# Patient Record
Sex: FEMALE | Race: BLACK OR AFRICAN AMERICAN | NOT HISPANIC OR LATINO | URBAN - METROPOLITAN AREA
[De-identification: names, ages, dates, MRNs, and addresses within clinical notes are randomized per-mention and may not be internally consistent; named-entity substitution may affect disease eponyms.]

---

## 2018-01-14 ENCOUNTER — EMERGENCY (EMERGENCY)
Facility: HOSPITAL | Age: 68
LOS: 1 days | Discharge: HOME | End: 2018-01-14
Attending: EMERGENCY MEDICINE | Admitting: EMERGENCY MEDICINE
Payer: MEDICARE

## 2018-01-14 VITALS
DIASTOLIC BLOOD PRESSURE: 79 MMHG | OXYGEN SATURATION: 98 % | SYSTOLIC BLOOD PRESSURE: 153 MMHG | RESPIRATION RATE: 16 BRPM | TEMPERATURE: 98 F | HEART RATE: 60 BPM

## 2018-01-14 DIAGNOSIS — Z98.89 OTHER SPECIFIED POSTPROCEDURAL STATES: Chronic | ICD-10-CM

## 2018-01-14 PROCEDURE — 99283 EMERGENCY DEPT VISIT LOW MDM: CPT

## 2018-01-14 RX ORDER — TRAMADOL HYDROCHLORIDE 50 MG/1
1 TABLET ORAL
Qty: 16 | Refills: 0 | OUTPATIENT
Start: 2018-01-14 | End: 2018-01-17

## 2018-01-14 NOTE — ED PROVIDER NOTE - PROGRESS NOTE DETAILS
DC with instructions:  Continue Valcyclovir as prescribed. Continue Ibuprofen 400mg every 6 hours with food as needed for pain. If pain not controlled, add TRAMADOL 50mg every 6 hours as needed. (Do Not Drive if you take this). For itch take Benadryl 25mg every 6 hours as needed. If you get eye pain or difficulty with vision return to ER

## 2018-01-14 NOTE — ED PROVIDER NOTE - PSH
C Section  x 1  History of ovarian cystectomy  x 2 in 2013  S/P Appendectomy  patient denies  S/P primary angioplasty with coronary stent  has 5 stents-first placed in 2007, last stent "I think it was 2011, more than a year ago"

## 2018-01-14 NOTE — ED PROVIDER NOTE - OBJECTIVE STATEMENT
66 y/o F w/ DM, HTN, CAD c/o itchy rash to the forehead and rt face x2wks w/ associated pain. Seen by her PMD yesterday, dx w/ shingles and started on valacyclovir and ibuprofen. Came to ED today due to difficulty sleeping last night secondary to pain, itchiness, and HA. Denies visual changes and other complaints. Regular meds are Eliquis, Norvasc, hydrochlorothiazide, atorvastatin, metoprolol, metformin, and levothyroxine. 68 y/o F w/ DM, HTN, CAD c/o itchy painful rash to the forehead and rt face over 1 week  w/ associated pain. Seen by her PMD yesterday, dx w/ shingles and started on valacyclovir and ibuprofen. Came to ED today due to difficulty sleeping last night secondary to pain, itchiness, and HA. Denies visual changes and other complaints. Regular meds are Eliquis, Norvasc, hydrochlorothiazide, atorvastatin, metoprolol, metformin, and levothyroxine.

## 2018-01-14 NOTE — ED PROVIDER NOTE - SKIN RASH DESCRIPTION
vesicular erythematous rash on rt forehead w/ some erythema extending down anterior to rt ear consistent w/ zoster rash, no rash elsewhere

## 2018-01-14 NOTE — ED PROVIDER NOTE - RIGHT FACE
vesicular erythematous rash on rt forehead w/ some erythema extending down anterior to rt ear consistent w/ zoster rash

## 2018-01-14 NOTE — ED PROVIDER NOTE - PMH
CAD (Coronary Artery Disease) (ICD9 414.00)  c STENTS  DM (Diabetes Mellitus) (ICD9 250.00)    Dyslipidemia (ICD9 272.4)    HTN (Hypertension) (ICD9 401.9)    Obesity    NACHO (obstructive sleep apnea)  pt's. daughter states pt. was told that she has NACHO but never had a sleep study; pt denies

## 2018-01-14 NOTE — ED ADULT TRIAGE NOTE - CHIEF COMPLAINT QUOTE
Patient states" I have painful rash on my right fore head and swelling  to right ear and neck since yesterday ." patient went to her doctor yesterday and was given meds for Shingles .h/o stentsx5, HTN ,DM , HLD

## 2018-01-14 NOTE — ED PROVIDER NOTE - MEDICAL DECISION MAKING DETAILS
Herpes Zoster rash in V1 distribution, no ocular involvement. Plan - continue valacyclovir, optimize analgesia, warn of danger signs, f/u PCP.

## 2018-08-31 ENCOUNTER — EMERGENCY (EMERGENCY)
Facility: HOSPITAL | Age: 68
LOS: 1 days | Discharge: HOME | End: 2018-08-31
Attending: EMERGENCY MEDICINE | Admitting: EMERGENCY MEDICINE
Payer: MEDICARE

## 2018-08-31 VITALS
OXYGEN SATURATION: 100 % | HEART RATE: 64 BPM | RESPIRATION RATE: 16 BRPM | SYSTOLIC BLOOD PRESSURE: 153 MMHG | TEMPERATURE: 98 F | DIASTOLIC BLOOD PRESSURE: 77 MMHG

## 2018-08-31 DIAGNOSIS — Z98.89 OTHER SPECIFIED POSTPROCEDURAL STATES: Chronic | ICD-10-CM

## 2018-08-31 PROCEDURE — 99284 EMERGENCY DEPT VISIT MOD MDM: CPT | Mod: 25

## 2018-08-31 NOTE — ED PROVIDER NOTE - CARE PLAN
Principal Discharge DX:	Umbilical hernia without obstruction or gangrene  Assessment and plan of treatment:	1. TAKE ALL MEDICATIONS AS DIRECTED.    2. FOR PAIN OR FEVER YOU CAN TAKE IBUPROFEN (MOTRIN, ADVIL) OR ACETAMINOPHEN (TYLENOL) AS NEEDED, AS DIRECTED ON PACKAGING.  3. FOLLOW UP WITH YOUR PRIMARY DOCTOR WITHIN 5 DAYS AS DIRECTED.  4. IF YOU HAD LABS OR IMAGING DONE, YOU WERE GIVEN COPIES OF ALL LABS AND/OR IMAGING RESULTS FROM YOUR ER VISIT--PLEASE TAKE THEM WITH YOU TO YOUR FOLLOW UP APPOINTMENTS.  5. IF NEEDED, CALL PATIENT ACCESS SERVICES AT [phone] TO FIND A PRIMARY CARE PHYSICIAN.  OR CALL [phone] TO MAKE AN APPOINTMENT WITH THE CLINIC.  6. RETURN TO THE ER FOR ANY WORSENING SYMPTOMS OR CONCERNS.

## 2018-08-31 NOTE — ED PROVIDER NOTE - OBJECTIVE STATEMENT
68F CAD (Coronary Artery Disease) (ICD9 414.00)  c STENTS  DM (Diabetes Mellitus) (ICD9 250.00)    Dyslipidemia (ICD9 272.4)    HTN (Hypertension) (ICD9 401.9)    Obesity    NACHO 68F CAD, DM, HLD, HTN, NACHO presents with pain in the umbilicus worsening for 2 days. History of abdominal surgery 2 years ago. Also noted b/l leg swelling since the beginning of summer. Denies fever, chills, N/V/D. PMD told her to come to ER. No other complaints.

## 2018-08-31 NOTE — ED PROVIDER NOTE - MEDICAL DECISION MAKING DETAILS
68F p/w periumbilical pain and leg swelling b/l. Possible umbilical hernia/abscess though no fever. Leg swelling likely from venous stasis no signs of DVT. Will get labs, CT abd pel. Admit if sig findings.

## 2018-08-31 NOTE — ED PROVIDER NOTE - PLAN OF CARE
1. TAKE ALL MEDICATIONS AS DIRECTED.    2. FOR PAIN OR FEVER YOU CAN TAKE IBUPROFEN (MOTRIN, ADVIL) OR ACETAMINOPHEN (TYLENOL) AS NEEDED, AS DIRECTED ON PACKAGING.  3. FOLLOW UP WITH YOUR PRIMARY DOCTOR WITHIN 5 DAYS AS DIRECTED.  4. IF YOU HAD LABS OR IMAGING DONE, YOU WERE GIVEN COPIES OF ALL LABS AND/OR IMAGING RESULTS FROM YOUR ER VISIT--PLEASE TAKE THEM WITH YOU TO YOUR FOLLOW UP APPOINTMENTS.  5. IF NEEDED, CALL PATIENT ACCESS SERVICES AT [phone] TO FIND A PRIMARY CARE PHYSICIAN.  OR CALL [phone] TO MAKE AN APPOINTMENT WITH THE CLINIC.  6. RETURN TO THE ER FOR ANY WORSENING SYMPTOMS OR CONCERNS.

## 2018-08-31 NOTE — ED ADULT TRIAGE NOTE - CHIEF COMPLAINT QUOTE
Abdominal pain today sent by MD. Denies n/v/d. States no BM since Monday. Denies CP, SOB Hx 5 stents on eliquis Abdominal pain today sent by MD. Denies n/v/d. States no BM since Monday. Also endorsing swelling to blle. Denies CP, SOB Hx 5 stents on eliquis

## 2018-08-31 NOTE — ED PROVIDER NOTE - PHYSICAL EXAMINATION
GEN - NAD; well appearing; A+O x3 Obese  HEAD - NC/AT     ENT - PEERL, EOMI, mucous membranes  moist , no discharge      NECK: Neck supple, non-tender without lymphadenopathy, no masses, no JVD  PULM - CTA b/l,  symmetric breath sounds  COR -  normal heart sounds    ABD - , ND, periumbilical ttp, small umbilical abd wall defect palpable, soft,  BACK - no CVA tenderness, nontender spine     EXTREMS - (+)nonpitting edema, no deformity, warm and well perfused    SKIN - no rash or bruising      NEUROLOGIC - alert, CN 2-12 intact, sensation nl, motor no focal deficit.

## 2018-08-31 NOTE — ED PROVIDER NOTE - ATTENDING CONTRIBUTION TO CARE
68F p/w umbilical pain, also c/o bilat LE swelling, worse when it is hot; told PMD about umbilicus pain, sent to ED.  H/o “cysts in abdomen” surgery in the past, no vomiting no diarrhea, no fever.  Pt obese, bilat nonpitting edema.  No masses, reports umbilical ttp. Potentially umbilical hernia - fat containing, as pt without obstructive sx.  Also check LE for DVTs with duplexes.  Reassess.  Liklely d/c home f/u PMD.   VS:  unremarkable except htn    GEN - NAD; well appearing; A+O x3 Obese  HEAD - NC/AT     ENT - PEERL, EOMI, mucous membranes  moist , no discharge      NECK: Neck supple, non-tender without lymphadenopathy, no masses, no JVD  PULM - CTA b/l,  symmetric breath sounds  COR -  normal heart sounds    ABD - , ND, periumbilical ttp, small umbilical abd wall defect palpable, soft,  BACK - no CVA tenderness, nontender spine     EXTREMS - (+)nonpitting edema, no deformity, warm and well perfused    SKIN - no rash or bruising      NEUROLOGIC - alert, CN 2-12 intact, sensation nl, motor no focal deficit.

## 2018-09-01 VITALS
TEMPERATURE: 98 F | RESPIRATION RATE: 18 BRPM | OXYGEN SATURATION: 99 % | SYSTOLIC BLOOD PRESSURE: 160 MMHG | HEART RATE: 57 BPM | DIASTOLIC BLOOD PRESSURE: 70 MMHG

## 2018-09-01 LAB
ALBUMIN SERPL ELPH-MCNC: 3.9 G/DL — SIGNIFICANT CHANGE UP (ref 3.3–5)
ALP SERPL-CCNC: 134 U/L — HIGH (ref 40–120)
ALT FLD-CCNC: 13 U/L — SIGNIFICANT CHANGE UP (ref 4–33)
APPEARANCE UR: CLEAR — SIGNIFICANT CHANGE UP
AST SERPL-CCNC: 17 U/L — SIGNIFICANT CHANGE UP (ref 4–32)
BASE EXCESS BLDV CALC-SCNC: 4.1 MMOL/L — SIGNIFICANT CHANGE UP
BASOPHILS # BLD AUTO: 0.04 K/UL — SIGNIFICANT CHANGE UP (ref 0–0.2)
BASOPHILS NFR BLD AUTO: 0.7 % — SIGNIFICANT CHANGE UP (ref 0–2)
BILIRUB SERPL-MCNC: < 0.2 MG/DL — LOW (ref 0.2–1.2)
BILIRUB UR-MCNC: NEGATIVE — SIGNIFICANT CHANGE UP
BLOOD GAS VENOUS - CREATININE: 0.59 MG/DL — SIGNIFICANT CHANGE UP (ref 0.5–1.3)
BLOOD UR QL VISUAL: NEGATIVE — SIGNIFICANT CHANGE UP
BUN SERPL-MCNC: 15 MG/DL — SIGNIFICANT CHANGE UP (ref 7–23)
CALCIUM SERPL-MCNC: 9.2 MG/DL — SIGNIFICANT CHANGE UP (ref 8.4–10.5)
CHLORIDE BLDV-SCNC: 105 MMOL/L — SIGNIFICANT CHANGE UP (ref 96–108)
CHLORIDE SERPL-SCNC: 102 MMOL/L — SIGNIFICANT CHANGE UP (ref 98–107)
CO2 SERPL-SCNC: 24 MMOL/L — SIGNIFICANT CHANGE UP (ref 22–31)
COLOR SPEC: COLORLESS — SIGNIFICANT CHANGE UP
CREAT SERPL-MCNC: 0.69 MG/DL — SIGNIFICANT CHANGE UP (ref 0.5–1.3)
EOSINOPHIL # BLD AUTO: 0.09 K/UL — SIGNIFICANT CHANGE UP (ref 0–0.5)
EOSINOPHIL NFR BLD AUTO: 1.5 % — SIGNIFICANT CHANGE UP (ref 0–6)
GAS PNL BLDV: 139 MMOL/L — SIGNIFICANT CHANGE UP (ref 136–146)
GLUCOSE BLDV-MCNC: 231 — HIGH (ref 70–99)
GLUCOSE SERPL-MCNC: 238 MG/DL — HIGH (ref 70–99)
GLUCOSE UR-MCNC: NEGATIVE — SIGNIFICANT CHANGE UP
HCO3 BLDV-SCNC: 28 MMOL/L — HIGH (ref 20–27)
HCT VFR BLD CALC: 33.3 % — LOW (ref 34.5–45)
HCT VFR BLDV CALC: 32.2 % — LOW (ref 34.5–45)
HGB BLD-MCNC: 10.2 G/DL — LOW (ref 11.5–15.5)
HGB BLDV-MCNC: 10.4 G/DL — LOW (ref 11.5–15.5)
IMM GRANULOCYTES # BLD AUTO: 0.01 # — SIGNIFICANT CHANGE UP
IMM GRANULOCYTES NFR BLD AUTO: 0.2 % — SIGNIFICANT CHANGE UP (ref 0–1.5)
KETONES UR-MCNC: NEGATIVE — SIGNIFICANT CHANGE UP
LACTATE BLDV-MCNC: 2.1 MMOL/L — HIGH (ref 0.5–2)
LEUKOCYTE ESTERASE UR-ACNC: NEGATIVE — SIGNIFICANT CHANGE UP
LIDOCAIN IGE QN: 33.6 U/L — SIGNIFICANT CHANGE UP (ref 7–60)
LYMPHOCYTES # BLD AUTO: 2.47 K/UL — SIGNIFICANT CHANGE UP (ref 1–3.3)
LYMPHOCYTES # BLD AUTO: 40.5 % — SIGNIFICANT CHANGE UP (ref 13–44)
MCHC RBC-ENTMCNC: 23.4 PG — LOW (ref 27–34)
MCHC RBC-ENTMCNC: 30.6 % — LOW (ref 32–36)
MCV RBC AUTO: 76.4 FL — LOW (ref 80–100)
MONOCYTES # BLD AUTO: 0.32 K/UL — SIGNIFICANT CHANGE UP (ref 0–0.9)
MONOCYTES NFR BLD AUTO: 5.2 % — SIGNIFICANT CHANGE UP (ref 2–14)
NEUTROPHILS # BLD AUTO: 3.17 K/UL — SIGNIFICANT CHANGE UP (ref 1.8–7.4)
NEUTROPHILS NFR BLD AUTO: 51.9 % — SIGNIFICANT CHANGE UP (ref 43–77)
NITRITE UR-MCNC: NEGATIVE — SIGNIFICANT CHANGE UP
NRBC # FLD: 0 — SIGNIFICANT CHANGE UP
PCO2 BLDV: 47 MMHG — SIGNIFICANT CHANGE UP (ref 41–51)
PH BLDV: 7.4 PH — SIGNIFICANT CHANGE UP (ref 7.32–7.43)
PH UR: 7.5 — SIGNIFICANT CHANGE UP (ref 5–8)
PLATELET # BLD AUTO: 234 K/UL — SIGNIFICANT CHANGE UP (ref 150–400)
PMV BLD: 11 FL — SIGNIFICANT CHANGE UP (ref 7–13)
PO2 BLDV: 58 MMHG — HIGH (ref 35–40)
POTASSIUM BLDV-SCNC: 3.1 MMOL/L — LOW (ref 3.4–4.5)
POTASSIUM SERPL-MCNC: 3.5 MMOL/L — SIGNIFICANT CHANGE UP (ref 3.5–5.3)
POTASSIUM SERPL-SCNC: 3.5 MMOL/L — SIGNIFICANT CHANGE UP (ref 3.5–5.3)
PROT SERPL-MCNC: 7.5 G/DL — SIGNIFICANT CHANGE UP (ref 6–8.3)
PROT UR-MCNC: NEGATIVE — SIGNIFICANT CHANGE UP
RBC # BLD: 4.36 M/UL — SIGNIFICANT CHANGE UP (ref 3.8–5.2)
RBC # FLD: 15.9 % — HIGH (ref 10.3–14.5)
SAO2 % BLDV: 88.3 % — HIGH (ref 60–85)
SODIUM SERPL-SCNC: 141 MMOL/L — SIGNIFICANT CHANGE UP (ref 135–145)
SP GR SPEC: 1.02 — SIGNIFICANT CHANGE UP (ref 1–1.04)
UROBILINOGEN FLD QL: NORMAL — SIGNIFICANT CHANGE UP
WBC # BLD: 6.1 K/UL — SIGNIFICANT CHANGE UP (ref 3.8–10.5)
WBC # FLD AUTO: 6.1 K/UL — SIGNIFICANT CHANGE UP (ref 3.8–10.5)

## 2018-09-01 PROCEDURE — 74177 CT ABD & PELVIS W/CONTRAST: CPT | Mod: 26

## 2018-09-01 NOTE — ED ADULT NURSE NOTE - CHIEF COMPLAINT QUOTE
Abdominal pain today sent by MD. Denies n/v/d. States no BM since Monday. Also endorsing swelling to blle. Denies CP, SOB Hx 5 stents on eliquis

## 2018-09-01 NOTE — ED ADULT NURSE NOTE - NSIMPLEMENTINTERV_GEN_ALL_ED
Implemented All Universal Safety Interventions:  [place] to call system. Call bell, personal items and telephone within reach. Instruct patient to call for assistance. Room bathroom lighting operational. Non-slip footwear when patient is off stretcher. Physically safe environment: no spills, clutter or unnecessary equipment. Stretcher in lowest position, wheels locked, appropriate side rails in place.

## 2018-09-01 NOTE — ED ADULT NURSE NOTE - OBJECTIVE STATEMENT
Pt received A&Ox4 to ED Rm 19 with c/o non-radiating umbilical pain & constipation x 5d. Also c/o BLE swelling x 3 months. Ambulates at baseline with cane. Also c/o "pain all over body - taking tylenol with codeine & not working." Denies SOB, N/V, fevers. RR equal & unlabored. Labs sent per MD orders. Awaiting CT. Will monitor.

## 2019-04-10 VITALS
TEMPERATURE: 97 F | HEIGHT: 65 IN | OXYGEN SATURATION: 100 % | RESPIRATION RATE: 17 BRPM | SYSTOLIC BLOOD PRESSURE: 175 MMHG | WEIGHT: 220.02 LBS | HEART RATE: 60 BPM | DIASTOLIC BLOOD PRESSURE: 70 MMHG

## 2019-04-10 NOTE — ASU PATIENT PROFILE, ADULT - PSH
C Section  x 1  History of hysterectomy    History of ovarian cystectomy  x 2 in 2013  S/P primary angioplasty with coronary stent  has 5 stents-first placed in 2007, last stent -2011,

## 2019-04-10 NOTE — ASU PREOP CHECKLIST - 1.
[id number]  Anesthesiologist [id number]   Anesthesiologist and Dr. Bernstein aware will treat patient in OR

## 2019-04-10 NOTE — ASU PATIENT PROFILE, ADULT - PMH
CAD (Coronary Artery Disease) (ICD9 414.00)  c STENTS  DM (Diabetes Mellitus) (ICD9 250.00)    Dyslipidemia (ICD9 272.4)    HTN (Hypertension) (ICD9 401.9)    Hypothyroid    Obesity    NACHO (obstructive sleep apnea)  sleep study done 5 years ago

## 2019-04-11 ENCOUNTER — RESULT REVIEW (OUTPATIENT)
Age: 69
End: 2019-04-11

## 2019-04-11 ENCOUNTER — OUTPATIENT (OUTPATIENT)
Dept: OUTPATIENT SERVICES | Facility: HOSPITAL | Age: 69
LOS: 1 days | Discharge: HOME | End: 2019-04-11
Payer: MEDICARE

## 2019-04-11 VITALS
OXYGEN SATURATION: 91 % | SYSTOLIC BLOOD PRESSURE: 120 MMHG | RESPIRATION RATE: 22 BRPM | HEART RATE: 62 BPM | DIASTOLIC BLOOD PRESSURE: 64 MMHG

## 2019-04-11 DIAGNOSIS — Z90.710 ACQUIRED ABSENCE OF BOTH CERVIX AND UTERUS: Chronic | ICD-10-CM

## 2019-04-11 DIAGNOSIS — Z98.89 OTHER SPECIFIED POSTPROCEDURAL STATES: Chronic | ICD-10-CM

## 2019-04-11 LAB
GLUCOSE BLDC GLUCOMTR-MCNC: 233 MG/DL — HIGH (ref 70–99)
GLUCOSE BLDC GLUCOMTR-MCNC: 240 MG/DL — HIGH (ref 70–99)
GLUCOSE BLDC GLUCOMTR-MCNC: 262 MG/DL — HIGH (ref 70–99)

## 2019-04-11 PROCEDURE — 49561: CPT

## 2019-04-11 PROCEDURE — 88302 TISSUE EXAM BY PATHOLOGIST: CPT

## 2019-04-11 PROCEDURE — 82962 GLUCOSE BLOOD TEST: CPT

## 2019-04-11 PROCEDURE — 49568: CPT

## 2019-04-11 RX ORDER — DEXTROSE 50 % IN WATER 50 %
12.5 SYRINGE (ML) INTRAVENOUS ONCE
Qty: 0 | Refills: 0 | Status: DISCONTINUED | OUTPATIENT
Start: 2019-04-11 | End: 2019-04-11

## 2019-04-11 RX ORDER — DEXTROSE 50 % IN WATER 50 %
15 SYRINGE (ML) INTRAVENOUS ONCE
Qty: 0 | Refills: 0 | Status: DISCONTINUED | OUTPATIENT
Start: 2019-04-11 | End: 2019-04-11

## 2019-04-11 RX ORDER — OXYCODONE HYDROCHLORIDE 5 MG/1
10 TABLET ORAL EVERY 4 HOURS
Qty: 0 | Refills: 0 | Status: DISCONTINUED | OUTPATIENT
Start: 2019-04-11 | End: 2019-04-11

## 2019-04-11 RX ORDER — INSULIN LISPRO 100/ML
VIAL (ML) SUBCUTANEOUS
Qty: 0 | Refills: 0 | Status: DISCONTINUED | OUTPATIENT
Start: 2019-04-11 | End: 2019-04-11

## 2019-04-11 RX ORDER — OXYCODONE HYDROCHLORIDE 5 MG/1
5 TABLET ORAL EVERY 4 HOURS
Qty: 0 | Refills: 0 | Status: DISCONTINUED | OUTPATIENT
Start: 2019-04-11 | End: 2019-04-11

## 2019-04-11 RX ORDER — DEXTROSE 50 % IN WATER 50 %
25 SYRINGE (ML) INTRAVENOUS ONCE
Qty: 0 | Refills: 0 | Status: DISCONTINUED | OUTPATIENT
Start: 2019-04-11 | End: 2019-04-11

## 2019-04-11 RX ORDER — SODIUM CHLORIDE 9 MG/ML
1000 INJECTION, SOLUTION INTRAVENOUS
Qty: 0 | Refills: 0 | Status: DISCONTINUED | OUTPATIENT
Start: 2019-04-11 | End: 2019-04-11

## 2019-04-11 RX ORDER — METOPROLOL TARTRATE 50 MG
5 TABLET ORAL
Qty: 0 | Refills: 0 | Status: DISCONTINUED | OUTPATIENT
Start: 2019-04-11 | End: 2019-04-11

## 2019-04-11 RX ORDER — GLUCAGON INJECTION, SOLUTION 0.5 MG/.1ML
1 INJECTION, SOLUTION SUBCUTANEOUS ONCE
Qty: 0 | Refills: 0 | Status: DISCONTINUED | OUTPATIENT
Start: 2019-04-11 | End: 2019-04-11

## 2019-04-11 RX ORDER — ONDANSETRON 8 MG/1
4 TABLET, FILM COATED ORAL EVERY 6 HOURS
Qty: 0 | Refills: 0 | Status: DISCONTINUED | OUTPATIENT
Start: 2019-04-11 | End: 2019-04-11

## 2019-04-11 RX ADMIN — Medication 6: at 09:51

## 2019-04-11 RX ADMIN — Medication 5 MILLIGRAM(S): at 09:48

## 2019-04-11 RX ADMIN — ONDANSETRON 4 MILLIGRAM(S): 8 TABLET, FILM COATED ORAL at 11:07

## 2019-04-11 NOTE — BRIEF OPERATIVE NOTE - OPERATION/FINDINGS
Preop Dx: Umbilical Hernia  Postop Dx: Same as above  Procedure: Umbilical Hernia Repair  Findings: Small (~5mm) Umbilical hernia with incarcerated preperitoneal fat noted. The incarcerated fat was excised. Hernia repaired with 2x interrupted figure of eight 0-maxxon sutures.

## 2019-04-17 LAB — SURGICAL PATHOLOGY STUDY: SIGNIFICANT CHANGE UP

## 2019-04-18 ENCOUNTER — INPATIENT (INPATIENT)
Facility: HOSPITAL | Age: 69
LOS: 18 days | Discharge: HOME | DRG: 25 | End: 2019-05-07
Attending: PSYCHIATRY & NEUROLOGY | Admitting: PSYCHIATRY & NEUROLOGY
Payer: COMMERCIAL

## 2019-04-18 VITALS
SYSTOLIC BLOOD PRESSURE: 197 MMHG | OXYGEN SATURATION: 95 % | HEART RATE: 55 BPM | DIASTOLIC BLOOD PRESSURE: 110 MMHG | RESPIRATION RATE: 16 BRPM

## 2019-04-18 DIAGNOSIS — T14.90XA INJURY, UNSPECIFIED, INITIAL ENCOUNTER: ICD-10-CM

## 2019-04-18 DIAGNOSIS — Z90.710 ACQUIRED ABSENCE OF BOTH CERVIX AND UTERUS: Chronic | ICD-10-CM

## 2019-04-18 DIAGNOSIS — Z98.89 OTHER SPECIFIED POSTPROCEDURAL STATES: Chronic | ICD-10-CM

## 2019-04-18 PROBLEM — E03.9 HYPOTHYROIDISM, UNSPECIFIED: Chronic | Status: ACTIVE | Noted: 2019-04-10

## 2019-04-18 LAB
ALBUMIN SERPL ELPH-MCNC: 3.9 G/DL — SIGNIFICANT CHANGE UP (ref 3.3–5)
ALP SERPL-CCNC: 152 U/L — HIGH (ref 40–120)
ALT FLD-CCNC: 17 U/L — SIGNIFICANT CHANGE UP (ref 10–45)
ANION GAP SERPL CALC-SCNC: 17 MMOL/L — SIGNIFICANT CHANGE UP (ref 5–17)
APTT BLD: 26.2 SEC — LOW (ref 27.5–36.3)
AST SERPL-CCNC: 36 U/L — SIGNIFICANT CHANGE UP (ref 10–40)
BILIRUB SERPL-MCNC: 0.3 MG/DL — SIGNIFICANT CHANGE UP (ref 0.2–1.2)
BLD GP AB SCN SERPL QL: NEGATIVE — SIGNIFICANT CHANGE UP
BUN SERPL-MCNC: 12 MG/DL — SIGNIFICANT CHANGE UP (ref 7–23)
CALCIUM SERPL-MCNC: 10.1 MG/DL — SIGNIFICANT CHANGE UP (ref 8.4–10.5)
CHLORIDE SERPL-SCNC: 96 MMOL/L — SIGNIFICANT CHANGE UP (ref 96–108)
CK SERPL-CCNC: 115 U/L — SIGNIFICANT CHANGE UP (ref 25–170)
CO2 SERPL-SCNC: 25 MMOL/L — SIGNIFICANT CHANGE UP (ref 22–31)
CREAT SERPL-MCNC: 0.47 MG/DL — LOW (ref 0.5–1.3)
GLUCOSE BLDC GLUCOMTR-MCNC: 157 MG/DL — HIGH (ref 70–99)
GLUCOSE BLDC GLUCOMTR-MCNC: 202 MG/DL — HIGH (ref 70–99)
GLUCOSE SERPL-MCNC: 204 MG/DL — HIGH (ref 70–99)
HCT VFR BLD CALC: 34 % — LOW (ref 34.5–45)
HGB BLD-MCNC: 11.1 G/DL — LOW (ref 11.5–15.5)
INR BLD: 1.31 RATIO — HIGH (ref 0.88–1.16)
LIDOCAIN IGE QN: 33 U/L — SIGNIFICANT CHANGE UP (ref 7–60)
MCHC RBC-ENTMCNC: 25.1 PG — LOW (ref 27–34)
MCHC RBC-ENTMCNC: 32.5 GM/DL — SIGNIFICANT CHANGE UP (ref 32–36)
MCV RBC AUTO: 77.2 FL — LOW (ref 80–100)
PLATELET # BLD AUTO: 194 K/UL — SIGNIFICANT CHANGE UP (ref 150–400)
POTASSIUM SERPL-MCNC: 4.6 MMOL/L — SIGNIFICANT CHANGE UP (ref 3.5–5.3)
POTASSIUM SERPL-SCNC: 4.6 MMOL/L — SIGNIFICANT CHANGE UP (ref 3.5–5.3)
PROT SERPL-MCNC: 7.9 G/DL — SIGNIFICANT CHANGE UP (ref 6–8.3)
PROTHROM AB SERPL-ACNC: 15.2 SEC — HIGH (ref 10–12.9)
RBC # BLD: 4.41 M/UL — SIGNIFICANT CHANGE UP (ref 3.8–5.2)
RBC # FLD: 13.8 % — SIGNIFICANT CHANGE UP (ref 10.3–14.5)
RH IG SCN BLD-IMP: POSITIVE — SIGNIFICANT CHANGE UP
SODIUM SERPL-SCNC: 138 MMOL/L — SIGNIFICANT CHANGE UP (ref 135–145)
TROPONIN T, HIGH SENSITIVITY RESULT: 9 NG/L — SIGNIFICANT CHANGE UP (ref 0–51)
WBC # BLD: 10.2 K/UL — SIGNIFICANT CHANGE UP (ref 3.8–10.5)
WBC # FLD AUTO: 10.2 K/UL — SIGNIFICANT CHANGE UP (ref 3.8–10.5)

## 2019-04-18 PROCEDURE — 74177 CT ABD & PELVIS W/CONTRAST: CPT | Mod: 26

## 2019-04-18 PROCEDURE — 71260 CT THORAX DX C+: CPT | Mod: 26

## 2019-04-18 PROCEDURE — 31500 INSERT EMERGENCY AIRWAY: CPT

## 2019-04-18 PROCEDURE — 70450 CT HEAD/BRAIN W/O DYE: CPT | Mod: 26

## 2019-04-18 PROCEDURE — 95951: CPT | Mod: 26

## 2019-04-18 PROCEDURE — 72125 CT NECK SPINE W/O DYE: CPT | Mod: 26

## 2019-04-18 PROCEDURE — 71045 X-RAY EXAM CHEST 1 VIEW: CPT | Mod: 26

## 2019-04-18 PROCEDURE — 71045 X-RAY EXAM CHEST 1 VIEW: CPT | Mod: 26,77

## 2019-04-18 PROCEDURE — 99291 CRITICAL CARE FIRST HOUR: CPT

## 2019-04-18 PROCEDURE — 95819 EEG AWAKE AND ASLEEP: CPT | Mod: 26

## 2019-04-18 PROCEDURE — 99291 CRITICAL CARE FIRST HOUR: CPT | Mod: 25

## 2019-04-18 RX ORDER — PROPOFOL 10 MG/ML
20 INJECTION, EMULSION INTRAVENOUS ONCE
Refills: 0 | Status: COMPLETED | OUTPATIENT
Start: 2019-04-18 | End: 2019-04-18

## 2019-04-18 RX ORDER — PROPOFOL 10 MG/ML
10 INJECTION, EMULSION INTRAVENOUS
Qty: 1000 | Refills: 0 | Status: DISCONTINUED | OUTPATIENT
Start: 2019-04-18 | End: 2019-04-19

## 2019-04-18 RX ORDER — METOPROLOL TARTRATE 50 MG
5 TABLET ORAL EVERY 6 HOURS
Refills: 0 | Status: DISCONTINUED | OUTPATIENT
Start: 2019-04-18 | End: 2019-04-20

## 2019-04-18 RX ORDER — CHLORHEXIDINE GLUCONATE 213 G/1000ML
1 SOLUTION TOPICAL
Refills: 0 | Status: DISCONTINUED | OUTPATIENT
Start: 2019-04-18 | End: 2019-04-23

## 2019-04-18 RX ORDER — LEVETIRACETAM 250 MG/1
2000 TABLET, FILM COATED ORAL ONCE
Refills: 0 | Status: COMPLETED | OUTPATIENT
Start: 2019-04-18 | End: 2019-04-18

## 2019-04-18 RX ORDER — CHLORHEXIDINE GLUCONATE 213 G/1000ML
15 SOLUTION TOPICAL
Refills: 0 | Status: DISCONTINUED | OUTPATIENT
Start: 2019-04-18 | End: 2019-04-23

## 2019-04-18 RX ORDER — ETOMIDATE 2 MG/ML
30 INJECTION INTRAVENOUS ONCE
Refills: 0 | Status: COMPLETED | OUTPATIENT
Start: 2019-04-18 | End: 2019-04-18

## 2019-04-18 RX ORDER — LEVOTHYROXINE SODIUM 125 MCG
25 TABLET ORAL AT BEDTIME
Refills: 0 | Status: DISCONTINUED | OUTPATIENT
Start: 2019-04-18 | End: 2019-04-23

## 2019-04-18 RX ORDER — SODIUM CHLORIDE 9 MG/ML
1000 INJECTION INTRAMUSCULAR; INTRAVENOUS; SUBCUTANEOUS
Refills: 0 | Status: DISCONTINUED | OUTPATIENT
Start: 2019-04-18 | End: 2019-04-21

## 2019-04-18 RX ORDER — SODIUM CHLORIDE 9 MG/ML
1000 INJECTION INTRAMUSCULAR; INTRAVENOUS; SUBCUTANEOUS ONCE
Refills: 0 | Status: COMPLETED | OUTPATIENT
Start: 2019-04-18 | End: 2019-04-18

## 2019-04-18 RX ORDER — INSULIN LISPRO 100/ML
VIAL (ML) SUBCUTANEOUS EVERY 4 HOURS
Refills: 0 | Status: DISCONTINUED | OUTPATIENT
Start: 2019-04-18 | End: 2019-04-20

## 2019-04-18 RX ORDER — HYDRALAZINE HCL 50 MG
10 TABLET ORAL ONCE
Refills: 0 | Status: COMPLETED | OUTPATIENT
Start: 2019-04-18 | End: 2019-04-18

## 2019-04-18 RX ORDER — PANTOPRAZOLE SODIUM 20 MG/1
40 TABLET, DELAYED RELEASE ORAL DAILY
Refills: 0 | Status: DISCONTINUED | OUTPATIENT
Start: 2019-04-18 | End: 2019-04-21

## 2019-04-18 RX ORDER — SUCCINYLCHOLINE CHLORIDE 100 MG/5ML
100 SYRINGE (ML) INTRAVENOUS ONCE
Refills: 0 | Status: COMPLETED | OUTPATIENT
Start: 2019-04-18 | End: 2019-04-18

## 2019-04-18 RX ORDER — LEVETIRACETAM 250 MG/1
750 TABLET, FILM COATED ORAL EVERY 12 HOURS
Refills: 0 | Status: DISCONTINUED | OUTPATIENT
Start: 2019-04-18 | End: 2019-04-19

## 2019-04-18 RX ADMIN — CHLORHEXIDINE GLUCONATE 15 MILLILITER(S): 213 SOLUTION TOPICAL at 17:17

## 2019-04-18 RX ADMIN — Medication 2: at 21:36

## 2019-04-18 RX ADMIN — Medication 4: at 17:17

## 2019-04-18 RX ADMIN — SODIUM CHLORIDE 100 MILLILITER(S): 9 INJECTION INTRAMUSCULAR; INTRAVENOUS; SUBCUTANEOUS at 21:02

## 2019-04-18 RX ADMIN — Medication 5 MILLIGRAM(S): at 23:57

## 2019-04-18 RX ADMIN — PROPOFOL 20 MILLIGRAM(S): 10 INJECTION, EMULSION INTRAVENOUS at 15:56

## 2019-04-18 RX ADMIN — PROPOFOL 5.4 MICROGRAM(S)/KG/MIN: 10 INJECTION, EMULSION INTRAVENOUS at 21:02

## 2019-04-18 RX ADMIN — Medication 25 MICROGRAM(S): at 22:22

## 2019-04-18 RX ADMIN — LEVETIRACETAM 600 MILLIGRAM(S): 250 TABLET, FILM COATED ORAL at 23:01

## 2019-04-18 RX ADMIN — CHLORHEXIDINE GLUCONATE 1 APPLICATION(S): 213 SOLUTION TOPICAL at 17:19

## 2019-04-18 RX ADMIN — Medication 100 MILLIGRAM(S): at 15:56

## 2019-04-18 RX ADMIN — Medication 10 MILLIGRAM(S): at 20:43

## 2019-04-18 RX ADMIN — PANTOPRAZOLE SODIUM 40 MILLIGRAM(S): 20 TABLET, DELAYED RELEASE ORAL at 18:21

## 2019-04-18 RX ADMIN — PROPOFOL 5.4 MICROGRAM(S)/KG/MIN: 10 INJECTION, EMULSION INTRAVENOUS at 15:57

## 2019-04-18 RX ADMIN — ETOMIDATE 30 MILLIGRAM(S): 2 INJECTION INTRAVENOUS at 15:56

## 2019-04-18 RX ADMIN — SODIUM CHLORIDE 1000 MILLILITER(S): 9 INJECTION INTRAMUSCULAR; INTRAVENOUS; SUBCUTANEOUS at 15:56

## 2019-04-18 NOTE — ED PROVIDER NOTE - ATTENDING CONTRIBUTION TO CARE
Dr. Vasquez : I have personally seen and examined this patient at the bedside. I have fully participated in the care of this patient. I have reviewed all pertinent clinical information, including history, physical exam, plan and the Resident's note and agree except as noted.     68yo F hx of a cad stents htn dm on eliquis pw ams sp fall. as per family wnl today. unwitnessed fall non communicative after just moaning but following some commands. at baseline talks in full sentences. + head trauma + vomiting.  Family Denies f/c/cp/sob/palpitations/cough/abd.pain/d/c/dysuria/hematuria. no sick contacts/recent travel.     PE:  head; atraumatic normocephalic  eyes: perrla;  Heart: rrr s1s2  lungs: ctab  abd: soft, nt nd + bs no rebound/guarding no cva ttp  le: no swelling no calf ttp  back: no midline cervical/thoracic/lumbar ttp  neuro: moving all extremeties intermittenly following commands moaning right sided gaze deviation GSC 10    -->level 2 activation intinially upgraded to level 1 as pt non communicative unsafe to go to ct--needed intubation--difficulty getting periphral lines; was pre oxygenated; also IO placed--pt seized meantime was given 2mg IM ativan; intubated first pass; no secondary trauma--ct head neck abd chest--admit to SICU Dr. Vasquez : I have personally seen and examined this patient at the bedside. I have fully participated in the care of this patient. I have reviewed all pertinent clinical information, including history, physical exam, plan and the Resident's note and agree except as noted.     68yo F hx of a cad stents htn dm on eliquis pw ams sp fall. as per family wnl today. unwitnessed fall non communicative after just moaning but following some commands. at baseline talks in full sentences. + head trauma + vomiting.  Family Denies f/c/cp/sob/palpitations/cough/abd.pain/d/c/dysuria/hematuria. no sick contacts/recent travel.     PE:  head; atraumatic normocephalic  eyes: perrla;  Heart: rrr s1s2  lungs: ctab  abd: soft, nt nd + bs no rebound/guarding no cva ttp  le: no swelling no calf ttp  back: no midline cervical/thoracic/lumbar ttp  neuro: moving all extremeties intermittenly following commands moaning right sided gaze deviation GSC 10    -->level 2 activation intinially upgraded to level 1 as pt non communicative unsafe to go to ct--needed intubation--difficulty getting periphral lines; was pre oxygenated; also IO placed by surgery attending--pt seized meantime was given 2mg IM ativan; intubated first pass; no secondary trauma--ct head neck abd chest--admit to SICU

## 2019-04-18 NOTE — H&P ADULT - NSICDXPASTMEDICALHX_GEN_ALL_CORE_FT
PAST MEDICAL HISTORY:  CAD (Coronary Artery Disease) (ICD9 414.00) c STENTS    DM (Diabetes Mellitus) (ICD9 250.00)     Dyslipidemia (ICD9 272.4)     HTN (Hypertension) (ICD9 401.9)     Hypothyroid     Obesity     NACHO (obstructive sleep apnea) sleep study done 5 years ago

## 2019-04-18 NOTE — H&P ADULT - ASSESSMENT
ASSESSMENT:   69F w/ CAD, DM, HTN, HLD, hypothyroid, on eliquis, ASA, s/p umbilical hernia repair ~2 weeks ago, presents as level 1 trauma activation. Primary survey initially intact, GCS 10.  Patient noted to be more confused, airway re-assessed with concern that patient not adequately protecting airway. Patient urgently intubated for airway protection with adequate end tidal CO2. Attempted left femoral central line but limited 2/2 habitus. IO placed. Secondary survey without evidence of external injury.    - Admit to trauma surgery service under Dr. Martinez  - SICU admission  - Ventilation  - Propofol in ED for sedation  - NPO + IVF  - Pending CT head, C/S, CAP  - CXR  - Pending trauma labs    Patient evaluated with Dr. Juan Jenkins [facility] PGY-2  Surgery Pager [phone] ASSESSMENT:   69F w/ CAD, DM, HTN, HLD, hypothyroid, on eliquis, ASA, s/p umbilical hernia repair ~2 weeks ago, presents as level 1 trauma activation. Primary survey initially intact, GCS 10.  Patient noted to be more confused, airway re-assessed with concern that patient not adequately protecting airway. Patient urgently intubated for airway protection with adequate end tidal CO2. Attempted left femoral central line but limited 2/2 habitus.  IO placed. Seizure in trauma bay, resolved w/ ativan. Secondary survey without evidence of external injury.    - Admit to trauma surgery service under Dr. Martinez  - SICU admission  - Ventilation  - Propofol in ED for sedation  - NPO + IVF  - Pending CT head, C/S, CAP  - CXR  - Pending trauma labs    Patient evaluated with Dr. Juan Jenkins [facility] PGY-2  Surgery Pager [phone]

## 2019-04-18 NOTE — ED ADULT NURSE NOTE - NSIMPLEMENTINTERV_GEN_ALL_ED
Implemented All Fall with Harm Risk Interventions:  [place] to call system. Call bell, personal items and telephone within reach. Instruct patient to call for assistance. Room bathroom lighting operational. Non-slip footwear when patient is off stretcher. Physically safe environment: no spills, clutter or unnecessary equipment. Stretcher in lowest position, wheels locked, appropriate side rails in place. Provide visual cue, wrist band, yellow gown, etc. Monitor gait and stability. Monitor for mental status changes and reorient to person, place, and time. Review medications for side effects contributing to fall risk. Reinforce activity limits and safety measures with patient and family. Provide visual clues: red socks.

## 2019-04-18 NOTE — H&P ADULT - NSICDXFAMILYHX_GEN_ALL_CORE_FT
FAMILY HISTORY:  Aunt  Still living? Unknown  Family history of heart disease, Age at diagnosis: Age Unknown

## 2019-04-18 NOTE — ED PROVIDER NOTE - OBJECTIVE STATEMENT
70yo female PMh DM, HTN, hypothyroid, obesity, on Eliquis for unclear reasons presenting to the ED for unwitnessed fall and AMS. As per EMS, patient was found down at home calling for help. Unclear how patient fell. Hypertensive en route to ED, BP in 200s systolic. Patient creole speaking, history attempted to be obtained from patient using family as , however patient too confused to respond.

## 2019-04-18 NOTE — ED PROVIDER NOTE - CLINICAL SUMMARY MEDICAL DECISION MAKING FREE TEXT BOX
70yo female with AMS, hypertension s/p unwitnessed fall, given patient's altered mentation level 1 trauma activated, IO placed for difficult access and patient intubated for airway protection. Patient developed seizure during assessment and was given Ativan 2mg IM. Patient to obtain Ct head/c-spine/chest/abd/pelvis, labs, SICU admission. Niurka Blood DO

## 2019-04-18 NOTE — CONSULT NOTE ADULT - SUBJECTIVE AND OBJECTIVE BOX
HISTORY OF PRESENT ILLNESS:  RON FOSTER is a 69y Female w/ hx of CAD, DM, HTN, HLD, hypothyroid, on eliquis, ASA, s/p umbilical hernia repair ~2 weeks ago, presents as level 1 trauma activation. Patient brought in by ambulance after being found down by family. Patient unable to answer questions, history obtained from family. Patient's family heard patient calling out for help and found her on the ground. She was noted to be less conversant than normal and confused. She was activated as a level 2 trauma activation based on unknown mechanism.  Patient had increasing AMS and trauma upgraded to level 1.  Patient intubated in trauma bay.  IV access obtained via right tibial IO.  Seizure in trauma bay, resolved w/ ativan.  Primary survey intact after intubation, bilateral breath sounds, hemodynamically stable.  Secondary survey without evidence of external injury.       PAST MEDICAL HISTORY: Hypothyroid  Obesity  Morbid obesity  Morbidly obese  NACHO (obstructive sleep apnea)  DM (Diabetes Mellitus) (ICD9 250.00)  Dyslipidemia (ICD9 272.4)  HTN (Hypertension) (ICD9 401.9)  CAD (Coronary Artery Disease) (ICD9 414.00)      PAST SURGICAL HISTORY: History of hysterectomy  History of ovarian cystectomy  S/P primary angioplasty with coronary stent  C Section  S/P Appendectomy  S/P Coronary Angioplasty      FAMILY HISTORY: Family history of heart disease (Aunt)  No pertinent family history in first degree relatives      HOME MEDICATIONS:  · 	levothyroxine 50 mcg (0.05 mg) oral tablet: 1 tab(s) orally once a day, Last Dose Taken:    · 	metFORMIN 1000 mg oral tablet, extended release: 1 tab(s) orally once a day, Last Dose Taken:    · 	Eliquis 5 mg oral tablet: 1 tab(s) orally 2 times a day, Last Dose Taken:    · 	amLODIPine: 10 milligram(s) orally once a day, Last Dose Taken:    · 	hydrochlorothiazide: 50 milligram(s) orally once a day, Last Dose Taken:    · 	Metoprolol Tartrate 100 mg oral tablet:  orally 2 times a day, Last Dose Taken:        ALLERGIES: No Known Allergies      VITAL SIGNS:  ICU Vital Signs Last 24 Hrs  T(C): 36.6 (18 Apr 2019 15:50), Max: 36.6 (18 Apr 2019 15:50)  T(F): 97.8 (18 Apr 2019 15:50), Max: 97.8 (18 Apr 2019 15:50)  HR: 79 (18 Apr 2019 16:00) (55 - 80)  BP: 252/114 (18 Apr 2019 16:00) (197/110 - 252/114)  BP(mean): 163 (18 Apr 2019 16:00) (144 - 163)  ABP: --  ABP(mean): --  RR: 31 (18 Apr 2019 16:00) (16 - 31)  SpO2: 100% (18 Apr 2019 16:00) (95% - 100%)      NEURO  Exam: AMS, not responding to questions  propofol Infusion 10 MICROgram(s)/kG/Min IV Continuous <Continuous>      RESPIRATORY  Mechanical Ventilation: Mode: AC/ CMV (Assist Control/ Continuous Mandatory Ventilation), RR (machine): 16, RR (patient): 22, TV (machine): 400, FiO2: 100, PEEP: 5, ITime: 1, PIP: 26  Exam: CTAB      CARDIOVASCULAR  Exam: hypertensive, RRR  Cardiac Rhythm: sinus  metoprolol tartrate Injectable 5 milliGRAM(s) IV Push every 6 hours      GI/NUTRITION  Exam: soft, ND, NT, recent surgical incision at umbilicus c,d,i with dermabond  Diet: NPO  pantoprazole  Injectable 40 milliGRAM(s) IV Push daily      GENITOURINARY/RENAL  sodium chloride 0.9%. 1000 milliLiter(s) IV Continuous <Continuous>      Weight (kg): 100.5 (04-18 @ 15:50)  04-18    138  |  96  |  12  ----------------------------<  204<H>  4.6   |  25  |  0.47<L>    Ca    10.1      18 Apr 2019 15:32    TPro  7.9  /  Alb  3.9  /  TBili  0.3  /  DBili  x   /  AST  36  /  ALT  17  /  AlkPhos  152<H>  04-18    [ ] Camilo catheter, indication: urine output monitoring in critically ill patient    HEMATOLOGIC  [ ] VTE Prophylaxis:        Transfusion: [ ] PRBC	[ ] Platelets	[ ] FFP	[ ] Cryoprecipitate      INFECTIOUS DISEASES  no isses    ENDOCRINE  insulin lispro (HumaLOG) corrective regimen sliding scale   SubCutaneous every 4 hours  levothyroxine Injectable 25 MICROGram(s) IV Push at bedtime    DM      PATIENT CARE ACCESS DEVICES:  [x] Peripheral IV  [ ] Central Venous Line	[ ] R	[ ] L	[ ] IJ	[ ] Fem	[ ] SC	Placed:   [ ] Arterial Line		[ ] R	[ ] L	[ ] Fem	[ ] Rad	[ ] Ax	Placed:   [ ] PICC:					[ ] Mediport  [ ] Urinary Catheter, Date Placed:   [x] Necessity of urinary, arterial, and venous catheters discussed    OTHER MEDICATIONS: chlorhexidine 0.12% Liquid 15 milliLiter(s) Oral Mucosa two times a day  chlorhexidine 4% Liquid 1 Application(s) Topical <User Schedule>      IMAGING STUDIES: HISTORY OF PRESENT ILLNESS:  RON FOSTER is a 69y Female w/ hx of CAD, DM, HTN, HLD, hypothyroid, on eliquis, ASA, s/p umbilical hernia repair ~2 weeks ago, presents as level 1 trauma activation. Patient brought in by ambulance after being found down by family. Patient unable to answer questions, history obtained from family. Patient's family heard patient calling out for help and found her on the ground. She was noted to be less conversant than normal and confused. She was activated as a level 2 trauma activation based on unknown mechanism.  Patient had increasing AMS and trauma upgraded to level 1.  Patient intubated in trauma bay.  IV access obtained via right tibial IO.  Seizure in trauma bay, resolved w/ ativan.  Primary survey intact after intubation, bilateral breath sounds, hemodynamically stable.  Secondary survey without evidence of external injury.       PAST MEDICAL HISTORY: Hypothyroid  Obesity  Morbid obesity  Morbidly obese  NACHO (obstructive sleep apnea)  DM (Diabetes Mellitus) (ICD9 250.00)  Dyslipidemia (ICD9 272.4)  HTN (Hypertension) (ICD9 401.9)  CAD (Coronary Artery Disease) (ICD9 414.00)      PAST SURGICAL HISTORY: History of hysterectomy  History of ovarian cystectomy  S/P primary angioplasty with coronary stent  C Section  S/P Appendectomy  S/P Coronary Angioplasty      FAMILY HISTORY: Family history of heart disease (Aunt)  No pertinent family history in first degree relatives      HOME MEDICATIONS:  · 	levothyroxine 50 mcg (0.05 mg) oral tablet: 1 tab(s) orally once a day, Last Dose Taken:    · 	metFORMIN 1000 mg oral tablet, extended release: 1 tab(s) orally once a day, Last Dose Taken:    · 	Eliquis 5 mg oral tablet: 1 tab(s) orally 2 times a day, Last Dose Taken:    · 	amLODIPine: 10 milligram(s) orally once a day, Last Dose Taken:    · 	hydrochlorothiazide: 50 milligram(s) orally once a day, Last Dose Taken:    · 	Metoprolol Tartrate 100 mg oral tablet:  orally 2 times a day, Last Dose Taken:        ALLERGIES: No Known Allergies      VITAL SIGNS:  ICU Vital Signs Last 24 Hrs  T(C): 36.6 (18 Apr 2019 15:50), Max: 36.6 (18 Apr 2019 15:50)  T(F): 97.8 (18 Apr 2019 15:50), Max: 97.8 (18 Apr 2019 15:50)  HR: 79 (18 Apr 2019 16:00) (55 - 80)  BP: 252/114 (18 Apr 2019 16:00) (197/110 - 252/114)  BP(mean): 163 (18 Apr 2019 16:00) (144 - 163)  ABP: --  ABP(mean): --  RR: 31 (18 Apr 2019 16:00) (16 - 31)  SpO2: 100% (18 Apr 2019 16:00) (95% - 100%)      NEURO  Exam: AMS, not responding to questions  propofol Infusion 10 MICROgram(s)/kG/Min IV Continuous <Continuous>      RESPIRATORY  Mechanical Ventilation: Mode: AC/ CMV (Assist Control/ Continuous Mandatory Ventilation), RR (machine): 16, RR (patient): 22, TV (machine): 400, FiO2: 100, PEEP: 5, ITime: 1, PIP: 26  Exam: CTAB      CARDIOVASCULAR  Exam: hypertensive, RRR  Cardiac Rhythm: sinus  metoprolol tartrate Injectable 5 milliGRAM(s) IV Push every 6 hours      GI/NUTRITION  Exam: soft, ND, NT, recent surgical incision at umbilicus c,d,i with dermabond  Diet: NPO  pantoprazole  Injectable 40 milliGRAM(s) IV Push daily      GENITOURINARY/RENAL  sodium chloride 0.9%. 1000 milliLiter(s) IV Continuous <Continuous>      Weight (kg): 100.5 (04-18 @ 15:50)  04-18    138  |  96  |  12  ----------------------------<  204<H>  4.6   |  25  |  0.47<L>    Ca    10.1      18 Apr 2019 15:32    TPro  7.9  /  Alb  3.9  /  TBili  0.3  /  DBili  x   /  AST  36  /  ALT  17  /  AlkPhos  152<H>  04-18    [ ] Camilo catheter, indication: urine output monitoring in critically ill patient    HEMATOLOGIC  [ ] VTE Prophylaxis:        Transfusion: [ ] PRBC	[ ] Platelets	[ ] FFP	[ ] Cryoprecipitate      INFECTIOUS DISEASES  no isses    ENDOCRINE  insulin lispro (HumaLOG) corrective regimen sliding scale   SubCutaneous every 4 hours  levothyroxine Injectable 25 MICROGram(s) IV Push at bedtime    DM      PATIENT CARE ACCESS DEVICES:  [x] Peripheral IV  [ ] Central Venous Line	[ ] R	[ ] L	[ ] IJ	[ ] Fem	[ ] SC	Placed:   [ ] Arterial Line		[ ] R	[ ] L	[ ] Fem	[ ] Rad	[ ] Ax	Placed:   [ ] PICC:					[ ] Mediport  [ ] Urinary Catheter, Date Placed:   [x] Necessity of urinary, arterial, and venous catheters discussed    OTHER MEDICATIONS: chlorhexidine 0.12% Liquid 15 milliLiter(s) Oral Mucosa two times a day  chlorhexidine 4% Liquid 1 Application(s) Topical <User Schedule>      IMAGING STUDIES:    < from: CT Head No Cont (04.18.19 @ 15:50) >  IMPRESSION:    CT brain    No acute intracranial hemorrhage, mass effect, vasogenic edema, or   evidence of acute territorial infarct.    Asymmetric nonspecific low density in the left posterior temporal and   occipital lobes primarily involving the white matter is more conspicuous   compared to study from 3/27/2013. This could represent progression of now   chronic ischemic changes. Correlation with MRI of the brain is   recommended for further evaluation.    CT cervical spine:    Loss of height of the C6 vertebral body may be degenerative in nature. No   subjacent compressive sclerosis or discontinuity of the cortex to suggest   acute fracture.    Otherwise, no definite acute fracture or traumatic subluxation. No gross   evidence for spinal canal stenosis or neural foraminal narrowing.    < end of copied text >

## 2019-04-18 NOTE — H&P ADULT - ATTENDING COMMENTS
Trauma Attending    69  year old female who presented as a level 2 trauma activation after a fall on aspirin and plavix. I was present the entire time. During the activation it was noted that her mental status declined and she was not able to state her name or have a normal conversation which was a change as per EMS. Given her high risk of intracranial hemorrhage I decided to intubate the patient. A  level 1 was activated for this. She was intubated successfully and ETCO2 confirmed this. She was difficult to obtain IV access and CVC was attempted but due to body habitus was unsuccessful as she was also moving to painful stimuli. An IO was placed as well as 2 IVs on the upper portions of her upper extremities.  IVF resuscitation was started.  A C collar was placed.  SHe did not have any other obvious injuries.  She was hemodynamically appropriate  She did appear to have seizure activity during this and ativan was given. After intubation propofol was also initiated as the patient was agitated.  CXR confirmed placement of ETT and it was readjusted.  She was taken to CT scan where there was no obvious hemorrhage noted.  SHe was then taken to SICU.   Final reads were pending of films.  I discussed the plan with her family.   CC time: 65 minutes

## 2019-04-18 NOTE — H&P ADULT - NSICDXPASTSURGICALHX_GEN_ALL_CORE_FT
PAST SURGICAL HISTORY:  C Section x 1    History of hysterectomy     History of ovarian cystectomy x 2 in 2013    S/P primary angioplasty with coronary stent has 5 stents-first placed in 2007, last stent -2011,

## 2019-04-18 NOTE — ED PROVIDER NOTE - PHYSICAL EXAMINATION
Gen: eyes closed, responds to pain, no meaningful speech, well-developed  Head: NCAT  HEENT: Pupils 3mm and reactive bilaterally, rightward gaze, oral mucosa moist, normal conjunctiva  Lung: CTAB, no respiratory distress, no wheezing, rales, rhonchi  CV: normal s1/s2, rrr, no murmurs, Normal perfusion  Abd: soft, NTND  MSK: No edema, no deformities  Neuro: Rightward preferential gaze, moving all extremities, not following commands, cervical collar in place  Skin: No rash

## 2019-04-18 NOTE — H&P ADULT - NSHPPHYSICALEXAM_GEN_ALL_CORE
T(C): --  HR: 55 (04-18-19 @ 14:34) (55 - 55)  BP: 197/110 (04-18-19 @ 14:34) (197/110 - 197/110)  RR: 16 (04-18-19 @ 14:34) (16 - 16)  SpO2: 95% (04-18-19 @ 14:34) (95% - 95%)    General: well developed, well groomed, agitated  Neuro: GCS 10, responsive to pain, opens eyes to voice, moves all extremities spontaneously  HEENT: NCAT, PERRL, EOMI, mucosa moist  Respiratory: airway patent, respirations unlabored  Back: no step off, deformity or discoloration  CVS: regular rate and rhythm  Chest: no deformity  Abdomen: soft, nondistended  Pelvis/: intact, stable  Extremities: no angulation or deformity, movement grossly intact  Vascular: bilateral palpable distal pulses  Skin: warm, dry, appropriate color

## 2019-04-18 NOTE — EEG REPORT - NS EEG TEXT BOX
[facility]   COMPREHENSIVE EPILEPSY CENTER   REPORT OF ROUTINE VIDEO EEG     [facility]: [address] [place] [address] , [address], [place] [address], Ph#: [phone]  [facility]: [id number], [place] [address], Ph#: [phone]  Office: [address] [place] [address], [place] [address], [place] [address] Ph#: [phone]    Patient Name: RON FOSTER  Age and : 69y (54)  MRN #: [id number]  Location: [place] [address]  Referring Physician: Daria Martinez    Study Date: 19    _____________________________________________________________  TECHNICAL INFORMATION    Placement and Labeling of Electrodes:  The EEG was performed utilizing 20 channels referential EEG connections (coronal over temporal over parasagittal montage) using all standard 10-20 electrode placements with EKG.  Recording was at a sampling rate of 256 samples per second per channel.  Time synchronized digital video recording was done simultaneously with EEG recording.  A low light infrared camera was used for low light recording.  Saulo and seizure detection algorithms were utilized.    _____________________________________________________________  HISTORY    Patient is a 69y old  Female who presents with a chief complaint of     PERTINENT MEDICATION:  MEDICATIONS  (STANDING):  chlorhexidine 0.12% Liquid 15 milliLiter(s) Oral Mucosa two times a day  chlorhexidine 4% Liquid 1 Application(s) Topical <User Schedule>  insulin lispro (HumaLOG) corrective regimen sliding scale   SubCutaneous every 4 hours  levothyroxine Injectable 25 MICROGram(s) IV Push at bedtime  metoprolol tartrate Injectable 5 milliGRAM(s) IV Push every 6 hours  pantoprazole  Injectable 40 milliGRAM(s) IV Push daily  propofol Infusion 10 MICROgram(s)/kG/Min (5.4 mL/Hr) IV Continuous <Continuous>  sodium chloride 0.9%. 1000 milliLiter(s) (100 mL/Hr) IV Continuous <Continuous>    _____________________________________________________________  STUDY INTERPRETATION    Findings: The background was continuous, diffuse delta and theta activity with faster frequencies bifrontally, and slower frequencies over the left hemisphere.  No PDR was seen.      Generalized Slowing:  -Diffuse theta and polymorphic delta slowing, continuous    Focal Slowing:   -Continuous Slowing, Lateralized, Left hemisphere (delta)    Sleep Background:  Stage II sleep transients were not recorded.  Drowsiness and stage II sleep transients were not recorded.    Other Non-Epileptiform Findings:  None were present.    Interictal Epileptiform Activity:   -LPDs (Lateralized Periodic Discharges), Regional, Left Posterior Temporal, maximum T7/P7 (0.5 - 1 Hz)    Events:  - 7 subclinical seizures recorded from the left posterior temporal region (rhythmic beta activity developing and evolving in frequency over the left posterior temporal region, duration 10-30 seconds, with no associated clinical signs)    Activation Procedures:   Hyperventilation was not performed.    Photic stimulation was not performed.     Artifacts:  Intermittent myogenic and movement artifacts were noted.    ECG:  The heart rate on single channel ECG was predominantly between XX BPM.    _____________________________________________________________  EEG SUMMARY/CLASSIFICATION  Abnormal EEG in an unresponsive patient / a sedated patient.  - 7 subclinical seizures recorded from the left posterior temporal region (rhythmic beta activity developing and evolving in frequency over the left posterior temporal region, duration 10-30 seconds, with no associated clinical signs)  - LPDs (Lateralized Periodic Discharges), Regional, Left Posterior Temporal, maximum T7/P7 (0.5 - 1 Hz)  -Continuous Slowing, Lateralized, Left hemisphere (delta)  -Diffuse theta and polymorphic delta slowing, continuous  _____________________________________________________________  EEG IMPRESSION/CLINICAL CORRELATE    Abnormal EEG study.  1. Seven subclinical seizures recorded from the left posterior temporal region (rhythmic beta activity developing and evolving in frequency over the left posterior temporal region, duration 10-30 seconds, with no associated clinical signs)  2. Structural lesion in the left hemisphere  3. Moderate to severe diffuse encephalopathy    Neurology team notified who will discuss with primary team.    Jhony Berkowitz MD  EEG / Epilepsy Attending Physician

## 2019-04-18 NOTE — AIRWAY PLACEMENT NOTE ADULT - AIRWAY COMMENTS:
intubated by pricila newberry
Pt intubated in ER by MD.  Bilateral chest rise and breath sounds.  Pt placed on full vent support.

## 2019-04-18 NOTE — H&P ADULT - HISTORY OF PRESENT ILLNESS
69F w/ CAD, DM, HTN, HLD, hypothyroid, on eliquis, ASA, s/p umbilical hernia repair ~2 weeks ago, presents as level 1 trauma activation. Patient brought in by ambulance after being found down by family. Patient unable to answer questions, history obtained from family. Patient's family heard patient calling out for help and found her on the ground. She was noted to be less conversant than normal and confused. She was activated as a level 2 trauma activation based on unknown mechanism, upgraded to level 2.     Primary survey--airway intact initially, b/l breath sounds, HDS. Patient noted to be more confused, airway re-assessed with concern that patient not adequately protecting airway. Patient urgently intubated for airway protection with adequate end tidal CO2. Attempted left femoral central line but limited 2/2 habitus. IO placed. Secondary survey without evidence of external injury. 69F w/ CAD, DM, HTN, HLD, hypothyroid, on eliquis, ASA, s/p umbilical hernia repair ~2 weeks ago, presents as level 1 trauma activation. Patient brought in by ambulance after being found down by family. Patient unable to answer questions, history obtained from family. Patient's family heard patient calling out for help and found her on the ground. She was noted to be less conversant than normal and confused. She was activated as a level 2 trauma activation based on unknown mechanism, upgraded to level 2.     Primary survey--airway intact initially, b/l breath sounds, HDS. Patient noted to be more confused, airway re-assessed with concern that patient not adequately protecting airway. Patient urgently intubated for airway protection with adequate end tidal CO2. Attempted left femoral central line but limited 2/2 habitus. IO placed. Seizure in trauma bay, resolved w/ ativan. Secondary survey without evidence of external injury.

## 2019-04-19 LAB
ALBUMIN SERPL ELPH-MCNC: 3.4 G/DL — SIGNIFICANT CHANGE UP (ref 3.3–5)
ALP SERPL-CCNC: 133 U/L — HIGH (ref 40–120)
ALT FLD-CCNC: 13 U/L — SIGNIFICANT CHANGE UP (ref 10–45)
ANION GAP SERPL CALC-SCNC: 13 MMOL/L — SIGNIFICANT CHANGE UP (ref 5–17)
ANION GAP SERPL CALC-SCNC: 14 MMOL/L — SIGNIFICANT CHANGE UP (ref 5–17)
ANION GAP SERPL CALC-SCNC: 15 MMOL/L — SIGNIFICANT CHANGE UP (ref 5–17)
APPEARANCE CSF: CLEAR — SIGNIFICANT CHANGE UP
APPEARANCE SPUN FLD: COLORLESS — SIGNIFICANT CHANGE UP
AST SERPL-CCNC: 17 U/L — SIGNIFICANT CHANGE UP (ref 10–40)
BILIRUB SERPL-MCNC: 0.3 MG/DL — SIGNIFICANT CHANGE UP (ref 0.2–1.2)
BUN SERPL-MCNC: 13 MG/DL — SIGNIFICANT CHANGE UP (ref 7–23)
BUN SERPL-MCNC: 16 MG/DL — SIGNIFICANT CHANGE UP (ref 7–23)
BUN SERPL-MCNC: 8 MG/DL — SIGNIFICANT CHANGE UP (ref 7–23)
CALCIUM SERPL-MCNC: 8.5 MG/DL — SIGNIFICANT CHANGE UP (ref 8.4–10.5)
CALCIUM SERPL-MCNC: 8.9 MG/DL — SIGNIFICANT CHANGE UP (ref 8.4–10.5)
CALCIUM SERPL-MCNC: 9 MG/DL — SIGNIFICANT CHANGE UP (ref 8.4–10.5)
CHLORIDE SERPL-SCNC: 106 MMOL/L — SIGNIFICANT CHANGE UP (ref 96–108)
CHLORIDE SERPL-SCNC: 97 MMOL/L — SIGNIFICANT CHANGE UP (ref 96–108)
CHLORIDE SERPL-SCNC: 98 MMOL/L — SIGNIFICANT CHANGE UP (ref 96–108)
CK SERPL-CCNC: 140 U/L — SIGNIFICANT CHANGE UP (ref 25–170)
CO2 SERPL-SCNC: 25 MMOL/L — SIGNIFICANT CHANGE UP (ref 22–31)
CO2 SERPL-SCNC: 26 MMOL/L — SIGNIFICANT CHANGE UP (ref 22–31)
CO2 SERPL-SCNC: 26 MMOL/L — SIGNIFICANT CHANGE UP (ref 22–31)
COLOR CSF: SIGNIFICANT CHANGE UP
CREAT SERPL-MCNC: 0.53 MG/DL — SIGNIFICANT CHANGE UP (ref 0.5–1.3)
CREAT SERPL-MCNC: 0.59 MG/DL — SIGNIFICANT CHANGE UP (ref 0.5–1.3)
CREAT SERPL-MCNC: 0.72 MG/DL — SIGNIFICANT CHANGE UP (ref 0.5–1.3)
GAS PNL BLDA: SIGNIFICANT CHANGE UP
GAS PNL BLDA: SIGNIFICANT CHANGE UP
GLUCOSE BLDC GLUCOMTR-MCNC: 147 MG/DL — HIGH (ref 70–99)
GLUCOSE BLDC GLUCOMTR-MCNC: 162 MG/DL — HIGH (ref 70–99)
GLUCOSE BLDC GLUCOMTR-MCNC: 170 MG/DL — HIGH (ref 70–99)
GLUCOSE BLDC GLUCOMTR-MCNC: 187 MG/DL — HIGH (ref 70–99)
GLUCOSE BLDC GLUCOMTR-MCNC: 191 MG/DL — HIGH (ref 70–99)
GLUCOSE BLDC GLUCOMTR-MCNC: 234 MG/DL — HIGH (ref 70–99)
GLUCOSE CSF-MCNC: 103 MG/DL — HIGH (ref 40–70)
GLUCOSE SERPL-MCNC: 183 MG/DL — HIGH (ref 70–99)
GLUCOSE SERPL-MCNC: 186 MG/DL — HIGH (ref 70–99)
GLUCOSE SERPL-MCNC: 188 MG/DL — HIGH (ref 70–99)
GRAM STN FLD: SIGNIFICANT CHANGE UP
HCT VFR BLD CALC: 33.3 % — LOW (ref 34.5–45)
HCV AB S/CO SERPL IA: 0.16 S/CO — SIGNIFICANT CHANGE UP (ref 0–0.99)
HCV AB SERPL-IMP: SIGNIFICANT CHANGE UP
HGB BLD-MCNC: 10.2 G/DL — LOW (ref 11.5–15.5)
LYMPHOCYTES # CSF: 87 % — HIGH (ref 40–80)
MAGNESIUM SERPL-MCNC: 1.1 MG/DL — LOW (ref 1.6–2.6)
MAGNESIUM SERPL-MCNC: 2.1 MG/DL — SIGNIFICANT CHANGE UP (ref 1.6–2.6)
MAGNESIUM SERPL-MCNC: 2.5 MG/DL — SIGNIFICANT CHANGE UP (ref 1.6–2.6)
MCHC RBC-ENTMCNC: 23.2 PG — LOW (ref 27–34)
MCHC RBC-ENTMCNC: 30.7 GM/DL — LOW (ref 32–36)
MCV RBC AUTO: 75.5 FL — LOW (ref 80–100)
MONOS+MACROS NFR CSF: 12 % — LOW (ref 15–45)
NEUTROPHILS # CSF: 1 % — SIGNIFICANT CHANGE UP (ref 0–6)
NRBC NFR CSF: 2 /UL — SIGNIFICANT CHANGE UP (ref 0–5)
PHOSPHATE SERPL-MCNC: 2.9 MG/DL — SIGNIFICANT CHANGE UP (ref 2.5–4.5)
PHOSPHATE SERPL-MCNC: 3.9 MG/DL — SIGNIFICANT CHANGE UP (ref 2.5–4.5)
PHOSPHATE SERPL-MCNC: 4.9 MG/DL — HIGH (ref 2.5–4.5)
PLATELET # BLD AUTO: 181 K/UL — SIGNIFICANT CHANGE UP (ref 150–400)
POTASSIUM SERPL-MCNC: 2.9 MMOL/L — CRITICAL LOW (ref 3.5–5.3)
POTASSIUM SERPL-MCNC: 3.2 MMOL/L — LOW (ref 3.5–5.3)
POTASSIUM SERPL-MCNC: 3.5 MMOL/L — SIGNIFICANT CHANGE UP (ref 3.5–5.3)
POTASSIUM SERPL-SCNC: 2.9 MMOL/L — CRITICAL LOW (ref 3.5–5.3)
POTASSIUM SERPL-SCNC: 3.2 MMOL/L — LOW (ref 3.5–5.3)
POTASSIUM SERPL-SCNC: 3.5 MMOL/L — SIGNIFICANT CHANGE UP (ref 3.5–5.3)
PROT CSF-MCNC: 41 MG/DL — SIGNIFICANT CHANGE UP (ref 15–45)
PROT SERPL-MCNC: 6.9 G/DL — SIGNIFICANT CHANGE UP (ref 6–8.3)
RBC # BLD: 4.4 M/UL — SIGNIFICANT CHANGE UP (ref 3.8–5.2)
RBC # CSF: 1 /UL — HIGH (ref 0–0)
RBC # FLD: 13.3 % — SIGNIFICANT CHANGE UP (ref 10.3–14.5)
SODIUM SERPL-SCNC: 137 MMOL/L — SIGNIFICANT CHANGE UP (ref 135–145)
SODIUM SERPL-SCNC: 138 MMOL/L — SIGNIFICANT CHANGE UP (ref 135–145)
SODIUM SERPL-SCNC: 145 MMOL/L — SIGNIFICANT CHANGE UP (ref 135–145)
TUBE TYPE: SIGNIFICANT CHANGE UP
WBC # BLD: 9.2 K/UL — SIGNIFICANT CHANGE UP (ref 3.8–10.5)
WBC # FLD AUTO: 9.2 K/UL — SIGNIFICANT CHANGE UP (ref 3.8–10.5)

## 2019-04-19 PROCEDURE — 70544 MR ANGIOGRAPHY HEAD W/O DYE: CPT | Mod: 26,59

## 2019-04-19 PROCEDURE — 99223 1ST HOSP IP/OBS HIGH 75: CPT

## 2019-04-19 PROCEDURE — 71045 X-RAY EXAM CHEST 1 VIEW: CPT | Mod: 26,77

## 2019-04-19 PROCEDURE — 95951: CPT | Mod: 26

## 2019-04-19 PROCEDURE — 99291 CRITICAL CARE FIRST HOUR: CPT

## 2019-04-19 PROCEDURE — 70549 MR ANGIOGRAPH NECK W/O&W/DYE: CPT | Mod: 26

## 2019-04-19 PROCEDURE — 70551 MRI BRAIN STEM W/O DYE: CPT | Mod: 26

## 2019-04-19 PROCEDURE — 71045 X-RAY EXAM CHEST 1 VIEW: CPT | Mod: 26

## 2019-04-19 RX ORDER — DEXMEDETOMIDINE HYDROCHLORIDE IN 0.9% SODIUM CHLORIDE 4 UG/ML
0.1 INJECTION INTRAVENOUS
Qty: 200 | Refills: 0 | Status: DISCONTINUED | OUTPATIENT
Start: 2019-04-19 | End: 2019-04-20

## 2019-04-19 RX ORDER — POTASSIUM CHLORIDE 20 MEQ
10 PACKET (EA) ORAL
Refills: 0 | Status: COMPLETED | OUTPATIENT
Start: 2019-04-19 | End: 2019-04-19

## 2019-04-19 RX ORDER — MIDAZOLAM HYDROCHLORIDE 1 MG/ML
1 INJECTION, SOLUTION INTRAMUSCULAR; INTRAVENOUS ONCE
Refills: 0 | Status: DISCONTINUED | OUTPATIENT
Start: 2019-04-19 | End: 2019-04-19

## 2019-04-19 RX ORDER — LACOSAMIDE 50 MG/1
200 TABLET ORAL EVERY 12 HOURS
Refills: 0 | Status: DISCONTINUED | OUTPATIENT
Start: 2019-04-19 | End: 2019-04-19

## 2019-04-19 RX ORDER — MAGNESIUM SULFATE 500 MG/ML
2 VIAL (ML) INJECTION ONCE
Refills: 0 | Status: COMPLETED | OUTPATIENT
Start: 2019-04-19 | End: 2019-04-19

## 2019-04-19 RX ORDER — LEVETIRACETAM 250 MG/1
1500 TABLET, FILM COATED ORAL EVERY 12 HOURS
Refills: 0 | Status: DISCONTINUED | OUTPATIENT
Start: 2019-04-19 | End: 2019-04-19

## 2019-04-19 RX ORDER — PERAMPANEL 2 MG/1
4 TABLET ORAL AT BEDTIME
Refills: 0 | Status: DISCONTINUED | OUTPATIENT
Start: 2019-04-19 | End: 2019-04-22

## 2019-04-19 RX ORDER — NICARDIPINE HYDROCHLORIDE 30 MG/1
5 CAPSULE, EXTENDED RELEASE ORAL
Qty: 40 | Refills: 0 | Status: DISCONTINUED | OUTPATIENT
Start: 2019-04-19 | End: 2019-04-20

## 2019-04-19 RX ORDER — ENOXAPARIN SODIUM 100 MG/ML
40 INJECTION SUBCUTANEOUS DAILY
Refills: 0 | Status: DISCONTINUED | OUTPATIENT
Start: 2019-04-19 | End: 2019-04-19

## 2019-04-19 RX ORDER — ACYCLOVIR SODIUM 500 MG
1000 VIAL (EA) INTRAVENOUS EVERY 8 HOURS
Refills: 0 | Status: DISCONTINUED | OUTPATIENT
Start: 2019-04-19 | End: 2019-04-20

## 2019-04-19 RX ORDER — LEVETIRACETAM 250 MG/1
1000 TABLET, FILM COATED ORAL
Refills: 0 | Status: DISCONTINUED | OUTPATIENT
Start: 2019-04-19 | End: 2019-04-22

## 2019-04-19 RX ORDER — POTASSIUM CHLORIDE 20 MEQ
40 PACKET (EA) ORAL
Refills: 0 | Status: COMPLETED | OUTPATIENT
Start: 2019-04-19 | End: 2019-04-20

## 2019-04-19 RX ORDER — LIDOCAINE HCL 20 MG/ML
20 VIAL (ML) INJECTION ONCE
Refills: 0 | Status: COMPLETED | OUTPATIENT
Start: 2019-04-19 | End: 2019-04-19

## 2019-04-19 RX ADMIN — LEVETIRACETAM 400 MILLIGRAM(S): 250 TABLET, FILM COATED ORAL at 17:15

## 2019-04-19 RX ADMIN — Medication 100 MILLIEQUIVALENT(S): at 15:07

## 2019-04-19 RX ADMIN — Medication 4: at 06:39

## 2019-04-19 RX ADMIN — LEVETIRACETAM 400 MILLIGRAM(S): 250 TABLET, FILM COATED ORAL at 06:26

## 2019-04-19 RX ADMIN — CHLORHEXIDINE GLUCONATE 15 MILLILITER(S): 213 SOLUTION TOPICAL at 06:43

## 2019-04-19 RX ADMIN — Medication 2: at 02:09

## 2019-04-19 RX ADMIN — PROPOFOL 5.4 MICROGRAM(S)/KG/MIN: 10 INJECTION, EMULSION INTRAVENOUS at 12:19

## 2019-04-19 RX ADMIN — Medication 100 MILLIEQUIVALENT(S): at 01:41

## 2019-04-19 RX ADMIN — Medication 5 MILLIGRAM(S): at 12:19

## 2019-04-19 RX ADMIN — Medication 50 GRAM(S): at 06:53

## 2019-04-19 RX ADMIN — Medication 5 MILLIGRAM(S): at 06:27

## 2019-04-19 RX ADMIN — Medication 20 MILLILITER(S): at 14:20

## 2019-04-19 RX ADMIN — Medication 50 GRAM(S): at 01:41

## 2019-04-19 RX ADMIN — Medication 100 MILLIEQUIVALENT(S): at 12:19

## 2019-04-19 RX ADMIN — PROPOFOL 5.4 MICROGRAM(S)/KG/MIN: 10 INJECTION, EMULSION INTRAVENOUS at 21:08

## 2019-04-19 RX ADMIN — Medication 2: at 11:02

## 2019-04-19 RX ADMIN — SODIUM CHLORIDE 100 MILLILITER(S): 9 INJECTION INTRAMUSCULAR; INTRAVENOUS; SUBCUTANEOUS at 07:56

## 2019-04-19 RX ADMIN — Medication 100 MILLIEQUIVALENT(S): at 09:05

## 2019-04-19 RX ADMIN — Medication 100 MILLIEQUIVALENT(S): at 17:15

## 2019-04-19 RX ADMIN — Medication 2: at 18:22

## 2019-04-19 RX ADMIN — LEVETIRACETAM 400 MILLIGRAM(S): 250 TABLET, FILM COATED ORAL at 23:21

## 2019-04-19 RX ADMIN — Medication 100 MILLIEQUIVALENT(S): at 10:30

## 2019-04-19 RX ADMIN — PERAMPANEL 4 MILLIGRAM(S): 2 TABLET ORAL at 23:21

## 2019-04-19 RX ADMIN — CHLORHEXIDINE GLUCONATE 15 MILLILITER(S): 213 SOLUTION TOPICAL at 17:10

## 2019-04-19 RX ADMIN — LACOSAMIDE 140 MILLIGRAM(S): 50 TABLET ORAL at 18:22

## 2019-04-19 RX ADMIN — CHLORHEXIDINE GLUCONATE 1 APPLICATION(S): 213 SOLUTION TOPICAL at 12:20

## 2019-04-19 RX ADMIN — Medication 100 MILLIEQUIVALENT(S): at 06:27

## 2019-04-19 RX ADMIN — PANTOPRAZOLE SODIUM 40 MILLIGRAM(S): 20 TABLET, DELAYED RELEASE ORAL at 12:20

## 2019-04-19 RX ADMIN — NICARDIPINE HYDROCHLORIDE 25 MG/HR: 30 CAPSULE, EXTENDED RELEASE ORAL at 21:09

## 2019-04-19 RX ADMIN — MIDAZOLAM HYDROCHLORIDE 1 MILLIGRAM(S): 1 INJECTION, SOLUTION INTRAMUSCULAR; INTRAVENOUS at 07:54

## 2019-04-19 RX ADMIN — Medication 25 MICROGRAM(S): at 21:07

## 2019-04-19 RX ADMIN — SODIUM CHLORIDE 100 MILLILITER(S): 9 INJECTION INTRAMUSCULAR; INTRAVENOUS; SUBCUTANEOUS at 21:09

## 2019-04-19 RX ADMIN — Medication 5 MILLIGRAM(S): at 17:10

## 2019-04-19 RX ADMIN — PROPOFOL 5.4 MICROGRAM(S)/KG/MIN: 10 INJECTION, EMULSION INTRAVENOUS at 17:10

## 2019-04-19 RX ADMIN — Medication 270 MILLIGRAM(S): at 16:08

## 2019-04-19 RX ADMIN — PROPOFOL 5.4 MICROGRAM(S)/KG/MIN: 10 INJECTION, EMULSION INTRAVENOUS at 07:55

## 2019-04-19 RX ADMIN — SODIUM CHLORIDE 100 MILLILITER(S): 9 INJECTION INTRAMUSCULAR; INTRAVENOUS; SUBCUTANEOUS at 17:10

## 2019-04-19 RX ADMIN — Medication 2: at 15:08

## 2019-04-19 NOTE — PROCEDURE NOTE - NSPROCDETAILS_GEN_ALL_CORE
ultrasound guidance
connected to a pressurized flush line/positive blood return obtained via catheter/sutured in place/Seldinger technique/all materials/supplies accounted for at end of procedure/ultrasound guidance
CSF Obtained/location identified, draped/prepped, sterile technique used, needle inserted/introduced/area cleaned in sterile fashion

## 2019-04-19 NOTE — CONSULT NOTE ADULT - SUBJECTIVE AND OBJECTIVE BOX
SUBJECTIVE:       Vital Signs Last 24 Hrs  T(C): 36.7 (19 Apr 2019 07:00), Max: 37.1 (18 Apr 2019 23:00)  T(F): 98 (19 Apr 2019 07:00), Max: 98.8 (18 Apr 2019 23:00)  HR: 74 (19 Apr 2019 09:00) (55 - 86)  BP: 131/60 (19 Apr 2019 07:00) (131/60 - 252/114)  BP(mean): 86 (19 Apr 2019 07:00) (86 - 163)  RR: 21 (19 Apr 2019 09:00) (16 - 32)  SpO2: 100% (19 Apr 2019 09:00) (95% - 100%)    PHYSICAL EXAM:    Constitutional: No Acute Distress     Neurological: AOx3, Following Commands, Moving all Extremities     Motor exam:          Upper extremity                         Delt     Bicep     Tricep    HG                                                 R         5/5        5/5        5/5       5/5                                               L          5/5        5/5        5/5       5/5          Lower extremity                        HF         KF        KE       DF         PF                                                  R        5/5        5/5        5/5       5/5         5/5                                               L         5/5        5/5       5/5       5/5          5/5                                                 Sensation: [] intact to light touch  [] decreased:     Pulmonary: Clear to Auscultation, No rales, No rhonchi, No wheezes     Cardiovascular: S1, S2, Regular rate and rhythm     Gastrointestinal: Soft, Non-tender, Non-distended     Extremities: No calf tenderness     LABS:                        10.2   9.2   )-----------( 181      ( 19 Apr 2019 00:35 )             33.3    04-19    138  |  97  |  13  ----------------------------<  183<H>  2.9<LL>   |  26  |  0.59    Ca    8.9      19 Apr 2019 00:35  Phos  3.9     04-19  Mg     1.1     04-19    TPro  6.9  /  Alb  3.4  /  TBili  0.3  /  DBili  x   /  AST  17  /  ALT  13  /  AlkPhos  133<H>  04-19  PT/INR - ( 18 Apr 2019 21:24 )   PT: 15.2 sec;   INR: 1.31 ratio         PTT - ( 18 Apr 2019 21:24 )  PTT:26.2 sec    04-18 @ 07:01  -  04-19 @ 07:00  --------------------------------------------------------  IN: 2035 mL / OUT: 500 mL / NET: 1535 mL    04-19 @ 07:01  - 04-19 @ 10:33  --------------------------------------------------------  IN: 171 mL / OUT: 0 mL / NET: 171 mL      DRAINS:     MEDICATIONS:  Anticoagulation:     Antibiotics:    Endo:  insulin lispro (HumaLOG) corrective regimen sliding scale   SubCutaneous every 4 hours  levothyroxine Injectable 25 MICROGram(s) IV Push at bedtime    Neuro:  levETIRAcetam  IVPB 750 milliGRAM(s) IV Intermittent every 12 hours  propofol Infusion 10 MICROgram(s)/kG/Min IV Continuous <Continuous>    Cardiac:  metoprolol tartrate Injectable 5 milliGRAM(s) IV Push every 6 hours    Pulm:    GI/:  pantoprazole  Injectable 40 milliGRAM(s) IV Push daily    Other:   chlorhexidine 0.12% Liquid 15 milliLiter(s) Oral Mucosa two times a day  chlorhexidine 4% Liquid 1 Application(s) Topical <User Schedule>  sodium chloride 0.9%. 1000 milliLiter(s) IV Continuous <Continuous>  IMAGING: < from: MR Head No Cont (04.19.19 @ 05:44) >  Left posterior lateral temporal and parietal occipital hyperintense T2   and FLAIR signal with faint hyperintense DWI signal and ADC T2 shine   through, with  slight  restricted diffusion, series 300 image 18. There   is a 4 x 6 mm  enhancement  in the left posterior lateral lateral   temporal lobe with questionable leptomeningeal enhancement and slight   susceptibility. Differential  considerations include atypical cavernoma   or  posterior reversible encephalopathy, mass such as low-grade neoplasm,   infectious, and or inflammatory change are not excluded.    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    < end of copied text > SUBJECTIVE:       Vital Signs Last 24 Hrs  T(C): 36.7 (19 Apr 2019 07:00), Max: 37.1 (18 Apr 2019 23:00)  T(F): 98 (19 Apr 2019 07:00), Max: 98.8 (18 Apr 2019 23:00)  HR: 74 (19 Apr 2019 09:00) (55 - 86)  BP: 131/60 (19 Apr 2019 07:00) (131/60 - 252/114)  BP(mean): 86 (19 Apr 2019 07:00) (86 - 163)  RR: 21 (19 Apr 2019 09:00) (16 - 32)  SpO2: 100% (19 Apr 2019 09:00) (95% - 100%)    PHYSICAL EXAM:    Constitutional: No Acute Distress     Neurological: Intubated , Following Commands, Moving all Extremities                                                LABS:                        10.2   9.2   )-----------( 181      ( 19 Apr 2019 00:35 )             33.3    04-19    138  |  97  |  13  ----------------------------<  183<H>  2.9<LL>   |  26  |  0.59    Ca    8.9      19 Apr 2019 00:35  Phos  3.9     04-19  Mg     1.1     04-19    TPro  6.9  /  Alb  3.4  /  TBili  0.3  /  DBili  x   /  AST  17  /  ALT  13  /  AlkPhos  133<H>  04-19  PT/INR - ( 18 Apr 2019 21:24 )   PT: 15.2 sec;   INR: 1.31 ratio         PTT - ( 18 Apr 2019 21:24 )  PTT:26.2 sec    04-18 @ 07:01 - 04-19 @ 07:00  --------------------------------------------------------  IN: 2035 mL / OUT: 500 mL / NET: 1535 mL    04-19 @ 07:01 - 04-19 @ 10:33  --------------------------------------------------------  IN: 171 mL / OUT: 0 mL / NET: 171 mL      DRAINS:     MEDICATIONS:  Anticoagulation:     Antibiotics:    Endo:  insulin lispro (HumaLOG) corrective regimen sliding scale   SubCutaneous every 4 hours  levothyroxine Injectable 25 MICROGram(s) IV Push at bedtime    Neuro:  levETIRAcetam  IVPB 750 milliGRAM(s) IV Intermittent every 12 hours  propofol Infusion 10 MICROgram(s)/kG/Min IV Continuous <Continuous>    Cardiac:  metoprolol tartrate Injectable 5 milliGRAM(s) IV Push every 6 hours    Pulm:    GI/:  pantoprazole  Injectable 40 milliGRAM(s) IV Push daily    Other:   chlorhexidine 0.12% Liquid 15 milliLiter(s) Oral Mucosa two times a day  chlorhexidine 4% Liquid 1 Application(s) Topical <User Schedule>  sodium chloride 0.9%. 1000 milliLiter(s) IV Continuous <Continuous>  IMAGING: < from: MR Head No Cont (04.19.19 @ 05:44) >  Left posterior lateral temporal and parietal occipital hyperintense T2   and FLAIR signal with faint hyperintense DWI signal and ADC T2 shine   through, with  slight  restricted diffusion, series 300 image 18. There   is a 4 x 6 mm  enhancement  in the left posterior lateral lateral   temporal lobe with questionable leptomeningeal enhancement and slight   susceptibility. Differential  considerations include atypical cavernoma   or  posterior reversible encephalopathy, mass such as low-grade neoplasm,   infectious, and or inflammatory change are not excluded.    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    < end of copied text >

## 2019-04-19 NOTE — DIETITIAN INITIAL EVALUATION ADULT. - NS AS NUTRI INTERV ENTERAL NUTRITION
1) Per discretion of medical team, recommend enteral nutrition of Vital 1.2 at 10ml/hr; increase by 10ml/hr to goal 30ml/hr x 24 hrs. To note, current infusion rate of propofol x 24 hrs to provide ~557kcal. Recommend prosource 1x daily (+60kcal, +15g protein). EN + prosource + propofol to provide: ~1481kcal, 69g protein. To provide ~14.7kcal/kg (dosing wt 100.7kg), ~1.2g protein/kg (upper limit IBW 57.6kg). 2) Monitor propofol infusion rate, RD to adjust EN formulary, volume/rate PRN. 3) Monitor GI tolerance, nutrition related labs, skin integrity and hydration status./Composition

## 2019-04-19 NOTE — DIETITIAN INITIAL EVALUATION ADULT. - PHYSICAL APPEARANCE
well nourished/Pt unable to provide consent for Nutrition Focused Physical Assessment at this time. Pt intubated and sedated, with collar in place.

## 2019-04-19 NOTE — PROGRESS NOTE ADULT - ASSESSMENT
69F w/ CAD, DM, HTN, HLD, hypothyroid, on eliquis, ASA, s/p umbilical hernia repair 2 weeks ago, presents as level 1 trauma activation s/p fall from standing with AMS, seized and subsequently intubated in trauma bay.  CT negative for ICH. Seizure like activity on vEEG in SICU, s/p keppra load, MRI obtained to rule out acute stroke      Neuro: Concern for acute stroke  - Continue vEEG  - Continue Keppra 750 BID     Resp:   - intubated for airway protection  - will wean from vent as tolerated    Cards: hypertension  - continue lopressor    GI:  - NPO    :  - close monitorin I/Os    Heme:  - H/H stable, will monitor    ID:   no active issues    Endo: DM  - insulin sliding scale    - Jatin Solano PA-C     Dispo: SICU full code

## 2019-04-19 NOTE — PROGRESS NOTE ADULT - SUBJECTIVE AND OBJECTIVE BOX
HPI:  69F w/ CAD, DM, HTN, HLD, hypothyroid, on eliquis, ASA, s/p umbilical hernia repair ~2 weeks ago, presented as a level 1 trauma activation. Patient brought in by ambulance after being found down by family. Patient unable to answer questions, history obtained from family. Patient's family heard patient calling out for help and found her on the ground. She was noted to be less conversant than normal and confused. She was activated as a level 2 trauma activation based on unknown mechanism, upgraded to level 2.     Primary survey--airway intact initially, b/l breath sounds, HDS. Patient noted to be more confused, airway re-assessed with concern that patient not adequately protecting airway. Patient urgently intubated for airway protection with adequate end tidal CO2. Attempted left femoral central line but limited 2/2 habitus. IO placed. Seizure in trauma bay, resolved w/ ativan. Secondary survey without evidence of external injury.     Patient admitted to SICU and then began having sublinical siezures on VEEG and then removed.  Patient had LP and MRI with Left posterior lateral temporal and parietal occipital hyperintense T2 FLAIR suspicious for herpes encephalitis - started on Acyclovir.    EVENTS:  Transferred to NSCU     VITALS:  Recent Vitals Reviewed   LABS:  Recent Labs Reviewed  MEDICATIONS: All Recent Medications Reviewed   IMAGING:   Recent imaging studies were reviewed.    EXAMINATION:  General:  intubated   HEENT:  MMM  Neuro:  on propofol - not arousable to verbal or noxious stimuli, no FC, no OE.  PERRL  Cards:  s1, s2 RRR  Respiratory:  lungs clear b/l   Adomen:  soft  Extremities:  no edema  Skin:  warm/dry    ET tube HPI:  69F w/ CAD, DM, HTN, HLD, hypothyroid, on eliquis, ASA, s/p umbilical hernia repair ~2 weeks ago, presented as a level 1 trauma activation. Patient brought in by ambulance after being found down by family. Patient unable to answer questions, history obtained from family. Patient's family heard patient calling out for help and found her on the ground. She was noted to be less conversant than normal and confused. She was activated as a level 2 trauma activation based on unknown mechanism, upgraded to level 2.     4/18 Primary survey--airway intact initially, b/l breath sounds, HDS. Patient noted to be more confused, airway re-assessed with concern that patient not adequately protecting airway. Patient urgently intubated for airway protection with adequate end tidal CO2. Attempted left femoral central line but limited 2/2 habitus. IO placed. Seizure in trauma bay, resolved w/ ativan. Secondary survey without evidence of external injury.     4/18-19 overnight Patient admitted to SICU and then began having sublinical siezures on VEEG and then removed.  Patient had LP and MRI with Left posterior lateral temporal and parietal occipital hyperintense T2 FLAIR suspicious for herpes encephalitis - started on Acyclovir.    4/19 PM Transferred to NSCU   on propofol    VITALS:  Recent Vitals Reviewed   LABS:  Recent Labs Reviewed  MEDICATIONS: All Recent Medications Reviewed   IMAGING:   Recent imaging studies were reviewed.    EXAMINATION:  General:  intubated   HEENT:  MMM  Neuro:  on propofol - EO to voice, nods appropriately, L gaze preference  oriented to self given choices  DELGADO antigravity to command  Cards:  s1, s2 RRR  Respiratory:  lungs clear b/l   Adomen:  soft  Extremities:  no edema  Skin:  warm/dry    ET tube

## 2019-04-19 NOTE — PROGRESS NOTE ADULT - SUBJECTIVE AND OBJECTIVE BOX
HISTORY  69y Female w/ hx of CAD, DM, HTN, HLD, hypothyroid, on eliquis, ASA, s/p umbilical hernia repair ~2 weeks ago, presents as level 1 trauma activation. Patient brought in by ambulance after being found down by family. Patient unable to answer questions, history obtained from family. Patient's family heard patient calling out for help and found her on the ground. She was noted to be less conversant than normal and confused. She was activated as a level 2 trauma activation based on unknown mechanism.  Patient had increasing AMS and trauma upgraded to level 1.  Patient intubated in trauma bay.  IV access obtained via right tibial IO.  Seizure in trauma bay, resolved w/ ativan.  Primary survey intact after intubation, bilateral breath sounds, hemodynamically stable.  Secondary survey without evidence of external injury.     24 HOUR EVENTS: Left radial arterial line placed for access in SICU. EEG showing evidence of seizure like activity. Loaded with keppra 2g and started 750mg. BID. Urgent MRI of head obtained to rule out acute stroke.     SUBJECTIVE/ROS:  [ ] A ten-point review of systems was otherwise negative except as noted.  [ ] Due to altered mental status/intubation, subjective information were not able to be obtained from the patient. History was obtained, to the extent possible, from review of the chart and collateral sources of information.      NEURO  GCS: 7T   Meds: levETIRAcetam  IVPB 750 milliGRAM(s) IV Intermittent every 12 hours  propofol Infusion 10 MICROgram(s)/kG/Min IV Continuous <Continuous>    [x] Adequacy of sedation and pain control has been assessed and adjusted      RESPIRATORY  RR: 22 (04-19-19 @ 03:00) (16 - 32)  SpO2: 100% (04-19-19 @ 03:00) (95% - 100%)  Exam: unlabored, clear to auscultation bilaterally  Mechanical Ventilation: Mode: AC/ CMV (Assist Control/ Continuous Mandatory Ventilation), RR (machine): 16, RR (patient): 21, TV (machine): 400, FiO2: 60, PEEP: 5, ITime: 1, PIP: 26  ABG - ( 19 Apr 2019 00:33 )  pH: 7.48  /  pCO2: 40    /  pO2: 147   / HCO3: 30    / Base Excess: 6.0   /  SaO2: 99      Lactate: x      [N/A] Extubation Readiness Assessed  Meds: none      CARDIOVASCULAR  HR: 74 (04-19-19 @ 03:00) (55 - 84)  BP: 174/79 (04-18-19 @ 21:00) (156/76 - 252/114)  BP(mean): 114 (04-18-19 @ 21:00) (109 - 163)  ABP: 90/67 (04-19-19 @ 03:00) (90/67 - 177/78)  ABP(mean): 75 (04-19-19 @ 03:00) (75 - 106)  Exam: regular rate and rhythm  Cardiac Rhythm: sinus  Perfusion     [x]Adequate   [ ]Inadequate  Mentation   [x]Normal       [ ]Reduced  Extremities  [x]Warm         [ ]Cool  Volume Status [ ]Hypervolemic [x]Euvolemic [ ]Hypovolemic  Meds: metoprolol tartrate Injectable 5 milliGRAM(s) IV Push every 6 hours        GI/NUTRITION  Exam: soft, nontender, nondistended  Diet: NPO   Meds: pantoprazole  Injectable 40 milliGRAM(s) IV Push daily      GENITOURINARY  I&O's Detail    04-18 @ 07:01  -  04-19 @ 03:16  --------------------------------------------------------  IN:    IV PiggyBack: 100 mL    propofol Infusion: 289 mL    sodium chloride 0.9%.: 1100 mL    Solution: 50 mL  Total IN: 1539 mL    OUT:  Total OUT: 0 mL    Total NET: 1539 mL        Weight (kg): 100.5 (04-18 @ 15:50)  04-19    138  |  97  |  13  ----------------------------<  183<H>  2.9<LL>   |  26  |  0.59    Ca    8.9      19 Apr 2019 00:35  Phos  3.9     04-19  Mg     1.1     04-19    TPro  6.9  /  Alb  3.4  /  TBili  0.3  /  DBili  x   /  AST  17  /  ALT  13  /  AlkPhos  133<H>  04-19    [ ] Camilo catheter, indication: N/A  Meds: magnesium sulfate  IVPB 2 Gram(s) IV Intermittent once  potassium chloride  10 mEq/100 mL IVPB 10 milliEquivalent(s) IV Intermittent every 1 hour  sodium chloride 0.9%. 1000 milliLiter(s) IV Continuous <Continuous>        HEMATOLOGIC  Meds:   [x] VTE Prophylaxis                        10.2   9.2   )-----------( 181      ( 19 Apr 2019 00:35 )             33.3     PT/INR - ( 18 Apr 2019 21:24 )   PT: 15.2 sec;   INR: 1.31 ratio         PTT - ( 18 Apr 2019 21:24 )  PTT:26.2 sec  Transfusion     [ ] PRBC   [ ] Platelets   [ ] FFP   [ ] Cryoprecipitate      INFECTIOUS DISEASES  WBC Count: 9.2 K/uL (04-19 @ 00:35)  WBC Count: 10.2 K/uL (04-18 @ 21:24)    RECENT CULTURES: none    Meds: none      ENDOCRINE  CAPILLARY BLOOD GLUCOSE      POCT Blood Glucose.: 191 mg/dL (19 Apr 2019 02:08)  POCT Blood Glucose.: 157 mg/dL (18 Apr 2019 21:34)  POCT Blood Glucose.: 202 mg/dL (18 Apr 2019 17:14)    Meds: insulin lispro (HumaLOG) corrective regimen sliding scale   SubCutaneous every 4 hours  levothyroxine Injectable 25 MICROGram(s) IV Push at bedtime        ACCESS DEVICES:  [x] Peripheral IV  [ ] Central Venous Line	[ ] R	[ ] L	[ ] IJ	[ ] Fem	[ ] SC	Placed:   [ ] Arterial Line		[ ] R	[ ] L	[ ] Fem	[ ] Rad	[ ] Ax	Placed:   [ ] PICC:					[ ] Mediport  [ ] Urinary Catheter, Date Placed:   [x] Necessity of urinary, arterial, and venous catheters discussed    OTHER MEDICATIONS:  chlorhexidine 0.12% Liquid 15 milliLiter(s) Oral Mucosa two times a day  chlorhexidine 4% Liquid 1 Application(s) Topical <User Schedule>      CODE STATUS: full code       IMAGING: < from: CT Abdomen and Pelvis w/ IV Cont (04.18.19 @ 15:50) >  IMPRESSION: Findings concerning for a T10 vertebral body fracture.   Recommend MRI for further evaluation.    Endotracheal tube in the right mainstem bronchus.      < end of copied text >

## 2019-04-19 NOTE — PROGRESS NOTE ADULT - SUBJECTIVE AND OBJECTIVE BOX
pt was seen and examined, still intubated.       Vital Signs Last 24 Hrs  T(C): 37.3 (2019 11:00), Max: 37.3 (2019 11:00)  T(F): 99.2 (2019 11:00), Max: 99.2 (2019 11:00)  HR: 73 (2019 14:00) (32 - 86)  BP: 131/60 (2019 07:00) (131/60 - 252/114)  BP(mean): 86 (2019 07:00) (86 - 163)  RR: 20 (2019 14:00) (17 - 33)  SpO2: 100% (2019 14:00) (100% - 100%)    I&O's Detail    2019 07:01  -  2019 07:00  --------------------------------------------------------  IN:    IV PiggyBack: 100 mL    propofol Infusion: 385 mL    sodium chloride 0.9%.: 1500 mL    Solution: 50 mL  Total IN: 2035 mL    OUT:    Incontinent per Collection Ba mL  Total OUT: 500 mL    Total NET: 1535 mL      2019 07:01  -  2019 14:57  --------------------------------------------------------  IN:    IV PiggyBack: 200 mL    propofol Infusion: 147 mL    sodium chloride 0.9%.: 600 mL    Solution: 50 mL  Total IN: 997 mL    OUT:  Total OUT: 0 mL    Total NET: 997 mL          MEDICATIONS  (STANDING):  acyclovir IVPB 1000 milliGRAM(s) IV Intermittent every 8 hours  chlorhexidine 0.12% Liquid 15 milliLiter(s) Oral Mucosa two times a day  chlorhexidine 4% Liquid 1 Application(s) Topical <User Schedule>  insulin lispro (HumaLOG) corrective regimen sliding scale   SubCutaneous every 4 hours  levETIRAcetam  IVPB 1500 milliGRAM(s) IV Intermittent every 12 hours  levothyroxine Injectable 25 MICROGram(s) IV Push at bedtime  metoprolol tartrate Injectable 5 milliGRAM(s) IV Push every 6 hours  pantoprazole  Injectable 40 milliGRAM(s) IV Push daily  potassium chloride  10 mEq/100 mL IVPB 10 milliEquivalent(s) IV Intermittent every 1 hour  propofol Infusion 10 MICROgram(s)/kG/Min (5.4 mL/Hr) IV Continuous <Continuous>  sodium chloride 0.9%. 1000 milliLiter(s) (100 mL/Hr) IV Continuous <Continuous>    MEDICATIONS  (PRN):      LABS:                        10.2   9.2   )-----------( 181      ( 2019 00:35 )             33.3         137  |  98  |  16  ----------------------------<  186<H>  3.5   |  26  |  0.72    Ca    9.0      2019 10:13  Phos  4.9       Mg     2.5         TPro  6.9  /  Alb  3.4  /  TBili  0.3  /  DBili  x   /  AST  17  /  ALT  13  /  AlkPhos  133<H>      PT/INR - ( 2019 21:24 )   PT: 15.2 sec;   INR: 1.31 ratio         PTT - ( 2019 21:24 )  PTT:26.2 sec    LIVER FUNCTIONS - ( 2019 00:35 )  Alb: 3.4 g/dL / Pro: 6.9 g/dL / ALK PHOS: 133 U/L / ALT: 13 U/L / AST: 17 U/L / GGT: x           Physical exam       General Exam:   General: No acute distress   Neck: In C-collar        Neurological Exam: Exam done on Propofol  Mental Status: Intubated and Sedated. Does not arouse to verbal or noxious stimuli. Does not follow commands.   Cranial Nerves:   PERRL, no nystagmus.  No facial asymmetry.

## 2019-04-19 NOTE — CONSULT NOTE ADULT - ASSESSMENT
HPI:  69F w/ CAD, DM, HTN, HLD, hypothyroid, on eliquis, ASA, s/p umbilical hernia repair ~2 weeks ago, presents as level 1 trauma activation. Patient brought in by ambulance after being found down by family. Patient unable to answer questions, history obtained from family. Patient's family heard patient calling out for help and found her on the ground. She was noted to be less conversant than normal and confused. She was activated as a level 2 trauma activation based on unknown mechanism, upgraded to level 2.     Primary survey--airway intact initially, b/l breath sounds, HDS. Patient noted to be more confused, airway re-assessed with concern that patient not adequately protecting airway. Patient urgently intubated for airway protection with adequate end tidal CO2. Attempted left femoral central line but limited 2/2 habitus. IO placed. Seizure in trauma bay, resolved w/ ativan. Secondary survey without evidence of external injury. (18 Apr 2019 15:19)    PAST MEDICAL & SURGICAL HISTORY:  Hypothyroid  Obesity  NACHO (obstructive sleep apnea): sleep study done 5 years ago  DM (Diabetes Mellitus) (ICD9 250.00)  Dyslipidemia (ICD9 272.4)  HTN (Hypertension) (ICD9 401.9)  CAD (Coronary Artery Disease) (ICD9 414.00): c STENTS  History of hysterectomy  History of ovarian cystectomy: x 2 in 2013  S/P primary angioplasty with coronary stent: has 5 stents-first placed in 2007, last stent -2011,  C Section: x 1        PLAN:  Neuro:   frequent neuro checks  mr with contrast  AEDS for seizure by neurology     t10 vertebral fracture on ct abdomen from 4/18  recommend thoracic MRI      [employer] # [id number] HPI:  69F w/ CAD, DM, HTN, HLD, hypothyroid, on eliquis, ASA, s/p umbilical hernia repair ~2 weeks ago, presents as level 1 trauma activation. Patient brought in by ambulance after being found down by family. Patient unable to answer questions, history obtained from family. Patient's family heard patient calling out for help and found her on the ground. She was noted to be less conversant than normal and confused. She was activated as a level 2 trauma activation based on unknown mechanism, upgraded to level 2.     Primary survey--airway intact initially, b/l breath sounds, HDS. Patient noted to be more confused, airway re-assessed with concern that patient not adequately protecting airway. Patient urgently intubated for airway protection with adequate end tidal CO2. Attempted left femoral central line but limited 2/2 habitus. IO placed. Seizure in trauma bay, resolved w/ ativan. Secondary survey without evidence of external injury. (18 Apr 2019 15:19)    PAST MEDICAL & SURGICAL HISTORY:  Hypothyroid  Obesity  NACHO (obstructive sleep apnea): sleep study done 5 years ago  DM (Diabetes Mellitus) (ICD9 250.00)  Dyslipidemia (ICD9 272.4)  HTN (Hypertension) (ICD9 401.9)  CAD (Coronary Artery Disease) (ICD9 414.00): c STENTS  History of hysterectomy  History of ovarian cystectomy: x 2 in 2013  S/P primary angioplasty with coronary stent: has 5 stents-first placed in 2007, last stent -2011,  C Section: x 1        PLAN:  Neuro:   frequent neuro checks  mr head with contrast  AEDS for seizure by neurology     t10 vertebral fracture on ct abdomen from 4/18  recommend thoracic MRI      [employer] # [id number] HPI:  69F w/ CAD, DM, HTN, HLD, hypothyroid, on eliquis, ASA, s/p umbilical hernia repair ~2 weeks ago, presents as level 1 trauma activation. Patient brought in by ambulance after being found down by family. Patient unable to answer questions, history obtained from family. Patient's family heard patient calling out for help and found her on the ground. She was noted to be less conversant than normal and confused. She was activated as a level 2 trauma activation based on unknown mechanism, upgraded to level 2.     found to have left posterior lateral temporal and parietal iccupital hyperintense t2 and flair possible low grade neoplasm, infectious, inflammatory  or posterior reversible encephalopathy   PAST MEDICAL & SURGICAL HISTORY:  Hypothyroid  Obesity  NACHO (obstructive sleep apnea): sleep study done 5 years ago  DM (Diabetes Mellitus) (ICD9 250.00)  Dyslipidemia (ICD9 272.4)  HTN (Hypertension) (ICD9 401.9)  CAD (Coronary Artery Disease) (ICD9 414.00): c STENTS  History of hysterectomy  History of ovarian cystectomy: x 2 in 2013  S/P primary angioplasty with coronary stent: has 5 stents-first placed in 2007, last stent -2011,  C Section: x 1        PLAN:  Neuro:   frequent neuro checks  mr head with contrast  AEDS for seizure by neurology     t10 vertebral fracture on ct abdomen from 4/18  recommend thoracic MRI      "[employer] # [id number]

## 2019-04-19 NOTE — DIETITIAN INITIAL EVALUATION ADULT. - OTHER INFO
Pt seen for length of stay in SICU. Pt intubated and sedated. Unable to obtain subjective wt/diet history at this time. Pt currently NPO, with orogastric tube in place per RN; pending placement. Per H&P (home medications), pt noted with hx of taking Metformin and Levothyroxine PTA. Baseline chewing/swallowing tolerance unknown at this time. Per review of EMR, no known food allergies. No BM's recorded in-house at this time. Will monitor. As noted, pt NPO since admission (4/18).

## 2019-04-19 NOTE — CONSULT NOTE ADULT - ASSESSMENT
70 y/o F w/ past medical history of CAD, DM, HTN, HLD, hypothyroid, on eliquis, ASA, s/p umbilical hernia repair ~2 weeks ago presented as level 1 trauma. Patient was found down by family 4/18/2019 and was found to be altered and had witnessed seizure in ED and subsequently intubated.   Neurology consulted for seizure and MRI brain findings. Neurologic exam done on Propofol. Limited but demonstrates PERRL and withdraws to pain (R>L).   MRI demonstrates hyperintense T2 and FLAIR signal in the left posterior lateral temporal and parietal occipital lobe w/ questionable leptomeningeal enhancement.   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 68 y/o F w/ past medical history of CAD, DM, HTN, HLD, hypothyroid, on eliquis, ASA, s/p umbilical hernia repair ~2 weeks ago presented as level 1 trauma. Patient was found down by family 4/18/2019 and was found to be altered and had witnessed seizure in ED and subsequently intubated.   Neurology consulted for seizure and MRI brain findings. Neurologic exam done on Propofol. Limited but demonstrates PERRL and withdraws to pain (R>L).   MRI demonstrates hyperintense T2 and FLAIR signal in the left posterior lateral temporal and parietal occipital lobe w/ questionable leptomeningeal enhancement.   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

## 2019-04-19 NOTE — PROGRESS NOTE ADULT - ASSESSMENT
70 y/o F w/ past medical history of CAD, DM, HTN, HLD, hypothyroid, on eliquis, ASA, s/p umbilical hernia repair ~2 weeks ago presented as level 1 trauma. Patient was found down by family 4/18/2019 and was found to be altered and had witnessed seizure in ED and subsequently intubated.     FU neurology   FU neuro surgery   care per sicu

## 2019-04-19 NOTE — EEG REPORT - NS EEG TEXT BOX
[facility]   COMPREHENSIVE EPILEPSY CENTER   REPORT OF ROUTINE VIDEO EEG     [facility]: [address] [place] [address] , [address], [place] [address], Ph#: [phone]  [facility]: [id number], [place] [address], Ph#: [phone]  Office: [address] [place] [address], [place] [address], [place] [address] Ph#: [phone]    Patient Name: RON FOSTER  Age and : 69y (54)  MRN #: [id number]  Location: [place] [address]  Referring Physician: Daria Martinez    Study Date: 19    _____________________________________________________________  TECHNICAL INFORMATION    Placement and Labeling of Electrodes:  The EEG was performed utilizing 20 channels referential EEG connections (coronal over temporal over parasagittal montage) using all standard 10-20 electrode placements with EKG.  Recording was at a sampling rate of 256 samples per second per channel.  Time synchronized digital video recording was done simultaneously with EEG recording.  A low light infrared camera was used for low light recording.  Saulo and seizure detection algorithms were utilized.    _____________________________________________________________  HISTORY    Patient is a 69y old  Female who presents with a chief complaint of     PERTINENT MEDICATION:  MEDICATIONS  (STANDING):  chlorhexidine 0.12% Liquid 15 milliLiter(s) Oral Mucosa two times a day  chlorhexidine 4% Liquid 1 Application(s) Topical <User Schedule>  insulin lispro (HumaLOG) corrective regimen sliding scale   SubCutaneous every 4 hours  levothyroxine Injectable 25 MICROGram(s) IV Push at bedtime  metoprolol tartrate Injectable 5 milliGRAM(s) IV Push every 6 hours  pantoprazole  Injectable 40 milliGRAM(s) IV Push daily  propofol Infusion 10 MICROgram(s)/kG/Min (5.4 mL/Hr) IV Continuous <Continuous>  sodium chloride 0.9%. 1000 milliLiter(s) (100 mL/Hr) IV Continuous <Continuous>    _____________________________________________________________  STUDY INTERPRETATION    Findings: The background was continuous, diffuse delta and theta activity with faster frequencies bifrontally, and slower frequencies over the left hemisphere.  No PDR was seen.      Generalized Slowing:  -Diffuse theta and polymorphic delta slowing, continuous    Focal Slowing:   -Continuous Slowing, Lateralized, Left hemisphere (delta)    Sleep Background:  Stage II sleep transients were not recorded.  Drowsiness and stage II sleep transients were not recorded.    Other Non-Epileptiform Findings:  None were present.    Interictal Epileptiform Activity:   -LPDs (Lateralized Periodic Discharges), Regional, Left Posterior Temporal, maximum T7/P7 (0.5 - 1 Hz)    Events:  - 12-16 subclinical seizures per hour recorded from the left posterior temporal region (rhythmic beta activity developing and evolving in frequency over the left posterior temporal region, duration 10-30 seconds, with no associated clinical signs)    Activation Procedures:   Hyperventilation was not performed.    Photic stimulation was not performed.     Artifacts:  Intermittent myogenic and movement artifacts were noted.    ECG:  The heart rate on single channel ECG was predominantly between XX BPM.    _____________________________________________________________  EEG SUMMARY/CLASSIFICATION  Abnormal EEG in an unresponsive patient / a sedated patient.  - 12-16 subclinical seizures per hour recorded from the left posterior temporal region (rhythmic beta activity developing and evolving in frequency over the left posterior temporal region, duration 10-30 seconds, with no associated clinical signs).   - LPDs (Lateralized Periodic Discharges), Regional, Left Posterior Temporal, maximum T7/P7 (0.5 - 1 Hz)  -Continuous Slowing, Lateralized, Left hemisphere (delta)  -Diffuse theta and polymorphic delta slowing, continuous  _____________________________________________________________  EEG IMPRESSION/CLINICAL CORRELATE    Abnormal EEG study.  1.  12-16 subclinical seizures per hour recorded from the left posterior temporal region (rhythmic beta activity developing and evolving in frequency over the left posterior temporal region, duration 10-30 seconds, with no associated clinical signs).  The last seizure was recorded at 2:48am, and the EEG was disconnected at 2:53am.  2. Structural lesion in the left hemisphere  3. Moderate to severe diffuse encephalopathy      Jhony Berkowitz MD  EEG / Epilepsy Attending Physician

## 2019-04-19 NOTE — PROGRESS NOTE ADULT - ASSESSMENT
ASSESSMENT/PLAN:  Seizures  ?Herpes encephalitis     NEURO:    Neurochecks q1 hrs,  propofol for sedation   LP done - f/u final cx's , viral panel, HSV PCR, patient on Acylovir   start cVEEG - c/w Keppra and start Vimpat   Activity: [] mobilize as tolerated [x] Bedrest [] PT [] OT [] PMNR    PULM:  acute resp failure 2/2 seziures  full mechanical vent support     CV:  MAP > 65    RENAL:  Fluids:  NS @ 75    GI:    Diet: npo    GI prophylaxis [] not indicated [x] PPI [] other:  Bowel regimen [x] colace [x] senna [] other:    ENDO:   Goal euglycemia (-180)    HEME/ONC:  VTE prophylaxis: [] SCDs [] chemoprophylaxis - lovenox     ID:  afebrile     SOCIAL/FAMILY:  [x] awaiting [] updated at bedside [] family meeting    CODE STATUS:  [x] Full Code [] DNR [] DNI [] Palliative/Comfort Care    DISPOSITION:  [x] ICU [] Stroke Unit [] Floor [] EMU [] RCU [] PCU    [x] Patient is at high risk of neurologic deterioration/death due to:  seziures, hemorrhage, herniation       Time spent: 45 critical care minutes    Contact: [phone] ASSESSMENT/PLAN:  Seizures  ?Herpes encephalitis     NEURO:    Neurochecks q1 hrs,  propofol for sedation   LP done - f/u final cx's , viral panel, HSV PCR, patient on Acylovir   start cVEEG - c/w Keppra and start Vimpat   12-16 subclinical seizures per hour overnight on 4/18-19  Activity: [] mobilize as tolerated [x] Bedrest [] PT [] OT [] PMNR    PULM:  acute resp failure 2/2 seziures  full mechanical vent support     CV:  MAP > 65  -160; cardene prn    RENAL:  Fluids:  NS @ 75    GI:    Diet: npo    GI prophylaxis [] not indicated [x] PPI [] other:  Bowel regimen [x] colace [x] senna [] other:    ENDO:   Goal euglycemia (-180)    HEME/ONC:  VTE prophylaxis: [x] SCDs [x] chemoprophylaxis - lovenox     ID:  afebrile     SOCIAL/FAMILY:  [x] awaiting [] updated at bedside [] family meeting    CODE STATUS:  [x] Full Code [] DNR [] DNI [] Palliative/Comfort Care    DISPOSITION:  [x] ICU [] Stroke Unit [] Floor [] EMU [] RCU [] PCU    [x] Patient is at high risk of neurologic deterioration/death due to:  seizures, hemorrhage, herniation       Time spent: 60 critical care minutes    Contact: [phone] ASSESSMENT/PLAN:  Seizures  ?Herpes encephalitis     NEURO:    Neurochecks q1 hrs,  propofol for sedation   LP done - f/u final cx's , viral panel, HSV PCR, patient on Acylovir   start cVEEG - c/w Keppra and start Vimpat   12-16 subclinical seizures per hour overnight on 4/18-19  Activity: [] mobilize as tolerated [x] Bedrest [] PT [] OT [] PMNR    PULM:  acute resp failure 2/2 seziures  full mechanical vent support     CV:  MAP > 65  -160; cardene prn    RENAL:  Fluids:  NS @ 75    GI:    Diet: npo    GI prophylaxis [] not indicated [x] PPI [] other:  Bowel regimen [x] colace [x] senna [] other:    ENDO:   Goal euglycemia (-180)    HEME/ONC:  VTE prophylaxis: [x] SCDs [x] chemoprophylaxis - lovenox     ID:  afebrile     SOCIAL/FAMILY:  [x] awaiting [] updated at bedside [] family meeting    CODE STATUS:  [x] Full Code [] DNR [] DNI [] Palliative/Comfort Care    DISPOSITION:  [x] ICU [] Stroke Unit [] Floor [] EMU [] RCU [] PCU    [x] Patient is at high risk of neurologic deterioration/death due to:  seizures, hemorrhage, herniation       Time spent: 45 critical care minutes    Contact: [phone]

## 2019-04-19 NOTE — DIETITIAN INITIAL EVALUATION ADULT. - ENERGY NEEDS
The modified [place] equation (ZHAO) 2010 equation was used to calculator resting energy expenditure: 1465kcal   Pt is a 70 yo F with PMH: CAD, DM, HTN, HLD, hypothyroid on eliquis, ASA, s/p umbilical hernia ~2 weeks ago. Presented s/p being found down by family. Pt intubated for airway protection with sedation. Noted with seizure, resolved with ativan. Neurology consulted secondary to same.   Ht: 63"  Wt: 221.5   BMI: 39.3 kg/m2   IBW: 115 (+/-10%)     193% IBW  Edema: 1+ generalized          Skin: surgical incision to lateral umbilicus

## 2019-04-19 NOTE — CONSULT NOTE ADULT - SUBJECTIVE AND OBJECTIVE BOX
RON [id number] is a 69y old  Female who presents with a chief complaint of     History obtained from EMR.     HPI: 68 y/o F w/ past medical history of CAD, DM, HTN, HLD, hypothyroid, on eliquis, ASA, s/p umbilical hernia repair ~2 weeks ago presented as level 1 trauma. Patient was found down by family 4/18/2019 and was found to be altered and had witnessed seizure in ED and subsequently intubated.   Neurology consulted for seizure and MRI brain findings.     MEDICATIONS  (STANDING):  chlorhexidine 0.12% Liquid 15 milliLiter(s) Oral Mucosa two times a day  chlorhexidine 4% Liquid 1 Application(s) Topical <User Schedule>  insulin lispro (HumaLOG) corrective regimen sliding scale   SubCutaneous every 4 hours  levETIRAcetam  IVPB 750 milliGRAM(s) IV Intermittent every 12 hours  levothyroxine Injectable 25 MICROGram(s) IV Push at bedtime  metoprolol tartrate Injectable 5 milliGRAM(s) IV Push every 6 hours  pantoprazole  Injectable 40 milliGRAM(s) IV Push daily  propofol Infusion 10 MICROgram(s)/kG/Min (5.4 mL/Hr) IV Continuous <Continuous>  sodium chloride 0.9%. 1000 milliLiter(s) (100 mL/Hr) IV Continuous <Continuous>    MEDICATIONS  (PRN):    PAST MEDICAL & SURGICAL HISTORY:  Hypothyroid  Obesity  NACHO (obstructive sleep apnea): sleep study done 5 years ago  DM (Diabetes Mellitus) (ICD9 250.00)  Dyslipidemia (ICD9 272.4)  HTN (Hypertension) (ICD9 401.9)  CAD (Coronary Artery Disease) (ICD9 414.00): c STENTS  History of hysterectomy  History of ovarian cystectomy: x 2 in 2013  S/P primary angioplasty with coronary stent: has 5 stents-first placed in 2007, last stent -2011,  C Section: x 1    FAMILY HISTORY:  Family history of heart disease (Aunt)    Allergies    No Known Allergies    Intolerances    SHx - No smoking, No ETOH, No drug abuse    Review of Systems:  As per HPI, otherwise negative.     Vital Signs Last 24 Hrs  T(C): 36.7 (19 Apr 2019 07:00), Max: 37.1 (18 Apr 2019 23:00)  T(F): 98 (19 Apr 2019 07:00), Max: 98.8 (18 Apr 2019 23:00)  HR: 72 (19 Apr 2019 10:00) (55 - 86)  BP: 131/60 (19 Apr 2019 07:00) (131/60 - 252/114)  BP(mean): 86 (19 Apr 2019 07:00) (86 - 163)  RR: 18 (19 Apr 2019 10:00) (16 - 32)  SpO2: 100% (19 Apr 2019 10:00) (95% - 100%)    General Exam:   General appearance: No acute distress   Neck: In C-collar          Neurological Exam: Exam done on Propofol  Mental Status: Intubated and Sedated. Does not arouse to verbal or noxious stimuli. Does not follow commands.   Cranial Nerves:   PERRL, no nystagmus.  No facial asymmetry.     Motor:   Tone: Normal.                   Strength:     [] Upper extremity                                                                     R         No spontaneous movement; Withdraws to noxious stimuli                                               L         No spontaneous movement; Minimal withdrawal to noxious stimuli    [] Lower extremity                                                                     R        No spontaneous movement; Withdraws to noxious stimuli                                               L        No spontaneous movement; Minimal withdrawal to noxious stimuli  Pronator drift: Unable to assess due to clinical condition               Dysmetria: Unable to assess due to clinical condition  Tremor: No resting, postural or action tremor.  No myoclonus.    Sensation: +withdraws all extremities to painful stimuli    Deep Tendon Reflexes:     Biceps          Triceps      BR        Patellar        Ankle         Babinski                                         R       1+                   1+           1+               0               0           downgoing                                         L        1+                  1+           1+               0               0           downgoing    Gait: Deferred    Other:    04-19    137  |  98  |  16  ----------------------------<  186<H>  3.5   |  26  |  0.72    Ca    9.0      19 Apr 2019 10:13  Phos  4.9     04-19  Mg     2.5     04-19    TPro  6.9  /  Alb  3.4  /  TBili  0.3  /  DBili  x   /  AST  17  /  ALT  13  /  AlkPhos  133<H>  04-19                            10.2   9.2   )-----------( 181      ( 19 Apr 2019 00:35 )             33.3       Radiology    CT: < from: CT Head No Cont (04.18.19 @ 15:50) >  IMPRESSION:    CT brain    No acute intracranial hemorrhage, mass effect, vasogenic edema, or   evidence of acute territorial infarct.    Asymmetric nonspecific low density in the left posterior temporal and   occipital lobes primarily involving the white matter is more conspicuous   compared to study from 3/27/2013. This could represent progression of now   chronic ischemic changes. Correlation with MRI of the brain is   recommended for further evaluation.    CT cervical spine:    Loss of height of the C6 vertebral body may be degenerative in nature. No   subjacent compressive sclerosis or discontinuity of the cortex to suggest   acute fracture.    Otherwise, no definite acute fracture or traumatic subluxation. No gross   evidence for spinal canal stenosis or neural foraminal narrowing.    < end of copied text >    MRI: < from: MR Angio Neck w/wo IV Cont (04.19.19 @ 05:44) >  IMPRESSION:    Left posterior lateral temporal and parietal occipital hyperintense T2   and FLAIR signal with faint hyperintense DWI signal and ADC T2 shine   through, with  slight  restricted diffusion, series 300 image 18. There   is a 4 x 6 mm  enhancement  in the left posterior lateral lateral   temporal lobe with questionable leptomeningeal enhancement and slight   susceptibility. Differential  considerations include atypical cavernoma   or  posterior reversible encephalopathy, mass such as low-grade neoplasm,   infectious, and or inflammatory change are not excluded.    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    No hemodynamically significant at the ICA origins by NASCET criteria.    < end of copied text >    EKG:  tele:  TTE:  EEG:

## 2019-04-20 LAB
ANION GAP SERPL CALC-SCNC: 13 MMOL/L — SIGNIFICANT CHANGE UP (ref 5–17)
BUN SERPL-MCNC: 6 MG/DL — LOW (ref 7–23)
CALCIUM SERPL-MCNC: 8.7 MG/DL — SIGNIFICANT CHANGE UP (ref 8.4–10.5)
CHLORIDE SERPL-SCNC: 117 MMOL/L — HIGH (ref 96–108)
CO2 SERPL-SCNC: 21 MMOL/L — LOW (ref 22–31)
CREAT SERPL-MCNC: 0.79 MG/DL — SIGNIFICANT CHANGE UP (ref 0.5–1.3)
CSF PCR RESULT: SIGNIFICANT CHANGE UP
GAS PNL BLDA: SIGNIFICANT CHANGE UP
GLUCOSE BLDC GLUCOMTR-MCNC: 137 MG/DL — HIGH (ref 70–99)
GLUCOSE BLDC GLUCOMTR-MCNC: 137 MG/DL — HIGH (ref 70–99)
GLUCOSE BLDC GLUCOMTR-MCNC: 144 MG/DL — HIGH (ref 70–99)
GLUCOSE BLDC GLUCOMTR-MCNC: 145 MG/DL — HIGH (ref 70–99)
GLUCOSE BLDC GLUCOMTR-MCNC: 152 MG/DL — HIGH (ref 70–99)
GLUCOSE BLDC GLUCOMTR-MCNC: 160 MG/DL — HIGH (ref 70–99)
GLUCOSE BLDC GLUCOMTR-MCNC: 171 MG/DL — HIGH (ref 70–99)
GLUCOSE BLDC GLUCOMTR-MCNC: 176 MG/DL — HIGH (ref 70–99)
GLUCOSE BLDC GLUCOMTR-MCNC: 179 MG/DL — HIGH (ref 70–99)
GLUCOSE BLDC GLUCOMTR-MCNC: 182 MG/DL — HIGH (ref 70–99)
GLUCOSE BLDC GLUCOMTR-MCNC: 184 MG/DL — HIGH (ref 70–99)
GLUCOSE BLDC GLUCOMTR-MCNC: 186 MG/DL — HIGH (ref 70–99)
GLUCOSE BLDC GLUCOMTR-MCNC: 188 MG/DL — HIGH (ref 70–99)
GLUCOSE BLDC GLUCOMTR-MCNC: 217 MG/DL — HIGH (ref 70–99)
GLUCOSE BLDC GLUCOMTR-MCNC: 222 MG/DL — HIGH (ref 70–99)
GLUCOSE BLDC GLUCOMTR-MCNC: 231 MG/DL — HIGH (ref 70–99)
GLUCOSE BLDC GLUCOMTR-MCNC: 233 MG/DL — HIGH (ref 70–99)
GLUCOSE BLDC GLUCOMTR-MCNC: 233 MG/DL — HIGH (ref 70–99)
GLUCOSE BLDC GLUCOMTR-MCNC: 254 MG/DL — HIGH (ref 70–99)
GLUCOSE SERPL-MCNC: 173 MG/DL — HIGH (ref 70–99)
HCT VFR BLD CALC: 33.8 % — LOW (ref 34.5–45)
HGB BLD-MCNC: 10.8 G/DL — LOW (ref 11.5–15.5)
LABORATORY COMMENT REPORT: SIGNIFICANT CHANGE UP
MAGNESIUM SERPL-MCNC: 1.7 MG/DL — SIGNIFICANT CHANGE UP (ref 1.6–2.6)
MCHC RBC-ENTMCNC: 25.2 PG — LOW (ref 27–34)
MCHC RBC-ENTMCNC: 31.9 GM/DL — LOW (ref 32–36)
MCV RBC AUTO: 79 FL — LOW (ref 80–100)
PHOSPHATE SERPL-MCNC: 1.4 MG/DL — LOW (ref 2.5–4.5)
PLATELET # BLD AUTO: 136 K/UL — LOW (ref 150–400)
POTASSIUM SERPL-MCNC: 3.6 MMOL/L — SIGNIFICANT CHANGE UP (ref 3.5–5.3)
POTASSIUM SERPL-SCNC: 3.6 MMOL/L — SIGNIFICANT CHANGE UP (ref 3.5–5.3)
RBC # BLD: 4.27 M/UL — SIGNIFICANT CHANGE UP (ref 3.8–5.2)
RBC # FLD: 14 % — SIGNIFICANT CHANGE UP (ref 10.3–14.5)
SODIUM SERPL-SCNC: 151 MMOL/L — HIGH (ref 135–145)
SOURCE HSV 1/2: SIGNIFICANT CHANGE UP
SP GR UR STRIP: 1 — LOW (ref 1.01–1.02)
WBC # BLD: 9.7 K/UL — SIGNIFICANT CHANGE UP (ref 3.8–10.5)
WBC # FLD AUTO: 9.7 K/UL — SIGNIFICANT CHANGE UP (ref 3.8–10.5)

## 2019-04-20 PROCEDURE — 99292 CRITICAL CARE ADDL 30 MIN: CPT

## 2019-04-20 PROCEDURE — 99232 SBSQ HOSP IP/OBS MODERATE 35: CPT

## 2019-04-20 PROCEDURE — 99291 CRITICAL CARE FIRST HOUR: CPT

## 2019-04-20 PROCEDURE — 71045 X-RAY EXAM CHEST 1 VIEW: CPT | Mod: 26,77

## 2019-04-20 PROCEDURE — 71045 X-RAY EXAM CHEST 1 VIEW: CPT | Mod: 26

## 2019-04-20 PROCEDURE — 95951: CPT | Mod: 26

## 2019-04-20 RX ORDER — NICARDIPINE HYDROCHLORIDE 30 MG/1
5 CAPSULE, EXTENDED RELEASE ORAL
Qty: 40 | Refills: 0 | Status: DISCONTINUED | OUTPATIENT
Start: 2019-04-20 | End: 2019-04-20

## 2019-04-20 RX ORDER — NICARDIPINE HYDROCHLORIDE 30 MG/1
10 CAPSULE, EXTENDED RELEASE ORAL
Qty: 40 | Refills: 0 | Status: DISCONTINUED | OUTPATIENT
Start: 2019-04-20 | End: 2019-04-21

## 2019-04-20 RX ORDER — LACOSAMIDE 50 MG/1
200 TABLET ORAL EVERY 12 HOURS
Refills: 0 | Status: DISCONTINUED | OUTPATIENT
Start: 2019-04-20 | End: 2019-04-21

## 2019-04-20 RX ORDER — IPRATROPIUM/ALBUTEROL SULFATE 18-103MCG
3 AEROSOL WITH ADAPTER (GRAM) INHALATION EVERY 6 HOURS
Refills: 0 | Status: DISCONTINUED | OUTPATIENT
Start: 2019-04-20 | End: 2019-05-01

## 2019-04-20 RX ORDER — HYDRALAZINE HCL 50 MG
5 TABLET ORAL ONCE
Refills: 0 | Status: COMPLETED | OUTPATIENT
Start: 2019-04-20 | End: 2019-04-20

## 2019-04-20 RX ORDER — ACYCLOVIR SODIUM 500 MG
1000 VIAL (EA) INTRAVENOUS EVERY 8 HOURS
Refills: 0 | Status: DISCONTINUED | OUTPATIENT
Start: 2019-04-20 | End: 2019-04-22

## 2019-04-20 RX ORDER — FENTANYL CITRATE 50 UG/ML
25 INJECTION INTRAVENOUS ONCE
Refills: 0 | Status: DISCONTINUED | OUTPATIENT
Start: 2019-04-20 | End: 2019-04-20

## 2019-04-20 RX ORDER — ENOXAPARIN SODIUM 100 MG/ML
40 INJECTION SUBCUTANEOUS
Refills: 0 | Status: DISCONTINUED | OUTPATIENT
Start: 2019-04-20 | End: 2019-04-24

## 2019-04-20 RX ORDER — METOPROLOL TARTRATE 50 MG
5 TABLET ORAL EVERY 6 HOURS
Refills: 0 | Status: DISCONTINUED | OUTPATIENT
Start: 2019-04-20 | End: 2019-04-21

## 2019-04-20 RX ORDER — SODIUM CHLORIDE 9 MG/ML
500 INJECTION INTRAMUSCULAR; INTRAVENOUS; SUBCUTANEOUS ONCE
Refills: 0 | Status: COMPLETED | OUTPATIENT
Start: 2019-04-20 | End: 2019-04-20

## 2019-04-20 RX ORDER — INSULIN HUMAN 100 [IU]/ML
3 INJECTION, SOLUTION SUBCUTANEOUS
Qty: 100 | Refills: 0 | Status: DISCONTINUED | OUTPATIENT
Start: 2019-04-20 | End: 2019-04-21

## 2019-04-20 RX ADMIN — PANTOPRAZOLE SODIUM 40 MILLIGRAM(S): 20 TABLET, DELAYED RELEASE ORAL at 11:48

## 2019-04-20 RX ADMIN — Medication 270 MILLIGRAM(S): at 14:00

## 2019-04-20 RX ADMIN — Medication 5 MILLIGRAM(S): at 05:14

## 2019-04-20 RX ADMIN — SODIUM CHLORIDE 1000 MILLILITER(S): 9 INJECTION INTRAMUSCULAR; INTRAVENOUS; SUBCUTANEOUS at 08:30

## 2019-04-20 RX ADMIN — INSULIN HUMAN 2 UNIT(S)/HR: 100 INJECTION, SOLUTION SUBCUTANEOUS at 06:23

## 2019-04-20 RX ADMIN — FENTANYL CITRATE 25 MICROGRAM(S): 50 INJECTION INTRAVENOUS at 03:00

## 2019-04-20 RX ADMIN — Medication 40 MILLIEQUIVALENT(S): at 01:33

## 2019-04-20 RX ADMIN — LEVETIRACETAM 400 MILLIGRAM(S): 250 TABLET, FILM COATED ORAL at 21:55

## 2019-04-20 RX ADMIN — CHLORHEXIDINE GLUCONATE 15 MILLILITER(S): 213 SOLUTION TOPICAL at 17:05

## 2019-04-20 RX ADMIN — Medication 3 MILLILITER(S): at 23:05

## 2019-04-20 RX ADMIN — Medication 5 MILLIGRAM(S): at 01:34

## 2019-04-20 RX ADMIN — Medication 40 MILLIEQUIVALENT(S): at 03:22

## 2019-04-20 RX ADMIN — LEVETIRACETAM 400 MILLIGRAM(S): 250 TABLET, FILM COATED ORAL at 05:14

## 2019-04-20 RX ADMIN — Medication 5 MILLIGRAM(S): at 11:48

## 2019-04-20 RX ADMIN — FENTANYL CITRATE 25 MICROGRAM(S): 50 INJECTION INTRAVENOUS at 03:15

## 2019-04-20 RX ADMIN — Medication 3 MILLILITER(S): at 18:34

## 2019-04-20 RX ADMIN — ENOXAPARIN SODIUM 40 MILLIGRAM(S): 100 INJECTION SUBCUTANEOUS at 17:05

## 2019-04-20 RX ADMIN — Medication 4: at 01:59

## 2019-04-20 RX ADMIN — Medication 5 MILLIGRAM(S): at 23:32

## 2019-04-20 RX ADMIN — Medication 5 MILLIGRAM(S): at 17:05

## 2019-04-20 RX ADMIN — NICARDIPINE HYDROCHLORIDE 50 MG/HR: 30 CAPSULE, EXTENDED RELEASE ORAL at 21:32

## 2019-04-20 RX ADMIN — Medication 5 MILLIGRAM(S): at 12:18

## 2019-04-20 RX ADMIN — CHLORHEXIDINE GLUCONATE 15 MILLILITER(S): 213 SOLUTION TOPICAL at 05:14

## 2019-04-20 RX ADMIN — LEVETIRACETAM 400 MILLIGRAM(S): 250 TABLET, FILM COATED ORAL at 14:00

## 2019-04-20 RX ADMIN — CHLORHEXIDINE GLUCONATE 1 APPLICATION(S): 213 SOLUTION TOPICAL at 11:48

## 2019-04-20 RX ADMIN — LACOSAMIDE 140 MILLIGRAM(S): 50 TABLET ORAL at 15:45

## 2019-04-20 RX ADMIN — Medication 25 MICROGRAM(S): at 21:55

## 2019-04-20 RX ADMIN — Medication 270 MILLIGRAM(S): at 05:14

## 2019-04-20 RX ADMIN — INSULIN HUMAN 3 UNIT(S)/HR: 100 INJECTION, SOLUTION SUBCUTANEOUS at 21:31

## 2019-04-20 RX ADMIN — Medication 270 MILLIGRAM(S): at 01:33

## 2019-04-20 RX ADMIN — Medication 270 MILLIGRAM(S): at 21:31

## 2019-04-20 NOTE — EEG REPORT - NS EEG TEXT BOX
[facility]   COMPREHENSIVE EPILEPSY CENTER   REPORT OF ROUTINE VIDEO EEG     [facility]: [address] , [address], [place] [address], Ph#: [phone]  [facility]: [id number] [place] [address], Ph#: [phone]  Office: [address] [place] [address], [place] [address], [place] [address] Ph#: [phone]    Patient Name: RON FOSTER  Age and : 69y (54)  MRN #: [id number]  Location: [place] [address]  Referring Physician: Daria Martinez    Study Date: 19-19    _____________________________________________________________  TECHNICAL INFORMATION    _____________________________________________________________  HISTORY    Patient is a 69y old  Female who presents with a chief complaint of AMS    PERTINENT MEDICATION:  acyclovir IVPB 1000 milliGRAM(s) IV Intermittent every 8 hours  chlorhexidine 0.12% Liquid 15 milliLiter(s) Oral Mucosa two times a day  chlorhexidine 4% Liquid 1 Application(s) Topical <User Schedule>  dexmedetomidine Infusion 0.1 MICROgram(s)/kG/Hr (2.513 mL/Hr) IV Continuous <Continuous>  enoxaparin Injectable 40 milliGRAM(s) SubCutaneous <User Schedule>  insulin Infusion 3 Unit(s)/Hr (3 mL/Hr) IV Continuous <Continuous>  levETIRAcetam  IVPB 1000 milliGRAM(s) IV Intermittent <User Schedule>  levothyroxine Injectable 25 MICROGram(s) IV Push at bedtime  metoprolol tartrate Injectable 5 milliGRAM(s) IV Push every 6 hours  pantoprazole  Injectable 40 milliGRAM(s) IV Push daily  perampanel 4 milliGRAM(s) Oral at bedtime  sodium chloride 0.9% Bolus 500 milliLiter(s) IV Bolus once  sodium chloride 0.9%. 1000 milliLiter(s) (100 mL/Hr) IV Continuous <Continuous>    _____________________________________________________________  STUDY INTERPRETATION    Findings: The background was continuous, diffuse delta and theta activity with faster frequencies bifrontally, and slower frequencies over the left hemisphere.  No PDR was seen.      Generalized Slowing:  -Diffuse theta and polymorphic delta slowing, continuous    Focal Slowing:   -Continuous Slowing, Lateralized, Left hemisphere (delta)    Sleep Background:  Stage II sleep transients (rudimentary symmetric sleep spindles and K complexes ) were recorded at 8 am.     Other Non-Epileptiform Findings:  None were present.    Interictal Epileptiform Activity:   -LPDs (Lateralized Periodic Discharges), Regional, Left Posterior Temporal, maximum T7/P7 (0.5 - 1 Hz), improved after 10 pm.    Events:  - 10-14 subclinical seizures per hour recorded from the left posterior temporal region (rhythmic beta activity developing and evolving in frequency over the left posterior temporal region, duration 10 seconds, with no associated clinical signs) improved after 8 am.     Activation Procedures:   Hyperventilation was not performed.    Photic stimulation was not performed.     Artifacts:  Intermittent myogenic and movement artifacts were noted.    ECG:  The heart rate on single channel ECG was predominantly between 70-80 BPM.    _____________________________________________________________  EEG SUMMARY/CLASSIFICATION  Abnormal EEG in an unresponsive patient / a sedated patient.  - 10-14 subclinical seizures per hour recorded from the left posterior temporal region (rhythmic beta activity developing and evolving in frequency over the left posterior temporal region, duration 10 seconds, with no associated clinical signs) improved after 8 am.  - LPDs (Lateralized Periodic Discharges), Regional, Left Posterior Temporal, maximum T7/P7 (0.5 - 1 Hz) improved after 10 pm.  -Continuous Slowing, Lateralized, Left hemisphere (delta)  -Diffuse theta and polymorphic delta slowing, intermittent.  _____________________________________________________________  EEG IMPRESSION/CLINICAL CORRELATE    Abnormal EEG study.  1.  10-14 subclinical seizures per hour recorded from the left posterior temporal region (rhythmic beta activity developing and evolving in frequency over the left posterior temporal region, duration 10 seconds, with no associated clinical signs) improved after 8 am.  2. Structural lesion in the left hemisphere  3. Moderate diffuse encephalopathy    The present recording is improved compared with the previous one due to decrease seizure burden and emerging normal sleep pattern.      Korina Ivory MD  Epilepsy Fellow [facility]   COMPREHENSIVE EPILEPSY CENTER   REPORT OF CONTINUOS VIDEO EEG     [facility]: [address], [address], [place] [address], Ph#: [phone]  [facility]: [id number] [place], [place] [address], [place] [address], Ph#: [phone]  Office: [address] [place] [address], [place] [address] Ph#: [phone]    Patient Name: RON FOSTER  Age and : 69y (54)  MRN #: [id number]  Location: [place] [address]  Referring Physician: Daria Martinez    Study Date: 19-19    _____________________________________________________________  TECHNICAL INFORMATION    _____________________________________________________________  HISTORY    Patient is a 69y old  Female who presents with a chief complaint of AMS    PERTINENT MEDICATION:  acyclovir IVPB 1000 milliGRAM(s) IV Intermittent every 8 hours  chlorhexidine 0.12% Liquid 15 milliLiter(s) Oral Mucosa two times a day  chlorhexidine 4% Liquid 1 Application(s) Topical <User Schedule>  dexmedetomidine Infusion 0.1 MICROgram(s)/kG/Hr (2.513 mL/Hr) IV Continuous <Continuous>  enoxaparin Injectable 40 milliGRAM(s) SubCutaneous <User Schedule>  insulin Infusion 3 Unit(s)/Hr (3 mL/Hr) IV Continuous <Continuous>  levETIRAcetam  IVPB 1000 milliGRAM(s) IV Intermittent <User Schedule>  levothyroxine Injectable 25 MICROGram(s) IV Push at bedtime  metoprolol tartrate Injectable 5 milliGRAM(s) IV Push every 6 hours  pantoprazole  Injectable 40 milliGRAM(s) IV Push daily  perampanel 4 milliGRAM(s) Oral at bedtime  sodium chloride 0.9% Bolus 500 milliLiter(s) IV Bolus once  sodium chloride 0.9%. 1000 milliLiter(s) (100 mL/Hr) IV Continuous <Continuous>    _____________________________________________________________  STUDY INTERPRETATION    Findings: The background was continuous, diffuse delta and theta activity with faster frequencies bifrontally, and slower frequencies over the left hemisphere.  No PDR was seen.      Generalized Slowing:  -Diffuse theta and polymorphic delta slowing, continuous    Focal Slowing:   -Continuous Slowing, Lateralized, Left hemisphere (delta)    Sleep Background:  Stage II sleep transients (rudimentary symmetric sleep spindles and K complexes ) were recorded at 8 am.     Other Non-Epileptiform Findings:  None were present.    Interictal Epileptiform Activity:   -LPDs (Lateralized Periodic Discharges), Regional, Left Posterior Temporal, maximum T7/P7 (0.5 - 1 Hz).    Events:  - 10-14 subclinical seizures per hour recorded from the left posterior temporal region (rhythmic beta activity developing and evolving in frequency over the left posterior temporal region, duration 10 seconds, with no associated clinical signs) improved after 8 am.     Activation Procedures:   Hyperventilation was not performed.    Photic stimulation was not performed.     Artifacts:  Intermittent myogenic and movement artifacts were noted.    ECG:  The heart rate on single channel ECG was predominantly between 70-80 BPM.    _____________________________________________________________  EEG SUMMARY/CLASSIFICATION  Abnormal EEG in an unresponsive patient / a sedated patient.  - 10-14 subclinical seizures per hour recorded from the left posterior temporal region (rhythmic beta activity developing and evolving in frequency over the left posterior temporal region, duration 10 seconds, with no associated clinical signs) improved after 8 am.  - LPDs (Lateralized Periodic Discharges), Regional, Left Posterior Temporal, maximum T7/P7 (0.5 - 1 Hz).  -Continuous Slowing, Lateralized, Left hemisphere (delta)  -Diffuse theta and polymorphic delta slowing, intermittent.  _____________________________________________________________  EEG IMPRESSION/CLINICAL CORRELATE    Abnormal EEG study.  1.  10-14 subclinical seizures per hour recorded from the left posterior temporal region (rhythmic beta activity developing and evolving in frequency over the left posterior temporal region, duration 10 seconds, with no associated clinical signs) improved after 8 am.  2. Structural lesion in the left hemisphere  3. Moderate diffuse encephalopathy    The present recording is improved compared with the previous one due to decrease seizure burden and emerging normal sleep pattern early in the morning.      Korina Ivory MD  Epilepsy Fellow [facility]   COMPREHENSIVE EPILEPSY CENTER   REPORT OF CONTINUOS VIDEO EEG     [facility]: [address], [address], [place] [address], Ph#: [phone]  [facility]: [id number] [place], [place] [address] [address], Ph#: [phone]  Office: [address] [place] [address], [place] [address] Ph#: [phone]    Patient Name: RON FOSTER  Age and : 69y (54)  MRN #: [id number]  Location: [place] [address]  Referring Physician: Daria Martinez    Study Date: 19-19    _____________________________________________________________  TECHNICAL INFORMATION    _____________________________________________________________  HISTORY    Patient is a 69y old  Female who presents with a chief complaint of AMS    PERTINENT MEDICATION:  acyclovir IVPB 1000 milliGRAM(s) IV Intermittent every 8 hours  chlorhexidine 0.12% Liquid 15 milliLiter(s) Oral Mucosa two times a day  chlorhexidine 4% Liquid 1 Application(s) Topical <User Schedule>  dexmedetomidine Infusion 0.1 MICROgram(s)/kG/Hr (2.513 mL/Hr) IV Continuous <Continuous>  enoxaparin Injectable 40 milliGRAM(s) SubCutaneous <User Schedule>  insulin Infusion 3 Unit(s)/Hr (3 mL/Hr) IV Continuous <Continuous>  levETIRAcetam  IVPB 1000 milliGRAM(s) IV Intermittent <User Schedule>  levothyroxine Injectable 25 MICROGram(s) IV Push at bedtime  metoprolol tartrate Injectable 5 milliGRAM(s) IV Push every 6 hours  pantoprazole  Injectable 40 milliGRAM(s) IV Push daily  perampanel 4 milliGRAM(s) Oral at bedtime  sodium chloride 0.9% Bolus 500 milliLiter(s) IV Bolus once  sodium chloride 0.9%. 1000 milliLiter(s) (100 mL/Hr) IV Continuous <Continuous>    _____________________________________________________________  STUDY INTERPRETATION    Findings: The background was continuous, diffuse delta and theta activity with faster frequencies bifrontally, and slower frequencies over the left hemisphere.  No PDR was seen.      Generalized Slowing:  -Diffuse theta and polymorphic delta slowing, continuous    Focal Slowing:   -Continuous Slowing, Lateralized, Left hemisphere (delta)    Sleep Background:  Stage II sleep transients (rudimentary symmetric sleep spindles and K complexes ) were recorded at 8 am.     Other Non-Epileptiform Findings:  None were present.    Interictal Epileptiform Activity:   -LPDs (Lateralized Periodic Discharges), Regional, Left Posterior Temporal, maximum T7/P7 (0.5 - 1 Hz).    Events:  - 10-14 subclinical seizures per hour recorded from the left posterior temporal region (rhythmic beta activity developing and evolving in frequency over the left posterior temporal region, duration 10 seconds, with no associated clinical signs) improved after 8 am.     Activation Procedures:   Hyperventilation was not performed.    Photic stimulation was not performed.     Artifacts:  Intermittent myogenic and movement artifacts were noted.    ECG:  The heart rate on single channel ECG was predominantly between 70-80 BPM.    _____________________________________________________________  EEG SUMMARY/CLASSIFICATION  Abnormal EEG in an unresponsive patient / a sedated patient.  - 10-14 subclinical seizures per hour recorded from the left posterior temporal region (rhythmic beta activity developing and evolving in frequency over the left posterior temporal region, duration 10 seconds, with no associated clinical signs) improved after 8 am.  - LPDs (Lateralized Periodic Discharges), Regional, Left Posterior Temporal, maximum T7/P7 (0.5 - 1 Hz).  -Continuous Slowing, Lateralized, Left hemisphere (delta)  -Diffuse theta and polymorphic delta slowing, intermittent.  _____________________________________________________________  EEG IMPRESSION/CLINICAL CORRELATE    Abnormal EEG study.  1.  10-14 subclinical seizures per hour recorded from the left posterior temporal region (rhythmic beta activity developing and evolving in frequency over the left posterior temporal region, duration 10 seconds, with no associated clinical signs) improved after 8 am.  2. Structural lesion in the left hemisphere  3. Moderate diffuse encephalopathy    The present recording is improved compared with the previous one due to decrease seizure burden and emerging normal sleep pattern early in the morning.  neurology team notified.    Korina Ivory MD  Epilepsy Fellow    Roberto Yun MD

## 2019-04-20 NOTE — AIRWAY REMOVAL NOTE  ADULT & PEDS - ARTIFICAL AIRWAY REMOVAL COMMENTS
Written order for extubation verified. The patient was identified by full name and birth date compared to the identification band. Present during the procedure was  Ulises COLEY

## 2019-04-20 NOTE — PROGRESS NOTE ADULT - SUBJECTIVE AND OBJECTIVE BOX
O status epilepticus on propofol drip     T(C): 37.3 (04-20-19 @ 07:00), Max: 37.3 (04-19-19 @ 11:00)  HR: 65 (04-20-19 @ 08:00) (32 - 104)  BP: 91/47 (04-20-19 @ 08:00) (91/47 - 91/47)  RR: 18 (04-20-19 @ 08:00) (16 - 33)  SpO2: 100% (04-20-19 @ 08:00) (97% - 100%)  04-19-19 @ 07:01  -  04-20-19 @ 07:00  --------------------------------------------------------  IN: 3251.5 mL / OUT: 2185 mL / NET: 1066.5 mL    04-20-19 @ 07:01  -  04-20-19 @ 08:46  --------------------------------------------------------  IN: 103 mL / OUT: 0 mL / NET: 103 mL    acyclovir IVPB 1000 milliGRAM(s) IV Intermittent every 8 hours  chlorhexidine 0.12% Liquid 15 milliLiter(s) Oral Mucosa two times a day  chlorhexidine 4% Liquid 1 Application(s) Topical <User Schedule>  dexmedetomidine Infusion 0.1 MICROgram(s)/kG/Hr IV Continuous <Continuous>  insulin Infusion 3 Unit(s)/Hr IV Continuous <Continuous>  levETIRAcetam  IVPB 1000 milliGRAM(s) IV Intermittent <User Schedule>  levothyroxine Injectable 25 MICROGram(s) IV Push at bedtime  metoprolol tartrate Injectable 5 milliGRAM(s) IV Push every 6 hours  niCARdipine Infusion 5 mG/Hr IV Continuous <Continuous>  pantoprazole  Injectable 40 milliGRAM(s) IV Push daily  perampanel 4 milliGRAM(s) Oral at bedtime  sodium chloride 0.9% Bolus 500 milliLiter(s) IV Bolus once  sodium chloride 0.9%. 1000 milliLiter(s) IV Continuous <Continuous>    Culture - CSF with Gram Stain (collected 04-19-19 @ 16:57)  Source: .CSF  Gram Stain (04-19-19 @ 17:53):    No polymorphonuclear cells seen    No organisms seen    by cytocentrifuge  Preliminary Report (04-19-19 @ 17:53):    Culture in progress    Mode: AC/ CMV (Assist Control/ Continuous Mandatory Ventilation), RR (machine): 16, TV (machine): 400, FiO2: 40, PEEP: 5, ITime: 1, MAP: 9, PIP: 20      EXAMINATION:  General:  intubated   HEENT:  MMM  Neuro:  on propofol - EO to voice, nods appropriately, L gaze preference  oriented to self given choices  DELGADO antigravity to command  Cards:  s1, s2 RRR  Respiratory:  lungs clear b/l   Adomen:  soft  Extremities:  no edema  Skin:  warm/dry      LABS:  Na: 145 (04-19 @ 22:55), 137 (04-19 @ 10:13), 138 (04-19 @ 00:35), 138 (04-18 @ 15:32)  K: 3.2 (04-19 @ 22:55), 3.5 (04-19 @ 10:13), 2.9 (04-19 @ 00:35), 4.6 (04-18 @ 15:32)  Cl: 106 (04-19 @ 22:55), 98 (04-19 @ 10:13), 97 (04-19 @ 00:35), 96 (04-18 @ 15:32)  CO2: 25 (04-19 @ 22:55), 26 (04-19 @ 10:13), 26 (04-19 @ 00:35), 25 (04-18 @ 15:32)  BUN: 8 (04-19 @ 22:55), 16 (04-19 @ 10:13), 13 (04-19 @ 00:35), 12 (04-18 @ 15:32)  Cr: 0.53 (04-19 @ 22:55), 0.72 (04-19 @ 10:13), 0.59 (04-19 @ 00:35), 0.47 (04-18 @ 15:32)  Glu: 188(04-19 @ 22:55), 186(04-19 @ 10:13), 183(04-19 @ 00:35), 204(04-18 @ 15:32)    Hgb: 10.2 (04-19 @ 00:35), 11.1 (04-18 @ 21:24)  Hct: 33.3 (04-19 @ 00:35), 34.0 (04-18 @ 21:24)  WBC: 9.2 (04-19 @ 00:35), 10.2 (04-18 @ 21:24)  Plt: 181 (04-19 @ 00:35), 194 (04-18 @ 21:24)    INR: 1.31 04-18-19 @ 21:24  PTT: 26.2 04-18-19 @ 21:24  	  ASSESSMENT/PLAN:  Seizures  ?Herpes encephalitis     NEURO:    Neurochecks q1 hrs,  propofol for sedation   LP done - f/u final cx's , viral panel, HSV PCR, patient on Acylovir   start cVEEG - c/w Keppra and start Vimpat   12-16 subclinical seizures per hour overnight on 4/18-19  Activity: [] mobilize as tolerated [x] Bedrest [] PT [] OT [] PMNR    PULM:  acute resp failure 2/2 seziures  full mechanical vent support     CV:  MAP > 65  -160; cardene prn    RENAL:  Fluids:  NS @ 75    GI:    Diet: npo    GI prophylaxis [] not indicated [x] PPI [] other:  Bowel regimen [x] colace [x] senna [] other:    ENDO:   Goal euglycemia (-180)    HEME/ONC:  VTE prophylaxis: [x] SCDs [x] chemoprophylaxis - lovenox     ID:  afebrile     SOCIAL/FAMILY:  [x] awaiting [] updated at bedside [] family meeting    CODE STATUS:  [x] Full Code [] DNR [] DNI [] Palliative/Comfort Care    DISPOSITION:  [x] ICU [] Stroke Unit [] Floor [] EMU [] RCU [] PCU    [x] Patient is at high risk of neurologic deterioration/death due to:  seizures, hemorrhage, herniation       Time spent: 60 critical care minutes    Contact: [phone] O status epilepticus on propofol drip     T(C): 37.3 (04-20-19 @ 07:00), Max: 37.3 (04-19-19 @ 11:00)  HR: 65 (04-20-19 @ 08:00) (32 - 104)  BP: 91/47 (04-20-19 @ 08:00) (91/47 - 91/47)  RR: 18 (04-20-19 @ 08:00) (16 - 33)  SpO2: 100% (04-20-19 @ 08:00) (97% - 100%)  04-19-19 @ 07:01  -  04-20-19 @ 07:00  --------------------------------------------------------  IN: 3251.5 mL / OUT: 2185 mL / NET: 1066.5 mL    04-20-19 @ 07:01  -  04-20-19 @ 08:46  --------------------------------------------------------  IN: 103 mL / OUT: 0 mL / NET: 103 mL    acyclovir IVPB 1000 milliGRAM(s) IV Intermittent every 8 hours  chlorhexidine 0.12% Liquid 15 milliLiter(s) Oral Mucosa two times a day  chlorhexidine 4% Liquid 1 Application(s) Topical <User Schedule>  dexmedetomidine Infusion 0.1 MICROgram(s)/kG/Hr IV Continuous <Continuous>  insulin Infusion 3 Unit(s)/Hr IV Continuous <Continuous>  levETIRAcetam  IVPB 1000 milliGRAM(s) IV Intermittent <User Schedule>  levothyroxine Injectable 25 MICROGram(s) IV Push at bedtime  metoprolol tartrate Injectable 5 milliGRAM(s) IV Push every 6 hours  niCARdipine Infusion 5 mG/Hr IV Continuous <Continuous>  pantoprazole  Injectable 40 milliGRAM(s) IV Push daily  perampanel 4 milliGRAM(s) Oral at bedtime  sodium chloride 0.9% Bolus 500 milliLiter(s) IV Bolus once  sodium chloride 0.9%. 1000 milliLiter(s) IV Continuous <Continuous>    Culture - CSF with Gram Stain (collected 04-19-19 @ 16:57)  Source: .CSF  Gram Stain (04-19-19 @ 17:53):    No polymorphonuclear cells seen    No organisms seen    by cytocentrifuge  Preliminary Report (04-19-19 @ 17:53):    Culture in progress    Mode: AC/ CMV (Assist Control/ Continuous Mandatory Ventilation), RR (machine): 16, TV (machine): 400, FiO2: 40, PEEP: 5, ITime: 1, MAP: 9, PIP: 20      EXAMINATION:  General:  intubated   HEENT:  MMM  Neuro: opens eyes to command, CANDE, no gaze preference, moving all 4 extremities antigravity   oriented to self given choices  DELGADO antigravity to command  Cards:  s1, s2 RRR  Respiratory:  lungs clear b/l   Adomen:  soft  Extremities:  no edema  Skin:  warm/dry      LABS:  Na: 145 (04-19 @ 22:55), 137 (04-19 @ 10:13), 138 (04-19 @ 00:35), 138 (04-18 @ 15:32)  K: 3.2 (04-19 @ 22:55), 3.5 (04-19 @ 10:13), 2.9 (04-19 @ 00:35), 4.6 (04-18 @ 15:32)  Cl: 106 (04-19 @ 22:55), 98 (04-19 @ 10:13), 97 (04-19 @ 00:35), 96 (04-18 @ 15:32)  CO2: 25 (04-19 @ 22:55), 26 (04-19 @ 10:13), 26 (04-19 @ 00:35), 25 (04-18 @ 15:32)  BUN: 8 (04-19 @ 22:55), 16 (04-19 @ 10:13), 13 (04-19 @ 00:35), 12 (04-18 @ 15:32)  Cr: 0.53 (04-19 @ 22:55), 0.72 (04-19 @ 10:13), 0.59 (04-19 @ 00:35), 0.47 (04-18 @ 15:32)  Glu: 188(04-19 @ 22:55), 186(04-19 @ 10:13), 183(04-19 @ 00:35), 204(04-18 @ 15:32)    Hgb: 10.2 (04-19 @ 00:35), 11.1 (04-18 @ 21:24)  Hct: 33.3 (04-19 @ 00:35), 34.0 (04-18 @ 21:24)  WBC: 9.2 (04-19 @ 00:35), 10.2 (04-18 @ 21:24)  Plt: 181 (04-19 @ 00:35), 194 (04-18 @ 21:24)    INR: 1.31 04-18-19 @ 21:24  PTT: 26.2 04-18-19 @ 21:24  	  ASSESSMENT/PLAN:  status epilepticus, r/o Herpes encephalitis , although CSF PCR is neg, might have been too early, will need to repeat tap next week 	    NEURO:    Neurochecks q1 hrs,  d/c propofol  continue keppra and perampanel if still seizing, will add Vimpat   LP done - CSF PCR neg, cx negative   cVEEG - c/w Keppra and start Vimpat   T 10 vertebral fracture, NS recommended MRI spine, will get it next week with a repeat MRI brain   This could also be a low grade glioma, if repeat CSF PCR HSV neg will probably need a brain biopsy   Activity: [] mobilize as tolerated [x] Bedrest [] PT [] OT [] PMNR    PULM:  acute resp failure 2/2 seziures  PS trial possible extubation   full mechanical vent support     CV:  MAP > 65 mmhg  -160 mmhg   resume her home metoprolol HCZ and amlodipine     RENAL:  Fluids:  NS @ 75    GI:    Diet: npo  for extubation  GI prophylaxis [] not indicated [x] PPI [] other:  Bowel regimen [x] colace [x] senna [] other:    ENDO:   Goal euglycemia (-180)    HEME/ONC:  VTE prophylaxis: [x] SCDs [x] chemoprophylaxis - lovenox     ID:  afebrile   r/o Herpes encephalitis, will need repeat LP next week ( monday), will continue acyclovir and hydration     SOCIAL/FAMILY:  [x] awaiting [] updated at bedside [] family meeting    CODE STATUS:  [x] Full Code [] DNR [] DNI [] Palliative/Comfort Care    DISPOSITION:  [x] ICU [] Stroke Unit [] Floor [] EMU [] RCU [] PCU    [x] Patient is at high risk of neurologic deterioration/death due to:  seizures, hemorrhage, herniation       Time spent: 40 critical care minutes    Contact: [phone]

## 2019-04-20 NOTE — PROGRESS NOTE ADULT - SUBJECTIVE AND OBJECTIVE BOX
HPI:  69F w/ CAD, DM, HTN, HLD, hypothyroid, on eliquis, ASA, s/p umbilical hernia repair ~2 weeks ago, presented as a level 1 trauma activation. Patient brought in by ambulance after being found down by family. Patient unable to answer questions, history obtained from family. Patient's family heard patient calling out for help and found her on the ground. She was noted to be less conversant than normal and confused. She was activated as a level 2 trauma activation based on unknown mechanism, upgraded to level 2.     4/18 Primary survey--airway intact initially, b/l breath sounds, HDS. Patient noted to be more confused, airway re-assessed with concern that patient not adequately protecting airway. Patient urgently intubated for airway protection with adequate end tidal CO2. Attempted left femoral central line but limited 2/2 habitus. IO placed. Seizure in trauma bay, resolved w/ ativan. Secondary survey without evidence of external injury.   4/18-19 overnight Patient admitted to SICU and then began having sublinical siezures on VEEG and then removed.  Patient had LP and MRI with Left posterior lateral temporal and parietal occipital hyperintense T2 FLAIR suspicious for herpes encephalitis - started on Acyclovir.  4/19 PM Transferred to NSCU   4/19-20 multiple subclinical seizures, now on keppra, fycompa, and vimpat    VITALS:  Recent Vitals Reviewed   LABS:  Recent Labs Reviewed  MEDICATIONS: All Recent Medications Reviewed   IMAGING:   Recent imaging studies were reviewed.    EXAMINATION:  General:  intubated   HEENT:  MMM  Neuro:  on propofol - EO to voice, nods appropriately, L gaze preference  oriented to self given choices  DELGADO antigravity to command  Cards:  s1, s2 RRR  Respiratory:  lungs clear b/l   Adomen:  soft  Extremities:  no edema  Skin:  warm/dry    ET tube HPI:  69F w/ CAD, DM, HTN, HLD, hypothyroid, on eliquis, ASA, s/p umbilical hernia repair ~2 weeks ago, presented as a level 1 trauma activation. Patient brought in by ambulance after being found down by family. Patient unable to answer questions, history obtained from family. Patient's family heard patient calling out for help and found her on the ground. She was noted to be less conversant than normal and confused. She was activated as a level 2 trauma activation based on unknown mechanism, upgraded to level 2.     4/18 Primary survey--airway intact initially, b/l breath sounds, HDS. Patient noted to be more confused, airway re-assessed with concern that patient not adequately protecting airway. Patient urgently intubated for airway protection with adequate end tidal CO2. Attempted left femoral central line but limited 2/2 habitus. IO placed. Seizure in trauma bay, resolved w/ ativan. Secondary survey without evidence of external injury.   4/18-19 overnight Patient admitted to SICU and then began having sublinical siezures on VEEG and then removed.  Patient had LP and MRI with Left posterior lateral temporal and parietal occipital hyperintense T2 FLAIR suspicious for herpes encephalitis - started on Acyclovir.  4/19 PM Transferred to NSCU   4/19-20 multiple subclinical seizures, now on keppra, fycompa, and vimpat    VITALS:  Recent Vitals Reviewed   LABS:  Recent Labs Reviewed  MEDICATIONS: All Recent Medications Reviewed   IMAGING:   Recent imaging studies were reviewed.    EXAMINATION:  General:  calm   HEENT:  MMM  Neuro:  oriented to self/place, BUE 4/5, BLE 5/5  Cards:  s1, s2 RRR  Respiratory:  lungs clear b/l   Adomen:  soft  Extremities:  no edema  Skin:  warm/dry    ET tube

## 2019-04-20 NOTE — PROGRESS NOTE ADULT - SUBJECTIVE AND OBJECTIVE BOX
*************************************  NEUROLOGY FOLLOW UP NOTE  **************************************    RON FOSTER  Female  MRN-[id number]    Subjective: Pt seen with attending. intubated; off sedation; open eyes to name; follows most commands; mental status improving.      VITAL SIGNS:  Vital Signs Last 24 Hrs  T(C): 37.3 (20 Apr 2019 07:00), Max: 37.3 (20 Apr 2019 07:00)  T(F): 99.1 (20 Apr 2019 07:00), Max: 99.1 (20 Apr 2019 07:00)  HR: 69 (20 Apr 2019 10:00) (32 - 104)  BP: 123/63 (20 Apr 2019 10:00) (91/47 - 123/63)  BP(mean): 82 (20 Apr 2019 10:00) (61 - 82)  RR: 25 (20 Apr 2019 10:00) (16 - 33)  SpO2: 99% (20 Apr 2019 10:00) (97% - 100%)    PHYSICAL EXAMINATION:  General Exam:   General appearance: No acute distress   Neck: supple, non-tender       Neurological Exam: off sedation  Mental Status: Intubated. open eyes to name; follows most commands (showed 2 fingers, squeezes hand)  Cranial Nerves:   PERRL, no nystagmus.  No facial asymmetry.     Motor:   Tone: Normal.                   Strength:   moving all ext spontaneously as well as on command   Tremor: No resting, postural or action tremor.  No myoclonus.    Sensation: +withdraws all extremities to painful stimuli    Deep Tendon Reflexes:     Biceps          Triceps      BR        Patellar        Ankle         Babinski                                         R       1+                   1+           1+               0               0           downgoing                                         L        1+                  1+           1+               0               0           downgoing    Gait: Deferred    LABS:                          10.2   9.2   )-----------( 181      ( 19 Apr 2019 00:35 )             33.3     04-19    145  |  106  |  8   ----------------------------<  188<H>  3.2<L>   |  25  |  0.53    Ca    8.5      19 Apr 2019 22:55  Phos  2.9     04-19  Mg     2.1     04-19    TPro  6.9  /  Alb  3.4  /  TBili  0.3  /  DBili  x   /  AST  17  /  ALT  13  /  AlkPhos  133<H>  04-19    PT/INR - ( 18 Apr 2019 21:24 )   PT: 15.2 sec;   INR: 1.31 ratio         PTT - ( 18 Apr 2019 21:24 )  PTT:26.2 sec    RADIOLOGY & ADDITIONAL STUDIES:    Herpes Simplex Virus 1/2 PCR, CSF (04.19.19 @ 22:40)    Source HSV 1/2: CSF    HSV 1/2 PCR: [facility]: HSV1/2 PCR assay is a real-time PCR test that detects and differentiates  HSV-1 and/or HSV-2 DNA in CSF (Vitreous Fluid). The results of this test  should be interpreted with consideration of all clinical and laboratory  findings.      _____________________________________________________________  EEG IMPRESSION/CLINICAL CORRELATE    Abnormal EEG study.  1.  10-14 subclinical seizures per hour recorded from the left posterior temporal region (rhythmic beta activity developing and evolving in frequency over the left posterior temporal region, duration 10 seconds, with no associated clinical signs) improved after 8 am.  2. Structural lesion in the left hemisphere  3. Moderate diffuse encephalopathy    The present recording is improved compared with the previous one due to decrease seizure burden and emerging normal sleep pattern early in the morning.  neurology team notified.

## 2019-04-20 NOTE — PROGRESS NOTE ADULT - ASSESSMENT
ASSESSMENT/PLAN:  Seizures  ?Herpes encephalitis     NEURO:    neurology following  LP done - f/u final cx's , viral panel, HSV PCR, patient on Acylovir   c/w Keppra, fycompa, vimpat  multiple subclinical seizures, cont vEEG with AEDs added/increased  Activity: [x] mobilize as tolerated [] Bedrest [x] PT [x] OT [] PMNR    PULM:    extubated, monitor resp status  encourage incentive spirometry as able    CV:  MAP > 65  -160 on cardene    RENAL:  Fluids:  NS @ 75    GI:    Diet: dysphagia screen s/p extubation  GI prophylaxis [] not indicated [x] PPI [] other:  Bowel regimen [x] colace [x] senna [] other:    ENDO:   Goal euglycemia (-180)  on insulin gtt--transition to lantus/premeal    HEME/ONC:  VTE prophylaxis: [x] SCDs [x] chemoprophylaxis - lovenox     ID:  afebrile     SOCIAL/FAMILY:  [x] updated at bedside extensively    CODE STATUS:  [x] Full Code [] DNR [] DNI [] Palliative/Comfort Care    [x] Patient is at high risk of neurologic deterioration/death due to:  seizures, hemorrhage, herniation     Time spent: 40 critical care minutes

## 2019-04-20 NOTE — PROGRESS NOTE ADULT - ASSESSMENT
68 y/o F w/ past medical history of CAD, DM, HTN, HLD, hypothyroid, on eliquis, ASA, s/p umbilical hernia repair ~2 weeks ago presented as level 1 trauma. Patient was found down by family 4/18/2019 and was found to be altered and had witnessed seizure in ED and subsequently intubated. Neurology consulted for seizure and MRI brain findings of left temporal encephalitis/cerebritis.    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 Off sedation following commands.    [] Kindly repeat MRI brain [facility] on 4/22 followed by repeat LP studies inc HSV, PCR  [x] LP done by neuro 4/19 - cells 2 (lymph 87), glucose 103, protein 43, CSF PCR/herpes negative (can be negative early 72hrs)  [x] c/w video EEG monitoring  [x] c/w Acyclovir 1g TID  [x] c/w Keppra 1g TID; Fycompa 4mg QHS    >> appreciate NSCU care, plan discussed with team 70 y/o F w/ past medical history of CAD, DM, HTN, HLD, hypothyroid, on eliquis, ASA, s/p umbilical hernia repair ~2 weeks ago presented as level 1 trauma. Patient was found down by family 4/18/2019 and was found to be altered and had witnessed seizure in ED and subsequently intubated. Neurology consulted for seizure and MRI brain findings of left temporal encephalitis/cerebritis.    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 Started on Fycompa overnight. Off sedation following commands.    [] Kindly repeat MRI brain [facility] on 4/22 followed by repeat LP studies inc HSV, PCR  [x] LP done by neuro 4/19 - cells 2 (lymph 87), glucose 103, protein 43, CSF PCR/herpes negative (can be negative early 72hrs)  [x] c/w video EEG monitoring  [x] c/w Acyclovir 1g TID  [x] c/w Keppra 1g TID; Fycompa 4mg QHS    >> appreciate NSCU care, plan discussed with team

## 2019-04-21 DIAGNOSIS — E03.9 HYPOTHYROIDISM, UNSPECIFIED: ICD-10-CM

## 2019-04-21 DIAGNOSIS — I10 ESSENTIAL (PRIMARY) HYPERTENSION: ICD-10-CM

## 2019-04-21 DIAGNOSIS — E11.65 TYPE 2 DIABETES MELLITUS WITH HYPERGLYCEMIA: ICD-10-CM

## 2019-04-21 DIAGNOSIS — E78.5 HYPERLIPIDEMIA, UNSPECIFIED: ICD-10-CM

## 2019-04-21 DIAGNOSIS — E87.0 HYPEROSMOLALITY AND HYPERNATREMIA: ICD-10-CM

## 2019-04-21 LAB
ANION GAP SERPL CALC-SCNC: 12 MMOL/L — SIGNIFICANT CHANGE UP (ref 5–17)
ANION GAP SERPL CALC-SCNC: 13 MMOL/L — SIGNIFICANT CHANGE UP (ref 5–17)
ANION GAP SERPL CALC-SCNC: 16 MMOL/L — SIGNIFICANT CHANGE UP (ref 5–17)
APTT BLD: 26.2 SEC — LOW (ref 27.5–36.3)
BUN SERPL-MCNC: 4 MG/DL — LOW (ref 7–23)
BUN SERPL-MCNC: 5 MG/DL — LOW (ref 7–23)
BUN SERPL-MCNC: 5 MG/DL — LOW (ref 7–23)
CALCIUM SERPL-MCNC: 8.9 MG/DL — SIGNIFICANT CHANGE UP (ref 8.4–10.5)
CALCIUM SERPL-MCNC: 9 MG/DL — SIGNIFICANT CHANGE UP (ref 8.4–10.5)
CALCIUM SERPL-MCNC: 9 MG/DL — SIGNIFICANT CHANGE UP (ref 8.4–10.5)
CHLORIDE SERPL-SCNC: 117 MMOL/L — HIGH (ref 96–108)
CHLORIDE SERPL-SCNC: 118 MMOL/L — HIGH (ref 96–108)
CHLORIDE SERPL-SCNC: 119 MMOL/L — HIGH (ref 96–108)
CO2 SERPL-SCNC: 22 MMOL/L — SIGNIFICANT CHANGE UP (ref 22–31)
CO2 SERPL-SCNC: 23 MMOL/L — SIGNIFICANT CHANGE UP (ref 22–31)
CO2 SERPL-SCNC: 23 MMOL/L — SIGNIFICANT CHANGE UP (ref 22–31)
CREAT SERPL-MCNC: 0.76 MG/DL — SIGNIFICANT CHANGE UP (ref 0.5–1.3)
CREAT SERPL-MCNC: 0.77 MG/DL — SIGNIFICANT CHANGE UP (ref 0.5–1.3)
CREAT SERPL-MCNC: 0.79 MG/DL — SIGNIFICANT CHANGE UP (ref 0.5–1.3)
GLUCOSE BLDC GLUCOMTR-MCNC: 106 MG/DL — HIGH (ref 70–99)
GLUCOSE BLDC GLUCOMTR-MCNC: 115 MG/DL — HIGH (ref 70–99)
GLUCOSE BLDC GLUCOMTR-MCNC: 127 MG/DL — HIGH (ref 70–99)
GLUCOSE BLDC GLUCOMTR-MCNC: 134 MG/DL — HIGH (ref 70–99)
GLUCOSE BLDC GLUCOMTR-MCNC: 146 MG/DL — HIGH (ref 70–99)
GLUCOSE BLDC GLUCOMTR-MCNC: 149 MG/DL — HIGH (ref 70–99)
GLUCOSE BLDC GLUCOMTR-MCNC: 150 MG/DL — HIGH (ref 70–99)
GLUCOSE BLDC GLUCOMTR-MCNC: 154 MG/DL — HIGH (ref 70–99)
GLUCOSE BLDC GLUCOMTR-MCNC: 164 MG/DL — HIGH (ref 70–99)
GLUCOSE BLDC GLUCOMTR-MCNC: 172 MG/DL — HIGH (ref 70–99)
GLUCOSE BLDC GLUCOMTR-MCNC: 97 MG/DL — SIGNIFICANT CHANGE UP (ref 70–99)
GLUCOSE SERPL-MCNC: 164 MG/DL — HIGH (ref 70–99)
GLUCOSE SERPL-MCNC: 174 MG/DL — HIGH (ref 70–99)
GLUCOSE SERPL-MCNC: 210 MG/DL — HIGH (ref 70–99)
HBA1C BLD-MCNC: 9.9 % — HIGH (ref 4–5.6)
HCT VFR BLD CALC: 33.1 % — LOW (ref 34.5–45)
HGB BLD-MCNC: 10.6 G/DL — LOW (ref 11.5–15.5)
INR BLD: 1.24 RATIO — HIGH (ref 0.88–1.16)
MAGNESIUM SERPL-MCNC: 1.9 MG/DL — SIGNIFICANT CHANGE UP (ref 1.6–2.6)
MAGNESIUM SERPL-MCNC: 2.4 MG/DL — SIGNIFICANT CHANGE UP (ref 1.6–2.6)
MCHC RBC-ENTMCNC: 25 PG — LOW (ref 27–34)
MCHC RBC-ENTMCNC: 32.1 GM/DL — SIGNIFICANT CHANGE UP (ref 32–36)
MCV RBC AUTO: 78 FL — LOW (ref 80–100)
OSMOLALITY SERPL: 318 MOS/KG — HIGH (ref 275–300)
OSMOLALITY UR: 294 MOS/KG — LOW (ref 300–900)
PHOSPHATE SERPL-MCNC: 2.2 MG/DL — LOW (ref 2.5–4.5)
PHOSPHATE SERPL-MCNC: 3.2 MG/DL — SIGNIFICANT CHANGE UP (ref 2.5–4.5)
PLATELET # BLD AUTO: 103 K/UL — LOW (ref 150–400)
POTASSIUM SERPL-MCNC: 3.5 MMOL/L — SIGNIFICANT CHANGE UP (ref 3.5–5.3)
POTASSIUM SERPL-MCNC: 3.8 MMOL/L — SIGNIFICANT CHANGE UP (ref 3.5–5.3)
POTASSIUM SERPL-MCNC: 4.2 MMOL/L — SIGNIFICANT CHANGE UP (ref 3.5–5.3)
POTASSIUM SERPL-SCNC: 3.5 MMOL/L — SIGNIFICANT CHANGE UP (ref 3.5–5.3)
POTASSIUM SERPL-SCNC: 3.8 MMOL/L — SIGNIFICANT CHANGE UP (ref 3.5–5.3)
POTASSIUM SERPL-SCNC: 4.2 MMOL/L — SIGNIFICANT CHANGE UP (ref 3.5–5.3)
PROTHROM AB SERPL-ACNC: 14.2 SEC — HIGH (ref 10–12.9)
RBC # BLD: 4.24 M/UL — SIGNIFICANT CHANGE UP (ref 3.8–5.2)
RBC # FLD: 13.7 % — SIGNIFICANT CHANGE UP (ref 10.3–14.5)
SODIUM SERPL-SCNC: 154 MMOL/L — HIGH (ref 135–145)
SODIUM SERPL-SCNC: 154 MMOL/L — HIGH (ref 135–145)
SODIUM SERPL-SCNC: 155 MMOL/L — HIGH (ref 135–145)
SP GR UR STRIP: 1.01 — LOW (ref 1.01–1.02)
SP GR UR STRIP: 1.01 — LOW (ref 1.01–1.02)
WBC # BLD: 9.6 K/UL — SIGNIFICANT CHANGE UP (ref 3.8–10.5)
WBC # FLD AUTO: 9.6 K/UL — SIGNIFICANT CHANGE UP (ref 3.8–10.5)

## 2019-04-21 PROCEDURE — 95819 EEG AWAKE AND ASLEEP: CPT | Mod: 26

## 2019-04-21 PROCEDURE — 99232 SBSQ HOSP IP/OBS MODERATE 35: CPT

## 2019-04-21 PROCEDURE — 93306 TTE W/DOPPLER COMPLETE: CPT | Mod: 26

## 2019-04-21 PROCEDURE — 95951: CPT | Mod: 26

## 2019-04-21 PROCEDURE — 99223 1ST HOSP IP/OBS HIGH 75: CPT | Mod: GC

## 2019-04-21 RX ORDER — HYDRALAZINE HCL 50 MG
25 TABLET ORAL EVERY 8 HOURS
Refills: 0 | Status: DISCONTINUED | OUTPATIENT
Start: 2019-04-21 | End: 2019-04-21

## 2019-04-21 RX ORDER — SODIUM CHLORIDE 9 MG/ML
1000 INJECTION, SOLUTION INTRAVENOUS
Refills: 0 | Status: DISCONTINUED | OUTPATIENT
Start: 2019-04-21 | End: 2019-04-23

## 2019-04-21 RX ORDER — HUMAN INSULIN 100 [IU]/ML
25 INJECTION, SUSPENSION SUBCUTANEOUS EVERY 8 HOURS
Refills: 0 | Status: DISCONTINUED | OUTPATIENT
Start: 2019-04-21 | End: 2019-04-21

## 2019-04-21 RX ORDER — HYDRALAZINE HCL 50 MG
25 TABLET ORAL ONCE
Refills: 0 | Status: COMPLETED | OUTPATIENT
Start: 2019-04-21 | End: 2019-04-21

## 2019-04-21 RX ORDER — HYDRALAZINE HCL 50 MG
50 TABLET ORAL ONCE
Refills: 0 | Status: COMPLETED | OUTPATIENT
Start: 2019-04-21 | End: 2019-04-21

## 2019-04-21 RX ORDER — HUMAN INSULIN 100 [IU]/ML
20 INJECTION, SUSPENSION SUBCUTANEOUS EVERY 8 HOURS
Refills: 0 | Status: DISCONTINUED | OUTPATIENT
Start: 2019-04-21 | End: 2019-04-21

## 2019-04-21 RX ORDER — MAGNESIUM SULFATE 500 MG/ML
2 VIAL (ML) INJECTION ONCE
Refills: 0 | Status: COMPLETED | OUTPATIENT
Start: 2019-04-21 | End: 2019-04-21

## 2019-04-21 RX ORDER — ACETAMINOPHEN 500 MG
650 TABLET ORAL EVERY 6 HOURS
Refills: 0 | Status: DISCONTINUED | OUTPATIENT
Start: 2019-04-21 | End: 2019-04-22

## 2019-04-21 RX ORDER — INSULIN LISPRO 100/ML
VIAL (ML) SUBCUTANEOUS EVERY 6 HOURS
Refills: 0 | Status: DISCONTINUED | OUTPATIENT
Start: 2019-04-21 | End: 2019-04-24

## 2019-04-21 RX ORDER — METOPROLOL TARTRATE 50 MG
50 TABLET ORAL ONCE
Refills: 0 | Status: COMPLETED | OUTPATIENT
Start: 2019-04-21 | End: 2019-04-21

## 2019-04-21 RX ORDER — HYDRALAZINE HCL 50 MG
50 TABLET ORAL EVERY 8 HOURS
Refills: 0 | Status: DISCONTINUED | OUTPATIENT
Start: 2019-04-21 | End: 2019-04-21

## 2019-04-21 RX ORDER — POTASSIUM PHOSPHATE, MONOBASIC POTASSIUM PHOSPHATE, DIBASIC 236; 224 MG/ML; MG/ML
30 INJECTION, SOLUTION INTRAVENOUS ONCE
Refills: 0 | Status: COMPLETED | OUTPATIENT
Start: 2019-04-21 | End: 2019-04-21

## 2019-04-21 RX ORDER — DEXTROSE 50 % IN WATER 50 %
15 SYRINGE (ML) INTRAVENOUS ONCE
Refills: 0 | Status: DISCONTINUED | OUTPATIENT
Start: 2019-04-21 | End: 2019-04-24

## 2019-04-21 RX ORDER — HUMAN INSULIN 100 [IU]/ML
20 INJECTION, SUSPENSION SUBCUTANEOUS
Refills: 0 | Status: DISCONTINUED | OUTPATIENT
Start: 2019-04-21 | End: 2019-04-21

## 2019-04-21 RX ORDER — HUMAN INSULIN 100 [IU]/ML
20 INJECTION, SUSPENSION SUBCUTANEOUS THREE TIMES A DAY
Refills: 0 | Status: DISCONTINUED | OUTPATIENT
Start: 2019-04-21 | End: 2019-04-21

## 2019-04-21 RX ORDER — METOPROLOL TARTRATE 50 MG
50 TABLET ORAL
Refills: 0 | Status: DISCONTINUED | OUTPATIENT
Start: 2019-04-21 | End: 2019-04-21

## 2019-04-21 RX ORDER — HUMAN INSULIN 100 [IU]/ML
14 INJECTION, SUSPENSION SUBCUTANEOUS EVERY 6 HOURS
Refills: 0 | Status: DISCONTINUED | OUTPATIENT
Start: 2019-04-21 | End: 2019-04-22

## 2019-04-21 RX ORDER — DEXTROSE 50 % IN WATER 50 %
12.5 SYRINGE (ML) INTRAVENOUS ONCE
Refills: 0 | Status: DISCONTINUED | OUTPATIENT
Start: 2019-04-21 | End: 2019-04-24

## 2019-04-21 RX ORDER — DEXTROSE 50 % IN WATER 50 %
25 SYRINGE (ML) INTRAVENOUS ONCE
Refills: 0 | Status: DISCONTINUED | OUTPATIENT
Start: 2019-04-21 | End: 2019-04-24

## 2019-04-21 RX ORDER — HUMAN INSULIN 100 [IU]/ML
5 INJECTION, SUSPENSION SUBCUTANEOUS ONCE
Refills: 0 | Status: COMPLETED | OUTPATIENT
Start: 2019-04-21 | End: 2019-04-21

## 2019-04-21 RX ORDER — LABETALOL HCL 100 MG
200 TABLET ORAL THREE TIMES A DAY
Refills: 0 | Status: DISCONTINUED | OUTPATIENT
Start: 2019-04-21 | End: 2019-05-01

## 2019-04-21 RX ORDER — SODIUM CHLORIDE 9 MG/ML
1000 INJECTION, SOLUTION INTRAVENOUS
Refills: 0 | Status: DISCONTINUED | OUTPATIENT
Start: 2019-04-21 | End: 2019-04-24

## 2019-04-21 RX ORDER — GLUCAGON INJECTION, SOLUTION 0.5 MG/.1ML
1 INJECTION, SOLUTION SUBCUTANEOUS ONCE
Refills: 0 | Status: DISCONTINUED | OUTPATIENT
Start: 2019-04-21 | End: 2019-04-24

## 2019-04-21 RX ORDER — POTASSIUM CHLORIDE 20 MEQ
40 PACKET (EA) ORAL ONCE
Refills: 0 | Status: COMPLETED | OUTPATIENT
Start: 2019-04-21 | End: 2019-04-21

## 2019-04-21 RX ORDER — AMLODIPINE BESYLATE 2.5 MG/1
10 TABLET ORAL DAILY
Refills: 0 | Status: DISCONTINUED | OUTPATIENT
Start: 2019-04-21 | End: 2019-05-01

## 2019-04-21 RX ORDER — INSULIN HUMAN 100 [IU]/ML
2 INJECTION, SOLUTION SUBCUTANEOUS
Qty: 100 | Refills: 0 | Status: DISCONTINUED | OUTPATIENT
Start: 2019-04-21 | End: 2019-04-21

## 2019-04-21 RX ORDER — POTASSIUM PHOSPHATE, MONOBASIC POTASSIUM PHOSPHATE, DIBASIC 236; 224 MG/ML; MG/ML
15 INJECTION, SOLUTION INTRAVENOUS ONCE
Refills: 0 | Status: COMPLETED | OUTPATIENT
Start: 2019-04-21 | End: 2019-04-21

## 2019-04-21 RX ORDER — HYDRALAZINE HCL 50 MG
100 TABLET ORAL EVERY 8 HOURS
Refills: 0 | Status: DISCONTINUED | OUTPATIENT
Start: 2019-04-21 | End: 2019-04-22

## 2019-04-21 RX ADMIN — HUMAN INSULIN 20 UNIT(S): 100 INJECTION, SUSPENSION SUBCUTANEOUS at 12:02

## 2019-04-21 RX ADMIN — Medication 50 MILLIGRAM(S): at 09:50

## 2019-04-21 RX ADMIN — Medication 3 MILLILITER(S): at 23:55

## 2019-04-21 RX ADMIN — Medication 4: at 23:10

## 2019-04-21 RX ADMIN — Medication 2: at 18:21

## 2019-04-21 RX ADMIN — AMLODIPINE BESYLATE 10 MILLIGRAM(S): 2.5 TABLET ORAL at 01:15

## 2019-04-21 RX ADMIN — LEVETIRACETAM 400 MILLIGRAM(S): 250 TABLET, FILM COATED ORAL at 21:09

## 2019-04-21 RX ADMIN — POTASSIUM PHOSPHATE, MONOBASIC POTASSIUM PHOSPHATE, DIBASIC 62.5 MILLIMOLE(S): 236; 224 INJECTION, SOLUTION INTRAVENOUS at 22:15

## 2019-04-21 RX ADMIN — Medication 25 MILLIGRAM(S): at 05:18

## 2019-04-21 RX ADMIN — HUMAN INSULIN 5 UNIT(S): 100 INJECTION, SUSPENSION SUBCUTANEOUS at 18:53

## 2019-04-21 RX ADMIN — INSULIN HUMAN 3 UNIT(S)/HR: 100 INJECTION, SOLUTION SUBCUTANEOUS at 09:47

## 2019-04-21 RX ADMIN — LACOSAMIDE 140 MILLIGRAM(S): 50 TABLET ORAL at 05:16

## 2019-04-21 RX ADMIN — Medication 40 MILLIEQUIVALENT(S): at 13:57

## 2019-04-21 RX ADMIN — Medication 650 MILLIGRAM(S): at 16:00

## 2019-04-21 RX ADMIN — Medication 3 MILLILITER(S): at 05:04

## 2019-04-21 RX ADMIN — SODIUM CHLORIDE 75 MILLILITER(S): 9 INJECTION, SOLUTION INTRAVENOUS at 09:47

## 2019-04-21 RX ADMIN — Medication 50 MILLIGRAM(S): at 05:39

## 2019-04-21 RX ADMIN — Medication 200 MILLIGRAM(S): at 22:14

## 2019-04-21 RX ADMIN — Medication 40 MILLIEQUIVALENT(S): at 03:28

## 2019-04-21 RX ADMIN — Medication 270 MILLIGRAM(S): at 13:38

## 2019-04-21 RX ADMIN — NICARDIPINE HYDROCHLORIDE 50 MG/HR: 30 CAPSULE, EXTENDED RELEASE ORAL at 09:47

## 2019-04-21 RX ADMIN — CHLORHEXIDINE GLUCONATE 15 MILLILITER(S): 213 SOLUTION TOPICAL at 05:17

## 2019-04-21 RX ADMIN — Medication 50 GRAM(S): at 03:29

## 2019-04-21 RX ADMIN — Medication 50 MILLIGRAM(S): at 18:33

## 2019-04-21 RX ADMIN — INSULIN HUMAN 2 UNIT(S)/HR: 100 INJECTION, SOLUTION SUBCUTANEOUS at 12:27

## 2019-04-21 RX ADMIN — Medication 25 MILLIGRAM(S): at 01:15

## 2019-04-21 RX ADMIN — Medication 270 MILLIGRAM(S): at 05:17

## 2019-04-21 RX ADMIN — Medication 100 MILLIGRAM(S): at 21:10

## 2019-04-21 RX ADMIN — POTASSIUM PHOSPHATE, MONOBASIC POTASSIUM PHOSPHATE, DIBASIC 83.33 MILLIMOLE(S): 236; 224 INJECTION, SOLUTION INTRAVENOUS at 03:29

## 2019-04-21 RX ADMIN — PERAMPANEL 4 MILLIGRAM(S): 2 TABLET ORAL at 00:56

## 2019-04-21 RX ADMIN — Medication 3 MILLILITER(S): at 12:03

## 2019-04-21 RX ADMIN — Medication 25 MICROGRAM(S): at 21:09

## 2019-04-21 RX ADMIN — Medication 2: at 12:01

## 2019-04-21 RX ADMIN — SODIUM CHLORIDE 75 MILLILITER(S): 9 INJECTION, SOLUTION INTRAVENOUS at 21:10

## 2019-04-21 RX ADMIN — Medication 270 MILLIGRAM(S): at 21:09

## 2019-04-21 RX ADMIN — CHLORHEXIDINE GLUCONATE 15 MILLILITER(S): 213 SOLUTION TOPICAL at 18:21

## 2019-04-21 RX ADMIN — HUMAN INSULIN 14 UNIT(S): 100 INJECTION, SUSPENSION SUBCUTANEOUS at 23:11

## 2019-04-21 RX ADMIN — CHLORHEXIDINE GLUCONATE 1 APPLICATION(S): 213 SOLUTION TOPICAL at 12:04

## 2019-04-21 RX ADMIN — ENOXAPARIN SODIUM 40 MILLIGRAM(S): 100 INJECTION SUBCUTANEOUS at 18:22

## 2019-04-21 RX ADMIN — Medication 50 GRAM(S): at 13:59

## 2019-04-21 RX ADMIN — Medication 100 MILLIGRAM(S): at 13:38

## 2019-04-21 RX ADMIN — LEVETIRACETAM 400 MILLIGRAM(S): 250 TABLET, FILM COATED ORAL at 05:17

## 2019-04-21 RX ADMIN — Medication 25 MILLIGRAM(S): at 05:39

## 2019-04-21 RX ADMIN — Medication 3 MILLILITER(S): at 18:05

## 2019-04-21 RX ADMIN — LEVETIRACETAM 400 MILLIGRAM(S): 250 TABLET, FILM COATED ORAL at 13:38

## 2019-04-21 RX ADMIN — PERAMPANEL 4 MILLIGRAM(S): 2 TABLET ORAL at 21:09

## 2019-04-21 RX ADMIN — Medication 650 MILLIGRAM(S): at 15:00

## 2019-04-21 NOTE — CONSULT NOTE ADULT - ASSESSMENT
69F w/ CAD, DM (A1C 9.9), HTN, HLD, hypothyroid, on eliquis, ASA, s/p umbilical hernia repair ~2 weeks ago, presents as level 1 trauma activation. Patient brought in by ambulance after being found down by family. Currently in NICU being treated for seizures and possible encephalitis. Endocrine consulted for Type 2 DM management on tube feeds as well as hypernatremia.

## 2019-04-21 NOTE — PROGRESS NOTE ADULT - SUBJECTIVE AND OBJECTIVE BOX
*************************************  NEUROLOGY FOLLOW UP NOTE  **************************************    RON FOSTER  Female  MRN-[id number]    Subjective: Pt seen with attending. intubated; off sedation; open eyes to name; follows most commands; mental status improving.    Vital Signs Last 24 Hrs  T(C): 37.1 (21 Apr 2019 07:00), Max: 37.4 (20 Apr 2019 23:00)  T(F): 98.7 (21 Apr 2019 07:00), Max: 99.3 (20 Apr 2019 23:00)  HR: 91 (21 Apr 2019 11:00) (76 - 121)  BP: --  BP(mean): --  RR: 24 (21 Apr 2019 11:00) (18 - 39)  SpO2: 99% (21 Apr 2019 11:00) (91% - 100%)    MEDICATIONS  (STANDING):  acyclovir IVPB 1000 milliGRAM(s) IV Intermittent every 8 hours  ALBUTerol/ipratropium for Nebulization 3 milliLiter(s) Nebulizer every 6 hours  amLODIPine   Tablet 10 milliGRAM(s) Oral daily  chlorhexidine 0.12% Liquid 15 milliLiter(s) Oral Mucosa two times a day  chlorhexidine 4% Liquid 1 Application(s) Topical <User Schedule>  dextrose 5%. 1000 milliLiter(s) (50 mL/Hr) IV Continuous <Continuous>  dextrose 50% Injectable 12.5 Gram(s) IV Push once  dextrose 50% Injectable 25 Gram(s) IV Push once  dextrose 50% Injectable 25 Gram(s) IV Push once  enoxaparin Injectable 40 milliGRAM(s) SubCutaneous <User Schedule>  hydrALAZINE 100 milliGRAM(s) Oral every 8 hours  insulin lispro (HumaLOG) corrective regimen sliding scale   SubCutaneous every 6 hours  insulin NPH human recombinant 20 Unit(s) SubCutaneous three times a day before meals  lactated ringers. 1000 milliLiter(s) (75 mL/Hr) IV Continuous <Continuous>  levETIRAcetam  IVPB 1000 milliGRAM(s) IV Intermittent <User Schedule>  levothyroxine Injectable 25 MICROGram(s) IV Push at bedtime  metoprolol tartrate 50 milliGRAM(s) Enteral Tube two times a day  niCARdipine Infusion 10 mG/Hr (50 mL/Hr) IV Continuous <Continuous>  perampanel 4 milliGRAM(s) Oral at bedtime    MEDICATIONS  (PRN):  dextrose 40% Gel 15 Gram(s) Oral once PRN Blood Glucose LESS THAN 70 milliGRAM(s)/deciliter  glucagon  Injectable 1 milliGRAM(s) IntraMuscular once PRN Glucose LESS THAN 70 milligrams/deciliter      PHYSICAL EXAMINATION:  General Exam:   General appearance: No acute distress   Neck: supple, non-tender       Neurological Exam: off sedation  Mental Status: Extubated. open eyes to name; follows most commands (showed 2 fingers, squeezes hand)  Cranial Nerves:   PERRL, no nystagmus.  No facial asymmetry.     Motor:   Tone: Normal.                   Strength:   moving all ext spontaneously as well as on command   Tremor: No resting, postural or action tremor.  No myoclonus.    Sensation: +withdraws all extremities to painful stimuli    Deep Tendon Reflexes:     Biceps          Triceps      BR        Patellar        Ankle         Babinski                                         R       1+                   1+           1+               0               0           downgoing                                         L        1+                  1+           1+               0               0           downgoing    Gait: Deferred    LABS:                        10.8   9.7   )-----------( 136      ( 20 Apr 2019 21:38 )             33.8     21 Apr 2019 04:34    154    |  119    |  5      ----------------------------<  164    4.2     |  23     |  0.76     Ca    8.9        21 Apr 2019 04:34  Phos  1.4       20 Apr 2019 21:38  Mg     1.7       20 Apr 2019 21:38        CSF:    RBC Count - Spinal Fluid: 1 /uL (04-19-19 @ 15:13)  Total Nucleated Cell Count, CSF: 2 /uL (04-19-19 @ 15:13)          Protein, CSF: 41 mg/dL (04-19-19 @ 15:13)      RADIOLOGY & ADDITIONAL STUDIES:    Herpes Simplex Virus 1/2 PCR, CSF (04.19.19 @ 22:40)    Source HSV 1/2: CSF    HSV 1/2 PCR: [facility]: HSV1/2 PCR assay is a real-time PCR test that detects and differentiates  HSV-1 and/or HSV-2 DNA in CSF (Vitreous Fluid). The results of this test  should be interpreted with consideration of all clinical and laboratory  findings.    _____________________________________________________________  EEG IMPRESSION/CLINICAL CORRELATE    Abnormal EEG study.  1.  6-10 subclinical seizures per hour recorded from the left posterior temporal region (rhythmic beta activity developing and evolving in frequency over the left posterior temporal region, duration 10 seconds, with no associated clinical signs) improved after 22:00  2. Structural lesion in the left hemisphere  3. Moderate diffuse encephalopathy    The present recording is improved compared with the previous one due to decrease seizure burden.      Korina Ivory MD  Epilepsy Fellow    Roberto Yun MD

## 2019-04-21 NOTE — PROGRESS NOTE ADULT - ASSESSMENT
ASSESSMENT/PLAN:  Seizures  ?Herpes encephalitis     NEURO:    neurology following  LP done - f/u final cx's , viral panel, HSV PCR, patient on Acylovir   c/w Keppra, fycompa  multiple subclinical seizures, cont vEEG with AEDs added/increased  Activity: [x] mobilize as tolerated [] Bedrest [x] PT [x] OT [] PMNR    PULM:    extubated, monitor resp status  encourage incentive spirometry as able    CV:  MAP > 65  -160 on cardene    RENAL:  Fluids:  NS @ 75    GI:    Diet: dysphagia screen s/p extubation  GI prophylaxis [] not indicated [x] PPI [] other:  Bowel regimen [x] colace [x] senna [] other:    ENDO:   Goal euglycemia (-180)  on insulin gtt--transition to lantus/premeal    HEME/ONC:  VTE prophylaxis: [x] SCDs [x] chemoprophylaxis - lovenox     ID:  afebrile     transfer to floor under neurology, [place]  not critically ill

## 2019-04-21 NOTE — CONSULT NOTE ADULT - PROBLEM SELECTOR RECOMMENDATION 9
-Patient with uncontrolled Type 2 DM (A1C 9.9)  -currently with hyperglycemia on tube feeds- on 18hrs on/6hrs off feeds- at goal rate of 60cc/hr. When pt seen tube feeds being held, most recently were at rate of 30cc/hr. Insulin gtt was dc 1 hr prior and started on NPH [id number]. -Patient with uncontrolled Type 2 DM (A1C 9.9)  -currently with hyperglycemia on tube feeds- on 18hrs on/6hrs off feeds- with goal rate of 60cc/hr. When pt seen tube feeds being held, most recently were at rate of 30cc/hr. Insulin gtt was dc 1 hr prior and started on NPH [id number].  -Would change tube feeds to continuous. Can start 14 units q6hrs. Hold if tube feeds are held. Also start humalog moderate dose correctional q 6hrs.

## 2019-04-21 NOTE — CONSULT NOTE ADULT - PROBLEM SELECTOR RECOMMENDATION 2
-Patient noted with persistent hypernatremia Na 155, increased serum osm 318 , low SG 1.006 and urine osm 294.   -Urine output does not appear to be increased as per records. Need strict I/O.   - -Patient noted with persistent hypernatremia Na 155, increased serum osm 318 , low SG 1.006 and urine osm 294.   -Urine output does not appear to be increased as per records. Need strict I/O.   -Patient noted with initial normal Na on admission with hypernatremia developing yest. Noted to have been NPO so far, with tube feeds started today.  -Patient with calculated free water deficit of 4L. Would start free water boluses of 400cc every 6hrs.   -Monitor Na every 4-6 hrs for now. Can start DI watch if increasing urine output. If urine output >500cc/1hr or >250cc every 2hrs, then check STAT Na, serum, osm, urine osm, SG. Can consider bolusing 1/2NS 500cc and trending Na then.  -D/W nsx team hypernatremia of unclear etiolgy though would be unsual to develop DI acutetly without a pituioary or infunibulum insult. rather this hypernatremia is new inpt and may be secondary to free water deficit?, however monitor for DI as can not rule it out until we have further trend with Na and UOP  Patient noted with hypernatremia Na 155 today , increased serum osm 318 , low SG 1.006 and urine osm 294.   -Urine output does not always appear to be increased which would have been consistent with DI as per records until this afternoon where she did have increased UOP  Need strict I/O.   -Patient noted with initial normal Na on admission with hypernatremia developing yest. Noted to have been NPO so far, with tube feeds started today.  -Patient with calculated free water deficit of 4L. Would recommend to  start free water boluses of 400cc every 6hrs and monitor Na and UOP closely   -Monitor Na every 4hrs for now.  Start DI watch if increasing urine output. DI watch as follows: If urine output >500cc/1hr or >250cc every 2hrs, then check STAT Na, serum, osm, urine osm, SG. if net negative and NA is increasing to prior would bolus 1/2NS to match half UOP and continue to trend Na  inform endocrine if DI watch is positve   -D/W nsx team

## 2019-04-21 NOTE — CONSULT NOTE ADULT - PROBLEM SELECTOR RECOMMENDATION 3
-on levothyroxine 50mcg at home. Here on 25mcg IV daily. Check TSH, Ft4 and will adjust accordingly.

## 2019-04-21 NOTE — EEG REPORT - NS EEG TEXT BOX
[facility]   COMPREHENSIVE EPILEPSY CENTER   REPORT OF CONTINUOS VIDEO EEG     [facility]: [address], [place] [address], Ph#: [phone]  [facility]: [id number] [place] [address], Ph#: [phone]  Office: [address] [place] [address], [place] [address], [place] [address] Ph#: [phone]    Patient Name: RON FOSTER  Age and : 69y (54)  MRN #: [id number]  Location: [place] [address]  Referring Physician: Daria Martinez    Study Date: 19-19    _____________________________________________________________  TECHNICAL INFORMATION    _____________________________________________________________  HISTORY    Patient is a 69y old  Female who presents with a chief complaint of AMS    PERTINENT MEDICATION:  acyclovir IVPB 1000 milliGRAM(s) IV Intermittent every 8 hours  ALBUTerol/ipratropium for Nebulization 3 milliLiter(s) Nebulizer every 6 hours  amLODIPine   Tablet 10 milliGRAM(s) Oral daily  chlorhexidine 0.12% Liquid 15 milliLiter(s) Oral Mucosa two times a day  chlorhexidine 4% Liquid 1 Application(s) Topical <User Schedule>  enoxaparin Injectable 40 milliGRAM(s) SubCutaneous <User Schedule>  hydrALAZINE 50 milliGRAM(s) Oral every 8 hours  insulin Infusion 3 Unit(s)/Hr (3 mL/Hr) IV Continuous <Continuous>  lacosamide IVPB 200 milliGRAM(s) IV Intermittent every 12 hours  lactated ringers. 1000 milliLiter(s) (75 mL/Hr) IV Continuous <Continuous>  levETIRAcetam  IVPB 1000 milliGRAM(s) IV Intermittent <User Schedule>  levothyroxine Injectable 25 MICROGram(s) IV Push at bedtime  metoprolol tartrate 50 milliGRAM(s) Enteral Tube two times a day  niCARdipine Infusion 10 mG/Hr (50 mL/Hr) IV Continuous <Continuous>  pantoprazole  Injectable 40 milliGRAM(s) IV Push daily  perampanel 4 milliGRAM(s) Oral at bedtime    _____________________________________________________________  STUDY INTERPRETATION    Findings: The background was continuous, diffuse delta and theta activity with faster frequencies bifrontally, and slower frequencies over the left hemisphere.  No PDR was seen.      Generalized Slowing:  -Diffuse theta and polymorphic delta slowing, continuous    Focal Slowing:   -Continuous Slowing, Lateralized, Left hemisphere (delta)    Sleep Background:  Stage II sleep transients (rudimentary symmetric sleep spindles and K complexes ) were present.     Other Non-Epileptiform Findings:  None were present.    Interictal Epileptiform Activity:   -LPDs (Lateralized Periodic Discharges), Regional, Left Posterior Temporal, maximum T7/P7 (0.5 - 1 Hz).    Events:  - 6-10 subclinical seizures per hour recorded from the left posterior temporal region (rhythmic beta activity developing and evolving in frequency over the left posterior temporal region, duration 10 seconds, with no associated clinical signs) improved after 22:00    Activation Procedures:   Hyperventilation was not performed.    Photic stimulation was not performed.     Artifacts:  Intermittent myogenic and movement artifacts were noted.    ECG:  The heart rate on single channel ECG was predominantly between 70-80 BPM.    _____________________________________________________________  EEG SUMMARY/CLASSIFICATION  Abnormal EEG in an unresponsive patient / a sedated patient.  - 6-10 subclinical seizures per hour recorded from the left posterior temporal region (rhythmic beta activity developing and evolving in frequency over the left posterior temporal region, duration 10 seconds, with no associated clinical signs) improved after 22:00.  - LPDs (Lateralized Periodic Discharges), Regional, Left Posterior Temporal, maximum T7/P7 (0.5 - 1 Hz).  -Continuous Slowing, Lateralized, Left hemisphere (delta)  -Diffuse theta and polymorphic delta slowing, intermittent.  _____________________________________________________________  EEG IMPRESSION/CLINICAL CORRELATE    Abnormal EEG study.  1.  6-10 subclinical seizures per hour recorded from the left posterior temporal region (rhythmic beta activity developing and evolving in frequency over the left posterior temporal region, duration 10 seconds, with no associated clinical signs) improved after 22:00  2. Structural lesion in the left hemisphere  3. Moderate diffuse encephalopathy    The present recording is improved compared with the previous one due to decrease seizure burden.      Korina Ivory MD  Epilepsy Fellow [facility]   COMPREHENSIVE EPILEPSY CENTER   REPORT OF CONTINUOS VIDEO EEG     [facility]: [address] [facility] , [address], [place] [address], Ph#: [phone]  [facility]: [id number] [place] [address], Ph#: [phone]  Office: [address] [place] [address], [place] [address], [place] [address] Ph#: [phone]    Patient Name: RON FOSTER  Age and : 69y (54)  MRN #: [id number]  Location: [place] [address]  Referring Physician: Daria Martinez    Study Date: 19-19    _____________________________________________________________  TECHNICAL INFORMATION    _____________________________________________________________  HISTORY    Patient is a 69y old  Female who presents with a chief complaint of AMS    PERTINENT MEDICATION:  acyclovir IVPB 1000 milliGRAM(s) IV Intermittent every 8 hours  ALBUTerol/ipratropium for Nebulization 3 milliLiter(s) Nebulizer every 6 hours  amLODIPine   Tablet 10 milliGRAM(s) Oral daily  chlorhexidine 0.12% Liquid 15 milliLiter(s) Oral Mucosa two times a day  chlorhexidine 4% Liquid 1 Application(s) Topical <User Schedule>  enoxaparin Injectable 40 milliGRAM(s) SubCutaneous <User Schedule>  hydrALAZINE 50 milliGRAM(s) Oral every 8 hours  insulin Infusion 3 Unit(s)/Hr (3 mL/Hr) IV Continuous <Continuous>  lacosamide IVPB 200 milliGRAM(s) IV Intermittent every 12 hours  lactated ringers. 1000 milliLiter(s) (75 mL/Hr) IV Continuous <Continuous>  levETIRAcetam  IVPB 1000 milliGRAM(s) IV Intermittent <User Schedule>  levothyroxine Injectable 25 MICROGram(s) IV Push at bedtime  metoprolol tartrate 50 milliGRAM(s) Enteral Tube two times a day  niCARdipine Infusion 10 mG/Hr (50 mL/Hr) IV Continuous <Continuous>  pantoprazole  Injectable 40 milliGRAM(s) IV Push daily  perampanel 4 milliGRAM(s) Oral at bedtime    _____________________________________________________________  STUDY INTERPRETATION    Findings: The background was continuous, diffuse delta and theta activity with faster frequencies bifrontally, and slower frequencies over the left hemisphere.  No PDR was seen.      Generalized Slowing:  -Diffuse theta and polymorphic delta slowing, continuous    Focal Slowing:   -Continuous Slowing, Lateralized, Left hemisphere (delta)    Sleep Background:  Stage II sleep transients (rudimentary symmetric sleep spindles and K complexes ) were present.     Other Non-Epileptiform Findings:  None were present.    Interictal Epileptiform Activity:   -LPDs (Lateralized Periodic Discharges), Regional, Left Posterior Temporal, maximum T7/P7 (0.5 - 1 Hz).    Events:  - 6-10 subclinical seizures per hour recorded from the left posterior temporal region (rhythmic beta activity developing and evolving in frequency over the left posterior temporal region, duration 10 seconds, with no associated clinical signs) improved after 22:00    Activation Procedures:   Hyperventilation was not performed.    Photic stimulation was not performed.     Artifacts:  Intermittent myogenic and movement artifacts were noted.    ECG:  The heart rate on single channel ECG was predominantly between 70-80 BPM.    _____________________________________________________________  EEG SUMMARY/CLASSIFICATION  Abnormal EEG in an unresponsive patient / a sedated patient.  - 6-10 subclinical seizures per hour recorded from the left posterior temporal region (rhythmic beta activity developing and evolving in frequency over the left posterior temporal region, duration 10 seconds, with no associated clinical signs) improved after 22:00.  - LPDs (Lateralized Periodic Discharges), Regional, Left Posterior Temporal, maximum T7/P7 (0.5 - 1 Hz).  -Continuous Slowing, Lateralized, Left hemisphere (delta)  -Diffuse theta and polymorphic delta slowing, intermittent.  _____________________________________________________________  EEG IMPRESSION/CLINICAL CORRELATE    Abnormal EEG study.  1.  6-10 subclinical seizures per hour recorded from the left posterior temporal region (rhythmic beta activity developing and evolving in frequency over the left posterior temporal region, duration 10 seconds, with no associated clinical signs) improved after 22:00  2. Structural lesion in the left hemisphere  3. Moderate diffuse encephalopathy    The present recording is improved compared with the previous one due to decrease seizure burden.      Korina Ivory MD  Epilepsy Fellow    Roberto Yun MD

## 2019-04-21 NOTE — CONSULT NOTE ADULT - SUBJECTIVE AND OBJECTIVE BOX
HPI:  69F w/ CAD, DM, HTN, HLD, hypothyroid, on eliquis, ASA, s/p umbilical hernia repair ~2 weeks ago, presents as level 1 trauma activation. Patient brought in by ambulance after being found down by family. Patient unable to answer questions, history obtained from family. Patient's family heard patient calling out for help and found her on the ground. She was noted to be less conversant than normal and confused. She was activated as a level 2 trauma activation based on unknown mechanism, upgraded to level 2.     Primary survey--airway intact initially, b/l breath sounds, HDS. Patient noted to be more confused, airway re-assessed with concern that patient not adequately protecting airway. Patient urgently intubated for airway protection with adequate end tidal CO2. Attempted left femoral central line but limited 2/2 habitus. IO placed. Seizure in trauma bay, resolved w/ ativan. Secondary survey without evidence of external injury. (18 Apr 2019 15:19)    Endocrine History:  Hx as per chart with patient lethargic and AAO X1.     Patient with reported hx of Type 2 DM (A1C 9.9). As per med rec was on metformin 1000mg daily. Not clear if has ever been on insulin. Currently lethargic, on tube feeds, 18 on/ 6 off with goal rate of 60cc/hr. Was on insulin gtt for hyperglycemia, most recent rate 2 units/hr. Insulin gtt was dc 1 hr prior and dosed NPH 20 units q8hrs.     Patient also noted with hypernatremia. Last Na 155, serum osm 318, urine osm 294, SG 1.007. 1L over the past shift since 7AM. MRI head done shows temporal lobe enhancement- infectious vs neoplasm, but no comment on pituitary.     Patient also noted to be on LT4 50mcg at home. Here on 25mcg IVP.      PAST MEDICAL & SURGICAL HISTORY:  Hypothyroid  Obesity  NACHO (obstructive sleep apnea): sleep study done 5 years ago  DM (Diabetes Mellitus) (ICD9 250.00)  Dyslipidemia (ICD9 272.4)  HTN (Hypertension) (ICD9 401.9)  CAD (Coronary Artery Disease) (ICD9 414.00): c STENTS  History of hysterectomy  History of ovarian cystectomy: x 2 in 2013  S/P primary angioplasty with coronary stent: has 5 stents-first placed in 2007, last stent -2011,  C Section: x 1      FAMILY HISTORY:  Family history of heart disease (Aunt)      Social History: unknown    Outpatient Medications:  · 	levothyroxine 50 mcg (0.05 mg) oral tablet: 1 tab(s) orally once a day, Last Dose Taken:    · 	metFORMIN 1000 mg oral tablet, extended release: 1 tab(s) orally once a day, Last Dose Taken:    · 	Eliquis 5 mg oral tablet: 1 tab(s) orally 2 times a day, Last Dose Taken:    · 	amLODIPine: 10 milligram(s) orally once a day, Last Dose Taken:    · 	hydrochlorothiazide: 50 milligram(s) orally once a day, Last Dose Taken:    · 	Metoprolol Tartrate 100 mg oral tablet:  orally 2 times a day, Last Dose Taken:        MEDICATIONS  (STANDING):  acyclovir IVPB 1000 milliGRAM(s) IV Intermittent every 8 hours  ALBUTerol/ipratropium for Nebulization 3 milliLiter(s) Nebulizer every 6 hours  amLODIPine   Tablet 10 milliGRAM(s) Oral daily  chlorhexidine 0.12% Liquid 15 milliLiter(s) Oral Mucosa two times a day  chlorhexidine 4% Liquid 1 Application(s) Topical <User Schedule>  dextrose 5%. 1000 milliLiter(s) (50 mL/Hr) IV Continuous <Continuous>  dextrose 50% Injectable 12.5 Gram(s) IV Push once  dextrose 50% Injectable 25 Gram(s) IV Push once  dextrose 50% Injectable 25 Gram(s) IV Push once  enoxaparin Injectable 40 milliGRAM(s) SubCutaneous <User Schedule>  hydrALAZINE 100 milliGRAM(s) Oral every 8 hours  insulin lispro (HumaLOG) corrective regimen sliding scale   SubCutaneous every 6 hours  insulin NPH human recombinant 20 Unit(s) SubCutaneous every 8 hours  lactated ringers. 1000 milliLiter(s) (75 mL/Hr) IV Continuous <Continuous>  levETIRAcetam  IVPB 1000 milliGRAM(s) IV Intermittent <User Schedule>  levothyroxine Injectable 25 MICROGram(s) IV Push at bedtime  metoprolol tartrate 50 milliGRAM(s) Enteral Tube two times a day  niCARdipine Infusion 10 mG/Hr (50 mL/Hr) IV Continuous <Continuous>  perampanel 4 milliGRAM(s) Oral at bedtime    MEDICATIONS  (PRN):  acetaminophen    Suspension .. 650 milliGRAM(s) Oral every 6 hours PRN Temp greater or equal to 38C (100.4F), Mild Pain (1 - 3)  dextrose 40% Gel 15 Gram(s) Oral once PRN Blood Glucose LESS THAN 70 milliGRAM(s)/deciliter  glucagon  Injectable 1 milliGRAM(s) IntraMuscular once PRN Glucose LESS THAN 70 milligrams/deciliter      Allergies    No Known Allergies    Intolerances      Review of Systems:    UNABLE TO OBTAIN    PHYSICAL EXAM:  VITALS: T(C): 37.2 (04-21-19 @ 15:00)  T(F): 98.9 (04-21-19 @ 15:00), Max: 99.3 (04-20-19 @ 23:00)  HR: 111 (04-21-19 @ 15:15) (77 - 121)  BP: --  RR:  (13 - 39)  SpO2:  (91% - 100%)  Wt(kg): --  GENERAL: NAD, well-groomed, well-developed, obese  EYES: No proptosis, no lid lag, anicteric  HEENT:  Atraumatic, Normocephalic, moist mucous membranes, NGT  THYROID: Normal size, no palpable nodules  RESPIRATORY: Clear to auscultation bilaterally; No rales, rhonchi, wheezing  CARDIOVASCULAR: Regular rate and rhythm; No murmurs; no peripheral edema  GI: Soft, nontender, non distended, normal bowel sounds  SKIN: Dry, intact, No rashes or lesions  PSYCH: Alert and oriented x 1, lethargic    POCT Blood Glucose.: 180 mg/dL (04-21-19 @ 16:22)  POCT Blood Glucose.: 174 mg/dL (04-21-19 @ 14:05)  POCT Blood Glucose.: 172 mg/dL (04-21-19 @ 13:00)  POCT Blood Glucose.: 192 mg/dL (04-21-19 @ 11:55)  POCT Blood Glucose.: 170 mg/dL (04-21-19 @ 10:41)  POCT Blood Glucose.: 127 mg/dL (04-21-19 @ 09:28)  POCT Blood Glucose.: 106 mg/dL (04-21-19 @ 08:34)  POCT Blood Glucose.: 97 mg/dL (04-21-19 @ 08:06)  POCT Blood Glucose.: 115 mg/dL (04-21-19 @ 06:52)  POCT Blood Glucose.: 150 mg/dL (04-21-19 @ 06:03)  POCT Blood Glucose.: 146 mg/dL (04-21-19 @ 05:10)  POCT Blood Glucose.: 164 mg/dL (04-21-19 @ 04:09)  POCT Blood Glucose.: 134 mg/dL (04-21-19 @ 03:13)  POCT Blood Glucose.: 149 mg/dL (04-21-19 @ 02:01)  POCT Blood Glucose.: 154 mg/dL (04-21-19 @ 01:08)  POCT Blood Glucose.: 172 mg/dL (04-20-19 @ 23:59)  POCT Blood Glucose.: 179 mg/dL (04-20-19 @ 23:23)  POCT Blood Glucose.: 171 mg/dL (04-20-19 @ 22:06)  POCT Blood Glucose.: 152 mg/dL (04-20-19 @ 21:02)  POCT Blood Glucose.: 176 mg/dL (04-20-19 @ 20:02)  POCT Blood Glucose.: 188 mg/dL (04-20-19 @ 18:46)  POCT Blood Glucose.: 217 mg/dL (04-20-19 @ 17:11)  POCT Blood Glucose.: 231 mg/dL (04-20-19 @ 16:11)  POCT Blood Glucose.: 182 mg/dL (04-20-19 @ 15:04)  POCT Blood Glucose.: 184 mg/dL (04-20-19 @ 14:02)  POCT Blood Glucose.: 160 mg/dL (04-20-19 @ 13:06)  POCT Blood Glucose.: 145 mg/dL (04-20-19 @ 11:53)  POCT Blood Glucose.: 144 mg/dL (04-20-19 @ 10:59)  POCT Blood Glucose.: 137 mg/dL (04-20-19 @ 10:06)  POCT Blood Glucose.: 137 mg/dL (04-20-19 @ 09:13)  POCT Blood Glucose.: 186 mg/dL (04-20-19 @ 07:56)  POCT Blood Glucose.: 233 mg/dL (04-20-19 @ 06:54)  POCT Blood Glucose.: 254 mg/dL (04-20-19 @ 06:22)  POCT Blood Glucose.: 233 mg/dL (04-20-19 @ 05:24)  POCT Blood Glucose.: 222 mg/dL (04-20-19 @ 01:45)  POCT Blood Glucose.: 147 mg/dL (04-19-19 @ 21:00)  POCT Blood Glucose.: 170 mg/dL (04-19-19 @ 18:18)  POCT Blood Glucose.: 187 mg/dL (04-19-19 @ 17:04)  POCT Blood Glucose.: 162 mg/dL (04-19-19 @ 15:03)  POCT Blood Glucose.: 234 mg/dL (04-19-19 @ 06:35)  POCT Blood Glucose.: 191 mg/dL (04-19-19 @ 02:08)  POCT Blood Glucose.: 157 mg/dL (04-18-19 @ 21:34)  POCT Blood Glucose.: 202 mg/dL (04-18-19 @ 17:14)                            10.8   9.7   )-----------( 136      ( 20 Apr 2019 21:38 )             33.8       04-21    155<H>  |  117<H>  |  4<L>  ----------------------------<  174<H>  3.5   |  22  |  0.77    EGFR if : 93  EGFR if non : 80    Ca    9.0      04-21  Mg     1.9     04-21  Phos  3.2     04-21    TPro  6.9  /  Alb  3.4  /  TBili  0.3  /  DBili  x   /  AST  17  /  ALT  13  /  AlkPhos  133<H>  04-19        Hemoglobin A1C, Whole Blood: 9.9 % <H> [4.0 - 5.6] (04-21-19 @ 09:26)

## 2019-04-21 NOTE — PROGRESS NOTE ADULT - SUBJECTIVE AND OBJECTIVE BOX
PAST MEDICAL & SURGICAL HISTORY:  Hypothyroid  Obesity  NACHO (obstructive sleep apnea): sleep study done 5 years ago  DM (Diabetes Mellitus) (ICD9 250.00)  Dyslipidemia (ICD9 272.4)  HTN (Hypertension) (ICD9 401.9)  CAD (Coronary Artery Disease) (ICD9 414.00): c STENTS  History of hysterectomy  History of ovarian cystectomy: x 2 in 2013  S/P primary angioplasty with coronary stent: has 5 stents-first placed in 2007, last stent -2011,  C Section: x 1      Allergies    No Known Allergies    Intolerances          REVIEW OF SYSTEMS: [ ] Unable to Assess due to neurologic exam   [x ] All ROS addressed below are non-contributory, except:  Neuro: [ ] Headache [ ] Back pain [ ] Numbness [ ] Weakness [ ] Ataxia [ ] Dizziness [ ] Aphasia [ ] Dysarthria [ ] Visual disturbance  Resp: [ ] Shortness of breath/dyspnea, [ ] Orthopnea [ ] Cough  CV: [ ] Chest pain [ ] Palpitation [ ] Lightheadedness [ ] Syncope  Renal: [ ] Thirst [ ] Edema  GI: [ ] Nausea [ ] Emesis [ ] Abdominal pain [ ] Constipation [ ] Diarrhea  Hem: [ ] Hematemesis [ ] bright red blood per rectum  ID: [ ] Fever [ ] Chills [ ] Dysuria  ENT: [ ] Rhinorrhea        T(C): 37.3 (04-20-19 @ 07:00), Max: 37.3 (04-19-19 @ 11:00)  HR: 65 (04-20-19 @ 08:00) (32 - 104)  BP: 91/47 (04-20-19 @ 08:00) (91/47 - 91/47)  RR: 18 (04-20-19 @ 08:00) (16 - 33)  SpO2: 100% (04-20-19 @ 08:00) (97% - 100%)  04-19-19 @ 07:01  -  04-20-19 @ 07:00  --------------------------------------------------------  IN: 3251.5 mL / OUT: 2185 mL / NET: 1066.5 mL    04-20-19 @ 07:01  -  04-20-19 @ 08:46  --------------------------------------------------------  IN: 103 mL / OUT: 0 mL / NET: 103 mL    acyclovir IVPB 1000 milliGRAM(s) IV Intermittent every 8 hours  chlorhexidine 0.12% Liquid 15 milliLiter(s) Oral Mucosa two times a day  chlorhexidine 4% Liquid 1 Application(s) Topical <User Schedule>  dexmedetomidine Infusion 0.1 MICROgram(s)/kG/Hr IV Continuous <Continuous>  insulin Infusion 3 Unit(s)/Hr IV Continuous <Continuous>  levETIRAcetam  IVPB 1000 milliGRAM(s) IV Intermittent <User Schedule>  levothyroxine Injectable 25 MICROGram(s) IV Push at bedtime  metoprolol tartrate Injectable 5 milliGRAM(s) IV Push every 6 hours  niCARdipine Infusion 5 mG/Hr IV Continuous <Continuous>  pantoprazole  Injectable 40 milliGRAM(s) IV Push daily  perampanel 4 milliGRAM(s) Oral at bedtime  sodium chloride 0.9% Bolus 500 milliLiter(s) IV Bolus once  sodium chloride 0.9%. 1000 milliLiter(s) IV Continuous <Continuous>    Culture - CSF with Gram Stain (collected 04-19-19 @ 16:57)  Source: .CSF  Gram Stain (04-19-19 @ 17:53):    No polymorphonuclear cells seen    No organisms seen    by cytocentrifuge  Preliminary Report (04-19-19 @ 17:53):    Culture in progress    Mode: AC/ CMV (Assist Control/ Continuous Mandatory Ventilation), RR (machine): 16, TV (machine): 400, FiO2: 40, PEEP: 5, ITime: 1, MAP: 9, PIP: 20      EXAMINATION:  General:  intubated   HEENT:  MMM  Neuro: opens eyes to command, CANDE, no gaze preference, moving all 4 extremities antigravity   oriented to self given choices  DELGADO antigravity to command  Cards:  s1, s2 RRR  Respiratory:  lungs clear b/l   Adomen:  soft  Extremities:  no edema  Skin:  warm/dry      LABS:  Na: 145 (04-19 @ 22:55), 137 (04-19 @ 10:13), 138 (04-19 @ 00:35), 138 (04-18 @ 15:32)  K: 3.2 (04-19 @ 22:55), 3.5 (04-19 @ 10:13), 2.9 (04-19 @ 00:35), 4.6 (04-18 @ 15:32)  Cl: 106 (04-19 @ 22:55), 98 (04-19 @ 10:13), 97 (04-19 @ 00:35), 96 (04-18 @ 15:32)  CO2: 25 (04-19 @ 22:55), 26 (04-19 @ 10:13), 26 (04-19 @ 00:35), 25 (04-18 @ 15:32)  BUN: 8 (04-19 @ 22:55), 16 (04-19 @ 10:13), 13 (04-19 @ 00:35), 12 (04-18 @ 15:32)  Cr: 0.53 (04-19 @ 22:55), 0.72 (04-19 @ 10:13), 0.59 (04-19 @ 00:35), 0.47 (04-18 @ 15:32)  Glu: 188(04-19 @ 22:55), 186(04-19 @ 10:13), 183(04-19 @ 00:35), 204(04-18 @ 15:32)    Hgb: 10.2 (04-19 @ 00:35), 11.1 (04-18 @ 21:24)  Hct: 33.3 (04-19 @ 00:35), 34.0 (04-18 @ 21:24)  WBC: 9.2 (04-19 @ 00:35), 10.2 (04-18 @ 21:24)  Plt: 181 (04-19 @ 00:35), 194 (04-18 @ 21:24)    INR: 1.31 04-18-19 @ 21:24  PTT: 26.2 04-18-19 @ 21:24    	  ASSESSMENT/PLAN:  status epilepticus, r/o Herpes encephalitis , although CSF PCR is neg, might have been too early, will need to repeat tap next week 	    NEURO:    Neurochecks q1 hrs,  d/c propofol  continue keppra and perampanel if still seizing, will add Vimpat   LP done - CSF PCR neg, cx negative   cVEEG - c/w Keppra and start Vimpat   T 10 vertebral fracture, NS recommended MRI spine, will get it next week with a repeat MRI brain   This could also be a low grade glioma, if repeat CSF PCR HSV neg will probably need a brain biopsy   Activity: [] mobilize as tolerated [x] Bedrest [] PT [] OT [] PMNR    PULM:  acute resp failure 2/2 seziures  PS trial possible extubation   full mechanical vent support     CV:  MAP > 65 mmhg  -160 mmhg   resume her home metoprolol HCZ and amlodipine     RENAL:  Fluids:  NS @ 75    GI:    Diet: npo  for extubation  GI prophylaxis [] not indicated [x] PPI [] other:  Bowel regimen [x] colace [x] senna [] other:    ENDO:   hyperglycemia on insulin drip     Goal euglycemia (-180)    HEME/ONC:  VTE prophylaxis: [x] SCDs [x] chemoprophylaxis - lovenox     ID:  afebrile   r/o Herpes encephalitis, will need repeat LP next week ( monday), will continue acyclovir and hydration     SOCIAL/FAMILY:  [x] awaiting [] updated at bedside [] family meeting    CODE STATUS:  [x] Full Code [] DNR [] DNI [] Palliative/Comfort Care    DISPOSITION:  [x] ICU [] Stroke Unit [] Floor [] EMU [] RCU [] PCU    [x] Patient is at high risk of neurologic deterioration/death due to:  seizures, hemorrhage, herniation       Time spent: 40 critical care minutes    Contact: [phone] Overnight  still has subclinical status       PAST MEDICAL & SURGICAL HISTORY:  Hypothyroid  Obesity  NACHO (obstructive sleep apnea): sleep study done 5 years ago  DM (Diabetes Mellitus) (ICD9 250.00)  Dyslipidemia (ICD9 272.4)  HTN (Hypertension) (ICD9 401.9)  CAD (Coronary Artery Disease) (ICD9 414.00): c STENTS  History of hysterectomy  History of ovarian cystectomy: x 2 in 2013  S/P primary angioplasty with coronary stent: has 5 stents-first placed in 2007, last stent -2011,  C Section: x 1      Allergies    No Known Allergies    Intolerances          REVIEW OF SYSTEMS: [ ] Unable to Assess due to neurologic exam   [x ] All ROS addressed below are non-contributory, except:  Neuro: [ ] Headache [ ] Back pain [ ] Numbness [ ] Weakness [ ] Ataxia [ ] Dizziness [ ] Aphasia [ ] Dysarthria [ ] Visual disturbance  Resp: [ ] Shortness of breath/dyspnea, [ ] Orthopnea [ ] Cough  CV: [ ] Chest pain [ ] Palpitation [ ] Lightheadedness [ ] Syncope  Renal: [ ] Thirst [ ] Edema  GI: [ ] Nausea [ ] Emesis [ ] Abdominal pain [ ] Constipation [ ] Diarrhea  Hem: [ ] Hematemesis [ ] bright red blood per rectum  ID: [ ] Fever [ ] Chills [ ] Dysuria  ENT: [ ] Rhinorrhea        T(C): 37.3 (04-20-19 @ 07:00), Max: 37.3 (04-19-19 @ 11:00)  HR: 65 (04-20-19 @ 08:00) (32 - 104)  BP: 91/47 (04-20-19 @ 08:00) (91/47 - 91/47)  RR: 18 (04-20-19 @ 08:00) (16 - 33)  SpO2: 100% (04-20-19 @ 08:00) (97% - 100%)  04-19-19 @ 07:01  -  04-20-19 @ 07:00  --------------------------------------------------------  IN: 3251.5 mL / OUT: 2185 mL / NET: 1066.5 mL    04-20-19 @ 07:01  -  04-20-19 @ 08:46  --------------------------------------------------------  IN: 103 mL / OUT: 0 mL / NET: 103 mL    acyclovir IVPB 1000 milliGRAM(s) IV Intermittent every 8 hours  chlorhexidine 0.12% Liquid 15 milliLiter(s) Oral Mucosa two times a day  chlorhexidine 4% Liquid 1 Application(s) Topical <User Schedule>  dexmedetomidine Infusion 0.1 MICROgram(s)/kG/Hr IV Continuous <Continuous>  insulin Infusion 3 Unit(s)/Hr IV Continuous <Continuous>  levETIRAcetam  IVPB 1000 milliGRAM(s) IV Intermittent <User Schedule>  levothyroxine Injectable 25 MICROGram(s) IV Push at bedtime  metoprolol tartrate Injectable 5 milliGRAM(s) IV Push every 6 hours  niCARdipine Infusion 5 mG/Hr IV Continuous <Continuous>  pantoprazole  Injectable 40 milliGRAM(s) IV Push daily  perampanel 4 milliGRAM(s) Oral at bedtime  sodium chloride 0.9% Bolus 500 milliLiter(s) IV Bolus once  sodium chloride 0.9%. 1000 milliLiter(s) IV Continuous <Continuous>    Culture - CSF with Gram Stain (collected 04-19-19 @ 16:57)  Source: .CSF  Gram Stain (04-19-19 @ 17:53):    No polymorphonuclear cells seen    No organisms seen    by cytocentrifuge  Preliminary Report (04-19-19 @ 17:53):    Culture in progress        EXAMINATION:  General:  NAD  HEENT:  MMM  Neuro: opens eyes to command, CANDE, no gaze preference, oriented x2,  moving all 4 extremities antigravity   Cards:  s1, s2 RRR  Respiratory:  lungs clear b/l   Adomen:  soft  Extremities:  no edema  Skin:  warm/dry      LABS:  Na: 145 (04-19 @ 22:55), 137 (04-19 @ 10:13), 138 (04-19 @ 00:35), 138 (04-18 @ 15:32)  K: 3.2 (04-19 @ 22:55), 3.5 (04-19 @ 10:13), 2.9 (04-19 @ 00:35), 4.6 (04-18 @ 15:32)  Cl: 106 (04-19 @ 22:55), 98 (04-19 @ 10:13), 97 (04-19 @ 00:35), 96 (04-18 @ 15:32)  CO2: 25 (04-19 @ 22:55), 26 (04-19 @ 10:13), 26 (04-19 @ 00:35), 25 (04-18 @ 15:32)  BUN: 8 (04-19 @ 22:55), 16 (04-19 @ 10:13), 13 (04-19 @ 00:35), 12 (04-18 @ 15:32)  Cr: 0.53 (04-19 @ 22:55), 0.72 (04-19 @ 10:13), 0.59 (04-19 @ 00:35), 0.47 (04-18 @ 15:32)  Glu: 188(04-19 @ 22:55), 186(04-19 @ 10:13), 183(04-19 @ 00:35), 204(04-18 @ 15:32)    Hgb: 10.2 (04-19 @ 00:35), 11.1 (04-18 @ 21:24)  Hct: 33.3 (04-19 @ 00:35), 34.0 (04-18 @ 21:24)  WBC: 9.2 (04-19 @ 00:35), 10.2 (04-18 @ 21:24)  Plt: 181 (04-19 @ 00:35), 194 (04-18 @ 21:24)    INR: 1.31 04-18-19 @ 21:24  PTT: 26.2 04-18-19 @ 21:24    	  ASSESSMENT/PLAN:  status epilepticus, r/o Herpes encephalitis , although CSF PCR is neg, might have been too early, will need to repeat tap next week 	    NEURO:    EEG reads of 10 subclinical seizures   Neurochecks q2 hrs  continue keppra   perampanel 4 mg qhs   Vimpat 200 mg BID   LP done - CSF PCR neg, cx negative   cVEEG - had 10 subclinical seizures   T 10 vertebral fracture, NS recommended MRI spine, will get it next week with a repeat MRI wwo brain   This could also be a low grade glioma, if repeat CSF PCR HSV neg will probably need a brain biopsy   Activity: [] mobilize as tolerated [] Bedrest [x] PT [x] OT [] PMNR    PULM:    On NC   IS  chest PT     CV:  MAP > 65 mmhg  -160 mmhg   resume her home metoprolol 50 mg q 12hrs   d/c HCZ as she has high urine output   increase hydralazine 100 mg q 8 hrs   continue  amlodipine   d/c nicardipine     RENAL:  Fluids:  LR 75 ml/hr  UO very elevated, Urine SG low, and sodium increased could be DI, will repeat BMP in 6 hrs and urine SG and consult endo for possible DI     GI:    Diet: glucerna   GI prophylaxis [] not indicated [x] PPI [] other:  Bowel regimen [x] colace [x] senna [] other:    ENDO:   hyperglycemia on insulin drip   NPH 20 units q 8 hr  ISS  Goal euglycemia (-180)    HEME/ONC:  VTE prophylaxis: [x] SCDs [x] chemoprophylaxis - lovenox     ID:  afebrile   r/o Herpes encephalitis, will need repeat LP next week ( monday), will continue acyclovir and hydration      SOCIAL/FAMILY:  [x] awaiting [] updated at bedside [] family meeting    CODE STATUS:  [x] Full Code [] DNR [] DNI [] Palliative/Comfort Care    DISPOSITION:  [x] ICU [] Stroke Unit [] Floor [] EMU [] RCU [] PCU    [x] Patient is at high risk of neurologic deterioration/death due to:  seizures, hemorrhage, herniation     MODERATE COMPLEXITY, MENINGITIS, SEIZURES, HYPERGLYCEMIA, R/O DI Overnight  still has subclinical status       PAST MEDICAL & SURGICAL HISTORY:  Hypothyroid  Obesity  NACHO (obstructive sleep apnea): sleep study done 5 years ago  DM (Diabetes Mellitus) (ICD9 250.00)  Dyslipidemia (ICD9 272.4)  HTN (Hypertension) (ICD9 401.9)  CAD (Coronary Artery Disease) (ICD9 414.00): c STENTS  History of hysterectomy  History of ovarian cystectomy: x 2 in 2013  S/P primary angioplasty with coronary stent: has 5 stents-first placed in 2007, last stent -2011,  C Section: x 1      Allergies    No Known Allergies    Intolerances          REVIEW OF SYSTEMS: [ ] Unable to Assess due to neurologic exam   [x ] All ROS addressed below are non-contributory, except:  Neuro: [ ] Headache [ ] Back pain [ ] Numbness [ ] Weakness [ ] Ataxia [ ] Dizziness [ ] Aphasia [ ] Dysarthria [ ] Visual disturbance  Resp: [ ] Shortness of breath/dyspnea, [ ] Orthopnea [ ] Cough  CV: [ ] Chest pain [ ] Palpitation [ ] Lightheadedness [ ] Syncope  Renal: [ ] Thirst [ ] Edema  GI: [ ] Nausea [ ] Emesis [ ] Abdominal pain [ ] Constipation [ ] Diarrhea  Hem: [ ] Hematemesis [ ] bright red blood per rectum  ID: [ ] Fever [ ] Chills [ ] Dysuria  ENT: [ ] Rhinorrhea        T(C): 37.3 (04-20-19 @ 07:00), Max: 37.3 (04-19-19 @ 11:00)  HR: 65 (04-20-19 @ 08:00) (32 - 104)  BP: 91/47 (04-20-19 @ 08:00) (91/47 - 91/47)  RR: 18 (04-20-19 @ 08:00) (16 - 33)  SpO2: 100% (04-20-19 @ 08:00) (97% - 100%)  04-19-19 @ 07:01  -  04-20-19 @ 07:00  --------------------------------------------------------  IN: 3251.5 mL / OUT: 2185 mL / NET: 1066.5 mL    04-20-19 @ 07:01  -  04-20-19 @ 08:46  --------------------------------------------------------  IN: 103 mL / OUT: 0 mL / NET: 103 mL    acyclovir IVPB 1000 milliGRAM(s) IV Intermittent every 8 hours  chlorhexidine 0.12% Liquid 15 milliLiter(s) Oral Mucosa two times a day  chlorhexidine 4% Liquid 1 Application(s) Topical <User Schedule>  dexmedetomidine Infusion 0.1 MICROgram(s)/kG/Hr IV Continuous <Continuous>  insulin Infusion 3 Unit(s)/Hr IV Continuous <Continuous>  levETIRAcetam  IVPB 1000 milliGRAM(s) IV Intermittent <User Schedule>  levothyroxine Injectable 25 MICROGram(s) IV Push at bedtime  metoprolol tartrate Injectable 5 milliGRAM(s) IV Push every 6 hours  niCARdipine Infusion 5 mG/Hr IV Continuous <Continuous>  pantoprazole  Injectable 40 milliGRAM(s) IV Push daily  perampanel 4 milliGRAM(s) Oral at bedtime  sodium chloride 0.9% Bolus 500 milliLiter(s) IV Bolus once  sodium chloride 0.9%. 1000 milliLiter(s) IV Continuous <Continuous>    Culture - CSF with Gram Stain (collected 04-19-19 @ 16:57)  Source: .CSF  Gram Stain (04-19-19 @ 17:53):    No polymorphonuclear cells seen    No organisms seen    by cytocentrifuge  Preliminary Report (04-19-19 @ 17:53):    Culture in progress        EXAMINATION:  General:  NAD  HEENT:  MMM  Neuro: opens eyes to command, CANDE, no gaze preference, oriented x2,  moving all 4 extremities antigravity   Cards:  s1, s2 RRR  Respiratory:  lungs clear b/l   Adomen:  soft  Extremities:  no edema  Skin:  warm/dry      LABS:  Na: 145 (04-19 @ 22:55), 137 (04-19 @ 10:13), 138 (04-19 @ 00:35), 138 (04-18 @ 15:32)  K: 3.2 (04-19 @ 22:55), 3.5 (04-19 @ 10:13), 2.9 (04-19 @ 00:35), 4.6 (04-18 @ 15:32)  Cl: 106 (04-19 @ 22:55), 98 (04-19 @ 10:13), 97 (04-19 @ 00:35), 96 (04-18 @ 15:32)  CO2: 25 (04-19 @ 22:55), 26 (04-19 @ 10:13), 26 (04-19 @ 00:35), 25 (04-18 @ 15:32)  BUN: 8 (04-19 @ 22:55), 16 (04-19 @ 10:13), 13 (04-19 @ 00:35), 12 (04-18 @ 15:32)  Cr: 0.53 (04-19 @ 22:55), 0.72 (04-19 @ 10:13), 0.59 (04-19 @ 00:35), 0.47 (04-18 @ 15:32)  Glu: 188(04-19 @ 22:55), 186(04-19 @ 10:13), 183(04-19 @ 00:35), 204(04-18 @ 15:32)    Hgb: 10.2 (04-19 @ 00:35), 11.1 (04-18 @ 21:24)  Hct: 33.3 (04-19 @ 00:35), 34.0 (04-18 @ 21:24)  WBC: 9.2 (04-19 @ 00:35), 10.2 (04-18 @ 21:24)  Plt: 181 (04-19 @ 00:35), 194 (04-18 @ 21:24)    INR: 1.31 04-18-19 @ 21:24  PTT: 26.2 04-18-19 @ 21:24    	  ASSESSMENT/PLAN:  status epilepticus, r/o Herpes encephalitis , although CSF PCR is neg, might have been too early, will need to repeat tap next week 	    NEURO:    EEG reads of 10 subclinical seizures   Neurochecks q2 hrs  continue keppra   perampanel 4 mg qhs   Vimpat 200 mg BID   LP done - CSF PCR neg, cx negative   cVEEG - had 10 subclinical seizures   T 10 vertebral fracture, NS recommended MRI spine, will get it next week with a repeat MRI wwo brain   This could also be a low grade glioma, if repeat CSF PCR HSV neg will probably need a brain biopsy   Activity: [] mobilize as tolerated [] Bedrest [x] PT [x] OT [] PMNR    PULM:    On NC   IS  chest PT     CV:  MAP > 65 mmhg  -160 mmhg   resume her home metoprolol 50 mg q 12hrs   increase hydralazine 100 mg q 8 hrs   continue  amlodipine   d/c nicardipine     RENAL:  Fluids:  LR 75 ml/hr  UO very elevated, Urine SG low, and sodium increased could be DI, will repeat BMP in 6 hrs and urine SG and consult endo for possible DI     GI:    Diet: glucerna   GI prophylaxis [] not indicated [x] PPI [] other:  Bowel regimen [x] colace [x] senna [] other:    ENDO:   hyperglycemia on insulin drip   NPH 20 units q 8 hr  ISS  Goal euglycemia (-180)    HEME/ONC:  VTE prophylaxis: [x] SCDs [x] chemoprophylaxis - lovenox     ID:  afebrile   r/o Herpes encephalitis, will need repeat LP next week ( monday), will continue acyclovir and hydration      SOCIAL/FAMILY:  [x] awaiting [] updated at bedside [] family meeting    CODE STATUS:  [x] Full Code [] DNR [] DNI [] Palliative/Comfort Care    DISPOSITION:  [x] ICU [] Stroke Unit [] Floor [] EMU [] RCU [] PCU    [x] Patient is at high risk of neurologic deterioration/death due to:  seizures, hemorrhage, herniation     MODERATE COMPLEXITY, MENINGITIS, SEIZURES, HYPERGLYCEMIA, R/O DI

## 2019-04-21 NOTE — PROGRESS NOTE ADULT - SUBJECTIVE AND OBJECTIVE BOX
HPI:  69F w/ CAD, DM, HTN, HLD, hypothyroid, on eliquis, ASA, s/p umbilical hernia repair ~2 weeks ago, presented as a level 1 trauma activation. Patient brought in by ambulance after being found down by family. Patient unable to answer questions, history obtained from family. Patient's family heard patient calling out for help and found her on the ground. She was noted to be less conversant than normal and confused. She was activated as a level 2 trauma activation based on unknown mechanism, upgraded to level 2.     4/18 Primary survey--airway intact initially, b/l breath sounds, HDS. Patient noted to be more confused, airway re-assessed with concern that patient not adequately protecting airway. Patient urgently intubated for airway protection with adequate end tidal CO2. Attempted left femoral central line but limited 2/2 habitus. IO placed. Seizure in trauma bay, resolved w/ ativan. Secondary survey without evidence of external injury.   4/18-19 overnight Patient admitted to SICU and then began having sublinical siezures on VEEG and then removed.  Patient had LP and MRI with Left posterior lateral temporal and parietal occipital hyperintense T2 FLAIR suspicious for herpes encephalitis - started on Acyclovir.  4/19 PM Transferred to NSCU   4/19-20 multiple subclinical seizures, now on keppra, fycompa, and vimpat    vimpat is d/c'ed per neurology    VITALS:  Recent Vitals Reviewed   LABS:  Recent Labs Reviewed  MEDICATIONS: All Recent Medications Reviewed   IMAGING:   Recent imaging studies were reviewed.    EXAMINATION:  General:  calm   HEENT:  MMM  Neuro:  oriented to self/place, BUE 4/5, BLE 5/5  Cards:  s1, s2 RRR  Respiratory:  lungs clear b/l   Adomen:  soft  Extremities:  no edema  Skin:  warm/dry

## 2019-04-21 NOTE — PROGRESS NOTE ADULT - ASSESSMENT
70 y/o F w/ past medical history of CAD, DM, HTN, HLD, hypothyroid, on eliquis, ASA, s/p umbilical hernia repair ~2 weeks ago presented as level 1 trauma. Patient was found down by family 4/18/2019 and was found to be altered and had witnessed seizure in ED and subsequently intubated. Neurology consulted for seizure and MRI brain findings of left temporal encephalitis/cerebritis.    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 Started on Vimpat overnight. Off sedation following commands.    [] Kindly repeat MRI brain [facility] on 4/22 followed by repeat LP studies inc HSV, PCR  [] Discontinue Vimpat. No clinical correlate to improvement in EEG.   [x] LP done by neuro 4/19 - cells 2 (lymph 87), glucose 103, protein 43, CSF PCR/herpes negative (can be negative early 72hrs)  [x] c/w video EEG monitoring  [x] c/w Acyclovir 1g TID  [x] c/w Keppra 1g TID; Fycompa 4mg QHS    >> appreciate NSCU care, plan discussed with team, can transfer to Neurology

## 2019-04-22 LAB
ANION GAP SERPL CALC-SCNC: 11 MMOL/L — SIGNIFICANT CHANGE UP (ref 5–17)
ANION GAP SERPL CALC-SCNC: 12 MMOL/L — SIGNIFICANT CHANGE UP (ref 5–17)
ANION GAP SERPL CALC-SCNC: 12 MMOL/L — SIGNIFICANT CHANGE UP (ref 5–17)
BUN SERPL-MCNC: 6 MG/DL — LOW (ref 7–23)
BUN SERPL-MCNC: 6 MG/DL — LOW (ref 7–23)
BUN SERPL-MCNC: 7 MG/DL — SIGNIFICANT CHANGE UP (ref 7–23)
CALCIUM SERPL-MCNC: 8.7 MG/DL — SIGNIFICANT CHANGE UP (ref 8.4–10.5)
CALCIUM SERPL-MCNC: 8.8 MG/DL — SIGNIFICANT CHANGE UP (ref 8.4–10.5)
CALCIUM SERPL-MCNC: 8.9 MG/DL — SIGNIFICANT CHANGE UP (ref 8.4–10.5)
CHLORIDE SERPL-SCNC: 114 MMOL/L — HIGH (ref 96–108)
CHLORIDE SERPL-SCNC: 114 MMOL/L — HIGH (ref 96–108)
CHLORIDE SERPL-SCNC: 117 MMOL/L — HIGH (ref 96–108)
CO2 SERPL-SCNC: 24 MMOL/L — SIGNIFICANT CHANGE UP (ref 22–31)
CO2 SERPL-SCNC: 24 MMOL/L — SIGNIFICANT CHANGE UP (ref 22–31)
CO2 SERPL-SCNC: 25 MMOL/L — SIGNIFICANT CHANGE UP (ref 22–31)
CREAT SERPL-MCNC: 0.84 MG/DL — SIGNIFICANT CHANGE UP (ref 0.5–1.3)
CREAT SERPL-MCNC: 0.84 MG/DL — SIGNIFICANT CHANGE UP (ref 0.5–1.3)
CREAT SERPL-MCNC: 0.87 MG/DL — SIGNIFICANT CHANGE UP (ref 0.5–1.3)
CULTURE RESULTS: NO GROWTH — SIGNIFICANT CHANGE UP
GLUCOSE SERPL-MCNC: 157 MG/DL — HIGH (ref 70–99)
GLUCOSE SERPL-MCNC: 181 MG/DL — HIGH (ref 70–99)
GLUCOSE SERPL-MCNC: 198 MG/DL — HIGH (ref 70–99)
HCT VFR BLD CALC: 30.4 % — LOW (ref 34.5–45)
HGB BLD-MCNC: 9.6 G/DL — LOW (ref 11.5–15.5)
MAGNESIUM SERPL-MCNC: 1.8 MG/DL — SIGNIFICANT CHANGE UP (ref 1.6–2.6)
MAGNESIUM SERPL-MCNC: 2.1 MG/DL — SIGNIFICANT CHANGE UP (ref 1.6–2.6)
MAGNESIUM SERPL-MCNC: 2.3 MG/DL — SIGNIFICANT CHANGE UP (ref 1.6–2.6)
MCHC RBC-ENTMCNC: 25.4 PG — LOW (ref 27–34)
MCHC RBC-ENTMCNC: 31.8 GM/DL — LOW (ref 32–36)
MCV RBC AUTO: 79.8 FL — LOW (ref 80–100)
PHOSPHATE SERPL-MCNC: 2.9 MG/DL — SIGNIFICANT CHANGE UP (ref 2.5–4.5)
PHOSPHATE SERPL-MCNC: 3 MG/DL — SIGNIFICANT CHANGE UP (ref 2.5–4.5)
PHOSPHATE SERPL-MCNC: 3.4 MG/DL — SIGNIFICANT CHANGE UP (ref 2.5–4.5)
PLATELET # BLD AUTO: 135 K/UL — LOW (ref 150–400)
POTASSIUM SERPL-MCNC: 3.6 MMOL/L — SIGNIFICANT CHANGE UP (ref 3.5–5.3)
POTASSIUM SERPL-MCNC: 3.6 MMOL/L — SIGNIFICANT CHANGE UP (ref 3.5–5.3)
POTASSIUM SERPL-MCNC: 3.9 MMOL/L — SIGNIFICANT CHANGE UP (ref 3.5–5.3)
POTASSIUM SERPL-SCNC: 3.6 MMOL/L — SIGNIFICANT CHANGE UP (ref 3.5–5.3)
POTASSIUM SERPL-SCNC: 3.6 MMOL/L — SIGNIFICANT CHANGE UP (ref 3.5–5.3)
POTASSIUM SERPL-SCNC: 3.9 MMOL/L — SIGNIFICANT CHANGE UP (ref 3.5–5.3)
RBC # BLD: 3.8 M/UL — SIGNIFICANT CHANGE UP (ref 3.8–5.2)
RBC # FLD: 14 % — SIGNIFICANT CHANGE UP (ref 10.3–14.5)
SODIUM SERPL-SCNC: 150 MMOL/L — HIGH (ref 135–145)
SODIUM SERPL-SCNC: 151 MMOL/L — HIGH (ref 135–145)
SODIUM SERPL-SCNC: 152 MMOL/L — HIGH (ref 135–145)
SPECIMEN SOURCE: SIGNIFICANT CHANGE UP
T4 FREE SERPL-MCNC: 1.1 NG/DL — SIGNIFICANT CHANGE UP (ref 0.9–1.8)
TROPONIN T, HIGH SENSITIVITY RESULT: 25 NG/L — SIGNIFICANT CHANGE UP (ref 0–51)
TSH SERPL-MCNC: 1.45 UIU/ML — SIGNIFICANT CHANGE UP (ref 0.27–4.2)
WBC # BLD: 6.9 K/UL — SIGNIFICANT CHANGE UP (ref 3.8–10.5)
WBC # FLD AUTO: 6.9 K/UL — SIGNIFICANT CHANGE UP (ref 3.8–10.5)

## 2019-04-22 PROCEDURE — 71045 X-RAY EXAM CHEST 1 VIEW: CPT | Mod: 26,76

## 2019-04-22 PROCEDURE — 95951: CPT | Mod: 26

## 2019-04-22 PROCEDURE — 95819 EEG AWAKE AND ASLEEP: CPT | Mod: 26

## 2019-04-22 PROCEDURE — 99233 SBSQ HOSP IP/OBS HIGH 50: CPT

## 2019-04-22 PROCEDURE — 99232 SBSQ HOSP IP/OBS MODERATE 35: CPT

## 2019-04-22 PROCEDURE — 93970 EXTREMITY STUDY: CPT | Mod: 26

## 2019-04-22 RX ORDER — POTASSIUM CHLORIDE 20 MEQ
40 PACKET (EA) ORAL ONCE
Refills: 0 | Status: COMPLETED | OUTPATIENT
Start: 2019-04-22 | End: 2019-04-22

## 2019-04-22 RX ORDER — HYDRALAZINE HCL 50 MG
100 TABLET ORAL EVERY 8 HOURS
Refills: 0 | Status: DISCONTINUED | OUTPATIENT
Start: 2019-04-22 | End: 2019-04-22

## 2019-04-22 RX ORDER — HYDRALAZINE HCL 50 MG
100 TABLET ORAL EVERY 8 HOURS
Refills: 0 | Status: DISCONTINUED | OUTPATIENT
Start: 2019-04-22 | End: 2019-05-01

## 2019-04-22 RX ORDER — FOSPHENYTOIN 50 MG/ML
1500 INJECTION INTRAMUSCULAR; INTRAVENOUS ONCE
Refills: 0 | Status: COMPLETED | OUTPATIENT
Start: 2019-04-22 | End: 2019-04-22

## 2019-04-22 RX ORDER — HUMAN INSULIN 100 [IU]/ML
7 INJECTION, SUSPENSION SUBCUTANEOUS EVERY 6 HOURS
Refills: 0 | Status: DISCONTINUED | OUTPATIENT
Start: 2019-04-22 | End: 2019-04-24

## 2019-04-22 RX ORDER — ALTEPLASE 100 MG
2 KIT INTRAVENOUS ONCE
Refills: 0 | Status: DISCONTINUED | OUTPATIENT
Start: 2019-04-22 | End: 2019-04-23

## 2019-04-22 RX ORDER — POTASSIUM CHLORIDE 20 MEQ
40 PACKET (EA) ORAL ONCE
Refills: 0 | Status: COMPLETED | OUTPATIENT
Start: 2019-04-22 | End: 2019-04-23

## 2019-04-22 RX ORDER — MAGNESIUM SULFATE 500 MG/ML
2 VIAL (ML) INJECTION ONCE
Refills: 0 | Status: COMPLETED | OUTPATIENT
Start: 2019-04-22 | End: 2019-04-23

## 2019-04-22 RX ORDER — HUMAN INSULIN 100 [IU]/ML
7 INJECTION, SUSPENSION SUBCUTANEOUS ONCE
Refills: 0 | Status: COMPLETED | OUTPATIENT
Start: 2019-04-22 | End: 2019-04-22

## 2019-04-22 RX ORDER — FOSPHENYTOIN 50 MG/ML
100 INJECTION INTRAMUSCULAR; INTRAVENOUS EVERY 8 HOURS
Refills: 0 | Status: DISCONTINUED | OUTPATIENT
Start: 2019-04-22 | End: 2019-04-22

## 2019-04-22 RX ORDER — FOSPHENYTOIN 50 MG/ML
100 INJECTION INTRAMUSCULAR; INTRAVENOUS EVERY 8 HOURS
Refills: 0 | Status: DISCONTINUED | OUTPATIENT
Start: 2019-04-22 | End: 2019-05-01

## 2019-04-22 RX ORDER — PERAMPANEL 2 MG/1
6 TABLET ORAL AT BEDTIME
Refills: 0 | Status: DISCONTINUED | OUTPATIENT
Start: 2019-04-22 | End: 2019-04-23

## 2019-04-22 RX ORDER — ACETAMINOPHEN 500 MG
975 TABLET ORAL EVERY 6 HOURS
Refills: 0 | Status: DISCONTINUED | OUTPATIENT
Start: 2019-04-22 | End: 2019-05-01

## 2019-04-22 RX ORDER — ACYCLOVIR SODIUM 500 MG
750 VIAL (EA) INTRAVENOUS EVERY 8 HOURS
Refills: 0 | Status: DISCONTINUED | OUTPATIENT
Start: 2019-04-22 | End: 2019-04-25

## 2019-04-22 RX ORDER — OXYCODONE HYDROCHLORIDE 5 MG/1
10 TABLET ORAL EVERY 6 HOURS
Refills: 0 | Status: DISCONTINUED | OUTPATIENT
Start: 2019-04-22 | End: 2019-04-22

## 2019-04-22 RX ORDER — ACETAMINOPHEN 500 MG
1000 TABLET ORAL ONCE
Refills: 0 | Status: COMPLETED | OUTPATIENT
Start: 2019-04-22 | End: 2019-04-22

## 2019-04-22 RX ORDER — LEVETIRACETAM 250 MG/1
1000 TABLET, FILM COATED ORAL
Refills: 0 | Status: DISCONTINUED | OUTPATIENT
Start: 2019-04-22 | End: 2019-05-01

## 2019-04-22 RX ORDER — OXYCODONE HYDROCHLORIDE 5 MG/1
5 TABLET ORAL EVERY 4 HOURS
Refills: 0 | Status: DISCONTINUED | OUTPATIENT
Start: 2019-04-22 | End: 2019-04-22

## 2019-04-22 RX ORDER — FOSPHENYTOIN 50 MG/ML
2000 INJECTION INTRAMUSCULAR; INTRAVENOUS ONCE
Refills: 0 | Status: DISCONTINUED | OUTPATIENT
Start: 2019-04-22 | End: 2019-04-22

## 2019-04-22 RX ADMIN — FOSPHENYTOIN 104 MILLIGRAM(S) PE: 50 INJECTION INTRAMUSCULAR; INTRAVENOUS at 21:36

## 2019-04-22 RX ADMIN — CHLORHEXIDINE GLUCONATE 15 MILLILITER(S): 213 SOLUTION TOPICAL at 18:14

## 2019-04-22 RX ADMIN — HUMAN INSULIN 7 UNIT(S): 100 INJECTION, SUSPENSION SUBCUTANEOUS at 11:51

## 2019-04-22 RX ADMIN — Medication 3 MILLILITER(S): at 05:03

## 2019-04-22 RX ADMIN — Medication 100 MILLIGRAM(S): at 05:01

## 2019-04-22 RX ADMIN — OXYCODONE HYDROCHLORIDE 5 MILLIGRAM(S): 5 TABLET ORAL at 05:45

## 2019-04-22 RX ADMIN — Medication 200 MILLIGRAM(S): at 05:21

## 2019-04-22 RX ADMIN — Medication 400 MILLIGRAM(S): at 00:30

## 2019-04-22 RX ADMIN — Medication 975 MILLIGRAM(S): at 16:56

## 2019-04-22 RX ADMIN — OXYCODONE HYDROCHLORIDE 5 MILLIGRAM(S): 5 TABLET ORAL at 11:00

## 2019-04-22 RX ADMIN — CHLORHEXIDINE GLUCONATE 15 MILLILITER(S): 213 SOLUTION TOPICAL at 05:02

## 2019-04-22 RX ADMIN — FOSPHENYTOIN 160 MILLIGRAM(S) PE: 50 INJECTION INTRAMUSCULAR; INTRAVENOUS at 18:14

## 2019-04-22 RX ADMIN — Medication 100 MILLIGRAM(S): at 13:25

## 2019-04-22 RX ADMIN — Medication 40 MILLIEQUIVALENT(S): at 15:58

## 2019-04-22 RX ADMIN — Medication 1000 MILLIGRAM(S): at 00:30

## 2019-04-22 RX ADMIN — HUMAN INSULIN 7 UNIT(S): 100 INJECTION, SUSPENSION SUBCUTANEOUS at 05:52

## 2019-04-22 RX ADMIN — Medication 270 MILLIGRAM(S): at 05:01

## 2019-04-22 RX ADMIN — SODIUM CHLORIDE 75 MILLILITER(S): 9 INJECTION, SOLUTION INTRAVENOUS at 09:58

## 2019-04-22 RX ADMIN — Medication 200 MILLIGRAM(S): at 15:58

## 2019-04-22 RX ADMIN — Medication 3 MILLILITER(S): at 12:23

## 2019-04-22 RX ADMIN — Medication 270 MILLIGRAM(S): at 13:26

## 2019-04-22 RX ADMIN — OXYCODONE HYDROCHLORIDE 5 MILLIGRAM(S): 5 TABLET ORAL at 09:57

## 2019-04-22 RX ADMIN — Medication 25 MICROGRAM(S): at 22:30

## 2019-04-22 RX ADMIN — Medication 2: at 11:51

## 2019-04-22 RX ADMIN — CHLORHEXIDINE GLUCONATE 1 APPLICATION(S): 213 SOLUTION TOPICAL at 11:52

## 2019-04-22 RX ADMIN — Medication 100 MILLIGRAM(S): at 21:33

## 2019-04-22 RX ADMIN — LEVETIRACETAM 400 MILLIGRAM(S): 250 TABLET, FILM COATED ORAL at 13:25

## 2019-04-22 RX ADMIN — AMLODIPINE BESYLATE 10 MILLIGRAM(S): 2.5 TABLET ORAL at 05:01

## 2019-04-22 RX ADMIN — Medication 2: at 05:39

## 2019-04-22 RX ADMIN — Medication 165 MILLIGRAM(S): at 21:33

## 2019-04-22 RX ADMIN — ENOXAPARIN SODIUM 40 MILLIGRAM(S): 100 INJECTION SUBCUTANEOUS at 18:15

## 2019-04-22 RX ADMIN — PERAMPANEL 6 MILLIGRAM(S): 2 TABLET ORAL at 22:09

## 2019-04-22 RX ADMIN — LEVETIRACETAM 400 MILLIGRAM(S): 250 TABLET, FILM COATED ORAL at 05:01

## 2019-04-22 RX ADMIN — Medication 975 MILLIGRAM(S): at 15:59

## 2019-04-22 RX ADMIN — OXYCODONE HYDROCHLORIDE 5 MILLIGRAM(S): 5 TABLET ORAL at 05:00

## 2019-04-22 RX ADMIN — LEVETIRACETAM 400 MILLIGRAM(S): 250 TABLET, FILM COATED ORAL at 21:33

## 2019-04-22 NOTE — CHART NOTE - NSCHARTNOTEFT_GEN_A_CORE
Epilepsy consult team recommendation : continue keppra 1 gram TID, fycompa increased to 6 mg. Load with Fosphenytoin 2 gram, continue 100 TID. Monitor phenytoin level.

## 2019-04-22 NOTE — PROGRESS NOTE ADULT - PROBLEM SELECTOR PLAN 1
-hold NPH until TF are restarted after MRI to avoid hypoglycemia. Can cover with moderate scale q6.  -Once TF are at goal, can restart NPH 14 units sq q6.  DISPO: will need to clarify her meds, but she will need additional agents as her HbA1c is 9.9%.

## 2019-04-22 NOTE — PROGRESS NOTE ADULT - SUBJECTIVE AND OBJECTIVE BOX
Right soleal DVT. EEG: Right soleal DVT. EEG: no overt seizures but did have LPDs (improving).    Arousable to loud voice, oriented to self, follows simple commands, BUE 4/5, BLE 5/5

## 2019-04-22 NOTE — PROGRESS NOTE ADULT - SUBJECTIVE AND OBJECTIVE BOX
Chief Complaint/Follow-up on: T2DM    Subjective: Patient has been NPO all day for her MRI, so NPH was reduced to 7 units sq qhs. While seeing her she pulled out her NGT but the RNs got it back in place.     MEDICATIONS  (STANDING):  acyclovir IVPB 750 milliGRAM(s) IV Intermittent every 8 hours  ALBUTerol/ipratropium for Nebulization 3 milliLiter(s) Nebulizer every 6 hours  amLODIPine   Tablet 10 milliGRAM(s) Oral daily  chlorhexidine 0.12% Liquid 15 milliLiter(s) Oral Mucosa two times a day  chlorhexidine 4% Liquid 1 Application(s) Topical <User Schedule>  dextrose 5%. 1000 milliLiter(s) (50 mL/Hr) IV Continuous <Continuous>  dextrose 50% Injectable 12.5 Gram(s) IV Push once  dextrose 50% Injectable 25 Gram(s) IV Push once  dextrose 50% Injectable 25 Gram(s) IV Push once  enoxaparin Injectable 40 milliGRAM(s) SubCutaneous <User Schedule>  fosphenytoin IVPB 100 milliGRAM(s) PE IV Intermittent every 8 hours  hydrALAZINE 100 milliGRAM(s) Oral every 8 hours  insulin lispro (HumaLOG) corrective regimen sliding scale   SubCutaneous every 6 hours  insulin NPH human recombinant 7 Unit(s) SubCutaneous every 6 hours  labetalol 200 milliGRAM(s) Oral three times a day  lactated ringers. 1000 milliLiter(s) (75 mL/Hr) IV Continuous <Continuous>  levETIRAcetam  IVPB 1000 milliGRAM(s) IV Intermittent <User Schedule>  levothyroxine Injectable 25 MICROGram(s) IV Push at bedtime  perampanel 6 milliGRAM(s) Oral at bedtime    MEDICATIONS  (PRN):  acetaminophen    Suspension .. 975 milliGRAM(s) Oral every 6 hours PRN Temp greater or equal to 38.5C (101.3F), Mild Pain (1 - 3)  dextrose 40% Gel 15 Gram(s) Oral once PRN Blood Glucose LESS THAN 70 milliGRAM(s)/deciliter  glucagon  Injectable 1 milliGRAM(s) IntraMuscular once PRN Glucose LESS THAN 70 milligrams/deciliter      PHYSICAL EXAM:  VITALS: T(C): 36.7 (04-22-19 @ 19:00)  T(F): 98.1 (04-22-19 @ 19:00), Max: 98.9 (04-22-19 @ 15:00)  HR: 78 (04-22-19 @ 19:00) (70 - 85)  BP: 121/60 (04-22-19 @ 19:00) (92/41 - 149/52)  RR:  (15 - 24)  SpO2:  (91% - 100%)  Wt(kg): --  GENERAL: NAD, well-groomed, well-developed  HEENT:  Atraumatic, Normocephalic, moist mucous membranes  RESPIRATORY: Clear to auscultation bilaterally; No rales, rhonchi, wheezing, or rubs  CARDIOVASCULAR: Regular rate and rhythm; No murmurs  GI: Soft, nontender, non distended, normal bowel sounds      POCT Blood Glucose.: 123 mg/dL (04-22-19 @ 18:10)  POCT Blood Glucose.: 174 mg/dL (04-22-19 @ 11:39)  POCT Blood Glucose.: 182 mg/dL (04-22-19 @ 05:23)  POCT Blood Glucose.: 230 mg/dL (04-21-19 @ 23:04)  POCT Blood Glucose.: 200 mg/dL (04-21-19 @ 17:38)  POCT Blood Glucose.: 180 mg/dL (04-21-19 @ 16:22)  POCT Blood Glucose.: 174 mg/dL (04-21-19 @ 14:05)  POCT Blood Glucose.: 172 mg/dL (04-21-19 @ 13:00)  POCT Blood Glucose.: 192 mg/dL (04-21-19 @ 11:55)  POCT Blood Glucose.: 170 mg/dL (04-21-19 @ 10:41)  POCT Blood Glucose.: 127 mg/dL (04-21-19 @ 09:28)  POCT Blood Glucose.: 106 mg/dL (04-21-19 @ 08:34)  POCT Blood Glucose.: 97 mg/dL (04-21-19 @ 08:06)  POCT Blood Glucose.: 115 mg/dL (04-21-19 @ 06:52)  POCT Blood Glucose.: 150 mg/dL (04-21-19 @ 06:03)  POCT Blood Glucose.: 146 mg/dL (04-21-19 @ 05:10)  POCT Blood Glucose.: 164 mg/dL (04-21-19 @ 04:09)  POCT Blood Glucose.: 134 mg/dL (04-21-19 @ 03:13)  POCT Blood Glucose.: 149 mg/dL (04-21-19 @ 02:01)  POCT Blood Glucose.: 154 mg/dL (04-21-19 @ 01:08)  POCT Blood Glucose.: 172 mg/dL (04-20-19 @ 23:59)  POCT Blood Glucose.: 179 mg/dL (04-20-19 @ 23:23)  POCT Blood Glucose.: 171 mg/dL (04-20-19 @ 22:06)  POCT Blood Glucose.: 152 mg/dL (04-20-19 @ 21:02)  POCT Blood Glucose.: 176 mg/dL (04-20-19 @ 20:02)  POCT Blood Glucose.: 188 mg/dL (04-20-19 @ 18:46)  POCT Blood Glucose.: 217 mg/dL (04-20-19 @ 17:11)  POCT Blood Glucose.: 231 mg/dL (04-20-19 @ 16:11)  POCT Blood Glucose.: 182 mg/dL (04-20-19 @ 15:04)  POCT Blood Glucose.: 184 mg/dL (04-20-19 @ 14:02)  POCT Blood Glucose.: 160 mg/dL (04-20-19 @ 13:06)  POCT Blood Glucose.: 145 mg/dL (04-20-19 @ 11:53)  POCT Blood Glucose.: 144 mg/dL (04-20-19 @ 10:59)  POCT Blood Glucose.: 137 mg/dL (04-20-19 @ 10:06)  POCT Blood Glucose.: 137 mg/dL (04-20-19 @ 09:13)  POCT Blood Glucose.: 186 mg/dL (04-20-19 @ 07:56)  POCT Blood Glucose.: 233 mg/dL (04-20-19 @ 06:54)  POCT Blood Glucose.: 254 mg/dL (04-20-19 @ 06:22)  POCT Blood Glucose.: 233 mg/dL (04-20-19 @ 05:24)  POCT Blood Glucose.: 222 mg/dL (04-20-19 @ 01:45)  POCT Blood Glucose.: 147 mg/dL (04-19-19 @ 21:00)    04-22    151<H>  |  114<H>  |  6<L>  ----------------------------<  181<H>  3.6   |  25  |  0.84    EGFR if : 83  EGFR if non : 72    Ca    8.8      04-22  Mg     2.1     04-22  Phos  2.9     04-22            Thyroid Function Tests:  04-22 @ 08:55 TSH -- FreeT4 1.1   04-22 @ 08:51 TSH 1.45      Hemoglobin A1C, Whole Blood: 9.9 % <H> [4.0 - 5.6] (04-21-19 @ 09:26)

## 2019-04-22 NOTE — SWALLOW BEDSIDE ASSESSMENT ADULT - SLP PERTINENT HISTORY OF CURRENT PROBLEM
69F w/ CAD, DM, HTN, HLD, hypothyroid, on eliquis, ASA, s/p umbilical hernia repair ~2 weeks ago, presents as level 1 trauma activation. Patient brought in by ambulance after being found down by family. Patient unable to answer questions, history obtained from family. Patient's family heard patient calling out for help and found her on the ground. She was noted to be less conversant than normal and confused. She was activated as a level 2 trauma activation based on unknown mechanism, upgraded to level 2.

## 2019-04-22 NOTE — PROGRESS NOTE ADULT - ASSESSMENT
69F w/ CAD, DM (A1C 9.9), HTN, HLD, hypothyroid, on eliquis, ASA, s/p umbilical hernia repair ~2 weeks ago, brought in by ambulance after being found down by family. Currently in NICU being treated for seizures and possible encephalitis. Endocrine consulted for Type 2 DM management on tube feeds as well as hypernatremia.

## 2019-04-22 NOTE — PROGRESS NOTE ADULT - ASSESSMENT
Seizures  - EEG  - Continue current antiepileptics  - Repeat LP; recheck HSV PCR; if repeat negative will d/c acyclovir  - -160mmHg, continue antihypertensive meds  - T10 vertebral body fracture: MRI pending

## 2019-04-22 NOTE — PROGRESS NOTE ADULT - ASSESSMENT
68 y/o F w/ past medical history of CAD, DM, HTN, HLD, hypothyroid, on eliquis, ASA, s/p umbilical hernia repair ~2 weeks ago presented as level 1 trauma. Patient was found down by family 4/18/2019 and was found to be altered and had witnessed seizure in ED and subsequently intubated. Neurology consulted for seizure and MRI brain findings of left temporal encephalitis/cerebritis.    Impression: AMS, seizures likely 2/2 to left temporal lesion, differential diagnosis includes neoplastic vs herpes infection     4/21: Mental status appears to be improved as well as EEG tracings. Started on Vimpat overnight. Off sedation following commands.  04/22 : waxing waning of mental status    [] Kindly repeat MRI brain WAW on 4/22 followed by repeat LP studies inc HSV, PCR  [x] LP done by neuro 4/19 - cells 2 (lymph 87), glucose 103, protein 43, CSF PCR/herpes negative (can be negative early 72hrs)  [x] c/w video EEG monitoring  [x] c/w Acyclovir 1g TID  [x] c/w Keppra 1g TID; increase Fycompa 6mg QHS    > appreciate NSCU care, Patient had numerous seizures (nearly 10/hr) in last 24 hours. Due to waxing/waning mental status and numerous seizure, patient should be monitored continuously with VEEG.   > Patient is at risk of compromising her airway due to numerous seizure, patient should stay in NSCU for further care until her seizures are under control. 70 y/o F w/ past medical history of CAD, DM, HTN, HLD, hypothyroid, on eliquis, ASA, s/p umbilical hernia repair ~2 weeks ago presented as level 1 trauma. Patient was found down by family 4/18/2019 and was found to be altered and had witnessed seizure in ED and subsequently intubated. Neurology consulted for seizure and MRI brain findings of left temporal abnormality.    Impression: AMS, seizures likely 2/2 to left temporal lesion, differential diagnosis includes neoplastic vs herpes infection     4/21: Mental status appears to be improved as well as EEG tracings. Started on Vimpat overnight. Off sedation following commands.  04/22 : waxing waning of mental status    [] Kindly repeat MRI brain WAW on 4/22 followed by repeat LP studies inc HSV, PCR  [x] LP done by neuro 4/19 - cells 2 (lymph 87), glucose 103, protein 43, CSF PCR/herpes negative (can be negative early 72hrs)  [x] c/w video EEG monitoring  [x] c/w Acyclovir 1g TID  [x] c/w Keppra 1g TID; increase Fycompa 6mg QHS    > appreciate NSCU care, Patient had numerous seizures (nearly 10/hr) in last 24 hours. Due to waxing/waning mental status and numerous seizure, patient should be monitored continuously with VEEG.   > Patient is at risk of compromising her airway due to numerous seizure, patient should stay in NSCU for further care until her seizures are under control.

## 2019-04-22 NOTE — EEG REPORT - NS EEG TEXT BOX
Study Date: 4/21/19-4/22/19    _____________________________________________________________  HISTORY:  Patient is a 69y old  Female who presents with a chief complaint of AMS    MEDICATIONS  (STANDING):  acyclovir IVPB 1000 milliGRAM(s) IV Intermittent every 8 hours  ALBUTerol/ipratropium for Nebulization 3 milliLiter(s) Nebulizer every 6 hours  amLODIPine   Tablet 10 milliGRAM(s) Oral daily  enoxaparin Injectable 40 milliGRAM(s) SubCutaneous <User Schedule>  hydrALAZINE 100 milliGRAM(s) Oral every 8 hours  insulin lispro (HumaLOG) corrective regimen sliding scale   SubCutaneous every 6 hours  insulin NPH human recombinant 14 Unit(s) SubCutaneous every 6 hours  labetalol 200 milliGRAM(s) Oral three times a day  lactated ringers. 1000 milliLiter(s) (75 mL/Hr) IV Continuous <Continuous>  levETIRAcetam  IVPB 1000 milliGRAM(s) IV Intermittent <User Schedule>  levothyroxine Injectable 25 MICROGram(s) IV Push at bedtime  perampanel 4 milliGRAM(s) Oral at bedtime    _____________________________________________________________  STUDY INTERPRETATION    Findings:  The background was continuous, diffuse delta and theta activity.  Diffuse alpha activity was present, though a clearly defined PDR was not.   State changes and reactivity were present.   Continuous Slowing, Lateralized, Left hemisphere (irregular delta)    Sleep Background:  Stage II sleep transients (rudimentary symmetric sleep spindles and K complexes ) were present.     Interictal Epileptiform Activity:   -Abundant LPDs (Lateralized Periodic Discharges) max O1 P7 at times field to P3 T7 (1 Hz), increased with stimulation and arousal (SIRPIDs).  Very frequently preceded by a burst of polyphasic spike activity in the same location.  LPDs and bursts of polyspiking at times did not evolve or occurred in short runs of a few seconds.    Events:  -Runs of LPDs evolving from burst of temporal occipital polyspike occurred frequently  lasting up to 30-60 sec at a time without overt clinical change near 10x per hour.  These did appear less prominent after 4AM less than 6/hr, and near 3/hr in the last 2 hours of recording.    Activation Procedures:   Hyperventilation was not performed.    Photic stimulation was not performed.     Artifacts:  Intermittent myogenic and movement artifacts were noted.    ECG:  The heart rate on single channel ECG was predominantly between 70-90 BPM.    _____________________________________________________________  EEG SUMMARY/CLASSIFICATION  Abnormal EEG in an unresponsive patient / a sedated patient.  - Frequent left temporo-occipital seizures, up to 10/hour, some improvements overnight with seizures near 2/hr near end of study  - LPDs / SIRPIDs temporal occipital  - Continuous Slowing, Lateralized, Left hemisphere (delta)  - Diffuse theta and polymorphic delta slowing, moderate  _____________________________________________________________  EEG IMPRESSION/CLINICAL CORRELATE    Abnormal EEG study.  Frequent left temporal occipital seizures and regional cortical irritability.  Moderate multifocal irregular slowing, worse in the left hemisphere    MD JULIANA Quevedo  Board Certifications:  Neurology, Clinical Neurophysiology, Epilepsy  Epilepsy Fellowship   , Department of Neurology  [facility]    Reading room: [phone] | Office: [phone]

## 2019-04-23 LAB
APTT BLD: 24.9 SEC — LOW (ref 27.5–36.3)
INR BLD: 1.21 RATIO — HIGH (ref 0.88–1.16)
PHENYTOIN FREE SERPL-MCNC: 11 UG/ML — SIGNIFICANT CHANGE UP (ref 10–20)
PROTHROM AB SERPL-ACNC: 14 SEC — HIGH (ref 10–12.9)

## 2019-04-23 PROCEDURE — 99233 SBSQ HOSP IP/OBS HIGH 50: CPT

## 2019-04-23 PROCEDURE — 95812 EEG 41-60 MINUTES: CPT | Mod: 26

## 2019-04-23 PROCEDURE — 71045 X-RAY EXAM CHEST 1 VIEW: CPT | Mod: 26

## 2019-04-23 PROCEDURE — 76390 MR SPECTROSCOPY: CPT | Mod: 26

## 2019-04-23 PROCEDURE — 99232 SBSQ HOSP IP/OBS MODERATE 35: CPT

## 2019-04-23 PROCEDURE — 72158 MRI LUMBAR SPINE W/O & W/DYE: CPT | Mod: 26

## 2019-04-23 PROCEDURE — 99223 1ST HOSP IP/OBS HIGH 75: CPT

## 2019-04-23 RX ORDER — LEVOTHYROXINE SODIUM 125 MCG
50 TABLET ORAL AT BEDTIME
Refills: 0 | Status: COMPLETED | OUTPATIENT
Start: 2019-04-23 | End: 2019-04-23

## 2019-04-23 RX ORDER — FOSPHENYTOIN 50 MG/ML
500 INJECTION INTRAMUSCULAR; INTRAVENOUS ONCE
Refills: 0 | Status: COMPLETED | OUTPATIENT
Start: 2019-04-23 | End: 2019-04-23

## 2019-04-23 RX ORDER — PERAMPANEL 2 MG/1
8 TABLET ORAL AT BEDTIME
Refills: 0 | Status: DISCONTINUED | OUTPATIENT
Start: 2019-04-23 | End: 2019-04-25

## 2019-04-23 RX ORDER — INSULIN LISPRO 100/ML
VIAL (ML) SUBCUTANEOUS
Refills: 0 | Status: DISCONTINUED | OUTPATIENT
Start: 2019-04-23 | End: 2019-04-24

## 2019-04-23 RX ORDER — IPRATROPIUM/ALBUTEROL SULFATE 18-103MCG
3 AEROSOL WITH ADAPTER (GRAM) INHALATION ONCE
Refills: 0 | Status: COMPLETED | OUTPATIENT
Start: 2019-04-23 | End: 2019-04-28

## 2019-04-23 RX ORDER — LEVOTHYROXINE SODIUM 125 MCG
50 TABLET ORAL DAILY
Refills: 0 | Status: DISCONTINUED | OUTPATIENT
Start: 2019-04-24 | End: 2019-05-01

## 2019-04-23 RX ADMIN — Medication 50 MICROGRAM(S): at 21:25

## 2019-04-23 RX ADMIN — LEVETIRACETAM 400 MILLIGRAM(S): 250 TABLET, FILM COATED ORAL at 15:34

## 2019-04-23 RX ADMIN — HUMAN INSULIN 7 UNIT(S): 100 INJECTION, SUSPENSION SUBCUTANEOUS at 22:13

## 2019-04-23 RX ADMIN — Medication 200 MILLIGRAM(S): at 00:27

## 2019-04-23 RX ADMIN — LEVETIRACETAM 400 MILLIGRAM(S): 250 TABLET, FILM COATED ORAL at 21:25

## 2019-04-23 RX ADMIN — HUMAN INSULIN 7 UNIT(S): 100 INJECTION, SUSPENSION SUBCUTANEOUS at 11:22

## 2019-04-23 RX ADMIN — Medication 100 MILLIGRAM(S): at 05:13

## 2019-04-23 RX ADMIN — Medication 3 MILLILITER(S): at 05:58

## 2019-04-23 RX ADMIN — Medication 165 MILLIGRAM(S): at 05:13

## 2019-04-23 RX ADMIN — Medication 1: at 22:13

## 2019-04-23 RX ADMIN — CHLORHEXIDINE GLUCONATE 15 MILLILITER(S): 213 SOLUTION TOPICAL at 05:14

## 2019-04-23 RX ADMIN — Medication 975 MILLIGRAM(S): at 15:33

## 2019-04-23 RX ADMIN — SODIUM CHLORIDE 75 MILLILITER(S): 9 INJECTION, SOLUTION INTRAVENOUS at 13:00

## 2019-04-23 RX ADMIN — Medication 50 GRAM(S): at 00:37

## 2019-04-23 RX ADMIN — FOSPHENYTOIN 120 MILLIGRAM(S) PE: 50 INJECTION INTRAMUSCULAR; INTRAVENOUS at 11:22

## 2019-04-23 RX ADMIN — ENOXAPARIN SODIUM 40 MILLIGRAM(S): 100 INJECTION SUBCUTANEOUS at 17:29

## 2019-04-23 RX ADMIN — PERAMPANEL 8 MILLIGRAM(S): 2 TABLET ORAL at 21:25

## 2019-04-23 RX ADMIN — FOSPHENYTOIN 104 MILLIGRAM(S) PE: 50 INJECTION INTRAMUSCULAR; INTRAVENOUS at 21:24

## 2019-04-23 RX ADMIN — LEVETIRACETAM 400 MILLIGRAM(S): 250 TABLET, FILM COATED ORAL at 05:18

## 2019-04-23 RX ADMIN — FOSPHENYTOIN 104 MILLIGRAM(S) PE: 50 INJECTION INTRAMUSCULAR; INTRAVENOUS at 15:34

## 2019-04-23 RX ADMIN — Medication 40 MILLIEQUIVALENT(S): at 00:37

## 2019-04-23 RX ADMIN — Medication 975 MILLIGRAM(S): at 16:00

## 2019-04-23 RX ADMIN — HUMAN INSULIN 7 UNIT(S): 100 INJECTION, SUSPENSION SUBCUTANEOUS at 05:55

## 2019-04-23 RX ADMIN — Medication 3 MILLILITER(S): at 19:24

## 2019-04-23 RX ADMIN — SODIUM CHLORIDE 75 MILLILITER(S): 9 INJECTION, SOLUTION INTRAVENOUS at 07:00

## 2019-04-23 RX ADMIN — Medication 2: at 17:30

## 2019-04-23 RX ADMIN — Medication 165 MILLIGRAM(S): at 15:34

## 2019-04-23 RX ADMIN — Medication 100 MILLIGRAM(S): at 17:29

## 2019-04-23 RX ADMIN — CHLORHEXIDINE GLUCONATE 1 APPLICATION(S): 213 SOLUTION TOPICAL at 12:35

## 2019-04-23 RX ADMIN — AMLODIPINE BESYLATE 10 MILLIGRAM(S): 2.5 TABLET ORAL at 05:13

## 2019-04-23 RX ADMIN — Medication 165 MILLIGRAM(S): at 20:34

## 2019-04-23 RX ADMIN — HUMAN INSULIN 7 UNIT(S): 100 INJECTION, SUSPENSION SUBCUTANEOUS at 00:27

## 2019-04-23 RX ADMIN — HUMAN INSULIN 7 UNIT(S): 100 INJECTION, SUSPENSION SUBCUTANEOUS at 17:30

## 2019-04-23 RX ADMIN — Medication 200 MILLIGRAM(S): at 23:34

## 2019-04-23 RX ADMIN — Medication 200 MILLIGRAM(S): at 08:53

## 2019-04-23 RX ADMIN — Medication 200 MILLIGRAM(S): at 15:33

## 2019-04-23 RX ADMIN — Medication 3 MILLILITER(S): at 00:21

## 2019-04-23 RX ADMIN — FOSPHENYTOIN 104 MILLIGRAM(S) PE: 50 INJECTION INTRAMUSCULAR; INTRAVENOUS at 05:14

## 2019-04-23 RX ADMIN — CHLORHEXIDINE GLUCONATE 15 MILLILITER(S): 213 SOLUTION TOPICAL at 17:29

## 2019-04-23 RX ADMIN — Medication 3 MILLILITER(S): at 23:37

## 2019-04-23 RX ADMIN — Medication 100 MILLIGRAM(S): at 21:25

## 2019-04-23 NOTE — PROGRESS NOTE ADULT - SUBJECTIVE AND OBJECTIVE BOX
69F w/ CAD, DM, HTN, HLD, hypothyroid, on eliquis, ASA, s/p umbilical hernia repair ~2 weeks ago, presented as a level 1 trauma activation. Patient brought in by ambulance after being found down by family. Patient unable to answer questions, history obtained from family. Patient's family heard patient calling out for help and found her on the ground. She was noted to be less conversant than normal and confused. She was activated as a level 2 trauma activation based on unknown mechanism.    Patient had LP and MRI with Left posterior lateral temporal and parietal occipital hyperintense T2 FLAIR suspicious for herpes encephalitis - started on Acyclovir.  4/19 PM Transferred to NSCU   4/19-20 multiple subclinical seizures, now on keppra, fycompa, and vimpat  4/22:  10 seizures overnight, 2/hr in the am    EVENTS:   no clinical seziures, exam remains stable     VITALS:  Reviewed   LABS:  Reviewed  MEDICATIONS: Reviewed  IMAGING:  Recent imaging studies were reviewed.    EXAM:  Arousable to loud voice, oriented to self, follows simple commands, BUE 4/5, BLE 5/5 69F w/ CAD, DM, HTN, HLD, hypothyroid, on eliquis, ASA, s/p umbilical hernia repair ~2 weeks ago, presented as a level 1 trauma activation. Patient brought in by ambulance after being found down by family. Patient unable to answer questions, history obtained from family. Patient's family heard patient calling out for help and found her on the ground. She was noted to be less conversant than normal and confused. She was activated as a level 2 trauma activation based on unknown mechanism.    Patient had LP and MRI with Left posterior lateral temporal and parietal occipital hyperintense T2 FLAIR suspicious for herpes encephalitis - started on Acyclovir.    4/19 PM Transferred to NSCU   4/19-20 multiple subclinical seizures, now on keppra, fycompa, and vimpat  4/22:  10 seizures overnight, 2/hr in the am    EVENTS:   perampanel increased to 6 mg PO      VITALS:  Reviewed   LABS:  Reviewed  MEDICATIONS: Reviewed  IMAGING:  Recent imaging studies were reviewed.    EXAM:  Arousable to loud voice, oriented to self, follows simple commands, BUE 4/5, BLE 5/5

## 2019-04-23 NOTE — PROGRESS NOTE ADULT - ASSESSMENT
70 y/o F w/ past medical history of CAD, DM, HTN, HLD, hypothyroid, on eliquis, ASA, s/p umbilical hernia repair ~2 weeks ago presented as level 1 trauma. Patient was found down by family 4/18/2019 and was found to be altered and had witnessed seizure in ED and subsequently intubated. Neurology consulted for seizure and MRI brain findings of left temporal abnormality.    Impression: AMS, seizures likely 2/2 to left temporal lesion, differential diagnosis includes neoplastic vs herpes infection     4/21: Mental status appears to be improved as well as EEG tracings. Started on Vimpat overnight. Off sedation following commands.  04/22 : waxing waning of mental status  04/23 : waxing/waning, improvement since 04/22     [] MRI brain WAW on 4/22 followed by repeat LP studies inc HSV, PCR  [x] LP done by neuro 4/19 - cells 2 (lymph 87), glucose 103, protein 43, CSF PCR/herpes negative (can be negative early 72hrs)  [x] c/w video EEG monitoring  [x] c/w Acyclovir 1g TID  [x] c/w Keppra 1g TID; increase Fycompa 8 mg QHS, patient on Fosphenytoin 100 TID ( received loading dose 1500, 500 in last tow days)    [ ] ID consult to see if patient is candidate for steroids without second LP   [ ] brain biopsy scheduled with  on Monday for post temporal lesion     > appreciate NSCU care, Patient had numerous seizures (nearly 3-10/hr) in last 24 hours. Due to waxing/waning mental status and numerous seizure, patient should be monitored continuously with VEEG.     > Patient is at risk of compromising her airway due to numerous seizure, Patient should be managed in EMU/NSCU for continuos VEEG monitoring.      Case discussed with  and .

## 2019-04-23 NOTE — SWALLOW BEDSIDE ASSESSMENT ADULT - COMMENTS
Primary survey--airway intact initially, b/l breath sounds, HDS. Patient noted to be more confused, airway re-assessed with concern that patient not adequately protecting airway. Patient urgently intubated for airway protection with adequate end tidal CO2. Attempted left femoral central line but limited 2/2 habitus. IO placed. Seizure in trauma bay, resolved w/ ativan. Secondary survey without evidence of external injury. GCS 10. MRI brain shows left temporal encephalitis/cerebritis and she has had multiple electrographic seizures even while on propofol.  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   If she continues to have electrographic seizures will add vimpat and/or versed drip.  LP performed and - CSF PCR neg, cx negative Pt extubated 4/20. 4/22-CXR: Clear lungs Brain MRI 4/19: Left posterior lateral temporal and parietal occipital hyperintense T2 and FLAIR signal with faint hyperintense DWI signal and ADC T2 shine through, with  slight  restricted diffusion, series 300 image 18. There is a 4 x 6 mm  enhancement  in the left posterior lateral lateral temporal lobe with questionable leptomeningeal enhancement and slight susceptibility. Differential  considerations include atypical cavernoma   or  posterior reversible encephalopathy, mass such as low-grade neoplasm, infectious, and or inflammatory change are not excluded. 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
Primary survey--airway intact initially, b/l breath sounds, HDS. Patient noted to be more confused, airway re-assessed with concern that patient not adequately protecting airway. Patient urgently intubated for airway protection with adequate end tidal CO2. Attempted left femoral central line but limited 2/2 habitus. IO placed. Seizure in trauma bay, resolved w/ ativan. Secondary survey without evidence of external injury. GCS 10. MRI brain shows left temporal encephalitis/cerebritis and she has had multiple electrographic seizures even while on propofol.  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   If she continues to have electrographic seizures will add vimpat and/or versed drip.  LP performed and - CSF PCR neg, cx negative Pt extubated 4/20. 4/22-CXR: Clear lungs Brain MRI 4/19: Left posterior lateral temporal and parietal occipital hyperintense T2 and FLAIR signal with faint hyperintense DWI signal and ADC T2 shine through, with  slight  restricted diffusion, series 300 image 18. There is a 4 x 6 mm  enhancement  in the left posterior lateral lateral temporal lobe with questionable leptomeningeal enhancement and slight susceptibility. Differential  considerations include atypical cavernoma   or  posterior reversible encephalopathy, mass such as low-grade neoplasm, infectious, and or inflammatory change are not excluded. 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

## 2019-04-23 NOTE — CONSULT NOTE ADULT - ASSESSMENT
69 yr old F h/o CAD, DM, HTN, HLD, hypothyroid, on eliquis and ASA (both off >5 days), presented initially after a fall. Patient brought in by ambulance after being found down by family. Patient was found down by family 4/18/2019 and was found to be altered and had witnessed seizure in ED. Patient then developed status epilepticus and was transferred to NSCU for further management. LP was done and was unremarkable. EEG showed many subclinical seizures. MRI showed left posterior lateral temporal and parietal occipital hyperintense T2   and FLAIR signal. PCR for HSV was negative. Neurosurgery was consulted for possible biopsy.     Plan:  - Continue management per neurology/ID  - Repeat MRI and LP per neurology  - Please send CSF for AFB, syphillis, fungus, etc.  - May entertain biopsy after MRI and CSF results 69 yr old F h/o CAD, DM, HTN, HLD, hypothyroid, on eliquis and ASA (both off >5 days), presented initially after a fall. Patient brought in by ambulance after being found down by family. Patient was found down by family 4/18/2019 and was found to be altered and had witnessed seizure in ED. Patient then developed status epilepticus and was transferred to NSCU for further management. LP was done and was unremarkable. EEG showed many subclinical seizures. MRI showed left posterior lateral temporal and parietal occipital hyperintense T2   and FLAIR signal. PCR for HSV was negative. Neurosurgery was consulted for possible biopsy.     Plan:  - Continue management per neurology/ID  - Continue holding ASA and eliquis  - Repeat MRI and LP per neurology  - Please send CSF for AFB, syphillis, fungus, etc.  - May entertain biopsy after MRI and CSF results  - Please obtain medical clearance 69 yr old F h/o CAD, DM, HTN, HLD, hypothyroid, on eliquis and ASA (both off >5 days), presented initially after a fall. Patient brought in by ambulance after being found down by family. Patient was found down by family 4/18/2019 and was found to be altered and had witnessed seizure in ED. Patient then developed status epilepticus and was transferred to NSCU for further management. LP was done and was unremarkable. EEG showed many subclinical seizures. MRI showed left posterior lateral temporal and parietal occipital hyperintense T2   and FLAIR signal. PCR for HSV was negative. Neurosurgery was consulted for possible biopsy.     Plan:  - Continue management per neurology/ID  - Continue holding ASA and eliquis  - Repeat MRI and LP per neurology (please make sure that MRI includes T2 and FLAIR sequences STEREO scan)  - Please send CSF for AFB, syphillis, fungus, etc.  - May entertain biopsy after MRI and CSF results  - Please obtain medical clearance

## 2019-04-23 NOTE — PROGRESS NOTE ADULT - ASSESSMENT
ASSESSMENT/PLAN:      NEURO:    Seizures - cEEG  Continue current antiepileptics  Repeat LP; recheck HSV PCR; if repeat negative will d/c acyclovir  MRI today   T10 vertebral body fracture: MRI pending  Activity: [] mobilize as tolerated [] Bedrest [x] PT [x] OT [] PMNR    PULM:  O2 sat > 92%    CV:  SBP goal 100 - 160    RENAL:  Fluids:  NS @ 75    GI:    Diet: advance diet as tolerated   GI prophylaxis [] not indicated [] PPI [] other:  Bowel regimen [x] colace [x] senna [] other:    ENDO:   Goal euglycemia (-180)    HEME/ONC:  VTE prophylaxis: [] SCDs [] chemoprophylaxis - lovenox   Right soleal DVT - repeat LE dopplers 4/29    ID:  afebrile     SOCIAL/FAMILY:  [x] awaiting [] updated at bedside [] family meeting    CODE STATUS:  [x] Full Code [] DNR [] DNI [] Palliative/Comfort Care    DISPOSITION:  [] ICU [] Stroke Unit [x] Floor [] EMU [] RCU [] PCU    [x] Patient is at high risk of neurologic deterioration/death due to:  seziures, hemorrhage, herniation       Time spent: 45 critical care minutes    Contact: [phone] ASSESSMENT/PLAN:  seziure -? herpes encephalitis vs low grade glioma vs autoimmune focality causing seziures    t10 fracture     NEURO:    seziure -? herpes encephalitis vs low grade glioma vs autoimmune focality causing seziures    Neurology following - consider Biospy, repeat MRI, repeat LP as HSV PCR negativew   Continue current antiepileptics, appreciate Epilepsy consult   T10 vertebral body nondisplaced fracture: MRI pending  Activity: [] mobilize as tolerated [] Bedrest [x] PT [x] OT [] PMNR    PULM:  O2 sat > 92% on RA  Duonebs     CV:  SBP goal 100 - 160    RENAL:  Fluids:  LR @ 75 while on Acyclovir     GI:    Diet: TF - will need formal speech and swallow   GI prophylaxis [] not indicated [] PPI [] other:  Bowel regimen [x] colace [x] senna [] other:    ENDO:   Goal euglycemia (-180)  Insulin nph 7 units q 6hours with ISS     HEME/ONC:  VTE prophylaxis: [] SCDs [x] chemoprophylaxis - lovenox   Right soleal DVT - repeat LE dopplers 4/29    ID:    ?Herpes encephalits Repeat LP; recheck HSV PCR; if repeat negative - d/c acyclovir    SOCIAL/FAMILY:  [x] awaiting [] updated at bedside [] family meeting    CODE STATUS:  [x] Full Code [] DNR [] DNI [] Palliative/Comfort Care    DISPOSITION:  [] ICU [] Stroke Unit [x] Floor [] EMU [] RCU [] PCU    [x] Patient is at high risk of neurologic deterioration/death due to:  seziures, hemorrhage, herniation       Time spent: 45 critical care minutes    Contact: [phone] ASSESSMENT/PLAN:  seziure -? herpes encephalitis vs low grade glioma vs autoimmune focality causing seziures    t10 fracture     NEURO:    seziure -? herpes encephalitis vs low grade glioma vs autoimmune focality causing seziures    Neurology following - consider Biopsy and immunotherapy if biopsy is negative, repeat MRI, repeat LP as HSV PCR negative    Continue current antiepileptics, appreciate Epilepsy consult   T10 vertebral body nondisplaced fracture: MRI pending  Activity: [] mobilize as tolerated [] Bedrest [x] PT [x] OT [] PMNR    PULM:  O2 sat > 92% on RA  Duonebs     CV:  SBP goal 100 - 160    RENAL:  Fluids:  LR @ 75 while on Acyclovir     GI:    Diet: TF - will need formal speech and swallow   GI prophylaxis [] not indicated [] PPI [] other:  Bowel regimen [x] colace [x] senna [] other:    ENDO:   Goal euglycemia (-180)  Insulin nph 7 units q 6hours with ISS     HEME/ONC:  VTE prophylaxis: [] SCDs [x] chemoprophylaxis - lovenox   Right soleal DVT - repeat LE dopplers 4/29    ID:    ?Herpes encephalits Repeat LP; recheck HSV PCR; if repeat negative - d/c acyclovir    SOCIAL/FAMILY:  [x] awaiting [] updated at bedside [] family meeting    CODE STATUS:  [x] Full Code [] DNR [] DNI [] Palliative/Comfort Care    DISPOSITION:  [] ICU [] Stroke Unit [x] Floor [] EMU [] RCU [] PCU    [x] Patient is at high risk of neurologic deterioration/death due to:  seziures, hemorrhage, herniation       Time spent: 45 critical care minutes    Contact: [phone] ASSESSMENT/PLAN:  seziure -? herpes encephalitis vs low grade glioma vs autoimmune focality causing seziures    t10 fracture     NEURO:    seziure -? herpes encephalitis vs low grade glioma vs autoimmune focality causing seziures    Neurology following - consider Biopsy and immunotherapy if biopsy is negative, repeat MRI, repeat LP as HSV PCR negative    Continue current antiepileptics, appreciate Epilepsy consult   T10 vertebral body nondisplaced fracture: MRI pending  Activity: [] mobilize as tolerated [] Bedrest [x] PT [x] OT [] PMNR    PULM:  O2 sat > 92% on RA  Duonebs     CV:  SBP goal 100 - 160    RENAL:  Fluids:  LR @ 75 while on Acyclovir     GI:    Diet: TF - will need formal speech and swallow   GI prophylaxis [] not indicated [] PPI [] other:  Bowel regimen [x] colace [x] senna [] other:    ENDO:   Goal euglycemia (-180)  Insulin nph 7 units q 6hours with ISS     HEME/ONC:  VTE prophylaxis: [] SCDs [x] chemoprophylaxis - lovenox   Right soleal DVT - repeat LE dopplers 4/29    ID:    ?Herpes encephalits Repeat LP; recheck HSV PCR; if repeat negative - d/c acyclovir    SOCIAL/FAMILY:  [x] awaiting [] updated at bedside [] family meeting    CODE STATUS:  [x] Full Code [] DNR [] DNI [] Palliative/Comfort Care    DISPOSITION:  [] ICU [] Stroke Unit [x] Floor [] EMU [] RCU [] PCU        Contact: [phone]

## 2019-04-23 NOTE — PROGRESS NOTE ADULT - ASSESSMENT
69F w/ CAD, DM (A1C 9.9), HTN, HLD, hypothyroid, on eliquis, ASA, s/p umbilical hernia repair ~2 weeks ago, brought in by ambulance after being found down by family. Currently in NICU being treated for seizures and possible encephalitis. Endocrine consulted for Type 2 DM management on tube feeds as well as hypernatremia which has improved.

## 2019-04-23 NOTE — PROGRESS NOTE ADULT - SUBJECTIVE AND OBJECTIVE BOX
Chief Complaint/Follow-up on: T2DM    Subjective: Pt seen in the NSICU after her MRI. She is alert to name and knew she was in the hospital in [facility] but could not tell me the name. She is unaware of the date. 10/10 frontal and neck pain noted.     MEDICATIONS  (STANDING):  acyclovir IVPB 750 milliGRAM(s) IV Intermittent every 8 hours  ALBUTerol/ipratropium for Nebulization 3 milliLiter(s) Nebulizer every 6 hours  amLODIPine   Tablet 10 milliGRAM(s) Oral daily  dextrose 5%. 1000 milliLiter(s) (50 mL/Hr) IV Continuous <Continuous>  dextrose 50% Injectable 12.5 Gram(s) IV Push once  dextrose 50% Injectable 25 Gram(s) IV Push once  dextrose 50% Injectable 25 Gram(s) IV Push once  enoxaparin Injectable 40 milliGRAM(s) SubCutaneous <User Schedule>  fosphenytoin IVPB 100 milliGRAM(s) PE IV Intermittent every 8 hours  hydrALAZINE 100 milliGRAM(s) Oral every 8 hours  insulin lispro (HumaLOG) corrective regimen sliding scale   SubCutaneous every 6 hours  insulin NPH human recombinant 7 Unit(s) SubCutaneous every 6 hours  labetalol 200 milliGRAM(s) Oral three times a day  levETIRAcetam  IVPB 1000 milliGRAM(s) IV Intermittent <User Schedule>  levothyroxine 50 MICROGram(s) Oral at bedtime  perampanel 8 milliGRAM(s) Oral at bedtime    MEDICATIONS  (PRN):  acetaminophen    Suspension .. 975 milliGRAM(s) Oral every 6 hours PRN Temp greater or equal to 38.5C (101.3F), Mild Pain (1 - 3)  dextrose 40% Gel 15 Gram(s) Oral once PRN Blood Glucose LESS THAN 70 milliGRAM(s)/deciliter  glucagon  Injectable 1 milliGRAM(s) IntraMuscular once PRN Glucose LESS THAN 70 milligrams/deciliter      PHYSICAL EXAM:  VITALS: T(C): 36.7 (04-23-19 @ 15:30)  T(F): 98.1 (04-23-19 @ 15:30), Max: 99 (04-23-19 @ 05:00)  HR: 85 (04-23-19 @ 15:30) (16 - 85)  BP: 153/135 (04-23-19 @ 15:30) (105/65 - 153/135)  RR:  (16 - 23)  SpO2:  (97% - 100%)  Wt(kg): --  GENERAL: NAD, well-groomed, well-developed  HEENT:  Atraumatic, Normocephalic, moist mucous membranes  RESPIRATORY: Clear to auscultation bilaterally; No rales, rhonchi, wheezing, or rubs  CARDIOVASCULAR: Regular rate and rhythm; No murmurs  GI: Soft, nontender, non distended, normal bowel sounds    POCT Blood Glucose.: 180 mg/dL (04-23-19 @ 17:27)  POCT Blood Glucose.: 141 mg/dL (04-23-19 @ 11:18)  POCT Blood Glucose.: 121 mg/dL (04-23-19 @ 05:52)  POCT Blood Glucose.: 144 mg/dL (04-23-19 @ 00:17)  POCT Blood Glucose.: 123 mg/dL (04-22-19 @ 18:10)  POCT Blood Glucose.: 174 mg/dL (04-22-19 @ 11:39)  POCT Blood Glucose.: 182 mg/dL (04-22-19 @ 05:23)  POCT Blood Glucose.: 230 mg/dL (04-21-19 @ 23:04)  POCT Blood Glucose.: 200 mg/dL (04-21-19 @ 17:38)  POCT Blood Glucose.: 180 mg/dL (04-21-19 @ 16:22)  POCT Blood Glucose.: 174 mg/dL (04-21-19 @ 14:05)  POCT Blood Glucose.: 172 mg/dL (04-21-19 @ 13:00)  POCT Blood Glucose.: 192 mg/dL (04-21-19 @ 11:55)  POCT Blood Glucose.: 170 mg/dL (04-21-19 @ 10:41)  POCT Blood Glucose.: 127 mg/dL (04-21-19 @ 09:28)  POCT Blood Glucose.: 106 mg/dL (04-21-19 @ 08:34)  POCT Blood Glucose.: 97 mg/dL (04-21-19 @ 08:06)  POCT Blood Glucose.: 115 mg/dL (04-21-19 @ 06:52)  POCT Blood Glucose.: 150 mg/dL (04-21-19 @ 06:03)  POCT Blood Glucose.: 146 mg/dL (04-21-19 @ 05:10)  POCT Blood Glucose.: 164 mg/dL (04-21-19 @ 04:09)  POCT Blood Glucose.: 134 mg/dL (04-21-19 @ 03:13)  POCT Blood Glucose.: 149 mg/dL (04-21-19 @ 02:01)  POCT Blood Glucose.: 154 mg/dL (04-21-19 @ 01:08)  POCT Blood Glucose.: 172 mg/dL (04-20-19 @ 23:59)  POCT Blood Glucose.: 179 mg/dL (04-20-19 @ 23:23)  POCT Blood Glucose.: 171 mg/dL (04-20-19 @ 22:06)  POCT Blood Glucose.: 152 mg/dL (04-20-19 @ 21:02)  POCT Blood Glucose.: 176 mg/dL (04-20-19 @ 20:02)    04-22    150<H>  |  114<H>  |  6<L>  ----------------------------<  157<H>  3.6   |  24  |  0.87    EGFR if : 80  EGFR if non : 69    Ca    8.9      04-22  Mg     1.8     04-22  Phos  3.4     04-22            Thyroid Function Tests:  04-22 @ 08:55 FreeT4 1.1   04-22 @ 08:51 TSH 1.45   Hemoglobin A1C, Whole Blood: 9.9 % <H> [4.0 - 5.6] (04-21-19 @ 09:26)

## 2019-04-23 NOTE — EEG REPORT - NS EEG TEXT BOX
Study Date: 4/22/19-4/23/19    _____________________________________________________________  HISTORY:  Patient is a 69y old  Female who presents with a chief complaint of AMS    MEDICATIONS  (STANDING):  acyclovir IVPB 1000 milliGRAM(s) IV Intermittent every 8 hours  ALBUTerol/ipratropium for Nebulization 3 milliLiter(s) Nebulizer every 6 hours  amLODIPine   Tablet 10 milliGRAM(s) Oral daily  enoxaparin Injectable 40 milliGRAM(s) SubCutaneous <User Schedule>  hydrALAZINE 100 milliGRAM(s) Oral every 8 hours  insulin lispro (HumaLOG) corrective regimen sliding scale   SubCutaneous every 6 hours  insulin NPH human recombinant 14 Unit(s) SubCutaneous every 6 hours  labetalol 200 milliGRAM(s) Oral three times a day  lactated ringers. 1000 milliLiter(s) (75 mL/Hr) IV Continuous <Continuous>  levETIRAcetam  IVPB 1000 milliGRAM(s) IV Intermittent <User Schedule>  levothyroxine Injectable 25 MICROGram(s) IV Push at bedtime  perampanel 4 milliGRAM(s) Oral at bedtime    _____________________________________________________________  STUDY INTERPRETATION    Findings:  The background was continuous, diffuse delta and theta activity.  Diffuse alpha activity was present, though a rudimentary PDR to 7hz was noted.   State changes and reactivity were present.   Continuous Slowing, Lateralized, Left hemisphere (irregular delta)    Sleep Background:  Stage II sleep transients (rudimentary symmetric sleep spindles and K complexes ) were present.      Interictal Epileptiform Activity:   -Abundant LPDs (Lateralized Periodic Discharges) max O1 P7 at times field to P3 T7 (0.5-1 Hz), increased with stimulation and arousal (SIRPIDs).  Very frequently preceded by a burst of polyphasic spike activity in the same location.  LPDs and bursts of polyspiking at times did not evolve or occurred in short runs of a few seconds.    Events:  -Runs of LPDs evolving from burst of temporal occipital polyspike occurred frequently  lasting up to 30-60 sec at a time, with frontotemporal spread of fast activity, without overt clinical change up to 10x per hour, slightly improved overnight closer to 3-6x/hr    Activation Procedures:   Hyperventilation was not performed.    Photic stimulation was not performed.     Artifacts:  Intermittent myogenic and movement artifacts were noted.    ECG:  The heart rate on single channel ECG was predominantly between 70-90 BPM.    _____________________________________________________________  EEG SUMMARY/CLASSIFICATION  Abnormal EEG in an unresponsive patient / a sedated patient.  - Frequent left temporo-occipital seizures, variably up to 3-10x/hour  - LPDs / SIRPIDs temporal occipital  - Continuous Slowing, Lateralized, Left hemisphere (delta)  - Diffuse theta and polymorphic delta slowing, moderate  _____________________________________________________________  EEG IMPRESSION/CLINICAL CORRELATE    Abnormal EEG study.  Frequent left temporal occipital seizures and regional cortical irritability.  Moderate multifocal irregular slowing, worse in the left hemisphere    MD JULIANA Quevedo  Board Certifications:  Neurology, Clinical Neurophysiology, Epilepsy  Epilepsy Fellowship   , Department of Neurology  [facility]    Reading room: [phone] | Office: [phone]

## 2019-04-23 NOTE — PROGRESS NOTE ADULT - PROBLEM SELECTOR PLAN 1
-continue moderate scale q6 and NPH 7 units sq q6.  DISPO: will need to clarify her meds, but she will need additional agents as her HbA1c is 9.9%.

## 2019-04-23 NOTE — SWALLOW BEDSIDE ASSESSMENT ADULT - SLP GENERAL OBSERVATIONS
Pt found in bed awake and alert. Pt English and Creole speaking. Daughters at bedside assisted with translation PRN at patient request. +KFT Pt with reduced command following.
Pt found in bed awake and alert. Pt English and Creole speaking. +KFT

## 2019-04-23 NOTE — SWALLOW BEDSIDE ASSESSMENT ADULT - SWALLOW EVAL: DIAGNOSIS
Assessment is incomplete 2/2 abrupt termination of PO trials 2/2 pt vomiting. Pt with hx of adverse effect to pain meds as per daughters. No overt s/s of laryngeal penetration/aspiration with puree textures.
Oropharyngeal swallow sequence appears WFL to tolerate a soft texture diet with no overt s/s of laryngeal penetration or aspiration.

## 2019-04-23 NOTE — CONSULT NOTE ADULT - SUBJECTIVE AND OBJECTIVE BOX
HPI:   Patient is a 69y female with dm, recent umbilical hernia repair who was admitted 5 days ago after being found down, has been having seizures and mri shows a left temporoccipital lesion. LP shows no pleocytosis ,normal protein, no blood and negative pcr panel. Acyclovir was commenced and we are called to comment on whether steroids should be given. The patient continues to have nongeneralized seizures. She currently denies all complaints and reports she felt perfectly well before admission. She is from [facility], in  for decades, works as home attendant, did not grow up on  a farm, no animal or known tb exposures. She has had no fever or headaches.    REVIEW OF SYSTEMS:  All other review of systems negative (Comprehensive ROS)    PAST MEDICAL & SURGICAL HISTORY:  Hypothyroid  Obesity  NACHO (obstructive sleep apnea): sleep study done 5 years ago  DM (Diabetes Mellitus) (ICD9 250.00)  Dyslipidemia (ICD9 272.4)  HTN (Hypertension) (ICD9 401.9)  CAD (Coronary Artery Disease) (ICD9 414.00): c STENTS  History of hysterectomy  History of ovarian cystectomy: x 2 in 2013  S/P primary angioplasty with coronary stent: has 5 stents-first placed in 2007, last stent -2011,  C Section: x 1      Allergies    No Known Allergies    Intolerances        Antimicrobials Day #  :5  acyclovir IVPB 750 milliGRAM(s) IV Intermittent every 8 hours    Other Medications:  acetaminophen    Suspension .. 975 milliGRAM(s) Oral every 6 hours PRN  ALBUTerol/ipratropium for Nebulization 3 milliLiter(s) Nebulizer every 6 hours  amLODIPine   Tablet 10 milliGRAM(s) Oral daily  chlorhexidine 0.12% Liquid 15 milliLiter(s) Oral Mucosa two times a day  chlorhexidine 4% Liquid 1 Application(s) Topical <User Schedule>  dextrose 40% Gel 15 Gram(s) Oral once PRN  dextrose 5%. 1000 milliLiter(s) IV Continuous <Continuous>  dextrose 50% Injectable 12.5 Gram(s) IV Push once  dextrose 50% Injectable 25 Gram(s) IV Push once  dextrose 50% Injectable 25 Gram(s) IV Push once  enoxaparin Injectable 40 milliGRAM(s) SubCutaneous <User Schedule>  fosphenytoin IVPB 100 milliGRAM(s) PE IV Intermittent every 8 hours  glucagon  Injectable 1 milliGRAM(s) IntraMuscular once PRN  hydrALAZINE 100 milliGRAM(s) Oral every 8 hours  insulin lispro (HumaLOG) corrective regimen sliding scale   SubCutaneous every 6 hours  insulin NPH human recombinant 7 Unit(s) SubCutaneous every 6 hours  labetalol 200 milliGRAM(s) Oral three times a day  lactated ringers. 1000 milliLiter(s) IV Continuous <Continuous>  levETIRAcetam  IVPB 1000 milliGRAM(s) IV Intermittent <User Schedule>  levothyroxine Injectable 25 MICROGram(s) IV Push at bedtime  perampanel 6 milliGRAM(s) Oral at bedtime      FAMILY HISTORY:  Family history of heart disease (Aunt)      SOCIAL HISTORY:  Smoking: [ ]Yes [ xNo  ETOH: [ ]Yes [ x]No  Drug Use: [ ]Yes [ ]xNo   [x ] Single[ ]    T(F): 98.1 (04-23-19 @ 11:00), Max: 99 (04-23-19 @ 05:00)  HR: 77 (04-23-19 @ 12:40)  BP: 105/65 (04-23-19 @ 11:00)  RR: 18 (04-23-19 @ 12:40)  SpO2: 99% (04-23-19 @ 12:40)  Wt(kg): --    PHYSICAL EXAM:  General: alert, no acute distress  Eyes:  anicteric, no conjunctival injection, no discharge  Oropharynx: no lesions or injection 	  Neck: supple, without adenopathy  Lungs: clear to auscultation  Heart: regular rate and rhythm; no murmur, rubs or gallops  Abdomen: soft, nondistended, nontender, without mass or organomegaly  Skin: no lesions  Extremities: no clubbing, cyanosis, or edema  Neurologic: alert,, moves all extremities  obese  LAB RESULTS:                        9.6    6.9   )-----------( 135      ( 22 Apr 2019 22:00 )             30.4     04-22    150<H>  |  114<H>  |  6<L>  ----------------------------<  157<H>  3.6   |  24  |  0.87    Ca    8.9      22 Apr 2019 22:00  Phos  3.4     04-22  Mg     1.8     04-22            MICROBIOLOGY:  RECENT CULTURES:  04-19 @ 16:57 .CSF     No growth    No polymorphonuclear cells seen  No organisms seen  by cytocentrifuge          RADIOLOGY REVIEWED:  < from: MR Head No Cont (04.19.19 @ 05:44) >  IMPRESSION:    Left posterior lateral temporal and parietal occipital hyperintense T2   and FLAIR signal with faint hyperintense DWI signal and ADC T2 shine   through, with  slight  restricted diffusion, series 300 image 18. There   is a 4 x 6 mm  enhancement  in the left posterior lateral lateral   temporal lobe with questionable leptomeningeal enhancement and slight   susceptibility. Differential  considerations include atypical cavernoma   or  posterior reversible encephalopathy, mass such as low-grade neoplasm,   infectious, and or inflammatory change are not excluded.    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    No hemodynamically significant at the ICA origins by NASCET criteria.    < end of copied text >          Impression:  Patient with dm, recent hernia repair but otherwise well until a few days ago when found down, seizures, left posterotemporal lesion that does not particularly look like herpes or any other infection radiographically per r/w radiology, LP without any parameters suggesting encephalitis and a negative pcr panel . I am not sure what she has but no objection to continuing acyclovir pending planned repeat LP. When do the LP would add additional studies for autoimmune encephalitis, fungus and afb cultures, cytology, vdrl. The mr spec may help to give more data as well which is being done now. I would favor to hold off on  IV ig until the repeat LP is done and the paraneoplastic antibodies are sent off. I would hold off on steroids since I suspect she will need a brain biopsy and if lymphoma the steroids will totally mess up our ability to make an accurate diagnosis. She does not seem to be herniating so I suspect it is neurologically safe at least in yuli short term to hold off  Recommendations:  For now on acyclovir, keep well hydrated, monitor cr and lytes  await planned repeat LP, send paraneoplastic/autoimmune panel, fungal cx, afb cx, repeat encephalitis/meningitis pcr panel, vdrl, cytology  await result of spect mr  will use lp and spec mr results to decide if continue acyclovir   Favor brain bx if no definitive dx in very near future  would hold off on steroids and iv IG pending further info to preserve ability to make a dx unless for some reason they are needed stat per neuro team

## 2019-04-23 NOTE — CONSULT NOTE ADULT - SUBJECTIVE AND OBJECTIVE BOX
Epilepsy Consult    69F w/ CAD, DM, HTN, HLD, hypothyroid, afib on eliquis, ASA, s/p umbilical hernia repair ~2 weeks PTA, presented after being found down by family, noted to be less conversant than normal and confused.  Patient urgently intubated for airway protection, Seizure in trauma bay, resolved w/ ativan.   Pt states she is amnestic for this.  Subsequently, extubated. Now awake, alert.    VEEG showing LPDs in the right temporal occipital region and frequent subclinical seizures near 3-10x/hr over last few days.   Pt denies any symptoms at this time.  Some HA and fluctuant AMS yesterday per notes.  BP elevated on admission, better last few days.  No fever.  LP neg for cells, PCR neg for HSV.  Given acyclovir empirically.  Hypernatremic    Prior ER visits in chart for dizziness, palpitations, HTN urgency in past.     ROS:  denies F/C/N/V/SOB/CP.  some backpain yest.  no HA today.  denies visual complaints.  ngt removed    SocHx: worked as HHA in past,  4 children  FmHx: n/c      MEDICATIONS  (STANDING):  acyclovir IVPB 750 milliGRAM(s) IV Intermittent every 8 hours  ALBUTerol/ipratropium for Nebulization 3 milliLiter(s) Nebulizer every 6 hours  amLODIPine   Tablet 10 milliGRAM(s) Oral daily  enoxaparin Injectable 40 milliGRAM(s) SubCutaneous <User Schedule>  fosphenytoin IVPB 100 milliGRAM(s) PE IV Intermittent every 8 hours  hydrALAZINE 100 milliGRAM(s) Oral every 8 hours  insulin lispro (HumaLOG) corrective regimen sliding scale   SubCutaneous every 6   insulin NPH human recombinant 7 Unit(s) SubCutaneous every 6 hours  labetalol 200 milliGRAM(s) Oral three times a day  lactated ringers. 1000 milliLiter(s) (75 mL/Hr) IV Continuous <Continuous>  levETIRAcetam  IVPB 1000 milliGRAM(s) IV Intermittent <User Schedule>  levothyroxine Injectable 25 MICROGram(s) IV Push at bedtime  perampanel 6 milliGRAM(s) Oral at bedtime    MEDICATIONS  (PRN):  acetaminophen    Suspension .. 975 milliGRAM(s) Oral every 6 hours PRN Temp greater or equal to 38.5C (101.3F), Mild Pain (1 - 3)  dextrose 40% Gel 15 Gram(s) Oral once PRN Blood Glucose LESS THAN 70 milliGRAM(s)/deciliter  glucagon  Injectable 1 milliGRAM(s) IntraMuscular once PRN Glucose LESS THAN 70 milligrams/deciliter      Vital Signs Last 24 Hrs:  T(C): 36.7 (04-23-19 @ 11:00), Max: 37.2 (04-22-19 @ 15:00)  T(F): 98.1 (04-23-19 @ 11:00), Max: 99 (04-23-19 @ 05:00)  HR: 77 (04-23-19 @ 12:40) (16 - 83)  BP: 105/65 (04-23-19 @ 11:00) (105/65 - 149/62)  RR: 18 (04-23-19 @ 12:40) (16 - 23)  SpO2: 99% (04-23-19 @ 12:40) (94% - 100%)    GENERAL PHYSICAL EXAM:  NAD, nondysmorphic  NCAT, OP clear  Neck supple  RRR  No rash  No joint deformity    NEUROLOGICAL EXAM:    Mental status: Awake, alert, and in no apparent distress. Oriented to person, place . Language function is normal. Recent memory, were normal.   neglect to Left with DSS.      Cranial Nerves: Visual field were full. But neglect to Left with DSS.  Pupils were equal, round, reactive to light. Extraocular movements were intact. Fundoscopic exam was deferred. Facial sensation was intact to light touch. There was no facial asymmetry. The palate was upgoing symmetrically and tongue was midline. Hearing acuity was intact to finger rub AU. Shoulder shrug was full bilaterally    Motor exam: Bulk and tone were normal. Strength was 5/5 in all four extremities. Fine finger movements were symmetric and normal. There was no pronator drift    Reflexes: 2+ in the biceps, 2+ in the knees     Sensation: Intact to light touch bilat    Coordination: Finger-nose-finger without dysmetria.     Gait: NT    < from: MR Head No Cont (04.19.19 @ 05:44) >  hyperintense T2 and FLAIR   signal associate with sulcal effacement in the left posterior lateral   temporal and parietal occipital lobes with faint hyperintense DWI signal   with predominantly ADC T2 shine through, with small component of   questionable slight restricted diffusion on ADC series 300 image 18   without significant hyperintense DWI signal. The postcontrast series 7   image 20 demonstrates a 4 x 6 mm enhancement in the left posterior   lateral lateral temporal lobe, SWI demonstrates slight susceptibility    < end of copied text >                          9.6<L>  6.9 )-----------( 135<L>     ( 04-22-19 @ 22:00 )             30.4<L>    150<H>  |  114<H>  |  6<L>  ----------------------------<  157<H>    04-22-19 @ 22:00  3.6   |  24  |  0.87    Ca    8.9      04-22-19 @ 22:00  Phos  3.4     04-22-19 @ 22:00  Mg     1.8     04-22-19 @ 22:00      Phenytoin Level, Serum: 11.0 ug/mL (04.23.19 @ 03:31)

## 2019-04-23 NOTE — SWALLOW BEDSIDE ASSESSMENT ADULT - SWALLOW EVAL: PATIENT/FAMILY GOALS STATEMENT
As per daughter's at bedside pt without dysphagia PTA
As per prior BSE, daughter's at bedside pt without dysphagia PTA

## 2019-04-23 NOTE — PHARMACOTHERAPY INTERVENTION NOTE - COMMENTS
iv to po switch
According to [employer] drug reference - acyclovir recommended dose in elevated BMI (39.3) for severe infections ie. HSV encephalitis, to use adjusted body weight.     Antimicrob Ag Chemother 60: 1830, 2016 studied IBW dosing in morbid obesity (BMI >=40) vs actual body weight in normal weight and found lower exposure and noted adjusted body weight might bring exposure levels closer to that in normal weight.     Shah guide recommended adjusted body weight dosing for severe infections.     Case report of neurotoxicity in supratherapeutic levels and case reports of nephrotoxicity and renal failure with actual weight dosing.     Recommended considering using adjusted body weight of 73 kg (10 mg/kg every 8 hours) and monitor.

## 2019-04-23 NOTE — CONSULT NOTE ADULT - ASSESSMENT
A/P:    69F with vascular risk factors, recent abdominal surgery, HTN, afib, now with NCSE/status epilepticus, right temporooccipital lesion on MRI (edema, wm involvement, not much contrast uptake on imaging), CSF neg so far.  DDx:  atypical tumor (does not appear c/w lymphoma or GBM), focal cerebritis from infectious or autoimmune/inflammatory cause, PRES/RLS a possibility (but less likely per nrad).    Rec:  -ID eval. consider D/C acyclovir.  or repeat CSF eval for PCR HSV repeat, autoimmune CSF and serum panels from [place], cytology.  -would recommend trial steroids and IVIGx5 doses if ID feels this is unlikely to be infectious, as pat has a lot of edema, PLEDs and seizures ongoing.  -if repeat MRI shows worsening or persistent lesion rec nsurg eval for possible biopsy, Dr Ruiz contacted.    Target PHT level 15-20, additional 500mg now x1, cont maintenance 300mg/d.  daily phenytoin levels please  cont fycompa to 8mg as tolerated  cont LEV 1000mg tid as tolerated.  consider benzo trial if mentation preserved ie 2mg ativan x1 now    BP, Na+ monitoring, FSG A/P:    69F with vascular risk factors, recent abdominal surgery, HTN, afib, now with NCSE/status epilepticus, right temporooccipital lesion on MRI (edema, wm involvement, not much contrast uptake on imaging), CSF neg so far.  DDx:  atypical tumor (does not appear c/w lymphoma or GBM), focal cerebritis from infectious or autoimmune/inflammatory cause, PRES/RLS a possibility (but less likely per nrad).    Rec:  -ID eval. consider D/C acyclovir.  or repeat CSF eval for PCR HSV repeat, autoimmune CSF and serum panels from [place], cytology.  -would recommend trial steroids and consideration for IVIGx5 doses if ID feels this is unlikely to be infectious, as pat has a lot of edema, PLEDs and seizures ongoing.  -if repeat MRI shows worsening or persistent lesion rec nsurg eval for possible biopsy, Dr Ruiz contacted.    Target PHT level 15-20, additional 500mg now x1, cont maintenance 300mg/d.  daily phenytoin levels please  cont fycompa to 8mg as tolerated  cont LEV 1000mg tid as tolerated.  consider benzo trial if mentation preserved ie 2mg ativan x1 now    BP, Na+ monitoring, FSG  DVT, afib A/C currently being held A/P:    69F with vascular risk factors, recent abdominal surgery, HTN, afib, now with NCSE/status epilepticus, right temporooccipital lesion on MRI (edema, wm involvement, not much contrast uptake on imaging), CSF neg so far.  DDx:  atypical tumor (does not appear c/w lymphoma or GBM), focal cerebritis from infectious or autoimmune/inflammatory cause, PRES/RLS a possibility (but less likely per nrad).    Rec:  -ID eval. consider D/C acyclovir.  or repeat CSF eval for PCR HSV repeat, autoimmune CSF and serum panels from [place], cytology.  -would recommend trial steroids (considering IVIGx5, but concern for pre-existant DVT) doses if ID feels this is unlikely to be infectious, as pat has a lot of edema, PLEDs and seizures ongoing.  -if repeat MRI shows worsening or persistent lesion rec nsurg eval for possible biopsy, Dr Ruiz contacted.    Target PHT level 15-20, additional 500mg now x1, cont maintenance 300mg/d.  daily phenytoin levels please  cont fycompa to 8mg as tolerated  cont LEV 1000mg tid as tolerated.  consider benzo trial if mentation preserved ie 2mg ativan x1 now    BP, Na+ monitoring, FSG  DVT, afib A/C currently being held A/P:    69F with vascular risk factors, recent abdominal surgery, HTN, afib, now with NCSE/status epilepticus, right temporooccipital lesion on MRI (edema, wm involvement, not much contrast uptake on imaging), CSF neg so far.  DDx:  atypical tumor (does not appear c/w lymphoma or GBM), focal cerebritis from infectious or autoimmune/inflammatory cause, PRES/RLS a possibility due to labile HTN (but less likely per nrad).    Rec:  -ID eval. consider D/C acyclovir.  or repeat CSF eval for PCR HSV repeat, autoimmune CSF and serum panels from [place], cytology.  -would recommend trial steroids (considering IVIGx5, but concern for pre-existant DVT) doses if ID feels this is unlikely to be infectious, as pat has a lot of edema, PLEDs and seizures ongoing.  -if repeat MRI shows worsening or persistent lesion rec nsurg eval for possible biopsy, Dr Ruiz contacted.    Target PHT level 15-20, additional 500mg now x1, cont maintenance 300mg/d.  daily phenytoin levels please  cont fycompa to 8mg as tolerated  cont LEV 1000mg tid as tolerated.  consider benzo trial if mentation preserved ie 2mg ativan x1 now    better BP control, Na+ monitoring, FSG  DVT, afib A/C currently being held for possible brain biopsy, but may need to resume when cleared

## 2019-04-24 ENCOUNTER — RESULT REVIEW (OUTPATIENT)
Age: 69
End: 2019-04-24

## 2019-04-24 LAB
ANION GAP SERPL CALC-SCNC: 12 MMOL/L — SIGNIFICANT CHANGE UP (ref 5–17)
ANION GAP SERPL CALC-SCNC: 12 MMOL/L — SIGNIFICANT CHANGE UP (ref 5–17)
APPEARANCE CSF: CLEAR — SIGNIFICANT CHANGE UP
APPEARANCE SPUN FLD: COLORLESS — SIGNIFICANT CHANGE UP
BUN SERPL-MCNC: 6 MG/DL — LOW (ref 7–23)
BUN SERPL-MCNC: 8 MG/DL — SIGNIFICANT CHANGE UP (ref 7–23)
CALCIUM SERPL-MCNC: 8.7 MG/DL — SIGNIFICANT CHANGE UP (ref 8.4–10.5)
CALCIUM SERPL-MCNC: 8.9 MG/DL — SIGNIFICANT CHANGE UP (ref 8.4–10.5)
CHLORIDE SERPL-SCNC: 105 MMOL/L — SIGNIFICANT CHANGE UP (ref 96–108)
CHLORIDE SERPL-SCNC: 107 MMOL/L — SIGNIFICANT CHANGE UP (ref 96–108)
CO2 SERPL-SCNC: 23 MMOL/L — SIGNIFICANT CHANGE UP (ref 22–31)
CO2 SERPL-SCNC: 25 MMOL/L — SIGNIFICANT CHANGE UP (ref 22–31)
COLOR CSF: SIGNIFICANT CHANGE UP
CREAT SERPL-MCNC: 0.84 MG/DL — SIGNIFICANT CHANGE UP (ref 0.5–1.3)
CREAT SERPL-MCNC: 0.95 MG/DL — SIGNIFICANT CHANGE UP (ref 0.5–1.3)
CSF PCR RESULT: SIGNIFICANT CHANGE UP
GLUCOSE CSF-MCNC: 69 MG/DL — SIGNIFICANT CHANGE UP (ref 40–70)
GLUCOSE SERPL-MCNC: 246 MG/DL — HIGH (ref 70–99)
GLUCOSE SERPL-MCNC: 79 MG/DL — SIGNIFICANT CHANGE UP (ref 70–99)
LABORATORY COMMENT REPORT: SIGNIFICANT CHANGE UP
LYMPHOCYTES # CSF: 91 % — HIGH (ref 40–80)
MONOS+MACROS NFR CSF: 4 % — LOW (ref 15–45)
NEUTROPHILS # CSF: 5 % — SIGNIFICANT CHANGE UP (ref 0–6)
NRBC NFR CSF: 2 /UL — SIGNIFICANT CHANGE UP (ref 0–5)
PHENYTOIN FREE SERPL-MCNC: 14.3 UG/ML — SIGNIFICANT CHANGE UP (ref 10–20)
POTASSIUM SERPL-MCNC: 3.2 MMOL/L — LOW (ref 3.5–5.3)
POTASSIUM SERPL-MCNC: 3.7 MMOL/L — SIGNIFICANT CHANGE UP (ref 3.5–5.3)
POTASSIUM SERPL-SCNC: 3.2 MMOL/L — LOW (ref 3.5–5.3)
POTASSIUM SERPL-SCNC: 3.7 MMOL/L — SIGNIFICANT CHANGE UP (ref 3.5–5.3)
PROT CSF-MCNC: 30 MG/DL — SIGNIFICANT CHANGE UP (ref 15–45)
RBC # CSF: 480 /UL — HIGH (ref 0–0)
SODIUM SERPL-SCNC: 140 MMOL/L — SIGNIFICANT CHANGE UP (ref 135–145)
SODIUM SERPL-SCNC: 144 MMOL/L — SIGNIFICANT CHANGE UP (ref 135–145)
SOURCE HSV 1/2: SIGNIFICANT CHANGE UP
TUBE TYPE: SIGNIFICANT CHANGE UP

## 2019-04-24 PROCEDURE — 88188 FLOWCYTOMETRY/READ 9-15: CPT | Mod: 59

## 2019-04-24 PROCEDURE — 62270 DX LMBR SPI PNXR: CPT

## 2019-04-24 PROCEDURE — 77003 FLUOROGUIDE FOR SPINE INJECT: CPT | Mod: 26

## 2019-04-24 PROCEDURE — 99232 SBSQ HOSP IP/OBS MODERATE 35: CPT

## 2019-04-24 PROCEDURE — 88108 CYTOPATH CONCENTRATE TECH: CPT | Mod: 26

## 2019-04-24 PROCEDURE — 95951: CPT | Mod: 26

## 2019-04-24 RX ORDER — INSULIN LISPRO 100/ML
VIAL (ML) SUBCUTANEOUS AT BEDTIME
Refills: 0 | Status: DISCONTINUED | OUTPATIENT
Start: 2019-04-24 | End: 2019-05-01

## 2019-04-24 RX ORDER — ENOXAPARIN SODIUM 100 MG/ML
100 INJECTION SUBCUTANEOUS
Refills: 0 | Status: DISCONTINUED | OUTPATIENT
Start: 2019-04-24 | End: 2019-04-28

## 2019-04-24 RX ORDER — SODIUM CHLORIDE 9 MG/ML
1000 INJECTION, SOLUTION INTRAVENOUS
Refills: 0 | Status: DISCONTINUED | OUTPATIENT
Start: 2019-04-24 | End: 2019-05-01

## 2019-04-24 RX ORDER — DEXTROSE 50 % IN WATER 50 %
25 SYRINGE (ML) INTRAVENOUS ONCE
Refills: 0 | Status: DISCONTINUED | OUTPATIENT
Start: 2019-04-24 | End: 2019-05-01

## 2019-04-24 RX ORDER — CLONAZEPAM 1 MG
1 TABLET ORAL ONCE
Refills: 0 | Status: DISCONTINUED | OUTPATIENT
Start: 2019-04-24 | End: 2019-04-24

## 2019-04-24 RX ORDER — INSULIN LISPRO 100/ML
VIAL (ML) SUBCUTANEOUS
Refills: 0 | Status: DISCONTINUED | OUTPATIENT
Start: 2019-04-24 | End: 2019-05-01

## 2019-04-24 RX ORDER — POTASSIUM CHLORIDE 20 MEQ
40 PACKET (EA) ORAL ONCE
Refills: 0 | Status: COMPLETED | OUTPATIENT
Start: 2019-04-24 | End: 2019-04-24

## 2019-04-24 RX ORDER — GLUCAGON INJECTION, SOLUTION 0.5 MG/.1ML
1 INJECTION, SOLUTION SUBCUTANEOUS ONCE
Refills: 0 | Status: DISCONTINUED | OUTPATIENT
Start: 2019-04-24 | End: 2019-05-01

## 2019-04-24 RX ORDER — DEXTROSE 50 % IN WATER 50 %
12.5 SYRINGE (ML) INTRAVENOUS ONCE
Refills: 0 | Status: DISCONTINUED | OUTPATIENT
Start: 2019-04-24 | End: 2019-05-01

## 2019-04-24 RX ORDER — INSULIN GLARGINE 100 [IU]/ML
18 INJECTION, SOLUTION SUBCUTANEOUS AT BEDTIME
Refills: 0 | Status: DISCONTINUED | OUTPATIENT
Start: 2019-04-24 | End: 2019-04-26

## 2019-04-24 RX ORDER — DEXTROSE 50 % IN WATER 50 %
15 SYRINGE (ML) INTRAVENOUS ONCE
Refills: 0 | Status: DISCONTINUED | OUTPATIENT
Start: 2019-04-24 | End: 2019-05-01

## 2019-04-24 RX ADMIN — INSULIN GLARGINE 18 UNIT(S): 100 INJECTION, SOLUTION SUBCUTANEOUS at 21:41

## 2019-04-24 RX ADMIN — Medication 1 MILLIGRAM(S): at 22:09

## 2019-04-24 RX ADMIN — Medication 165 MILLIGRAM(S): at 04:34

## 2019-04-24 RX ADMIN — Medication 50 MICROGRAM(S): at 05:18

## 2019-04-24 RX ADMIN — PERAMPANEL 8 MILLIGRAM(S): 2 TABLET ORAL at 21:42

## 2019-04-24 RX ADMIN — Medication 165 MILLIGRAM(S): at 21:35

## 2019-04-24 RX ADMIN — Medication 100 MILLIGRAM(S): at 05:17

## 2019-04-24 RX ADMIN — LEVETIRACETAM 400 MILLIGRAM(S): 250 TABLET, FILM COATED ORAL at 05:18

## 2019-04-24 RX ADMIN — Medication 975 MILLIGRAM(S): at 15:30

## 2019-04-24 RX ADMIN — LEVETIRACETAM 400 MILLIGRAM(S): 250 TABLET, FILM COATED ORAL at 13:11

## 2019-04-24 RX ADMIN — Medication 975 MILLIGRAM(S): at 02:28

## 2019-04-24 RX ADMIN — Medication 975 MILLIGRAM(S): at 21:24

## 2019-04-24 RX ADMIN — Medication 3 MILLILITER(S): at 13:11

## 2019-04-24 RX ADMIN — ENOXAPARIN SODIUM 100 MILLIGRAM(S): 100 INJECTION SUBCUTANEOUS at 18:08

## 2019-04-24 RX ADMIN — Medication 3 MILLILITER(S): at 18:08

## 2019-04-24 RX ADMIN — Medication 100 MILLIGRAM(S): at 13:11

## 2019-04-24 RX ADMIN — Medication 100 MILLIGRAM(S): at 21:41

## 2019-04-24 RX ADMIN — AMLODIPINE BESYLATE 10 MILLIGRAM(S): 2.5 TABLET ORAL at 05:18

## 2019-04-24 RX ADMIN — Medication 1: at 13:10

## 2019-04-24 RX ADMIN — Medication 0: at 21:41

## 2019-04-24 RX ADMIN — Medication 3 MILLILITER(S): at 05:19

## 2019-04-24 RX ADMIN — LEVETIRACETAM 400 MILLIGRAM(S): 250 TABLET, FILM COATED ORAL at 22:12

## 2019-04-24 RX ADMIN — Medication 165 MILLIGRAM(S): at 13:10

## 2019-04-24 RX ADMIN — Medication 975 MILLIGRAM(S): at 16:20

## 2019-04-24 RX ADMIN — Medication 975 MILLIGRAM(S): at 20:54

## 2019-04-24 RX ADMIN — Medication 200 MILLIGRAM(S): at 13:11

## 2019-04-24 RX ADMIN — FOSPHENYTOIN 104 MILLIGRAM(S) PE: 50 INJECTION INTRAMUSCULAR; INTRAVENOUS at 22:11

## 2019-04-24 RX ADMIN — Medication 40 MILLIEQUIVALENT(S): at 09:05

## 2019-04-24 RX ADMIN — FOSPHENYTOIN 104 MILLIGRAM(S) PE: 50 INJECTION INTRAMUSCULAR; INTRAVENOUS at 05:18

## 2019-04-24 RX ADMIN — Medication 975 MILLIGRAM(S): at 08:54

## 2019-04-24 RX ADMIN — Medication 975 MILLIGRAM(S): at 09:35

## 2019-04-24 RX ADMIN — Medication 200 MILLIGRAM(S): at 08:54

## 2019-04-24 RX ADMIN — HUMAN INSULIN 7 UNIT(S): 100 INJECTION, SUSPENSION SUBCUTANEOUS at 13:10

## 2019-04-24 RX ADMIN — FOSPHENYTOIN 104 MILLIGRAM(S) PE: 50 INJECTION INTRAMUSCULAR; INTRAVENOUS at 13:10

## 2019-04-24 RX ADMIN — Medication 975 MILLIGRAM(S): at 02:58

## 2019-04-24 NOTE — PROGRESS NOTE ADULT - SUBJECTIVE AND OBJECTIVE BOX
Clinical Indication: Encephalopathy    PREPROCEDURE:    Patient presents for diagnostic lumbar puncture.  Risks and benefits were discussed with patient and/or health care proxy.  Risks include but are not limited to headache, bleeding, infection and nerve damage.    Patient and/or health care proxy understands and consents to procedure.    POSTPROCEDURE:    Lumbar puncture was performed at the L3-4 level using a 20 gauge 6 inch needle using fluoroscopic guidance. 4 tubes of CSF  was collected and hand delivered to the lab    Patient tolerated the procedure well and left the department in stable condition.

## 2019-04-24 NOTE — PROGRESS NOTE ADULT - SUBJECTIVE AND OBJECTIVE BOX
CC: f/u for L temp lobe lesion, possibly HSE    Patient reports mental status improved, more alert, conversant, follows all commands, no Sz activity     REVIEW OF SYSTEMS:  All other review of systems negative (Comprehensive ROS)    Antimicrobials Day # 6  acyclovir IVPB 750 milliGRAM(s) IV Intermittent every 8 hours    Other Medications Reviewed    T(F): 97.8 (04-24-19 @ 15:31), Max: 98.1 (04-23-19 @ 20:25)  HR: 76 (04-24-19 @ 15:31)  BP: 126/68 (04-24-19 @ 15:31)  RR: 18 (04-24-19 @ 15:31)  SpO2: 97% (04-24-19 @ 15:31)  Wt(kg): --    PHYSICAL EXAM:  General: alert, no acute distress  Eyes:  anicteric, no conjunctival injection, no discharge  Oropharynx: no lesions or injection 	  Neck: supple, without adenopathy  Lungs: clear to auscultation  Heart: regular rate and rhythm; no murmur, rubs or gallops  Abdomen: soft, nondistended, nontender, without mass or organomegaly  Skin: no lesions  Extremities: no clubbing, cyanosis, or edema  Neurologic: alert, moves all extremities    LAB RESULTS:                        9.6    6.9   )-----------( 135      ( 22 Apr 2019 22:00 )             30.4     04-24    144  |  107  |  8   ----------------------------<  79  3.2<L>   |  25  |  0.95    Ca    8.9      24 Apr 2019 04:57  Phos  3.4     04-22  Mg     1.8     04-22    Cerebrospinal Fluid Cell Count-1 (04.24.19 @ 13:10)    CSF Segmented Neutrophils: 5 %    CSF Monocytes/Macrophages: 4 %    CSF Lymphocytes: 91 %    CSF Appearance: Clear    CSF Color: No Color    Total Nucleated Cell Count, CSF: 2 /uL    MICROBIOLOGY:  RECENT CULTURES:  Culture - CSF with Gram Stain . (04.19.19 @ 16:57)    Gram Stain:   No polymorphonuclear cells seen  No organisms seen  by cytocentrifuge    Specimen Source: .CSF    Culture Results:   No growth    Herpes Simplex Virus 1/2 PCR, CSF (04.19.19 @ 22:40)    Source HSV 1/2: CSF    HSV 1/2 PCR: NotDete:    RADIOLOGY REVIEWED:  MR Head No Cont (04.19.19 @ 05:44) >  Left posterior lateral temporal and parietal occipital hyperintense T2   and FLAIR signal with faint hyperintense DWI signal and ADC T2 shine   through, with  slight  restricted diffusion, series 300 image 18. There   is a 4 x 6 mm  enhancement  in the left posterior lateral lateral   temporal lobe with questionable leptomeningeal enhancement and slight   susceptibility. Differential  considerations include atypical cavernoma   or  posterior reversible encephalopathy, mass such as low-grade neoplasm,   infectious, and or inflammatory change are not excluded.

## 2019-04-24 NOTE — PROGRESS NOTE ADULT - ASSESSMENT
69F w/ CAD, DM (A1C 9.9), HTN, HLD, hypothyroid, on eliquis, ASA, s/p umbilical hernia repair ~2 weeks ago, brought in by ambulance after being found down by family. Currently in being treated for seizures and possible encephalitis. Endocrine consulted for Type 2 DM management on tube feeds as well as hypernatremia which has improved. 69F w/ CAD, DM (A1C 9.9), HTN, HLD, hypothyroid, on eliquis, ASA, s/p umbilical hernia repair ~2 weeks ago, brought in by ambulance after being found down by family. Currently in being treated for seizures and possible encephalitis. Endocrine consulted for Type 2 DM management on tube feeds which are now discontinued

## 2019-04-24 NOTE — PROVIDER CONTACT NOTE (OTHER) - RECOMMENDATIONS
Give morning BP meds amlodipine, hydralazine and labetolol and recheck BP Give morning BP meds amlodipine, hydralazine and recheck BP

## 2019-04-24 NOTE — PROGRESS NOTE ADULT - ASSESSMENT
70 y/o F w/ past medical history of CAD, DM, HTN, HLD, hypothyroid, on eliquis, ASA, s/p umbilical hernia repair ~2 weeks ago presented as level 1 trauma. Patient was found down by family 4/18/2019 and was found to be altered and had witnessed seizure in ED and subsequently intubated. Neurology consulted for seizure and MRI brain findings of left temporal abnormality.    Impression: AMS, seizures likely 2/2 to left temporal lesion, differential diagnosis includes neoplastic vs herpes infection     4/21: Mental status appears to be improved as well as EEG tracings. Started on Vimpat overnight. Off sedation following commands.  04/22 : waxing waning of mental status  04/23 : waxing/waning, improvement since 04/22 04/24 : alert awake, EEG improved compared to last three days     [] MRI brain WAW on 4/22 followed by repeat LP studies inc HSV, PCR  [x] LP done by neuro 4/19 - cells 2 (lymph 87), glucose 103, protein 43, CSF PCR/herpes negative (can be negative early 72hrs)  [x] c/w video EEG monitoring  [x] c/w Acyclovir 1g TID  [x] c/w Keppra 1g TID;  Fycompa 8 mg QHS, patient on Fosphenytoin 100 TID       [x ] ID recommended repeat LP : CSF glucose, cell count, protein, csf fungal culture, AFB culture, cytology pcr panel, serum autoimmune encephalitis panel, spinal fluid autoimmune encephalitis panel, Autoimmune epilepsy panel     [ ] brain biopsy scheduled with  on Monday for post temporal lesion     [ ] Discussed with patient's cardiology, patient is on Eliquis for new onset of A.fibb. Patient will be switched to full dose Lovonex in-patient before biopsy. 68 y/o F w/ past medical history of CAD, DM, HTN, HLD, hypothyroid, on eliquis, ASA, s/p umbilical hernia repair ~2 weeks ago presented as level 1 trauma. Patient was found down by family 4/18/2019 and was found to be altered and had witnessed seizure in ED and subsequently intubated. Neurology consulted for seizure and MRI brain findings of left temporal abnormality.    Impression: AMS, seizures likely 2/2 to left temporal lesion, differential diagnosis includes neoplastic vs herpes infection     4/21: Mental status appears to be improved as well as EEG tracings. Started on Vimpat overnight. Off sedation following commands.  04/22 : waxing waning of mental status  04/23 : waxing/waning, improvement since 04/22 04/24 : alert awake, EEG improved compared to last three days     [] MRI brain WAW on 4/22 followed by repeat LP studies inc HSV, PCR  [x] LP done by neuro 4/19 - cells 2 (lymph 87), glucose 103, protein 43, CSF PCR/herpes negative (can be negative early 72hrs)  [x] c/w video EEG monitoring  [x] c/w Acyclovir 1g TID  [x] c/w Keppra 1g TID;  Fycompa 8 mg QHS, patient on Fosphenytoin 100 TID       [x ] ID recommended repeat LP : CSF glucose, cell count, protein, csf fungal culture, AFB culture, cytology pcr panel, serum autoimmune encephalitis panel, spinal fluid autoimmune encephalitis panel, Autoimmune epilepsy panel     [ ] brain biopsy scheduled with  on Monday for post temporal lesion     [ ] Discussed with patient's cardiology, patient is on Eliquis for new onset of A.fibb. Patient will be switched to full dose Lovonex in-patient before biopsy.

## 2019-04-24 NOTE — EEG REPORT - NS EEG TEXT BOX
Study Date: 4/23/19-4/24/19    _____________________________________________________________  HISTORY:  Patient is a 69y old  Female who presents with a chief complaint of AMS    MEDICATIONS  (STANDING):  acyclovir IVPB 750 milliGRAM(s) IV Intermittent every 8 hours  ALBUTerol/ipratropium for Nebulization 3 milliLiter(s) Nebulizer every 6 hours  ALBUTerol/ipratropium for Nebulization. 3 milliLiter(s) Nebulizer once  amLODIPine   Tablet 10 milliGRAM(s) Oral daily  enoxaparin Injectable 40 milliGRAM(s) SubCutaneous <User Schedule>  fosphenytoin IVPB 100 milliGRAM(s) PE IV Intermittent every 8 hours  hydrALAZINE 100 milliGRAM(s) Oral every 8 hours  insulin lispro (HumaLOG) corrective regimen sliding scale   SubCutaneous every 6 hours  insulin lispro (HumaLOG) corrective regimen sliding scale   SubCutaneous three times a day before meals  insulin NPH human recombinant 7 Unit(s) SubCutaneous every 6 hours  labetalol 200 milliGRAM(s) Oral three times a day  levETIRAcetam  IVPB 1000 milliGRAM(s) IV Intermittent <User Schedule>  levothyroxine 50 MICROGram(s) Oral daily  perampanel 8 milliGRAM(s) Oral at bedtime    MEDICATIONS  (PRN):  acetaminophen    Suspension .. 975 milliGRAM(s) Oral every 6 hours PRN Temp greater or equal to 38.5C (101.3F), Mild Pain (1 - 3)  dextrose 40% Gel 15 Gram(s) Oral once PRN Blood Glucose LESS THAN 70 milliGRAM(s)/deciliter  glucagon  Injectable 1 milliGRAM(s) IntraMuscular once PRN Glucose LESS THAN 70 milligrams/deciliter    _____________________________________________________________  STUDY INTERPRETATION    Findings:  The background was continuous, diffuse delta and theta activity.  Diffuse alpha activity was present, though a rudimentary PDR to 7hz was noted.   State changes and reactivity were present.   Continuous Slowing, Lateralized, Left hemisphere (irregular delta)    Sleep Background:  Stage II sleep transients (rudimentary symmetric sleep spindles and K complexes ) were present.      Interictal Epileptiform Activity:   -Abundant LPDs (Lateralized Periodic Discharges) max O1 P7 at times field to P3 T7 (0.5-1 Hz).      Events:  -Runs of LPDs evolving from burst of temporal occipital polyspike occurred frequently  lasting up to 30-90 sec at a time, with frontotemporal spread of fast activity, without overt clinical change.  Seizures decreased overnight to near 1-2x/hr, improved vs prior days, a couple of hours with no seizures.    Activation Procedures:   Hyperventilation was not performed.    Photic stimulation was not performed.     Artifacts:  Intermittent myogenic and movement artifacts were noted.  Pt disconnected self, and was reconnected.  Also, was off recording during transport to EMU, then reconnected.    ECG:  The heart rate on single channel ECG was predominantly between 70-90 BPM.    _____________________________________________________________  EEG SUMMARY/CLASSIFICATION  Abnormal EEG in an unresponsive patient / a sedated patient.  - Frequent left temporo-occipital seizures, decreased overnight to near 1-2x/hr, improved vs prior days, a couple of hours with no seizures.  - LPDs / SIRPIDs temporal occipital  - Continuous Slowing, Lateralized, Left hemisphere (delta)  - Diffuse theta and polymorphic delta slowing, moderate  _____________________________________________________________  EEG IMPRESSION/CLINICAL CORRELATE    Abnormal EEG study.  Frequent left temporal occipital seizures and regional cortical irritability.  Improved in number of seizures per hour vs prior days.  Moderate multifocal irregular slowing, worse in the left hemisphere    MD JULIANA Quevedo  Board Certifications:  Neurology, Clinical Neurophysiology, Epilepsy  Epilepsy Fellowship   , Department of Neurology  [facility]    Reading room: [phone] | Office: [phone]

## 2019-04-24 NOTE — PROGRESS NOTE ADULT - SUBJECTIVE AND OBJECTIVE BOX
Chief Complaint/Follow-up on: T2DM    Subjective:    MEDICATIONS  (STANDING):  acyclovir IVPB 750 milliGRAM(s) IV Intermittent every 8 hours  ALBUTerol/ipratropium for Nebulization 3 milliLiter(s) Nebulizer every 6 hours  ALBUTerol/ipratropium for Nebulization. 3 milliLiter(s) Nebulizer once  amLODIPine   Tablet 10 milliGRAM(s) Oral daily  dextrose 5%. 1000 milliLiter(s) (50 mL/Hr) IV Continuous <Continuous>  dextrose 50% Injectable 12.5 Gram(s) IV Push once  dextrose 50% Injectable 25 Gram(s) IV Push once  dextrose 50% Injectable 25 Gram(s) IV Push once  enoxaparin Injectable 100 milliGRAM(s) SubCutaneous two times a day  fosphenytoin IVPB 100 milliGRAM(s) PE IV Intermittent every 8 hours  hydrALAZINE 100 milliGRAM(s) Oral every 8 hours  insulin lispro (HumaLOG) corrective regimen sliding scale   SubCutaneous every 6 hours  insulin lispro (HumaLOG) corrective regimen sliding scale   SubCutaneous three times a day before meals  insulin NPH human recombinant 7 Unit(s) SubCutaneous every 6 hours  labetalol 200 milliGRAM(s) Oral three times a day  levETIRAcetam  IVPB 1000 milliGRAM(s) IV Intermittent <User Schedule>  levothyroxine 50 MICROGram(s) Oral daily  perampanel 8 milliGRAM(s) Oral at bedtime    MEDICATIONS  (PRN):  acetaminophen    Suspension .. 975 milliGRAM(s) Oral every 6 hours PRN Temp greater or equal to 38.5C (101.3F), Mild Pain (1 - 3)  dextrose 40% Gel 15 Gram(s) Oral once PRN Blood Glucose LESS THAN 70 milliGRAM(s)/deciliter  glucagon  Injectable 1 milliGRAM(s) IntraMuscular once PRN Glucose LESS THAN 70 milligrams/deciliter      PHYSICAL EXAM:  VITALS: T(C): 36.6 (04-24-19 @ 15:31)  T(F): 97.8 (04-24-19 @ 15:31), Max: 98.1 (04-23-19 @ 20:25)  HR: 76 (04-24-19 @ 15:31) (68 - 84)  BP: 126/68 (04-24-19 @ 15:31) (126/68 - 176/76)  RR:  (16 - 19)  SpO2:  (94% - 99%)  Wt(kg): 100.5 kg    GENERAL: NAD, well-groomed, well-developed  EYES: No proptosis, no injection  HEENT:  Atraumatic, Normocephalic, moist mucous membranes  THYROID: Normal size, no palpable nodules  RESPIRATORY: Clear to auscultation bilaterally; No rales, rhonchi, wheezing, or rubs  CARDIOVASCULAR: Regular rate and rhythm; No murmurs; no peripheral edema  GI: Soft, nontender, non distended, normal bowel sounds  CUSHING'S SIGNS: no striae    POCT Blood Glucose.: 162 mg/dL (04-24-19 @ 12:39)  POCT Blood Glucose.: 88 mg/dL (04-24-19 @ 08:55)    POCT Blood Glucose.: 161 mg/dL (04-23-19 @ 21:35)  POCT Blood Glucose.: 180 mg/dL (04-23-19 @ 17:27)  POCT Blood Glucose.: 141 mg/dL (04-23-19 @ 11:18)  POCT Blood Glucose.: 121 mg/dL (04-23-19 @ 05:52)  POCT Blood Glucose.: 144 mg/dL (04-23-19 @ 00:17)    POCT Blood Glucose.: 123 mg/dL (04-22-19 @ 18:10)  POCT Blood Glucose.: 174 mg/dL (04-22-19 @ 11:39)  POCT Blood Glucose.: 182 mg/dL (04-22-19 @ 05:23)    POCT Blood Glucose.: 230 mg/dL (04-21-19 @ 23:04)  POCT Blood Glucose.: 200 mg/dL (04-21-19 @ 17:38)  POCT Blood Glucose.: 180 mg/dL (04-21-19 @ 16:22)    04-24    144  |  107  |  8   ----------------------------<  79  3.2<L>   |  25  |  0.95    EGFR if : 72  EGFR if non : 62    Ca    8.9      04-24  Mg     1.8     04-22  Phos  3.4     04-22      Thyroid Function Tests:  04-22 @ 08:55   TSH -- FreeT4 1.1 T3 -- Anti TPO -- Anti Thyroglobulin Ab -- TSI --    04-22 @ 08:51   TSH 1.45 FreeT4 -- T3 -- Anti TPO -- Anti Thyroglobulin Ab -- TSI --      Hemoglobin A1C, Whole Blood: 9.9 % <H> [4.0 - 5.6] (04-21-19 @ 09:26) Chief Complaint/Follow-up on: T2DM    Subjective: Patient pulled out NG last night, ate 100% breakfast and lunch today. No nausea or diarrhea. no vomiting. Admits to having an appetite for dinner. Last NPH 7 units was given at noon. FS now is 150. Planned for brain biopsy on Monday    MEDICATIONS  (STANDING):  acyclovir IVPB 750 milliGRAM(s) IV Intermittent every 8 hours  ALBUTerol/ipratropium for Nebulization 3 milliLiter(s) Nebulizer every 6 hours  ALBUTerol/ipratropium for Nebulization. 3 milliLiter(s) Nebulizer once  amLODIPine   Tablet 10 milliGRAM(s) Oral daily  dextrose 5%. 1000 milliLiter(s) (50 mL/Hr) IV Continuous <Continuous>  dextrose 50% Injectable 12.5 Gram(s) IV Push once  dextrose 50% Injectable 25 Gram(s) IV Push once  dextrose 50% Injectable 25 Gram(s) IV Push once  enoxaparin Injectable 100 milliGRAM(s) SubCutaneous two times a day  fosphenytoin IVPB 100 milliGRAM(s) PE IV Intermittent every 8 hours  hydrALAZINE 100 milliGRAM(s) Oral every 8 hours  insulin lispro (HumaLOG) corrective regimen sliding scale   SubCutaneous every 6 hours  insulin lispro (HumaLOG) corrective regimen sliding scale   SubCutaneous three times a day before meals  insulin NPH human recombinant 7 Unit(s) SubCutaneous every 6 hours  labetalol 200 milliGRAM(s) Oral three times a day  levETIRAcetam  IVPB 1000 milliGRAM(s) IV Intermittent <User Schedule>  levothyroxine 50 MICROGram(s) Oral daily  perampanel 8 milliGRAM(s) Oral at bedtime    MEDICATIONS  (PRN):  acetaminophen    Suspension .. 975 milliGRAM(s) Oral every 6 hours PRN Temp greater or equal to 38.5C (101.3F), Mild Pain (1 - 3)  dextrose 40% Gel 15 Gram(s) Oral once PRN Blood Glucose LESS THAN 70 milliGRAM(s)/deciliter  glucagon  Injectable 1 milliGRAM(s) IntraMuscular once PRN Glucose LESS THAN 70 milligrams/deciliter      PHYSICAL EXAM:  VITALS: T(C): 36.6 (04-24-19 @ 15:31)  T(F): 97.8 (04-24-19 @ 15:31), Max: 98.1 (04-23-19 @ 20:25)  HR: 76 (04-24-19 @ 15:31) (68 - 84)  BP: 126/68 (04-24-19 @ 15:31) (126/68 - 176/76)  RR:  (16 - 19)  SpO2:  (94% - 99%)  Wt(kg): 100.5 kg    GENERAL: NAD, well-groomed, well-developed, obese female  EYES: No proptosis, no injection  HEENT:  Atraumatic, Normocephalic, moist mucous membranes  THYROID: Normal size, no palpable nodules  RESPIRATORY: Clear to auscultation bilaterally; No rales, rhonchi, wheezing, or rubs  CARDIOVASCULAR: Regular rate and rhythm; No murmurs; no peripheral edema  GI: Soft, nontender, non distended, normal bowel sounds  CUSHING'S SIGNS: no striae    POCT Blood Glucose.: 162 mg/dL (04-24-19 @ 12:39)  POCT Blood Glucose.: 88 mg/dL (04-24-19 @ 08:55)    POCT Blood Glucose.: 161 mg/dL (04-23-19 @ 21:35)  POCT Blood Glucose.: 180 mg/dL (04-23-19 @ 17:27)  POCT Blood Glucose.: 141 mg/dL (04-23-19 @ 11:18)  POCT Blood Glucose.: 121 mg/dL (04-23-19 @ 05:52)  POCT Blood Glucose.: 144 mg/dL (04-23-19 @ 00:17)    POCT Blood Glucose.: 123 mg/dL (04-22-19 @ 18:10)  POCT Blood Glucose.: 174 mg/dL (04-22-19 @ 11:39)  POCT Blood Glucose.: 182 mg/dL (04-22-19 @ 05:23)    POCT Blood Glucose.: 230 mg/dL (04-21-19 @ 23:04)  POCT Blood Glucose.: 200 mg/dL (04-21-19 @ 17:38)  POCT Blood Glucose.: 180 mg/dL (04-21-19 @ 16:22)    04-24    144  |  107  |  8   ----------------------------<  79  3.2<L>   |  25  |  0.95    EGFR if : 72  EGFR if non : 62    Ca    8.9      04-24  Mg     1.8     04-22  Phos  3.4     04-22      Thyroid Function Tests:  04-22 @ 08:55   TSH -- FreeT4 1.1 T3 -- Anti TPO -- Anti Thyroglobulin Ab -- TSI --    04-22 @ 08:51   TSH 1.45 FreeT4 -- T3 -- Anti TPO -- Anti Thyroglobulin Ab -- TSI --      Hemoglobin A1C, Whole Blood: 9.9 % <H> [4.0 - 5.6] (04-21-19 @ 09:26)

## 2019-04-24 NOTE — PROGRESS NOTE ADULT - PROBLEM SELECTOR PLAN 3
-continue synthroid 25 mcg IVP qdaily  Mari Guillermo MD  [phone] - Continue Synthroid 50 mcg QD    Ryland Vargas DO  Pager 9AM-5PM: [phone]  Pager Nights and Weekends: [phone]

## 2019-04-24 NOTE — PROGRESS NOTE ADULT - SUBJECTIVE AND OBJECTIVE BOX
*************************************  NEUROLOGY FOLLOW UP NOTE  **************************************      Subjective: patient reported no headache today. More awake discussed about her family members.       Vital Signs Last 24 Hrs  T(C): 36.6 (24 Apr 2019 07:37), Max: 36.7 (23 Apr 2019 15:30)  T(F): 97.9 (24 Apr 2019 07:37), Max: 98.1 (23 Apr 2019 15:30)  HR: 84 (24 Apr 2019 07:37) (68 - 85)  BP: 158/82 (24 Apr 2019 07:37) (127/76 - 176/76)  BP(mean): 101 (23 Apr 2019 15:30) (101 - 101)  RR: 18 (24 Apr 2019 07:37) (17 - 19)  SpO2: 99% (24 Apr 2019 07:37) (95% - 99%)    MEDICATIONS  (STANDING):  acyclovir IVPB 750 milliGRAM(s) IV Intermittent every 8 hours  ALBUTerol/ipratropium for Nebulization 3 milliLiter(s) Nebulizer every 6 hours  ALBUTerol/ipratropium for Nebulization. 3 milliLiter(s) Nebulizer once  amLODIPine   Tablet 10 milliGRAM(s) Oral daily  dextrose 5%. 1000 milliLiter(s) (50 mL/Hr) IV Continuous <Continuous>  dextrose 50% Injectable 12.5 Gram(s) IV Push once  dextrose 50% Injectable 25 Gram(s) IV Push once  dextrose 50% Injectable 25 Gram(s) IV Push once  enoxaparin Injectable 40 milliGRAM(s) SubCutaneous <User Schedule>  fosphenytoin IVPB 100 milliGRAM(s) PE IV Intermittent every 8 hours  hydrALAZINE 100 milliGRAM(s) Oral every 8 hours  insulin lispro (HumaLOG) corrective regimen sliding scale   SubCutaneous every 6 hours  insulin lispro (HumaLOG) corrective regimen sliding scale   SubCutaneous three times a day before meals  insulin NPH human recombinant 7 Unit(s) SubCutaneous every 6 hours  labetalol 200 milliGRAM(s) Oral three times a day  levETIRAcetam  IVPB 1000 milliGRAM(s) IV Intermittent <User Schedule>  levothyroxine 50 MICROGram(s) Oral daily  perampanel 8 milliGRAM(s) Oral at bedtime    MEDICATIONS  (PRN):  acetaminophen    Suspension .. 975 milliGRAM(s) Oral every 6 hours PRN Temp greater or equal to 38.5C (101.3F), Mild Pain (1 - 3)  dextrose 40% Gel 15 Gram(s) Oral once PRN Blood Glucose LESS THAN 70 milliGRAM(s)/deciliter  glucagon  Injectable 1 milliGRAM(s) IntraMuscular once PRN Glucose LESS THAN 70 milligrams/deciliter      PHYSICAL EXAMINATION:  General Exam:   General appearance: No acute distress   Neck: supple, non-tender       Neurological Exam: off sedation  Mental Status:  alert awake following commands, oriented *2  Cranial Nerves:   PERRL, no nystagmus.  No facial asymmetry.     Motor:   Tone: Normal.                   Strength:   moving all ext spontaneously as well as on command   Tremor: No resting, postural or action tremor.  No myoclonus.    Sensation: intact     Gait: Deferred    LABS:                        10.8   9.7   )-----------( 136      ( 20 Apr 2019 21:38 )             33.8     21 Apr 2019 04:34    154    |  119    |  5      ----------------------------<  164    4.2     |  23     |  0.76     Ca    8.9        21 Apr 2019 04:34  Phos  1.4       20 Apr 2019 21:38  Mg     1.7       20 Apr 2019 21:38        CSF:    RBC Count - Spinal Fluid: 1 /uL (04-19-19 @ 15:13)  Total Nucleated Cell Count, CSF: 2 /uL (04-19-19 @ 15:13)          Protein, CSF: 41 mg/dL (04-19-19 @ 15:13)      RADIOLOGY & ADDITIONAL STUDIES:    Herpes Simplex Virus 1/2 PCR, CSF (04.19.19 @ 22:40)    Source HSV 1/2: CSF    HSV 1/2 PCR: Not[employer]: HSV1/2 PCR assay is a real-time PCR test that detects and differentiates  HSV-1 and/or HSV-2 DNA in CSF (Vitreous Fluid). The results of this test  should be interpreted with consideration of all clinical and laboratory  findings.    _____________________________________________________________  EEG IMPRESSION/CLINICAL CORRELATE    Abnormal EEG study.  1.  6-10 subclinical seizures per hour recorded from the left posterior temporal region (rhythmic beta activity developing and evolving in frequency over the left posterior temporal region, duration 10 seconds, with no associated clinical signs) improved after 22:00  2. Structural lesion in the left hemisphere  3. Moderate diffuse encephalopathy      < from: MR Head No Cont (04.19.19 @ 05:44) >    IMPRESSION:    Left posterior lateral temporal and parietal occipital hyperintense T2   and FLAIR signal with faint hyperintense DWI signal and ADC T2 shine   through, with  slight  restricted diffusion, series 300 image 18. There   is a 4 x 6 mm  enhancement  in the left posterior lateral lateral   temporal lobe with questionable leptomeningeal enhancement and slight   susceptibility. Differential  considerations include atypical cavernoma   or  posterior reversible encephalopathy, mass such as low-grade neoplasm,   infectious, and or inflammatory change are not excluded.    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  - Frequent left temporo-occipital seizures, up to 10/hour, some improvements overnight with seizures near 2/hr near end of study  - LPDs / SIRPIDs temporal occipital  - Continuous Slowing, Lateralized, Left hemisphere (delta)  - Diffuse theta and polymorphic delta slowing, moderate  _____________________________________________________________  EEG IMPRESSION/CLINICAL CORRELATE    Abnormal EEG study.  Frequent left temporal occipital seizures and regional cortical irritability.  Moderate multifocal irregular slowing, worse in the left hemisphere    04/22/2019     EEG SUMMARY/CLASSIFICATION  Abnormal EEG in an unresponsive patient / a sedated patient.  - Frequent left temporo-occipital seizures, variably up to 3-10x/hour  - LPDs / SIRPIDs temporal occipital  - Continuous Slowing, Lateralized, Left hemisphere (delta)  - Diffuse theta and polymorphic delta slowing, moderate  _____________________________________________________________  EEG IMPRESSION/CLINICAL CORRELATE    Abnormal EEG study.  Frequent left temporal occipital seizures and regional cortical irritability.  Moderate multifocal irregular slowing, worse in the left hemisphere

## 2019-04-24 NOTE — PROGRESS NOTE ADULT - PROBLEM SELECTOR PLAN 1
- continue moderate scale q6 and NPH 7 units sq q6.  DISPO: will need to clarify her meds, but she will need additional agents as her HbA1c is 9.9%. - Recommend stop NPH  - No insulin for dinner since NPH is still working from 12pm dose  - Recommend Lantus 18 units QHS starting tonight  - Recommend Humalog mod SSI AC and HS (HS starting tonight and AC starting tomorrow)  - RD consult    Outpatient   - F/u [address] [place] [address], will set up apt for her  - Provided card  - D/c plan possibly basal + meds or 3 oral agents since she was on Metformin and Glipizide at home and A1c 9.9%

## 2019-04-25 LAB
ANION GAP SERPL CALC-SCNC: 12 MMOL/L — SIGNIFICANT CHANGE UP (ref 5–17)
ANION GAP SERPL CALC-SCNC: 14 MMOL/L — SIGNIFICANT CHANGE UP (ref 5–17)
BUN SERPL-MCNC: 7 MG/DL — SIGNIFICANT CHANGE UP (ref 7–23)
BUN SERPL-MCNC: 7 MG/DL — SIGNIFICANT CHANGE UP (ref 7–23)
CALCIUM SERPL-MCNC: 8.8 MG/DL — SIGNIFICANT CHANGE UP (ref 8.4–10.5)
CALCIUM SERPL-MCNC: 9 MG/DL — SIGNIFICANT CHANGE UP (ref 8.4–10.5)
CHLORIDE SERPL-SCNC: 105 MMOL/L — SIGNIFICANT CHANGE UP (ref 96–108)
CHLORIDE SERPL-SCNC: 109 MMOL/L — HIGH (ref 96–108)
CHLORIDE UR-SCNC: 64 MMOL/L — SIGNIFICANT CHANGE UP
CO2 SERPL-SCNC: 24 MMOL/L — SIGNIFICANT CHANGE UP (ref 22–31)
CO2 SERPL-SCNC: 24 MMOL/L — SIGNIFICANT CHANGE UP (ref 22–31)
CREAT SERPL-MCNC: 0.73 MG/DL — SIGNIFICANT CHANGE UP (ref 0.5–1.3)
CREAT SERPL-MCNC: 0.88 MG/DL — SIGNIFICANT CHANGE UP (ref 0.5–1.3)
GLUCOSE SERPL-MCNC: 115 MG/DL — HIGH (ref 70–99)
GLUCOSE SERPL-MCNC: 127 MG/DL — HIGH (ref 70–99)
HCT VFR BLD CALC: 31.6 % — LOW (ref 34.5–45)
HGB BLD-MCNC: 9.5 G/DL — LOW (ref 11.5–15.5)
MAGNESIUM SERPL-MCNC: 1.8 MG/DL — SIGNIFICANT CHANGE UP (ref 1.6–2.6)
MCHC RBC-ENTMCNC: 23.6 PG — LOW (ref 27–34)
MCHC RBC-ENTMCNC: 30.1 GM/DL — LOW (ref 32–36)
MCV RBC AUTO: 78.4 FL — LOW (ref 80–100)
PHENYTOIN FREE SERPL-MCNC: 19.4 UG/ML — SIGNIFICANT CHANGE UP (ref 10–20)
PHOSPHATE SERPL-MCNC: 3.6 MG/DL — SIGNIFICANT CHANGE UP (ref 2.5–4.5)
PLATELET # BLD AUTO: 208 K/UL — SIGNIFICANT CHANGE UP (ref 150–400)
POTASSIUM SERPL-MCNC: 3.3 MMOL/L — LOW (ref 3.5–5.3)
POTASSIUM SERPL-MCNC: 3.5 MMOL/L — SIGNIFICANT CHANGE UP (ref 3.5–5.3)
POTASSIUM SERPL-SCNC: 3.3 MMOL/L — LOW (ref 3.5–5.3)
POTASSIUM SERPL-SCNC: 3.5 MMOL/L — SIGNIFICANT CHANGE UP (ref 3.5–5.3)
POTASSIUM UR-SCNC: 13 MMOL/L — SIGNIFICANT CHANGE UP
RBC # BLD: 4.03 M/UL — SIGNIFICANT CHANGE UP (ref 3.8–5.2)
RBC # FLD: 14.9 % — HIGH (ref 10.3–14.5)
SODIUM SERPL-SCNC: 141 MMOL/L — SIGNIFICANT CHANGE UP (ref 135–145)
SODIUM SERPL-SCNC: 147 MMOL/L — HIGH (ref 135–145)
SODIUM UR-SCNC: 79 MMOL/L — SIGNIFICANT CHANGE UP
VDRL CSF-TITR: NEGATIVE — SIGNIFICANT CHANGE UP
WBC # BLD: 5.37 K/UL — SIGNIFICANT CHANGE UP (ref 3.8–10.5)
WBC # FLD AUTO: 5.37 K/UL — SIGNIFICANT CHANGE UP (ref 3.8–10.5)

## 2019-04-25 PROCEDURE — 74177 CT ABD & PELVIS W/CONTRAST: CPT | Mod: 26

## 2019-04-25 PROCEDURE — 70450 CT HEAD/BRAIN W/O DYE: CPT | Mod: 26

## 2019-04-25 PROCEDURE — 95951: CPT | Mod: 26

## 2019-04-25 PROCEDURE — 95819 EEG AWAKE AND ASLEEP: CPT | Mod: 26

## 2019-04-25 PROCEDURE — 71260 CT THORAX DX C+: CPT | Mod: 26

## 2019-04-25 PROCEDURE — 99233 SBSQ HOSP IP/OBS HIGH 50: CPT

## 2019-04-25 PROCEDURE — 99232 SBSQ HOSP IP/OBS MODERATE 35: CPT

## 2019-04-25 PROCEDURE — 70553 MRI BRAIN STEM W/O & W/DYE: CPT | Mod: 26

## 2019-04-25 RX ORDER — POTASSIUM CHLORIDE 20 MEQ
20 PACKET (EA) ORAL ONCE
Refills: 0 | Status: COMPLETED | OUTPATIENT
Start: 2019-04-25 | End: 2019-04-25

## 2019-04-25 RX ORDER — DOCUSATE SODIUM 100 MG
100 CAPSULE ORAL DAILY
Refills: 0 | Status: DISCONTINUED | OUTPATIENT
Start: 2019-04-25 | End: 2019-05-01

## 2019-04-25 RX ORDER — SENNA PLUS 8.6 MG/1
1 TABLET ORAL DAILY
Refills: 0 | Status: DISCONTINUED | OUTPATIENT
Start: 2019-04-25 | End: 2019-05-01

## 2019-04-25 RX ORDER — PERAMPANEL 2 MG/1
4 TABLET ORAL AT BEDTIME
Refills: 0 | Status: DISCONTINUED | OUTPATIENT
Start: 2019-04-25 | End: 2019-05-01

## 2019-04-25 RX ORDER — POLYETHYLENE GLYCOL 3350 17 G/17G
17 POWDER, FOR SOLUTION ORAL
Refills: 0 | Status: DISCONTINUED | OUTPATIENT
Start: 2019-04-25 | End: 2019-05-01

## 2019-04-25 RX ADMIN — Medication 200 MILLIGRAM(S): at 16:47

## 2019-04-25 RX ADMIN — Medication 975 MILLIGRAM(S): at 05:43

## 2019-04-25 RX ADMIN — Medication 100 MILLIGRAM(S): at 05:13

## 2019-04-25 RX ADMIN — Medication 165 MILLIGRAM(S): at 04:33

## 2019-04-25 RX ADMIN — Medication 3 MILLILITER(S): at 18:23

## 2019-04-25 RX ADMIN — Medication 20 MILLIEQUIVALENT(S): at 23:23

## 2019-04-25 RX ADMIN — Medication 50 MICROGRAM(S): at 05:14

## 2019-04-25 RX ADMIN — Medication 200 MILLIGRAM(S): at 00:22

## 2019-04-25 RX ADMIN — LEVETIRACETAM 400 MILLIGRAM(S): 250 TABLET, FILM COATED ORAL at 06:05

## 2019-04-25 RX ADMIN — PERAMPANEL 4 MILLIGRAM(S): 2 TABLET ORAL at 21:14

## 2019-04-25 RX ADMIN — LEVETIRACETAM 400 MILLIGRAM(S): 250 TABLET, FILM COATED ORAL at 21:15

## 2019-04-25 RX ADMIN — Medication 200 MILLIGRAM(S): at 23:23

## 2019-04-25 RX ADMIN — Medication 200 MILLIGRAM(S): at 08:24

## 2019-04-25 RX ADMIN — Medication 975 MILLIGRAM(S): at 17:20

## 2019-04-25 RX ADMIN — Medication 3 MILLILITER(S): at 05:14

## 2019-04-25 RX ADMIN — Medication 3 MILLILITER(S): at 00:22

## 2019-04-25 RX ADMIN — POLYETHYLENE GLYCOL 3350 17 GRAM(S): 17 POWDER, FOR SOLUTION ORAL at 21:14

## 2019-04-25 RX ADMIN — INSULIN GLARGINE 18 UNIT(S): 100 INJECTION, SOLUTION SUBCUTANEOUS at 21:15

## 2019-04-25 RX ADMIN — Medication 1 MILLIGRAM(S): at 08:58

## 2019-04-25 RX ADMIN — LEVETIRACETAM 400 MILLIGRAM(S): 250 TABLET, FILM COATED ORAL at 16:47

## 2019-04-25 RX ADMIN — ENOXAPARIN SODIUM 100 MILLIGRAM(S): 100 INJECTION SUBCUTANEOUS at 18:23

## 2019-04-25 RX ADMIN — AMLODIPINE BESYLATE 10 MILLIGRAM(S): 2.5 TABLET ORAL at 05:15

## 2019-04-25 RX ADMIN — SENNA PLUS 1 TABLET(S): 8.6 TABLET ORAL at 21:13

## 2019-04-25 RX ADMIN — Medication 100 MILLIGRAM(S): at 16:47

## 2019-04-25 RX ADMIN — Medication 100 MILLIGRAM(S): at 21:15

## 2019-04-25 RX ADMIN — FOSPHENYTOIN 104 MILLIGRAM(S) PE: 50 INJECTION INTRAMUSCULAR; INTRAVENOUS at 21:15

## 2019-04-25 RX ADMIN — ENOXAPARIN SODIUM 100 MILLIGRAM(S): 100 INJECTION SUBCUTANEOUS at 05:15

## 2019-04-25 RX ADMIN — FOSPHENYTOIN 104 MILLIGRAM(S) PE: 50 INJECTION INTRAMUSCULAR; INTRAVENOUS at 06:05

## 2019-04-25 RX ADMIN — Medication 1 MILLIGRAM(S): at 21:15

## 2019-04-25 RX ADMIN — Medication 975 MILLIGRAM(S): at 05:13

## 2019-04-25 RX ADMIN — Medication 975 MILLIGRAM(S): at 16:40

## 2019-04-25 RX ADMIN — FOSPHENYTOIN 104 MILLIGRAM(S) PE: 50 INJECTION INTRAMUSCULAR; INTRAVENOUS at 17:01

## 2019-04-25 RX ADMIN — Medication 100 MILLIGRAM(S): at 21:14

## 2019-04-25 RX ADMIN — Medication 4: at 12:53

## 2019-04-25 RX ADMIN — Medication 3 MILLILITER(S): at 23:24

## 2019-04-25 NOTE — PROGRESS NOTE ADULT - SUBJECTIVE AND OBJECTIVE BOX
Neurology Follow up note    Patient is a 69y old  Female who presents with a chief complaint of AMS/seizure (24 Apr 2019 12:42)      Subjective: Interval History - No events overnight    Objective:   Vital Signs Last 24 Hrs  T(C): 36.6 (25 Apr 2019 07:33), Max: 36.9 (24 Apr 2019 20:06)  T(F): 97.9 (25 Apr 2019 07:33), Max: 98.5 (24 Apr 2019 20:06)  HR: 70 (25 Apr 2019 07:33) (70 - 81)  BP: 131/61 (25 Apr 2019 07:33) (126/68 - 164/83)  BP(mean): --  RR: 19 (25 Apr 2019 07:33) (16 - 19)  SpO2: 97% (25 Apr 2019 07:33) (94% - 99%)    General Exam:   General appearance: No acute distress                   Neurological Exam:  Mental Status: Orientated to self and place, but not to date (would not state current month or year, even when prompted). Attention intact. No dysarthria. able to name pen, but not gloves, or thumb (able to name gloves with hint).  able to show 2 fingers but does not follow commands to cross midline. able to count to 20, but skipped the number 18.    Cranial Nerves:  EOMI, no nystagmus. No facial asymmetry. Tongue midline.      Motor:                 Strength: 5/5 UE and LE    Deep Tendon Reflexes: 3+ RUE and RLE, 2+ LUE and LLE.  toes mute      Other:    04-25    147<H>  |  109<H>  |  7   ----------------------------<  115<H>  3.5   |  24  |  0.88    Ca    9.0      25 Apr 2019 04:51  Phos  3.6     04-25  Mg     1.8     04-25 04-25    147<H>  |  109<H>  |  7   ----------------------------<  115<H>  3.5   |  24  |  0.88    Ca    9.0      25 Apr 2019 04:51  Phos  3.6     04-25  Mg     1.8     04-25          Radiology    EKG:  tele:  TTE:  EEG:      MEDICATIONS  (STANDING):  ALBUTerol/ipratropium for Nebulization 3 milliLiter(s) Nebulizer every 6 hours  ALBUTerol/ipratropium for Nebulization. 3 milliLiter(s) Nebulizer once  amLODIPine   Tablet 10 milliGRAM(s) Oral daily  dextrose 5%. 1000 milliLiter(s) (50 mL/Hr) IV Continuous <Continuous>  dextrose 50% Injectable 12.5 Gram(s) IV Push once  dextrose 50% Injectable 25 Gram(s) IV Push once  dextrose 50% Injectable 25 Gram(s) IV Push once  enoxaparin Injectable 100 milliGRAM(s) SubCutaneous two times a day  fosphenytoin IVPB 100 milliGRAM(s) PE IV Intermittent every 8 hours  hydrALAZINE 100 milliGRAM(s) Oral every 8 hours  insulin glargine Injectable (LANTUS) 18 Unit(s) SubCutaneous at bedtime  insulin lispro (HumaLOG) corrective regimen sliding scale   SubCutaneous three times a day before meals  insulin lispro (HumaLOG) corrective regimen sliding scale   SubCutaneous at bedtime  labetalol 200 milliGRAM(s) Oral three times a day  levETIRAcetam  IVPB 1000 milliGRAM(s) IV Intermittent <User Schedule>  levothyroxine 50 MICROGram(s) Oral daily  perampanel 8 milliGRAM(s) Oral at bedtime    MEDICATIONS  (PRN):  acetaminophen    Suspension .. 975 milliGRAM(s) Oral every 6 hours PRN Temp greater or equal to 38.5C (101.3F), Mild Pain (1 - 3)  dextrose 40% Gel 15 Gram(s) Oral once PRN Blood Glucose LESS THAN 70 milliGRAM(s)/deciliter  glucagon  Injectable 1 milliGRAM(s) IntraMuscular once PRN Glucose LESS THAN 70 milligrams/deciliter  LORazepam   Injectable 2 milliGRAM(s) IV Push once PRN Seizure

## 2019-04-25 NOTE — CONSULT NOTE ADULT - PROBLEM SELECTOR RECOMMENDATION 9
Maintain sp02>90% on supplemental oxygen if needed  -pCO2 is normal on admission, no evidence of chronic co2 retention  -Outpt PSG needed  -Can trial on CPAP at night while inpt but will not be able to qualify her for home CPAP without PSG Maintain sp02>90% w supplemental oxygen if needed  -pCO2 is normal on admission, no evidence of chronic co2 retention  -Outpt PSG  -Can trial on CPAP at night while inpt but will not be able to qualify her for home CPAP without PSG

## 2019-04-25 NOTE — PROGRESS NOTE ADULT - ASSESSMENT
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

## 2019-04-25 NOTE — CONSULT NOTE ADULT - SUBJECTIVE AND OBJECTIVE BOX
seen full consult to follow seen full consult to follow    [facility]  [phone]  NEPHROLOGY      INITIAL CONSULT NOTE  --------------------------------------------------------------------------------  HPI:    69F with vascular risk factors, recent abdominal surgery, poorly controlled HTN, afib, now with NCSE/status epilepticus, right temporooccipital lesion on MRI (edema, wm involvement, not much contrast uptake on imaging), CSF neg so far.  DDx:  atypical tumor (does not appear c/w lymphoma or GBM), focal cerebritis from infectious or autoimmune/inflammatory cause with meningeal vessel enhancement, PRES/RLS a possibility due to labile HTN. also had ct abd which reveal heterogenous appearance and suspected pyelonephritis vs malignancy based on ct. renal consult called.   also undergoing work up for neuro findings as above     PAST HISTORY  --------------------------------------------------------------------------------  PAST MEDICAL & SURGICAL HISTORY:  Hypothyroid  Obesity  NACHO (obstructive sleep apnea): sleep study done 5 years ago  DM (Diabetes Mellitus) (ICD9 250.00)  Dyslipidemia (ICD9 272.4)  HTN (Hypertension) (ICD9 401.9)  CAD (Coronary Artery Disease) (ICD9 414.00): c STENTS  History of hysterectomy  History of ovarian cystectomy: x 2 in 2013  S/P primary angioplasty with coronary stent: has 5 stents-first placed in 2007, last stent -2011,  C Section: x 1    FAMILY HISTORY:  Family history of heart disease (Aunt)    PAST SOCIAL HISTORY:  pt unable     ALLERGIES & MEDICATIONS  --------------------------------------------------------------------------------  Allergies    No Known Allergies    Intolerances      Standing Inpatient Medications  ALBUTerol/ipratropium for Nebulization 3 milliLiter(s) Nebulizer every 6 hours  ALBUTerol/ipratropium for Nebulization. 3 milliLiter(s) Nebulizer once  amLODIPine   Tablet 10 milliGRAM(s) Oral daily  dextrose 5%. 1000 milliLiter(s) IV Continuous <Continuous>  dextrose 50% Injectable 12.5 Gram(s) IV Push once  dextrose 50% Injectable 25 Gram(s) IV Push once  dextrose 50% Injectable 25 Gram(s) IV Push once  docusate sodium 100 milliGRAM(s) Oral daily  enoxaparin Injectable 100 milliGRAM(s) SubCutaneous two times a day  fosphenytoin IVPB 100 milliGRAM(s) PE IV Intermittent every 8 hours  hydrALAZINE 100 milliGRAM(s) Oral every 8 hours  insulin glargine Injectable (LANTUS) 18 Unit(s) SubCutaneous at bedtime  insulin lispro (HumaLOG) corrective regimen sliding scale   SubCutaneous three times a day before meals  insulin lispro (HumaLOG) corrective regimen sliding scale   SubCutaneous at bedtime  labetalol 200 milliGRAM(s) Oral three times a day  levETIRAcetam  IVPB 1000 milliGRAM(s) IV Intermittent <User Schedule>  levothyroxine 50 MICROGram(s) Oral daily  LORazepam     Tablet 1 milliGRAM(s) Oral three times a day  perampanel 4 milliGRAM(s) Oral at bedtime  polyethylene glycol 3350 17 Gram(s) Oral two times a day  potassium chloride    Tablet ER 20 milliEquivalent(s) Oral once  senna 1 Tablet(s) Oral daily    PRN Inpatient Medications  acetaminophen    Suspension .. 975 milliGRAM(s) Oral every 6 hours PRN  bisacodyl Suppository 10 milliGRAM(s) Rectal daily PRN  dextrose 40% Gel 15 Gram(s) Oral once PRN  glucagon  Injectable 1 milliGRAM(s) IntraMuscular once PRN  LORazepam   Injectable 2 milliGRAM(s) IV Push once PRN      REVIEW OF SYSTEMS  --------------------------------------------------------------------------------    pt unable     VITALS/PHYSICAL EXAM  --------------------------------------------------------------------------------  T(C): 36.7 (04-25-19 @ 19:30), Max: 36.8 (04-25-19 @ 16:31)  HR: 71 (04-25-19 @ 19:30) (66 - 80)  BP: 159/92 (04-25-19 @ 19:30) (131/61 - 180/98)  RR: 20 (04-25-19 @ 19:30) (18 - 20)  SpO2: 100% (04-25-19 @ 19:30) (94% - 100%)  Wt(kg): --        04-24-19 @ 07:01  -  04-25-19 @ 07:00  --------------------------------------------------------  IN: 1170 mL / OUT: 0 mL / NET: 1170 mL      Physical Exam:  	Gen: Non toxic does not open eyes [ had received benzo earlier ]  	no jvd ,   	Pulm: decrease bs  no rales or ronchi or wheezing  	CV: RRR, S1S2; no rub  	Abd: +BS, soft, nontender/nondistended obese   	: No suprapubic tenderness  	UE: Warm, no cyanosis  no clubbing,  no edema;  	LE: Warm, no cyanosis  no clubbing, no edema  	Neuro: non verbal when seen   	Skin: Warm, no decrease skin turgor   	    LABS/STUDIES  --------------------------------------------------------------------------------              9.5    5.37  >-----------<  208      [04-25-19 @ 09:47]              31.6     141  |  105  |  7   ----------------------------<  127      [04-25-19 @ 20:12]  3.3   |  24  |  0.73        Ca     8.8     [04-25-19 @ 20:12]      Mg     1.8     [04-25-19 @ 04:51]      Phos  3.6     [04-25-19 @ 04:51]    TPro  x   /  Alb  2637  /  TBili  x   /  DBili  x   /  AST  x   /  ALT  x   /  AlkPhos  x   [04-24-19 @ 22:05]          Creatinine Trend:  SCr 0.73 [04-25 @ 20:12]  SCr 0.88 [04-25 @ 04:51]  SCr 0.84 [04-24 @ 19:03]  SCr 0.95 [04-24 @ 04:57]  SCr 0.87 [04-22 @ 22:00]    Urinalysis - [09-01-18 @ 03:55]      Color COLORLESS / Appearance CLEAR / SG 1.024 / pH 7.5      Gluc NEGATIVE / Ketone NEGATIVE  / Bili NEGATIVE / Urobili NORMAL       Blood NEGATIVE / Protein NEGATIVE / Leuk Est NEGATIVE / Nitrite NEGATIVE      RBC  / WBC  / Hyaline  / Gran  / Sq Epi  / Non Sq Epi  / Bacteria     Urine Osmolality 294      [04-21-19 @ 04:19]    HbA1c 9.9      [04-21-19 @ 09:26]  TSH 1.45      [04-22-19 @ 08:51]    HCV 0.16, Nonreact      [04-19-19 @ 15:27]    < from: CT Abdomen and Pelvis w/ IV Cont (04.25.19 @ 16:16) >  EXAM:  CT ABDOMEN AND PELVIS IC                          EXAM:  CT CHEST IC                            PROCEDURE DATE:  04/25/2019            INTERPRETATION:  CLINICAL INFORMATION: Status post recent umbilical   hernia repair with seizure and left temporal lobe lesion.      COMPARISON: Prior CT dated 4/18/2019.    PROCEDURE:   CT of the Chest, Abdomen and Pelvis was performed with intravenous   contrast.   Intravenous contrast: 90 ml Omnipaque 350. 10 ml discarded.  Oral contrast: None.  Sagittal and coronal reformats were performed.    FINDINGS:    CHEST:     LUNGS AND LARGE AIRWAYS: Patent central airways. Bilateral patchy   nonspecific groundglass opacities. Improved aeration in the left lower   lobe. Linear atelectasis in the right lower lobe.  PLEURA: Trace bilateral pleural effusions.  VESSELS: Left PICC line, with its tip at the cavoatrial junction.   Atherosclerotic calcification of the aorta and coronary arteries.  HEART: Heart size is mildly enlarged. No pericardial effusion.  MEDIASTINUM AND CANDICE: No lymphadenopathy.  CHEST WALL AND LOWER NECK: Within normal limits.    ABDOMEN AND PELVIS:    LIVER: Within normal limits.  BILE DUCTS: Normal caliber.  GALLBLADDER: Within normal limits.  SPLEEN: Within normal limits.  PANCREAS: Within normal limits.  ADRENALS: Within normal limits.  KIDNEYS/URETERS: No hydronephrosis. Heterogeneous enhancement of the   kidneys bilaterally, with patchy areas of decreased attenuation.    BLADDER: Within normal limits.  REPRODUCTIVE ORGANS:Status post hysterectomy.    BOWEL: No bowel obstruction. Appendix is within normal limits. Small   hiatal hernia.  PERITONEUM: No ascites.  VESSELS:  Atherosclerotic calcification of the aorta and bilateral renal   arteries. Again noted, is infiltration of the subcutaneous tissues in the   left groin, likely related to intervention, slightly decreased since   4/18/2019.  RETROPERITONEUM: No lymphadenopathy.    ABDOMINAL WALL: Again noted, is infiltration of the periumbilical soft   tissues.  BONES: Degenerative changes in the spine. Sclerotic focus in the right   iliac bone, unchanged compared to prior studies dating back to 9/1/2018.    IMPRESSION: Heterogeneous enhancement of the kidneys bilaterally, with   striated nephrograms bilaterally. Differential diagnosis includes   infection such as pyelonephritis, as well as ischemia and infiltrative   processes such is lymphoma and leukemia.  Correlation with urinalysis is   recommended.    Infiltration of the periumbilical subcutaneous tissues again noted.                      ARIES CAMARENA M.D., ATTENDING RADIOLOGIST  This document has been electronically signed. Apr 25 2019  4:39PM        < end of copied text >

## 2019-04-25 NOTE — PROGRESS NOTE ADULT - SUBJECTIVE AND OBJECTIVE BOX
Epilepsy Consult [facility]    Updates:  transferred to floor.  some agitation intermittently, removed EEG several times.  gabrielle feels she is more irritable. had some formed visual hallucinations of hus/man sitting in front of her.  COYNE yesterday, improved now.  LP done yest    MEDICATIONS  (STANDING):  ALBUTerol/ipratropium for Nebulization 3 milliLiter(s) Nebulizer every 6 hours  ALBUTerol/ipratropium for Nebulization. 3 milliLiter(s) Nebulizer once  amLODIPine   Tablet 10 milliGRAM(s) Oral daily  enoxaparin Injectable 100 milliGRAM(s) SubCutaneous two times a day  fosphenytoin IVPB 100 milliGRAM(s) PE IV Intermittent every 8 hours  hydrALAZINE 100 milliGRAM(s) Oral every 8 hours  insulin glargine Injectable (LANTUS) 18 Unit(s) SubCutaneous at bedtime  insulin lispro (HumaLOG) corrective regimen sliding scale   SubCutaneous three times a day before meals  insulin lispro (HumaLOG) corrective regimen sliding scale   SubCutaneous at bedtime  labetalol 200 milliGRAM(s) Oral three times a day  levETIRAcetam  IVPB 1000 milliGRAM(s) IV Intermittent <User Schedule>  levothyroxine 50 MICROGram(s) Oral daily  LORazepam     Tablet 1 milliGRAM(s) Oral three times a day  perampanel 4 milliGRAM(s) Oral at bedtime    MEDICATIONS  (PRN):  acetaminophen    Suspension .. 975 milliGRAM(s) Oral every 6 hours PRN Temp greater or equal to 38.5C (101.3F), Mild Pain (1 - 3)  dextrose 40% Gel 15 Gram(s) Oral once PRN Blood Glucose LESS THAN 70 milliGRAM(s)/deciliter  glucagon  Injectable 1 milliGRAM(s) IntraMuscular once PRN Glucose LESS THAN 70 milligrams/deciliter  LORazepam   Injectable 2 milliGRAM(s) IV Push once PRN Seizure    Vital Signs Last 24 Hrs:  T(C): 36.8 (04-25-19 @ 16:31), Max: 36.9 (04-24-19 @ 20:06)  T(F): 98.2 (04-25-19 @ 16:31), Max: 98.5 (04-24-19 @ 20:06)  HR: 78 (04-25-19 @ 16:31) (70 - 81)  BP: 180/98 (04-25-19 @ 16:31) (131/61 - 180/98)  RR: 18 (04-25-19 @ 16:31) (18 - 19)  SpO2: 98% (04-25-19 @ 16:31) (96% - 99%)    GENERAL PHYSICAL EXAM:  NAD, nondysmorphic  NCAT, OP clear  Neck supple  RRR  No rash  No joint deformity    NEUROLOGICAL EXAM:    Mental status: Awake, alert, and in no apparent distress. Oriented to person, place . Language function is normal. Recent memory, were normal.   neglect to Left with DSS.      Cranial Nerves: Visual field were full, but neglect to Left with DSS.  Pupils were equal, round, reactive to light. Extraocular movements were intact. Fundoscopic exam was deferred. Facial sensation was intact to light touch. There was no facial asymmetry. The palate was upgoing symmetrically and tongue was midline. Hearing acuity was intact to finger rub AU. Shoulder shrug was full bilaterally    Motor exam: Bulk and tone were normal. Strength was 5/5 in all four extremities. Fine finger movements were symmetric and normal. There was no pronator drift    Reflexes: 2+ in the biceps, 2+ in the knees     Sensation: Intact to light touch bilat    Coordination: Finger-nose-finger without dysmetria.     Gait: NT    MRI repeated 4/25:  SPECT neg, no contrast enhancement, meningeal vessels enhancing on left langmfm-wglpzacj-dkuuyxtu junction.  WM/cortical edema on the left.                                     9.5<L>  5.37 )-----------( 208     ( 04-25-19 @ 09:47 )             31.6<L>    147<H>  |  109<H>  |  7   ----------------------------<  115<H>    04-25-19 @ 04:51  3.5   |  24  |  0.88    Ca    9.0      04-25-19 @ 04:51  Phos  3.6     04-25-19 @ 04:51  Mg     1.8     04-25-19 @ 04:51    Phenytoin Level, Serum: 19.4 ug/mL (04.25.19 @ 08:23)  CSF neg cell #, PCR, prot/glu nl

## 2019-04-25 NOTE — CONSULT NOTE ADULT - ASSESSMENT
70 y/o F with PMH of CAD, DM, HTN, HLD, hypothyroid, on eliquis, ASA, s/p umbilical hernia repair ~2 weeks ago, presents as level 1 trauma activation after being found down at home. Found to have status epilepticus-now controlled. L temporal lesion identified on MRI brain of yet unclear etiology. Called to eval for ?NACHO. Sp02 dropping to 92% on RA at night. Denies dyspnea, cough, asthma history. Currently 98% on RA

## 2019-04-25 NOTE — PROGRESS NOTE ADULT - SUBJECTIVE AND OBJECTIVE BOX
CC: f/u for L temp lobe lesion, possibly HSE    Patient reports still more alert, cooperative, but confused at times    REVIEW OF SYSTEMS:  All other review of systems negative (Comprehensive ROS)    Antimicrobials Day # 7  acyclovir IVPB 750 milliGRAM(s) IV Intermittent every 8 hours    Other Medications Reviewed    Vital Signs Last 24 Hrs  T(F): 97.9 (25 Apr 2019 07:33), Max: 98.5 (24 Apr 2019 20:06)  HR: 70 (25 Apr 2019 07:33) (70 - 81)  BP: 131/61 (25 Apr 2019 07:33) (131/61 - 160/75)  BP(mean): --  RR: 19 (25 Apr 2019 07:33) (18 - 19)  SpO2: 97% (25 Apr 2019 07:33) (96% - 99%)    PHYSICAL EXAM:  General: alert, no acute distress  Eyes:  anicteric, no conjunctival injection, no discharge  Oropharynx: no lesions or injection 	  Neck: supple, without adenopathy  Lungs: clear to auscultation  Heart: regular rate and rhythm; no murmur, rubs or gallops  Abdomen: soft, nondistended, nontender, without mass or organomegaly  Skin: no lesions  Extremities: no clubbing, cyanosis, or edema  Neurologic: alert, moves all extremities    LAB RESULTS:                        9.6    6.9   )-----------( 135      ( 22 Apr 2019 22:00 )             30.4     04-24    144  |  107  |  8   ----------------------------<  79  3.2<L>   |  25  |  0.95    Ca    8.9      24 Apr 2019 04:57  Phos  3.4     04-22  Mg     1.8     04-22    Cerebrospinal Fluid Cell Count-1 (04.24.19 @ 13:10)    CSF Segmented Neutrophils: 5 %    CSF Monocytes/Macrophages: 4 %    CSF Lymphocytes: 91 %    CSF Appearance: Clear    CSF Color: No Color    Total Nucleated Cell Count, CSF: 2 /uL    MICROBIOLOGY:  RECENT CULTURES:  Culture - CSF with Gram Stain . (04.19.19 @ 16:57)    Gram Stain:   No polymorphonuclear cells seen  No organisms seen  by cytocentrifuge    Specimen Source: .CSF    Culture Results:   No growth    Herpes Simplex Virus 1/2 PCR, CSF (04.24.19 @ 22:05)    Source HSV 1/2: CSF    HSV 1/2 PCR: [facility]    Herpes Simplex Virus 1/2 PCR, CSF (04.19.19 @ 22:40)    Source HSV 1/2: CSF    HSV 1/2 PCR: [facility]:    RADIOLOGY REVIEWED:  MR Head No Cont (04.19.19 @ 05:44) >  Left posterior lateral temporal and parietal occipital hyperintense T2   and FLAIR signal with faint hyperintense DWI signal and ADC T2 shine   through, with  slight  restricted diffusion, series 300 image 18. There   is a 4 x 6 mm  enhancement  in the left posterior lateral lateral   temporal lobe with questionable leptomeningeal enhancement and slight   susceptibility. Differential  considerations include atypical cavernoma   or  posterior reversible encephalopathy, mass such as low-grade neoplasm,   infectious, and or inflammatory change are not excluded.

## 2019-04-25 NOTE — PROGRESS NOTE ADULT - ASSESSMENT
A/P:  69F with vascular risk factors, recent abdominal surgery, poorly controlled HTN, afib, now with NCSE/status epilepticus, right temporooccipital lesion on MRI (edema, wm involvement, not much contrast uptake on imaging), CSF neg so far.  DDx:  atypical tumor (does not appear c/w lymphoma or GBM), focal cerebritis from infectious or autoimmune/inflammatory cause with meningeal vessel enhancement, PRES/RLS a possibility due to labile HTN.    NCSE with szs 2-3x/hr yesterday prior to pulling leads again.  Improving vs 10+/hr on prior days.    Rec:  -appreciated ID eval. D/C acyclovir. repeat CSF eval for PCR HSV neg  - autoimmune CSF and serum panels from [place], cytology.  would recommend trial steroids (considered IVIGx5, but concern for pre-existant DVT) doses if ID feels this is unlikely to be infectious, as pat has a lot of edema, PLEDs and seizures ongoing.  -persistent lesion on MRI rec nsurg eval for possible biopsy, Dr Ruiz contacted, tentative date mon or wed next week.    Target PHT level 15-20, cont maintenance 300mg/d.  daily phenytoin levels please  reduce fycompa to 4mg due to increased agitation/delirium yesterday  cont LEV 1000mg tid as tolerated.  benzo trial ativan 1mg tid, hold for excessive sedation  Attempt to reconnect VEEG    better BP control, Na+ monitoring, FSG glycemic control  DVT, afib A/C currently on lovenox, will then hold for possible brain biopsy, but may need to resume A/C when cleared per nsurg  consider requesting heme guidance on this.    Had conf today with residents/faculty, later then with family to discuss care.

## 2019-04-25 NOTE — CHART NOTE - NSCHARTNOTEFT_GEN_A_CORE
Nutrition Follow Up Note  Patient seen for: consult for RD/follow up    Pt admitted after being found unresponsive and s/p seizure likely secondary to left temporal lesion (neoplastic vs herpes  infection). Pt planned for biopsy of posterior temporal lesion on 4/29/19.      Source: pt, daughter, grand daughter, chart    Diet : consistent carbohydrate, soft    Patient reports: pt confused and disoriented to time and situation. she was speaking Creole to her family at time of visit. she does not like hospital food, she likes her own cooking. pt does speak English also. daughter agrees to health shakes with meals in case pt does not like meals she receives. encouraged family to called down to diet office for an alternate lunch since pt was not happy with the one that she received today     PO intake : >75% of meals      Source for PO intake: daughter        Daily   % Weight Change: no current weight    Pertinent Medications: MEDICATIONS  (STANDING):  ALBUTerol/ipratropium for Nebulization 3 milliLiter(s) Nebulizer every 6 hours  ALBUTerol/ipratropium for Nebulization. 3 milliLiter(s) Nebulizer once  amLODIPine   Tablet 10 milliGRAM(s) Oral daily  dextrose 5%. 1000 milliLiter(s) (50 mL/Hr) IV Continuous <Continuous>  dextrose 50% Injectable 12.5 Gram(s) IV Push once  dextrose 50% Injectable 25 Gram(s) IV Push once  dextrose 50% Injectable 25 Gram(s) IV Push once  enoxaparin Injectable 100 milliGRAM(s) SubCutaneous two times a day  fosphenytoin IVPB 100 milliGRAM(s) PE IV Intermittent every 8 hours  hydrALAZINE 100 milliGRAM(s) Oral every 8 hours  insulin glargine Injectable (LANTUS) 18 Unit(s) SubCutaneous at bedtime  insulin lispro (HumaLOG) corrective regimen sliding scale   SubCutaneous three times a day before meals  insulin lispro (HumaLOG) corrective regimen sliding scale   SubCutaneous at bedtime  labetalol 200 milliGRAM(s) Oral three times a day  levETIRAcetam  IVPB 1000 milliGRAM(s) IV Intermittent <User Schedule>  levothyroxine 50 MICROGram(s) Oral daily  perampanel 8 milliGRAM(s) Oral at bedtime    MEDICATIONS  (PRN):  acetaminophen    Suspension .. 975 milliGRAM(s) Oral every 6 hours PRN Temp greater or equal to 38.5C (101.3F), Mild Pain (1 - 3)  dextrose 40% Gel 15 Gram(s) Oral once PRN Blood Glucose LESS THAN 70 milliGRAM(s)/deciliter  glucagon  Injectable 1 milliGRAM(s) IntraMuscular once PRN Glucose LESS THAN 70 milligrams/deciliter  LORazepam   Injectable 2 milliGRAM(s) IV Push once PRN Seizure    Pertinent Labs: 04-25 @ 04:51: Na 147<H>, BUN 7, Cr 0.88, <H>, K+ 3.5, Phos 3.6      Finger Sticks:  POCT Blood Glucose.: 211 mg/dL (04-25 @ 12:49)  POCT Blood Glucose.: 118 mg/dL (04-25 @ 08:44)  POCT Blood Glucose.: 216 mg/dL (04-24 @ 21:27)  POCT Blood Glucose.: 150 mg/dL (04-24 @ 16:10)      Skin per nursing documentation: no pressure injuries  Edema: no edema    Estimated Needs:   [ x] no change since previous assessment  [ ] recalculated:     Previous Nutrition Diagnosis: Swallowing difficulty  Nutrition Diagnosis is: resolved    New Nutrition Diagnosis: Obesity class 2  Related to: excessive energy intake   As evidenced by: admit BMI 39.3    Interventions: provided health shake at meals in case she does not like what she receives  provided written diabetes diet education to daughter for pt in the future (pt is confused at this time)    Recommend  1)continue consistent carbohydrate, soft diet  2)monitor tolerance of health shakes  3)monitor for appropriate time to follow up with verbal diabetes diet ed with pt      Monitoring and Evaluation:     Continue to monitor Nutritional intake, Tolerance to diet prescription, weights, labs, skin integrity    RD remains available upon request and will follow up per protocol  Umu Mccarty MA, [facility], CDN #[phone] Nutrition Follow Up Note  Patient seen for: consult for RD/follow up    Pt admitted after being found unresponsive and s/p seizure likely secondary to left temporal lesion (neoplastic vs herpes  infection). Pt planned for biopsy of posterior temporal lesion on 4/29/19.      Source: pt, daughter, grand daughter, chart    Diet : consistent carbohydrate, soft    Patient reports: pt confused and disoriented to time and situation. she was speaking Creole to her family at time of visit. she does not like hospital food, she likes her own cooking. pt does speak English also. daughter agrees to health shakes with meals in case pt does not like meals she receives. encouraged family to called down to diet office for an alternate lunch since pt was not happy with the one that she received today     PO intake : >75% of meals      Source for PO intake: daughter        Daily   % Weight Change: no current weight    Pertinent Medications: MEDICATIONS  (STANDING):  ALBUTerol/ipratropium for Nebulization 3 milliLiter(s) Nebulizer every 6 hours  ALBUTerol/ipratropium for Nebulization. 3 milliLiter(s) Nebulizer once  amLODIPine   Tablet 10 milliGRAM(s) Oral daily  dextrose 5%. 1000 milliLiter(s) (50 mL/Hr) IV Continuous <Continuous>  dextrose 50% Injectable 12.5 Gram(s) IV Push once  dextrose 50% Injectable 25 Gram(s) IV Push once  dextrose 50% Injectable 25 Gram(s) IV Push once  enoxaparin Injectable 100 milliGRAM(s) SubCutaneous two times a day  fosphenytoin IVPB 100 milliGRAM(s) PE IV Intermittent every 8 hours  hydrALAZINE 100 milliGRAM(s) Oral every 8 hours  insulin glargine Injectable (LANTUS) 18 Unit(s) SubCutaneous at bedtime  insulin lispro (HumaLOG) corrective regimen sliding scale   SubCutaneous three times a day before meals  insulin lispro (HumaLOG) corrective regimen sliding scale   SubCutaneous at bedtime  labetalol 200 milliGRAM(s) Oral three times a day  levETIRAcetam  IVPB 1000 milliGRAM(s) IV Intermittent <User Schedule>  levothyroxine 50 MICROGram(s) Oral daily  perampanel 8 milliGRAM(s) Oral at bedtime    MEDICATIONS  (PRN):  acetaminophen    Suspension .. 975 milliGRAM(s) Oral every 6 hours PRN Temp greater or equal to 38.5C (101.3F), Mild Pain (1 - 3)  dextrose 40% Gel 15 Gram(s) Oral once PRN Blood Glucose LESS THAN 70 milliGRAM(s)/deciliter  glucagon  Injectable 1 milliGRAM(s) IntraMuscular once PRN Glucose LESS THAN 70 milligrams/deciliter  LORazepam   Injectable 2 milliGRAM(s) IV Push once PRN Seizure    Pertinent Labs: 04-25 @ 04:51: Na 147<H>, BUN 7, Cr 0.88, <H>, K+ 3.5, Phos 3.6      Finger Sticks:  POCT Blood Glucose.: 211 mg/dL (04-25 @ 12:49)  POCT Blood Glucose.: 118 mg/dL (04-25 @ 08:44)  POCT Blood Glucose.: 216 mg/dL (04-24 @ 21:27)  POCT Blood Glucose.: 150 mg/dL (04-24 @ 16:10)      Skin per nursing documentation: no pressure injuries  Edema: no edema    Estimated Needs:   [ x] no change since previous assessment  [ ] recalculated:     Previous Nutrition Diagnosis: Swallowing difficulty  Nutrition Diagnosis is: resolved    New Nutrition Diagnosis: Obesity class 2  Related to: excessive energy intake   As evidenced by: admit BMI 39.3    Interventions: provided health shake at meals in case she does not like what she receives  provided written diabetes diet education to daughter for pt in the future (pt is confused at this time)    Recommend  1)consistent carbohydrate, soft, dash  2)monitor tolerance of health shakes  3)monitor for appropriate time to follow up with verbal diabetes diet ed with pt  4)multivitamin with minerals      Monitoring and Evaluation:     Continue to monitor Nutritional intake, Tolerance to diet prescription, weights, labs, skin integrity    RD remains available upon request and will follow up per protocol  Umu Mccarty MA, RD, CDN #[phone]

## 2019-04-25 NOTE — CONSULT NOTE ADULT - PROBLEM SELECTOR PROBLEM 1
R/O NACHO (obstructive sleep apnea)
Type 2 diabetes mellitus with hyperglycemia, unspecified whether long term insulin use

## 2019-04-25 NOTE — PROGRESS NOTE ADULT - ASSESSMENT
68 y/o F w/ past medical history of CAD, DM, HTN, HLD, hypothyroid, on eliquis, ASA, s/p umbilical hernia repair ~2 weeks ago presented as level 1 trauma. Patient was found down by family 4/18/2019 and was found to be altered and had witnessed seizure in ED and subsequently intubated. Neurology consulted for seizure and MRI brain findings of left temporal abnormality.    Impression: Altered mental status due to seizures likely 2/2 to left temporal lesion, differential diagnosis includes neoplastic vs herpes infection vs autoimmune encephalitis. Was in nonconvulsive status epilepticus, currently improving on fycompa, phenytoin, and keppra. MRI brain shows FLAIR lesion in the left temporo-occipital region. MR spect does not suggest neoplastic lesion. CSF so far suggests against herpes encephalitis or infectious etiology. Will need further workup for left temporal lesion.    Plan:  -  [x] LP done by neuro 4/19 - cells 2 (lymph 87), glucose 103, protein 43, CSF PCR/herpes negative (can be negative early 72hrs)  [x] c/w video EEG monitoring  [x] c/w Acyclovir 1g TID  [x] c/w Keppra 1g TID;  Fycompa 8 mg QHS, patient on Fosphenytoin 100 TID       [x ] ID recommended repeat LP : CSF glucose, cell count, protein, csf fungal culture, AFB culture, cytology pcr panel, serum autoimmune encephalitis panel, spinal fluid autoimmune encephalitis panel, Autoimmune epilepsy panel     [ ] brain biopsy scheduled with  on Monday for post temporal lesion     [ ] Discussed with patient's cardiology, patient is on Eliquis for new onset of A.fibb. Patient will be switched to full dose Lovonex in-patient before biopsy. 70 y/o F w/ past medical history of CAD, DM, HTN, HLD, hypothyroid, on eliquis, ASA, s/p umbilical hernia repair ~2 weeks ago presented as level 1 trauma. Patient was found down by family 4/18/2019 and was found to be altered and had witnessed seizure in ED and subsequently intubated. Neurology consulted for seizure and MRI brain findings of left temporal abnormality.    Impression: Altered mental status due to seizures likely 2/2 to left temporal lesion, differential diagnosis includes neoplastic vs herpes infection vs autoimmune encephalitis. Was in nonconvulsive status epilepticus, currently improving on fycompa, phenytoin, and keppra. MRI brain shows FLAIR lesion in the left temporo-occipital region. MR spect does not suggest neoplastic lesion. CSF so far also suggests against herpes encephalitis or infectious etiology (LP on 4/19 shows TNC 2, protein 43, CSF PCR and HSV PCR neg, and repeat LP on 4/24 shows TNC 2, protein 30, CSF PCR and HSV PCR neg). Will need further workup for left temporal lesion.    Plan:  -will discontinue acyclovir given 2 LPs with negative CSF PCR and HSV PCR   -c/w video EEG monitoring  -epilepsy team recommendations noted - will c/w Keppra 1g TID;  Fycompa 8 mg QHS, and phenytoin 100 TID. Will check daily phenytoin levels along with hepatic panel. goal PHE level of 15-20.  -will f/u LP results from 4/24 per ID recs for: csf fungal culture, AFB culture, cytology, spinal fluid autoimmune encephalitis panel, Autoimmune epilepsy panel   -will f/u serum autoimmune encephalitis panel  -MRI brain w/ and w/o contrast  -will proceed with brain biopsy scheduled with Dr. Ruiz on 4/29 for left posterior temporal lesion. NPO past midnight on 4/28.   -endocrine evaluation noted - patient started on lantus and moderate sliding scale. will f/u for discharge recommendations  -case was discussed with patient's cardiology, and patient is on Eliquis for atrial fibrillation. will place on lovenox 1 mg/kg BID as therapeutic dose for now. will f/u with NSGY regarding timing of discontinuing lovenox prior to surgery

## 2019-04-25 NOTE — CONSULT NOTE ADULT - ASSESSMENT
69F with vascular risk factors, recent abdominal surgery, poorly controlled HTN, afib, now with NCSE/status epilepticus, right temporooccipital lesion on MRI (edema, wm involvement, not much contrast uptake on imaging), CSF neg so far. Ct abd which reveal heterogenous appearance and suspected pyelonephritis vs malignancy based on ct. r    1- suspected pyelonephritis on ct abd to have urinalysis and urine culture  although doubt clinically she has pyelo.   2-  given anemia to have spep/ipep   3- htn- to cont hydralazine 100 mg tid   4- hypokalemia kcl 20 meq

## 2019-04-25 NOTE — CONSULT NOTE ADULT - SUBJECTIVE AND OBJECTIVE BOX
PULMONARY CONSULT    HPI: 68 y/o F with PMH of CAD, DM, HTN, HLD, hypothyroid, on eliquis, ASA, s/p umbilical hernia repair ~2 weeks ago, presents as level 1 trauma activation after being found down at home. Found to have status epilepticus-now controlled. L temporal lesion identified on MRI brain of yet unclear etiology. Called to eval for ?NACHO. Sp02 dropping to 92% on RA at night. Denies dyspnea, cough, asthma history. Currently 98% on RA        PAST MEDICAL & SURGICAL HISTORY:  Hypothyroid  Obesity  NACHO (obstructive sleep apnea): sleep study done 5 years ago  DM (Diabetes Mellitus) (ICD9 250.00)  Dyslipidemia (ICD9 272.4)  HTN (Hypertension) (ICD9 401.9)  CAD (Coronary Artery Disease) (ICD9 414.00): c STENTS  History of hysterectomy  History of ovarian cystectomy: x 2 in 2013  S/P primary angioplasty with coronary stent: has 5 stents-first placed in 2007, last stent -2011,  C Section: x 1    Allergies    No Known Allergies    Intolerances      FAMILY HISTORY:  Family history of heart disease (Aunt)    Social history: Never smoker     Review of Systems:  CONSTITUTIONAL: No fever, chills, or fatigue  EYES: No eye pain, visual disturbances, or discharge  ENMT:  No difficulty hearing, tinnitus, vertigo; No sinus or throat pain  NECK: No pain or stiffness  RESPIRATORY: Per above  CARDIOVASCULAR: No chest pain, palpitations, dizziness, or leg swelling  GASTROINTESTINAL: No abdominal or epigastric pain. No nausea, vomiting, or hematemesis; No diarrhea or constipation. No melena or hematochezia.  GENITOURINARY: No dysuria, frequency, hematuria, or incontinence  NEUROLOGICAL: No headaches, memory loss, loss of strength, numbness, or tremors  SKIN: No itching, burning, rashes, or lesions   MUSCULOSKELETAL: No joint pain or swelling; No muscle, back, or extremity pain  PSYCHIATRIC: No depression, anxiety, mood swings, or difficulty sleeping      Medications:  MEDICATIONS  (STANDING):  ALBUTerol/ipratropium for Nebulization 3 milliLiter(s) Nebulizer every 6 hours  ALBUTerol/ipratropium for Nebulization. 3 milliLiter(s) Nebulizer once  amLODIPine   Tablet 10 milliGRAM(s) Oral daily  dextrose 5%. 1000 milliLiter(s) (50 mL/Hr) IV Continuous <Continuous>  dextrose 50% Injectable 12.5 Gram(s) IV Push once  dextrose 50% Injectable 25 Gram(s) IV Push once  dextrose 50% Injectable 25 Gram(s) IV Push once  enoxaparin Injectable 100 milliGRAM(s) SubCutaneous two times a day  fosphenytoin IVPB 100 milliGRAM(s) PE IV Intermittent every 8 hours  hydrALAZINE 100 milliGRAM(s) Oral every 8 hours  insulin glargine Injectable (LANTUS) 18 Unit(s) SubCutaneous at bedtime  insulin lispro (HumaLOG) corrective regimen sliding scale   SubCutaneous three times a day before meals  insulin lispro (HumaLOG) corrective regimen sliding scale   SubCutaneous at bedtime  labetalol 200 milliGRAM(s) Oral three times a day  levETIRAcetam  IVPB 1000 milliGRAM(s) IV Intermittent <User Schedule>  levothyroxine 50 MICROGram(s) Oral daily  LORazepam     Tablet 1 milliGRAM(s) Oral three times a day  perampanel 4 milliGRAM(s) Oral at bedtime    MEDICATIONS  (PRN):  acetaminophen    Suspension .. 975 milliGRAM(s) Oral every 6 hours PRN Temp greater or equal to 38.5C (101.3F), Mild Pain (1 - 3)  dextrose 40% Gel 15 Gram(s) Oral once PRN Blood Glucose LESS THAN 70 milliGRAM(s)/deciliter  glucagon  Injectable 1 milliGRAM(s) IntraMuscular once PRN Glucose LESS THAN 70 milligrams/deciliter  LORazepam   Injectable 2 milliGRAM(s) IV Push once PRN Seizure            Vital Signs Last 24 Hrs  T(C): 36.6 (25 Apr 2019 07:33), Max: 36.9 (24 Apr 2019 20:06)  T(F): 97.9 (25 Apr 2019 07:33), Max: 98.5 (24 Apr 2019 20:06)  HR: 70 (25 Apr 2019 07:33) (70 - 81)  BP: 131/61 (25 Apr 2019 07:33) (126/68 - 160/75)  BP(mean): --  RR: 19 (25 Apr 2019 07:33) (18 - 19)  SpO2: 97% (25 Apr 2019 07:33) (96% - 99%) on RA 04-24 @ 07:01  -  04-25 @ 07:00  --------------------------------------------------------  IN: 1170 mL / OUT: 0 mL / NET: 1170 mL          LABS:                        9.5    5.37  )-----------( 208      ( 25 Apr 2019 09:47 )             31.6     04-25    147<H>  |  109<H>  |  7   ----------------------------<  115<H>  3.5   |  24  |  0.88    Ca    9.0      25 Apr 2019 04:51  Phos  3.6     04-25  Mg     1.8     04-25    TPro  x   /  Alb  2637<L>  /  TBili  x   /  DBili  x   /  AST  x   /  ALT  x   /  AlkPhos  x   04-24          CAPILLARY BLOOD GLUCOSE      POCT Blood Glucose.: 211 mg/dL (25 Apr 2019 12:49)                      CULTURES: (if applicable)  Culture Results:   Testing in progress (04-24 @ 17:17)  Culture Results:   No growth (04-19 @ 16:57)        Physical Examination:    General: No acute distress.      HEENT: Pupils equal, reactive to light.  Symmetric.    PULM: Clear to auscultation bilaterally, no significant sputum production    CVS: S1, S2    ABD: Soft, nondistended, nontender, normoactive bowel sounds, no masses    EXT: No edema, nontender    SKIN: Warm and well perfused, no rashes noted.    NEURO: Alert, oriented, interactive, nonfocal    RADIOLOGY REVIEWED  CXR: 4/23: Clear lungs PULMONARY CONSULT    HPI: 70 y/o F with PMH of CAD, DM, HTN, HLD, hypothyroid, on eliquis, ASA, s/p umbilical hernia repair ~2 weeks ago, presents as level 1 trauma activation after being found down at home. Found to have status epilepticus-now controlled. L temporal lesion identified on MRI brain of yet unclear etiology. Called to eval for ?NACHO. Sp02 dropping to 92% on RA at night. Denies dyspnea, cough, asthma history. Currently 98% on RA        PAST MEDICAL & SURGICAL HISTORY:  Hypothyroid  Obesity  NACHO (obstructive sleep apnea): sleep study done 5 years ago  DM (Diabetes Mellitus) (ICD9 250.00)  Dyslipidemia (ICD9 272.4)  HTN (Hypertension) (ICD9 401.9)  CAD (Coronary Artery Disease) (ICD9 414.00): c STENTS  History of hysterectomy  History of ovarian cystectomy: x 2 in 2013  S/P primary angioplasty with coronary stent: has 5 stents-first placed in 2007, last stent -2011,  C Section: x 1    Allergies    No Known Allergies    Intolerances      FAMILY HISTORY:  Family history of heart disease (Aunt)    Social history: Never smoker     Review of Systems:  CONSTITUTIONAL: No fever, chills, or fatigue  EYES: No eye pain, visual disturbances, or discharge  ENMT:  No difficulty hearing, tinnitus, vertigo; No sinus or throat pain  NECK: No pain or stiffness  RESPIRATORY: Per above  CARDIOVASCULAR: No chest pain, palpitations, dizziness, or leg swelling  GASTROINTESTINAL: No abdominal or epigastric pain. No nausea, vomiting, or hematemesis; No diarrhea or constipation. No melena or hematochezia.  GENITOURINARY: No dysuria, frequency, hematuria, or incontinence  NEUROLOGICAL: No headaches, memory loss, loss of strength, numbness, or tremors  SKIN: No itching, burning, rashes, or lesions   MUSCULOSKELETAL: No joint pain or swelling; No muscle, back, or extremity pain  PSYCHIATRIC: No depression, anxiety, mood swings, or difficulty sleeping      Medications:  MEDICATIONS  (STANDING):  ALBUTerol/ipratropium for Nebulization 3 milliLiter(s) Nebulizer every 6 hours  ALBUTerol/ipratropium for Nebulization. 3 milliLiter(s) Nebulizer once  amLODIPine   Tablet 10 milliGRAM(s) Oral daily  dextrose 5%. 1000 milliLiter(s) (50 mL/Hr) IV Continuous <Continuous>  dextrose 50% Injectable 12.5 Gram(s) IV Push once  dextrose 50% Injectable 25 Gram(s) IV Push once  dextrose 50% Injectable 25 Gram(s) IV Push once  enoxaparin Injectable 100 milliGRAM(s) SubCutaneous two times a day  fosphenytoin IVPB 100 milliGRAM(s) PE IV Intermittent every 8 hours  hydrALAZINE 100 milliGRAM(s) Oral every 8 hours  insulin glargine Injectable (LANTUS) 18 Unit(s) SubCutaneous at bedtime  insulin lispro (HumaLOG) corrective regimen sliding scale   SubCutaneous three times a day before meals  insulin lispro (HumaLOG) corrective regimen sliding scale   SubCutaneous at bedtime  labetalol 200 milliGRAM(s) Oral three times a day  levETIRAcetam  IVPB 1000 milliGRAM(s) IV Intermittent <User Schedule>  levothyroxine 50 MICROGram(s) Oral daily  LORazepam     Tablet 1 milliGRAM(s) Oral three times a day  perampanel 4 milliGRAM(s) Oral at bedtime    MEDICATIONS  (PRN):  acetaminophen    Suspension .. 975 milliGRAM(s) Oral every 6 hours PRN Temp greater or equal to 38.5C (101.3F), Mild Pain (1 - 3)  dextrose 40% Gel 15 Gram(s) Oral once PRN Blood Glucose LESS THAN 70 milliGRAM(s)/deciliter  glucagon  Injectable 1 milliGRAM(s) IntraMuscular once PRN Glucose LESS THAN 70 milligrams/deciliter  LORazepam   Injectable 2 milliGRAM(s) IV Push once PRN Seizure            Vital Signs Last 24 Hrs  T(C): 36.6 (25 Apr 2019 07:33), Max: 36.9 (24 Apr 2019 20:06)  T(F): 97.9 (25 Apr 2019 07:33), Max: 98.5 (24 Apr 2019 20:06)  HR: 70 (25 Apr 2019 07:33) (70 - 81)  BP: 131/61 (25 Apr 2019 07:33) (126/68 - 160/75)  BP(mean): --  RR: 19 (25 Apr 2019 07:33) (18 - 19)  SpO2: 97% (25 Apr 2019 07:33) (96% - 99%) on RA 04-24 @ 07:01  -  04-25 @ 07:00  --------------------------------------------------------  IN: 1170 mL / OUT: 0 mL / NET: 1170 mL          LABS:                        9.5    5.37  )-----------( 208      ( 25 Apr 2019 09:47 )             31.6     04-25    147<H>  |  109<H>  |  7   ----------------------------<  115<H>  3.5   |  24  |  0.88    Ca    9.0      25 Apr 2019 04:51  Phos  3.6     04-25  Mg     1.8     04-25    TPro  x   /  Alb  2637<L>  /  TBili  x   /  DBili  x   /  AST  x   /  ALT  x   /  AlkPhos  x   04-24          CAPILLARY BLOOD GLUCOSE      POCT Blood Glucose.: 211 mg/dL (25 Apr 2019 12:49)                      CULTURES: (if applicable)  Culture Results:   Testing in progress (04-24 @ 17:17)  Culture Results:   No growth (04-19 @ 16:57)        Physical Examination:    General: No acute distress.        PULM: Clear to auscultation bilaterally, no significant sputum production    CVS: rrr    ABD: Soft, nondistended, nontender, normoactive bowel sounds, no masses    EXT: No edema, nontender    SKIN: Warm and well perfused, no rashes noted.    NEURO: Alert, oriented, interactive, nonfocal    RADIOLOGY REVIEWED  CXR: 4/23: Clear lungs

## 2019-04-26 LAB
AFP-TM SERPL-MCNC: 31.7 NG/ML — HIGH
ANION GAP SERPL CALC-SCNC: 11 MMOL/L — SIGNIFICANT CHANGE UP (ref 5–17)
ANION GAP SERPL CALC-SCNC: 11 MMOL/L — SIGNIFICANT CHANGE UP (ref 5–17)
B2 MICROGLOB SERPL-MCNC: 2.3 MG/L — HIGH (ref 0.8–2.2)
BASOPHILS # BLD AUTO: 0.04 K/UL — SIGNIFICANT CHANGE UP (ref 0–0.2)
BASOPHILS NFR BLD AUTO: 0.9 % — SIGNIFICANT CHANGE UP (ref 0–2)
BCA 255 TISS QL IMSTN: 5.6 U/ML — SIGNIFICANT CHANGE UP
BUN SERPL-MCNC: 10 MG/DL — SIGNIFICANT CHANGE UP (ref 7–23)
BUN SERPL-MCNC: 7 MG/DL — SIGNIFICANT CHANGE UP (ref 7–23)
CALCIUM SERPL-MCNC: 8.7 MG/DL — SIGNIFICANT CHANGE UP (ref 8.4–10.5)
CALCIUM SERPL-MCNC: 9.2 MG/DL — SIGNIFICANT CHANGE UP (ref 8.4–10.5)
CANCER AG125 SERPL-ACNC: 18 U/ML — SIGNIFICANT CHANGE UP
CANCER AG19-9 SERPL-ACNC: 42 U/ML — HIGH
CHLORIDE SERPL-SCNC: 106 MMOL/L — SIGNIFICANT CHANGE UP (ref 96–108)
CHLORIDE SERPL-SCNC: 108 MMOL/L — SIGNIFICANT CHANGE UP (ref 96–108)
CO2 SERPL-SCNC: 23 MMOL/L — SIGNIFICANT CHANGE UP (ref 22–31)
CO2 SERPL-SCNC: 25 MMOL/L — SIGNIFICANT CHANGE UP (ref 22–31)
CREAT SERPL-MCNC: 0.84 MG/DL — SIGNIFICANT CHANGE UP (ref 0.5–1.3)
CREAT SERPL-MCNC: 1.03 MG/DL — SIGNIFICANT CHANGE UP (ref 0.5–1.3)
EOSINOPHIL # BLD AUTO: 0.16 K/UL — SIGNIFICANT CHANGE UP (ref 0–0.5)
EOSINOPHIL NFR BLD AUTO: 3.4 % — SIGNIFICANT CHANGE UP (ref 0–6)
GLUCOSE SERPL-MCNC: 107 MG/DL — HIGH (ref 70–99)
GLUCOSE SERPL-MCNC: 217 MG/DL — HIGH (ref 70–99)
HCT VFR BLD CALC: 30.9 % — LOW (ref 34.5–45)
HGB BLD-MCNC: 9.5 G/DL — LOW (ref 11.5–15.5)
IMM GRANULOCYTES NFR BLD AUTO: 0.9 % — SIGNIFICANT CHANGE UP (ref 0–1.5)
LYMPHOCYTES # BLD AUTO: 1.76 K/UL — SIGNIFICANT CHANGE UP (ref 1–3.3)
LYMPHOCYTES # BLD AUTO: 37.4 % — SIGNIFICANT CHANGE UP (ref 13–44)
MAGNESIUM SERPL-MCNC: 1.9 MG/DL — SIGNIFICANT CHANGE UP (ref 1.6–2.6)
MCHC RBC-ENTMCNC: 23.9 PG — LOW (ref 27–34)
MCHC RBC-ENTMCNC: 30.7 GM/DL — LOW (ref 32–36)
MCV RBC AUTO: 77.6 FL — LOW (ref 80–100)
MONOCYTES # BLD AUTO: 0.37 K/UL — SIGNIFICANT CHANGE UP (ref 0–0.9)
MONOCYTES NFR BLD AUTO: 7.9 % — SIGNIFICANT CHANGE UP (ref 2–14)
NEUTROPHILS # BLD AUTO: 2.33 K/UL — SIGNIFICANT CHANGE UP (ref 1.8–7.4)
NEUTROPHILS NFR BLD AUTO: 49.5 % — SIGNIFICANT CHANGE UP (ref 43–77)
OLIGOCLONAL BANDS CSF ELPH-IMP: SIGNIFICANT CHANGE UP
PHENYTOIN FREE SERPL-MCNC: 13.4 UG/ML — SIGNIFICANT CHANGE UP (ref 10–20)
PHOSPHATE SERPL-MCNC: 4.1 MG/DL — SIGNIFICANT CHANGE UP (ref 2.5–4.5)
PLATELET # BLD AUTO: 242 K/UL — SIGNIFICANT CHANGE UP (ref 150–400)
POTASSIUM SERPL-MCNC: 3.5 MMOL/L — SIGNIFICANT CHANGE UP (ref 3.5–5.3)
POTASSIUM SERPL-MCNC: 3.7 MMOL/L — SIGNIFICANT CHANGE UP (ref 3.5–5.3)
POTASSIUM SERPL-SCNC: 3.5 MMOL/L — SIGNIFICANT CHANGE UP (ref 3.5–5.3)
POTASSIUM SERPL-SCNC: 3.7 MMOL/L — SIGNIFICANT CHANGE UP (ref 3.5–5.3)
RBC # BLD: 3.98 M/UL — SIGNIFICANT CHANGE UP (ref 3.8–5.2)
RBC # FLD: 15.1 % — HIGH (ref 10.3–14.5)
SODIUM SERPL-SCNC: 140 MMOL/L — SIGNIFICANT CHANGE UP (ref 135–145)
SODIUM SERPL-SCNC: 144 MMOL/L — SIGNIFICANT CHANGE UP (ref 135–145)
WBC # BLD: 4.7 K/UL — SIGNIFICANT CHANGE UP (ref 3.8–10.5)
WBC # FLD AUTO: 4.7 K/UL — SIGNIFICANT CHANGE UP (ref 3.8–10.5)

## 2019-04-26 PROCEDURE — XXXXX: CPT

## 2019-04-26 PROCEDURE — ZZZZZ: CPT

## 2019-04-26 PROCEDURE — 99222 1ST HOSP IP/OBS MODERATE 55: CPT | Mod: GC

## 2019-04-26 PROCEDURE — 93010 ELECTROCARDIOGRAM REPORT: CPT

## 2019-04-26 PROCEDURE — 99233 SBSQ HOSP IP/OBS HIGH 50: CPT

## 2019-04-26 PROCEDURE — 99232 SBSQ HOSP IP/OBS MODERATE 35: CPT

## 2019-04-26 PROCEDURE — 95951: CPT | Mod: 26

## 2019-04-26 RX ORDER — KETOROLAC TROMETHAMINE 30 MG/ML
15 SYRINGE (ML) INJECTION ONCE
Refills: 0 | Status: DISCONTINUED | OUTPATIENT
Start: 2019-04-26 | End: 2019-05-01

## 2019-04-26 RX ORDER — ONDANSETRON 8 MG/1
4 TABLET, FILM COATED ORAL ONCE
Refills: 0 | Status: COMPLETED | OUTPATIENT
Start: 2019-04-26 | End: 2019-04-26

## 2019-04-26 RX ORDER — INSULIN GLARGINE 100 [IU]/ML
16 INJECTION, SOLUTION SUBCUTANEOUS AT BEDTIME
Refills: 0 | Status: DISCONTINUED | OUTPATIENT
Start: 2019-04-26 | End: 2019-04-30

## 2019-04-26 RX ADMIN — Medication 100 MILLIGRAM(S): at 15:05

## 2019-04-26 RX ADMIN — FOSPHENYTOIN 104 MILLIGRAM(S) PE: 50 INJECTION INTRAMUSCULAR; INTRAVENOUS at 14:29

## 2019-04-26 RX ADMIN — Medication 1 MILLIGRAM(S): at 22:14

## 2019-04-26 RX ADMIN — Medication 100 MILLIGRAM(S): at 21:37

## 2019-04-26 RX ADMIN — FOSPHENYTOIN 104 MILLIGRAM(S) PE: 50 INJECTION INTRAMUSCULAR; INTRAVENOUS at 21:35

## 2019-04-26 RX ADMIN — Medication 975 MILLIGRAM(S): at 03:52

## 2019-04-26 RX ADMIN — INSULIN GLARGINE 16 UNIT(S): 100 INJECTION, SOLUTION SUBCUTANEOUS at 21:35

## 2019-04-26 RX ADMIN — POLYETHYLENE GLYCOL 3350 17 GRAM(S): 17 POWDER, FOR SOLUTION ORAL at 05:45

## 2019-04-26 RX ADMIN — ONDANSETRON 4 MILLIGRAM(S): 8 TABLET, FILM COATED ORAL at 17:07

## 2019-04-26 RX ADMIN — Medication 50 MICROGRAM(S): at 05:45

## 2019-04-26 RX ADMIN — PERAMPANEL 4 MILLIGRAM(S): 2 TABLET ORAL at 21:35

## 2019-04-26 RX ADMIN — Medication 975 MILLIGRAM(S): at 03:22

## 2019-04-26 RX ADMIN — LEVETIRACETAM 400 MILLIGRAM(S): 250 TABLET, FILM COATED ORAL at 05:46

## 2019-04-26 RX ADMIN — Medication 200 MILLIGRAM(S): at 15:05

## 2019-04-26 RX ADMIN — Medication 1 MILLIGRAM(S): at 05:45

## 2019-04-26 RX ADMIN — Medication 975 MILLIGRAM(S): at 12:48

## 2019-04-26 RX ADMIN — Medication 100 MILLIGRAM(S): at 12:48

## 2019-04-26 RX ADMIN — Medication 100 MILLIGRAM(S): at 05:45

## 2019-04-26 RX ADMIN — Medication 200 MILLIGRAM(S): at 09:57

## 2019-04-26 RX ADMIN — AMLODIPINE BESYLATE 10 MILLIGRAM(S): 2.5 TABLET ORAL at 05:45

## 2019-04-26 RX ADMIN — Medication 4: at 18:04

## 2019-04-26 RX ADMIN — Medication 1 MILLIGRAM(S): at 15:05

## 2019-04-26 RX ADMIN — FOSPHENYTOIN 104 MILLIGRAM(S) PE: 50 INJECTION INTRAMUSCULAR; INTRAVENOUS at 05:46

## 2019-04-26 RX ADMIN — Medication 975 MILLIGRAM(S): at 13:20

## 2019-04-26 RX ADMIN — Medication 3 MILLILITER(S): at 15:06

## 2019-04-26 RX ADMIN — LEVETIRACETAM 400 MILLIGRAM(S): 250 TABLET, FILM COATED ORAL at 14:29

## 2019-04-26 RX ADMIN — LEVETIRACETAM 400 MILLIGRAM(S): 250 TABLET, FILM COATED ORAL at 21:35

## 2019-04-26 RX ADMIN — Medication 3 MILLILITER(S): at 05:45

## 2019-04-26 RX ADMIN — SENNA PLUS 1 TABLET(S): 8.6 TABLET ORAL at 12:48

## 2019-04-26 RX ADMIN — ENOXAPARIN SODIUM 100 MILLIGRAM(S): 100 INJECTION SUBCUTANEOUS at 18:04

## 2019-04-26 RX ADMIN — ENOXAPARIN SODIUM 100 MILLIGRAM(S): 100 INJECTION SUBCUTANEOUS at 05:46

## 2019-04-26 NOTE — PROGRESS NOTE ADULT - SUBJECTIVE AND OBJECTIVE BOX
DIABETES FOLLOW UP NOTE: Saw pt earlier today  INTERVAL HX: 70 y/o F w/h/o uncontrolled T2DM on oral DM meds. Also HTN/HLD/CAD/Hypothyroidism/recent umbilical hernia repair 2 weeks ago. Here s/p fall with LOC> seizures> hypernatremia. Feeling better. Denies any HA/dizzines. Pt feels tired and dislikes hospital food > unpredictable intake. Not filling out food menus. No hypoglycemia but FBG <100s while on present insulin doses      Review of Systems:  General: As above  Cardiovascular: No chest pain, palpitations  Respiratory: No SOB, no cough  GI: No nausea, vomiting, abdominal pain  Endocrine: No polyuria, polydipsia or S&Sx of hypoglycemia    Allergies    No Known Allergies    Intolerances      MEDICATIONS    insulin glargine Injectable (LANTUS) 18 Unit(s) SubCutaneous at bedtime  insulin lispro (HumaLOG) corrective regimen sliding scale   SubCutaneous three times a day before meals  insulin lispro (HumaLOG) corrective regimen sliding scale   SubCutaneous at bedtime  levothyroxine 50 MICROGram(s) Oral daily      PHYSICAL EXAM:  VITALS: T(C): 36.6 (04-26-19 @ 09:49)  T(F): 97.9 (04-26-19 @ 09:49), Max: 98.2 (04-25-19 @ 16:31)  HR: 75 (04-26-19 @ 09:49) (66 - 85)  BP: 149/82 (04-26-19 @ 09:49) (149/82 - 180/98)  RR:  (18 - 20)  SpO2:  (94% - 100%)  Wt(kg): --  GENERAL: Female Lying in bed in NAD  HEENT: EEG in progress. electrodes on.  Abdomen: Soft, nontender, non distended  Extremities: Warm, no edema in all 4 exts  NEURO: A&O X3    LABS:  POCT Blood Glucose.: 106 mg/dL (04-26-19 @ 13:12)  POCT Blood Glucose.: 90 mg/dL (04-26-19 @ 09:07)  POCT Blood Glucose.: 132 mg/dL (04-25-19 @ 21:05)  POCT Blood Glucose.: 148 mg/dL (04-25-19 @ 17:28)  POCT Blood Glucose.: 211 mg/dL (04-25-19 @ 12:49)  POCT Blood Glucose.: 118 mg/dL (04-25-19 @ 08:44)  POCT Blood Glucose.: 216 mg/dL (04-24-19 @ 21:27)  POCT Blood Glucose.: 150 mg/dL (04-24-19 @ 16:10)  POCT Blood Glucose.: 162 mg/dL (04-24-19 @ 12:39)  POCT Blood Glucose.: 88 mg/dL (04-24-19 @ 08:55)  POCT Blood Glucose.: 161 mg/dL (04-23-19 @ 21:35)  POCT Blood Glucose.: 180 mg/dL (04-23-19 @ 17:27)                            9.5    4.70  )-----------( 242      ( 26 Apr 2019 07:44 )             30.9       04-26    144  |  108  |  7   ----------------------------<  107<H>  3.5   |  25  |  0.84    EGFR if : 83      Ca    9.2      04-26  Mg     1.9     04-26  Phos  4.1     04-26    TPro  x   /  Alb  2637<L>  /  TBili  x   /  DBili  x   /  AST  x   /  ALT  x   /  AlkPhos  x   04-24      Thyroid Function Tests:  04-22 @ 08:55 TSH -- FreeT4 1.1 T3 -- Anti TPO -- Anti Thyroglobulin Ab -- TSI --  04-22 @ 08:51 TSH 1.45 FreeT4 -- T3 -- Anti TPO -- Anti Thyroglobulin Ab -- TSI --      Hemoglobin A1C, Whole Blood: 9.9 % <H> [4.0 - 5.6] (04-21-19 @ 09:26)

## 2019-04-26 NOTE — CONSULT NOTE ADULT - ATTENDING COMMENTS
Dr. Shultz (Attending Physician)  Pt. with ho cad on asa, eliquis pw fall worsening ams suspected seizure activity intubated for declining mental status and upgraded to level 1 trauma. No injury identified on CT head.  N - Continuous EEG placed.  On propofol gtt for sedation. Will wean and attempt to get neuro exam  P - Acute resp failure secondary to declining mental status  C - HTN will restart antihtn meds.  No evidence of PRES on CT scan but htn to 190s will give hydralazine since weaning propofol.  GI - Will place ogt   - escalera placed  H - on asa and eliquis, no ICH on CT scan so no indication for reversal, will hold for now  ID - no acute issues  E - dm glucose control goal 120-180 with ssi    This patient was critically ill with one or more vital organ systems at a high probability of imminent or life threatening deterioration. Complex decision making was required to assess and treat single or multiple vital organ system failure and/or prevent further life threatening deterioration of the patient’s condition.  All recent labwork and imaging was reviewed.  Total Critical Care Time 45 min
Patient seen and examined with resident.  Agree with assessment and plan.  Patient was noted to be acting odd by family and having episodes of confusion 2-3 days prior to fall and ?generalized seizure.  MRI brain shows left temporal encephalitis/cerebritis and she has had multiple electrographic seizures even while on propofol.      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      If she continues to have electrographic seizures will add vimpat and/or versed drip.  LP performed and will f/u results tomorrow.  Case and diagnosis d/w primary team and family.
Pt without any history of anemia. Denies any recent wt loss or night sweats or weakness. On exam, no cervical or axillary adenopathy palpable. Plan for biopsy of CNS mass noted. Will follow up with anemia work up.
fu CT chest
Madeleine Aburto MD  Pager [phone] ([facility])/ [phone] ([facility]) [please provide 10 digit call back number]  Nights and weekends: [phone]  Please note that this patient may be followed by a different provider tomorrow. If no answer or after hours, please contact [phone].  For final dc reccomendations, please call [phone] or page the endocrine fellow on call.

## 2019-04-26 NOTE — PROGRESS NOTE ADULT - ASSESSMENT
A/P:  69F with vascular risk factors, recent abdominal surgery, poorly controlled HTN, afib, now with NCSE/status epilepticus, right temporooccipital lesion on MRI (edema, wm involvement, not much contrast uptake on imaging), CSF neg so far.  DDx:  atypical tumor (does not appear c/w lymphoma or GBM), focal cerebritis from infectious or autoimmune/paraneoplastic inflammatory cause with meningeal vessel enhancement, PRES/RLS a possibility due to labile HTN though radiographically less likely.    NCSE improving with 5 szs in last day of recording    Doubt relevance of CT abd/kidney findings, as they were normal on CT 7 days prior.  Probably redherring.  Would check UA to r/o for UTI/pyelonephritis    Rec:  - autoimmune CSF and serum panels from [place] pending, cytology.    would recommend trial steroids high dose s/p biopsy, or prior if any decompensation.  -persistent lesion on MRI rec nsurg eval for possible biopsy, Dr Ruiz contacted, tentative date mon or wed next week.  -rec rheum/vasculitic screen for cerebritis    Target PHT level 15-20, cont maintenance 300mg/d.  daily phenytoin levels please  reduced fycompa to 4mg due to increased agitation/delirium, appears better  cont LEV 1000mg tid as tolerated.  benzo trial ativan 1mg tid, hold for excessive sedation, plan to d/c on monday if szs better    VEEG    better BP control, Na+ monitoring, FSG glycemic control  DVT, afib A/C currently on lovenox, will then hold for possible brain biopsy, but may need to resume A/C when cleared per nsurg    consider requesting heme/onc guidance on this.    d/w'd another gabrielle at bedside today

## 2019-04-26 NOTE — CONSULT NOTE ADULT - SUBJECTIVE AND OBJECTIVE BOX
HPI:  69F w/ CAD, DM, HTN, HLD, hypothyroid, on eliquis, ASA, s/p umbilical hernia repair presented as a level 1 trauma activation. Patient brought in by ambulance after being found down by family. Hematology consulted for workup of possible lymphoma.    Patient has been admitted, she was intubated for airway protection   she was  admitted to SICU and then began having subclinical siezures . Patient had LP and MRI with Left posterior lateral temporal and parietal occipital hyperintense T2 FLAIR suspicious for herpes encephalitis - started on Acyclovir. For the seizures she was also started onkeppra, fycompa, and vimpat,     Patient seen at bedside, she denies unintentional weight loss, fevers, night sweats, abdominal pain      PAST MEDICAL & SURGICAL HISTORY:  Hypothyroid  Obesity  NACHO (obstructive sleep apnea): sleep study done 5 years ago  DM (Diabetes Mellitus) (ICD9 250.00)  Dyslipidemia (ICD9 272.4)  HTN (Hypertension) (ICD9 401.9)  CAD (Coronary Artery Disease) (ICD9 414.00): c STENTS  History of hysterectomy  History of ovarian cystectomy: x 2 in 2013  S/P primary angioplasty with coronary stent: has 5 stents-first placed in 2007, last stent -2011,  C Section: x 1      Allergies    No Known Allergies    Intolerances        MEDICATIONS  (STANDING):  ALBUTerol/ipratropium for Nebulization 3 milliLiter(s) Nebulizer every 6 hours  ALBUTerol/ipratropium for Nebulization. 3 milliLiter(s) Nebulizer once  amLODIPine   Tablet 10 milliGRAM(s) Oral daily  dextrose 5%. 1000 milliLiter(s) (50 mL/Hr) IV Continuous <Continuous>  dextrose 50% Injectable 12.5 Gram(s) IV Push once  dextrose 50% Injectable 25 Gram(s) IV Push once  dextrose 50% Injectable 25 Gram(s) IV Push once  docusate sodium 100 milliGRAM(s) Oral daily  enoxaparin Injectable 100 milliGRAM(s) SubCutaneous two times a day  fosphenytoin IVPB 100 milliGRAM(s) PE IV Intermittent every 8 hours  hydrALAZINE 100 milliGRAM(s) Oral every 8 hours  insulin glargine Injectable (LANTUS) 16 Unit(s) SubCutaneous at bedtime  insulin lispro (HumaLOG) corrective regimen sliding scale   SubCutaneous three times a day before meals  insulin lispro (HumaLOG) corrective regimen sliding scale   SubCutaneous at bedtime  ketorolac   Injectable 15 milliGRAM(s) IV Push once  labetalol 200 milliGRAM(s) Oral three times a day  levETIRAcetam  IVPB 1000 milliGRAM(s) IV Intermittent <User Schedule>  levothyroxine 50 MICROGram(s) Oral daily  LORazepam     Tablet 1 milliGRAM(s) Oral three times a day  perampanel 4 milliGRAM(s) Oral at bedtime  polyethylene glycol 3350 17 Gram(s) Oral two times a day  senna 1 Tablet(s) Oral daily    MEDICATIONS  (PRN):  acetaminophen    Suspension .. 975 milliGRAM(s) Oral every 6 hours PRN Temp greater or equal to 38.5C (101.3F), Mild Pain (1 - 3)  bisacodyl Suppository 10 milliGRAM(s) Rectal daily PRN Constipation  dextrose 40% Gel 15 Gram(s) Oral once PRN Blood Glucose LESS THAN 70 milliGRAM(s)/deciliter  glucagon  Injectable 1 milliGRAM(s) IntraMuscular once PRN Glucose LESS THAN 70 milligrams/deciliter  LORazepam   Injectable 2 milliGRAM(s) IV Push once PRN Seizure      FAMILY HISTORY:  Family history of heart disease (Aunt)      SOCIAL HISTORY: No EtOH, no tobacco    REVIEW OF SYSTEMS:    CONSTITUTIONAL: No weakness, fevers or chills  EYES/ENT: No visual changes;  No vertigo or throat pain   NECK: No pain or stiffness  RESPIRATORY: No cough, wheezing, hemoptysis; No shortness of breath  CARDIOVASCULAR: No chest pain or palpitations  GASTROINTESTINAL: No abdominal or epigastric pain. No nausea, vomiting, or hematemesis; No diarrhea or constipation. No melena or hematochezia.  GENITOURINARY: No dysuria, frequency or hematuria  NEUROLOGICAL: No numbness or weakness  SKIN: No itching, burning, rashes, or lesions   All other review of systems is negative unless indicated above.        T(F): 98.4 (04-26-19 @ 20:10), Max: 98.4 (04-26-19 @ 15:12)  HR: 78 (04-26-19 @ 20:10)  BP: 124/65 (04-26-19 @ 20:10)  RR: 20 (04-26-19 @ 20:10)  SpO2: 92% (04-26-19 @ 20:10)  Wt(kg): --    GENERAL: NAD, well-developed  HEAD:  Atraumatic, Normocephalic  EYES: EOMI, PERRLA, conjunctiva and sclera clear  NECK: Supple, No JVD  CHEST/LUNG: Clear to auscultation bilaterally; No wheeze  HEART: Regular rate and rhythm; No murmurs, rubs, or gallops  ABDOMEN: Soft, Nontender, Nondistended; Bowel sounds present  EXTREMITIES:  2+ Peripheral Pulses, No clubbing, cyanosis, or edema  NEUROLOGY: non-focal  SKIN: No rashes or lesions                          9.5    4.70  )-----------( 242      ( 26 Apr 2019 07:44 )             30.9       04-26    140  |  106  |  10  ----------------------------<  217<H>  3.7   |  23  |  1.03    Ca    8.7      26 Apr 2019 18:52  Phos  4.1     04-26  Mg     1.9     04-26    TPro  x   /  Alb  2637<L>  /  TBili  x   /  DBili  x   /  AST  x   /  ALT  x   /  AlkPhos  x   04-24      Magnesium, Serum: 1.9 mg/dL (04-26 @ 06:42)  Phosphorus Level, Serum: 4.1 mg/dL (04-26 @ 06:42)          .CSF  04-24 @ 17:17   Testing in progress  --    No polymorphonuclear cells seen  No organisms seen  by cytocentrifuge      .CSF  04-19 @ 16:57   No growth  --    No polymorphonuclear cells seen  No organisms seen  by cytocentrifuge    < from: MR Head w/wo IV Cont (04.25.19 @ 10:20) >    IMPRESSION:    No significant interval change from previous brain MRIs.    Similar-appearing nonenhancing hyperintense signal in the left posterior   temporal and occipital lobes which appears both subcortical and cortical   in nature.    Study done for presurgical planning.      < from: CT Abdomen and Pelvis w/ IV Cont (04.25.19 @ 16:16) >  FINDINGS:    CHEST:     LUNGS AND LARGE AIRWAYS: Patent central airways. Bilateral patchy   nonspecific groundglass opacities. Improved aeration in the left lower   lobe. Linear atelectasis in the right lower lobe.  PLEURA: Trace bilateral pleural effusions.  VESSELS: Left PICC line, with its tip at the cavoatrial junction.   Atherosclerotic calcification of the aorta and coronary arteries.  HEART: Heart size is mildly enlarged. No pericardial effusion.  MEDIASTINUM AND CANDICE: No lymphadenopathy.  CHEST WALL AND LOWER NECK: Within normal limits.    ABDOMEN AND PELVIS:    LIVER: Within normal limits.  BILE DUCTS: Normal caliber.  GALLBLADDER: Within normal limits.  SPLEEN: Within normal limits.  PANCREAS: Within normal limits.  ADRENALS: Within normal limits.  KIDNEYS/URETERS: No hydronephrosis. Heterogeneous enhancement of the   kidneys bilaterally, with patchy areas of decreased attenuation.    BLADDER: Within normal limits.  REPRODUCTIVE ORGANS:Status post hysterectomy.    BOWEL: No bowel obstruction. Appendix is within normal limits. Small   hiatal hernia.  PERITONEUM: No ascites.  VESSELS:  Atherosclerotic calcification of the aorta and bilateral renal   arteries. Again noted, is infiltration of the subcutaneous tissues in the   left groin, likely related to intervention, slightly decreased since   4/18/2019.  RETROPERITONEUM: No lymphadenopathy.    ABDOMINAL WALL: Again noted, is infiltration of the periumbilical soft   tissues.  BONES: Degenerative changes in the spine. Sclerotic focus in the right   iliac bone, unchanged compared to prior studies dating back to 9/1/2018.    IMPRESSION: Heterogeneous enhancement of the kidneys bilaterally, with   striated nephrograms bilaterally. Differential diagnosis includes   infection such as pyelonephritis, as well as ischemia and infiltrative   processes such is lymphoma and leukemia.  Correlation with urinalysis is   recommended.    Infiltration of the periumbilical subcutaneous tissues again noted.

## 2019-04-26 NOTE — CONSULT NOTE ADULT - SUBJECTIVE AND OBJECTIVE BOX
HPI:  69F w/ CAD, DM, HTN, HLD, hypothyroid, on eliquis, ASA, s/p umbilical hernia repair ~2 weeks ago, presents as level 1 trauma activation. Patient brought in by ambulance after being found down by family. Patient unable to answer questions, history obtained from family. Patient's family heard patient calling out for help and found her on the ground. She was noted to be less conversant than normal and confused. New onset Sz, L temporal lesion- localized encephalitis vs neoplasm. For brain biopsy.      PAST MEDICAL & SURGICAL HISTORY:  Hypothyroid  Obesity  NACHO (obstructive sleep apnea): sleep study done 5 years ago  DM (Diabetes Mellitus) (ICD9 250.00)  Dyslipidemia (ICD9 272.4)  HTN (Hypertension) (ICD9 401.9)  CAD (Coronary Artery Disease) (ICD9 414.00): c STENTS  History of hysterectomy  History of ovarian cystectomy: x 2 in 2013  S/P primary angioplasty with coronary stent: has 5 stents-first placed in 2007, last stent -2011,  C Section: x 1      Review of Systems:   CONSTITUTIONAL: No fever, weight loss, or fatigue  EYES: No eye pain, visual disturbances, or discharge  ENMT:  No difficulty hearing, tinnitus, vertigo; No sinus or throat pain  NECK: No pain or stiffness  BREASTS: No pain, masses, or nipple discharge  RESPIRATORY: No cough, wheezing, chills or hemoptysis; No shortness of breath  CARDIOVASCULAR: No chest pain, palpitations, dizziness, or leg swelling  GASTROINTESTINAL: No abdominal or epigastric pain. No nausea, vomiting, or hematemesis; No diarrhea or constipation. No melena or hematochezia.  GENITOURINARY: No dysuria, frequency, hematuria, or incontinence  NEUROLOGICAL: No headaches, memory loss, loss of strength, numbness, or tremors  SKIN: No itching, burning, rashes, or lesions   LYMPH NODES: No enlarged glands  ENDOCRINE: No heat or cold intolerance; No hair loss  MUSCULOSKELETAL: No joint pain or swelling; No muscle, back, or extremity pain  PSYCHIATRIC: No depression, anxiety, mood swings, or difficulty sleeping + confusion  HEME/LYMPH: No easy bruising, or bleeding gums  ALLERY AND IMMUNOLOGIC: No hives or eczema    Allergies    No Known Allergies    Intolerances        Social History:     FAMILY HISTORY:  Family history of heart disease (Aunt)      MEDICATIONS  (STANDING):  ALBUTerol/ipratropium for Nebulization 3 milliLiter(s) Nebulizer every 6 hours  ALBUTerol/ipratropium for Nebulization. 3 milliLiter(s) Nebulizer once  amLODIPine   Tablet 10 milliGRAM(s) Oral daily  dextrose 5%. 1000 milliLiter(s) (50 mL/Hr) IV Continuous <Continuous>  dextrose 50% Injectable 12.5 Gram(s) IV Push once  dextrose 50% Injectable 25 Gram(s) IV Push once  dextrose 50% Injectable 25 Gram(s) IV Push once  docusate sodium 100 milliGRAM(s) Oral daily  enoxaparin Injectable 100 milliGRAM(s) SubCutaneous two times a day  fosphenytoin IVPB 100 milliGRAM(s) PE IV Intermittent every 8 hours  hydrALAZINE 100 milliGRAM(s) Oral every 8 hours  insulin glargine Injectable (LANTUS) 16 Unit(s) SubCutaneous at bedtime  insulin lispro (HumaLOG) corrective regimen sliding scale   SubCutaneous three times a day before meals  insulin lispro (HumaLOG) corrective regimen sliding scale   SubCutaneous at bedtime  ketorolac   Injectable 15 milliGRAM(s) IV Push once  labetalol 200 milliGRAM(s) Oral three times a day  levETIRAcetam  IVPB 1000 milliGRAM(s) IV Intermittent <User Schedule>  levothyroxine 50 MICROGram(s) Oral daily  LORazepam     Tablet 1 milliGRAM(s) Oral three times a day  perampanel 4 milliGRAM(s) Oral at bedtime  polyethylene glycol 3350 17 Gram(s) Oral two times a day  senna 1 Tablet(s) Oral daily    MEDICATIONS  (PRN):  acetaminophen    Suspension .. 975 milliGRAM(s) Oral every 6 hours PRN Temp greater or equal to 38.5C (101.3F), Mild Pain (1 - 3)  bisacodyl Suppository 10 milliGRAM(s) Rectal daily PRN Constipation  dextrose 40% Gel 15 Gram(s) Oral once PRN Blood Glucose LESS THAN 70 milliGRAM(s)/deciliter  glucagon  Injectable 1 milliGRAM(s) IntraMuscular once PRN Glucose LESS THAN 70 milligrams/deciliter  LORazepam   Injectable 2 milliGRAM(s) IV Push once PRN Seizure        CAPILLARY BLOOD GLUCOSE      POCT Blood Glucose.: 222 mg/dL (26 Apr 2019 17:49)  POCT Blood Glucose.: 106 mg/dL (26 Apr 2019 13:12)  POCT Blood Glucose.: 90 mg/dL (26 Apr 2019 09:07)  POCT Blood Glucose.: 132 mg/dL (25 Apr 2019 21:05)    I&O's Summary    25 Apr 2019 07:01  -  26 Apr 2019 07:00  --------------------------------------------------------  IN: 240 mL / OUT: 0 mL / NET: 240 mL    26 Apr 2019 07:01  -  26 Apr 2019 19:21  --------------------------------------------------------  IN: 120 mL / OUT: 0 mL / NET: 120 mL        PHYSICAL EXAM:  GENERAL: NAD  HEAD:  Atraumatic, Normocephalic  EYES: EOMI, PERRLA, conjunctiva and sclera clear  NECK: Supple, No JVD  CHEST/LUNG: Clear to auscultation bilaterally; No wheeze  HEART: Regular rate and rhythm; No murmurs, rubs, or gallops  ABDOMEN: Soft, Nontender, Nondistended; Bowel sounds present s/p umbilical hernia repair  EXTREMITIES:  2+ Peripheral Pulses, No clubbing, cyanosis, or edema  PSYCH: confused  NEUROLOGY: non-focal  SKIN: No rashes or lesions    LABS:                        9.5    4.70  )-----------( 242      ( 26 Apr 2019 07:44 )             30.9     04-26    144  |  108  |  7   ----------------------------<  107<H>  3.5   |  25  |  0.84    Ca    9.2      26 Apr 2019 06:42  Phos  4.1     04-26  Mg     1.9     04-26    TPro  x   /  Alb  2637<L>  /  TBili  x   /  DBili  x   /  AST  x   /  ALT  x   /  AlkPhos  x   04-24              RADIOLOGY & ADDITIONAL TESTS:    Imaging Personally Reviewed:  < from: CT Abdomen and Pelvis w/ IV Cont (04.25.19 @ 16:16) >  IMPRESSION: Heterogeneous enhancement of the kidneys bilaterally, with   striated nephrograms bilaterally. Differential diagnosis includes   infection such as pyelonephritis, as well as ischemia and infiltrative   processes such is lymphoma and leukemia.  Correlation with urinalysis is   recommended.    Infiltration of the periumbilical subcutaneous tissues again noted.    < end of copied text >  < from: Transthoracic Echocardiogram (04.21.19 @ 14:07) >  Conclusions:  Technically difficult study.  1. Normal mitral valve. Minimal mitral regurgitation.  2. Aortic valve not well visualized. No aortic valve  regurgitation seen.  3. Left ventricular dimensions are poorly visualized;  grossly, there appears to be concentric left ventricular  hypertrophy.  4. Endocardium not well visualized; hyperdynamic left  ventricular systolic function. Left ventricular outflow  tract gradient is present due to hyperdynamic left  ventricular function. No evidence of obstruction is seen.  Peak recorded gradient equals 36 mmHg but may be  underestimated.  5. The right ventricle is not well visualized; grossly  normal right ventricular systolic function.  *** No previous Echo exam.    < end of copied text >    Consultant(s) Notes Reviewed:      Care Discussed with Consultants/Other Providers:

## 2019-04-26 NOTE — CONSULT NOTE ADULT - ASSESSMENT
69 f for brain mass biopsy  - EKG ordered  - PT/PTT ordered  continue present Rx   Medical clearance pending above.

## 2019-04-26 NOTE — PROGRESS NOTE ADULT - SUBJECTIVE AND OBJECTIVE BOX
Epilepsy Consult FUV    Updates:  agitation improved per gabrielle today.  no formed visual hallucinations o/n. HA left side of head more severe today. unsteady gait    MEDICATIONS  (STANDING):  ALBUTerol/ipratropium for Nebulization 3 milliLiter(s) Nebulizer every 6 hours  ALBUTerol/ipratropium for Nebulization. 3 milliLiter(s) Nebulizer once  amLODIPine   Tablet 10 milliGRAM(s) Oral dailyenoxaparin Injectable 100 milliGRAM(s) SubCutaneous two times a day  fosphenytoin IVPB 100 milliGRAM(s) PE IV Intermittent every 8 hours  hydrALAZINE 100 milliGRAM(s) Oral every 8 hours  insulin glargine Injectable (LANTUS) 18 Unit(s) SubCutaneous at bedtime  insulin lispro (HumaLOG) corrective regimen sliding scale   SubCutaneous three times a day before meals  insulin lispro (HumaLOG) corrective regimen sliding scale   SubCutaneous at bedtime  ketorolac   Injectable 15 milliGRAM(s) IV Push once  labetalol 200 milliGRAM(s) Oral three times a day  levETIRAcetam  IVPB 1000 milliGRAM(s) IV Intermittent <User Schedule>  levothyroxine 50 MICROGram(s) Oral daily  LORazepam     Tablet 1 milliGRAM(s) Oral three times a day  perampanel 4 milliGRAM(s) Oral at bedtime  polyethylene glycol 3350 17 Gram(s) Oral two times a day  senna 1 Tablet(s) Oral daily    Vital Signs Last 24 Hrs:  T(C): 36.6 (04-26-19 @ 09:49), Max: 36.8 (04-25-19 @ 16:31)  T(F): 97.9 (04-26-19 @ 09:49), Max: 98.2 (04-25-19 @ 16:31)  HR: 75 (04-26-19 @ 09:49) (66 - 85)  BP: 149/82 (04-26-19 @ 09:49) (149/82 - 180/98)  RR: 19 (04-26-19 @ 09:49) (18 - 20)  SpO2: 98% (04-26-19 @ 09:49) (94% - 100%)  GENERAL PHYSICAL EXAM:  NAD, nondysmorphic  NCAT, OP clear  Neck supple  RRR  No rash  No joint deformity    NEUROLOGICAL EXAM:    Mental status: Awake, alert, and in no apparent distress. Oriented to person, place . Language function is normal. Recent memory, were normal.   neglect to Left with DSS.      Cranial Nerves: Visual field were full, but neglect to Left with DSS.  Pupils were equal, round, reactive to light. Extraocular movements were intact. Fundoscopic exam was deferred. Facial sensation was intact to light touch. There was no facial asymmetry. The palate was upgoing symmetrically and tongue was midline. Hearing acuity was intact to finger rub AU. Shoulder shrug was full bilaterally    Motor exam: Bulk and tone were normal. Strength was 5/5 in all four extremities. Fine finger movements were symmetric and normal. There was no pronator drift    Reflexes: 2+ in the biceps, 2+ in the knees     Sensation: Intact to light touch bilat    Coordination: Finger-nose-finger without dysmetria.     Gait: NT    MRI repeated 4/25:  SPECT neg, no contrast enhancement, meningeal vessels enhancing on left wxifywk-[id number]-qikojkea junction.  WM/cortical edema on the left.                                     9.5<L>  4.70 )-----------( 242     ( 04-26-19 @ 07:44 )             30.9<L>    144  |  108  |  7   ----------------------------<  107<H>    04-26-19 @ 06:42  3.5   |  25  |  0.84    Ca    9.2      04-26-19 @ 06:42  Phos  4.1     04-26-19 @ 06:42  Mg     1.9     04-26-19 @ 06:42  TPro  x   /  Alb  2637<L>  /  TBili  x   /  DBili  x   /  AST  x   /  ALT  x   /  AlkPhos  x   04-24-19 @ 22:05    AFP+, Cancer Antigen, GI Ca 19-9: 42H     CSF neg cell #, PCR, prot/glu nl  cx neg

## 2019-04-26 NOTE — PROGRESS NOTE ADULT - ASSESSMENT
69 yr old F h/o CAD, DM, HTN, HLD, hypothyroid, on eliquis and ASA (both off >5 days), presented initially after a fall. Patient brought in by ambulance after being found down by family. Patient was found down by family 4/18/2019 and was found to be altered and had witnessed seizure in ED. Patient then developed status epilepticus and was transferred to [facility] for further management. LP was done and was unremarkable. EEG showed many subclinical seizures. MRI showed left posterior lateral temporal and parietal occipital hyperintense T2   and FLAIR signal. PCR for HSV was negative. Neurosurgery was consulted for possible biopsy.     Plan:  - Continue management per neurology/ID  - Continue holding ASA and eliquis  - Tentatively for OR bx on Monday  - Please obtain medical clearance and preop labs

## 2019-04-26 NOTE — PROGRESS NOTE ADULT - ASSESSMENT
70 y/o F w/h/o uncontrolled T2DM on oral DM meds. Also HTN/HLD/CAD/Hypothyroidism/recent umbilical hernia repair 2 weeks ago. Here s/p fall with LOC> seizures> hypernatremia. Feeling better but with unpredictable PO intake because dislikes hospital food > Not filling out food menus. No hypoglycemia but FBG <100s while on present insulin doses. Will preventively decrease Lantus dose. Spoke to staff to assist pt filling out menus to promote a more consistent PO intake. Per primary team pt going for brain bx on Monday will adjust Lantus dose for NPO status.

## 2019-04-26 NOTE — PROGRESS NOTE ADULT - SUBJECTIVE AND OBJECTIVE BOX
Patient seen and examined at bedside.    T(C): 36.7 (04-25-19 @ 23:20), Max: 36.8 (04-25-19 @ 16:31)  HR: 73 (04-26-19 @ 00:00) (66 - 85)  BP: 154/89 (04-26-19 @ 00:00) (131/61 - 180/98)  RR: 18 (04-26-19 @ 00:00) (18 - 20)  SpO2: 96% (04-26-19 @ 00:00) (94% - 100%)  Wt(kg): --    Exam:    AOx2, FC, PERRL, EOMI, V1-3 intact, no facial, tongue midline, shrug 5/5  5/5 throughout, no drift  SILT  No clonus or babinski

## 2019-04-26 NOTE — EEG REPORT - NS EEG TEXT BOX
Study Date: 4/24/19-4/25/19    _____________________________________________________________  HISTORY:  Patient is a 69y old  Female who presents with a chief complaint of AMS    MEDICATIONS  (STANDING):  ALBUTerol/ipratropium for Nebulization 3 milliLiter(s) Nebulizer every 6 hours  ALBUTerol/ipratropium for Nebulization. 3 milliLiter(s) Nebulizer once  amLODIPine   Tablet 10 milliGRAM(s) Oral daily  enoxaparin Injectable 100 milliGRAM(s) SubCutaneous two times a day  fosphenytoin IVPB 100 milliGRAM(s) PE IV Intermittent every 8 hours  hydrALAZINE 100 milliGRAM(s) Oral every 8 hours  insulin glargine Injectable (LANTUS) 18 Unit(s) SubCutaneous at bedtime  insulin lispro (HumaLOG) corrective regimen sliding scale   SubCutaneous three times a day before meals  insulin lispro (HumaLOG) corrective regimen sliding scale   SubCutaneous at bedtime  labetalol 200 milliGRAM(s) Oral three times a day  levETIRAcetam  IVPB 1000 milliGRAM(s) IV Intermittent <User Schedule>  levothyroxine 50 MICROGram(s) Oral daily  LORazepam     Tablet 1 milliGRAM(s) Oral three times a day  perampanel 4 milliGRAM(s) Oral at bedtime  polyethylene glycol 3350 17 Gram(s) Oral two times a day  senna 1 Tablet(s) Oral daily    MEDICATIONS  (PRN):  acetaminophen    Suspension .. 975 milliGRAM(s) Oral every 6 hours PRN Temp greater or equal to 38.5C (101.3F), Mild Pain (1 - 3)  bisacodyl Suppository 10 milliGRAM(s) Rectal daily PRN Constipation  dextrose 40% Gel 15 Gram(s) Oral once PRN Blood Glucose LESS THAN 70 milliGRAM(s)/deciliter  glucagon  Injectable 1 milliGRAM(s) IntraMuscular once PRN Glucose LESS THAN 70 milligrams/deciliter  LORazepam   Injectable 2 milliGRAM(s) IV Push once PRN Seizure      _____________________________________________________________  STUDY INTERPRETATION    Findings:  The background was continuous, diffuse delta and theta activity.  Diffuse alpha activity was present, though a rudimentary PDR to 7hz was noted.   State changes and reactivity were present.   Continuous Slowing, Lateralized, Left hemisphere (irregular delta)    Sleep Background:  Stage II sleep transients asymmetric sleep spindles and K complexes were present, better formed over the right    Interictal Epileptiform Activity:   -Continuous LPDs (Lateralized Periodic Discharges) max O1 P7 (0.5-1 Hz).      Events:  -Runs of LPDs evolving from burst of temporal occipital polyspike occurred frequently  lasting up to 30-90 sec at a time, with frontotemporal spread of fast activity, without overt clinical change.  5 Seizures in 13 hours    Activation Procedures:   Hyperventilation was not performed.    Photic stimulation was not performed.     Artifacts:  Intermittent myogenic and movement artifacts were noted.    ECG:  The heart rate on single channel ECG was predominantly between 70-90 BPM.    _____________________________________________________________  EEG SUMMARY/CLASSIFICATION  Abnormal EEG in an unresponsive patient / a sedated patient.  - 5 left temporo-occipital seizures in 13hrs  - LPDs / SIRPIDs temporal occipital  - Continuous Slowing, Lateralized, Left hemisphere (delta)  - Diffuse theta and polymorphic delta slowing, moderate  _____________________________________________________________  EEG IMPRESSION/CLINICAL CORRELATE    Abnormal EEG study.  Left temporal occipital seizures and regional cortical irritability.  Improved in number of seizures  vs prior days.  Moderate multifocal irregular slowing, worse in the left hemisphere    MD JULIANA Quevedo  Board Certifications:  Neurology, Clinical Neurophysiology, Epilepsy  Epilepsy Fellowship   , Department of Neurology  [facility]    Reading room: [phone] | Office: [phone]

## 2019-04-26 NOTE — PROGRESS NOTE ADULT - ASSESSMENT
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

## 2019-04-26 NOTE — PROGRESS NOTE ADULT - ASSESSMENT
Impression: Altered mental status due to seizures likely 2/2 to left temporal lesion, differential diagnosis includes neoplastic vs autoimmune encephalitis. Was in nonconvulsive status epilepticus, currently improving on fycompa, phenytoin, and keppra. MR spect does not suggest neoplastic lesion. MRI brain w/ contrast does not show enhancement of the lesion, which makes infiltrative glioma less likely, as well as lymphoma, though lymphoma not entirely excluded. CSF so far also suggests against herpes encephalitis or infectious etiology. CSF flow cytometry and cytopathology have both been negative. CT c/a/p shows enhancement of bilateral kidneys, concerning for infection vs neoplastic process. Will need further workup for left temporal lesion.    Plan:  -off acyclovir  -c/w video EEG monitoring  -epilepsy team recommendations noted - will c/w Keppra 1g TID;  decreased Fycompa to 4 mg QHS, and phenytoin 100 TID. Will check daily phenytoin levels along with hepatic panel. goal PHE level of 15-20.  -will f/u LP results from 4/24 per ID recs for: csf fungal culture, AFB culture, cytology, spinal fluid autoimmune encephalitis panel, Autoimmune epilepsy panel   -will f/u serum autoimmune encephalitis panel  -will proceed with brain biopsy scheduled with Dr. Ruiz on 4/29 for left posterior temporal lesion. NPO past midnight on 4/28.   -endocrine evaluation noted - patient started on lantus and moderate sliding scale. will f/u for discharge recommendations  -case was discussed with patient's cardiology, and patient is on Eliquis for atrial fibrillation. will place on lovenox 1 mg/kg BID as therapeutic dose for now. will f/u with NSGY regarding timing of discontinuing lovenox prior to surgery  -nephrology recommendations noted - ordered SPEP and UPEP, less likely to be pyelonephritis  -will obtain medical clearance for brain biopsy Impression: Altered mental status due to seizures likely 2/2 to left temporal lesion, differential diagnosis includes neoplastic vs autoimmune encephalitis. Was in nonconvulsive status epilepticus, currently improving on fycompa, phenytoin, keppra, and ativan. MR spect does not suggest neoplastic lesion. MRI brain w/ contrast does not show enhancement of the lesion, which makes infiltrative glioma less likely, as well as lymphoma, though lymphoma is not entirely excluded. CSF so far also suggests against herpes encephalitis or infectious etiology. CSF flow cytometry and cytopathology have both been negative. CT c/a/p shows enhancement of bilateral kidneys, concerning for infection vs neoplastic process. Though the renal enhancement could be tumor, it is not clear if the same process is ongoing intracranially. For that reason, may not be useful to biopsy the kidney, as it may not yield full diagnostic information regarding the intracranial process.    Plan:  -off acyclovir  -c/w video EEG monitoring  -epilepsy team recommendations noted - will c/w Keppra 1g TID;  decreased Fycompa to 4 mg QHS, and phenytoin 100 TID. Will check daily phenytoin levels along with hepatic panel. goal PHE level of 15-20.  -will f/u LP results from 4/24 per ID recs for: csf fungal culture, AFB culture, cytology, spinal fluid autoimmune encephalitis panel, Autoimmune epilepsy panel   -will f/u serum autoimmune encephalitis panel  -will proceed with brain biopsy scheduled with Dr. Ruiz on 4/29 for left posterior temporal lesion. NPO past midnight on 4/28.   -endocrine evaluation noted - patient started on lantus and moderate sliding scale. will f/u for discharge recommendations  -case was discussed with patient's cardiology, and patient is on Eliquis for atrial fibrillation. c/w lovenox 1 mg/kg BID as therapeutic dose for now. will f/u with NSGY regarding timing of discontinuing lovenox prior to surgery  -nephrology recommendations noted - ordered SPEP and UPEP, less likely to be pyelonephritis  -will ask renal regarding management of HTN medications  -will obtain medical clearance for brain biopsy Impression: Altered mental status due to seizures likely 2/2 to left temporal lesion, differential diagnosis includes neoplastic vs autoimmune encephalitis. Was in nonconvulsive status epilepticus, currently improving on fycompa, phenytoin, keppra, and ativan. MR spect does not suggest neoplastic lesion. MRI brain w/ contrast does not show enhancement of the lesion, which makes infiltrative glioma less likely, as well as lymphoma, though lymphoma is not entirely excluded. CSF so far also suggests against herpes encephalitis or infectious etiology. CSF flow cytometry and cytopathology have both been negative. CT c/a/p shows enhancement of bilateral kidneys, concerning for infection vs neoplastic process. Though the renal enhancement could be tumor, it is not clear if the same process is ongoing intracranially. For that reason, may not be useful to biopsy the kidney, as it may not yield full diagnostic information regarding the intracranial process.    Plan:  -off acyclovir  -c/w video EEG monitoring  -epilepsy team recommendations noted - will c/w Keppra 1g TID;  decreased Fycompa to 4 mg QHS, and phenytoin 100 TID. Will check daily phenytoin levels along with hepatic panel. goal PHE level of 15-20.  -will f/u LP results from 4/24 per ID recs for: csf fungal culture, AFB culture, cytology, spinal fluid autoimmune encephalitis panel, Autoimmune epilepsy panel   -will f/u serum autoimmune encephalitis panel  -will proceed with brain biopsy scheduled with Dr. Ruiz on 4/29 for left posterior temporal lesion. NPO past midnight on 4/28.   -endocrine evaluation noted - patient started on lantus and moderate sliding scale. will f/u for discharge recommendations  -case was discussed with patient's cardiology, and patient is on Eliquis for atrial fibrillation. c/w lovenox 1 mg/kg BID as therapeutic dose for now. will f/u with NSGY regarding timing of discontinuing lovenox prior to surgery  -nephrology recommendations noted - ordered SPEP and UPEP, less likely to be pyelonephritis  -will ask renal regarding management of HTN medications  -will obtain medical clearance for brain biopsy  -will consider hematology evaluation for evaluation of lymphoma/leukemia

## 2019-04-26 NOTE — CONSULT NOTE ADULT - ASSESSMENT
69F w/ CAD, DM, HTN, HLD, hypothyroid, on eliquis, ASA, s/p umbilical hernia repair presented as a level 1 trauma activation. Patient brought in by ambulance after being found down by family. Course complicated by acute respiratory failure s/p intubation , seizures, found to have brain mass on MRI. Hematology consulted for workup of possible lymphoma.    Seizures- found to have on MRI brain: " Left posterior lateral temporal and parietal occipital hyperintense T2   and FLAIR signal . There  is a 4 x 6 mm  enhancement  in the left posterior lateral lateral  temporal lobe with questionable leptomeningeal enhancement and slight  susceptibility. Differential  considerations include atypical cavernoma  or  posterior reversible encephalopathy, mass such as low-grade neoplasm,  infectious, and or inflammatory change are not excluded."  s/p LP, flow cytometry negative  CT chest abdomen pelvis showing: "  Heterogeneous enhancement of the  kidneys bilaterally, with patchy areas of decreased attenuation." --> consider MRI to further evaluate  check LDH, uric acid  supposed to go for biopsy by neurosurg next week, ideally try to hold off steroids but if clinically needed--> appreciate neurology and neurosurg recs with regards to steroids  consider neuro onc evaluation  no lymphadenopathy on CT chest /abdomen and pelvis.    Anemia- hgb 9.5, MCV 78  check iron studies, b12, folic acid, LDH, reticulocyte count, haptoglobin

## 2019-04-26 NOTE — PROGRESS NOTE ADULT - SUBJECTIVE AND OBJECTIVE BOX
CC: f/u for L temp lobe lesion    Patient reports still confused at times, EEG in progress    REVIEW OF SYSTEMS:  All other review of systems negative (Comprehensive ROS)    Antimicrobials off  Medications Reviewed    Vital Signs Last 24 Hrs  T(F): 98.4 (26 Apr 2019 15:12), Max: 98.4 (26 Apr 2019 15:12)  HR: 82 (26 Apr 2019 15:12) (66 - 85)  BP: 161/84 (26 Apr 2019 15:12) (149/82 - 180/98)  BP(mean): --  RR: 20 (26 Apr 2019 15:12) (18 - 20)  SpO2: 95% (26 Apr 2019 15:12) (94% - 100%)    PHYSICAL EXAM:  General: no acute distress  Eyes:  anicteric, no conjunctival injection, no discharge  Oropharynx: no lesions or injection 	  Neck: supple, without adenopathy  Lungs: clear to auscultation  Heart: regular rate and rhythm; no murmur, rubs or gallops  Abdomen: soft, nondistended, nontender, without mass or organomegaly  Skin: no lesions  Extremities: no clubbing, cyanosis, or edema  Neurologic: arouses easily, moves all extremities    LAB RESULTS:                        9.5    4.70  )-----------( 242      ( 26 Apr 2019 07:44 )             30.9   04-26    144  |  108  |  7   ----------------------------<  107<H>  3.5   |  25  |  0.84    Ca    9.2      26 Apr 2019 06:42  Phos  4.1     04-26  Mg     1.9     04-26    TPro  x   /  Alb  2637<L>  /  TBili  x   /  DBili  x   /  AST  x   /  ALT  x   /  AlkPhos  x   04-24    Cerebrospinal Fluid Cell Count-1 (04.24.19 @ 13:10)    CSF Segmented Neutrophils: 5 %    CSF Monocytes/Macrophages: 4 %    CSF Lymphocytes: 91 %    CSF Appearance: Clear    CSF Color: No Color    Total Nucleated Cell Count, CSF: 2 /uL    MICROBIOLOGY:  RECENT CULTURES:  Culture - CSF with Gram Stain . (04.19.19 @ 16:57)    Gram Stain:   No polymorphonuclear cells seen  No organisms seen  by cytocentrifuge    Specimen Source: .CSF    Culture Results:   No growth    Herpes Simplex Virus 1/2 PCR, CSF (04.24.19 @ 22:05)    Source HSV 1/2: CSF    HSV 1/2 PCR: [facility]    Herpes Simplex Virus 1/2 PCR, CSF (04.19.19 @ 22:40)    Source HSV 1/2: CSF    HSV 1/2 PCR: [facility]:    RADIOLOGY REVIEWED:  CT Abdomen and Pelvis w/ IV Cont (04.25.19 @ 16:16) >  Heterogeneous enhancement of the kidneys bilaterally, with   striated nephrograms bilaterally. Differential diagnosis includes   infection such as pyelonephritis, as well as ischemia and infiltrative   processes such is lymphoma and leukemia.  Correlation with urinalysis is   recommended.  Infiltration of the periumbilical subcutaneous tissues again noted.      MR Head No Cont (04.19.19 @ 05:44) >  Left posterior lateral temporal and parietal occipital hyperintense T2   and FLAIR signal with faint hyperintense DWI signal and ADC T2 shine   through, with  slight  restricted diffusion, series 300 image 18. There   is a 4 x 6 mm  enhancement  in the left posterior lateral lateral   temporal lobe with questionable leptomeningeal enhancement and slight   susceptibility. Differential  considerations include atypical cavernoma   or  posterior reversible encephalopathy, mass such as low-grade neoplasm,   infectious, and or inflammatory change are not excluded.

## 2019-04-26 NOTE — PROGRESS NOTE ADULT - PROBLEM SELECTOR PLAN 1
-Test BG ac and hs. Change to q6h once NPO  -Decrease Lantus dose to 16 units q hs. Give 14 4/28 if NPO for biopsy on 4/29.  -C/w Humalog moderate correction scales ac and hs until pt eating more consistently.  -No need for premeal insulin yet since pt has unpredictable PO intake and BGs are at goal while on correction scales alone.   -Plan discussed with pt/team. Dis charge recs will depend on PO intake/insulin needs and glycemic control in hospital  Contact info: [phone] (24/7). pager [phone]

## 2019-04-26 NOTE — PROGRESS NOTE ADULT - SUBJECTIVE AND OBJECTIVE BOX
Neurology Follow up note    Patient is a 69y old  Female who presents with a chief complaint of NCSE (25 Apr 2019 16:51)      Subjective:Interval History - still having seizures, had 5 sz from left temporal-occipital region overnight.    Objective:   Vital Signs Last 24 Hrs  T(C): 36.6 (26 Apr 2019 09:49), Max: 36.8 (25 Apr 2019 16:31)  T(F): 97.9 (26 Apr 2019 09:49), Max: 98.2 (25 Apr 2019 16:31)  HR: 75 (26 Apr 2019 09:49) (66 - 85)  BP: 149/82 (26 Apr 2019 09:49) (149/82 - 180/98)  BP(mean): --  RR: 19 (26 Apr 2019 09:49) (18 - 20)  SpO2: 98% (26 Apr 2019 09:49) (94% - 100%)    General Exam:   General appearance: No acute distress                 Cardiovascular: Pedal dorsalis pulses intact bilaterally    Neurological Exam:  Mental Status: Orientated to self, not to month or year, even when options were given. did not identify pen or thumb.  Cranial Nerves: EOMI, no nystagmus. No facial asymmetry.      Strength: 5/5 UE and LE.    Deep Tendon Reflexes: 2+ bilateral biceps, brachioradialis, knee. Toes mute bilaterally        Other:    04-26    144  |  108  |  7   ----------------------------<  107<H>  3.5   |  25  |  0.84    Ca    9.2      26 Apr 2019 06:42  Phos  4.1     04-26  Mg     1.9     04-26    TPro  x   /  Alb  2637<L>  /  TBili  x   /  DBili  x   /  AST  x   /  ALT  x   /  AlkPhos  x   04-24 04-26    144  |  108  |  7   ----------------------------<  107<H>  3.5   |  25  |  0.84    Ca    9.2      26 Apr 2019 06:42  Phos  4.1     04-26  Mg     1.9     04-26    TPro  x   /  Alb  2637<L>  /  TBili  x   /  DBili  x   /  AST  x   /  ALT  x   /  AlkPhos  x   04-24    LIVER FUNCTIONS - ( 24 Apr 2019 22:05 )  Alb: 2637 mg/dL / Pro: x     / ALK PHOS: x     / ALT: x     / AST: x     / GGT: x                                 9.5    4.70  )-----------( 242      ( 26 Apr 2019 07:44 )             30.9     Radiology    EKG:  tele:  TTE:  EEG:      MEDICATIONS  (STANDING):  ALBUTerol/ipratropium for Nebulization 3 milliLiter(s) Nebulizer every 6 hours  ALBUTerol/ipratropium for Nebulization. 3 milliLiter(s) Nebulizer once  amLODIPine   Tablet 10 milliGRAM(s) Oral daily  dextrose 5%. 1000 milliLiter(s) (50 mL/Hr) IV Continuous <Continuous>  dextrose 50% Injectable 12.5 Gram(s) IV Push once  dextrose 50% Injectable 25 Gram(s) IV Push once  dextrose 50% Injectable 25 Gram(s) IV Push once  docusate sodium 100 milliGRAM(s) Oral daily  enoxaparin Injectable 100 milliGRAM(s) SubCutaneous two times a day  fosphenytoin IVPB 100 milliGRAM(s) PE IV Intermittent every 8 hours  hydrALAZINE 100 milliGRAM(s) Oral every 8 hours  insulin glargine Injectable (LANTUS) 18 Unit(s) SubCutaneous at bedtime  insulin lispro (HumaLOG) corrective regimen sliding scale   SubCutaneous three times a day before meals  insulin lispro (HumaLOG) corrective regimen sliding scale   SubCutaneous at bedtime  labetalol 200 milliGRAM(s) Oral three times a day  levETIRAcetam  IVPB 1000 milliGRAM(s) IV Intermittent <User Schedule>  levothyroxine 50 MICROGram(s) Oral daily  LORazepam     Tablet 1 milliGRAM(s) Oral three times a day  perampanel 4 milliGRAM(s) Oral at bedtime  polyethylene glycol 3350 17 Gram(s) Oral two times a day  senna 1 Tablet(s) Oral daily    MEDICATIONS  (PRN):  acetaminophen    Suspension .. 975 milliGRAM(s) Oral every 6 hours PRN Temp greater or equal to 38.5C (101.3F), Mild Pain (1 - 3)  bisacodyl Suppository 10 milliGRAM(s) Rectal daily PRN Constipation  dextrose 40% Gel 15 Gram(s) Oral once PRN Blood Glucose LESS THAN 70 milliGRAM(s)/deciliter  glucagon  Injectable 1 milliGRAM(s) IntraMuscular once PRN Glucose LESS THAN 70 milligrams/deciliter  LORazepam   Injectable 2 milliGRAM(s) IV Push once PRN Seizure Neurology Follow up note    Patient is a 69y old  Female who presents with a chief complaint of NCSE (25 Apr 2019 16:51)      Subjective:Interval History - still having seizures, had 5 sz from left temporal-occipital region overnight. c/o HA.    Objective:   Vital Signs Last 24 Hrs  T(C): 36.6 (26 Apr 2019 09:49), Max: 36.8 (25 Apr 2019 16:31)  T(F): 97.9 (26 Apr 2019 09:49), Max: 98.2 (25 Apr 2019 16:31)  HR: 75 (26 Apr 2019 09:49) (66 - 85)  BP: 149/82 (26 Apr 2019 09:49) (149/82 - 180/98)  BP(mean): --  RR: 19 (26 Apr 2019 09:49) (18 - 20)  SpO2: 98% (26 Apr 2019 09:49) (94% - 100%)    General Exam:   General appearance: No acute distress                 Cardiovascular: Pedal dorsalis pulses intact bilaterally    Neurological Exam:  Mental Status: Orientated to self, not to month or year, even when options were given. did not identify pen or thumb.  Cranial Nerves: EOMI, no nystagmus. No facial asymmetry.      Strength: 5/5 UE and LE.    Deep Tendon Reflexes: 2+ bilateral biceps, brachioradialis, knee. Toes mute bilaterally        Other:    04-26    144  |  108  |  7   ----------------------------<  107<H>  3.5   |  25  |  0.84    Ca    9.2      26 Apr 2019 06:42  Phos  4.1     04-26  Mg     1.9     04-26    TPro  x   /  Alb  2637<L>  /  TBili  x   /  DBili  x   /  AST  x   /  ALT  x   /  AlkPhos  x   04-24 04-26    144  |  108  |  7   ----------------------------<  107<H>  3.5   |  25  |  0.84    Ca    9.2      26 Apr 2019 06:42  Phos  4.1     04-26  Mg     1.9     04-26    TPro  x   /  Alb  2637<L>  /  TBili  x   /  DBili  x   /  AST  x   /  ALT  x   /  AlkPhos  x   04-24    LIVER FUNCTIONS - ( 24 Apr 2019 22:05 )  Alb: 2637 mg/dL / Pro: x     / ALK PHOS: x     / ALT: x     / AST: x     / GGT: x                                 9.5    4.70  )-----------( 242      ( 26 Apr 2019 07:44 )             30.9     Radiology    EKG:  tele:  TTE:  EEG:      MEDICATIONS  (STANDING):  ALBUTerol/ipratropium for Nebulization 3 milliLiter(s) Nebulizer every 6 hours  ALBUTerol/ipratropium for Nebulization. 3 milliLiter(s) Nebulizer once  amLODIPine   Tablet 10 milliGRAM(s) Oral daily  dextrose 5%. 1000 milliLiter(s) (50 mL/Hr) IV Continuous <Continuous>  dextrose 50% Injectable 12.5 Gram(s) IV Push once  dextrose 50% Injectable 25 Gram(s) IV Push once  dextrose 50% Injectable 25 Gram(s) IV Push once  docusate sodium 100 milliGRAM(s) Oral daily  enoxaparin Injectable 100 milliGRAM(s) SubCutaneous two times a day  fosphenytoin IVPB 100 milliGRAM(s) PE IV Intermittent every 8 hours  hydrALAZINE 100 milliGRAM(s) Oral every 8 hours  insulin glargine Injectable (LANTUS) 18 Unit(s) SubCutaneous at bedtime  insulin lispro (HumaLOG) corrective regimen sliding scale   SubCutaneous three times a day before meals  insulin lispro (HumaLOG) corrective regimen sliding scale   SubCutaneous at bedtime  labetalol 200 milliGRAM(s) Oral three times a day  levETIRAcetam  IVPB 1000 milliGRAM(s) IV Intermittent <User Schedule>  levothyroxine 50 MICROGram(s) Oral daily  LORazepam     Tablet 1 milliGRAM(s) Oral three times a day  perampanel 4 milliGRAM(s) Oral at bedtime  polyethylene glycol 3350 17 Gram(s) Oral two times a day  senna 1 Tablet(s) Oral daily    MEDICATIONS  (PRN):  acetaminophen    Suspension .. 975 milliGRAM(s) Oral every 6 hours PRN Temp greater or equal to 38.5C (101.3F), Mild Pain (1 - 3)  bisacodyl Suppository 10 milliGRAM(s) Rectal daily PRN Constipation  dextrose 40% Gel 15 Gram(s) Oral once PRN Blood Glucose LESS THAN 70 milliGRAM(s)/deciliter  glucagon  Injectable 1 milliGRAM(s) IntraMuscular once PRN Glucose LESS THAN 70 milligrams/deciliter  LORazepam   Injectable 2 milliGRAM(s) IV Push once PRN Seizure

## 2019-04-27 LAB
ALBUMIN SERPL ELPH-MCNC: 3.1 G/DL — LOW (ref 3.3–5)
ALP SERPL-CCNC: 291 U/L — HIGH (ref 40–120)
ALT FLD-CCNC: 199 U/L — HIGH (ref 10–45)
ANION GAP SERPL CALC-SCNC: 12 MMOL/L — SIGNIFICANT CHANGE UP (ref 5–17)
APPEARANCE UR: CLEAR — SIGNIFICANT CHANGE UP
AST SERPL-CCNC: 304 U/L — HIGH (ref 10–40)
BILIRUB SERPL-MCNC: 0.2 MG/DL — SIGNIFICANT CHANGE UP (ref 0.2–1.2)
BILIRUB UR-MCNC: NEGATIVE — SIGNIFICANT CHANGE UP
BUN SERPL-MCNC: 10 MG/DL — SIGNIFICANT CHANGE UP (ref 7–23)
CALCIUM SERPL-MCNC: 8.8 MG/DL — SIGNIFICANT CHANGE UP (ref 8.4–10.5)
CALCIUM UR-MCNC: 1.7 MG/DL — SIGNIFICANT CHANGE UP
CGA FLD-MCNC: 39 NG/ML — SIGNIFICANT CHANGE UP
CHLORIDE SERPL-SCNC: 108 MMOL/L — SIGNIFICANT CHANGE UP (ref 96–108)
CO2 SERPL-SCNC: 22 MMOL/L — SIGNIFICANT CHANGE UP (ref 22–31)
COLOR SPEC: YELLOW — SIGNIFICANT CHANGE UP
CREAT SERPL-MCNC: 0.95 MG/DL — SIGNIFICANT CHANGE UP (ref 0.5–1.3)
DIFF PNL FLD: NEGATIVE — SIGNIFICANT CHANGE UP
FOLATE SERPL-MCNC: 10.8 NG/ML — SIGNIFICANT CHANGE UP
GLUCOSE SERPL-MCNC: 108 MG/DL — HIGH (ref 70–99)
GLUCOSE UR QL: NEGATIVE — SIGNIFICANT CHANGE UP
HAPTOGLOB SERPL-MCNC: 193 MG/DL — SIGNIFICANT CHANGE UP (ref 34–200)
HCT VFR BLD CALC: 31.2 % — LOW (ref 34.5–45)
HGB BLD-MCNC: 9.7 G/DL — LOW (ref 11.5–15.5)
INR BLD: 1.17 RATIO — HIGH (ref 0.88–1.16)
IRON SATN MFR SERPL: 17 % — SIGNIFICANT CHANGE UP (ref 14–50)
IRON SATN MFR SERPL: 51 UG/DL — SIGNIFICANT CHANGE UP (ref 30–160)
KETONES UR-MCNC: NEGATIVE — SIGNIFICANT CHANGE UP
LDH SERPL L TO P-CCNC: 526 U/L — HIGH (ref 50–242)
LEUKOCYTE ESTERASE UR-ACNC: NEGATIVE — SIGNIFICANT CHANGE UP
MAGNESIUM SERPL-MCNC: 2 MG/DL — SIGNIFICANT CHANGE UP (ref 1.6–2.6)
MCHC RBC-ENTMCNC: 24.5 PG — LOW (ref 27–34)
MCHC RBC-ENTMCNC: 31.1 GM/DL — LOW (ref 32–36)
MCV RBC AUTO: 78.6 FL — LOW (ref 80–100)
NITRITE UR-MCNC: NEGATIVE — SIGNIFICANT CHANGE UP
PH UR: 6 — SIGNIFICANT CHANGE UP (ref 5–8)
PHENYTOIN FREE SERPL-MCNC: 13.5 UG/ML — SIGNIFICANT CHANGE UP (ref 10–20)
PHOSPHATE SERPL-MCNC: 4.1 MG/DL — SIGNIFICANT CHANGE UP (ref 2.5–4.5)
PLATELET # BLD AUTO: 264 K/UL — SIGNIFICANT CHANGE UP (ref 150–400)
POTASSIUM SERPL-MCNC: 4 MMOL/L — SIGNIFICANT CHANGE UP (ref 3.5–5.3)
POTASSIUM SERPL-SCNC: 4 MMOL/L — SIGNIFICANT CHANGE UP (ref 3.5–5.3)
PROT SERPL-MCNC: 6.4 G/DL — SIGNIFICANT CHANGE UP (ref 6–8.3)
PROT UR-MCNC: NEGATIVE — SIGNIFICANT CHANGE UP
PROTHROM AB SERPL-ACNC: 13.4 SEC — HIGH (ref 10–13.1)
RBC # BLD: 3.94 M/UL — SIGNIFICANT CHANGE UP (ref 3.8–5.2)
RBC # BLD: 3.96 M/UL — SIGNIFICANT CHANGE UP (ref 3.8–5.2)
RBC # FLD: 14.2 % — SIGNIFICANT CHANGE UP (ref 10.3–14.5)
RETICS #: 49.4 K/UL — SIGNIFICANT CHANGE UP (ref 25–125)
RETICS/RBC NFR: 1.2 % — SIGNIFICANT CHANGE UP (ref 0.5–2.5)
SODIUM SERPL-SCNC: 142 MMOL/L — SIGNIFICANT CHANGE UP (ref 135–145)
SP GR SPEC: 1.01 — SIGNIFICANT CHANGE UP (ref 1.01–1.02)
TIBC SERPL-MCNC: 306 UG/DL — SIGNIFICANT CHANGE UP (ref 220–430)
UIBC SERPL-MCNC: 255 UG/DL — SIGNIFICANT CHANGE UP (ref 110–370)
UROBILINOGEN FLD QL: NEGATIVE — SIGNIFICANT CHANGE UP
VIT B12 SERPL-MCNC: 1046 PG/ML — SIGNIFICANT CHANGE UP (ref 232–1245)
WBC # BLD: 5 K/UL — SIGNIFICANT CHANGE UP (ref 3.8–10.5)
WBC # FLD AUTO: 5 K/UL — SIGNIFICANT CHANGE UP (ref 3.8–10.5)

## 2019-04-27 PROCEDURE — 95951: CPT | Mod: 26

## 2019-04-27 PROCEDURE — 99233 SBSQ HOSP IP/OBS HIGH 50: CPT

## 2019-04-27 PROCEDURE — ZZZZZ: CPT

## 2019-04-27 RX ADMIN — Medication 200 MILLIGRAM(S): at 13:18

## 2019-04-27 RX ADMIN — Medication 3 MILLILITER(S): at 06:05

## 2019-04-27 RX ADMIN — Medication 200 MILLIGRAM(S): at 09:31

## 2019-04-27 RX ADMIN — INSULIN GLARGINE 16 UNIT(S): 100 INJECTION, SOLUTION SUBCUTANEOUS at 21:35

## 2019-04-27 RX ADMIN — LEVETIRACETAM 400 MILLIGRAM(S): 250 TABLET, FILM COATED ORAL at 06:04

## 2019-04-27 RX ADMIN — FOSPHENYTOIN 104 MILLIGRAM(S) PE: 50 INJECTION INTRAMUSCULAR; INTRAVENOUS at 21:35

## 2019-04-27 RX ADMIN — Medication 975 MILLIGRAM(S): at 14:00

## 2019-04-27 RX ADMIN — FOSPHENYTOIN 104 MILLIGRAM(S) PE: 50 INJECTION INTRAMUSCULAR; INTRAVENOUS at 06:05

## 2019-04-27 RX ADMIN — Medication 975 MILLIGRAM(S): at 19:46

## 2019-04-27 RX ADMIN — Medication 200 MILLIGRAM(S): at 01:01

## 2019-04-27 RX ADMIN — Medication 1 MILLIGRAM(S): at 21:35

## 2019-04-27 RX ADMIN — Medication 1 MILLIGRAM(S): at 13:19

## 2019-04-27 RX ADMIN — Medication 975 MILLIGRAM(S): at 20:31

## 2019-04-27 RX ADMIN — ENOXAPARIN SODIUM 100 MILLIGRAM(S): 100 INJECTION SUBCUTANEOUS at 18:16

## 2019-04-27 RX ADMIN — Medication 100 MILLIGRAM(S): at 13:19

## 2019-04-27 RX ADMIN — AMLODIPINE BESYLATE 10 MILLIGRAM(S): 2.5 TABLET ORAL at 06:05

## 2019-04-27 RX ADMIN — Medication 100 MILLIGRAM(S): at 21:35

## 2019-04-27 RX ADMIN — Medication 100 MILLIGRAM(S): at 06:05

## 2019-04-27 RX ADMIN — ENOXAPARIN SODIUM 100 MILLIGRAM(S): 100 INJECTION SUBCUTANEOUS at 06:06

## 2019-04-27 RX ADMIN — Medication 3 MILLILITER(S): at 01:02

## 2019-04-27 RX ADMIN — FOSPHENYTOIN 104 MILLIGRAM(S) PE: 50 INJECTION INTRAMUSCULAR; INTRAVENOUS at 13:19

## 2019-04-27 RX ADMIN — Medication 50 MICROGRAM(S): at 06:05

## 2019-04-27 RX ADMIN — Medication 4: at 18:16

## 2019-04-27 RX ADMIN — Medication 975 MILLIGRAM(S): at 13:22

## 2019-04-27 RX ADMIN — PERAMPANEL 4 MILLIGRAM(S): 2 TABLET ORAL at 21:35

## 2019-04-27 RX ADMIN — LEVETIRACETAM 400 MILLIGRAM(S): 250 TABLET, FILM COATED ORAL at 21:35

## 2019-04-27 RX ADMIN — LEVETIRACETAM 400 MILLIGRAM(S): 250 TABLET, FILM COATED ORAL at 13:19

## 2019-04-27 RX ADMIN — Medication 1 MILLIGRAM(S): at 06:05

## 2019-04-27 RX ADMIN — Medication 3 MILLILITER(S): at 13:19

## 2019-04-27 RX ADMIN — Medication 3 MILLILITER(S): at 18:16

## 2019-04-27 NOTE — CONSULT NOTE ADULT - CONSULT REASON
Abnormal LFTs
Level 1 trauma
Seizure; MRI findings.
Type 2 DM with hyperglycemia
ncse
possible tumor
preop evaluation
temporal lobe lesion
pyelonephritis suspected
Seizures
?NACHO
r/o lymphoma

## 2019-04-27 NOTE — PROGRESS NOTE ADULT - ASSESSMENT
69 f for brain mass biopsy  No acute medical condition identified to postpone planned surgical intervention.   Continue present Rx.  Tyrone Antony MD pager [phone]

## 2019-04-27 NOTE — PROGRESS NOTE ADULT - SUBJECTIVE AND OBJECTIVE BOX
CC: f/u for L temp lobe lesion    Patient remains calm, conversant, still confused at times    REVIEW OF SYSTEMS:  All other review of systems negative (Comprehensive ROS)    Antimicrobials off  Medications Reviewed    Vital Signs Last 24 Hrs  T(F): 98 (27 Apr 2019 12:24), Max: 98.4 (26 Apr 2019 15:12)  HR: 78 (27 Apr 2019 12:24) (74 - 82)  BP: 116/65 (27 Apr 2019 12:24) (116/65 - 161/84)  BP(mean): --  RR: 20 (27 Apr 2019 12:24) (18 - 20)  SpO2: 99% (27 Apr 2019 12:24) (92% - 99%)    PHYSICAL EXAM:  General: no acute distress  Eyes:  anicteric, no conjunctival injection, no discharge  Oropharynx: no lesions or injection 	  Neck: supple, without adenopathy  Lungs: clear to auscultation  Heart: regular rate and rhythm; no murmur, rubs or gallops  Abdomen: soft, nondistended, nontender, without mass or organomegaly  Skin: no lesions  Extremities: no clubbing, cyanosis, or edema  Neurologic: arouses easily, moves all extremities    LAB RESULTS:                        9.7    5.0   )-----------( 264      ( 27 Apr 2019 06:36 )             31.2   04-27    142  |  108  |  10  ----------------------------<  108<H>  4.0   |  22  |  0.95    Ca    8.8      27 Apr 2019 06:36  Phos  4.1     04-27  Mg     2.0     04-27    TPro  6.4  /  Alb  3.1<L>  /  TBili  0.2  /  DBili  x   /  AST  304<H>  /  ALT  199<H>  /  AlkPhos  291<H>  04-27      Cerebrospinal Fluid Cell Count-1 (04.24.19 @ 13:10)    Total Nucleated Cell Count, CSF: 2 /uL    MICROBIOLOGY:  RECENT CULTURES:  Culture - CSF with Gram Stain . (04.19.19 @ 16:57)    Gram Stain:   No polymorphonuclear cells seen  No organisms seen  by cytocentrifuge    Specimen Source: .CSF    Culture Results:   No growth    Herpes Simplex Virus 1/2 PCR, CSF (04.24.19 @ 22:05)    Source HSV 1/2: CSF    HSV 1/2 PCR: [facility]    Herpes Simplex Virus 1/2 PCR, CSF (04.19.19 @ 22:40)    Source HSV 1/2: CSF    HSV 1/2 PCR: [facility]:    RADIOLOGY REVIEWED:  CT Abdomen and Pelvis w/ IV Cont (04.25.19 @ 16:16) >  Heterogeneous enhancement of the kidneys bilaterally, with   striated nephrograms bilaterally. Differential diagnosis includes   infection such as pyelonephritis, as well as ischemia and infiltrative   processes such is lymphoma and leukemia.  Correlation with urinalysis is   recommended.  Infiltration of the periumbilical subcutaneous tissues again noted.      MR Head No Cont (04.19.19 @ 05:44) >  Left posterior lateral temporal and parietal occipital hyperintense T2   and FLAIR signal with faint hyperintense DWI signal and ADC T2 shine   through, with  slight  restricted diffusion, series 300 image 18. There   is a 4 x 6 mm  enhancement  in the left posterior lateral lateral   temporal lobe with questionable leptomeningeal enhancement and slight   susceptibility. Differential  considerations include atypical cavernoma   or  posterior reversible encephalopathy, mass such as low-grade neoplasm,   infectious, and or inflammatory change are not excluded.

## 2019-04-27 NOTE — CONSULT NOTE ADULT - CONSULT REQUESTED BY NAME
[facility]
Dr. Burnham
Dr. Martinez
ED
Jose Shaw
Primary Team
gen neuro/nscu
Neurology
Dr. Armas
Dr. Burnham

## 2019-04-27 NOTE — CONSULT NOTE ADULT - ASSESSMENT
Impression:  1) Abnormal LFTs - DDx most likely medication induced dili as patient has been on many hepatotoix agents such as keppra and valcyte.  It is unclear the trend of these enzymes is the first repeat since admission.  DDx also includes viral induced given known HSV vs much less likely autoimmune.    recommendations:   - please send auto immune work up: anti-mitochondrial, anti-smooth muscle, anti-LKM, anti-liver cytosol antibody, MIGUEL, Immunoglobulin leveles   - please send viral hepatitis panel: Hep A, Hep B, Hep C, Hep E  antibody and DNA PCR CMV, EBV, and HSV PCR and IgM   - please send Utox, tylenol level, ASA level, ethanol level   - please send daily LFT's and INR   - obtain abdominal US with sono   - avoid hepatotoxic agents if possible    Bethany Alciea, PGY-4  Gastroenterology Fellow  Pager [phone] or [phone]  (After 5 pm or on weekends please page GI on call)

## 2019-04-27 NOTE — PROGRESS NOTE ADULT - SUBJECTIVE AND OBJECTIVE BOX
Patient is a 69y old  Female who presents with a chief complaint of level 2 trauma (2019 20:24)      SUBJECTIVE / OVERNIGHT EVENTS: Comfortable without new complaints.   Review of Systems  chest pain no  palpitations no  sob no  nausea no  headache no    MEDICATIONS  (STANDING):  ALBUTerol/ipratropium for Nebulization 3 milliLiter(s) Nebulizer every 6 hours  ALBUTerol/ipratropium for Nebulization. 3 milliLiter(s) Nebulizer once  amLODIPine   Tablet 10 milliGRAM(s) Oral daily  dextrose 5%. 1000 milliLiter(s) (50 mL/Hr) IV Continuous <Continuous>  dextrose 50% Injectable 12.5 Gram(s) IV Push once  dextrose 50% Injectable 25 Gram(s) IV Push once  dextrose 50% Injectable 25 Gram(s) IV Push once  docusate sodium 100 milliGRAM(s) Oral daily  enoxaparin Injectable 100 milliGRAM(s) SubCutaneous two times a day  fosphenytoin IVPB 100 milliGRAM(s) PE IV Intermittent every 8 hours  hydrALAZINE 100 milliGRAM(s) Oral every 8 hours  insulin glargine Injectable (LANTUS) 16 Unit(s) SubCutaneous at bedtime  insulin lispro (HumaLOG) corrective regimen sliding scale   SubCutaneous three times a day before meals  insulin lispro (HumaLOG) corrective regimen sliding scale   SubCutaneous at bedtime  ketorolac   Injectable 15 milliGRAM(s) IV Push once  labetalol 200 milliGRAM(s) Oral three times a day  levETIRAcetam  IVPB 1000 milliGRAM(s) IV Intermittent <User Schedule>  levothyroxine 50 MICROGram(s) Oral daily  LORazepam     Tablet 1 milliGRAM(s) Oral three times a day  perampanel 4 milliGRAM(s) Oral at bedtime  polyethylene glycol 3350 17 Gram(s) Oral two times a day  senna 1 Tablet(s) Oral daily    MEDICATIONS  (PRN):  acetaminophen    Suspension .. 975 milliGRAM(s) Oral every 6 hours PRN Temp greater or equal to 38.5C (101.3F), Mild Pain (1 - 3)  bisacodyl Suppository 10 milliGRAM(s) Rectal daily PRN Constipation  dextrose 40% Gel 15 Gram(s) Oral once PRN Blood Glucose LESS THAN 70 milliGRAM(s)/deciliter  glucagon  Injectable 1 milliGRAM(s) IntraMuscular once PRN Glucose LESS THAN 70 milligrams/deciliter  LORazepam   Injectable 2 milliGRAM(s) IV Push once PRN Seizure      Vital Signs Last 24 Hrs  T(C): 36.7 (2019 12:24), Max: 36.9 (2019 15:12)  T(F): 98 (2019 12:24), Max: 98.4 (2019 15:12)  HR: 78 (2019 12:24) (74 - 82)  BP: 116/65 (2019 12:24) (116/65 - 161/84)  BP(mean): --  RR: 20 (2019 12:24) (18 - 20)  SpO2: 99% (2019 12:24) (92% - 99%)    PHYSICAL EXAM:  GENERAL: NAD, well-developed  HEAD:  Atraumatic, Normocephalic  EYES: EOMI, PERRLA, conjunctiva and sclera clear  NECK: Supple, No JVD  CHEST/LUNG: Clear to auscultation bilaterally; No wheeze  HEART: Regular rate and rhythm; No murmurs, rubs, or gallops  ABDOMEN: Soft, Nontender, Nondistended; Bowel sounds present  EXTREMITIES:  2+ Peripheral Pulses, No clubbing, cyanosis, or edema  PSYCH: AAOx3  NEUROLOGY: non-focal  SKIN: No rashes or lesions    LABS:                        9.7    5.0   )-----------( 264      ( 2019 06:36 )             31.2         142  |  108  |  10  ----------------------------<  108<H>  4.0   |  22  |  0.95    Ca    8.8      2019 06:36  Phos  4.1       Mg     2.0         TPro  6.4  /  Alb  3.1<L>  /  TBili  0.2  /  DBili  x   /  AST  304<H>  /  ALT  199<H>  /  AlkPhos  291<H>      PT/INR - ( 2019 09:52 )   PT: 13.4 sec;   INR: 1.17 ratio               Urinalysis Basic - ( 2019 10:22 )    Color: Yellow / Appearance: Clear / S.012 / pH: x  Gluc: x / Ketone: Negative  / Bili: Negative / Urobili: Negative   Blood: x / Protein: Negative / Nitrite: Negative   Leuk Esterase: Negative / RBC: x / WBC x   Sq Epi: x / Non Sq Epi: x / Bacteria: x        Culture - CSF with Gram Stain (collected 2019 17:17)  Source: .CSF  Gram Stain (2019 18:29):    No polymorphonuclear cells seen    No organisms seen    by cytocentrifuge  Preliminary Report (2019 16:20):    No growth    Culture - Fungal, CSF (collected 2019 17:17)  Source: .CSF  Preliminary Report (2019 08:54):    Testing in progress    EKG SR NSST/T    RADIOLOGY & ADDITIONAL TESTS:    Imaging Personally Reviewed:    Consultant(s) Notes Reviewed:      Care Discussed with Consultants/Other Providers:

## 2019-04-27 NOTE — CONSULT NOTE ADULT - SUBJECTIVE AND OBJECTIVE BOX
Chief Complaint:  Patient is a 69y old  Female who presents with a chief complaint of level 2 trauma (2019 20:24)      HPI:  The patient is a 69F w/ CAD, DM, HTN, HLD, hypothyroid, on eliquis, ASA, s/p umbilical hernia repair ~2 weeks ago, presented as trauma activation on . Patient brought in by ambulance after being found down by family. Patient unable to answer questions, history obtained from family. Patient's family heard patient calling out for help and found her on the ground. She was noted to be less conversant than normal and confused.    Patient has been admitted since then and was was intubated for airway protection.  She was initally admitted to SICU and then began having subclinical siezures . Patient had LP and MRI with Left posterior lateral temporal and parietal occipital hyperintense T2 FLAIR suspicious for herpes encephalitis - started on Acyclovir. For the seizures she was also started on keppra, fycompa, and vimpat.  The patient LFTs on arrival where normal bilirubin, ALP/AST/ALT where normal.  Patients LFTs repeated today aand bilirubin was still normal but ALP/AST/ALP was 291/304/199 so GI was called.  Patient has no abdominal imaging and has had no further work up.      Allergies:  No Known Allergies      Home Medications:    Hospital Medications:  acetaminophen    Suspension .. 975 milliGRAM(s) Oral every 6 hours PRN  ALBUTerol/ipratropium for Nebulization 3 milliLiter(s) Nebulizer every 6 hours  ALBUTerol/ipratropium for Nebulization. 3 milliLiter(s) Nebulizer once  amLODIPine   Tablet 10 milliGRAM(s) Oral daily  bisacodyl Suppository 10 milliGRAM(s) Rectal daily PRN  dextrose 40% Gel 15 Gram(s) Oral once PRN  dextrose 5%. 1000 milliLiter(s) IV Continuous <Continuous>  dextrose 50% Injectable 12.5 Gram(s) IV Push once  dextrose 50% Injectable 25 Gram(s) IV Push once  dextrose 50% Injectable 25 Gram(s) IV Push once  docusate sodium 100 milliGRAM(s) Oral daily  enoxaparin Injectable 100 milliGRAM(s) SubCutaneous two times a day  fosphenytoin IVPB 100 milliGRAM(s) PE IV Intermittent every 8 hours  glucagon  Injectable 1 milliGRAM(s) IntraMuscular once PRN  hydrALAZINE 100 milliGRAM(s) Oral every 8 hours  insulin glargine Injectable (LANTUS) 16 Unit(s) SubCutaneous at bedtime  insulin lispro (HumaLOG) corrective regimen sliding scale   SubCutaneous three times a day before meals  insulin lispro (HumaLOG) corrective regimen sliding scale   SubCutaneous at bedtime  ketorolac   Injectable 15 milliGRAM(s) IV Push once  labetalol 200 milliGRAM(s) Oral three times a day  levETIRAcetam  IVPB 1000 milliGRAM(s) IV Intermittent <User Schedule>  levothyroxine 50 MICROGram(s) Oral daily  LORazepam     Tablet 1 milliGRAM(s) Oral three times a day  LORazepam   Injectable 2 milliGRAM(s) IV Push once PRN  perampanel 4 milliGRAM(s) Oral at bedtime  polyethylene glycol 3350 17 Gram(s) Oral two times a day  senna 1 Tablet(s) Oral daily      PMHX/PSHX:  Hypothyroid  Obesity  Morbid obesity  Morbidly obese  NACHO (obstructive sleep apnea)  DM (Diabetes Mellitus) (ICD9 250.00)  Dyslipidemia (ICD9 272.4)  HTN (Hypertension) (ICD9 401.9)  CAD (Coronary Artery Disease) (ICD9 414.00)  History of hysterectomy  History of ovarian cystectomy  S/P primary angioplasty with coronary stent  C Section  S/P Appendectomy  S/P Coronary Angioplasty      Family history:  Family history of heart disease (Aunt)  No pertinent family history in first degree relatives      Social History:     ROS:     General:  No wt loss, fevers, chills, night sweats, fatigue,   Eyes:  Good vision, no reported pain  ENT:  No sore throat, pain, runny nose, dysphagia  CV:  No pain, palpitations, hypo/hypertension  Resp:  No dyspnea, cough, tachypnea, wheezing  GI:  See HPI  :  No pain, bleeding, incontinence, nocturia  Muscle:  No pain, weakness  Neuro:  No weakness, tingling, memory problems  Psych:  No fatigue, insomnia, mood problems, depression  Endocrine:  No polyuria, polydipsia, cold/heat intolerance  Heme:  No petechiae, ecchymosis, easy bruisability  Skin:  No rash, edema      PHYSICAL EXAM:     GENERAL:  NAD  CHEST:  Full & symmetric excursion  HEART:  Regular rhythm, no abdominal bruit, no edema  ABDOMEN:  Soft, non-tender, non-distended, normoactive bowel sounds,  no masses , no hepatosplenomegaly  EXTREMITIES:  no cyanosis,clubbing or edema  SKIN:  No rash/erythema/ecchymoses/petechiae/wounds/abscess/warm/dry  NEURO:  Alert, oriented    Vital Signs:  Vital Signs Last 24 Hrs  T(C): 36.7 (2019 12:24), Max: 36.9 (2019 20:10)  T(F): 98 (2019 12:24), Max: 98.4 (2019 20:10)  HR: 78 (2019 12:24) (74 - 79)  BP: 116/65 (2019 12:24) (116/65 - 149/82)  BP(mean): --  RR: 20 (2019 12:24) (18 - 20)  SpO2: 99% (2019 12:24) (92% - 99%)  Daily     Daily     LABS:                        9.7    5.0   )-----------( 264      ( 2019 06:36 )             31.2         142  |  108  |  10  ----------------------------<  108<H>  4.0   |  22  |  0.95    Ca    8.8      2019 06:36  Phos  4.1       Mg     2.0         TPro  6.4  /  Alb  3.1<L>  /  TBili  0.2  /  DBili  x   /  AST  304<H>  /  ALT  199<H>  /  AlkPhos  291<H>      LIVER FUNCTIONS - ( 2019 06:36 )  Alb: 3.1 g/dL / Pro: 6.4 g/dL / ALK PHOS: 291 U/L / ALT: 199 U/L / AST: 304 U/L / GGT: x           PT/INR - ( 2019 09:52 )   PT: 13.4 sec;   INR: 1.17 ratio           Urinalysis Basic - ( 2019 10:22 )    Color: Yellow / Appearance: Clear / S.012 / pH: x  Gluc: x / Ketone: Negative  / Bili: Negative / Urobili: Negative   Blood: x / Protein: Negative / Nitrite: Negative   Leuk Esterase: Negative / RBC: x / WBC x   Sq Epi: x / Non Sq Epi: x / Bacteria: x          Imaging:

## 2019-04-27 NOTE — PROGRESS NOTE ADULT - SUBJECTIVE AND OBJECTIVE BOX
Neurology Follow up note    Patient is a 69y old  Female who presents with a chief complaint of NCSE (25 Apr 2019 16:51)      Subjective:Interval History - no overnight events.    Objective:   Vital Signs Last 24 Hrs  T(C): 36.8 (27 Apr 2019 08:09), Max: 36.9 (26 Apr 2019 15:12)  T(F): 98.2 (27 Apr 2019 08:09), Max: 98.4 (26 Apr 2019 15:12)  HR: 77 (27 Apr 2019 08:09) (74 - 82)  BP: 136/61 (27 Apr 2019 08:09) (124/65 - 161/84)  BP(mean): --  RR: 19 (27 Apr 2019 08:09) (18 - 20)  SpO2: 94% (27 Apr 2019 08:09) (92% - 98%)    General Exam:   General appearance: No acute distress                 Cardiovascular: Pedal dorsalis pulses intact bilaterally    Neurological Exam:  Mental Status: Orientated to self, not to month or year, even when options were given. did not identify pen or thumb.  Cranial Nerves: EOMI, no nystagmus. No facial asymmetry.      Strength: 5/5 UE and LE.    Deep Tendon Reflexes: 2+ bilateral biceps, brachioradialis, knee. Toes mute bilaterally        Other:    04-26    144  |  108  |  7   ----------------------------<  107<H>  3.5   |  25  |  0.84    Ca    9.2      26 Apr 2019 06:42  Phos  4.1     04-26  Mg     1.9     04-26    TPro  x   /  Alb  2637<L>  /  TBili  x   /  DBili  x   /  AST  x   /  ALT  x   /  AlkPhos  x   04-24 04-26    144  |  108  |  7   ----------------------------<  107<H>  3.5   |  25  |  0.84    Ca    9.2      26 Apr 2019 06:42  Phos  4.1     04-26  Mg     1.9     04-26    TPro  x   /  Alb  2637<L>  /  TBili  x   /  DBili  x   /  AST  x   /  ALT  x   /  AlkPhos  x   04-24    LIVER FUNCTIONS - ( 24 Apr 2019 22:05 )  Alb: 2637 mg/dL / Pro: x     / ALK PHOS: x     / ALT: x     / AST: x     / GGT: x                                 9.5    4.70  )-----------( 242      ( 26 Apr 2019 07:44 )             30.9     Radiology    EKG:  tele:  TTE:  EEG:      MEDICATIONS  (STANDING):  ALBUTerol/ipratropium for Nebulization 3 milliLiter(s) Nebulizer every 6 hours  ALBUTerol/ipratropium for Nebulization. 3 milliLiter(s) Nebulizer once  amLODIPine   Tablet 10 milliGRAM(s) Oral daily  dextrose 5%. 1000 milliLiter(s) (50 mL/Hr) IV Continuous <Continuous>  dextrose 50% Injectable 12.5 Gram(s) IV Push once  dextrose 50% Injectable 25 Gram(s) IV Push once  dextrose 50% Injectable 25 Gram(s) IV Push once  docusate sodium 100 milliGRAM(s) Oral daily  enoxaparin Injectable 100 milliGRAM(s) SubCutaneous two times a day  fosphenytoin IVPB 100 milliGRAM(s) PE IV Intermittent every 8 hours  hydrALAZINE 100 milliGRAM(s) Oral every 8 hours  insulin glargine Injectable (LANTUS) 18 Unit(s) SubCutaneous at bedtime  insulin lispro (HumaLOG) corrective regimen sliding scale   SubCutaneous three times a day before meals  insulin lispro (HumaLOG) corrective regimen sliding scale   SubCutaneous at bedtime  labetalol 200 milliGRAM(s) Oral three times a day  levETIRAcetam  IVPB 1000 milliGRAM(s) IV Intermittent <User Schedule>  levothyroxine 50 MICROGram(s) Oral daily  LORazepam     Tablet 1 milliGRAM(s) Oral three times a day  perampanel 4 milliGRAM(s) Oral at bedtime  polyethylene glycol 3350 17 Gram(s) Oral two times a day  senna 1 Tablet(s) Oral daily    MEDICATIONS  (PRN):  acetaminophen    Suspension .. 975 milliGRAM(s) Oral every 6 hours PRN Temp greater or equal to 38.5C (101.3F), Mild Pain (1 - 3)  bisacodyl Suppository 10 milliGRAM(s) Rectal daily PRN Constipation  dextrose 40% Gel 15 Gram(s) Oral once PRN Blood Glucose LESS THAN 70 milliGRAM(s)/deciliter  glucagon  Injectable 1 milliGRAM(s) IntraMuscular once PRN Glucose LESS THAN 70 milligrams/deciliter  LORazepam   Injectable 2 milliGRAM(s) IV Push once PRN Seizure Neurology Follow up note    Patient is a 69y old  Female who presents with a chief complaint of NCSE (25 Apr 2019 16:51)      Subjective:Interval History - no overnight events.    Objective:   Vital Signs Last 24 Hrs  T(C): 36.8 (27 Apr 2019 08:09), Max: 36.9 (26 Apr 2019 15:12)  T(F): 98.2 (27 Apr 2019 08:09), Max: 98.4 (26 Apr 2019 15:12)  HR: 77 (27 Apr 2019 08:09) (74 - 82)  BP: 136/61 (27 Apr 2019 08:09) (124/65 - 161/84)  BP(mean): --  RR: 19 (27 Apr 2019 08:09) (18 - 20)  SpO2: 94% (27 Apr 2019 08:09) (92% - 98%)    General Exam:   General appearance: No acute distress                 Cardiovascular: Pedal dorsalis pulses intact bilaterally    Neurological Exam:  Mental Status: Orientated to self and month but not year, counts to 20  Cranial Nerves: EOMI, no nystagmus. No facial asymmetry.      Strength: 5/5 UE and LE.    Deep Tendon Reflexes: 2+ bilateral biceps, brachioradialis, knee. Toes mute bilaterally        Other:    04-26    144  |  108  |  7   ----------------------------<  107<H>  3.5   |  25  |  0.84    Ca    9.2      26 Apr 2019 06:42  Phos  4.1     04-26  Mg     1.9     04-26    TPro  x   /  Alb  2637<L>  /  TBili  x   /  DBili  x   /  AST  x   /  ALT  x   /  AlkPhos  x   04-24 04-26    144  |  108  |  7   ----------------------------<  107<H>  3.5   |  25  |  0.84    Ca    9.2      26 Apr 2019 06:42  Phos  4.1     04-26  Mg     1.9     04-26    TPro  x   /  Alb  2637<L>  /  TBili  x   /  DBili  x   /  AST  x   /  ALT  x   /  AlkPhos  x   04-24    LIVER FUNCTIONS - ( 24 Apr 2019 22:05 )  Alb: 2637 mg/dL / Pro: x     / ALK PHOS: x     / ALT: x     / AST: x     / GGT: x                                 9.5    4.70  )-----------( 242      ( 26 Apr 2019 07:44 )             30.9     Radiology    EKG:  tele:  TTE:  EEG:      MEDICATIONS  (STANDING):  ALBUTerol/ipratropium for Nebulization 3 milliLiter(s) Nebulizer every 6 hours  ALBUTerol/ipratropium for Nebulization. 3 milliLiter(s) Nebulizer once  amLODIPine   Tablet 10 milliGRAM(s) Oral daily  dextrose 5%. 1000 milliLiter(s) (50 mL/Hr) IV Continuous <Continuous>  dextrose 50% Injectable 12.5 Gram(s) IV Push once  dextrose 50% Injectable 25 Gram(s) IV Push once  dextrose 50% Injectable 25 Gram(s) IV Push once  docusate sodium 100 milliGRAM(s) Oral daily  enoxaparin Injectable 100 milliGRAM(s) SubCutaneous two times a day  fosphenytoin IVPB 100 milliGRAM(s) PE IV Intermittent every 8 hours  hydrALAZINE 100 milliGRAM(s) Oral every 8 hours  insulin glargine Injectable (LANTUS) 18 Unit(s) SubCutaneous at bedtime  insulin lispro (HumaLOG) corrective regimen sliding scale   SubCutaneous three times a day before meals  insulin lispro (HumaLOG) corrective regimen sliding scale   SubCutaneous at bedtime  labetalol 200 milliGRAM(s) Oral three times a day  levETIRAcetam  IVPB 1000 milliGRAM(s) IV Intermittent <User Schedule>  levothyroxine 50 MICROGram(s) Oral daily  LORazepam     Tablet 1 milliGRAM(s) Oral three times a day  perampanel 4 milliGRAM(s) Oral at bedtime  polyethylene glycol 3350 17 Gram(s) Oral two times a day  senna 1 Tablet(s) Oral daily    MEDICATIONS  (PRN):  acetaminophen    Suspension .. 975 milliGRAM(s) Oral every 6 hours PRN Temp greater or equal to 38.5C (101.3F), Mild Pain (1 - 3)  bisacodyl Suppository 10 milliGRAM(s) Rectal daily PRN Constipation  dextrose 40% Gel 15 Gram(s) Oral once PRN Blood Glucose LESS THAN 70 milliGRAM(s)/deciliter  glucagon  Injectable 1 milliGRAM(s) IntraMuscular once PRN Glucose LESS THAN 70 milligrams/deciliter  LORazepam   Injectable 2 milliGRAM(s) IV Push once PRN Seizure

## 2019-04-27 NOTE — PROGRESS NOTE ADULT - ASSESSMENT
Impression: Altered mental status due to seizures likely 2/2 to left temporal lesion, differential diagnosis includes neoplastic vs autoimmune encephalitis. Was in nonconvulsive status epilepticus, currently improving on fycompa, phenytoin, keppra, and ativan. MR spect does not suggest neoplastic lesion. MRI brain w/ contrast does not show enhancement of the lesion, which makes infiltrative glioma less likely, as well as lymphoma, though lymphoma is not entirely excluded. CSF so far also suggests against herpes encephalitis or infectious etiology. CSF flow cytometry and cytopathology have both been negative. CT c/a/p shows enhancement of bilateral kidneys, concerning for infection vs neoplastic process. Though the renal enhancement could be tumor, it is not clear if the same process is ongoing intracranially. For that reason, may not be useful to biopsy the kidney, as it may not yield full diagnostic information regarding the intracranial process.    Plan:  -off acyclovir  -c/w video EEG monitoring  -epilepsy team recommendations noted - will c/w Keppra 1g TID;  decreased Fycompa to 4 mg QHS, and phenytoin 100 TID. Will check daily phenytoin levels along with hepatic panel. goal PHE level of 15-20.  -will f/u LP results from 4/24 per ID recs for: csf fungal culture, AFB culture, cytology, spinal fluid autoimmune encephalitis panel, Autoimmune epilepsy panel   -will f/u serum autoimmune encephalitis panel  -will proceed with brain biopsy scheduled with Dr. Ruiz on 4/29 for left posterior temporal lesion. NPO past midnight on 4/28.   -endocrine evaluation noted - patient started on lantus and moderate sliding scale. will f/u for discharge recommendations  -case was discussed with patient's cardiology, and patient is on Eliquis for atrial fibrillation. c/w lovenox 1 mg/kg BID as therapeutic dose for now. will f/u with NSGY regarding timing of discontinuing lovenox prior to surgery  -nephrology recommendations noted - ordered SPEP and UPEP, less likely to be pyelonephritis  -will ask renal regarding management of HTN medications  -will obtain medical clearance for brain biopsy  -will consider hematology evaluation for evaluation of lymphoma/leukemia

## 2019-04-27 NOTE — PROGRESS NOTE ADULT - ASSESSMENT
70 yo F, DM, recent umbilical hernia repair  New onset Sz, L temporal lesion- localized encephalitis vs neoplasm  Covered for possible HSE, although initial CSF PCR and repeat CSF 4/24 both negative- ACV d/ahsan  CSF Cx negative, VDRL negative, PCR negative  Afebrile, normal WBC/diff  CT C/A/P findings reviewed; no other source- doubt pyelo  UA negative  No support for infection  Heme/Onc input noted    Plan:  Follow off abx  Await Bx as outlined by Neuro- for 4/29  D/w pt and family at bedside

## 2019-04-27 NOTE — CONSULT NOTE ADULT - PROVIDER SPECIALTY LIST ADULT
Endocrinology
Hepatology
Infectious Disease
Internal Medicine
Nephrology
Neurology
Neurology
Neurosurgery
SICU
Neurosurgery
Heme/Onc
Pulmonology

## 2019-04-27 NOTE — EEG REPORT - NS EEG TEXT BOX
Study Date: 4/26/19-4/27/19    _____________________________________________________________  HISTORY:  Patient is a 69y old  Female who presents with a chief complaint of AMS    MEDICATIONS  (STANDING):  fosphenytoin IVPB 100 milliGRAM(s) PE IV Intermittent every 8 hours  levETIRAcetam  IVPB 1000 milliGRAM(s) IV Intermittent <User Schedule>  LORazepam     Tablet 1 milliGRAM(s) Oral three times a day  perampanel 4 milliGRAM(s) Oral at bedtime  _____________________________________________________________  STUDY INTERPRETATION    Findings:  The background was continuous, diffuse delta and theta activity.  Diffuse alpha activity was present, though a rudimentary PDR to 7hz was noted.   State changes and reactivity were present.   Continuous Slowing, Lateralized, Left hemisphere (irregular delta)    Sleep Background:  Stage II sleep transients asymmetric sleep spindles and K complexes were present, better formed over the right    Interictal Epileptiform Activity:   -Continuous LPDs (Lateralized Periodic Discharges) max O1 P7 (0.5-1 Hz).      Events:  None    Activation Procedures:   Hyperventilation was not performed.    Photic stimulation was not performed.     Artifacts:  Intermittent myogenic and movement artifacts were noted.    ECG:  The heart rate on single channel ECG was predominantly between 70-90 BPM.    _____________________________________________________________  EEG SUMMARY/CLASSIFICATION  Abnormal EEG in an unresponsive/sedated patient.  - LPDs / SIRPIDs temporal occipital  - Continuous Slowing, Lateralized, Left hemisphere (delta)  - Diffuse theta and polymorphic delta slowing, moderate  _____________________________________________________________  EEG IMPRESSION/CLINICAL CORRELATE    Abnormal EEG study.  Left temporal occipital epileptogenic potential. No seizures recorded.   Moderate multifocal irregular slowing, worse in the left hemisphere          Delia Tomlin MD  Epilepsy Fellow

## 2019-04-27 NOTE — CONSULT NOTE ADULT - CONSULT REQUESTED DATE/TIME
18-Apr-2019 16:48
19-Apr-2019
19-Apr-2019 11:15
21-Apr-2019 16:37
23-Apr-2019 13:00
23-Apr-2019 13:54
25-Apr-2019 21:58
26-Apr-2019 19:21
27-Apr-2019 15:53
23-Apr-2019 11:46
25-Apr-2019
26-Apr-2019 20:25

## 2019-04-27 NOTE — PROGRESS NOTE ADULT - SUBJECTIVE AND OBJECTIVE BOX
Patient seen and examined at bedside.    T(C): 36.6 (04-27-19 @ 04:44), Max: 36.9 (04-26-19 @ 15:12)  HR: 74 (04-27-19 @ 04:44) (74 - 82)  BP: 130/68 (04-27-19 @ 04:44) (124/65 - 161/84)  RR: 18 (04-27-19 @ 04:44) (18 - 20)  SpO2: 96% (04-27-19 @ 04:44) (92% - 98%)  Wt(kg): --    Exam:    AOx2, FC, PERRL, EOMI, V1-3 intact, no facial, tongue midline, shrug 5/5  5/5 throughout, no drift  SILT  No clonus or babinski

## 2019-04-28 LAB
ALBUMIN SERPL ELPH-MCNC: 3.2 G/DL — LOW (ref 3.3–5)
ALP SERPL-CCNC: 344 U/L — HIGH (ref 40–120)
ALT FLD-CCNC: 289 U/L — HIGH (ref 10–45)
ANION GAP SERPL CALC-SCNC: 11 MMOL/L — SIGNIFICANT CHANGE UP (ref 5–17)
APAP SERPL-MCNC: <15 UG/ML — SIGNIFICANT CHANGE UP (ref 10–30)
APTT BLD: 30.5 SEC — SIGNIFICANT CHANGE UP (ref 27.5–36.3)
APTT BLD: 36.2 SEC — SIGNIFICANT CHANGE UP (ref 27.5–36.3)
AST SERPL-CCNC: 339 U/L — HIGH (ref 10–40)
BILIRUB SERPL-MCNC: 0.2 MG/DL — SIGNIFICANT CHANGE UP (ref 0.2–1.2)
BLD GP AB SCN SERPL QL: NEGATIVE — SIGNIFICANT CHANGE UP
BUN SERPL-MCNC: 6 MG/DL — LOW (ref 7–23)
CALCIUM SERPL-MCNC: 9 MG/DL — SIGNIFICANT CHANGE UP (ref 8.4–10.5)
CHLORIDE SERPL-SCNC: 107 MMOL/L — SIGNIFICANT CHANGE UP (ref 96–108)
CO2 SERPL-SCNC: 22 MMOL/L — SIGNIFICANT CHANGE UP (ref 22–31)
CREAT SERPL-MCNC: 0.6 MG/DL — SIGNIFICANT CHANGE UP (ref 0.5–1.3)
CULTURE RESULTS: SIGNIFICANT CHANGE UP
ETHANOL SERPL-MCNC: SIGNIFICANT CHANGE UP MG/DL (ref 0–10)
GLUCOSE SERPL-MCNC: 249 MG/DL — HIGH (ref 70–99)
INR BLD: 1.03 RATIO — SIGNIFICANT CHANGE UP (ref 0.88–1.16)
INR BLD: 1.04 RATIO — SIGNIFICANT CHANGE UP (ref 0.88–1.16)
LDH SERPL L TO P-CCNC: 539 U/L — HIGH (ref 50–242)
POTASSIUM SERPL-MCNC: 4 MMOL/L — SIGNIFICANT CHANGE UP (ref 3.5–5.3)
POTASSIUM SERPL-SCNC: 4 MMOL/L — SIGNIFICANT CHANGE UP (ref 3.5–5.3)
PROT SERPL-MCNC: 6.6 G/DL — SIGNIFICANT CHANGE UP (ref 6–8.3)
PROTHROM AB SERPL-ACNC: 11.9 SEC — SIGNIFICANT CHANGE UP (ref 10–13.1)
PROTHROM AB SERPL-ACNC: 12 SEC — SIGNIFICANT CHANGE UP (ref 10–13.1)
RH IG SCN BLD-IMP: POSITIVE — SIGNIFICANT CHANGE UP
SALICYLATES SERPL-MCNC: <2 MG/DL — LOW (ref 15–30)
SODIUM SERPL-SCNC: 140 MMOL/L — SIGNIFICANT CHANGE UP (ref 135–145)
SPECIMEN SOURCE: SIGNIFICANT CHANGE UP
URATE SERPL-MCNC: 3.5 MG/DL — SIGNIFICANT CHANGE UP (ref 2.5–7)

## 2019-04-28 PROCEDURE — 95951: CPT | Mod: 26

## 2019-04-28 PROCEDURE — 99232 SBSQ HOSP IP/OBS MODERATE 35: CPT

## 2019-04-28 PROCEDURE — 99222 1ST HOSP IP/OBS MODERATE 55: CPT | Mod: GC

## 2019-04-28 RX ORDER — ENOXAPARIN SODIUM 100 MG/ML
100 INJECTION SUBCUTANEOUS
Refills: 0 | Status: DISCONTINUED | OUTPATIENT
Start: 2019-04-28 | End: 2019-04-30

## 2019-04-28 RX ADMIN — Medication 200 MILLIGRAM(S): at 13:53

## 2019-04-28 RX ADMIN — Medication 50 MICROGRAM(S): at 06:19

## 2019-04-28 RX ADMIN — LEVETIRACETAM 400 MILLIGRAM(S): 250 TABLET, FILM COATED ORAL at 06:09

## 2019-04-28 RX ADMIN — Medication 1 MILLIGRAM(S): at 13:53

## 2019-04-28 RX ADMIN — Medication 3 MILLILITER(S): at 06:09

## 2019-04-28 RX ADMIN — LEVETIRACETAM 400 MILLIGRAM(S): 250 TABLET, FILM COATED ORAL at 14:01

## 2019-04-28 RX ADMIN — Medication 100 MILLIGRAM(S): at 05:57

## 2019-04-28 RX ADMIN — Medication 975 MILLIGRAM(S): at 06:14

## 2019-04-28 RX ADMIN — Medication 1 MILLIGRAM(S): at 06:19

## 2019-04-28 RX ADMIN — Medication 3 MILLILITER(S): at 05:57

## 2019-04-28 RX ADMIN — Medication 2: at 08:52

## 2019-04-28 RX ADMIN — Medication 3 MILLILITER(S): at 00:08

## 2019-04-28 RX ADMIN — FOSPHENYTOIN 104 MILLIGRAM(S) PE: 50 INJECTION INTRAMUSCULAR; INTRAVENOUS at 13:54

## 2019-04-28 RX ADMIN — INSULIN GLARGINE 16 UNIT(S): 100 INJECTION, SOLUTION SUBCUTANEOUS at 22:35

## 2019-04-28 RX ADMIN — Medication 975 MILLIGRAM(S): at 07:00

## 2019-04-28 RX ADMIN — POLYETHYLENE GLYCOL 3350 17 GRAM(S): 17 POWDER, FOR SOLUTION ORAL at 06:19

## 2019-04-28 RX ADMIN — Medication 3 MILLILITER(S): at 18:17

## 2019-04-28 RX ADMIN — Medication 1 MILLIGRAM(S): at 21:48

## 2019-04-28 RX ADMIN — Medication 200 MILLIGRAM(S): at 00:08

## 2019-04-28 RX ADMIN — ENOXAPARIN SODIUM 100 MILLIGRAM(S): 100 INJECTION SUBCUTANEOUS at 14:03

## 2019-04-28 RX ADMIN — Medication 100 MILLIGRAM(S): at 13:53

## 2019-04-28 RX ADMIN — Medication 4: at 12:52

## 2019-04-28 RX ADMIN — LEVETIRACETAM 400 MILLIGRAM(S): 250 TABLET, FILM COATED ORAL at 22:36

## 2019-04-28 RX ADMIN — PERAMPANEL 4 MILLIGRAM(S): 2 TABLET ORAL at 21:48

## 2019-04-28 RX ADMIN — FOSPHENYTOIN 104 MILLIGRAM(S) PE: 50 INJECTION INTRAMUSCULAR; INTRAVENOUS at 05:57

## 2019-04-28 RX ADMIN — FOSPHENYTOIN 104 MILLIGRAM(S) PE: 50 INJECTION INTRAMUSCULAR; INTRAVENOUS at 21:48

## 2019-04-28 RX ADMIN — Medication 3 MILLILITER(S): at 14:02

## 2019-04-28 RX ADMIN — Medication 200 MILLIGRAM(S): at 08:52

## 2019-04-28 RX ADMIN — AMLODIPINE BESYLATE 10 MILLIGRAM(S): 2.5 TABLET ORAL at 06:09

## 2019-04-28 RX ADMIN — ENOXAPARIN SODIUM 100 MILLIGRAM(S): 100 INJECTION SUBCUTANEOUS at 06:18

## 2019-04-28 RX ADMIN — Medication 100 MILLIGRAM(S): at 21:48

## 2019-04-28 NOTE — PROGRESS NOTE ADULT - ASSESSMENT
69 f for brain mass biopsy  Off antibiotics.  Hold ASA, Eliquis   A Fib  - AC with Lovenox. Hold prior to Bx.  Hypothyroid  - follow TSH.  HTN control  COPD  - continue nebs  Diabetes control.  Seizure control  No acute medical condition identified to postpone planned surgical intervention.   Continue present Rx.  Tyrone Antony MD pager [phone]

## 2019-04-28 NOTE — EEG REPORT - NS EEG TEXT BOX
Study Date: 4/27/19-4/28/19    _____________________________________________________________  HISTORY:  Patient is a 69y old  Female who presents with a chief complaint of AMS    MEDICATIONS  (STANDING):  fosphenytoin IVPB 100 milliGRAM(s) PE IV Intermittent every 8 hours  levETIRAcetam  IVPB 1000 milliGRAM(s) IV Intermittent <User Schedule>  LORazepam     Tablet 1 milliGRAM(s) Oral three times a day  perampanel 4 milliGRAM(s) Oral at bedtime  _____________________________________________________________  STUDY INTERPRETATION    Findings:  The background was continuous, diffuse delta and theta activity.  Diffuse alpha activity was present, though a rudimentary PDR to 7hz was noted.   State changes and reactivity were present.   Continuous Slowing, Lateralized, Left hemisphere (irregular delta)    Sleep Background:  Stage II sleep transients asymmetric sleep spindles and K complexes were present, better formed over the right    Interictal Epileptiform Activity:   -Frequent LPDs (Lateralized Periodic Discharges) max O1 P7 (0.5-1 Hz).      Events:  None    Activation Procedures:   Hyperventilation was not performed.    Photic stimulation was not performed.     Artifacts:  Intermittent myogenic and movement artifacts were noted.    ECG:  The heart rate on single channel ECG was predominantly between 70-90 BPM.    _____________________________________________________________  EEG SUMMARY/CLASSIFICATION  Abnormal EEG in an unresponsive/sedated patient.  - LPDs left temporal occipital  - Continuous Slowing, Lateralized, Left hemisphere (delta)  - Diffuse theta and polymorphic delta slowing, moderate  _____________________________________________________________  EEG IMPRESSION/CLINICAL CORRELATE    Abnormal EEG study.  Left temporal occipital epileptogenic potential. No seizures recorded.   Moderate multifocal irregular slowing, worse in the left hemisphere          Delia Tomlin MD  Epilepsy Fellow Study Date: 4/27/19-4/28/19    _____________________________________________________________  HISTORY:  Patient is a 69y old  Female who presents with a chief complaint of AMS    MEDICATIONS  (STANDING):  fosphenytoin IVPB 100 milliGRAM(s) PE IV Intermittent every 8 hours  levETIRAcetam  IVPB 1000 milliGRAM(s) IV Intermittent <User Schedule>  LORazepam     Tablet 1 milliGRAM(s) Oral three times a day  perampanel 4 milliGRAM(s) Oral at bedtime  _____________________________________________________________  STUDY INTERPRETATION    Findings:  The background was continuous, diffuse delta and theta activity.  Diffuse alpha activity was present, though a rudimentary PDR to 7hz was noted.   State changes and reactivity were present.   Continuous Slowing, Lateralized, Left hemisphere (irregular delta)    Sleep Background:  Stage II sleep transients asymmetric sleep spindles and K complexes were present, better formed over the right    Interictal Epileptiform Activity:   -Frequent LPDs (Lateralized Periodic Discharges) max O1 P7 (0.5-1 Hz).      Events:  None    Activation Procedures:   Hyperventilation was not performed.    Photic stimulation was not performed.     Artifacts:  Intermittent myogenic and movement artifacts were noted.    ECG:  The heart rate on single channel ECG was predominantly between 70-90 BPM.    _____________________________________________________________  EEG SUMMARY/CLASSIFICATION  Abnormal EEG in an unresponsive/sedated patient.  - LPDs left temporal occipital  - Continuous Slowing, Lateralized, Left hemisphere (delta)  - Diffuse theta and polymorphic delta slowing, moderate  _____________________________________________________________  EEG IMPRESSION/CLINICAL CORRELATE    Abnormal EEG study.  Left temporal occipital epileptogenic potential. No seizures recorded.   Moderate multifocal irregular slowing, worse in the left hemisphere          Delia Tomlin MD   Epilepsy Fellow

## 2019-04-28 NOTE — PROGRESS NOTE ADULT - SUBJECTIVE AND OBJECTIVE BOX
Patient is a 69y old  Female who presents with a chief complaint of level 2 trauma (2019 20:24)      SUBJECTIVE / OVERNIGHT EVENTS: Comfortable without new complaints.   Review of Systems  chest pain no  palpitations no  sob no  nausea no  headache no    MEDICATIONS  (STANDING):  ALBUTerol/ipratropium for Nebulization 3 milliLiter(s) Nebulizer every 6 hours  amLODIPine   Tablet 10 milliGRAM(s) Oral daily  dextrose 5%. 1000 milliLiter(s) (50 mL/Hr) IV Continuous <Continuous>  dextrose 50% Injectable 12.5 Gram(s) IV Push once  dextrose 50% Injectable 25 Gram(s) IV Push once  dextrose 50% Injectable 25 Gram(s) IV Push once  docusate sodium 100 milliGRAM(s) Oral daily  enoxaparin Injectable 100 milliGRAM(s) SubCutaneous two times a day  fosphenytoin IVPB 100 milliGRAM(s) PE IV Intermittent every 8 hours  hydrALAZINE 100 milliGRAM(s) Oral every 8 hours  insulin glargine Injectable (LANTUS) 16 Unit(s) SubCutaneous at bedtime  insulin lispro (HumaLOG) corrective regimen sliding scale   SubCutaneous three times a day before meals  insulin lispro (HumaLOG) corrective regimen sliding scale   SubCutaneous at bedtime  ketorolac   Injectable 15 milliGRAM(s) IV Push once  labetalol 200 milliGRAM(s) Oral three times a day  levETIRAcetam  IVPB 1000 milliGRAM(s) IV Intermittent <User Schedule>  levothyroxine 50 MICROGram(s) Oral daily  LORazepam     Tablet 1 milliGRAM(s) Oral three times a day  perampanel 4 milliGRAM(s) Oral at bedtime  polyethylene glycol 3350 17 Gram(s) Oral two times a day  senna 1 Tablet(s) Oral daily    MEDICATIONS  (PRN):  acetaminophen    Suspension .. 975 milliGRAM(s) Oral every 6 hours PRN Temp greater or equal to 38.5C (101.3F), Mild Pain (1 - 3)  bisacodyl Suppository 10 milliGRAM(s) Rectal daily PRN Constipation  dextrose 40% Gel 15 Gram(s) Oral once PRN Blood Glucose LESS THAN 70 milliGRAM(s)/deciliter  glucagon  Injectable 1 milliGRAM(s) IntraMuscular once PRN Glucose LESS THAN 70 milligrams/deciliter  LORazepam   Injectable 2 milliGRAM(s) IV Push once PRN Seizure      Vital Signs Last 24 Hrs  T(C): 36.5 (2019 08:23), Max: 36.8 (2019 22:00)  T(F): 97.7 (2019 08:23), Max: 98.3 (2019 23:16)  HR: 77 (2019 08:23) (74 - 83)  BP: 168/79 (2019 08:23) (134/70 - 170/79)  BP(mean): --  RR: 18 (2019 08:23) (18 - 19)  SpO2: 95% (2019 08:23) (94% - 97%)    PHYSICAL EXAM:  GENERAL: NAD, well-developed  HEAD:  Atraumatic, Normocephalic  EYES: EOMI, PERRLA, conjunctiva and sclera clear  NECK: Supple, No JVD  CHEST/LUNG: Clear to auscultation bilaterally; No wheeze  HEART: Regular rate and rhythm; No murmurs, rubs, or gallops  ABDOMEN: Soft, Nontender, Nondistended; Bowel sounds present  EXTREMITIES:  2+ Peripheral Pulses, No clubbing, cyanosis, or edema  PSYCH: AAOx3  NEUROLOGY: non-focal  SKIN: No rashes or lesions    LABS:                        9.7    5.0   )-----------( 264      ( 2019 06:36 )             31.2         142  |  108  |  10  ----------------------------<  108<H>  4.0   |  22  |  0.95    Ca    8.8      2019 06:36  Phos  4.1       Mg     2.0         TPro  6.4  /  Alb  3.1<L>  /  TBili  0.2  /  DBili  x   /  AST  304<H>  /  ALT  199<H>  /  AlkPhos  291<H>      PT/INR - ( 2019 09:18 )   PT: 11.9 sec;   INR: 1.03 ratio         PTT - ( 2019 09:18 )  PTT:30.5 sec      Urinalysis Basic - ( 2019 10:22 )    Color: Yellow / Appearance: Clear / S.012 / pH: x  Gluc: x / Ketone: Negative  / Bili: Negative / Urobili: Negative   Blood: x / Protein: Negative / Nitrite: Negative   Leuk Esterase: Negative / RBC: x / WBC x   Sq Epi: x / Non Sq Epi: x / Bacteria: x        Culture - Urine (collected 2019 13:56)  Source: .Urine  Final Report (2019 09:04):    >=3 organisms. Probable collection contamination.        RADIOLOGY & ADDITIONAL TESTS:    Imaging Personally Reviewed:    Consultant(s) Notes Reviewed:      Care Discussed with Consultants/Other Providers:

## 2019-04-28 NOTE — PROGRESS NOTE ADULT - SUBJECTIVE AND OBJECTIVE BOX
Subjective: Interval History - No events overnight    Objective:   Vital Signs Last 24 Hrs  T(C): 36.5 (28 Apr 2019 08:23), Max: 36.8 (27 Apr 2019 22:00)  T(F): 97.7 (28 Apr 2019 08:23), Max: 98.3 (27 Apr 2019 23:16)  HR: 77 (28 Apr 2019 08:23) (74 - 83)  BP: 168/79 (28 Apr 2019 08:23) (116/65 - 170/79)  RR: 18 (28 Apr 2019 08:23) (18 - 20)  SpO2: 95% (28 Apr 2019 08:23) (94% - 99%)    General Exam:   General appearance: No acute distress                   Neurological Exam:  Mental Status: Orientated to self.      Cranial Nerves: EOMI, No facial asymmetry.    Motor:               Strength: moving all extremities    Gait: needs assistance    Other:    04-27    142  |  108  |  10  ----------------------------<  108<H>  4.0   |  22  |  0.95    Ca    8.8      27 Apr 2019 06:36  Phos  4.1     04-27  Mg     2.0     04-27    TPro  6.4  /  Alb  3.1<L>  /  TBili  0.2  /  DBili  x   /  AST  304<H>  /  ALT  199<H>  /  AlkPhos  291<H>  04-27    LIVER FUNCTIONS - ( 27 Apr 2019 06:36 )  Alb: 3.1 g/dL / Pro: 6.4 g/dL / ALK PHOS: 291 U/L / ALT: 199 U/L / AST: 304 U/L / GGT: x                                 9.7    5.0   )-----------( 264      ( 27 Apr 2019 06:36 )             31.2       MEDICATIONS  (STANDING):  ALBUTerol/ipratropium for Nebulization 3 milliLiter(s) Nebulizer every 6 hours  amLODIPine   Tablet 10 milliGRAM(s) Oral daily  dextrose 5%. 1000 milliLiter(s) (50 mL/Hr) IV Continuous <Continuous>  dextrose 50% Injectable 12.5 Gram(s) IV Push once  dextrose 50% Injectable 25 Gram(s) IV Push once  dextrose 50% Injectable 25 Gram(s) IV Push once  docusate sodium 100 milliGRAM(s) Oral daily  fosphenytoin IVPB 100 milliGRAM(s) PE IV Intermittent every 8 hours  hydrALAZINE 100 milliGRAM(s) Oral every 8 hours  insulin glargine Injectable (LANTUS) 16 Unit(s) SubCutaneous at bedtime  insulin lispro (HumaLOG) corrective regimen sliding scale   SubCutaneous three times a day before meals  insulin lispro (HumaLOG) corrective regimen sliding scale   SubCutaneous at bedtime  ketorolac   Injectable 15 milliGRAM(s) IV Push once  labetalol 200 milliGRAM(s) Oral three times a day  levETIRAcetam  IVPB 1000 milliGRAM(s) IV Intermittent <User Schedule>  levothyroxine 50 MICROGram(s) Oral daily  LORazepam     Tablet 1 milliGRAM(s) Oral three times a day  perampanel 4 milliGRAM(s) Oral at bedtime  polyethylene glycol 3350 17 Gram(s) Oral two times a day  senna 1 Tablet(s) Oral daily    MEDICATIONS  (PRN):  acetaminophen    Suspension .. 975 milliGRAM(s) Oral every 6 hours PRN Temp greater or equal to 38.5C (101.3F), Mild Pain (1 - 3)  bisacodyl Suppository 10 milliGRAM(s) Rectal daily PRN Constipation  dextrose 40% Gel 15 Gram(s) Oral once PRN Blood Glucose LESS THAN 70 milliGRAM(s)/deciliter  glucagon  Injectable 1 milliGRAM(s) IntraMuscular once PRN Glucose LESS THAN 70 milligrams/deciliter  LORazepam   Injectable 2 milliGRAM(s) IV Push once PRN Seizure

## 2019-04-28 NOTE — PROGRESS NOTE ADULT - ASSESSMENT
Patient is a 69 year old female with altered mental status due to seizures likely 2/2 to left temporal lesion, differential diagnosis includes neoplastic vs autoimmune encephalitis. Was in nonconvulsive status epilepticus, improved. Now on fycompa, phenytoin, keppra, and ativan. MR spect does not suggest neoplastic lesion. MRI brain w/ contrast does not show enhancement of the lesion, which makes infiltrative glioma less likely, as well as lymphoma, though lymphoma is not entirely excluded. CSF so far also suggests against herpes encephalitis or infectious etiology. CSF flow cytometry and cytopathology have both been negative. CT c/a/p shows enhancement of bilateral kidneys, concerning for infection vs neoplastic process. Though the renal enhancement could be tumor, it is not clear if the same process is ongoing intracranially. For that reason, may not be useful to biopsy the kidney, as it may not yield full diagnostic information regarding the intracranial process.    Plan:  -c/w video EEG monitoring  -c/w Keppra 1g TID; Fycompa to 4 mg QHS, and phenytoin 100 TID. Will check daily phenytoin levels along with hepatic panel. goal PHE level of 15-20.  -f/u LP results from 4/24 per ID recs for: csf fungal culture, AFB culture, cytology, spinal fluid autoimmune encephalitis panel, Autoimmune epilepsy panel   -f/u serum autoimmune encephalitis panel, SPEP and UPEP, hepatology labs  -Brain biopsy scheduled with Dr. Ruiz on 4/29 for left posterior temporal lesion. NPO past midnight on 4/28 and FDL held  -will f/u endo for discharge recommendations  -case was discussed with patient's cardiology, and patient is on Eliquis for atrial fibrillation Patient is a 69 year old female with altered mental status due to seizures likely 2/2 to left temporal lesion, differential diagnosis includes neoplastic vs autoimmune encephalitis. Was in nonconvulsive status epilepticus, improved. Now on fycompa, phenytoin, keppra, and ativan. MR spect does not suggest neoplastic lesion. MRI brain w/ contrast does not show enhancement of the lesion, which makes infiltrative glioma less likely, as well as lymphoma, though lymphoma is not entirely excluded. CSF so far also suggests against herpes encephalitis or infectious etiology. CSF flow cytometry and cytopathology have both been negative. CT c/a/p shows enhancement of bilateral kidneys, concerning for infection vs neoplastic process. Though the renal enhancement could be tumor, it is not clear if the same process is ongoing intracranially. For that reason, may not be useful to biopsy the kidney, as it may not yield full diagnostic information regarding the intracranial process.    Plan:  -c/w video EEG monitoring  -c/w Keppra 1g TID; Fycompa to 4 mg QHS, and phenytoin 100 TID. Will check daily phenytoin levels along with hepatic panel. goal PHE level of 15-20.  -f/u LP results from 4/24 per ID recs for: csf fungal culture, AFB culture, cytology, spinal fluid autoimmune encephalitis panel, Autoimmune epilepsy panel   -f/u serum autoimmune encephalitis panel, SPEP and UPEP, hepatology labs  -Brain biopsy scheduled with Dr. Ruiz on 5/1 for left posterior temporal lesion. Hold anticoagulation the day before  -will f/u endo for discharge recommendations  -case was discussed with patient's cardiology, and patient is on Eliquis for atrial fibrillation

## 2019-04-28 NOTE — PROGRESS NOTE ADULT - ASSESSMENT
69 yr old F h/o CAD, DM, HTN, HLD, hypothyroid, on eliquis and ASA (both off >5 days), presented initially after a fall. Patient brought in by ambulance after being found down by family. Patient was found down by family 4/18/2019 and was found to be altered and had witnessed seizure in ED. Patient then developed status epilepticus and was transferred to NSCU for further management. LP was done and was unremarkable. EEG showed many subclinical seizures. MRI showed left posterior lateral temporal and parietal occipital hyperintense T2   and FLAIR signal. PCR for HSV was negative. Neurosurgery was consulted for possible biopsy.     Plan:  - Continue management per neurology/ID  - Continue holding ASA and eliquis  - Biopsy scheduled for Wednesday 5/1

## 2019-04-29 DIAGNOSIS — G93.9 DISORDER OF BRAIN, UNSPECIFIED: ICD-10-CM

## 2019-04-29 DIAGNOSIS — G04.90 ENCEPHALITIS AND ENCEPHALOMYELITIS, UNSPECIFIED: ICD-10-CM

## 2019-04-29 LAB
ALBUMIN SERPL ELPH-MCNC: 3.3 G/DL — SIGNIFICANT CHANGE UP (ref 3.3–5)
ALP SERPL-CCNC: 348 U/L — HIGH (ref 40–120)
ALT FLD-CCNC: 254 U/L — HIGH (ref 10–45)
ANION GAP SERPL CALC-SCNC: 10 MMOL/L — SIGNIFICANT CHANGE UP (ref 5–17)
AST SERPL-CCNC: 226 U/L — HIGH (ref 10–40)
BILIRUB SERPL-MCNC: 0.1 MG/DL — LOW (ref 0.2–1.2)
BUN SERPL-MCNC: 4 MG/DL — LOW (ref 7–23)
CALCIUM SERPL-MCNC: 9 MG/DL — SIGNIFICANT CHANGE UP (ref 8.4–10.5)
CHLORIDE SERPL-SCNC: 106 MMOL/L — SIGNIFICANT CHANGE UP (ref 96–108)
CO2 SERPL-SCNC: 24 MMOL/L — SIGNIFICANT CHANGE UP (ref 22–31)
CREAT SERPL-MCNC: 0.67 MG/DL — SIGNIFICANT CHANGE UP (ref 0.5–1.3)
DRVVT SCREEN TO CONFIRM RATIO: SIGNIFICANT CHANGE UP
EBV EA AB SER IA-ACNC: <5 U/ML — SIGNIFICANT CHANGE UP
EBV EA AB TITR SER IF: POSITIVE
EBV EA IGG SER-ACNC: NEGATIVE — SIGNIFICANT CHANGE UP
EBV NA IGG SER IA-ACNC: >600 U/ML — HIGH
EBV PATRN SPEC IB-IMP: SIGNIFICANT CHANGE UP
EBV VCA IGG AVIDITY SER QL IA: POSITIVE
EBV VCA IGM SER IA-ACNC: 120 U/ML — HIGH
EBV VCA IGM SER IA-ACNC: <10 U/ML — SIGNIFICANT CHANGE UP
EBV VCA IGM TITR FLD: NEGATIVE — SIGNIFICANT CHANGE UP
GLUCOSE SERPL-MCNC: 151 MG/DL — HIGH (ref 70–99)
HCT VFR BLD CALC: 30.8 % — LOW (ref 34.5–45)
HGB BLD-MCNC: 9.2 G/DL — LOW (ref 11.5–15.5)
HSV1 IGG SER-ACNC: 25.1 INDEX — HIGH
HSV1 IGG SERPL QL IA: POSITIVE
HSV2 IGG FLD-ACNC: 10.7 INDEX — HIGH
HSV2 IGG SERPL QL IA: POSITIVE
IGA FLD-MCNC: 420 MG/DL — SIGNIFICANT CHANGE UP (ref 84–499)
IGG FLD-MCNC: 934 MG/DL — SIGNIFICANT CHANGE UP (ref 610–1660)
IGM SERPL-MCNC: 75 MG/DL — SIGNIFICANT CHANGE UP (ref 35–242)
KAPPA LC SER QL IFE: 2.3 MG/DL — HIGH (ref 0.33–1.94)
KAPPA/LAMBDA FREE LIGHT CHAIN RATIO, SERUM: 1.32 RATIO — SIGNIFICANT CHANGE UP (ref 0.26–1.65)
LA NT DPL PPP QL: 28.1 SEC — SIGNIFICANT CHANGE UP
LAMBDA LC SER QL IFE: 1.74 MG/DL — SIGNIFICANT CHANGE UP (ref 0.57–2.63)
LKM AB SER-ACNC: <20.1 UNITS — SIGNIFICANT CHANGE UP (ref 0–20)
MCHC RBC-ENTMCNC: 23.8 PG — LOW (ref 27–34)
MCHC RBC-ENTMCNC: 29.9 GM/DL — LOW (ref 32–36)
MCV RBC AUTO: 79.8 FL — LOW (ref 80–100)
MITOCHONDRIA AB SER-ACNC: SIGNIFICANT CHANGE UP
NORMALIZED SCT PPP-RTO: 0.81 RATIO — SIGNIFICANT CHANGE UP (ref 0–1.16)
NORMALIZED SCT PPP-RTO: SIGNIFICANT CHANGE UP
PHENYTOIN FREE SERPL-MCNC: 11.8 UG/ML — SIGNIFICANT CHANGE UP (ref 10–20)
PLATELET # BLD AUTO: 123 K/UL — LOW (ref 150–400)
POTASSIUM SERPL-MCNC: 3.8 MMOL/L — SIGNIFICANT CHANGE UP (ref 3.5–5.3)
POTASSIUM SERPL-SCNC: 3.8 MMOL/L — SIGNIFICANT CHANGE UP (ref 3.5–5.3)
PROT SERPL-MCNC: 6.6 G/DL — SIGNIFICANT CHANGE UP (ref 6–8.3)
RBC # BLD: 3.86 M/UL — SIGNIFICANT CHANGE UP (ref 3.8–5.2)
RBC # FLD: 16.2 % — HIGH (ref 10.3–14.5)
SMOOTH MUSCLE AB SER-ACNC: SIGNIFICANT CHANGE UP
SODIUM SERPL-SCNC: 140 MMOL/L — SIGNIFICANT CHANGE UP (ref 135–145)
WBC # BLD: 4.97 K/UL — SIGNIFICANT CHANGE UP (ref 3.8–10.5)
WBC # FLD AUTO: 4.97 K/UL — SIGNIFICANT CHANGE UP (ref 3.8–10.5)

## 2019-04-29 PROCEDURE — 76700 US EXAM ABDOM COMPLETE: CPT | Mod: 26

## 2019-04-29 PROCEDURE — 95951: CPT | Mod: 26,52

## 2019-04-29 PROCEDURE — 99233 SBSQ HOSP IP/OBS HIGH 50: CPT | Mod: GC

## 2019-04-29 PROCEDURE — ZZZZZ: CPT

## 2019-04-29 PROCEDURE — 74183 MRI ABD W/O CNTR FLWD CNTR: CPT | Mod: 26

## 2019-04-29 PROCEDURE — 99232 SBSQ HOSP IP/OBS MODERATE 35: CPT

## 2019-04-29 PROCEDURE — 99232 SBSQ HOSP IP/OBS MODERATE 35: CPT | Mod: GC

## 2019-04-29 RX ORDER — INSULIN LISPRO 100/ML
3 VIAL (ML) SUBCUTANEOUS
Refills: 0 | Status: DISCONTINUED | OUTPATIENT
Start: 2019-04-29 | End: 2019-05-01

## 2019-04-29 RX ADMIN — FOSPHENYTOIN 104 MILLIGRAM(S) PE: 50 INJECTION INTRAMUSCULAR; INTRAVENOUS at 21:46

## 2019-04-29 RX ADMIN — AMLODIPINE BESYLATE 10 MILLIGRAM(S): 2.5 TABLET ORAL at 05:22

## 2019-04-29 RX ADMIN — LEVETIRACETAM 400 MILLIGRAM(S): 250 TABLET, FILM COATED ORAL at 21:47

## 2019-04-29 RX ADMIN — PERAMPANEL 4 MILLIGRAM(S): 2 TABLET ORAL at 21:47

## 2019-04-29 RX ADMIN — Medication 200 MILLIGRAM(S): at 17:29

## 2019-04-29 RX ADMIN — Medication 3 MILLILITER(S): at 17:25

## 2019-04-29 RX ADMIN — Medication 2: at 21:48

## 2019-04-29 RX ADMIN — Medication 1 MILLIGRAM(S): at 05:25

## 2019-04-29 RX ADMIN — ENOXAPARIN SODIUM 100 MILLIGRAM(S): 100 INJECTION SUBCUTANEOUS at 05:22

## 2019-04-29 RX ADMIN — POLYETHYLENE GLYCOL 3350 17 GRAM(S): 17 POWDER, FOR SOLUTION ORAL at 05:22

## 2019-04-29 RX ADMIN — Medication 3 MILLILITER(S): at 11:28

## 2019-04-29 RX ADMIN — LEVETIRACETAM 400 MILLIGRAM(S): 250 TABLET, FILM COATED ORAL at 17:29

## 2019-04-29 RX ADMIN — INSULIN GLARGINE 16 UNIT(S): 100 INJECTION, SOLUTION SUBCUTANEOUS at 21:47

## 2019-04-29 RX ADMIN — FOSPHENYTOIN 104 MILLIGRAM(S) PE: 50 INJECTION INTRAMUSCULAR; INTRAVENOUS at 17:25

## 2019-04-29 RX ADMIN — Medication 100 MILLIGRAM(S): at 21:47

## 2019-04-29 RX ADMIN — Medication 3 MILLILITER(S): at 23:36

## 2019-04-29 RX ADMIN — Medication 200 MILLIGRAM(S): at 00:54

## 2019-04-29 RX ADMIN — Medication 100 MILLIGRAM(S): at 05:21

## 2019-04-29 RX ADMIN — Medication 975 MILLIGRAM(S): at 13:00

## 2019-04-29 RX ADMIN — ENOXAPARIN SODIUM 100 MILLIGRAM(S): 100 INJECTION SUBCUTANEOUS at 17:25

## 2019-04-29 RX ADMIN — Medication 50 MICROGRAM(S): at 05:22

## 2019-04-29 RX ADMIN — Medication 3 MILLILITER(S): at 00:54

## 2019-04-29 RX ADMIN — LEVETIRACETAM 400 MILLIGRAM(S): 250 TABLET, FILM COATED ORAL at 05:25

## 2019-04-29 RX ADMIN — Medication 200 MILLIGRAM(S): at 10:43

## 2019-04-29 RX ADMIN — Medication 100 MILLIGRAM(S): at 17:26

## 2019-04-29 RX ADMIN — POLYETHYLENE GLYCOL 3350 17 GRAM(S): 17 POWDER, FOR SOLUTION ORAL at 17:27

## 2019-04-29 RX ADMIN — FOSPHENYTOIN 104 MILLIGRAM(S) PE: 50 INJECTION INTRAMUSCULAR; INTRAVENOUS at 05:21

## 2019-04-29 RX ADMIN — Medication 200 MILLIGRAM(S): at 23:36

## 2019-04-29 RX ADMIN — Medication 975 MILLIGRAM(S): at 12:09

## 2019-04-29 RX ADMIN — Medication 3 UNIT(S): at 17:36

## 2019-04-29 RX ADMIN — Medication 3 MILLILITER(S): at 05:21

## 2019-04-29 NOTE — PROGRESS NOTE ADULT - SUBJECTIVE AND OBJECTIVE BOX
CC: f/u for seizures and brain lesion    Patient reports: she is undergoing EEG, has dry cough, mild headache    REVIEW OF SYSTEMS:  All other review of systems negative (Comprehensive ROS)    Antimicrobials Day #  :off    Other Medications Reviewed    T(F): 98.1 (04-29-19 @ 08:29), Max: 98.6 (04-29-19 @ 00:21)  HR: 91 (04-29-19 @ 08:29)  BP: 156/83 (04-29-19 @ 08:29)  RR: 18 (04-29-19 @ 08:29)  SpO2: 96% (04-29-19 @ 08:29)  Wt(kg): --    PHYSICAL EXAM:  General: alert, no acute distress, EEG in progress  Eyes:  anicteric, no conjunctival injection, no discharge  Oropharynx: no lesions or injection 	  Neck: supple, without adenopathy  Lungs: clear to auscultation,poor inspiratory effert  Heart: regular rate and rhythm; no murmur, rubs or gallops  Abdomen: soft, nondistended, nontender, without mass or organomegaly  Skin: no lesions  Extremities: no clubbing, cyanosis,trace  edema  Neurologic: alert, oriented, moves all extremities    LAB RESULTS:                        9.2    4.97  )-----------( 123      ( 29 Apr 2019 07:41 )             30.8     04-29    140  |  106  |  4<L>  ----------------------------<  151<H>  3.8   |  24  |  0.67    Ca    9.0      29 Apr 2019 05:26    TPro  6.6  /  Alb  3.3  /  TBili  0.1<L>  /  DBili  x   /  AST  226<H>  /  ALT  254<H>  /  AlkPhos  348<H>  04-29    LIVER FUNCTIONS - ( 29 Apr 2019 05:26 )  Alb: 3.3 g/dL / Pro: 6.6 g/dL / ALK PHOS: 348 U/L / ALT: 254 U/L / AST: 226 U/L / GGT: x             MICROBIOLOGY:  RECENT CULTURES:  04-27 @ 13:56 .Urine     >=3 organisms. Probable collection contamination.      04-24 @ 17:17 .CSF     Testing in progress    No polymorphonuclear cells seen  No organisms seen  by cytocentrifuge        RADIOLOGY REVIEWED:  < from: MR Head w/wo IV Cont (04.25.19 @ 10:20) >  IMPRESSION:    No significant interval change from previous brain MRIs.    Similar-appearing nonenhancing hyperintense signal in the left posterior   temporal and occipital lobes which appears both subcortical and cortical   in nature.    Study done for presurgical planning.    < end of copied text >

## 2019-04-29 NOTE — PROGRESS NOTE ADULT - ASSESSMENT
70 y/o F w/h/o uncontrolled T2DM on oral DM meds. Also HTN/HLD/CAD/Hypothyroidism/recent umbilical hernia repair 2 weeks ago. Here s/p fall with LOC> seizures> hypernatremia. BG values at goal in AM but elevated throughout the day. PO intake improved per staff. NPO today. BG goal (100-180mg/dl).

## 2019-04-29 NOTE — PROGRESS NOTE ADULT - SUBJECTIVE AND OBJECTIVE BOX
Subjective: Interval History - No events overnight    Objective:   Vital Signs Last 24 Hrs  T(C): 36.7 (29 Apr 2019 08:29), Max: 37 (29 Apr 2019 00:21)  T(F): 98.1 (29 Apr 2019 08:29), Max: 98.6 (29 Apr 2019 00:21)  HR: 91 (29 Apr 2019 08:29) (77 - 91)  BP: 156/83 (29 Apr 2019 08:29) (145/86 - 182/83)  BP(mean): --  RR: 18 (29 Apr 2019 08:29) (18 - 18)  SpO2: 96% (29 Apr 2019 08:29) (92% - 98%)    General Exam:   General appearance: No acute distress                   Neurological Exam:  Mental Status: Orientated to self. reported she in the hospital but not name and floor,  not oriented to month, year. Left     Cranial Nerves: EOMI, extinction on her right visual field, No facial asymmetry.    Motor:               Strength: moving all extremities  Sensation : Extinction on right     Gait: deferred due to EEG     Other:    04-27    142  |  108  |  10  ----------------------------<  108<H>  4.0   |  22  |  0.95    Ca    8.8      27 Apr 2019 06:36  Phos  4.1     04-27  Mg     2.0     04-27    TPro  6.4  /  Alb  3.1<L>  /  TBili  0.2  /  DBili  x   /  AST  304<H>  /  ALT  199<H>  /  AlkPhos  291<H>  04-27    LIVER FUNCTIONS - ( 27 Apr 2019 06:36 )  Alb: 3.1 g/dL / Pro: 6.4 g/dL / ALK PHOS: 291 U/L / ALT: 199 U/L / AST: 304 U/L / GGT: x                                 9.7    5.0   )-----------( 264      ( 27 Apr 2019 06:36 )             31.2       MEDICATIONS  (STANDING):  ALBUTerol/ipratropium for Nebulization 3 milliLiter(s) Nebulizer every 6 hours  amLODIPine   Tablet 10 milliGRAM(s) Oral daily  dextrose 5%. 1000 milliLiter(s) (50 mL/Hr) IV Continuous <Continuous>  dextrose 50% Injectable 12.5 Gram(s) IV Push once  dextrose 50% Injectable 25 Gram(s) IV Push once  dextrose 50% Injectable 25 Gram(s) IV Push once  docusate sodium 100 milliGRAM(s) Oral daily  enoxaparin Injectable 100 milliGRAM(s) SubCutaneous two times a day  fosphenytoin IVPB 100 milliGRAM(s) PE IV Intermittent every 8 hours  hydrALAZINE 100 milliGRAM(s) Oral every 8 hours  insulin glargine Injectable (LANTUS) 16 Unit(s) SubCutaneous at bedtime  insulin lispro (HumaLOG) corrective regimen sliding scale   SubCutaneous three times a day before meals  insulin lispro (HumaLOG) corrective regimen sliding scale   SubCutaneous at bedtime  insulin lispro Injectable (HumaLOG) 3 Unit(s) SubCutaneous three times a day with meals  ketorolac   Injectable 15 milliGRAM(s) IV Push once  labetalol 200 milliGRAM(s) Oral three times a day  levETIRAcetam  IVPB 1000 milliGRAM(s) IV Intermittent <User Schedule>  levothyroxine 50 MICROGram(s) Oral daily  perampanel 4 milliGRAM(s) Oral at bedtime  polyethylene glycol 3350 17 Gram(s) Oral two times a day  senna 1 Tablet(s) Oral daily    MEDICATIONS  (PRN):  acetaminophen    Suspension .. 975 milliGRAM(s) Oral every 6 hours PRN Temp greater or equal to 38.5C (101.3F), Mild Pain (1 - 3)  bisacodyl Suppository 10 milliGRAM(s) Rectal daily PRN Constipation  dextrose 40% Gel 15 Gram(s) Oral once PRN Blood Glucose LESS THAN 70 milliGRAM(s)/deciliter  glucagon  Injectable 1 milliGRAM(s) IntraMuscular once PRN Glucose LESS THAN 70 milligrams/deciliter  LORazepam   Injectable 2 milliGRAM(s) IV Push once PRN Seizure

## 2019-04-29 NOTE — PROGRESS NOTE ADULT - SUBJECTIVE AND OBJECTIVE BOX
Follow-up Pulm Progress Note    No new respiratory events overnight.  Denies SOB/CP.   100% on RA    Medications:  MEDICATIONS  (STANDING):  ALBUTerol/ipratropium for Nebulization 3 milliLiter(s) Nebulizer every 6 hours  amLODIPine   Tablet 10 milliGRAM(s) Oral daily  dextrose 5%. 1000 milliLiter(s) (50 mL/Hr) IV Continuous <Continuous>  dextrose 50% Injectable 12.5 Gram(s) IV Push once  dextrose 50% Injectable 25 Gram(s) IV Push once  dextrose 50% Injectable 25 Gram(s) IV Push once  docusate sodium 100 milliGRAM(s) Oral daily  enoxaparin Injectable 100 milliGRAM(s) SubCutaneous two times a day  fosphenytoin IVPB 100 milliGRAM(s) PE IV Intermittent every 8 hours  hydrALAZINE 100 milliGRAM(s) Oral every 8 hours  insulin glargine Injectable (LANTUS) 16 Unit(s) SubCutaneous at bedtime  insulin lispro (HumaLOG) corrective regimen sliding scale   SubCutaneous three times a day before meals  insulin lispro (HumaLOG) corrective regimen sliding scale   SubCutaneous at bedtime  insulin lispro Injectable (HumaLOG) 3 Unit(s) SubCutaneous three times a day with meals  ketorolac   Injectable 15 milliGRAM(s) IV Push once  labetalol 200 milliGRAM(s) Oral three times a day  levETIRAcetam  IVPB 1000 milliGRAM(s) IV Intermittent <User Schedule>  levothyroxine 50 MICROGram(s) Oral daily  perampanel 4 milliGRAM(s) Oral at bedtime  polyethylene glycol 3350 17 Gram(s) Oral two times a day  senna 1 Tablet(s) Oral daily    MEDICATIONS  (PRN):  acetaminophen    Suspension .. 975 milliGRAM(s) Oral every 6 hours PRN Temp greater or equal to 38.5C (101.3F), Mild Pain (1 - 3)  bisacodyl Suppository 10 milliGRAM(s) Rectal daily PRN Constipation  dextrose 40% Gel 15 Gram(s) Oral once PRN Blood Glucose LESS THAN 70 milliGRAM(s)/deciliter  glucagon  Injectable 1 milliGRAM(s) IntraMuscular once PRN Glucose LESS THAN 70 milligrams/deciliter  LORazepam   Injectable 2 milliGRAM(s) IV Push once PRN Seizure          Vital Signs Last 24 Hrs  T(C): 36.7 (29 Apr 2019 08:29), Max: 37 (29 Apr 2019 00:21)  T(F): 98.1 (29 Apr 2019 08:29), Max: 98.6 (29 Apr 2019 00:21)  HR: 91 (29 Apr 2019 08:29) (77 - 91)  BP: 156/83 (29 Apr 2019 08:29) (145/86 - 182/83)  BP(mean): --  RR: 18 (29 Apr 2019 08:29) (18 - 18)  SpO2: 96% (29 Apr 2019 08:29) (92% - 98%) on RA          04-28 @ 07:01  -  04-29 @ 07:00  --------------------------------------------------------  IN: 240 mL / OUT: 0 mL / NET: 240 mL          LABS:                        9.2    4.97  )-----------( 123      ( 29 Apr 2019 07:41 )             30.8     04-29    140  |  106  |  4<L>  ----------------------------<  151<H>  3.8   |  24  |  0.67    Ca    9.0      29 Apr 2019 05:26    TPro  6.6  /  Alb  3.3  /  TBili  0.1<L>  /  DBili  x   /  AST  226<H>  /  ALT  254<H>  /  AlkPhos  348<H>  04-29          CAPILLARY BLOOD GLUCOSE      POCT Blood Glucose.: 122 mg/dL (29 Apr 2019 12:43)    PT/INR - ( 28 Apr 2019 17:30 )   PT: 12.0 sec;   INR: 1.04 ratio         PTT - ( 28 Apr 2019 17:30 )  PTT:36.2 sec                    CULTURES: (if applicable)  Culture Results:   >=3 organisms. Probable collection contamination. (04-27 @ 13:56)  Culture Results:   Testing in progress (04-24 @ 17:17)  Culture Results:   No growth (04-24 @ 17:17)    Most recent blood culture -- 04-27 @ 13:56   -- -- .Urine 04-27 @ 13:56  Most recent blood culture -- 04-24 @ 17:17   -- -- .CSF 04-24 @ 17:17    Blood culture 04-24 @ 17:17  --    No polymorphonuclear cells seen  No organisms seen  by cytocentrifuge  --  --  --    Urine culture    -->      Physical Examination:  PULM: Clear to auscultation bilaterally, no significant sputum production  CVS: S1, S2 heard    RADIOLOGY REVIEWED  CT chest: < from: CT Chest w/ IV Cont (04.25.19 @ 16:16) >  CHEST:     LUNGS AND LARGE AIRWAYS: Patent central airways. Bilateral patchy   nonspecific groundglass opacities. Improved aeration in the left lower   lobe. Linear atelectasis in the right lower lobe.  PLEURA: Trace bilateral pleural effusions.  VESSELS: Left PICC line, with its tip at the cavoatrial junction.   Atherosclerotic calcification of the aorta and coronary arteries.  HEART: Heart size is mildly enlarged. No pericardial effusion.  MEDIASTINUM AND CANDICE: No lymphadenopathy.  CHEST WALL AND LOWER NECK: Within normal limits.    < end of copied text >

## 2019-04-29 NOTE — PROGRESS NOTE ADULT - ASSESSMENT
Patient is a 69 year old female with altered mental status due to seizures likely 2/2 to left temporal lesion, differential diagnosis includes neoplastic vs autoimmune encephalitis. Was in nonconvulsive status epilepticus, improved. Now on fycompa, phenytoin, keppra, and ativan. MR spect does not suggest neoplastic lesion. MRI brain w/ contrast does not show enhancement of the lesion, which makes infiltrative glioma less likely, as well as lymphoma, though lymphoma is not entirely excluded. CSF so far also suggests against herpes encephalitis or infectious etiology. CSF flow cytometry and cytopathology have both been negative. CT c/a/p shows enhancement of bilateral kidneys, concerning for infection vs neoplastic process. Though the renal enhancement could be tumor, it is not clear if the same process is ongoing intracranially. For that reason, may not be useful to biopsy the kidney, as it may not yield full diagnostic information regarding the intracranial process.    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  -f/u LP results from 4/24 per ID recs for: csf fungal culture, AFB culture, cytology, spinal fluid autoimmune encephalitis panel, Autoimmune epilepsy panel   -f/u serum autoimmune encephalitis panel, SPEP and UPEP, hepatology labs  -Brain biopsy scheduled with Dr. Ruiz on 5/1 for left posterior temporal lesion. Hold anticoagulation the day before  -will f/u endo for discharge recommendations  -case was discussed with patient's cardiology, and patient is on Eliquis for atrial fibrillation  - US abdomen   - MRI abdomen   - In process ( anti-mitochondrial, anti-smooth muscle, anti-LKM, anti-liver cytosol antibody, MIGUEL, Immunoglobulin levels, viral hepatitis panel: Hep A, Hep B, Hep C, Hep E  antibody)    - WNL ( tylenol level, ASA level, ethanol level)   - daily LFT's and INR   - avoid hepatotoxic agents if possible  - Ativan 1 mg TID discontinued as per recs. from

## 2019-04-29 NOTE — PROGRESS NOTE ADULT - ASSESSMENT
Impression:  1) Abnormal LFTs - DDx most likely medication induced dili as patient has been on many hepatotoix agents such as keppra and valcyte.  It is unclear the trend of these enzymes is the first repeat since admission.  DDx also includes viral induced given known HSV vs much less likely autoimmune.    recommendations:   - please send auto immune work up: anti-mitochondrial, anti-smooth muscle, anti-LKM, anti-liver cytosol antibody, MIGUEL, Immunoglobulin levels   - please send viral hepatitis panel: Hep A, Hep B, Hep C, Hep E  antibody and DNA PCR CMV, EBV, and HSV PCR and IgM   - please send Utox, tylenol level, ASA level, ethanol level   - please send daily LFT's and INR   - obtain abdominal US with sono   - avoid hepatotoxic agents if possible    Bethany Alicea, PGY-4  Gastroenterology Fellow  Pager x [phone] or [phone]  (After 5 pm or on weekends please page GI on call)

## 2019-04-29 NOTE — PROGRESS NOTE ADULT - SUBJECTIVE AND OBJECTIVE BOX
Diabetes Follow up note:  Interval Hx:   70 y/o F w/h/o uncontrolled T2DM on oral DM meds. Also HTN/HLD/CAD/Hypothyroidism/recent umbilical hernia repair 2 weeks ago. Here s/p fall with LOC> seizures> hypernatremia. Pt on 24 hour EEG monitoring. BG values over weekend at goal in AM but increased during day. Pt reports improved appetite. NPO today for testing. Pt reports was taking Metformin and Glipizide at home and was taking consistently.     Review of Systems:  General: as above  GI: Tolerating POs without any N/V/D/ABD PAIN.  CV: No CP/SOB  ENDO: No S&Sx of hypoglycemia  MEDS:    insulin glargine Injectable (LANTUS) 16 Unit(s) SubCutaneous at bedtime  insulin lispro (HumaLOG) corrective regimen sliding scale   SubCutaneous three times a day before meals  insulin lispro (HumaLOG) corrective regimen sliding scale   SubCutaneous at bedtime  levothyroxine 50 MICROGram(s) Oral daily      Allergies    No Known Allergies        PE:  General: Female lying in bed. NAD>   Vital Signs Last 24 Hrs  T(C): 36.7 (29 Apr 2019 08:29), Max: 37 (29 Apr 2019 00:21)  T(F): 98.1 (29 Apr 2019 08:29), Max: 98.6 (29 Apr 2019 00:21)  HR: 91 (29 Apr 2019 08:29) (77 - 91)  BP: 156/83 (29 Apr 2019 08:29) (145/86 - 182/83)  BP(mean): --  RR: 18 (29 Apr 2019 08:29) (18 - 18)  SpO2: 96% (29 Apr 2019 08:29) (92% - 98%)  Abd: Soft, NT,ND, Obese.  Extremities: Warm. no edema noted.   Neuro: A&O X2. Restless.     LABS:    POCT Blood Glucose.: 137 mg/dL (04-29-19 @ 08:07)  POCT Blood Glucose.: 217 mg/dL (04-28-19 @ 22:09)  POCT Blood Glucose.: 160 mg/dL (04-28-19 @ 18:10)  POCT Blood Glucose.: 259 mg/dL (04-28-19 @ 13:56)  POCT Blood Glucose.: 229 mg/dL (04-28-19 @ 12:34)  POCT Blood Glucose.: 159 mg/dL (04-28-19 @ 08:33)  POCT Blood Glucose.: 173 mg/dL (04-27-19 @ 21:30)  POCT Blood Glucose.: 222 mg/dL (04-27-19 @ 17:48)  POCT Blood Glucose.: 118 mg/dL (04-27-19 @ 12:45)  POCT Blood Glucose.: 106 mg/dL (04-27-19 @ 09:27)  POCT Blood Glucose.: 164 mg/dL (04-26-19 @ 21:32)  POCT Blood Glucose.: 222 mg/dL (04-26-19 @ 17:49)  POCT Blood Glucose.: 106 mg/dL (04-26-19 @ 13:12)                            9.2    4.97  )-----------( 123      ( 29 Apr 2019 07:41 )             30.8       04-29    140  |  106  |  4<L>  ----------------------------<  151<H>  3.8   |  24  |  0.67    Ca    9.0      29 Apr 2019 05:26    TPro  6.6  /  Alb  3.3  /  TBili  0.1<L>  /  DBili  x   /  AST  226<H>  /  ALT  254<H>  /  AlkPhos  348<H>  04-29      Thyroid Function Tests:  04-22 @ 08:55 TSH -- FreeT4 1.1 T3 -- Anti TPO -- Anti Thyroglobulin Ab -- TSI --  04-22 @ 08:51 TSH 1.45 FreeT4 -- T3 -- Anti TPO -- Anti Thyroglobulin Ab -- TSI --      Hemoglobin A1C, Whole Blood: 9.9 % <H> [4.0 - 5.6] (04-21-19 @ 09:26)            Contact number: eugenio [phone] or [phone]

## 2019-04-29 NOTE — PROGRESS NOTE ADULT - SUBJECTIVE AND OBJECTIVE BOX
Patient seen and examined at bedside.    T(C): 36.7 (04-29-19 @ 04:00), Max: 37 (04-29-19 @ 00:21)  HR: 79 (04-29-19 @ 04:00) (77 - 89)  BP: 145/86 (04-29-19 @ 04:00) (145/86 - 182/83)  RR: 18 (04-29-19 @ 04:00) (18 - 18)  SpO2: 94% (04-29-19 @ 04:00) (92% - 98%)  Wt(kg): --    Exam:      AOx2, FC, PERRL, EOMI, V1-3 intact, no facial, tongue midline, shrug 5/5  5/5 throughout, no drift  SILT  No clonus or babinski

## 2019-04-29 NOTE — PROGRESS NOTE ADULT - SUBJECTIVE AND OBJECTIVE BOX
Patient is a 69y old  Female who presents with a chief complaint of seizures (29 Apr 2019 13:23)      SUBJECTIVE / OVERNIGHT EVENTS: No new complaints.   Review of Systems  chest pain no  palpitations no  sob no  nausea no  headache no    MEDICATIONS  (STANDING):  ALBUTerol/ipratropium for Nebulization 3 milliLiter(s) Nebulizer every 6 hours  amLODIPine   Tablet 10 milliGRAM(s) Oral daily  dextrose 5%. 1000 milliLiter(s) (50 mL/Hr) IV Continuous <Continuous>  dextrose 50% Injectable 12.5 Gram(s) IV Push once  dextrose 50% Injectable 25 Gram(s) IV Push once  dextrose 50% Injectable 25 Gram(s) IV Push once  docusate sodium 100 milliGRAM(s) Oral daily  enoxaparin Injectable 100 milliGRAM(s) SubCutaneous two times a day  fosphenytoin IVPB 100 milliGRAM(s) PE IV Intermittent every 8 hours  hydrALAZINE 100 milliGRAM(s) Oral every 8 hours  insulin glargine Injectable (LANTUS) 16 Unit(s) SubCutaneous at bedtime  insulin lispro (HumaLOG) corrective regimen sliding scale   SubCutaneous three times a day before meals  insulin lispro (HumaLOG) corrective regimen sliding scale   SubCutaneous at bedtime  insulin lispro Injectable (HumaLOG) 3 Unit(s) SubCutaneous three times a day with meals  ketorolac   Injectable 15 milliGRAM(s) IV Push once  labetalol 200 milliGRAM(s) Oral three times a day  levETIRAcetam  IVPB 1000 milliGRAM(s) IV Intermittent <User Schedule>  levothyroxine 50 MICROGram(s) Oral daily  perampanel 4 milliGRAM(s) Oral at bedtime  polyethylene glycol 3350 17 Gram(s) Oral two times a day  senna 1 Tablet(s) Oral daily    MEDICATIONS  (PRN):  acetaminophen    Suspension .. 975 milliGRAM(s) Oral every 6 hours PRN Temp greater or equal to 38.5C (101.3F), Mild Pain (1 - 3)  bisacodyl Suppository 10 milliGRAM(s) Rectal daily PRN Constipation  dextrose 40% Gel 15 Gram(s) Oral once PRN Blood Glucose LESS THAN 70 milliGRAM(s)/deciliter  glucagon  Injectable 1 milliGRAM(s) IntraMuscular once PRN Glucose LESS THAN 70 milligrams/deciliter  LORazepam   Injectable 2 milliGRAM(s) IV Push once PRN Seizure      Vital Signs Last 24 Hrs  T(C): 36.7 (29 Apr 2019 08:29), Max: 37 (29 Apr 2019 00:21)  T(F): 98.1 (29 Apr 2019 08:29), Max: 98.6 (29 Apr 2019 00:21)  HR: 91 (29 Apr 2019 08:29) (79 - 91)  BP: 156/83 (29 Apr 2019 08:29) (145/86 - 182/83)  BP(mean): --  RR: 18 (29 Apr 2019 08:29) (18 - 18)  SpO2: 96% (29 Apr 2019 08:29) (94% - 98%)    PHYSICAL EXAM:  GENERAL: NAD  HEAD:  Atraumatic, Normocephalic  EYES: EOMI, PERRLA, conjunctiva and sclera clear  NECK: Supple, No JVD  CHEST/LUNG: Clear to auscultation bilaterally; No wheeze  HEART: Regular rate and rhythm; No murmurs, rubs, or gallops  ABDOMEN: Soft, Nontender, Nondistended; Bowel sounds present  EXTREMITIES:  2+ Peripheral Pulses, No clubbing, cyanosis, or edema  PSYCH: AAOx3  NEUROLOGY: non-focal  SKIN: No rashes or lesions    LABS:                        9.2    4.97  )-----------( 123      ( 29 Apr 2019 07:41 )             30.8     04-29    140  |  106  |  4<L>  ----------------------------<  151<H>  3.8   |  24  |  0.67    Ca    9.0      29 Apr 2019 05:26    TPro  6.6  /  Alb  3.3  /  TBili  0.1<L>  /  DBili  x   /  AST  226<H>  /  ALT  254<H>  /  AlkPhos  348<H>  04-29    PT/INR - ( 28 Apr 2019 17:30 )   PT: 12.0 sec;   INR: 1.04 ratio         PTT - ( 28 Apr 2019 17:30 )  PTT:36.2 sec          Culture - Urine (collected 27 Apr 2019 13:56)  Source: .Urine  Final Report (28 Apr 2019 09:04):    >=3 organisms. Probable collection contamination.        RADIOLOGY & ADDITIONAL TESTS:    Imaging Personally Reviewed:    Consultant(s) Notes Reviewed:      Care Discussed with Consultants/Other Providers:

## 2019-04-29 NOTE — PROGRESS NOTE ADULT - ASSESSMENT
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 aspiration risk

## 2019-04-29 NOTE — PROGRESS NOTE ADULT - ASSESSMENT
69 f for brain mass biopsy  Off antibiotics.  Hold ASA, Eliquis   A Fib  - AC with Lovenox. Hold prior to Bx.  Hypothyroid  - follow TSH.  HTN control  COPD  - continue nebs  Diabetes control.  Seizure control  Abnormal LFT  - MRI abdomen pending   - Hepatology evaluation  - follow LFT  Continue present Rx.  Tyrone Antony MD pager [phone]

## 2019-04-29 NOTE — EEG REPORT - NS EEG TEXT BOX
[facility]   COMPREHENSIVE EPILEPSY CENTER   REPORT OF CONTINUOUS VIDEO EEG     [facility]: [address], [place] [address], Ph#: [phone]  [facility]: [id number], [place] [address], Ph#: [phone]  Office: [address] [place] [address], [facility] [address], [place] [address] Ph#: [phone]    Patient Name: RON FOSTER  Age and : 69y (54)  MRN #: [id number]  Location: [place] [address]  Referring Physician: Sebastian Burnham    Study Date: 19-19    _____________________________________________________________  STUDY INFORMATION    EEG Recording Technique:  The patient underwent continuous Video-EEG monitoring, using Telemetry System hardware on the XLTek Digital System. EEG and video data were stored on a computer hard drive with important events saved in digital archive files. The material was reviewed by a physician (electroencephalographer / epileptologist) on a daily basis. Saulo and seizure detection algorithms were utilized and reviewed. An EEG Technician attended to the patient, and was available throughout daytime work hours.  The epilepsy center neurologist was available in person or on call 24-hours per day.    EEG Placement and Labeling of Electrodes:  The EEG was performed utilizing 20 channel referential EEG connections (coronal over temporal over parasagittal montage) using all standard 10-20 electrode placements with EKG, with additional electrodes placed in the inferior temporal region using the modified 10-10 montage electrode placements for elective admissions, or if deemed necessary. Recording was at a sampling rate of 256 samples per second per channel. Time synchronized digital video recording was done simultaneously with EEG recording. A low light infrared camera was used for low light recording.     _____________________________________________________________  HISTORY    Patient is a 69y old  Female who presents with a chief complaint of seizures (2019 13:23)      PERTINENT MEDICATION:  fosphenytoin IVPB 100 milliGRAM(s) PE IV Intermittent every 8 hours  levETIRAcetam  IVPB 1000 milliGRAM(s) IV Intermittent <User Schedule>  perampanel 4 milliGRAM(s) Oral at bedtime    STUDY INTERPRETATION    Findings:  The background was continuous, spontaneously variable and reactive.  The background consist of diffuse delta and theta activity.  A rudimentary PDR to 7hz was noted.      Focal Slowing:  Continuous polymorphic delta and theta activity over the left hemisphere.    Sleep Background:  Stage II sleep transients asymmetric sleep spindles and K complexes were present, better formed over the right.    Interictal Epileptiform Activity:   Frequent spike wave discharges over the left posterior quadrant , max O1 P7.       Events:  None    Activation Procedures:   Hyperventilation was not performed.    Photic stimulation was not performed.     Artifacts:  Intermittent myogenic and movement artifacts were noted.    ECG:  The heart rate on single channel ECG was predominantly between 70-90 BPM.    _____________________________________________________________  EEG SUMMARY/CLASSIFICATION  Abnormal EEG in an unresponsive/sedated patient.  - Frequent spike wave discharges over the left posterior quadrant , max O1 P7.  - Continuous polymorphic delta and theta activity over the left hemisphere.  - Moderate generalized slowing.   _____________________________________________________________  EEG IMPRESSION/CLINICAL CORRELATE    Abnormal EEG study.  1. Left temporal occipital epileptogenic potential.   2. Structural abnormality over the left hemisphere.   3. Moderate diffuse or multifocal cerebral dysfunction.  4. No seizures recorded.         Preliminary Fellow Read:    Yashira Anaya MD  Adult EEG Fellow, [facility] Epilepsy [place] [facility]   COMPREHENSIVE EPILEPSY CENTER   REPORT OF CONTINUOUS VIDEO EEG     [facility]: [address], [place] [address], Ph#: [phone]  [facility]: [id number], [place] [address], Ph#: [phone]  Office: [address] [place] [address], [facility] [address], [place] [address] Ph#: [phone]    Patient Name: RON FOSTER  Age and : 69y (54)  MRN #: [id number]  Location: [place] [address]  Referring Physician: Sebastian Burnham    Study Date: 19-19    _____________________________________________________________  STUDY INFORMATION    EEG Recording Technique:  The patient underwent continuous Video-EEG monitoring, using Telemetry System hardware on the XLTek Digital System. EEG and video data were stored on a computer hard drive with important events saved in digital archive files. The material was reviewed by a physician (electroencephalographer / epileptologist) on a daily basis. Saulo and seizure detection algorithms were utilized and reviewed. An EEG Technician attended to the patient, and was available throughout daytime work hours.  The epilepsy center neurologist was available in person or on call 24-hours per day.    EEG Placement and Labeling of Electrodes:  The EEG was performed utilizing 20 channel referential EEG connections (coronal over temporal over parasagittal montage) using all standard 10-20 electrode placements with EKG, with additional electrodes placed in the inferior temporal region using the modified 10-10 montage electrode placements for elective admissions, or if deemed necessary. Recording was at a sampling rate of 256 samples per second per channel. Time synchronized digital video recording was done simultaneously with EEG recording. A low light infrared camera was used for low light recording.     _____________________________________________________________  HISTORY    Patient is a 69y old  Female who presents with a chief complaint of seizures (2019 13:23)      PERTINENT MEDICATION:  fosphenytoin IVPB 100 milliGRAM(s) PE IV Intermittent every 8 hours  levETIRAcetam  IVPB 1000 milliGRAM(s) IV Intermittent <User Schedule>  perampanel 4 milliGRAM(s) Oral at bedtime    STUDY INTERPRETATION    Findings:  The background was continuous, spontaneously variable and reactive.  The background consist of diffuse delta and theta activity.  A rudimentary PDR to 7hz was noted.      Focal Slowing:  Continuous polymorphic delta and theta activity over the left hemisphere.    Sleep Background:  Stage II sleep transients asymmetric sleep spindles and K complexes were present, better formed over the right.    Interictal Epileptiform Activity:   Frequent spike wave discharges over the left posterior quadrant , max O1 P7.       Events:  None    Activation Procedures:   Hyperventilation was not performed.    Photic stimulation was not performed.     Artifacts:  Intermittent myogenic and movement artifacts were noted.    ECG:  The heart rate on single channel ECG was predominantly between 70-90 BPM.    _____________________________________________________________  EEG SUMMARY/CLASSIFICATION  Abnormal EEG in an unresponsive/sedated patient.  - Frequent spike wave discharges over the left posterior quadrant , max O1 P7.  - Continuous polymorphic delta and theta activity over the left hemisphere.  - Moderate generalized slowing.   _____________________________________________________________  EEG IMPRESSION/CLINICAL CORRELATE    Abnormal EEG study.  1. Left temporal occipital epileptogenic potential.   2. Structural abnormality over the left hemisphere.   3. Moderate diffuse or multifocal cerebral dysfunction.  4. No seizures recorded.         Yashira Anaya MD  Adult EEG Fellow, [facility] Epilepsy [place]

## 2019-04-29 NOTE — PROGRESS NOTE ADULT - PROBLEM SELECTOR PLAN 1
Maintain sp02>90% w supplemental oxygen if needed  -pCO2 is normal on admission, no evidence of chronic co2 retention  -Outpt PSG  -Can trial on CPAP at night while inpt but will not be able to qualify her for home CPAP without PSG.

## 2019-04-29 NOTE — PROGRESS NOTE ADULT - ASSESSMENT
68 y/o F with PMH of CAD, DM, HTN, HLD, hypothyroid, on eliquis, ASA, s/p umbilical hernia repair ~2 weeks ago, presents as level 1 trauma activation after being found down at home. Found to have status epilepticus-now controlled. L temporal lesion identified on MRI brain of yet unclear etiology. Called to eval for ?NACHO. Sp02 dropping to 92% on RA at night. CT chest with mosaic attenuation pattern likely 2nd

## 2019-04-29 NOTE — PROGRESS NOTE ADULT - SUBJECTIVE AND OBJECTIVE BOX
Chief Complaint:  Patient is a 69y old  Female who presents with a chief complaint of level 2 trauma (2019 20:24)      Interval Events:   LFT's stable and slightly down trending.    Allergies:  No Known Allergies      Hospital Medications:  acetaminophen    Suspension .. 975 milliGRAM(s) Oral every 6 hours PRN  ALBUTerol/ipratropium for Nebulization 3 milliLiter(s) Nebulizer every 6 hours  amLODIPine   Tablet 10 milliGRAM(s) Oral daily  bisacodyl Suppository 10 milliGRAM(s) Rectal daily PRN  dextrose 40% Gel 15 Gram(s) Oral once PRN  dextrose 5%. 1000 milliLiter(s) IV Continuous <Continuous>  dextrose 50% Injectable 12.5 Gram(s) IV Push once  dextrose 50% Injectable 25 Gram(s) IV Push once  dextrose 50% Injectable 25 Gram(s) IV Push once  docusate sodium 100 milliGRAM(s) Oral daily  enoxaparin Injectable 100 milliGRAM(s) SubCutaneous two times a day  fosphenytoin IVPB 100 milliGRAM(s) PE IV Intermittent every 8 hours  glucagon  Injectable 1 milliGRAM(s) IntraMuscular once PRN  hydrALAZINE 100 milliGRAM(s) Oral every 8 hours  insulin glargine Injectable (LANTUS) 16 Unit(s) SubCutaneous at bedtime  insulin lispro (HumaLOG) corrective regimen sliding scale   SubCutaneous three times a day before meals  insulin lispro (HumaLOG) corrective regimen sliding scale   SubCutaneous at bedtime  ketorolac   Injectable 15 milliGRAM(s) IV Push once  labetalol 200 milliGRAM(s) Oral three times a day  levETIRAcetam  IVPB 1000 milliGRAM(s) IV Intermittent <User Schedule>  levothyroxine 50 MICROGram(s) Oral daily  LORazepam     Tablet 1 milliGRAM(s) Oral three times a day  LORazepam   Injectable 2 milliGRAM(s) IV Push once PRN  perampanel 4 milliGRAM(s) Oral at bedtime  polyethylene glycol 3350 17 Gram(s) Oral two times a day  senna 1 Tablet(s) Oral daily      PMHX/PSHX:  Hypothyroid  Obesity  Morbid obesity  Morbidly obese  NACHO (obstructive sleep apnea)  DM (Diabetes Mellitus) (ICD9 250.00)  Dyslipidemia (ICD9 272.4)  HTN (Hypertension) (ICD9 401.9)  CAD (Coronary Artery Disease) (ICD9 414.00)  History of hysterectomy  History of ovarian cystectomy  S/P primary angioplasty with coronary stent  C Section  S/P Appendectomy  S/P Coronary Angioplasty      Family history:  Family history of heart disease (Aunt)  No pertinent family history in first degree relatives          PHYSICAL EXAM:     GENERAL:  NAD  HEENT:  NC/AT,  conjunctivae clear, sclera -anicteric  CHEST:  Full & symmetric excursion, no increased  HEART:  Regular rhythm  ABDOMEN:  Soft, non-tender, non-distended, normoactive bowel sounds,  no masses ,no hepato-splenomegaly,   EXTREMITIES:  no cyanosis,clubbing or edema  SKIN:  No rash/erythema/ecchymoses/petechiae/wounds/abscess/warm/dry  NEURO:  Alert, oriented    Vital Signs:  Vital Signs Last 24 Hrs  T(C): 36.7 (2019 04:00), Max: 37 (2019 00:21)  T(F): 98 (2019 04:00), Max: 98.6 (2019 00:21)  HR: 79 (2019 04:00) (77 - 89)  BP: 145/86 (2019 04:00) (145/86 - 182/83)  BP(mean): --  RR: 18 (2019 04:00) (18 - 18)  SpO2: 94% (2019 04:00) (92% - 98%)  Daily     Daily     LABS:                        9.2    4.97  )-----------( 123      ( 2019 07:41 )             30.8         140  |  106  |  4<L>  ----------------------------<  151<H>  3.8   |  24  |  0.67    Ca    9.0      2019 05:26    TPro  6.6  /  Alb  3.3  /  TBili  0.1<L>  /  DBili  x   /  AST  226<H>  /  ALT  254<H>  /  AlkPhos  348<H>      LIVER FUNCTIONS - ( 2019 05:26 )  Alb: 3.3 g/dL / Pro: 6.6 g/dL / ALK PHOS: 348 U/L / ALT: 254 U/L / AST: 226 U/L / GGT: x           PT/INR - ( 2019 17:30 )   PT: 12.0 sec;   INR: 1.04 ratio         PTT - ( 2019 17:30 )  PTT:36.2 sec  Urinalysis Basic - ( 2019 10:22 )    Color: Yellow / Appearance: Clear / S.012 / pH: x  Gluc: x / Ketone: Negative  / Bili: Negative / Urobili: Negative   Blood: x / Protein: Negative / Nitrite: Negative   Leuk Esterase: Negative / RBC: x / WBC x   Sq Epi: x / Non Sq Epi: x / Bacteria: x          Imaging:

## 2019-04-29 NOTE — PROGRESS NOTE ADULT - PROBLEM SELECTOR PLAN 1
-test BG AC/HS  -c/w Lantus 16 units QHS. Can give Lantus 14 if NPO  -Start Humalog 3 units AC meals. (hold if not eating)  -c/w Humalog moderate correction scale AC and Mod HS scale  -discussed plan w/pt and RN  Discharge recs will depend on PO intake/insulin needs and glycemic control in hospital  pager: [phone]

## 2019-04-30 ENCOUNTER — TRANSCRIPTION ENCOUNTER (OUTPATIENT)
Age: 69
End: 2019-04-30

## 2019-04-30 LAB
ALBUMIN SERPL ELPH-MCNC: 3.4 G/DL — SIGNIFICANT CHANGE UP (ref 3.3–5)
ALP SERPL-CCNC: 345 U/L — HIGH (ref 40–120)
ALT FLD-CCNC: 204 U/L — HIGH (ref 10–45)
ANA TITR SER: NEGATIVE — SIGNIFICANT CHANGE UP
ANION GAP SERPL CALC-SCNC: 13 MMOL/L — SIGNIFICANT CHANGE UP (ref 5–17)
ANION GAP SERPL CALC-SCNC: 13 MMOL/L — SIGNIFICANT CHANGE UP (ref 5–17)
AST SERPL-CCNC: 149 U/L — HIGH (ref 10–40)
BILIRUB SERPL-MCNC: 0.1 MG/DL — LOW (ref 0.2–1.2)
BLD GP AB SCN SERPL QL: NEGATIVE — SIGNIFICANT CHANGE UP
BUN SERPL-MCNC: 7 MG/DL — SIGNIFICANT CHANGE UP (ref 7–23)
BUN SERPL-MCNC: 9 MG/DL — SIGNIFICANT CHANGE UP (ref 7–23)
CALCIUM SERPL-MCNC: 9.3 MG/DL — SIGNIFICANT CHANGE UP (ref 8.4–10.5)
CALCIUM SERPL-MCNC: 9.5 MG/DL — SIGNIFICANT CHANGE UP (ref 8.4–10.5)
CHLORIDE SERPL-SCNC: 103 MMOL/L — SIGNIFICANT CHANGE UP (ref 96–108)
CHLORIDE SERPL-SCNC: 105 MMOL/L — SIGNIFICANT CHANGE UP (ref 96–108)
CO2 SERPL-SCNC: 22 MMOL/L — SIGNIFICANT CHANGE UP (ref 22–31)
CO2 SERPL-SCNC: 24 MMOL/L — SIGNIFICANT CHANGE UP (ref 22–31)
CREAT SERPL-MCNC: 0.71 MG/DL — SIGNIFICANT CHANGE UP (ref 0.5–1.3)
CREAT SERPL-MCNC: 0.76 MG/DL — SIGNIFICANT CHANGE UP (ref 0.5–1.3)
GLUCOSE SERPL-MCNC: 139 MG/DL — HIGH (ref 70–99)
GLUCOSE SERPL-MCNC: 236 MG/DL — HIGH (ref 70–99)
HCT VFR BLD CALC: 30.3 % — LOW (ref 34.5–45)
HCT VFR BLD CALC: 32.3 % — LOW (ref 34.5–45)
HGB BLD-MCNC: 10.9 G/DL — LOW (ref 11.5–15.5)
HGB BLD-MCNC: 9.2 G/DL — LOW (ref 11.5–15.5)
INR BLD: 0.95 RATIO — SIGNIFICANT CHANGE UP (ref 0.88–1.16)
MCHC RBC-ENTMCNC: 23.9 PG — LOW (ref 27–34)
MCHC RBC-ENTMCNC: 26.5 PG — LOW (ref 27–34)
MCHC RBC-ENTMCNC: 30.4 GM/DL — LOW (ref 32–36)
MCHC RBC-ENTMCNC: 33.7 GM/DL — SIGNIFICANT CHANGE UP (ref 32–36)
MCV RBC AUTO: 78.7 FL — LOW (ref 80–100)
MCV RBC AUTO: 78.7 FL — LOW (ref 80–100)
PHENYTOIN FREE SERPL-MCNC: 10.2 UG/ML — SIGNIFICANT CHANGE UP (ref 10–20)
PLATELET # BLD AUTO: 138 K/UL — LOW (ref 150–400)
PLATELET # BLD AUTO: 203 K/UL — SIGNIFICANT CHANGE UP (ref 150–400)
POTASSIUM SERPL-MCNC: 4.1 MMOL/L — SIGNIFICANT CHANGE UP (ref 3.5–5.3)
POTASSIUM SERPL-MCNC: 4.9 MMOL/L — SIGNIFICANT CHANGE UP (ref 3.5–5.3)
POTASSIUM SERPL-SCNC: 4.1 MMOL/L — SIGNIFICANT CHANGE UP (ref 3.5–5.3)
POTASSIUM SERPL-SCNC: 4.9 MMOL/L — SIGNIFICANT CHANGE UP (ref 3.5–5.3)
PROT SERPL-MCNC: 6.6 G/DL — SIGNIFICANT CHANGE UP (ref 6–8.3)
PROTHROM AB SERPL-ACNC: 10.9 SEC — SIGNIFICANT CHANGE UP (ref 10–12.9)
RBC # BLD: 3.85 M/UL — SIGNIFICANT CHANGE UP (ref 3.8–5.2)
RBC # BLD: 4.1 M/UL — SIGNIFICANT CHANGE UP (ref 3.8–5.2)
RBC # FLD: 15.7 % — HIGH (ref 10.3–14.5)
RBC # FLD: 16.7 % — HIGH (ref 10.3–14.5)
RH IG SCN BLD-IMP: POSITIVE — SIGNIFICANT CHANGE UP
SODIUM SERPL-SCNC: 138 MMOL/L — SIGNIFICANT CHANGE UP (ref 135–145)
SODIUM SERPL-SCNC: 142 MMOL/L — SIGNIFICANT CHANGE UP (ref 135–145)
WBC # BLD: 5.28 K/UL — SIGNIFICANT CHANGE UP (ref 3.8–10.5)
WBC # BLD: 5.8 K/UL — SIGNIFICANT CHANGE UP (ref 3.8–10.5)
WBC # FLD AUTO: 5.28 K/UL — SIGNIFICANT CHANGE UP (ref 3.8–10.5)
WBC # FLD AUTO: 5.8 K/UL — SIGNIFICANT CHANGE UP (ref 3.8–10.5)

## 2019-04-30 PROCEDURE — 99232 SBSQ HOSP IP/OBS MODERATE 35: CPT

## 2019-04-30 PROCEDURE — 99232 SBSQ HOSP IP/OBS MODERATE 35: CPT | Mod: GC

## 2019-04-30 PROCEDURE — 99233 SBSQ HOSP IP/OBS HIGH 50: CPT | Mod: GC

## 2019-04-30 PROCEDURE — 99223 1ST HOSP IP/OBS HIGH 75: CPT | Mod: 57

## 2019-04-30 RX ORDER — INSULIN GLARGINE 100 [IU]/ML
14 INJECTION, SOLUTION SUBCUTANEOUS AT BEDTIME
Refills: 0 | Status: DISCONTINUED | OUTPATIENT
Start: 2019-04-30 | End: 2019-04-30

## 2019-04-30 RX ORDER — ALTEPLASE 100 MG
2 KIT INTRAVENOUS ONCE
Refills: 0 | Status: COMPLETED | OUTPATIENT
Start: 2019-04-30 | End: 2019-04-30

## 2019-04-30 RX ORDER — INSULIN GLARGINE 100 [IU]/ML
16 INJECTION, SOLUTION SUBCUTANEOUS AT BEDTIME
Refills: 0 | Status: DISCONTINUED | OUTPATIENT
Start: 2019-05-01 | End: 2019-05-01

## 2019-04-30 RX ORDER — INSULIN GLARGINE 100 [IU]/ML
14 INJECTION, SOLUTION SUBCUTANEOUS AT BEDTIME
Refills: 0 | Status: COMPLETED | OUTPATIENT
Start: 2019-04-30 | End: 2019-04-30

## 2019-04-30 RX ADMIN — Medication 100 MILLIGRAM(S): at 13:07

## 2019-04-30 RX ADMIN — LEVETIRACETAM 400 MILLIGRAM(S): 250 TABLET, FILM COATED ORAL at 22:20

## 2019-04-30 RX ADMIN — LEVETIRACETAM 400 MILLIGRAM(S): 250 TABLET, FILM COATED ORAL at 13:06

## 2019-04-30 RX ADMIN — Medication 3 MILLILITER(S): at 18:17

## 2019-04-30 RX ADMIN — AMLODIPINE BESYLATE 10 MILLIGRAM(S): 2.5 TABLET ORAL at 05:56

## 2019-04-30 RX ADMIN — Medication 200 MILLIGRAM(S): at 23:52

## 2019-04-30 RX ADMIN — Medication 3 MILLILITER(S): at 23:51

## 2019-04-30 RX ADMIN — ALTEPLASE 2 MILLIGRAM(S): KIT at 03:15

## 2019-04-30 RX ADMIN — Medication 4: at 12:51

## 2019-04-30 RX ADMIN — FOSPHENYTOIN 104 MILLIGRAM(S) PE: 50 INJECTION INTRAMUSCULAR; INTRAVENOUS at 05:55

## 2019-04-30 RX ADMIN — FOSPHENYTOIN 104 MILLIGRAM(S) PE: 50 INJECTION INTRAMUSCULAR; INTRAVENOUS at 22:20

## 2019-04-30 RX ADMIN — INSULIN GLARGINE 14 UNIT(S): 100 INJECTION, SOLUTION SUBCUTANEOUS at 22:26

## 2019-04-30 RX ADMIN — Medication 100 MILLIGRAM(S): at 12:51

## 2019-04-30 RX ADMIN — PERAMPANEL 4 MILLIGRAM(S): 2 TABLET ORAL at 22:21

## 2019-04-30 RX ADMIN — Medication 50 MICROGRAM(S): at 05:57

## 2019-04-30 RX ADMIN — Medication 3 MILLILITER(S): at 05:57

## 2019-04-30 RX ADMIN — Medication 100 MILLIGRAM(S): at 22:21

## 2019-04-30 RX ADMIN — LEVETIRACETAM 400 MILLIGRAM(S): 250 TABLET, FILM COATED ORAL at 05:56

## 2019-04-30 RX ADMIN — Medication 3 UNIT(S): at 18:17

## 2019-04-30 RX ADMIN — Medication 4: at 18:17

## 2019-04-30 RX ADMIN — Medication 975 MILLIGRAM(S): at 22:21

## 2019-04-30 RX ADMIN — Medication 200 MILLIGRAM(S): at 13:07

## 2019-04-30 RX ADMIN — Medication 3 UNIT(S): at 12:51

## 2019-04-30 RX ADMIN — FOSPHENYTOIN 104 MILLIGRAM(S) PE: 50 INJECTION INTRAMUSCULAR; INTRAVENOUS at 13:06

## 2019-04-30 RX ADMIN — Medication 100 MILLIGRAM(S): at 05:56

## 2019-04-30 RX ADMIN — Medication 3 MILLILITER(S): at 12:51

## 2019-04-30 NOTE — PROGRESS NOTE ADULT - PROBLEM SELECTOR PLAN 1
-test BG AC/HS  -c/w Lantus 16 units QHS. Can give Lantus 14 if NPO  -C/w Humalog 3 units AC meals. Need to reassess effect but was not given this am ac breakfast. If BGs remain >200s today while on present premeal dose will increase dose to 4 units (hold if not eating).  -c/w Humalog moderate correction scale AC and Mod HS scale  -Discharge recs will depend on PO intake/insulin needs and glycemic control in hospital  -Plan discussed with pt/team.  Contact info: [phone] (24/7). pager [phone]

## 2019-04-30 NOTE — PROGRESS NOTE ADULT - SUBJECTIVE AND OBJECTIVE BOX
Diabetes Follow up note: Saw pt earlier today  Interval Hx: 68 y/o F w/h/o uncontrolled T2DM on oral DM meds. Also HTN/HLD/CAD/Hypothyroidism/recent umbilical hernia repair 2 weeks ago. Here s/p fall with LOC> seizures> hypernatremia. Pt reports tolerating POs with BG values variable between 100s to 200s depending on PO intake. Pt more alert today reporting eating much better. However, pt didn't receive meal time insulin this am and had rebound hyperglycemia ac lunch. No hypoglycemia. Per team pt going for brain bx tomorrow so will be NPO post mn tonight. Pt states she feels much better.        Review of Systems:  General: as above  GI: Tolerating POs without any N/V/D/ABD PAIN.  CV: No CP/SOB  ENDO: No S&Sx of hypoglycemia      MEDS:  levothyroxine 50 MICROGram(s) Oral daily  insulin glargine Injectable (LANTUS) 16 Unit(s) SubCutaneous at bedtime  insulin lispro (HumaLOG) corrective regimen sliding scale   SubCutaneous three times a day before meals  insulin lispro (HumaLOG) corrective regimen sliding scale   SubCutaneous at bedtime  insulin lispro Injectable (HumaLOG) 3 Unit(s) SubCutaneous three times a day with meals      Allergies    No Known Allergies        PE:  General: Female sitting in chair in NAD>   Vital Signs Last 24 Hrs  T(C): 36.6 (04-30-19 @ 13:01), Max: 36.9 (04-29-19 @ 23:34)  T(F): 97.9 (04-30-19 @ 13:01), Max: 98.5 (04-29-19 @ 23:34)  HR: 77 (04-30-19 @ 13:01) (75 - 79)  BP: 167/78 (04-30-19 @ 13:01) (134/71 - 167/78)  BP(mean): --  RR: 18 (04-30-19 @ 13:01) (16 - 20)  SpO2: 95% (04-30-19 @ 13:01) (95% - 99%)  Abd: Soft, NT, ND, Obese.  Extremities: Warm. no edema noted in all 4 exts.   Neuro: A&O X3. Able to answer all questions appropriately and follow directions.    LABS:  POCT Blood Glucose.: 248 mg/dL (04-30-19 @ 12:48)  POCT Blood Glucose.: 124 mg/dL (04-30-19 @ 08:44)  POCT Blood Glucose.: 294 mg/dL (04-29-19 @ 21:33)  POCT Blood Glucose.: 126 mg/dL (04-29-19 @ 17:30)  POCT Blood Glucose.: 122 mg/dL (04-29-19 @ 12:43)  POCT Blood Glucose.: 137 mg/dL (04-29-19 @ 08:07)  POCT Blood Glucose.: 217 mg/dL (04-28-19 @ 22:09)  POCT Blood Glucose.: 160 mg/dL (04-28-19 @ 18:10)                              9.2    4.97  )-----------( 123      ( 29 Apr 2019 07:41 )             30.8       04-29    140  |  106  |  4<L>  ----------------------------<  151<H>  3.8   |  24  |  0.67    Ca    9.0      29 Apr 2019 05:26    TPro  6.6  /  Alb  3.3  /  TBili  0.1<L>  /  DBili  x   /  AST  226<H>  /  ALT  254<H>  /  AlkPhos  348<H>  04-29      Thyroid Function Tests:  04-22 @ 08:55 TSH -- FreeT4 1.1 T3 -- Anti TPO -- Anti Thyroglobulin Ab -- TSI --  04-22 @ 08:51 TSH 1.45 FreeT4 -- T3 -- Anti TPO -- Anti Thyroglobulin Ab -- TSI --      Hemoglobin A1C, Whole Blood: 9.9 % <H> [4.0 - 5.6] (04-21-19 @ 09:26)            Contact number: eugenio [phone] or [phone]

## 2019-04-30 NOTE — PROGRESS NOTE ADULT - ASSESSMENT
69 f for brain mass biopsy  Off antibiotics.  Hold ASA, Eliquis   A Fib  - AC with Lovenox. Hold prior to Bx.  Hypothyroid  - follow TSH.  HTN control  COPD  - continue nebs  Diabetes control.  Seizure control  Abnormal LFT  - MRI abdomen pending   - Hepatology follow  - follow LFT  If cleared by Hepatology, no other medical condition identified to postpone planned brain biopsy.  Continue present Rx.  Tyrone Antony MD pager [phone]

## 2019-04-30 NOTE — PROGRESS NOTE ADULT - SUBJECTIVE AND OBJECTIVE BOX
Patient seen and examined at bedside.    T(C): 36.8 (04-30-19 @ 04:12), Max: 36.9 (04-29-19 @ 23:34)  HR: 79 (04-30-19 @ 04:12) (75 - 91)  BP: 134/71 (04-30-19 @ 04:12) (134/71 - 160/72)  RR: 20 (04-30-19 @ 04:12) (16 - 20)  SpO2: 96% (04-30-19 @ 04:12) (96% - 99%)  Wt(kg): --    Exam:    AOx2, FC, PERRL, EOMI, V1-3 intact, no facial, tongue midline, shrug 5/5  5/5 throughout, no drift  SILT  No clonus or babinski

## 2019-04-30 NOTE — PROGRESS NOTE ADULT - SUBJECTIVE AND OBJECTIVE BOX
Follow-up Pulm Progress Note    No new respiratory events overnight.  Denies SOB/CP.   97% on RA    Medications:  MEDICATIONS  (STANDING):  ALBUTerol/ipratropium for Nebulization 3 milliLiter(s) Nebulizer every 6 hours  amLODIPine   Tablet 10 milliGRAM(s) Oral daily  dextrose 5%. 1000 milliLiter(s) (50 mL/Hr) IV Continuous <Continuous>  dextrose 50% Injectable 12.5 Gram(s) IV Push once  dextrose 50% Injectable 25 Gram(s) IV Push once  dextrose 50% Injectable 25 Gram(s) IV Push once  docusate sodium 100 milliGRAM(s) Oral daily  fosphenytoin IVPB 100 milliGRAM(s) PE IV Intermittent every 8 hours  hydrALAZINE 100 milliGRAM(s) Oral every 8 hours  insulin glargine Injectable (LANTUS) 16 Unit(s) SubCutaneous at bedtime  insulin lispro (HumaLOG) corrective regimen sliding scale   SubCutaneous three times a day before meals  insulin lispro (HumaLOG) corrective regimen sliding scale   SubCutaneous at bedtime  insulin lispro Injectable (HumaLOG) 3 Unit(s) SubCutaneous three times a day with meals  ketorolac   Injectable 15 milliGRAM(s) IV Push once  labetalol 200 milliGRAM(s) Oral three times a day  levETIRAcetam  IVPB 1000 milliGRAM(s) IV Intermittent <User Schedule>  levothyroxine 50 MICROGram(s) Oral daily  perampanel 4 milliGRAM(s) Oral at bedtime  polyethylene glycol 3350 17 Gram(s) Oral two times a day  senna 1 Tablet(s) Oral daily    MEDICATIONS  (PRN):  acetaminophen    Suspension .. 975 milliGRAM(s) Oral every 6 hours PRN Temp greater or equal to 38.5C (101.3F), Mild Pain (1 - 3)  bisacodyl Suppository 10 milliGRAM(s) Rectal daily PRN Constipation  dextrose 40% Gel 15 Gram(s) Oral once PRN Blood Glucose LESS THAN 70 milliGRAM(s)/deciliter  glucagon  Injectable 1 milliGRAM(s) IntraMuscular once PRN Glucose LESS THAN 70 milligrams/deciliter  LORazepam   Injectable 2 milliGRAM(s) IV Push once PRN Seizure          Vital Signs Last 24 Hrs  T(C): 36.6 (30 Apr 2019 08:38), Max: 36.9 (29 Apr 2019 23:34)  T(F): 97.9 (30 Apr 2019 08:38), Max: 98.5 (29 Apr 2019 23:34)  HR: 79 (30 Apr 2019 08:38) (75 - 79)  BP: 146/72 (30 Apr 2019 08:38) (134/71 - 160/72)  BP(mean): --  RR: 18 (30 Apr 2019 08:38) (16 - 20)  SpO2: 97% (30 Apr 2019 08:38) (96% - 99%) on RA          04-29 @ 07:01  -  04-30 @ 07:00  --------------------------------------------------------  IN: 420 mL / OUT: 1050 mL / NET: -630 mL          LABS:                        9.2    5.28  )-----------( 203      ( 30 Apr 2019 07:55 )             30.3     04-30    142  |  105  |  7   ----------------------------<  139<H>  4.1   |  24  |  0.76    Ca    9.3      30 Apr 2019 06:40    TPro  6.6  /  Alb  3.4  /  TBili  0.1<L>  /  DBili  <0.1  /  AST  149<H>  /  ALT  204<H>  /  AlkPhos  345<H>  04-30          CAPILLARY BLOOD GLUCOSE      POCT Blood Glucose.: 124 mg/dL (30 Apr 2019 08:44)    PT/INR - ( 30 Apr 2019 06:40 )   PT: 10.9 sec;   INR: 0.95 ratio         PTT - ( 28 Apr 2019 17:30 )  PTT:36.2 sec                    CULTURES: (if applicable)  Culture Results:   >=3 organisms. Probable collection contamination. (04-27 @ 13:56)  Culture Results:   Testing in progress (04-24 @ 17:17)  Culture Results:   No growth (04-24 @ 17:17)    Most recent blood culture -- 04-27 @ 13:56   -- -- .Urine 04-27 @ 13:56        Physical Examination:  PULM: Clear to auscultation bilaterally, no significant sputum production  CVS: S1, S2 RRR +murmur    RADIOLOGY REVIEWED  CT chest: < from: CT Chest w/ IV Cont (04.25.19 @ 16:16) >  CHEST:     LUNGS AND LARGE AIRWAYS: Patent central airways. Bilateral patchy   nonspecific groundglass opacities. Improved aeration in the left lower   lobe. Linear atelectasis in the right lower lobe.  PLEURA: Trace bilateral pleural effusions.  VESSELS: Left PICC line, with its tip at the cavoatrial junction.   Atherosclerotic calcification of the aorta and coronary arteries.  HEART: Heart size is mildly enlarged. No pericardial effusion.  MEDIASTINUM AND CANDICE: No lymphadenopathy.  CHEST WALL AND LOWER NECK: Within normal limits.    < end of copied text >

## 2019-04-30 NOTE — PROGRESS NOTE ADULT - ASSESSMENT
69 year old female with altered mental status due to seizures likely 2/2 to left temporal lesion, differential diagnosis includes neoplastic vs autoimmune encephalitis. Was in nonconvulsive status epilepticus, improved. Now on fycompa, phenytoin, keppra. MR spect does not suggest neoplastic lesion. MRI brain w/ contrast does not show enhancement of the lesion, which makes infiltrative glioma less likely, as well as lymphoma, though lymphoma is not entirely excluded. CSF so far also suggests against herpes encephalitis or infectious etiology. CSF flow cytometry and cytopathology have both been negative. CT c/a/p shows enhancement of bilateral kidneys, concerning for infection vs neoplastic process. Though the renal enhancement could be tumor, it is not clear if the same process is ongoing intracranially. For that reason, may not be useful to biopsy the kidney, as it may not yield full diagnostic information regarding the intracranial process.    She has elevated liver enzymes, which could be due to pheyntoin use.     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  -per GI recs, will attempt to taper off phenytoin and avoid hepatotoxic agents if possible  -f/u LP results from 4/24 per ID recs for: csf fungal culture, AFB culture, spinal fluid autoimmune encephalitis panel   -f/u serum autoimmune encephalitis panel, SPEP and UPEP, hepatology labs  -Brain biopsy scheduled with Dr. Ruiz on 5/1 for left posterior temporal lesion. Hold anticoagulation the day before  -will f/u endo for discharge recommendations  -case was discussed with patient's cardiology, and patient is on Eliquis for atrial fibrillation  -US abdomen   -MRI abdomen   -awaiting results from: anti-mitochondrial, anti-smooth muscle, anti-LKM, anti-liver cytosol antibody, MIGUEL)  -daily LFT's and INR 69 year old female with altered mental status due to seizures likely 2/2 to left temporal lesion, differential diagnosis includes neoplastic vs autoimmune encephalitis. Was in nonconvulsive status epilepticus, improved. Now on fycompa, phenytoin, keppra. MR spect does not suggest neoplastic lesion. MRI brain w/ contrast does not show enhancement of the lesion, which makes infiltrative glioma less likely, as well as lymphoma, though lymphoma is not entirely excluded. CSF so far also suggests against herpes encephalitis or infectious etiology. CSF flow cytometry and cytopathology have both been negative. CT c/a/p shows enhancement of bilateral kidneys, concerning for infection vs neoplastic process. Though the renal enhancement could be tumor, it is not clear if the same process is ongoing intracranially. For that reason, may not be useful to biopsy the kidney, as it may not yield full diagnostic information regarding the intracranial process.    She has elevated liver enzymes, which could be due to pheyntoin use.     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  -per GI recs, will attempt to taper off phenytoin and avoid hepatotoxic agents if possible  -f/u LP results from 4/24 per ID recs for: csf fungal culture, AFB culture, spinal fluid autoimmune encephalitis panel   -f/u serum autoimmune encephalitis panel, SPEP and UPEP, hepatology labs (anti-mitochondrial, anti-smooth muscle, anti-LKM, anti-liver cytosol antibody)  -Brain biopsy scheduled with Dr. Ruiz on 5/1 for left posterior temporal lesion. Hold anticoagulation the day before  -will f/u endo for discharge recommendations  -case was discussed with patient's cardiology, and patient is on Eliquis for atrial fibrillation  -US abdomen as part of evaluation for elevated LFTs  -MRI abdomen to assess renal lesions  -daily LFT's and INR

## 2019-04-30 NOTE — PROGRESS NOTE ADULT - SUBJECTIVE AND OBJECTIVE BOX
Neurology Follow up note    Patient is a 69y old  Female who presents with a chief complaint of seizures (29 Apr 2019 13:23)      Subjective: Interval History - No events overnight    Objective:   Vital Signs Last 24 Hrs  T(C): 36.6 (30 Apr 2019 08:38), Max: 36.9 (29 Apr 2019 23:34)  T(F): 97.9 (30 Apr 2019 08:38), Max: 98.5 (29 Apr 2019 23:34)  HR: 79 (30 Apr 2019 08:38) (75 - 79)  BP: 146/72 (30 Apr 2019 08:38) (134/71 - 160/72)  BP(mean): --  RR: 18 (30 Apr 2019 08:38) (16 - 20)  SpO2: 97% (30 Apr 2019 08:38) (96% - 99%)    Neurological Exam:  Mental Status: Orientated to self. reported she in the hospital but not name and floor, not oriented to month, year.     Cranial Nerves: EOMI, extinction on her right visual field, No facial asymmetry.    Motor:               Strength: moving all extremities  Sensation : Extinction on right       Other:    04-30    142  |  105  |  7   ----------------------------<  139<H>  4.1   |  24  |  0.76    Ca    9.3      30 Apr 2019 06:40    TPro  6.6  /  Alb  3.4  /  TBili  0.1<L>  /  DBili  <0.1  /  AST  149<H>  /  ALT  204<H>  /  AlkPhos  345<H>  04-30 04-30    142  |  105  |  7   ----------------------------<  139<H>  4.1   |  24  |  0.76    Ca    9.3      30 Apr 2019 06:40    TPro  6.6  /  Alb  3.4  /  TBili  0.1<L>  /  DBili  <0.1  /  AST  149<H>  /  ALT  204<H>  /  AlkPhos  345<H>  04-30    LIVER FUNCTIONS - ( 30 Apr 2019 06:40 )  Alb: 3.4 g/dL / Pro: 6.6 g/dL / ALK PHOS: 345 U/L / ALT: 204 U/L / AST: 149 U/L / GGT: x                                 9.2    5.28  )-----------( 203      ( 30 Apr 2019 07:55 )             30.3     Radiology    EKG:  tele:  TTE:  EEG:      MEDICATIONS  (STANDING):  ALBUTerol/ipratropium for Nebulization 3 milliLiter(s) Nebulizer every 6 hours  amLODIPine   Tablet 10 milliGRAM(s) Oral daily  dextrose 5%. 1000 milliLiter(s) (50 mL/Hr) IV Continuous <Continuous>  dextrose 50% Injectable 12.5 Gram(s) IV Push once  dextrose 50% Injectable 25 Gram(s) IV Push once  dextrose 50% Injectable 25 Gram(s) IV Push once  docusate sodium 100 milliGRAM(s) Oral daily  fosphenytoin IVPB 100 milliGRAM(s) PE IV Intermittent every 8 hours  hydrALAZINE 100 milliGRAM(s) Oral every 8 hours  insulin glargine Injectable (LANTUS) 16 Unit(s) SubCutaneous at bedtime  insulin lispro (HumaLOG) corrective regimen sliding scale   SubCutaneous three times a day before meals  insulin lispro (HumaLOG) corrective regimen sliding scale   SubCutaneous at bedtime  insulin lispro Injectable (HumaLOG) 3 Unit(s) SubCutaneous three times a day with meals  ketorolac   Injectable 15 milliGRAM(s) IV Push once  labetalol 200 milliGRAM(s) Oral three times a day  levETIRAcetam  IVPB 1000 milliGRAM(s) IV Intermittent <User Schedule>  levothyroxine 50 MICROGram(s) Oral daily  perampanel 4 milliGRAM(s) Oral at bedtime  polyethylene glycol 3350 17 Gram(s) Oral two times a day  senna 1 Tablet(s) Oral daily    MEDICATIONS  (PRN):  acetaminophen    Suspension .. 975 milliGRAM(s) Oral every 6 hours PRN Temp greater or equal to 38.5C (101.3F), Mild Pain (1 - 3)  bisacodyl Suppository 10 milliGRAM(s) Rectal daily PRN Constipation  dextrose 40% Gel 15 Gram(s) Oral once PRN Blood Glucose LESS THAN 70 milliGRAM(s)/deciliter  glucagon  Injectable 1 milliGRAM(s) IntraMuscular once PRN Glucose LESS THAN 70 milligrams/deciliter  LORazepam   Injectable 2 milliGRAM(s) IV Push once PRN Seizure

## 2019-04-30 NOTE — PROGRESS NOTE ADULT - SUBJECTIVE AND OBJECTIVE BOX
CC: f/u for brain lesion and elevated LFT's.    Patient reports: no complaints today.    REVIEW OF SYSTEMS:  All other review of systems negative (Comprehensive ROS)    Antimicrobials Day #  :off    Other Medications Reviewed    T(F): 97.9 (04-30-19 @ 13:01), Max: 98.5 (04-29-19 @ 23:34)  HR: 77 (04-30-19 @ 13:01)  BP: 167/78 (04-30-19 @ 13:01)  RR: 18 (04-30-19 @ 13:01)  SpO2: 95% (04-30-19 @ 13:01)  Wt(kg): --    PHYSICAL EXAM:  General: alert, no acute distress  Eyes:  anicteric, no conjunctival injection, no discharge  Oropharynx: no lesions or injection 	  Neck: supple, without adenopathy  Lungs: clear to auscultation  Heart: regular rate and rhythm; no murmur, rubs or gallops  Abdomen: soft, nondistended, nontender, without mass or organomegaly  Skin: no lesions  Extremities: no clubbing, cyanosis, or edema  Neurologic: alert, oriented, moves all extremities  Left arm PICC line  LAB RESULTS:                        9.2    5.28  )-----------( 203      ( 30 Apr 2019 07:55 )             30.3     04-30    142  |  105  |  7   ----------------------------<  139<H>  4.1   |  24  |  0.76    Ca    9.3      30 Apr 2019 06:40    TPro  6.6  /  Alb  3.4  /  TBili  0.1<L>  /  DBili  <0.1  /  AST  149<H>  /  ALT  204<H>  /  AlkPhos  345<H>  04-30    LIVER FUNCTIONS - ( 30 Apr 2019 06:40 )  Alb: 3.4 g/dL / Pro: 6.6 g/dL / ALK PHOS: 345 U/L / ALT: 204 U/L / AST: 149 U/L / GGT: x             MICROBIOLOGY:  RECENT CULTURES:  04-27 @ 13:56 .Urine     >=3 organisms. Probable collection contamination.          RADIOLOGY REVIEWED:  < from: CT Head No Cont (04.25.19 @ 16:00) >  IMPRESSION:    Evidence for calvarial fracture or acute intracranial hemorrhage.    Stable nonspecific hypodensity in the left posterior temporal, occipital,   parietal locations as described. Serial MRI imaging follow-up overtime   is recommended to monitor for stability.    < end of copied text >

## 2019-04-30 NOTE — PROGRESS NOTE ADULT - ASSESSMENT
Impression:  1) Abnormal LFTs - DDx most likely medication induced dili as patient has been on many hepatotoix agents such as fosphenytoin. AST/ALT appear to have peaked on 4/27 and now continues to downtrend. However, Alk phos remains steadily elevated. DDx also includes viral induced given known HSV vs much less likely autoimmune.    recommendations: **PENDING ATTENDING RECOMMENDATIONS.   - auto immune work up: AMA/anti-smooth musc/Anti-LKM/Igs negative. pending MIGUEL.   - please send viral hepatitis panel: Hep A, Hep B, Hep C, Hep E  antibody and DNA PCR CMV, EBV, and HSV PCR and IgM   - acetaminophen/EToh/ASA levels negative.   - please send daily LFT's and INR   - obtain abdominal US with sono   - avoid hepatotoxic agents if possible    Almaz Lepe MD PGY-1  [phone] Impression:  1) Abnormal LFTs - DDx most likely medication induced dili as patient has been on many hepatotoix agents such as fosphenytoin. AST/ALT appear to have peaked on 4/27 and now continues to downtrend. However, Alk phos remains steadily elevated. DDx also includes viral induced given known HSV vs much less likely autoimmune.      recommendations:    - if possible, would if can exchange fosphenytoin for another AED.    - auto immune work up: AMA/anti-smooth musc/Anti-LKM/Igs negative. pending MIGUEL.   - please send viral hepatitis panel: Hep A, Hep B, Hep C, Hep E  antibody and DNA PCR CMV, EBV, and HSV PCR and IgM   - acetaminophen/EToh/ASA levels negative.   - please send daily LFT's and INR   - pending MRI a/p w/ IV + PO contrast.    - avoid hepatotoxic agents if possible    Discussed with Dr. Sanchez (attending) on 4/30.     Almaz Lepe MD PGY-1  [phone] Impression:  1) Abnormal LFTs - DDx most likely medication induced dili as patient has been on many hepatotoix agents such as fosphenytoin. AST/ALT appear to have peaked on 4/27 and now continues to downtrend. However, Alk phos remains steadily elevated. DDx also includes viral induced given known HSV vs much less likely autoimmune.      recommendations:    - if possible, would if can exchange fosphenytoin for another AED after brain bx.    - auto immune work up: AMA/anti-smooth musc/Anti-LKM/Igs negative. pending MIGUEL.   - please send viral hepatitis panel: Hep A, Hep B, Hep C, Hep E  antibody and DNA PCR CMV, EBV, and HSV PCR and IgM   - acetaminophen/EToh/ASA levels negative.   - please send daily LFT's and INR   - pending MRI a/p w/ IV + PO contrast.    - avoid hepatotoxic agents if possible    Discussed with Dr. Sanchez (attending) on 4/30.     Almaz Lepe MD PGY-1  [phone]

## 2019-04-30 NOTE — PROGRESS NOTE ADULT - ASSESSMENT
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 aspiration risk

## 2019-04-30 NOTE — CHART NOTE - NSCHARTNOTEFT_GEN_A_CORE
Hepatology Note:    Patient's history and lab work reviewed. Discussed with day time hepatology team.  No hepatologic contraindications to proceeding with neurosurgery at this time.  Appreciate neurosurgical care.  Please call with questions  Neda Price  GI Fellow  Pager: [phone]

## 2019-04-30 NOTE — PROGRESS NOTE ADULT - SUBJECTIVE AND OBJECTIVE BOX
Patient is a 69y old  Female who presents with a chief complaint of brain lesion (30 Apr 2019 16:07)      SUBJECTIVE / OVERNIGHT EVENTS: Comfortable without new complaints.   Review of Systems  chest pain no  palpitations no  sob no  nausea no  headache no    MEDICATIONS  (STANDING):  ALBUTerol/ipratropium for Nebulization 3 milliLiter(s) Nebulizer every 6 hours  amLODIPine   Tablet 10 milliGRAM(s) Oral daily  dextrose 5%. 1000 milliLiter(s) (50 mL/Hr) IV Continuous <Continuous>  dextrose 50% Injectable 12.5 Gram(s) IV Push once  dextrose 50% Injectable 25 Gram(s) IV Push once  dextrose 50% Injectable 25 Gram(s) IV Push once  docusate sodium 100 milliGRAM(s) Oral daily  fosphenytoin IVPB 100 milliGRAM(s) PE IV Intermittent every 8 hours  hydrALAZINE 100 milliGRAM(s) Oral every 8 hours  insulin glargine Injectable (LANTUS) 14 Unit(s) SubCutaneous at bedtime  insulin lispro (HumaLOG) corrective regimen sliding scale   SubCutaneous three times a day before meals  insulin lispro (HumaLOG) corrective regimen sliding scale   SubCutaneous at bedtime  insulin lispro Injectable (HumaLOG) 3 Unit(s) SubCutaneous three times a day with meals  ketorolac   Injectable 15 milliGRAM(s) IV Push once  labetalol 200 milliGRAM(s) Oral three times a day  levETIRAcetam  IVPB 1000 milliGRAM(s) IV Intermittent <User Schedule>  levothyroxine 50 MICROGram(s) Oral daily  perampanel 4 milliGRAM(s) Oral at bedtime  polyethylene glycol 3350 17 Gram(s) Oral two times a day  senna 1 Tablet(s) Oral daily    MEDICATIONS  (PRN):  acetaminophen    Suspension .. 975 milliGRAM(s) Oral every 6 hours PRN Temp greater or equal to 38.5C (101.3F), Mild Pain (1 - 3)  bisacodyl Suppository 10 milliGRAM(s) Rectal daily PRN Constipation  dextrose 40% Gel 15 Gram(s) Oral once PRN Blood Glucose LESS THAN 70 milliGRAM(s)/deciliter  glucagon  Injectable 1 milliGRAM(s) IntraMuscular once PRN Glucose LESS THAN 70 milligrams/deciliter  LORazepam   Injectable 2 milliGRAM(s) IV Push once PRN Seizure      Vital Signs Last 24 Hrs  T(C): 36.6 (30 Apr 2019 13:01), Max: 36.9 (29 Apr 2019 23:34)  T(F): 97.9 (30 Apr 2019 13:01), Max: 98.5 (29 Apr 2019 23:34)  HR: 77 (30 Apr 2019 13:01) (75 - 79)  BP: 167/78 (30 Apr 2019 13:01) (134/71 - 167/78)  BP(mean): --  RR: 18 (30 Apr 2019 13:01) (16 - 20)  SpO2: 95% (30 Apr 2019 13:01) (95% - 99%)    PHYSICAL EXAM:  GENERAL: NAD, well-developed  HEAD:  Atraumatic, Normocephalic  EYES: EOMI, PERRLA, conjunctiva and sclera clear  NECK: Supple, No JVD  CHEST/LUNG: Clear to auscultation bilaterally; No wheeze  HEART: Regular rate and rhythm; No murmurs, rubs, or gallops  ABDOMEN: Soft, Nontender, Nondistended; Bowel sounds present  EXTREMITIES:  2+ Peripheral Pulses, No clubbing, cyanosis, or edema  PSYCH: AAOx3  NEUROLOGY: non-focal  SKIN: No rashes or lesions    LABS:                        9.2    5.28  )-----------( 203      ( 30 Apr 2019 07:55 )             30.3     04-30    142  |  105  |  7   ----------------------------<  139<H>  4.1   |  24  |  0.76    Ca    9.3      30 Apr 2019 06:40    TPro  6.6  /  Alb  3.4  /  TBili  0.1<L>  /  DBili  <0.1  /  AST  149<H>  /  ALT  204<H>  /  AlkPhos  345<H>  04-30    PT/INR - ( 30 Apr 2019 06:40 )   PT: 10.9 sec;   INR: 0.95 ratio         PTT - ( 28 Apr 2019 17:30 )  PTT:36.2 sec            RADIOLOGY & ADDITIONAL TESTS:    Imaging Personally Reviewed:    Consultant(s) Notes Reviewed:      Care Discussed with Consultants/Other Providers:

## 2019-04-30 NOTE — PROGRESS NOTE ADULT - SUBJECTIVE AND OBJECTIVE BOX
Chief Complaint:  Patient is a 69y old  Female who presents with a chief complaint of seizures (29 Apr 2019 13:23)      Interval Events:     Allergies:  No Known Allergies      Hospital Medications:  acetaminophen    Suspension .. 975 milliGRAM(s) Oral every 6 hours PRN  ALBUTerol/ipratropium for Nebulization 3 milliLiter(s) Nebulizer every 6 hours  amLODIPine   Tablet 10 milliGRAM(s) Oral daily  bisacodyl Suppository 10 milliGRAM(s) Rectal daily PRN  dextrose 40% Gel 15 Gram(s) Oral once PRN  dextrose 5%. 1000 milliLiter(s) IV Continuous <Continuous>  dextrose 50% Injectable 12.5 Gram(s) IV Push once  dextrose 50% Injectable 25 Gram(s) IV Push once  dextrose 50% Injectable 25 Gram(s) IV Push once  docusate sodium 100 milliGRAM(s) Oral daily  fosphenytoin IVPB 100 milliGRAM(s) PE IV Intermittent every 8 hours  glucagon  Injectable 1 milliGRAM(s) IntraMuscular once PRN  hydrALAZINE 100 milliGRAM(s) Oral every 8 hours  insulin glargine Injectable (LANTUS) 16 Unit(s) SubCutaneous at bedtime  insulin lispro (HumaLOG) corrective regimen sliding scale   SubCutaneous three times a day before meals  insulin lispro (HumaLOG) corrective regimen sliding scale   SubCutaneous at bedtime  insulin lispro Injectable (HumaLOG) 3 Unit(s) SubCutaneous three times a day with meals  ketorolac   Injectable 15 milliGRAM(s) IV Push once  labetalol 200 milliGRAM(s) Oral three times a day  levETIRAcetam  IVPB 1000 milliGRAM(s) IV Intermittent <User Schedule>  levothyroxine 50 MICROGram(s) Oral daily  LORazepam   Injectable 2 milliGRAM(s) IV Push once PRN  perampanel 4 milliGRAM(s) Oral at bedtime  polyethylene glycol 3350 17 Gram(s) Oral two times a day  senna 1 Tablet(s) Oral daily      PMHX/PSHX:  Hypothyroid  Obesity  Morbid obesity  Morbidly obese  NACHO (obstructive sleep apnea)  DM (Diabetes Mellitus) (ICD9 250.00)  Dyslipidemia (ICD9 272.4)  HTN (Hypertension) (ICD9 401.9)  CAD (Coronary Artery Disease) (ICD9 414.00)  History of hysterectomy  History of ovarian cystectomy  S/P primary angioplasty with coronary stent  C Section  S/P Appendectomy  S/P Coronary Angioplasty      Family history:  Family history of heart disease (Aunt)  No pertinent family history in first degree relatives      ROS:     General:  No wt loss, fevers, chills, night sweats, fatigue,   Eyes:  Good vision, no reported pain  ENT:  No sore throat, pain, runny nose, dysphagia  CV:  No pain, palpitations, hypo/hypertension  Resp:  No dyspnea, cough, tachypnea, wheezing  GI:  See HPI  :  No pain, bleeding, incontinence, nocturia  Muscle:  No pain, weakness  Neuro:  No weakness, tingling, memory problems  Psych:  No fatigue, insomnia, mood problems, depression  Endocrine:  No polyuria, polydipsia, cold/heat intolerance  Heme:  No petechiae, ecchymosis, easy bruisability  Skin:  No rash, edema      PHYSICAL EXAM:     GENERAL:  Appears stated age, well-groomed, well-nourished, no distress  HEENT:  NC/AT,  conjunctivae clear, sclera -anicteric  CHEST:  Full & symmetric excursion, no increased effort, breath sounds clear  HEART:  Regular rhythm, S1, S2, no murmur/rub/S3/S4,  no edema  ABDOMEN:  Soft, non-tender, non-distended, normoactive bowel sounds,  no masses ,no hepato-splenomegaly,   EXTREMITIES:  no cyanosis,clubbing or edema  SKIN:  No rash/erythema/ecchymoses/petechiae/wounds/abscess/warm/dry  NEURO:  Alert, oriented    Vital Signs:  Vital Signs Last 24 Hrs  T(C): 36.6 (30 Apr 2019 08:38), Max: 36.9 (29 Apr 2019 23:34)  T(F): 97.9 (30 Apr 2019 08:38), Max: 98.5 (29 Apr 2019 23:34)  HR: 79 (30 Apr 2019 08:38) (75 - 79)  BP: 146/72 (30 Apr 2019 08:38) (134/71 - 160/72)  BP(mean): --  RR: 18 (30 Apr 2019 08:38) (16 - 20)  SpO2: 97% (30 Apr 2019 08:38) (96% - 99%)  Daily     Daily     LABS:                        9.2    5.28  )-----------( 203      ( 30 Apr 2019 07:55 )             30.3     04-30    142  |  105  |  7   ----------------------------<  139<H>  4.1   |  24  |  0.76    Ca    9.3      30 Apr 2019 06:40    TPro  6.6  /  Alb  3.4  /  TBili  0.1<L>  /  DBili  <0.1  /  AST  149<H>  /  ALT  204<H>  /  AlkPhos  345<H>  04-30    LIVER FUNCTIONS - ( 30 Apr 2019 06:40 )  Alb: 3.4 g/dL / Pro: 6.6 g/dL / ALK PHOS: 345 U/L / ALT: 204 U/L / AST: 149 U/L / GGT: x           PT/INR - ( 30 Apr 2019 06:40 )   PT: 10.9 sec;   INR: 0.95 ratio         PTT - ( 28 Apr 2019 17:30 )  PTT:36.2 sec        Imaging: Chief Complaint:  Patient is a 69y old  Female who presents with a chief complaint of seizures (29 Apr 2019 13:23)      Interval Events:   Doing well this AM. Denies any abdominal pain, swelling, or SOB. Denies any family or person hx of liver disease or abnormal liver tests.    Allergies:  No Known Allergies      Hospital Medications:  acetaminophen    Suspension .. 975 milliGRAM(s) Oral every 6 hours PRN  ALBUTerol/ipratropium for Nebulization 3 milliLiter(s) Nebulizer every 6 hours  amLODIPine   Tablet 10 milliGRAM(s) Oral daily  bisacodyl Suppository 10 milliGRAM(s) Rectal daily PRN  dextrose 40% Gel 15 Gram(s) Oral once PRN  dextrose 5%. 1000 milliLiter(s) IV Continuous <Continuous>  dextrose 50% Injectable 12.5 Gram(s) IV Push once  dextrose 50% Injectable 25 Gram(s) IV Push once  dextrose 50% Injectable 25 Gram(s) IV Push once  docusate sodium 100 milliGRAM(s) Oral daily  fosphenytoin IVPB 100 milliGRAM(s) PE IV Intermittent every 8 hours  glucagon  Injectable 1 milliGRAM(s) IntraMuscular once PRN  hydrALAZINE 100 milliGRAM(s) Oral every 8 hours  insulin glargine Injectable (LANTUS) 16 Unit(s) SubCutaneous at bedtime  insulin lispro (HumaLOG) corrective regimen sliding scale   SubCutaneous three times a day before meals  insulin lispro (HumaLOG) corrective regimen sliding scale   SubCutaneous at bedtime  insulin lispro Injectable (HumaLOG) 3 Unit(s) SubCutaneous three times a day with meals  ketorolac   Injectable 15 milliGRAM(s) IV Push once  labetalol 200 milliGRAM(s) Oral three times a day  levETIRAcetam  IVPB 1000 milliGRAM(s) IV Intermittent <User Schedule>  levothyroxine 50 MICROGram(s) Oral daily  LORazepam   Injectable 2 milliGRAM(s) IV Push once PRN  perampanel 4 milliGRAM(s) Oral at bedtime  polyethylene glycol 3350 17 Gram(s) Oral two times a day  senna 1 Tablet(s) Oral daily      PMHX/PSHX:  Hypothyroid  Obesity  Morbid obesity  Morbidly obese  NACHO (obstructive sleep apnea)  DM (Diabetes Mellitus) (ICD9 250.00)  Dyslipidemia (ICD9 272.4)  HTN (Hypertension) (ICD9 401.9)  CAD (Coronary Artery Disease) (ICD9 414.00)  History of hysterectomy  History of ovarian cystectomy  S/P primary angioplasty with coronary stent  C Section  S/P Appendectomy  S/P Coronary Angioplasty      Family history:  Family history of heart disease (Aunt)  No pertinent family history in first degree relatives      ROS:     General:  No wt loss, fevers, chills, night sweats, fatigue,   Eyes:  Good vision, no reported pain  ENT:  No sore throat, pain, runny nose, dysphagia  CV:  No pain, palpitations  Resp:  No dyspnea, cough, tachypnea, wheezing  GI:  See HPI  :  No pain, bleeding, incontinence, nocturia  Muscle:  No pain, weakness  Neuro:  No weakness, tingling, memory problems  Psych:  No fatigue, insomnia, mood problems, depression  Endocrine:  No polyuria, polydipsia, cold/heat intolerance  Heme:  No petechiae, ecchymosis, easy bruisability  Skin:  No rash, edema      PHYSICAL EXAM:     GENERAL:  Appears stated age, well-groomed, well-nourished, no distress  HEENT:  NC/AT,  conjunctivae clear, sclera -anicteric  CHEST:  Full & symmetric excursion, no increased effort, breath sounds clear  HEART:  Regular rhythm, S1, S2, no murmur/rub/S3/S4,  no edema  ABDOMEN:  Soft, non-tender, non-distended, normoactive bowel sounds,  no masses ,no hepato-splenomegaly,   EXTREMITIES:  no cyanosis,clubbing. trace bitting edema of BLE below knee.  SKIN:  No rash  NEURO:  Alert, oriented    Vital Signs:  Vital Signs Last 24 Hrs  T(C): 36.6 (30 Apr 2019 08:38), Max: 36.9 (29 Apr 2019 23:34)  T(F): 97.9 (30 Apr 2019 08:38), Max: 98.5 (29 Apr 2019 23:34)  HR: 79 (30 Apr 2019 08:38) (75 - 79)  BP: 146/72 (30 Apr 2019 08:38) (134/71 - 160/72)  BP(mean): --  RR: 18 (30 Apr 2019 08:38) (16 - 20)  SpO2: 97% (30 Apr 2019 08:38) (96% - 99%)  Daily     Daily     LABS:                        9.2    5.28  )-----------( 203      ( 30 Apr 2019 07:55 )             30.3     04-30    142  |  105  |  7   ----------------------------<  139<H>  4.1   |  24  |  0.76    Ca    9.3      30 Apr 2019 06:40    TPro  6.6  /  Alb  3.4  /  TBili  0.1<L>  /  DBili  <0.1  /  AST  149<H>  /  ALT  204<H>  /  AlkPhos  345<H>  04-30    LIVER FUNCTIONS - ( 30 Apr 2019 06:40 )  Alb: 3.4 g/dL / Pro: 6.6 g/dL / ALK PHOS: 345 U/L / ALT: 204 U/L / AST: 149 U/L / GGT: x           PT/INR - ( 30 Apr 2019 06:40 )   PT: 10.9 sec;   INR: 0.95 ratio         PTT - ( 28 Apr 2019 17:30 )  PTT:36.2 sec

## 2019-04-30 NOTE — PROGRESS NOTE ADULT - ASSESSMENT
70 y/o F with PMH of CAD, DM, HTN, HLD, hypothyroid, on eliquis, ASA, s/p umbilical hernia repair ~2 weeks ago, presents as level 1 trauma activation after being found down at home. Found to have status epilepticus-now controlled. L temporal lesion identified on MRI brain of yet unclear etiology. Called to eval for ?NACHO. Sp02 dropping to 92% on RA at night. CT chest with mosaic attenuation pattern-expiratory phase of film vs. small airway disease d/t asthma. Hospital course c/b transaminitis, likely drug induced

## 2019-05-01 ENCOUNTER — RESULT REVIEW (OUTPATIENT)
Age: 69
End: 2019-05-01

## 2019-05-01 ENCOUNTER — APPOINTMENT (OUTPATIENT)
Dept: NEUROSURGERY | Facility: HOSPITAL | Age: 69
End: 2019-05-01
Payer: MEDICARE

## 2019-05-01 LAB
ALBUMIN SERPL ELPH-MCNC: 3.4 G/DL — SIGNIFICANT CHANGE UP (ref 3.3–5)
ALBUMIN SERPL ELPH-MCNC: 3.5 G/DL — SIGNIFICANT CHANGE UP (ref 3.3–5)
ALP SERPL-CCNC: 337 U/L — HIGH (ref 40–120)
ALP SERPL-CCNC: 341 U/L — HIGH (ref 40–120)
ALT FLD-CCNC: 111 U/L — HIGH (ref 10–45)
ALT FLD-CCNC: 145 U/L — HIGH (ref 10–45)
ANION GAP SERPL CALC-SCNC: 10 MMOL/L — SIGNIFICANT CHANGE UP (ref 5–17)
ANION GAP SERPL CALC-SCNC: 11 MMOL/L — SIGNIFICANT CHANGE UP (ref 5–17)
APTT BLD: 26.3 SEC — LOW (ref 27.5–36.3)
AST SERPL-CCNC: 39 U/L — SIGNIFICANT CHANGE UP (ref 10–40)
AST SERPL-CCNC: 80 U/L — HIGH (ref 10–40)
BASOPHILS # BLD AUTO: 0.05 K/UL — SIGNIFICANT CHANGE UP (ref 0–0.2)
BASOPHILS NFR BLD AUTO: 0.8 % — SIGNIFICANT CHANGE UP (ref 0–2)
BILIRUB DIRECT SERPL-MCNC: <0.1 MG/DL — SIGNIFICANT CHANGE UP (ref 0–0.2)
BILIRUB INDIRECT FLD-MCNC: >0 MG/DL — LOW (ref 0.2–1)
BILIRUB SERPL-MCNC: 0.1 MG/DL — LOW (ref 0.2–1.2)
BILIRUB SERPL-MCNC: 0.1 MG/DL — LOW (ref 0.2–1.2)
BUN SERPL-MCNC: 10 MG/DL — SIGNIFICANT CHANGE UP (ref 7–23)
BUN SERPL-MCNC: 9 MG/DL — SIGNIFICANT CHANGE UP (ref 7–23)
CALCIUM SERPL-MCNC: 9 MG/DL — SIGNIFICANT CHANGE UP (ref 8.4–10.5)
CALCIUM SERPL-MCNC: 9.1 MG/DL — SIGNIFICANT CHANGE UP (ref 8.4–10.5)
CHLORIDE SERPL-SCNC: 105 MMOL/L — SIGNIFICANT CHANGE UP (ref 96–108)
CHLORIDE SERPL-SCNC: 106 MMOL/L — SIGNIFICANT CHANGE UP (ref 96–108)
CO2 SERPL-SCNC: 24 MMOL/L — SIGNIFICANT CHANGE UP (ref 22–31)
CO2 SERPL-SCNC: 25 MMOL/L — SIGNIFICANT CHANGE UP (ref 22–31)
CREAT SERPL-MCNC: 0.59 MG/DL — SIGNIFICANT CHANGE UP (ref 0.5–1.3)
CREAT SERPL-MCNC: 0.71 MG/DL — SIGNIFICANT CHANGE UP (ref 0.5–1.3)
EOSINOPHIL # BLD AUTO: 0.19 K/UL — SIGNIFICANT CHANGE UP (ref 0–0.5)
EOSINOPHIL NFR BLD AUTO: 3 % — SIGNIFICANT CHANGE UP (ref 0–6)
GLUCOSE SERPL-MCNC: 183 MG/DL — HIGH (ref 70–99)
GLUCOSE SERPL-MCNC: 225 MG/DL — HIGH (ref 70–99)
HCT VFR BLD CALC: 28.9 % — LOW (ref 34.5–45)
HCT VFR BLD CALC: 30 % — LOW (ref 34.5–45)
HCT VFR BLD CALC: 30 % — LOW (ref 34.5–45)
HEV IGM SER QL: SIGNIFICANT CHANGE UP
HGB BLD-MCNC: 10 G/DL — LOW (ref 11.5–15.5)
HGB BLD-MCNC: 8.7 G/DL — LOW (ref 11.5–15.5)
HGB BLD-MCNC: 9.9 G/DL — LOW (ref 11.5–15.5)
IMM GRANULOCYTES NFR BLD AUTO: 0.9 % — SIGNIFICANT CHANGE UP (ref 0–1.5)
INR BLD: 0.94 RATIO — SIGNIFICANT CHANGE UP (ref 0.88–1.16)
INR BLD: 0.99 RATIO — SIGNIFICANT CHANGE UP (ref 0.88–1.16)
LYMPHOCYTES # BLD AUTO: 2.29 K/UL — SIGNIFICANT CHANGE UP (ref 1–3.3)
LYMPHOCYTES # BLD AUTO: 35.9 % — SIGNIFICANT CHANGE UP (ref 13–44)
MAGNESIUM SERPL-MCNC: 1.8 MG/DL — SIGNIFICANT CHANGE UP (ref 1.6–2.6)
MAGNESIUM SERPL-MCNC: 2 MG/DL — SIGNIFICANT CHANGE UP (ref 1.6–2.6)
MCHC RBC-ENTMCNC: 24 PG — LOW (ref 27–34)
MCHC RBC-ENTMCNC: 26.1 PG — LOW (ref 27–34)
MCHC RBC-ENTMCNC: 26.3 PG — LOW (ref 27–34)
MCHC RBC-ENTMCNC: 30.1 GM/DL — LOW (ref 32–36)
MCHC RBC-ENTMCNC: 33 GM/DL — SIGNIFICANT CHANGE UP (ref 32–36)
MCHC RBC-ENTMCNC: 33.3 GM/DL — SIGNIFICANT CHANGE UP (ref 32–36)
MCV RBC AUTO: 78.8 FL — LOW (ref 80–100)
MCV RBC AUTO: 79.1 FL — LOW (ref 80–100)
MCV RBC AUTO: 79.6 FL — LOW (ref 80–100)
MONOCYTES # BLD AUTO: 0.67 K/UL — SIGNIFICANT CHANGE UP (ref 0–0.9)
MONOCYTES NFR BLD AUTO: 10.5 % — SIGNIFICANT CHANGE UP (ref 2–14)
NEUTROPHILS # BLD AUTO: 3.12 K/UL — SIGNIFICANT CHANGE UP (ref 1.8–7.4)
NEUTROPHILS NFR BLD AUTO: 48.9 % — SIGNIFICANT CHANGE UP (ref 43–77)
PHOSPHATE SERPL-MCNC: 3.5 MG/DL — SIGNIFICANT CHANGE UP (ref 2.5–4.5)
PHOSPHATE SERPL-MCNC: 3.7 MG/DL — SIGNIFICANT CHANGE UP (ref 2.5–4.5)
PLATELET # BLD AUTO: 253 K/UL — SIGNIFICANT CHANGE UP (ref 150–400)
PLATELET # BLD AUTO: 268 K/UL — SIGNIFICANT CHANGE UP (ref 150–400)
PLATELET # BLD AUTO: 295 K/UL — SIGNIFICANT CHANGE UP (ref 150–400)
POTASSIUM SERPL-MCNC: 4 MMOL/L — SIGNIFICANT CHANGE UP (ref 3.5–5.3)
POTASSIUM SERPL-MCNC: 4.1 MMOL/L — SIGNIFICANT CHANGE UP (ref 3.5–5.3)
POTASSIUM SERPL-SCNC: 4 MMOL/L — SIGNIFICANT CHANGE UP (ref 3.5–5.3)
POTASSIUM SERPL-SCNC: 4.1 MMOL/L — SIGNIFICANT CHANGE UP (ref 3.5–5.3)
PROT SERPL-MCNC: 6.6 G/DL — SIGNIFICANT CHANGE UP (ref 6–8.3)
PROT SERPL-MCNC: 6.9 G/DL — SIGNIFICANT CHANGE UP (ref 6–8.3)
PROTHROM AB SERPL-ACNC: 10.8 SEC — SIGNIFICANT CHANGE UP (ref 10–13.1)
PROTHROM AB SERPL-ACNC: 11.4 SEC — SIGNIFICANT CHANGE UP (ref 10–12.9)
RBC # BLD: 3.63 M/UL — LOW (ref 3.8–5.2)
RBC # BLD: 3.79 M/UL — LOW (ref 3.8–5.2)
RBC # BLD: 3.8 M/UL — SIGNIFICANT CHANGE UP (ref 3.8–5.2)
RBC # FLD: 15.7 % — HIGH (ref 10.3–14.5)
RBC # FLD: 16.2 % — HIGH (ref 10.3–14.5)
RBC # FLD: 17 % — HIGH (ref 10.3–14.5)
SODIUM SERPL-SCNC: 140 MMOL/L — SIGNIFICANT CHANGE UP (ref 135–145)
SODIUM SERPL-SCNC: 141 MMOL/L — SIGNIFICANT CHANGE UP (ref 135–145)
WBC # BLD: 10.8 K/UL — HIGH (ref 3.8–10.5)
WBC # BLD: 5.6 K/UL — SIGNIFICANT CHANGE UP (ref 3.8–10.5)
WBC # BLD: 6.38 K/UL — SIGNIFICANT CHANGE UP (ref 3.8–10.5)
WBC # FLD AUTO: 10.8 K/UL — HIGH (ref 3.8–10.5)
WBC # FLD AUTO: 5.6 K/UL — SIGNIFICANT CHANGE UP (ref 3.8–10.5)
WBC # FLD AUTO: 6.38 K/UL — SIGNIFICANT CHANGE UP (ref 3.8–10.5)

## 2019-05-01 PROCEDURE — 88331 PATH CONSLTJ SURG 1 BLK 1SPC: CPT | Mod: 26

## 2019-05-01 PROCEDURE — 99232 SBSQ HOSP IP/OBS MODERATE 35: CPT

## 2019-05-01 PROCEDURE — 88342 IMHCHEM/IMCYTCHM 1ST ANTB: CPT | Mod: 26,59

## 2019-05-01 PROCEDURE — 88313 SPECIAL STAINS GROUP 2: CPT | Mod: 26

## 2019-05-01 PROCEDURE — 88334 PATH CONSLTJ SURG CYTO XM EA: CPT | Mod: 26,59

## 2019-05-01 PROCEDURE — 61781 SCAN PROC CRANIAL INTRA: CPT

## 2019-05-01 PROCEDURE — 95961 ELECTRODE STIMULATION BRAIN: CPT | Mod: 26,52

## 2019-05-01 PROCEDURE — 88307 TISSUE EXAM BY PATHOLOGIST: CPT | Mod: 26

## 2019-05-01 PROCEDURE — 99292 CRITICAL CARE ADDL 30 MIN: CPT

## 2019-05-01 PROCEDURE — 88341 IMHCHEM/IMCYTCHM EA ADD ANTB: CPT | Mod: 26,59

## 2019-05-01 PROCEDURE — 61304 CRNEC/CRNOT EXPL SUPRATNTORL: CPT | Mod: 59

## 2019-05-01 PROCEDURE — 88360 TUMOR IMMUNOHISTOCHEM/MANUAL: CPT | Mod: 26

## 2019-05-01 PROCEDURE — 99291 CRITICAL CARE FIRST HOUR: CPT

## 2019-05-01 RX ORDER — LABETALOL HCL 100 MG
200 TABLET ORAL THREE TIMES A DAY
Refills: 0 | Status: DISCONTINUED | OUTPATIENT
Start: 2019-05-01 | End: 2019-05-01

## 2019-05-01 RX ORDER — FOSPHENYTOIN 50 MG/ML
100 INJECTION INTRAMUSCULAR; INTRAVENOUS EVERY 8 HOURS
Refills: 0 | Status: DISCONTINUED | OUTPATIENT
Start: 2019-05-01 | End: 2019-05-02

## 2019-05-01 RX ORDER — HYDRALAZINE HCL 50 MG
5 TABLET ORAL EVERY 4 HOURS
Refills: 0 | Status: DISCONTINUED | OUTPATIENT
Start: 2019-05-01 | End: 2019-05-02

## 2019-05-01 RX ORDER — OXYCODONE HYDROCHLORIDE 5 MG/1
5 TABLET ORAL EVERY 4 HOURS
Refills: 0 | Status: DISCONTINUED | OUTPATIENT
Start: 2019-05-01 | End: 2019-05-01

## 2019-05-01 RX ORDER — DEXTROSE 50 % IN WATER 50 %
15 SYRINGE (ML) INTRAVENOUS ONCE
Refills: 0 | Status: DISCONTINUED | OUTPATIENT
Start: 2019-05-01 | End: 2019-05-07

## 2019-05-01 RX ORDER — GLUCAGON INJECTION, SOLUTION 0.5 MG/.1ML
1 INJECTION, SOLUTION SUBCUTANEOUS ONCE
Refills: 0 | Status: DISCONTINUED | OUTPATIENT
Start: 2019-05-01 | End: 2019-05-07

## 2019-05-01 RX ORDER — DEXTROSE 50 % IN WATER 50 %
12.5 SYRINGE (ML) INTRAVENOUS ONCE
Refills: 0 | Status: DISCONTINUED | OUTPATIENT
Start: 2019-05-01 | End: 2019-05-07

## 2019-05-01 RX ORDER — OXYCODONE HYDROCHLORIDE 5 MG/1
10 TABLET ORAL EVERY 6 HOURS
Refills: 0 | Status: DISCONTINUED | OUTPATIENT
Start: 2019-05-01 | End: 2019-05-02

## 2019-05-01 RX ORDER — LABETALOL HCL 100 MG
200 TABLET ORAL EVERY 8 HOURS
Refills: 0 | Status: DISCONTINUED | OUTPATIENT
Start: 2019-05-01 | End: 2019-05-01

## 2019-05-01 RX ORDER — AMLODIPINE BESYLATE 2.5 MG/1
10 TABLET ORAL DAILY
Refills: 0 | Status: DISCONTINUED | OUTPATIENT
Start: 2019-05-01 | End: 2019-05-01

## 2019-05-01 RX ORDER — INSULIN LISPRO 100/ML
4 VIAL (ML) SUBCUTANEOUS
Refills: 0 | Status: DISCONTINUED | OUTPATIENT
Start: 2019-05-01 | End: 2019-05-04

## 2019-05-01 RX ORDER — LABETALOL HCL 100 MG
10 TABLET ORAL ONCE
Refills: 0 | Status: COMPLETED | OUTPATIENT
Start: 2019-05-01 | End: 2019-05-01

## 2019-05-01 RX ORDER — MORPHINE SULFATE 50 MG/1
2 CAPSULE, EXTENDED RELEASE ORAL EVERY 4 HOURS
Refills: 0 | Status: DISCONTINUED | OUTPATIENT
Start: 2019-05-01 | End: 2019-05-02

## 2019-05-01 RX ORDER — LEVETIRACETAM 250 MG/1
1000 TABLET, FILM COATED ORAL
Refills: 0 | Status: DISCONTINUED | OUTPATIENT
Start: 2019-05-01 | End: 2019-05-07

## 2019-05-01 RX ORDER — LABETALOL HCL 100 MG
20 TABLET ORAL ONCE
Refills: 0 | Status: COMPLETED | OUTPATIENT
Start: 2019-05-01 | End: 2019-05-01

## 2019-05-01 RX ORDER — OXYCODONE HYDROCHLORIDE 5 MG/1
5 TABLET ORAL EVERY 6 HOURS
Refills: 0 | Status: DISCONTINUED | OUTPATIENT
Start: 2019-05-01 | End: 2019-05-02

## 2019-05-01 RX ORDER — HYDRALAZINE HCL 50 MG
10 TABLET ORAL ONCE
Refills: 0 | Status: COMPLETED | OUTPATIENT
Start: 2019-05-01 | End: 2019-05-01

## 2019-05-01 RX ORDER — FENTANYL CITRATE 50 UG/ML
25 INJECTION INTRAVENOUS
Refills: 0 | Status: DISCONTINUED | OUTPATIENT
Start: 2019-05-01 | End: 2019-05-01

## 2019-05-01 RX ORDER — INSULIN LISPRO 100/ML
VIAL (ML) SUBCUTANEOUS AT BEDTIME
Refills: 0 | Status: DISCONTINUED | OUTPATIENT
Start: 2019-05-01 | End: 2019-05-07

## 2019-05-01 RX ORDER — ONDANSETRON 8 MG/1
4 TABLET, FILM COATED ORAL ONCE
Refills: 0 | Status: DISCONTINUED | OUTPATIENT
Start: 2019-05-01 | End: 2019-05-01

## 2019-05-01 RX ORDER — SODIUM CHLORIDE 9 MG/ML
1000 INJECTION INTRAMUSCULAR; INTRAVENOUS; SUBCUTANEOUS
Refills: 0 | Status: DISCONTINUED | OUTPATIENT
Start: 2019-05-01 | End: 2019-05-02

## 2019-05-01 RX ORDER — CEFAZOLIN SODIUM 1 G
2000 VIAL (EA) INJECTION EVERY 8 HOURS
Refills: 0 | Status: COMPLETED | OUTPATIENT
Start: 2019-05-01 | End: 2019-05-02

## 2019-05-01 RX ORDER — AMLODIPINE BESYLATE 2.5 MG/1
10 TABLET ORAL DAILY
Refills: 0 | Status: DISCONTINUED | OUTPATIENT
Start: 2019-05-01 | End: 2019-05-07

## 2019-05-01 RX ORDER — LEVOTHYROXINE SODIUM 125 MCG
50 TABLET ORAL DAILY
Refills: 0 | Status: DISCONTINUED | OUTPATIENT
Start: 2019-05-01 | End: 2019-05-07

## 2019-05-01 RX ORDER — INSULIN LISPRO 100/ML
3 VIAL (ML) SUBCUTANEOUS
Refills: 0 | Status: DISCONTINUED | OUTPATIENT
Start: 2019-05-01 | End: 2019-05-01

## 2019-05-01 RX ORDER — DOCUSATE SODIUM 100 MG
100 CAPSULE ORAL DAILY
Refills: 0 | Status: DISCONTINUED | OUTPATIENT
Start: 2019-05-01 | End: 2019-05-07

## 2019-05-01 RX ORDER — HYDRALAZINE HCL 50 MG
100 TABLET ORAL EVERY 8 HOURS
Refills: 0 | Status: DISCONTINUED | OUTPATIENT
Start: 2019-05-01 | End: 2019-05-02

## 2019-05-01 RX ORDER — DEXTROSE 50 % IN WATER 50 %
25 SYRINGE (ML) INTRAVENOUS ONCE
Refills: 0 | Status: DISCONTINUED | OUTPATIENT
Start: 2019-05-01 | End: 2019-05-07

## 2019-05-01 RX ORDER — INSULIN GLARGINE 100 [IU]/ML
16 INJECTION, SOLUTION SUBCUTANEOUS AT BEDTIME
Refills: 0 | Status: DISCONTINUED | OUTPATIENT
Start: 2019-05-01 | End: 2019-05-05

## 2019-05-01 RX ORDER — SENNA PLUS 8.6 MG/1
1 TABLET ORAL DAILY
Refills: 0 | Status: DISCONTINUED | OUTPATIENT
Start: 2019-05-01 | End: 2019-05-07

## 2019-05-01 RX ORDER — SODIUM CHLORIDE 9 MG/ML
1000 INJECTION, SOLUTION INTRAVENOUS
Refills: 0 | Status: DISCONTINUED | OUTPATIENT
Start: 2019-05-01 | End: 2019-05-07

## 2019-05-01 RX ORDER — OXYCODONE HYDROCHLORIDE 5 MG/1
10 TABLET ORAL EVERY 4 HOURS
Refills: 0 | Status: DISCONTINUED | OUTPATIENT
Start: 2019-05-01 | End: 2019-05-01

## 2019-05-01 RX ORDER — INSULIN LISPRO 100/ML
VIAL (ML) SUBCUTANEOUS
Refills: 0 | Status: DISCONTINUED | OUTPATIENT
Start: 2019-05-01 | End: 2019-05-07

## 2019-05-01 RX ORDER — POLYETHYLENE GLYCOL 3350 17 G/17G
17 POWDER, FOR SOLUTION ORAL
Refills: 0 | Status: DISCONTINUED | OUTPATIENT
Start: 2019-05-01 | End: 2019-05-07

## 2019-05-01 RX ORDER — IPRATROPIUM/ALBUTEROL SULFATE 18-103MCG
3 AEROSOL WITH ADAPTER (GRAM) INHALATION EVERY 6 HOURS
Refills: 0 | Status: DISCONTINUED | OUTPATIENT
Start: 2019-05-01 | End: 2019-05-07

## 2019-05-01 RX ORDER — HYDROMORPHONE HYDROCHLORIDE 2 MG/ML
1 INJECTION INTRAMUSCULAR; INTRAVENOUS; SUBCUTANEOUS ONCE
Refills: 0 | Status: DISCONTINUED | OUTPATIENT
Start: 2019-05-01 | End: 2019-05-01

## 2019-05-01 RX ADMIN — OXYCODONE HYDROCHLORIDE 10 MILLIGRAM(S): 5 TABLET ORAL at 22:00

## 2019-05-01 RX ADMIN — HYDROMORPHONE HYDROCHLORIDE 1 MILLIGRAM(S): 2 INJECTION INTRAMUSCULAR; INTRAVENOUS; SUBCUTANEOUS at 15:34

## 2019-05-01 RX ADMIN — Medication 10 MILLIGRAM(S): at 19:24

## 2019-05-01 RX ADMIN — FOSPHENYTOIN 104 MILLIGRAM(S) PE: 50 INJECTION INTRAMUSCULAR; INTRAVENOUS at 14:19

## 2019-05-01 RX ADMIN — Medication 20 MILLIGRAM(S): at 13:50

## 2019-05-01 RX ADMIN — MORPHINE SULFATE 2 MILLIGRAM(S): 50 CAPSULE, EXTENDED RELEASE ORAL at 19:35

## 2019-05-01 RX ADMIN — Medication 3 MILLILITER(S): at 05:53

## 2019-05-01 RX ADMIN — Medication 100 MILLIGRAM(S): at 16:39

## 2019-05-01 RX ADMIN — Medication 100 MILLIGRAM(S): at 21:03

## 2019-05-01 RX ADMIN — FOSPHENYTOIN 104 MILLIGRAM(S) PE: 50 INJECTION INTRAMUSCULAR; INTRAVENOUS at 05:53

## 2019-05-01 RX ADMIN — INSULIN GLARGINE 16 UNIT(S): 100 INJECTION, SOLUTION SUBCUTANEOUS at 22:50

## 2019-05-01 RX ADMIN — LEVETIRACETAM 400 MILLIGRAM(S): 250 TABLET, FILM COATED ORAL at 06:30

## 2019-05-01 RX ADMIN — FENTANYL CITRATE 25 MICROGRAM(S): 50 INJECTION INTRAVENOUS at 13:30

## 2019-05-01 RX ADMIN — OXYCODONE HYDROCHLORIDE 10 MILLIGRAM(S): 5 TABLET ORAL at 21:12

## 2019-05-01 RX ADMIN — Medication 100 MILLIGRAM(S): at 17:15

## 2019-05-01 RX ADMIN — FOSPHENYTOIN 104 MILLIGRAM(S) PE: 50 INJECTION INTRAMUSCULAR; INTRAVENOUS at 21:03

## 2019-05-01 RX ADMIN — FENTANYL CITRATE 25 MICROGRAM(S): 50 INJECTION INTRAVENOUS at 13:29

## 2019-05-01 RX ADMIN — HYDROMORPHONE HYDROCHLORIDE 1 MILLIGRAM(S): 2 INJECTION INTRAMUSCULAR; INTRAVENOUS; SUBCUTANEOUS at 15:21

## 2019-05-01 RX ADMIN — FENTANYL CITRATE 25 MICROGRAM(S): 50 INJECTION INTRAVENOUS at 14:00

## 2019-05-01 RX ADMIN — Medication 50 MICROGRAM(S): at 05:53

## 2019-05-01 RX ADMIN — Medication 100 MILLIGRAM(S): at 05:53

## 2019-05-01 RX ADMIN — FENTANYL CITRATE 25 MICROGRAM(S): 50 INJECTION INTRAVENOUS at 12:30

## 2019-05-01 RX ADMIN — MORPHINE SULFATE 2 MILLIGRAM(S): 50 CAPSULE, EXTENDED RELEASE ORAL at 19:50

## 2019-05-01 RX ADMIN — LEVETIRACETAM 400 MILLIGRAM(S): 250 TABLET, FILM COATED ORAL at 15:04

## 2019-05-01 RX ADMIN — LEVETIRACETAM 400 MILLIGRAM(S): 250 TABLET, FILM COATED ORAL at 22:11

## 2019-05-01 RX ADMIN — Medication 200 MILLIGRAM(S): at 15:08

## 2019-05-01 RX ADMIN — FENTANYL CITRATE 25 MICROGRAM(S): 50 INJECTION INTRAVENOUS at 12:45

## 2019-05-01 RX ADMIN — AMLODIPINE BESYLATE 10 MILLIGRAM(S): 2.5 TABLET ORAL at 17:15

## 2019-05-01 RX ADMIN — Medication 3 MILLILITER(S): at 18:33

## 2019-05-01 RX ADMIN — Medication 10 MILLIGRAM(S): at 14:22

## 2019-05-01 RX ADMIN — SODIUM CHLORIDE 70 MILLILITER(S): 9 INJECTION INTRAMUSCULAR; INTRAVENOUS; SUBCUTANEOUS at 17:16

## 2019-05-01 RX ADMIN — MORPHINE SULFATE 2 MILLIGRAM(S): 50 CAPSULE, EXTENDED RELEASE ORAL at 15:00

## 2019-05-01 RX ADMIN — SENNA PLUS 1 TABLET(S): 8.6 TABLET ORAL at 21:02

## 2019-05-01 RX ADMIN — MORPHINE SULFATE 2 MILLIGRAM(S): 50 CAPSULE, EXTENDED RELEASE ORAL at 14:31

## 2019-05-01 RX ADMIN — Medication 2: at 15:39

## 2019-05-01 RX ADMIN — FENTANYL CITRATE 25 MICROGRAM(S): 50 INJECTION INTRAVENOUS at 12:54

## 2019-05-01 RX ADMIN — Medication 10 MILLIGRAM(S): at 21:55

## 2019-05-01 RX ADMIN — AMLODIPINE BESYLATE 10 MILLIGRAM(S): 2.5 TABLET ORAL at 05:53

## 2019-05-01 RX ADMIN — Medication 10 MILLIGRAM(S): at 13:18

## 2019-05-01 NOTE — PROGRESS NOTE ADULT - ASSESSMENT
69 yr old F h/o CAD, DM, HTN, HLD, hypothyroid, on eliquis and ASA (both off >5 days), presented initially after a fall. Patient brought in by ambulance after being found down by family. Patient was found down by family 4/18/2019 and was found to be altered and had witnessed seizure in ED. Patient then developed status epilepticus and was transferred to NSCU for further management. LP was done and was unremarkable. EEG showed many subclinical seizures. MRI showed left posterior lateral temporal and parietal occipital hyperintense T2   and FLAIR signal. PCR for HSV was negative. Neurosurgery was consulted for possible biopsy.     Plan:  - OR today

## 2019-05-01 NOTE — PROGRESS NOTE ADULT - SUBJECTIVE AND OBJECTIVE BOX
Diabetes Follow up note:  Interval Hx:  70 y/o F w/h/o uncontrolled T2DM on oral DM meds. Also HTN/HLD/CAD/Hypothyroidism/recent umbilical hernia repair 2 weeks ago. Here s/p fall with LOC> seizures> hypernatremia. Pt s/p brain biopsy today. Seen in PACU. Family at bedside. Pt w/out relief of headache after pain meds. Has not eaten since biopsy.     Review of Systems:  General: + headache  GI: Tolerating POs without any N/V/D/ABD PAIN.  CV: No CP/SOB  ENDO: No S&Sx of hypoglycemia  MEDS:    insulin glargine Injectable (LANTUS) 16 Unit(s) SubCutaneous at bedtime  insulin lispro (HumaLOG) corrective regimen sliding scale   SubCutaneous three times a day before meals  insulin lispro (HumaLOG) corrective regimen sliding scale   SubCutaneous at bedtime  insulin lispro Injectable (HumaLOG) 3 Unit(s) SubCutaneous three times a day with meals  levothyroxine 50 MICROGram(s) Oral daily    ceFAZolin   IVPB 2000 milliGRAM(s) IV Intermittent every 8 hours    Allergies    No Known Allergies        PE:  General: Female lying in bed.   Vital Signs Last 24 Hrs  T(C): 36 (01 May 2019 11:51), Max: 36.8 (30 Apr 2019 17:17)  T(F): 96.8 (01 May 2019 11:51), Max: 98.3 (30 Apr 2019 17:17)  HR: 60 (01 May 2019 15:30) (60 - 83)  BP: 175/77 (01 May 2019 11:56) (110/68 - 175/77)  BP(mean): --  RR: 16 (01 May 2019 15:30) (14 - 18)  SpO2: 97% (01 May 2019 15:30) (96% - 100%)  Abd: Soft, NT,ND, Obese.   Extremities: Warm.   Neuro: A&O X3    LABS:    POCT Blood Glucose.: 178 mg/dL (05-01-19 @ 15:31)  POCT Blood Glucose.: 176 mg/dL (05-01-19 @ 06:37)  POCT Blood Glucose.: 148 mg/dL (04-30-19 @ 22:22)  POCT Blood Glucose.: 140 mg/dL (04-30-19 @ 21:18)  POCT Blood Glucose.: 204 mg/dL (04-30-19 @ 17:22)  POCT Blood Glucose.: 248 mg/dL (04-30-19 @ 12:48)  POCT Blood Glucose.: 124 mg/dL (04-30-19 @ 08:44)  POCT Blood Glucose.: 294 mg/dL (04-29-19 @ 21:33)  POCT Blood Glucose.: 126 mg/dL (04-29-19 @ 17:30)  POCT Blood Glucose.: 122 mg/dL (04-29-19 @ 12:43)  POCT Blood Glucose.: 137 mg/dL (04-29-19 @ 08:07)  POCT Blood Glucose.: 217 mg/dL (04-28-19 @ 22:09)  POCT Blood Glucose.: 160 mg/dL (04-28-19 @ 18:10)                            10.0   5.6   )-----------( 253      ( 01 May 2019 13:00 )             30.0       05-01    140  |  105  |  9   ----------------------------<  225<H>  4.1   |  24  |  0.59    Ca    9.0      01 May 2019 13:00  Phos  3.7     05-01  Mg     2.0     05-01    TPro  7.1  /  Alb  3.9  /  TBili  0.1<L>  /  DBili  <0.1  /  AST  65<H>  /  ALT  142<H>  /  AlkPhos  368<H>  05-01      Thyroid Function Tests:  04-22 @ 08:55 TSH -- FreeT4 1.1 T3 -- Anti TPO -- Anti Thyroglobulin Ab -- TSI --  04-22 @ 08:51 TSH 1.45 FreeT4 -- T3 -- Anti TPO -- Anti Thyroglobulin Ab -- TSI --      Hemoglobin A1C, Whole Blood: 9.9 % <H> [4.0 - 5.6] (04-21-19 @ 09:26)            Contact number: eugenio [phone] or [phone]

## 2019-05-01 NOTE — PROGRESS NOTE ADULT - ASSESSMENT
69 year old female with altered mental status due to seizures likely 2/2 to left temporal lesion, differential diagnosis includes neoplastic vs autoimmune encephalitis. Was in nonconvulsive status epilepticus, improved. Now on fycompa, phenytoin, keppra. MR spect does not suggest neoplastic lesion. MRI brain w/ contrast does not show enhancement of the lesion, which makes infiltrative glioma less likely, as well as lymphoma, though lymphoma is not entirely excluded. CSF so far also suggests against herpes encephalitis or infectious etiology. CSF flow cytometry and cytopathology have both been negative. CT c/a/p shows enhancement of bilateral kidneys, concerning for infection vs neoplastic process. Though the renal enhancement could be tumor, it is not clear if the same process is ongoing intracranially. For that reason, may not be useful to biopsy the kidney, as it may not yield full diagnostic information regarding the intracranial process.    She has elevated liver enzymes, which could be due to pheyntoin use.     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   - Post operative care as per neurosurgery.   - VEEG after patient is back from PACU  -c/w Keppra 1g TID; Fycompa to 4 mg QHS, and phenytoin 100 TID.  -per GI recs, will attempt to taper off phenytoin and avoid hepatotoxic agents if possible  -f/u LP results from 4/24 per ID recs for: csf fungal culture, AFB culture, spinal fluid autoimmune encephalitis panel   -f/u serum autoimmune encephalitis panel, SPEP and UPEP, hepatology labs (anti-mitochondrial, anti-smooth muscle, anti-LKM, anti-liver cytosol antibody)  - will coordinate with neurosurgery for starting anticoagulation.   -will f/u endo for discharge recommendations  -case was discussed with patient's cardiology, and patient is on Eliquis for atrial fibrillation  -US abdomen as part of evaluation for elevated LFTs  -MRI abdomen to assess renal lesions - resolution of prior renal enhancements.   -daily LFT's

## 2019-05-01 NOTE — PROGRESS NOTE ADULT - SUBJECTIVE AND OBJECTIVE BOX
HPI:  69F w/ CAD, DM, HTN, HLD, hypothyroid, on eliquis, ASA, s/p umbilical hernia repair presented as a level 1 trauma activation. Patient brought in by ambulance 4/18/19 after being found down by family. Hematology consulted for workup of possible lymphoma. Admitted to SICU and then began having subclinical siezures . Patient had LP and MRI with Left posterior lateral temporal and parietal occipital hyperintense T2 FLAIR suspicious for herpes encephalitis - started on Acyclovir. For the seizures she was also started onkeppra, fycompa, and vimpat,  4[facility] PM [facility]   4/19-20 multiple subclinical seizures, now on keppra, fycompa, and vimpat  4/22:  10 seizures overnight, 2/hr in the am       PAST MEDICAL & SURGICAL HISTORY:  Hypothyroid  Obesity  NACHO (obstructive sleep apnea): sleep study done 5 years ago  DM (Diabetes Mellitus) (ICD9 250.00)  Dyslipidemia (ICD9 272.4)  HTN (Hypertension) (ICD9 401.9)  CAD (Coronary Artery Disease) (ICD9 414.00): c STENTS  History of hysterectomy  History of ovarian cystectomy: x 2 in 2013  S/P primary angioplasty with coronary stent: has 5 stents-first placed in 2007, last stent -2011,  C Section: x 1    FAMILY HISTORY:  Family history of heart disease (Aunt)    SOCIAL HISTORY: No EtOH, no tobacco      Allergies    No Known Allergies    Intolerances    MEDICATIONS  (STANDING):  ALBUTerol/ipratropium for Nebulization 3 milliLiter(s) Nebulizer every 6 hours  amLODIPine   Tablet 10 milliGRAM(s) Oral daily  ceFAZolin   IVPB 2000 milliGRAM(s) IV Intermittent every 8 hours  dextrose 5%. 1000 milliLiter(s) (50 mL/Hr) IV Continuous <Continuous>  dextrose 50% Injectable 12.5 Gram(s) IV Push once  dextrose 50% Injectable 25 Gram(s) IV Push once  dextrose 50% Injectable 25 Gram(s) IV Push once  docusate sodium 100 milliGRAM(s) Oral daily  fosphenytoin IVPB 100 milliGRAM(s) PE IV Intermittent every 8 hours  hydrALAZINE 100 milliGRAM(s) Oral every 8 hours  insulin glargine Injectable (LANTUS) 16 Unit(s) SubCutaneous at bedtime  insulin lispro (HumaLOG) corrective regimen sliding scale   SubCutaneous three times a day before meals  insulin lispro (HumaLOG) corrective regimen sliding scale   SubCutaneous at bedtime  insulin lispro Injectable (HumaLOG) 3 Unit(s) SubCutaneous three times a day with meals  labetalol 200 milliGRAM(s) Oral three times a day  levETIRAcetam  IVPB 1000 milliGRAM(s) IV Intermittent <User Schedule>  levothyroxine 50 MICROGram(s) Oral daily  polyethylene glycol 3350 17 Gram(s) Oral two times a day  senna 1 Tablet(s) Oral daily    MEDICATIONS  (PRN):  bisacodyl Suppository 10 milliGRAM(s) Rectal daily PRN Constipation  dextrose 40% Gel 15 Gram(s) Oral once PRN Blood Glucose LESS THAN 70 milliGRAM(s)/deciliter  fentaNYL    Injectable 25 MICROGram(s) IV Push every 10 minutes PRN moderate/severe pain  glucagon  Injectable 1 milliGRAM(s) IntraMuscular once PRN Glucose LESS THAN 70 milligrams/deciliter  LORazepam   Injectable 2 milliGRAM(s) IV Push once PRN Seizure  ondansetron Injectable 4 milliGRAM(s) IV Push once PRN Nausea and/or Vomiting  oxyCODONE    IR 5 milliGRAM(s) Oral every 4 hours PRN Moderate Pain (4 - 6)  oxyCODONE    IR 10 milliGRAM(s) Oral every 4 hours PRN Severe Pain (7 - 10)    REVIEW OF SYSTEMS: [X ] Unable to Assess due to neurologic condition  Neuro: [ ] Headache [ ] Back pain [ ] Numbness [ ] Weakness [ ] Ataxia [ ] Dizziness [ ] Aphasia [ ] Dysarthria [ ] Visual disturbance  Resp: [ ] Shortness of breath/dyspnea, [ ] Orthopnea [ ] Cough  CV: [ ] Chest pain [ ] Palpitation [ ] Lightheadedness [ ] Syncope  Renal: [ ] Thirst [ ] Edema  GI: [ ] Nausea [ ] Emesis [ ] Abdominal pain [ ] Constipation [ ] Diarrhea  Hem: [ ] Hematemesis [ ] bright red blood per rectum  ID: [ ] Fever [ ] Chills [ ] Dysuria  ENT: [ ] Rhinorrhea    ICU Vital Signs Last 24 Hrs  T(C): 36 (01 May 2019 11:51), Max: 36.8 (30 Apr 2019 17:17)  T(F): 96.8 (01 May 2019 11:51), Max: 98.3 (30 Apr 2019 17:17)  HR: 74 (01 May 2019 13:00) (68 - 83)  BP: 175/77 (01 May 2019 11:56) (110/68 - 175/77)  ABP: 186/79 (01 May 2019 13:00) (164/73 - 194/81)  ABP(mean): 119 (01 May 2019 13:00) (116 - 128)  RR: 16 (01 May 2019 13:00) (16 - 18)  SpO2: 99% (01 May 2019 13:00) (96% - 100%)                          8.7    6.38  )-----------( 295      ( 01 May 2019 09:38 )             28.9     05-01    141  |  106  |  10  ----------------------------<  183<H>  4.0   |  25  |  0.71    Ca    9.1      01 May 2019 07:29  Phos  3.7     05-01  Mg     2.0     05-01    TPro  6.6  /  Alb  3.4  /  TBili  0.1<L>  /  DBili  x   /  AST  80<H>  /  ALT  145<H>  /  AlkPhos  337<H>  05-      .CSF  04-24 @ 17:17   Testing in progress  --    No polymorphonuclear cells seen  No organisms seen  by cytocentrifuge      .CSF  04-19 @ 16:57   No growth  --    No polymorphonuclear cells seen  No organisms seen  by cytocentrifuge     MR Abdomen w/wo IV Cont (04.29.19  LOWER CHEST: Bibasilar subsegmental atelectasis.    LIVER: Within normal limits.   BILE DUCTS: Normal caliber.  GALLBLADDER: Within normal limits.  SPLEEN: Within normal limits.  PANCREAS: Within normal limits.  ADRENALS: Within normal limits.  KIDNEYS/URETERS: Homogeneous enhancement without focal lesion or   hydronephrosis. No abnormality at diffusion-weighted imaging.    VISUALIZED PORTIONS:    BOWEL: Small hiatal hernia.   PERITONEUM: No ascites.  VESSELS: Renal veins and IVC are patent.  RETROPERITONEUM: No lymphadenopathy.    ABDOMINAL WALL: Unchanged nonspecific infiltration of the periumbilical   soft tissues.  BONES: Right iliac bone island.    IMPRESSION:       < end of copied text >      MR Head w/wo IV Cont (04.25.19 @ 10:20) >    IMPRESSION:    No significant interval change from previous brain MRIs.    Similar-appearing nonenhancing hyperintense signal in the left posterior   temporal and occipital lobes which appears both subcortical and cortical   in nature.    Study done for presurgical planning.      CT Abdomen and Pelvis w/ IV Cont (04.25.19 @ 16:16) >  FINDINGS:    CHEST:     LUNGS AND LARGE AIRWAYS: Patent central airways. Bilateral patchy   nonspecific groundglass opacities. Improved aeration in the left lower   lobe. Linear atelectasis in the right lower lobe.  PLEURA: Trace bilateral pleural effusions.  VESSELS: Left PICC line, with its tip at the cavoatrial junction.   Atherosclerotic calcification of the aorta and coronary arteries.  HEART: Heart size is mildly enlarged. No pericardial effusion.  MEDIASTINUM AND CANDICE: No lymphadenopathy.  CHEST WALL AND LOWER NECK: Within normal limits.    ABDOMEN AND PELVIS:    LIVER: Within normal limits.  BILE DUCTS: Normal caliber.  GALLBLADDER: Within normal limits.  SPLEEN: Within normal limits.  PANCREAS: Within normal limits.  ADRENALS: Within normal limits.  KIDNEYS/URETERS: No hydronephrosis. Heterogeneous enhancement of the   kidneys bilaterally, with patchy areas of decreased attenuation.    BLADDER: Within normal limits.  REPRODUCTIVE ORGANS:Status post hysterectomy.    BOWEL: No bowel obstruction. Appendix is within normal limits. Small   hiatal hernia.  PERITONEUM: No ascites.  VESSELS:  Atherosclerotic calcification of the aorta and bilateral renal   arteries. Again noted, is infiltration of the subcutaneous tissues in the   left groin, likely related to intervention, slightly decreased since   4/18/2019.  RETROPERITONEUM: No lymphadenopathy.    ABDOMINAL WALL: Again noted, is infiltration of the periumbilical soft   tissues.  BONES: Degenerative changes in the spine. Sclerotic focus in the right   iliac bone, unchanged compared to prior studies dating back to 9/1/2018.    IMPRESSION: Heterogeneous enhancement of the kidneys bilaterally, with   striated nephrograms bilaterally. Differential diagnosis includes   infection such as pyelonephritis, as well as ischemia and infiltrative   processes such is lymphoma and leukemia.  Correlation with urinalysis is   recommended.    Infiltration of the periumbilical subcutaneous tissues again noted.      GENERAL: NAD, well-developed  HEAD:  Atraumatic, Normocephalic  EYES: EOMI, PERRLA, conjunctiva and sclera clear  NECK: Supple, No JVD  CHEST/LUNG: Clear to auscultation bilaterally; No wheeze  HEART: Regular rate and rhythm; No murmurs, rubs, or gallops  ABDOMEN: Soft, Nontender, Nondistended; Bowel sounds present  EXTREMITIES:  2+ Peripheral Pulses, No clubbing, cyanosis, or edema  NEUROLOGY: non-focal  SKIN: No rashes or lesions HPI:  69F w/ CAD, DM, HTN, HLD, hypothyroid, on eliquis, ASA, s/p umbilical hernia repair presented as a level 1 trauma activation. Patient brought in by ambulance 4/18/19 after being found down by family. Hematology consulted for workup of possible lymphoma. Admitted to SICU and then began having subclinical siezures . Patient had LP and MRI with Left posterior lateral temporal and parietal occipital hyperintense T2 FLAIR suspicious for herpes encephalitis - started on Acyclovir. For the seizures she was also started onkeppra, fycompa, and vimpat,  4/19 PM [facility] [facility] [place] [place]   4/19-20 multiple subclinical seizures, now on keppra, fycompa, and vimpat  4/22:  10 seizures overnight, 2/hr in the am       PAST MEDICAL & SURGICAL HISTORY:  Hypothyroid  Obesity  NACHO (obstructive sleep apnea): sleep study done 5 years ago  DM (Diabetes Mellitus) (ICD9 250.00)  Dyslipidemia (ICD9 272.4)  HTN (Hypertension) (ICD9 401.9)  CAD (Coronary Artery Disease) (ICD9 414.00): c STENTS  History of hysterectomy  History of ovarian cystectomy: x 2 in 2013  S/P primary angioplasty with coronary stent: has 5 stents-first placed in 2007, last stent -2011,  C Section: x 1    FAMILY HISTORY:  Family history of heart disease (Aunt)    SOCIAL HISTORY: No EtOH, no tobacco      Allergies    No Known Allergies    Intolerances    MEDICATIONS  (STANDING):  ALBUTerol/ipratropium for Nebulization 3 milliLiter(s) Nebulizer every 6 hours  amLODIPine   Tablet 10 milliGRAM(s) Oral daily  ceFAZolin   IVPB 2000 milliGRAM(s) IV Intermittent every 8 hours  dextrose 5%. 1000 milliLiter(s) (50 mL/Hr) IV Continuous <Continuous>  dextrose 50% Injectable 12.5 Gram(s) IV Push once  dextrose 50% Injectable 25 Gram(s) IV Push once  dextrose 50% Injectable 25 Gram(s) IV Push once  docusate sodium 100 milliGRAM(s) Oral daily  fosphenytoin IVPB 100 milliGRAM(s) PE IV Intermittent every 8 hours  hydrALAZINE 100 milliGRAM(s) Oral every 8 hours  insulin glargine Injectable (LANTUS) 16 Unit(s) SubCutaneous at bedtime  insulin lispro (HumaLOG) corrective regimen sliding scale   SubCutaneous three times a day before meals  insulin lispro (HumaLOG) corrective regimen sliding scale   SubCutaneous at bedtime  insulin lispro Injectable (HumaLOG) 3 Unit(s) SubCutaneous three times a day with meals  labetalol 200 milliGRAM(s) Oral three times a day  levETIRAcetam  IVPB 1000 milliGRAM(s) IV Intermittent <User Schedule>  levothyroxine 50 MICROGram(s) Oral daily  polyethylene glycol 3350 17 Gram(s) Oral two times a day  senna 1 Tablet(s) Oral daily    MEDICATIONS  (PRN):  bisacodyl Suppository 10 milliGRAM(s) Rectal daily PRN Constipation  dextrose 40% Gel 15 Gram(s) Oral once PRN Blood Glucose LESS THAN 70 milliGRAM(s)/deciliter  fentaNYL    Injectable 25 MICROGram(s) IV Push every 10 minutes PRN moderate/severe pain  glucagon  Injectable 1 milliGRAM(s) IntraMuscular once PRN Glucose LESS THAN 70 milligrams/deciliter  LORazepam   Injectable 2 milliGRAM(s) IV Push once PRN Seizure  ondansetron Injectable 4 milliGRAM(s) IV Push once PRN Nausea and/or Vomiting  oxyCODONE    IR 5 milliGRAM(s) Oral every 4 hours PRN Moderate Pain (4 - 6)  oxyCODONE    IR 10 milliGRAM(s) Oral every 4 hours PRN Severe Pain (7 - 10)    REVIEW OF SYSTEMS: [X ] Unable to Assess due to neurologic condition  Neuro: [ ] Headache [ ] Back pain [ ] Numbness [ ] Weakness [ ] Ataxia [ ] Dizziness [ ] Aphasia [ ] Dysarthria [ ] Visual disturbance  Resp: [ ] Shortness of breath/dyspnea, [ ] Orthopnea [ ] Cough  CV: [ ] Chest pain [ ] Palpitation [ ] Lightheadedness [ ] Syncope  Renal: [ ] Thirst [ ] Edema  GI: [ ] Nausea [ ] Emesis [ ] Abdominal pain [ ] Constipation [ ] Diarrhea  Hem: [ ] Hematemesis [ ] bright red blood per rectum  ID: [ ] Fever [ ] Chills [ ] Dysuria  ENT: [ ] Rhinorrhea    ICU Vital Signs Last 24 Hrs  T(C): 36 (01 May 2019 11:51), Max: 36.8 (30 Apr 2019 17:17)  T(F): 96.8 (01 May 2019 11:51), Max: 98.3 (30 Apr 2019 17:17)  HR: 74 (01 May 2019 13:00) (68 - 83)  BP: 175/77 (01 May 2019 11:56) (110/68 - 175/77)  ABP: 186/79 (01 May 2019 13:00) (164/73 - 194/81)  ABP(mean): 119 (01 May 2019 13:00) (116 - 128)  RR: 16 (01 May 2019 13:00) (16 - 18)  SpO2: 99% (01 May 2019 13:00) (96% - 100%)                          8.7    6.38  )-----------( 295      ( 01 May 2019 09:38 )             28.9     05-01    141  |  106  |  10  ----------------------------<  183<H>  4.0   |  25  |  0.71    Ca    9.1      01 May 2019 07:29  Phos  3.7     05-01  Mg     2.0     05-01    TPro  6.6  /  Alb  3.4  /  TBili  0.1<L>  /  DBili  x   /  AST  80<H>  /  ALT  145<H>  /  AlkPhos  337<H>  05-    MIGUEL/AMA/anti-smooth musc/Anti-LKM/Igs negative.   viral hepatitis panel: Hep A, Hep B,  CMV, and HSV PCR.   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    acetaminophen/EToh/ASA levels negative.    MICROBIOLOGY:  RECENT CULTURES:  04-27 @ 13:56 .Urine     >=3 organisms. Probable collection contamination.    .CSF  04-24 @ 17:17   Testing in progress  --    No polymorphonuclear cells seen  No organisms seen  by cytocentrifuge      .CSF  04-19 @ 16:57   No growth  --    No polymorphonuclear cells seen  No organisms seen  by cytocentrifuge     US Abdomen Complete (04.29.19 @ 15:33) >  FINDINGS:    Liver: Within normal limits.    Bile ducts: Normal caliber. Common bile duct measures 3 mm.     Gallbladder: Within normal limits.        Pancreas: Visualized portions are within normal limits.    Spleen: 8 cm. Within normal limits.    Right kidney: 12.5 cm. No hydronephrosis.    Left kidney: 12.0 cm.  No hydronephrosis.    Ascites: None.    Aorta and IVC: Visualized portions are within normal limits.    IMPRESSION:     Normal abdominal ultrasound.     MR Abdomen w/wo IV Cont (04.29.19  LOWER CHEST: Bibasilar subsegmental atelectasis.    LIVER: Within normal limits.   BILE DUCTS: Normal caliber.  GALLBLADDER: Within normal limits.  SPLEEN: Within normal limits.  PANCREAS: Within normal limits.  ADRENALS: Within normal limits.  KIDNEYS/URETERS: Homogeneous enhancement without focal lesion or   hydronephrosis. No abnormality at diffusion-weighted imaging.    VISUALIZED PORTIONS:    BOWEL: Small hiatal hernia.   PERITONEUM: No ascites.  VESSELS: Renal veins and IVC are patent.  RETROPERITONEUM: No lymphadenopathy.    ABDOMINAL WALL: Unchanged nonspecific infiltration of the periumbilical   soft tissues.  BONES: Right iliac bone island.    IMPRESSION:       < end of copied text >      MR Head w/wo IV Cont (04.25.19 @ 10:20) >    IMPRESSION:    No significant interval change from previous brain MRIs.    Similar-appearing nonenhancing hyperintense signal in the left posterior   temporal and occipital lobes which appears both subcortical and cortical   in nature.    Study done for presurgical planning.      CT Abdomen and Pelvis w/ IV Cont (04.25.19 @ 16:16) >  FINDINGS:    CHEST:     LUNGS AND LARGE AIRWAYS: Patent central airways. Bilateral patchy   nonspecific groundglass opacities. Improved aeration in the left lower   lobe. Linear atelectasis in the right lower lobe.  PLEURA: Trace bilateral pleural effusions.  VESSELS: Left PICC line, with its tip at the cavoatrial junction.   Atherosclerotic calcification of the aorta and coronary arteries.  HEART: Heart size is mildly enlarged. No pericardial effusion.  MEDIASTINUM AND CANDICE: No lymphadenopathy.  CHEST WALL AND LOWER NECK: Within normal limits.    ABDOMEN AND PELVIS:    LIVER: Within normal limits.  BILE DUCTS: Normal caliber.  GALLBLADDER: Within normal limits.  SPLEEN: Within normal limits.  PANCREAS: Within normal limits.  ADRENALS: Within normal limits.  KIDNEYS/URETERS: No hydronephrosis. Heterogeneous enhancement of the   kidneys bilaterally, with patchy areas of decreased attenuation.    BLADDER: Within normal limits.  REPRODUCTIVE ORGANS:Status post hysterectomy.    BOWEL: No bowel obstruction. Appendix is within normal limits. Small   hiatal hernia.  PERITONEUM: No ascites.  VESSELS:  Atherosclerotic calcification of the aorta and bilateral renal   arteries. Again noted, is infiltration of the subcutaneous tissues in the   left groin, likely related to intervention, slightly decreased since   4/18/2019.  RETROPERITONEUM: No lymphadenopathy.    ABDOMINAL WALL: Again noted, is infiltration of the periumbilical soft   tissues.  BONES: Degenerative changes in the spine. Sclerotic focus in the right   iliac bone, unchanged compared to prior studies dating back to 9/1/2018.    IMPRESSION: Heterogeneous enhancement of the kidneys bilaterally, with   striated nephrograms bilaterally. Differential diagnosis includes   infection such as pyelonephritis, as well as ischemia and infiltrative   processes such is lymphoma and leukemia.  Correlation with urinalysis is   recommended.    Infiltration of the periumbilical subcutaneous tissues again noted.      GENERAL: NAD, well-developed  HEAD:  Atraumatic, Normocephalic  EYES: EOMI, PERRLA, conjunctiva and sclera clear  NECK: Supple, No JVD  CHEST/LUNG: Clear to auscultation bilaterally; No wheeze  HEART: Regular rate and rhythm; No murmurs, rubs, or gallops  ABDOMEN: Soft, Nontender, Nondistended; Bowel sounds present  EXTREMITIES:  2+ Peripheral Pulses, No clubbing, cyanosis, or edema  NEUROLOGY: non-focal  SKIN: No rashes or lesions HPI:  69F w/ CAD, DM, HTN, HLD, hypothyroid, on eliquis, ASA, s/p umbilical hernia repair presented as a level 1 trauma activation. Patient brought in by ambulance 4/18/19 after being found down by family. Hematology consulted for workup of possible lymphoma. Admitted to SICU and then began having subclinical siezures . Patient had LP and MRI with Left posterior lateral temporal and parietal occipital hyperintense T2 FLAIR suspicious for herpes encephalitis - started on Acyclovir. For the seizures she was also started onkeppra, fycompa, and vimpat,  4[facility]19 PM [facility] [place]   4/19-20 multiple subclinical seizures, now on keppra, fycompa, and vimpat  4/22:  10 seizures overnight, 2/hr in the am       PAST MEDICAL & SURGICAL HISTORY:  Hypothyroid  Obesity  NACHO (obstructive sleep apnea): sleep study done 5 years ago  DM (Diabetes Mellitus) (ICD9 250.00)  Dyslipidemia (ICD9 272.4)  HTN (Hypertension) (ICD9 401.9)  CAD (Coronary Artery Disease) (ICD9 414.00): c STENTS  History of hysterectomy  History of ovarian cystectomy: x 2 in 2013  S/P primary angioplasty with coronary stent: has 5 stents-first placed in 2007, last stent -2011,  C Section: x 1    FAMILY HISTORY:  Family history of heart disease (Aunt)    SOCIAL HISTORY: No EtOH, no tobacco      Allergies    No Known Allergies    Intolerances    MEDICATIONS  (STANDING):  ALBUTerol/ipratropium for Nebulization 3 milliLiter(s) Nebulizer every 6 hours  amLODIPine   Tablet 10 milliGRAM(s) Oral daily  ceFAZolin   IVPB 2000 milliGRAM(s) IV Intermittent every 8 hours  dextrose 5%. 1000 milliLiter(s) (50 mL/Hr) IV Continuous <Continuous>  dextrose 50% Injectable 12.5 Gram(s) IV Push once  dextrose 50% Injectable 25 Gram(s) IV Push once  dextrose 50% Injectable 25 Gram(s) IV Push once  docusate sodium 100 milliGRAM(s) Oral daily  fosphenytoin IVPB 100 milliGRAM(s) PE IV Intermittent every 8 hours  hydrALAZINE 100 milliGRAM(s) Oral every 8 hours  insulin glargine Injectable (LANTUS) 16 Unit(s) SubCutaneous at bedtime  insulin lispro (HumaLOG) corrective regimen sliding scale   SubCutaneous three times a day before meals  insulin lispro (HumaLOG) corrective regimen sliding scale   SubCutaneous at bedtime  insulin lispro Injectable (HumaLOG) 3 Unit(s) SubCutaneous three times a day with meals  labetalol 200 milliGRAM(s) Oral three times a day  levETIRAcetam  IVPB 1000 milliGRAM(s) IV Intermittent <User Schedule>  levothyroxine 50 MICROGram(s) Oral daily  polyethylene glycol 3350 17 Gram(s) Oral two times a day  senna 1 Tablet(s) Oral daily    MEDICATIONS  (PRN):  bisacodyl Suppository 10 milliGRAM(s) Rectal daily PRN Constipation  dextrose 40% Gel 15 Gram(s) Oral once PRN Blood Glucose LESS THAN 70 milliGRAM(s)/deciliter  fentaNYL    Injectable 25 MICROGram(s) IV Push every 10 minutes PRN moderate/severe pain  glucagon  Injectable 1 milliGRAM(s) IntraMuscular once PRN Glucose LESS THAN 70 milligrams/deciliter  LORazepam   Injectable 2 milliGRAM(s) IV Push once PRN Seizure  ondansetron Injectable 4 milliGRAM(s) IV Push once PRN Nausea and/or Vomiting  oxyCODONE    IR 5 milliGRAM(s) Oral every 4 hours PRN Moderate Pain (4 - 6)  oxyCODONE    IR 10 milliGRAM(s) Oral every 4 hours PRN Severe Pain (7 - 10)    REVIEW OF SYSTEMS: [X ] Unable to Assess due to neurologic condition  Neuro: [ ] Headache [ ] Back pain [ ] Numbness [ ] Weakness [ ] Ataxia [ ] Dizziness [ ] Aphasia [ ] Dysarthria [ ] Visual disturbance  Resp: [ ] Shortness of breath/dyspnea, [ ] Orthopnea [ ] Cough  CV: [ ] Chest pain [ ] Palpitation [ ] Lightheadedness [ ] Syncope  Renal: [ ] Thirst [ ] Edema  GI: [ ] Nausea [ ] Emesis [ ] Abdominal pain [ ] Constipation [ ] Diarrhea  Hem: [ ] Hematemesis [ ] bright red blood per rectum  ID: [ ] Fever [ ] Chills [ ] Dysuria  ENT: [ ] Rhinorrhea    ICU Vital Signs Last 24 Hrs  T(C): 36 (01 May 2019 11:51), Max: 36.8 (30 Apr 2019 17:17)  T(F): 96.8 (01 May 2019 11:51), Max: 98.3 (30 Apr 2019 17:17)  HR: 74 (01 May 2019 13:00) (68 - 83)  BP: 175/77 (01 May 2019 11:56) (110/68 - 175/77)  ABP: 186/79 (01 May 2019 13:00) (164/73 - 194/81)  ABP(mean): 119 (01 May 2019 13:00) (116 - 128)  RR: 16 (01 May 2019 13:00) (16 - 18)  SpO2: 99% (01 May 2019 13:00) (96% - 100%)                          8.7    6.38  )-----------( 295      ( 01 May 2019 09:38 )             28.9     05-01    141  |  106  |  10  ----------------------------<  183<H>  4.0   |  25  |  0.71    Ca    9.1      01 May 2019 07:29  Phos  3.7     05-01  Mg     2.0     05-01    TPro  6.6  /  Alb  3.4  /  TBili  0.1<L>  /  DBili  x   /  AST  80<H>  /  ALT  145<H>  /  AlkPhos  337<H>  05-    CAPILLARY BLOOD GLUCOSE      POCT Blood Glucose.: 176 mg/dL (01 May 2019 06:37)  POCT Blood Glucose.: 148 mg/dL (30 Apr 2019 22:22)  POCT Blood Glucose.: 140 mg/dL (30 Apr 2019 21:18)  POCT Blood Glucose.: 204 mg/dL (30 Apr 2019 17:22)      Thyroid Function Tests:  04-22 @ 08:55 TSH -- FreeT4 1.1 T3 -- Anti TPO -- Anti Thyroglobulin Ab -- TSI --  04-22 @ 08:51 TSH 1.45 FreeT4 -- T3 -- Anti TPO -- Anti Thyroglobulin Ab -- TSI --      Hemoglobin A1C, Whole Blood: 9.9 % <H> [4.0 - 5.6] (04-21-19 @ 09:26)      MIGUEL/AMA/anti-smooth musc/Anti-LKM/Igs negative.   viral hepatitis panel: Hep A, Hep B,  CMV, and HSV PCR.   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    acetaminophen/EToh/ASA levels negative.     Herpes CSF PCR and repeat CSF 4/24 both negative-   CSF Cx negative, VDRL negative, PCR negative  Afebrile, normal WBC/diff    MICROBIOLOGY:  RECENT CULTURES:  04-27 @ 13:56 .Urine     >=3 organisms. Probable collection contamination.    .CSF  04-24 @ 17:17   Testing in progress  --    No polymorphonuclear cells seen  No organisms seen  by cytocentrifuge    .CSF  04-19 @ 16:57   No growth  --    No polymorphonuclear cells seen  No organisms seen  by cytocentrifuge     US Abdomen Complete (04.29.19 @ 15:33) >  FINDINGS:    Liver: Within normal limits.    Bile ducts: Normal caliber. Common bile duct measures 3 mm.     Gallbladder: Within normal limits.        Pancreas: Visualized portions are within normal limits.    Spleen: 8 cm. Within normal limits.    Right kidney: 12.5 cm. No hydronephrosis.    Left kidney: 12.0 cm.  No hydronephrosis.    Ascites: None.    Aorta and IVC: Visualized portions are within normal limits.    IMPRESSION:     Normal abdominal ultrasound.     MR Abdomen w/wo IV Cont (04.29.19  LOWER CHEST: Bibasilar subsegmental atelectasis.    LIVER: Within normal limits.   BILE DUCTS: Normal caliber.  GALLBLADDER: Within normal limits.  SPLEEN: Within normal limits.  PANCREAS: Within normal limits.  ADRENALS: Within normal limits.  KIDNEYS/URETERS: Homogeneous enhancement without focal lesion or   hydronephrosis. No abnormality at diffusion-weighted imaging.    VISUALIZED PORTIONS:    BOWEL: Small hiatal hernia.   PERITONEUM: No ascites.  VESSELS: Renal veins and IVC are patent.  RETROPERITONEUM: No lymphadenopathy.    ABDOMINAL WALL: Unchanged nonspecific infiltration of the periumbilical   soft tissues.  BONES: Right iliac bone island.    IMPRESSION:       < end of copied text >      MR Head w/wo IV Cont (04.25.19 @ 10:20) >    IMPRESSION:    No significant interval change from previous brain MRIs.    Similar-appearing nonenhancing hyperintense signal in the left posterior   temporal and occipital lobes which appears both subcortical and cortical   in nature.    Study done for presurgical planning.      CT Abdomen and Pelvis w/ IV Cont (04.25.19 @ 16:16) >  FINDINGS:    CHEST:     LUNGS AND LARGE AIRWAYS: Patent central airways. Bilateral patchy   nonspecific groundglass opacities. Improved aeration in the left lower   lobe. Linear atelectasis in the right lower lobe.  PLEURA: Trace bilateral pleural effusions.  VESSELS: Left PICC line, with its tip at the cavoatrial junction.   Atherosclerotic calcification of the aorta and coronary arteries.  HEART: Heart size is mildly enlarged. No pericardial effusion.  MEDIASTINUM AND CANDICE: No lymphadenopathy.  CHEST WALL AND LOWER NECK: Within normal limits.    ABDOMEN AND PELVIS:    LIVER: Within normal limits.  BILE DUCTS: Normal caliber.  GALLBLADDER: Within normal limits.  SPLEEN: Within normal limits.  PANCREAS: Within normal limits.  ADRENALS: Within normal limits.  KIDNEYS/URETERS: No hydronephrosis. Heterogeneous enhancement of the   kidneys bilaterally, with patchy areas of decreased attenuation.    BLADDER: Within normal limits.  REPRODUCTIVE ORGANS:Status post hysterectomy.    BOWEL: No bowel obstruction. Appendix is within normal limits. Small   hiatal hernia.  PERITONEUM: No ascites.  VESSELS:  Atherosclerotic calcification of the aorta and bilateral renal   arteries. Again noted, is infiltration of the subcutaneous tissues in the   left groin, likely related to intervention, slightly decreased since   4/18/2019.  RETROPERITONEUM: No lymphadenopathy.    ABDOMINAL WALL: Again noted, is infiltration of the periumbilical soft   tissues.  BONES: Degenerative changes in the spine. Sclerotic focus in the right   iliac bone, unchanged compared to prior studies dating back to 9/1/2018.    IMPRESSION: Heterogeneous enhancement of the kidneys bilaterally, with   striated nephrograms bilaterally. Differential diagnosis includes   infection such as pyelonephritis, as well as ischemia and infiltrative   processes such is lymphoma and leukemia.  Correlation with urinalysis is   recommended.    Infiltration of the periumbilical subcutaneous tissues again noted.      GENERAL: NAD, well-developed  HEAD:  Atraumatic, Normocephalic  EYES: EOMI, PERRLA, conjunctiva and sclera clear  NECK: Supple, No JVD  CHEST/LUNG: Clear to auscultation bilaterally; No wheeze  HEART: Regular rate and rhythm; No murmurs, rubs, or gallops  ABDOMEN: Soft, Nontender, Nondistended; Bowel sounds present  EXTREMITIES:  2+ Peripheral Pulses, No clubbing, cyanosis, or edema  NEUROLOGY: non-focal  SKIN: No rashes or lesions

## 2019-05-01 NOTE — PROGRESS NOTE ADULT - ASSESSMENT
69F w/ CAD, DM, HTN, HLD, hypothyroid, on eliquis, ASA, s/p umbilical hernia repair presented as a level 1 trauma activation. Patient brought in by ambulance after being found down by family. Course complicated by acute respiratory failure s/p intubation , seizures, found to have brain mass on MRI. Hematology consulted for workup of possible lymphoma.    Seizures- found to have on MRI brain: " Left posterior lateral temporal and parietal occipital hyperintense T2   and FLAIR signal . There  is a 4 x 6 mm  enhancement  in the left posterior lateral lateral  temporal lobe with questionable leptomeningeal enhancement and slight  susceptibility. Differential  considerations include atypical cavernoma  or  posterior reversible encephalopathy, mass such as low-grade neoplasm,  infectious, and or inflammatory change are not excluded."  s/p LP, flow cytometry negative  CT chest abdomen pelvis showing: "  Heterogeneous enhancement of the  kidneys bilaterally, with patchy areas of decreased attenuation." --> consider MRI to further evaluate  check LDH, uric acid  supposed to go for biopsy by neurosurg next week, ideally try to hold off steroids but if clinically needed--> appreciate neurology and neurosurg recs with regards to steroids  consider neuro onc evaluation  no lymphadenopathy on CT chest /abdomen and pelvis.    Anemia- hgb 9.5, MCV 78  check iron studies, b12, folic acid, LDH, reticulocyte count, haptoglobin 69F w/ CAD, DM, HTN, HLD, hypothyroid, on eliquis, ASA, s/p umbilical hernia repair presented as a level 1 trauma activation. Patient brought in by ambulance after being found down by family. Course complicated by acute respiratory failure s/p intubation , seizures noted on imaging is left posterior lateral temporal and parietal occipital hyperintense T2 and FLAIR signal . There  is a 4 x 6 mm  enhancement  in the left posterior lateral lateral  temporal lobe with questionable leptomeningeal enhancement and slight  susceptibility, found to have brain mass on MRI. S/P empiric tx for herpes encephalitis;   POD #0 S/P brain Bx     Neuro  Neuro checks q 1hr  CT in am  Off all ABX  Resume seizure meds    Resp  Probable NACHO  Maintain O2 sats > 92 % - suppl O2 at night or CPAP if tolerate    CV- afib, HTN- off NOAC; CAD - hx of stents  Maintain -150  Discuss with NSG when to resume NOAC    ID- AFEBRILE ; s/p ACYCLOVIR FOR POSSIBLE HERPES ENCEPHALITIS  CURRENTLY OFF ALL abx    End- Hypothyroidism and DM  Resume Levothyroxine  Lantus 16 U qhs and 3 U ac    GI- start CCD diet once taking po   Stool softeners  No PPI needed    Renal-  Monitor Cr  IVL once taking adequate PO     Heme/ONC  Preliminary w/u for possible lymphoma neg  Anemia- check Fe, Ferritin and B12 and folate     Case reviewed extensively, History, meds, vitals , labs and imaging reviewed. Assessment and plan discussed with housestaff.    Time spent 45 min 69F w/ CAD, DM, HTN, HLD, hypothyroid, on eliquis, ASA, s/p umbilical hernia repair presented as a level 1 trauma activation. Patient brought in by ambulance after being found down by family. Course complicated by acute respiratory failure s/p intubation , seizures noted on imaging is left posterior lateral temporal and parietal occipital hyperintense T2 and FLAIR signal . There  is a 4 x 6 mm  enhancement  in the left posterior lateral lateral  temporal lobe with questionable leptomeningeal enhancement and slight  susceptibility, found to have brain mass on MRI. S/P empiric tx for herpes encephalitis;   POD #0 S/P brain Bx     Neuro  Neuro checks q 1hr  CT in am  Off all ABX  Resume seizure meds    Resp  Probable NACHO  Maintain O2 sats > 92 % - suppl O2 at night or CPAP if tolerate    CV- afib, HTN- off NOAC; CAD - hx of stents  Maintain -150  Discuss with NSG when to resume NOAC    ID- AFEBRILE ; s/p ACYCLOVIR FOR POSSIBLE HERPES ENCEPHALITIS  CURRENTLY OFF ALL abx    End- Hypothyroidism and DM  Resume Levothyroxine  Lantus 16 U qhs and 3 U ac    GI- start CCD diet once taking po   Stool softeners  No PPI needed    Renal-  Monitor Cr  IVL once taking adequate PO     Heme/ONC  Preliminary w/u for possible lymphoma neg  Anemia- check Fe, Ferritin and B12 and folate  Ca19-9, Ca 125; Ca 15-3 - not suggestive of tumor at specific visceral site   Fe studies - nl  B12/folate- nl    DVT prophylaxis fresh post op -hold anticoag with risk of bleeding in operative bed therefore hold prophylaxis till CT in am.     Case reviewed extensively, History, meds, vitals , labs and imaging reviewed. Assessment and plan discussed with housestaff.    Time spent 45 min 69F w/ CAD, DM, HTN, HLD, hypothyroid, on eliquis, ASA, s/p umbilical hernia repair presented as a level 1 trauma activation. Patient brought in by ambulance after being found down by family. Course complicated by acute respiratory failure s/p intubation , seizures noted on imaging is left posterior lateral temporal and parietal occipital hyperintense T2 and FLAIR signal . There  is a 4 x 6 mm  enhancement  in the left posterior lateral lateral  temporal lobe with questionable leptomeningeal enhancement and slight  susceptibility, found to have brain mass on MRI. S/P empiric tx for herpes encephalitis;   POD #0 S/P brain Bx     Neuro  Neuro checks q 1hr  CT in am  Off all ABX  Resume seizure meds    Resp  Probable NACHO  Maintain O2 sats > 92 % - suppl O2 at night or CPAP if tolerate    CV- afib, HTN- off NOAC/ASA ; CAD - hx of stents  Maintain -150  amlodipine, labetolol , hydralazine  PRN IV Labetolol and hydralazine   Discuss with NSG when to resume ASA  NOAC    ID- AFEBRILE ; s/p ACYCLOVIR FOR POSSIBLE HERPES ENCEPHALITIS  CURRENTLY OFF ALL abx    End- Hypothyroidism and DM  Resume Levothyroxine  Lantus 16 U qhs and 3 U ac    GI- start CCD diet once taking po   Stool softeners  No PPI needed    Renal-  Monitor Cr  IVL once taking adequate PO     Heme/ONC  Preliminary w/u for possible lymphoma neg  Anemia- check Fe, Ferritin and B12 and folate  Ca19-9, Ca 125; Ca 15-3 - not suggestive of tumor at specific visceral site   Fe studies - nl  B12/folate- nl    DVT prophylaxis fresh post op -hold anticoag with risk of bleeding in operative bed therefore hold prophylaxis till CT in am.     Case reviewed extensively, History, meds, vitals , labs and imaging reviewed. Assessment and plan discussed with housestaff.    Time spent 45 min

## 2019-05-01 NOTE — PROGRESS NOTE ADULT - SUBJECTIVE AND OBJECTIVE BOX
Chief Complaint:  Patient is a 69y old  Female who presents with a chief complaint of brain lesion (30 Apr 2019 16:07)      Interval Events:  Pending brain bx today.    Allergies:  No Known Allergies      Hospital Medications:  acetaminophen    Suspension .. 975 milliGRAM(s) Oral every 6 hours PRN  ALBUTerol/ipratropium for Nebulization 3 milliLiter(s) Nebulizer every 6 hours  amLODIPine   Tablet 10 milliGRAM(s) Oral daily  bisacodyl Suppository 10 milliGRAM(s) Rectal daily PRN  dextrose 40% Gel 15 Gram(s) Oral once PRN  dextrose 5%. 1000 milliLiter(s) IV Continuous <Continuous>  dextrose 50% Injectable 12.5 Gram(s) IV Push once  dextrose 50% Injectable 25 Gram(s) IV Push once  dextrose 50% Injectable 25 Gram(s) IV Push once  docusate sodium 100 milliGRAM(s) Oral daily  fosphenytoin IVPB 100 milliGRAM(s) PE IV Intermittent every 8 hours  glucagon  Injectable 1 milliGRAM(s) IntraMuscular once PRN  hydrALAZINE 100 milliGRAM(s) Oral every 8 hours  insulin glargine Injectable (LANTUS) 16 Unit(s) SubCutaneous at bedtime  insulin lispro (HumaLOG) corrective regimen sliding scale   SubCutaneous three times a day before meals  insulin lispro (HumaLOG) corrective regimen sliding scale   SubCutaneous at bedtime  insulin lispro Injectable (HumaLOG) 3 Unit(s) SubCutaneous three times a day with meals  ketorolac   Injectable 15 milliGRAM(s) IV Push once  labetalol 200 milliGRAM(s) Oral three times a day  levETIRAcetam  IVPB 1000 milliGRAM(s) IV Intermittent <User Schedule>  levothyroxine 50 MICROGram(s) Oral daily  LORazepam   Injectable 2 milliGRAM(s) IV Push once PRN  perampanel 4 milliGRAM(s) Oral at bedtime  polyethylene glycol 3350 17 Gram(s) Oral two times a day  senna 1 Tablet(s) Oral daily        PMHX/PSHX:  Hypothyroid  Obesity  Morbid obesity  Morbidly obese  NACHO (obstructive sleep apnea)  DM (Diabetes Mellitus) (ICD9 250.00)  Dyslipidemia (ICD9 272.4)  HTN (Hypertension) (ICD9 401.9)  CAD (Coronary Artery Disease) (ICD9 414.00)  History of hysterectomy  History of ovarian cystectomy  S/P primary angioplasty with coronary stent  C Section  S/P Appendectomy  S/P Coronary Angioplasty      Family history:  Family history of heart disease (Aunt)  No pertinent family history in first degree relatives      ROS:     General:  No wt loss, fevers, chills, night sweats, fatigue,   Eyes:  Good vision, no reported pain  ENT:  No sore throat, pain, runny nose, dysphagia  CV:  No pain, palpitations, hypo/hypertension  Resp:  No dyspnea, cough, tachypnea, wheezing  GI:  See HPI  :  No pain, bleeding, incontinence, nocturia  Muscle:  No pain, weakness  Neuro:  No weakness, tingling, memory problems  Psych:  No fatigue, insomnia, mood problems, depression  Endocrine:  No polyuria, polydipsia, cold/heat intolerance  Heme:  No petechiae, ecchymosis, easy bruisability  Skin:  No rash, edema      PHYSICAL EXAM:     GENERAL:  Appears stated age, well-groomed, well-nourished, no distress  HEENT:  NC/AT,  conjunctivae clear, sclera -anicteric  CHEST:  Full & symmetric excursion, no increased effort, breath sounds clear  HEART:  Regular rhythm, S1, S2, no murmur/rub/S3/S4,  no edema  ABDOMEN:  Soft, non-tender, non-distended, normoactive bowel sounds,  no masses ,no hepato-splenomegaly,   EXTREMITIES:  no cyanosis,clubbing or edema  SKIN:  No rash/erythema/ecchymoses/petechiae/wounds/abscess/warm/dry  NEURO:  Alert, oriented    Vital Signs:  Vital Signs Last 24 Hrs  T(C): 36.7 (01 May 2019 06:54), Max: 36.8 (30 Apr 2019 17:17)  T(F): 98 (01 May 2019 04:57), Max: 98.3 (30 Apr 2019 17:17)  HR: 78 (01 May 2019 06:54) (72 - 81)  BP: 144/82 (01 May 2019 06:54) (110/68 - 168/90)  BP(mean): --  RR: 18 (01 May 2019 06:54) (18 - 18)  SpO2: 96% (01 May 2019 06:54) (95% - 98%)  Daily Height in cm: 160 (01 May 2019 06:54)    Daily     LABS:                        10.9   5.8   )-----------( 138      ( 30 Apr 2019 19:09 )             32.3     05-01    141  |  106  |  10  ----------------------------<  183<H>  4.0   |  25  |  0.71    Ca    9.1      01 May 2019 07:29  Phos  3.7     05-01  Mg     2.0     05-01    TPro  6.6  /  Alb  3.4  /  TBili  0.1<L>  /  DBili  x   /  AST  80<H>  /  ALT  145<H>  /  AlkPhos  337<H>  05-01    LIVER FUNCTIONS - ( 01 May 2019 07:29 )  Alb: 3.4 g/dL / Pro: 6.6 g/dL / ALK PHOS: 337 U/L / ALT: 145 U/L / AST: 80 U/L / GGT: x           PT/INR - ( 30 Apr 2019 06:40 )   PT: 10.9 sec;   INR: 0.95 ratio                 Imaging: Chief Complaint:  Patient is a 69y old  Female who presents with a chief complaint of brain lesion (30 Apr 2019 16:07)      Interval Events:  Pending brain bx today. MRI a/p + RU Q U/S w/ normal appearing liver.    Allergies:  No Known Allergies      Hospital Medications:  acetaminophen    Suspension .. 975 milliGRAM(s) Oral every 6 hours PRN  ALBUTerol/ipratropium for Nebulization 3 milliLiter(s) Nebulizer every 6 hours  amLODIPine   Tablet 10 milliGRAM(s) Oral daily  bisacodyl Suppository 10 milliGRAM(s) Rectal daily PRN  dextrose 40% Gel 15 Gram(s) Oral once PRN  dextrose 5%. 1000 milliLiter(s) IV Continuous <Continuous>  dextrose 50% Injectable 12.5 Gram(s) IV Push once  dextrose 50% Injectable 25 Gram(s) IV Push once  dextrose 50% Injectable 25 Gram(s) IV Push once  docusate sodium 100 milliGRAM(s) Oral daily  fosphenytoin IVPB 100 milliGRAM(s) PE IV Intermittent every 8 hours  glucagon  Injectable 1 milliGRAM(s) IntraMuscular once PRN  hydrALAZINE 100 milliGRAM(s) Oral every 8 hours  insulin glargine Injectable (LANTUS) 16 Unit(s) SubCutaneous at bedtime  insulin lispro (HumaLOG) corrective regimen sliding scale   SubCutaneous three times a day before meals  insulin lispro (HumaLOG) corrective regimen sliding scale   SubCutaneous at bedtime  insulin lispro Injectable (HumaLOG) 3 Unit(s) SubCutaneous three times a day with meals  ketorolac   Injectable 15 milliGRAM(s) IV Push once  labetalol 200 milliGRAM(s) Oral three times a day  levETIRAcetam  IVPB 1000 milliGRAM(s) IV Intermittent <User Schedule>  levothyroxine 50 MICROGram(s) Oral daily  LORazepam   Injectable 2 milliGRAM(s) IV Push once PRN  perampanel 4 milliGRAM(s) Oral at bedtime  polyethylene glycol 3350 17 Gram(s) Oral two times a day  senna 1 Tablet(s) Oral daily        PMHX/PSHX:  Hypothyroid  Obesity  Morbid obesity  Morbidly obese  NACHO (obstructive sleep apnea)  DM (Diabetes Mellitus) (ICD9 250.00)  Dyslipidemia (ICD9 272.4)  HTN (Hypertension) (ICD9 401.9)  CAD (Coronary Artery Disease) (ICD9 414.00)  History of hysterectomy  History of ovarian cystectomy  S/P primary angioplasty with coronary stent  C Section  S/P Appendectomy  S/P Coronary Angioplasty      Family history:  Family history of heart disease (Aunt)  No pertinent family history in first degree relatives      ROS:         PHYSICAL EXAM:       Vital Signs:  Vital Signs Last 24 Hrs  T(C): 36.7 (01 May 2019 06:54), Max: 36.8 (30 Apr 2019 17:17)  T(F): 98 (01 May 2019 04:57), Max: 98.3 (30 Apr 2019 17:17)  HR: 78 (01 May 2019 06:54) (72 - 81)  BP: 144/82 (01 May 2019 06:54) (110/68 - 168/90)  BP(mean): --  RR: 18 (01 May 2019 06:54) (18 - 18)  SpO2: 96% (01 May 2019 06:54) (95% - 98%)  Daily Height in cm: 160 (01 May 2019 06:54)    Daily     LABS:                        10.9   5.8   )-----------( 138      ( 30 Apr 2019 19:09 )             32.3     05-01    141  |  106  |  10  ----------------------------<  183<H>  4.0   |  25  |  0.71    Ca    9.1      01 May 2019 07:29  Phos  3.7     05-01  Mg     2.0     05-01    TPro  6.6  /  Alb  3.4  /  TBili  0.1<L>  /  DBili  x   /  AST  80<H>  /  ALT  145<H>  /  AlkPhos  337<H>  05-01    LIVER FUNCTIONS - ( 01 May 2019 07:29 )  Alb: 3.4 g/dL / Pro: 6.6 g/dL / ALK PHOS: 337 U/L / ALT: 145 U/L / AST: 80 U/L / GGT: x           PT/INR - ( 30 Apr 2019 06:40 )   PT: 10.9 sec;   INR: 0.95 ratio         Imaging:    < from: US Abdomen Complete (04.29.19 @ 15:33) >  FINDINGS:    Liver: Within normal limits.    Bile ducts: Normal caliber. Common bile duct measures 3 mm.     Gallbladder: Within normal limits.        Pancreas: Visualized portions are within normal limits.    Spleen: 8 cm. Within normal limits.    Right kidney: 12.5 cm. No hydronephrosis.    Left kidney: 12.0 cm.  No hydronephrosis.    Ascites: None.    Aorta and IVC: Visualized portions are within normal limits.    IMPRESSION:     Normal abdominal ultrasound.        < from: MR Abdomen w/wo IV Cont (04.29.19 @ 17:06) >  LIVER: Within normal limits.   BILE DUCTS: Normal caliber.  GALLBLADDER: Within normal limits.  SPLEEN: Within normal limits.  PANCREAS: Within normal limits.  ADRENALS: Within normal limits.  KIDNEYS/URETERS: Homogeneous enhancement without focal lesion or   hydronephrosis. No abnormality at diffusion-weighted imaging.    VISUALIZED PORTIONS:    BOWEL: Small hiatal hernia.   PERITONEUM: No ascites.  VESSELS: Renal veins and IVC are patent.  RETROPERITONEUM: No lymphadenopathy.    ABDOMINAL WALL: Unchanged nonspecific infiltration of the periumbilical   soft tissues.  BONES: Right iliac bone island.    IMPRESSION:     Previously noted heterogeneous renal enhancement has resolved. No urinary   tract abnormality.    < end of copied text >  < end of copied text >

## 2019-05-01 NOTE — PROGRESS NOTE ADULT - PROBLEM SELECTOR PLAN 1
-test BG AC/HS  -c/w Lantus 16 units QHS  -Increase Humalog 4 units AC meals  -c/w Humalog moderate correction scale AC and Mod HS scale  -Discharge recs will depend on PO intake/insulin needs and glycemic control in hospital  -discussed w/PACU RN  will monitor  pager: [phone]

## 2019-05-01 NOTE — PROVIDER CONTACT NOTE (OTHER) - RECOMMENDATIONS
BRIJESH Greene will sign off to team that patient has been transferred to SICU as per BRIJESH Osman.

## 2019-05-01 NOTE — PRE-ANESTHESIA EVALUATION ADULT - NSANTHOSAYNRD_GEN_A_CORE
No. NACHO screening performed.  STOP BANG Legend: 0-2 = LOW Risk; 3-4 = INTERMEDIATE Risk; 5-8 = HIGH Risk

## 2019-05-01 NOTE — PROGRESS NOTE ADULT - SUBJECTIVE AND OBJECTIVE BOX
Patient seen and examined at bedside.    T(C): 36.7 (05-01-19 @ 04:57), Max: 36.8 (04-30-19 @ 17:17)  HR: 81 (05-01-19 @ 04:57) (72 - 81)  BP: 110/68 (05-01-19 @ 04:57) (110/68 - 168/90)  RR: 18 (05-01-19 @ 04:57) (18 - 18)  SpO2: 96% (05-01-19 @ 04:57) (95% - 98%)  Wt(kg): --    Exam:      AOx2, FC, PERRL, EOMI, V1-3 intact, no facial, tongue midline, shrug 5/5  5/5 throughout, no drift  SILT  No clonus or babinski

## 2019-05-01 NOTE — PRE-ANESTHESIA EVALUATION ADULT - NSANTHPMHFT_GEN_ALL_CORE
Adm for fall after seizure. Diagnosis of NACHO has not yet been made - pt is currently being worked up by pulmonologists. Adm for fall after seizure. Pt is currently being worked up by pulmonology for a likely diagnosis of NACHO.    Pt has poor understanding of medical history. Pt is AAO x 3, but some poor insight into the reason she is currently hospitalized and the purpose of the surgery. When reminded that she fell down due to a seizure, she states she "ok." Denies history of COPD or CAD - states she has had a "heart problem" for the past 2 years but is unable to further elucidate on the topic. Reports being able to walk up a flight of stairs or a few blocks.

## 2019-05-01 NOTE — PROGRESS NOTE ADULT - ASSESSMENT
seizures, brain mass  - CT Head in AM  - f/u pathology  - continue antiepileptics  - pain control  - -150mmHg    30 minutes critical care time

## 2019-05-01 NOTE — PROGRESS NOTE ADULT - SUBJECTIVE AND OBJECTIVE BOX
Patient is a 69y old  Female who presents with a chief complaint of brain lesion (30 Apr 2019 16:07)      SUBJECTIVE / OVERNIGHT EVENTS: s/p Brain Bx. In PACU. c/o Headache.  Review of Systems  chest pain no  palpitations no  sob no    MEDICATIONS  (STANDING):  ALBUTerol/ipratropium for Nebulization 3 milliLiter(s) Nebulizer every 6 hours  amLODIPine   Tablet 10 milliGRAM(s) Oral daily  ceFAZolin   IVPB 2000 milliGRAM(s) IV Intermittent every 8 hours  dextrose 5%. 1000 milliLiter(s) (50 mL/Hr) IV Continuous <Continuous>  dextrose 50% Injectable 12.5 Gram(s) IV Push once  dextrose 50% Injectable 25 Gram(s) IV Push once  dextrose 50% Injectable 25 Gram(s) IV Push once  docusate sodium 100 milliGRAM(s) Oral daily  fosphenytoin IVPB 100 milliGRAM(s) PE IV Intermittent every 8 hours  hydrALAZINE 100 milliGRAM(s) Oral every 8 hours  insulin glargine Injectable (LANTUS) 16 Unit(s) SubCutaneous at bedtime  insulin lispro (HumaLOG) corrective regimen sliding scale   SubCutaneous three times a day before meals  insulin lispro (HumaLOG) corrective regimen sliding scale   SubCutaneous at bedtime  insulin lispro Injectable (HumaLOG) 3 Unit(s) SubCutaneous three times a day with meals  labetalol 200 milliGRAM(s) Oral three times a day  levETIRAcetam  IVPB 1000 milliGRAM(s) IV Intermittent <User Schedule>  levothyroxine 50 MICROGram(s) Oral daily  polyethylene glycol 3350 17 Gram(s) Oral two times a day  senna 1 Tablet(s) Oral daily    MEDICATIONS  (PRN):  bisacodyl Suppository 10 milliGRAM(s) Rectal daily PRN Constipation  dextrose 40% Gel 15 Gram(s) Oral once PRN Blood Glucose LESS THAN 70 milliGRAM(s)/deciliter  fentaNYL    Injectable 25 MICROGram(s) IV Push every 10 minutes PRN moderate/severe pain  glucagon  Injectable 1 milliGRAM(s) IntraMuscular once PRN Glucose LESS THAN 70 milligrams/deciliter  LORazepam   Injectable 2 milliGRAM(s) IV Push once PRN Seizure  ondansetron Injectable 4 milliGRAM(s) IV Push once PRN Nausea and/or Vomiting  oxyCODONE    IR 5 milliGRAM(s) Oral every 4 hours PRN Moderate Pain (4 - 6)  oxyCODONE    IR 10 milliGRAM(s) Oral every 4 hours PRN Severe Pain (7 - 10)      Vital Signs Last 24 Hrs  T(C): 36.7 (01 May 2019 06:54), Max: 36.8 (30 Apr 2019 17:17)  T(F): 98 (01 May 2019 04:57), Max: 98.3 (30 Apr 2019 17:17)  HR: 78 (01 May 2019 06:54) (72 - 81)  BP: 144/82 (01 May 2019 06:54) (110/68 - 168/90)  BP(mean): --  RR: 18 (01 May 2019 06:54) (18 - 18)  SpO2: 96% (01 May 2019 06:54) (95% - 98%)    PHYSICAL EXAM:  GENERAL: NAD, well-developed  HEAD:  Normocephalic  EYES: EOMI, PERRLA, conjunctiva and sclera clear  NECK: Supple, No JVD  CHEST/LUNG: Clear to auscultation bilaterally; No wheeze  HEART: Regular rate and rhythm; No murmurs, rubs, or gallops  ABDOMEN: Soft, Nontender, Nondistended; Bowel sounds present  EXTREMITIES:  2+ Peripheral Pulses, No clubbing, cyanosis, or edema  PSYCH: AAOx3  NEUROLOGY: non-focal  SKIN: No rashes or lesions    LABS:                        8.7    6.38  )-----------( 295      ( 01 May 2019 09:38 )             28.9     05-01    141  |  106  |  10  ----------------------------<  183<H>  4.0   |  25  |  0.71    Ca    9.1      01 May 2019 07:29  Phos  3.7     05-01  Mg     2.0     05-01    TPro  6.6  /  Alb  3.4  /  TBili  0.1<L>  /  DBili  x   /  AST  80<H>  /  ALT  145<H>  /  AlkPhos  337<H>  05-01    PT/INR - ( 01 May 2019 09:29 )   PT: 10.8 sec;   INR: 0.94 ratio         PTT - ( 01 May 2019 09:29 )  PTT:26.3 sec            RADIOLOGY & ADDITIONAL TESTS:    Imaging Personally Reviewed:    Consultant(s) Notes Reviewed:      Care Discussed with Consultants/Other Providers:

## 2019-05-01 NOTE — PROGRESS NOTE ADULT - ASSESSMENT
Impression:  1) Abnormal LFTs - DDx most likely medication induced dili as patient has been on many hepatotoix agents such as fosphenytoin. AST/ALT appear to have peaked on 4/27 and now continues to downtrend. Alk phos beginning to downtrend. DDx also includes viral induced given known HSV vs much less likely autoimmune.      recommendations:    - if possible, would avoid fosphenytoin + phenytoin after brain bx. if no other agents available for refractory seizures, will need regular outpt f/up for LFT monitoring.   - MRI + RUQ U/S w/ nl liver negative for REYNA/cirrhosis/ascites.    - auto immune work up negative. MIGUEL/AMA/anti-smooth musc/Anti-LKM/Igs negative.    - please send viral hepatitis panel: Hep A, Hep B,  CMV, and HSV PCR. 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    - acetaminophen/EToh/ASA levels negative.   - please send daily LFT's and INR   - avoid hepatotoxic agents if possible    Almaz Lepe MD PGY-1  [phone]

## 2019-05-01 NOTE — PROGRESS NOTE ADULT - SUBJECTIVE AND OBJECTIVE BOX
Neurology Follow up note    Subjective: Interval History -  Patient was not examined as went for biopsy.     Objective:   Vital Signs Last 24 Hrs  T(C): 36 (01 May 2019 11:51), Max: 36.8 (30 Apr 2019 17:17)  T(F): 96.8 (01 May 2019 11:51), Max: 98.3 (30 Apr 2019 17:17)  HR: 70 (01 May 2019 13:30) (68 - 83)  BP: 175/77 (01 May 2019 11:56) (110/68 - 175/77)  BP(mean): --  RR: 14 (01 May 2019 13:30) (14 - 18)  SpO2: 97% (01 May 2019 13:30) (96% - 100%)    Neurological Exam:    not examined as patient went for biopsy.     Other:    04-30    142  |  105  |  7   ----------------------------<  139<H>  4.1   |  24  |  0.76    Ca    9.3      30 Apr 2019 06:40    TPro  6.6  /  Alb  3.4  /  TBili  0.1<L>  /  DBili  <0.1  /  AST  149<H>  /  ALT  204<H>  /  AlkPhos  345<H>  04-30 04-30    142  |  105  |  7   ----------------------------<  139<H>  4.1   |  24  |  0.76    Ca    9.3      30 Apr 2019 06:40    TPro  6.6  /  Alb  3.4  /  TBili  0.1<L>  /  DBili  <0.1  /  AST  149<H>  /  ALT  204<H>  /  AlkPhos  345<H>  04-30    LIVER FUNCTIONS - ( 30 Apr 2019 06:40 )  Alb: 3.4 g/dL / Pro: 6.6 g/dL / ALK PHOS: 345 U/L / ALT: 204 U/L / AST: 149 U/L / GGT: x                                 9.2    5.28  )-----------( 203      ( 30 Apr 2019 07:55 )             30.3     Radiology    EKG:  tele:  TTE:  EEG:      MEDICATIONS  (STANDING):  ALBUTerol/ipratropium for Nebulization 3 milliLiter(s) Nebulizer every 6 hours  amLODIPine   Tablet 10 milliGRAM(s) Oral daily  dextrose 5%. 1000 milliLiter(s) (50 mL/Hr) IV Continuous <Continuous>  dextrose 50% Injectable 12.5 Gram(s) IV Push once  dextrose 50% Injectable 25 Gram(s) IV Push once  dextrose 50% Injectable 25 Gram(s) IV Push once  docusate sodium 100 milliGRAM(s) Oral daily  fosphenytoin IVPB 100 milliGRAM(s) PE IV Intermittent every 8 hours  hydrALAZINE 100 milliGRAM(s) Oral every 8 hours  insulin glargine Injectable (LANTUS) 16 Unit(s) SubCutaneous at bedtime  insulin lispro (HumaLOG) corrective regimen sliding scale   SubCutaneous three times a day before meals  insulin lispro (HumaLOG) corrective regimen sliding scale   SubCutaneous at bedtime  insulin lispro Injectable (HumaLOG) 3 Unit(s) SubCutaneous three times a day with meals  ketorolac   Injectable 15 milliGRAM(s) IV Push once  labetalol 200 milliGRAM(s) Oral three times a day  levETIRAcetam  IVPB 1000 milliGRAM(s) IV Intermittent <User Schedule>  levothyroxine 50 MICROGram(s) Oral daily  perampanel 4 milliGRAM(s) Oral at bedtime  polyethylene glycol 3350 17 Gram(s) Oral two times a day  senna 1 Tablet(s) Oral daily    MEDICATIONS  (PRN):  acetaminophen    Suspension .. 975 milliGRAM(s) Oral every 6 hours PRN Temp greater or equal to 38.5C (101.3F), Mild Pain (1 - 3)  bisacodyl Suppository 10 milliGRAM(s) Rectal daily PRN Constipation  dextrose 40% Gel 15 Gram(s) Oral once PRN Blood Glucose LESS THAN 70 milliGRAM(s)/deciliter  glucagon  Injectable 1 milliGRAM(s) IntraMuscular once PRN Glucose LESS THAN 70 milligrams/deciliter  LORazepam   Injectable 2 milliGRAM(s) IV Push once PRN Seizure

## 2019-05-01 NOTE — PROGRESS NOTE ADULT - ASSESSMENT
69 f s/p brain mass biopsy  Off antibiotics.  Hold ASA, Eliquis - restart when cleared by NSx.  A Fib  Hypothyroid  - follow TSH.  HTN control  COPD  - continue nebs  Diabetes control.  Seizure control  Abnormal LFT  - MRI abdomen pending   - Hepatology follow  - follow LFT  Path pending.  Tyrone Antony MD pager [phone]

## 2019-05-01 NOTE — PROGRESS NOTE ADULT - ASSESSMENT
70 y/o F w/h/o uncontrolled T2DM on oral DM meds. Also HTN/HLD/CAD/Hypothyroidism/recent umbilical hernia repair 2 weeks ago. Here s/p fall with LOC> seizures> hypernatremia. S/p brain biopsy today. BG values trending higher yesterday w/improvement of PO intake. NPO today but diet to be advanced. BG goal (100-180mg/dl).

## 2019-05-01 NOTE — PROGRESS NOTE ADULT - ASSESSMENT
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 aspiration risk

## 2019-05-01 NOTE — PROGRESS NOTE ADULT - SUBJECTIVE AND OBJECTIVE BOX
CC: f/u for left sided brain mass    Patient reports: she is s/p brain biopsy, c/o headache    REVIEW OF SYSTEMS:  All other review of systems negative (Comprehensive ROS)    Antimicrobials Day #  :periop  ceFAZolin   IVPB 2000 milliGRAM(s) IV Intermittent every 8 hours    Other Medications Reviewed    T(F): 96.8 (05-01-19 @ 11:51), Max: 98.3 (04-30-19 @ 17:17)  HR: 73 (05-01-19 @ 14:30)  BP: 175/77 (05-01-19 @ 11:56)  RR: 16 (05-01-19 @ 14:30)  SpO2: 100% (05-01-19 @ 14:30)  Wt(kg): --    PHYSICAL EXAM:  General: alert, no acute distress, biopsy site dry  Eyes:  anicteric, no conjunctival injection, no discharge  Oropharynx: no lesions or injection 	  Neck: supple, without adenopathy  Lungs: clear to auscultation  Heart: regular rate and rhythm; no murmur, rubs or gallops  Abdomen: soft, nondistended, nontender, without mass or organomegaly  Skin: no lesions  Extremities: no clubbing, cyanosis, or edema  Neurologic: alert, oriented, moves all extremities    LAB RESULTS:                        10.0   5.6   )-----------( 253      ( 01 May 2019 13:00 )             30.0     05-01    140  |  105  |  9   ----------------------------<  225<H>  4.1   |  24  |  0.59    Ca    9.0      01 May 2019 13:00  Phos  3.7     05-01  Mg     2.0     05-01    TPro  6.6  /  Alb  3.4  /  TBili  0.1<L>  /  DBili  x   /  AST  80<H>  /  ALT  145<H>  /  AlkPhos  337<H>  05-01    LIVER FUNCTIONS - ( 01 May 2019 07:29 )  Alb: 3.4 g/dL / Pro: 6.6 g/dL / ALK PHOS: 337 U/L / ALT: 145 U/L / AST: 80 U/L / GGT: x             MICROBIOLOGY:  RECENT CULTURES:  04-27 @ 13:56 .Urine     >=3 organisms. Probable collection contamination.          RADIOLOGY REVIEWED:    < from: MR Abdomen w/wo IV Cont (04.29.19 @ 17:06) >    IMPRESSION:     Previously noted heterogeneous renal enhancement has resolved. No urinary   tract abnormality.    < end of copied text >

## 2019-05-01 NOTE — PRE-ANESTHESIA EVALUATION ADULT - NSANTHAIRWAYFT_ENT_ALL_CORE
Large neck. Full ROM of neck. Adequate oral opening, TM distance > 4 cm. Poor forward jaw translation.

## 2019-05-02 LAB
ALBUMIN SERPL ELPH-MCNC: 3.3 G/DL — SIGNIFICANT CHANGE UP (ref 3.3–5)
ALBUMIN SERPL ELPH-MCNC: 3.5 G/DL — SIGNIFICANT CHANGE UP (ref 3.3–5)
ALP SERPL-CCNC: 345 U/L — HIGH (ref 40–120)
ALP SERPL-CCNC: 396 U/L — HIGH (ref 40–120)
ALT FLD-CCNC: 100 U/L — HIGH (ref 10–45)
ALT FLD-CCNC: 104 U/L — HIGH (ref 10–45)
ANION GAP SERPL CALC-SCNC: 12 MMOL/L — SIGNIFICANT CHANGE UP (ref 5–17)
ANION GAP SERPL CALC-SCNC: 13 MMOL/L — SIGNIFICANT CHANGE UP (ref 5–17)
AST SERPL-CCNC: 100 U/L — HIGH (ref 10–40)
AST SERPL-CCNC: 49 U/L — HIGH (ref 10–40)
BILIRUB DIRECT SERPL-MCNC: 0.2 MG/DL — SIGNIFICANT CHANGE UP (ref 0–0.2)
BILIRUB INDIRECT FLD-MCNC: 0.3 MG/DL — SIGNIFICANT CHANGE UP (ref 0.2–1)
BILIRUB SERPL-MCNC: 0.2 MG/DL — SIGNIFICANT CHANGE UP (ref 0.2–1.2)
BILIRUB SERPL-MCNC: 0.5 MG/DL — SIGNIFICANT CHANGE UP (ref 0.2–1.2)
BUN SERPL-MCNC: 5 MG/DL — LOW (ref 7–23)
BUN SERPL-MCNC: <4 MG/DL — LOW (ref 7–23)
CALCIUM SERPL-MCNC: 9 MG/DL — SIGNIFICANT CHANGE UP (ref 8.4–10.5)
CALCIUM SERPL-MCNC: 9 MG/DL — SIGNIFICANT CHANGE UP (ref 8.4–10.5)
CHLORIDE SERPL-SCNC: 102 MMOL/L — SIGNIFICANT CHANGE UP (ref 96–108)
CHLORIDE SERPL-SCNC: 103 MMOL/L — SIGNIFICANT CHANGE UP (ref 96–108)
CO2 SERPL-SCNC: 22 MMOL/L — SIGNIFICANT CHANGE UP (ref 22–31)
CO2 SERPL-SCNC: 23 MMOL/L — SIGNIFICANT CHANGE UP (ref 22–31)
CREAT SERPL-MCNC: 0.49 MG/DL — LOW (ref 0.5–1.3)
CREAT SERPL-MCNC: 0.54 MG/DL — SIGNIFICANT CHANGE UP (ref 0.5–1.3)
GLUCOSE BLDC GLUCOMTR-MCNC: 113 MG/DL — HIGH (ref 70–99)
GLUCOSE BLDC GLUCOMTR-MCNC: 141 MG/DL — HIGH (ref 70–99)
GLUCOSE BLDC GLUCOMTR-MCNC: 149 MG/DL — HIGH (ref 70–99)
GLUCOSE SERPL-MCNC: 135 MG/DL — HIGH (ref 70–99)
GLUCOSE SERPL-MCNC: 161 MG/DL — HIGH (ref 70–99)
MAGNESIUM SERPL-MCNC: 1.8 MG/DL — SIGNIFICANT CHANGE UP (ref 1.6–2.6)
PHENYTOIN FREE SERPL-MCNC: 7.1 UG/ML — LOW (ref 10–20)
PHOSPHATE SERPL-MCNC: 2.7 MG/DL — SIGNIFICANT CHANGE UP (ref 2.5–4.5)
POTASSIUM SERPL-MCNC: 3.4 MMOL/L — LOW (ref 3.5–5.3)
POTASSIUM SERPL-MCNC: 3.7 MMOL/L — SIGNIFICANT CHANGE UP (ref 3.5–5.3)
POTASSIUM SERPL-SCNC: 3.4 MMOL/L — LOW (ref 3.5–5.3)
POTASSIUM SERPL-SCNC: 3.7 MMOL/L — SIGNIFICANT CHANGE UP (ref 3.5–5.3)
PROT SERPL-MCNC: 6.9 G/DL — SIGNIFICANT CHANGE UP (ref 6–8.3)
PROT SERPL-MCNC: 6.9 G/DL — SIGNIFICANT CHANGE UP (ref 6–8.3)
SODIUM SERPL-SCNC: 137 MMOL/L — SIGNIFICANT CHANGE UP (ref 135–145)
SODIUM SERPL-SCNC: 138 MMOL/L — SIGNIFICANT CHANGE UP (ref 135–145)

## 2019-05-02 PROCEDURE — 99291 CRITICAL CARE FIRST HOUR: CPT

## 2019-05-02 PROCEDURE — 99292 CRITICAL CARE ADDL 30 MIN: CPT

## 2019-05-02 PROCEDURE — 70450 CT HEAD/BRAIN W/O DYE: CPT | Mod: 26

## 2019-05-02 PROCEDURE — 99232 SBSQ HOSP IP/OBS MODERATE 35: CPT

## 2019-05-02 PROCEDURE — 93010 ELECTROCARDIOGRAM REPORT: CPT

## 2019-05-02 RX ORDER — MORPHINE SULFATE 50 MG/1
2 CAPSULE, EXTENDED RELEASE ORAL EVERY 4 HOURS
Refills: 0 | Status: DISCONTINUED | OUTPATIENT
Start: 2019-05-02 | End: 2019-05-07

## 2019-05-02 RX ORDER — ACETAMINOPHEN 500 MG
1000 TABLET ORAL ONCE
Refills: 0 | Status: COMPLETED | OUTPATIENT
Start: 2019-05-02 | End: 2019-05-02

## 2019-05-02 RX ORDER — POTASSIUM CHLORIDE 20 MEQ
10 PACKET (EA) ORAL
Refills: 0 | Status: COMPLETED | OUTPATIENT
Start: 2019-05-02 | End: 2019-05-02

## 2019-05-02 RX ORDER — LABETALOL HCL 100 MG
100 TABLET ORAL EVERY 12 HOURS
Refills: 0 | Status: DISCONTINUED | OUTPATIENT
Start: 2019-05-02 | End: 2019-05-02

## 2019-05-02 RX ORDER — HYDRALAZINE HCL 50 MG
5 TABLET ORAL ONCE
Refills: 0 | Status: COMPLETED | OUTPATIENT
Start: 2019-05-02 | End: 2019-05-02

## 2019-05-02 RX ORDER — HALOPERIDOL DECANOATE 100 MG/ML
1 INJECTION INTRAMUSCULAR EVERY 4 HOURS
Refills: 0 | Status: DISCONTINUED | OUTPATIENT
Start: 2019-05-02 | End: 2019-05-07

## 2019-05-02 RX ORDER — ENOXAPARIN SODIUM 100 MG/ML
40 INJECTION SUBCUTANEOUS
Refills: 0 | Status: DISCONTINUED | OUTPATIENT
Start: 2019-05-02 | End: 2019-05-07

## 2019-05-02 RX ORDER — MAGNESIUM SULFATE 500 MG/ML
1 VIAL (ML) INJECTION ONCE
Refills: 0 | Status: COMPLETED | OUTPATIENT
Start: 2019-05-02 | End: 2019-05-02

## 2019-05-02 RX ORDER — HYDROMORPHONE HYDROCHLORIDE 2 MG/ML
1 INJECTION INTRAMUSCULAR; INTRAVENOUS; SUBCUTANEOUS ONCE
Refills: 0 | Status: DISCONTINUED | OUTPATIENT
Start: 2019-05-02 | End: 2019-05-02

## 2019-05-02 RX ORDER — HYDROMORPHONE HYDROCHLORIDE 2 MG/ML
0.5 INJECTION INTRAMUSCULAR; INTRAVENOUS; SUBCUTANEOUS EVERY 4 HOURS
Refills: 0 | Status: DISCONTINUED | OUTPATIENT
Start: 2019-05-02 | End: 2019-05-07

## 2019-05-02 RX ORDER — CHLORHEXIDINE GLUCONATE 213 G/1000ML
1 SOLUTION TOPICAL
Refills: 0 | Status: DISCONTINUED | OUTPATIENT
Start: 2019-05-02 | End: 2019-05-07

## 2019-05-02 RX ORDER — HYDRALAZINE HCL 50 MG
100 TABLET ORAL EVERY 8 HOURS
Refills: 0 | Status: DISCONTINUED | OUTPATIENT
Start: 2019-05-02 | End: 2019-05-07

## 2019-05-02 RX ORDER — POTASSIUM CHLORIDE 20 MEQ
40 PACKET (EA) ORAL ONCE
Refills: 0 | Status: COMPLETED | OUTPATIENT
Start: 2019-05-02 | End: 2019-05-03

## 2019-05-02 RX ORDER — HALOPERIDOL DECANOATE 100 MG/ML
0.5 INJECTION INTRAMUSCULAR ONCE
Refills: 0 | Status: COMPLETED | OUTPATIENT
Start: 2019-05-02 | End: 2019-05-02

## 2019-05-02 RX ORDER — FENTANYL CITRATE 50 UG/ML
12.5 INJECTION INTRAVENOUS ONCE
Refills: 0 | Status: DISCONTINUED | OUTPATIENT
Start: 2019-05-02 | End: 2019-05-02

## 2019-05-02 RX ORDER — LABETALOL HCL 100 MG
200 TABLET ORAL EVERY 8 HOURS
Refills: 0 | Status: DISCONTINUED | OUTPATIENT
Start: 2019-05-02 | End: 2019-05-07

## 2019-05-02 RX ORDER — MAGNESIUM SULFATE 500 MG/ML
2 VIAL (ML) INJECTION ONCE
Refills: 0 | Status: COMPLETED | OUTPATIENT
Start: 2019-05-02 | End: 2019-05-03

## 2019-05-02 RX ORDER — HYDRALAZINE HCL 50 MG
10 TABLET ORAL EVERY 4 HOURS
Refills: 0 | Status: DISCONTINUED | OUTPATIENT
Start: 2019-05-02 | End: 2019-05-07

## 2019-05-02 RX ORDER — FOSPHENYTOIN 50 MG/ML
200 INJECTION INTRAMUSCULAR; INTRAVENOUS EVERY 12 HOURS
Refills: 0 | Status: DISCONTINUED | OUTPATIENT
Start: 2019-05-02 | End: 2019-05-03

## 2019-05-02 RX ORDER — LABETALOL HCL 100 MG
10 TABLET ORAL ONCE
Refills: 0 | Status: COMPLETED | OUTPATIENT
Start: 2019-05-02 | End: 2019-05-02

## 2019-05-02 RX ORDER — FOSPHENYTOIN 50 MG/ML
200 INJECTION INTRAMUSCULAR; INTRAVENOUS ONCE
Refills: 0 | Status: COMPLETED | OUTPATIENT
Start: 2019-05-02 | End: 2019-05-02

## 2019-05-02 RX ORDER — NICARDIPINE HYDROCHLORIDE 30 MG/1
5 CAPSULE, EXTENDED RELEASE ORAL
Qty: 40 | Refills: 0 | Status: DISCONTINUED | OUTPATIENT
Start: 2019-05-02 | End: 2019-05-03

## 2019-05-02 RX ADMIN — Medication 200 MILLIGRAM(S): at 22:01

## 2019-05-02 RX ADMIN — Medication 3 MILLILITER(S): at 11:52

## 2019-05-02 RX ADMIN — Medication 3 MILLILITER(S): at 17:57

## 2019-05-02 RX ADMIN — Medication 400 MILLIGRAM(S): at 01:13

## 2019-05-02 RX ADMIN — Medication 10 MILLIGRAM(S): at 18:04

## 2019-05-02 RX ADMIN — HYDROMORPHONE HYDROCHLORIDE 0.5 MILLIGRAM(S): 2 INJECTION INTRAMUSCULAR; INTRAVENOUS; SUBCUTANEOUS at 16:42

## 2019-05-02 RX ADMIN — HYDROMORPHONE HYDROCHLORIDE 1 MILLIGRAM(S): 2 INJECTION INTRAMUSCULAR; INTRAVENOUS; SUBCUTANEOUS at 08:05

## 2019-05-02 RX ADMIN — Medication 100 GRAM(S): at 06:54

## 2019-05-02 RX ADMIN — Medication 100 MILLIGRAM(S): at 00:30

## 2019-05-02 RX ADMIN — LEVETIRACETAM 400 MILLIGRAM(S): 250 TABLET, FILM COATED ORAL at 13:11

## 2019-05-02 RX ADMIN — FENTANYL CITRATE 12.5 MICROGRAM(S): 50 INJECTION INTRAVENOUS at 02:17

## 2019-05-02 RX ADMIN — HALOPERIDOL DECANOATE 1 MILLIGRAM(S): 100 INJECTION INTRAMUSCULAR at 15:19

## 2019-05-02 RX ADMIN — NICARDIPINE HYDROCHLORIDE 25 MG/HR: 30 CAPSULE, EXTENDED RELEASE ORAL at 20:28

## 2019-05-02 RX ADMIN — Medication 100 MILLIEQUIVALENT(S): at 10:31

## 2019-05-02 RX ADMIN — CHLORHEXIDINE GLUCONATE 1 APPLICATION(S): 213 SOLUTION TOPICAL at 22:00

## 2019-05-02 RX ADMIN — Medication 5 MILLIGRAM(S): at 10:31

## 2019-05-02 RX ADMIN — Medication 0.1 MILLIGRAM(S): at 19:56

## 2019-05-02 RX ADMIN — FENTANYL CITRATE 12.5 MICROGRAM(S): 50 INJECTION INTRAVENOUS at 02:35

## 2019-05-02 RX ADMIN — ENOXAPARIN SODIUM 40 MILLIGRAM(S): 100 INJECTION SUBCUTANEOUS at 17:13

## 2019-05-02 RX ADMIN — OXYCODONE HYDROCHLORIDE 10 MILLIGRAM(S): 5 TABLET ORAL at 07:15

## 2019-05-02 RX ADMIN — Medication 4 UNIT(S): at 17:17

## 2019-05-02 RX ADMIN — LEVETIRACETAM 400 MILLIGRAM(S): 250 TABLET, FILM COATED ORAL at 05:32

## 2019-05-02 RX ADMIN — Medication 5 MILLIGRAM(S): at 12:06

## 2019-05-02 RX ADMIN — Medication 5 MILLIGRAM(S): at 00:48

## 2019-05-02 RX ADMIN — Medication 100 MILLIGRAM(S): at 05:31

## 2019-05-02 RX ADMIN — Medication 100 MILLIEQUIVALENT(S): at 09:07

## 2019-05-02 RX ADMIN — MORPHINE SULFATE 2 MILLIGRAM(S): 50 CAPSULE, EXTENDED RELEASE ORAL at 20:45

## 2019-05-02 RX ADMIN — NICARDIPINE HYDROCHLORIDE 25 MG/HR: 30 CAPSULE, EXTENDED RELEASE ORAL at 13:56

## 2019-05-02 RX ADMIN — SODIUM CHLORIDE 70 MILLILITER(S): 9 INJECTION INTRAMUSCULAR; INTRAVENOUS; SUBCUTANEOUS at 07:48

## 2019-05-02 RX ADMIN — MORPHINE SULFATE 2 MILLIGRAM(S): 50 CAPSULE, EXTENDED RELEASE ORAL at 00:30

## 2019-05-02 RX ADMIN — Medication 1.25 MILLIGRAM(S): at 12:48

## 2019-05-02 RX ADMIN — Medication 4 UNIT(S): at 09:07

## 2019-05-02 RX ADMIN — Medication 3 MILLILITER(S): at 23:51

## 2019-05-02 RX ADMIN — AMLODIPINE BESYLATE 10 MILLIGRAM(S): 2.5 TABLET ORAL at 05:03

## 2019-05-02 RX ADMIN — HYDROMORPHONE HYDROCHLORIDE 0.5 MILLIGRAM(S): 2 INJECTION INTRAMUSCULAR; INTRAVENOUS; SUBCUTANEOUS at 16:57

## 2019-05-02 RX ADMIN — MORPHINE SULFATE 2 MILLIGRAM(S): 50 CAPSULE, EXTENDED RELEASE ORAL at 07:48

## 2019-05-02 RX ADMIN — Medication 100 MILLIGRAM(S): at 22:43

## 2019-05-02 RX ADMIN — SODIUM CHLORIDE 70 MILLILITER(S): 9 INJECTION INTRAMUSCULAR; INTRAVENOUS; SUBCUTANEOUS at 05:03

## 2019-05-02 RX ADMIN — HYDROMORPHONE HYDROCHLORIDE 1 MILLIGRAM(S): 2 INJECTION INTRAMUSCULAR; INTRAVENOUS; SUBCUTANEOUS at 08:20

## 2019-05-02 RX ADMIN — Medication 100 MILLIGRAM(S): at 17:13

## 2019-05-02 RX ADMIN — FOSPHENYTOIN 108 MILLIGRAM(S) PE: 50 INJECTION INTRAMUSCULAR; INTRAVENOUS at 10:04

## 2019-05-02 RX ADMIN — Medication 5 MILLIGRAM(S): at 06:12

## 2019-05-02 RX ADMIN — MORPHINE SULFATE 2 MILLIGRAM(S): 50 CAPSULE, EXTENDED RELEASE ORAL at 07:33

## 2019-05-02 RX ADMIN — LEVETIRACETAM 400 MILLIGRAM(S): 250 TABLET, FILM COATED ORAL at 22:01

## 2019-05-02 RX ADMIN — Medication 100 MILLIGRAM(S): at 05:03

## 2019-05-02 RX ADMIN — HYDROMORPHONE HYDROCHLORIDE 0.5 MILLIGRAM(S): 2 INJECTION INTRAMUSCULAR; INTRAVENOUS; SUBCUTANEOUS at 11:56

## 2019-05-02 RX ADMIN — MORPHINE SULFATE 2 MILLIGRAM(S): 50 CAPSULE, EXTENDED RELEASE ORAL at 00:45

## 2019-05-02 RX ADMIN — FOSPHENYTOIN 108 MILLIGRAM(S) PE: 50 INJECTION INTRAMUSCULAR; INTRAVENOUS at 17:13

## 2019-05-02 RX ADMIN — HALOPERIDOL DECANOATE 0.5 MILLIGRAM(S): 100 INJECTION INTRAMUSCULAR at 11:15

## 2019-05-02 RX ADMIN — INSULIN GLARGINE 16 UNIT(S): 100 INJECTION, SOLUTION SUBCUTANEOUS at 22:01

## 2019-05-02 RX ADMIN — Medication 1000 MILLIGRAM(S): at 01:30

## 2019-05-02 RX ADMIN — Medication 3 MILLILITER(S): at 06:16

## 2019-05-02 RX ADMIN — Medication 10 MILLIGRAM(S): at 01:13

## 2019-05-02 RX ADMIN — POLYETHYLENE GLYCOL 3350 17 GRAM(S): 17 POWDER, FOR SOLUTION ORAL at 05:03

## 2019-05-02 RX ADMIN — SENNA PLUS 1 TABLET(S): 8.6 TABLET ORAL at 11:33

## 2019-05-02 RX ADMIN — OXYCODONE HYDROCHLORIDE 10 MILLIGRAM(S): 5 TABLET ORAL at 06:55

## 2019-05-02 RX ADMIN — Medication 50 MICROGRAM(S): at 05:03

## 2019-05-02 RX ADMIN — MORPHINE SULFATE 2 MILLIGRAM(S): 50 CAPSULE, EXTENDED RELEASE ORAL at 20:28

## 2019-05-02 RX ADMIN — HYDROMORPHONE HYDROCHLORIDE 0.5 MILLIGRAM(S): 2 INJECTION INTRAMUSCULAR; INTRAVENOUS; SUBCUTANEOUS at 11:40

## 2019-05-02 RX ADMIN — FOSPHENYTOIN 104 MILLIGRAM(S) PE: 50 INJECTION INTRAMUSCULAR; INTRAVENOUS at 05:31

## 2019-05-02 RX ADMIN — Medication 100 MILLIEQUIVALENT(S): at 07:47

## 2019-05-02 NOTE — PROGRESS NOTE ADULT - ASSESSMENT
69 year old female with altered mental status due to seizures likely 2/2 to left temporal lesion, differential diagnosis includes neoplastic vs autoimmune encephalitis. Was in nonconvulsive status epilepticus, improved. Now on fycompa, phenytoin, keppra. MR spect does not suggest neoplastic lesion. MRI brain w/ contrast does not show enhancement of the lesion, which makes infiltrative glioma less likely, as well as lymphoma, though lymphoma is not entirely excluded. CSF so far also suggests against herpes encephalitis or infectious etiology. CSF flow cytometry and cytopathology have both been negative. CT c/a/p shows enhancement of bilateral kidneys, concerning for infection vs neoplastic process. Though the renal enhancement could be tumor, it is not clear if the same process is ongoing intracranially. For that reason, may not be useful to biopsy the kidney, as it may not yield full diagnostic information regarding the intracranial process.    She has elevated liver enzymes, which could be due to pheyntoin use.     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   - VEEG  -currently on Keppra 1g TID; Fycompa to 4 mg QHS, and phenytoin 200 q12. per GI recs, will attempt to taper off phenytoin and avoid hepatotoxic agents if possible  -f/u LP results from 4/24 per ID recs for: csf fungal culture, AFB culture, spinal fluid autoimmune encephalitis panel   -f/u serum autoimmune encephalitis panel, SPEP and UPEP, hepatology labs (anti-mitochondrial, anti-smooth muscle, anti-LKM, anti-liver cytosol antibody)  -will coordinate with neurosurgery for starting anticoagulation.   -will f/u endo for discharge recommendations  -case was discussed with patient's cardiology, and patient is on Eliquis for atrial fibrillation  -US abdomen as part of evaluation for elevated LFTs - normal  -MRI abdomen to assess renal lesions - resolution of prior renal enhancements.   -daily LFT's

## 2019-05-02 NOTE — PROGRESS NOTE ADULT - ASSESSMENT
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 aspiration risk

## 2019-05-02 NOTE — CHART NOTE - NSCHARTNOTEFT_GEN_A_CORE
NEURO-ONCOLOGY: LAURENT     patient seen and examined  met with daughter at bedside  reviewed recent post-operative head CT in neuroradiology with Dr Blevins    EXAM  Alert, but not fully able to attend to examination. thought it was March, thought year was 2010, easily reoriented.  Paucity of verbal output, does not understand all commands, even when stated in Yoruba by examiner or English by examiner.  right hemianopsia  right hemineglect  able to move both arms well  no overt drift, but not fully cooperative for exam.  (+) extinction to right side on double simultaneous stimulation    LABS  no path available yet  rheum workup unremarkable    IMAGING  CT - head 5/2/19 - s/p resection, with craniotomy visible. no hemorrhage. no CVA. (+) cerebral edema as seen previously. some air/other components of procedure visible and not unexpected.    IMPRESSION/PLAN  68 yo RH woman with left posterior and inferior temporal lobe T2 FLAIR changes with minimal enhancement, concerning for inflammatory or neoplastic process. CSF was unrevealing, as was rheum workup, s/p brain biopsy 5/1/19.    BRAIN LESION -- awaiting final pathology  COGNITION -- likely result of cerebral edema and recent procedure. Please avoid sedating medications, including opiates and benzos.  SEIZURES -- she had status epilepticus in the past. No evidence of that at this time. Please continue with fosphenytoin 200-200 and Keppra [phone].     total time spent was 36 minutes

## 2019-05-02 NOTE — PROVIDER CONTACT NOTE (EICU) - ASSESSMENT
Pt resting in bed, A & O x4, complaining of mild headache.
Pt able to follow all commands but is confused to situation and place. Pt not able to be reoriented. Pt currently hypertensive but stable.

## 2019-05-02 NOTE — PROGRESS NOTE ADULT - SUBJECTIVE AND OBJECTIVE BOX
CC: f/u for CNS lesion     Patient reports: she has been c/o headaches, agitated earlier, required sedation, POD 1 s/p left sided biopsy    REVIEW OF SYSTEMS:  All other review of systems negative (Comprehensive ROS):    Antimicrobials Day #  :off    Other Medications Reviewed    T(F): 99.9 (05-02-19 @ 11:00), Max: 99.9 (05-02-19 @ 11:00)  HR: 79 (05-02-19 @ 13:00)  BP: 197/86 (05-02-19 @ 12:15)  RR: 18 (05-02-19 @ 13:00)  SpO2: 97% (05-02-19 @ 13:00)  Wt(kg): --    PHYSICAL EXAM:  General: sleepy, no acute distress,   Eyes:  anicteric, no conjunctival injection, no discharge  Oropharynx: no lesions or injection 	  Neck: supple, without adenopathy  Lungs: clear to auscultation  Heart: regular rate and rhythm; no murmur, rubs or gallops  Abdomen: soft, nondistended, nontender, without mass or organomegaly  Skin: no lesions  Extremities: no clubbing, cyanosis, or edema  Neurologic: sleepy at this point    LAB RESULTS:                        9.9    10.8  )-----------( 268      ( 01 May 2019 23:02 )             30.0     05-02    138  |  103  |  5<L>  ----------------------------<  135<H>  3.4<L>   |  23  |  0.54    Ca    9.0      02 May 2019 04:52  Phos  3.5     05-01  Mg     1.8     05-01    TPro  6.9  /  Alb  3.5  /  TBili  0.2  /  DBili  x   /  AST  49<H>  /  ALT  104<H>  /  AlkPhos  345<H>  05-02    LIVER FUNCTIONS - ( 02 May 2019 04:52 )  Alb: 3.5 g/dL / Pro: 6.9 g/dL / ALK PHOS: 345 U/L / ALT: 104 U/L / AST: 49 U/L / GGT: x             MICROBIOLOGY:  RECENT CULTURES:  04-27 @ 13:56 .Urine     >=3 organisms. Probable collection contamination.          RADIOLOGY REVIEWED:  < from: CT Head No Cont (05.02.19 @ 01:48) >  INTERPRETATION:  CLINICAL INDICATION: Biopsy of seizure focus.    Technique: Noncontrast CT of the head was performed.    Multiple contiguous axial images were acquired from the skull base to the   vertex without the administration of intravenous contrast. Coronal and   sagittal reformations were made.    COMPARISON: CT head dated 4/25/2019, 4/18/2019.    FINDINGS:  A left parietal craniotomy is seen with scalp and dural flap changes.   Parenchymal vasogenic edema adjacent to the craniotomy is consistent with   expected postoperative change.    Ventricles and sulci are otherwise within normal limits. There are no new   areas of attenuation abnormality in the brain. There is no   intraparenchymal hematoma, mass effect or midline shift. No abnormal   extra-axial fluid collections are present. There is no evidence of acute   transcortical territorial infarction.    The visualized intraorbital compartments, paranasal sinuses and   tympanomastoid cavities appear free of acute disease.      IMPRESSION:  No acute intracranial hemorrhage.  Expected postoperative change.    < end of copied text >

## 2019-05-02 NOTE — PROGRESS NOTE ADULT - SUBJECTIVE AND OBJECTIVE BOX
Neurology Follow up note    Patient is a 69y old  Female who presents with a chief complaint of seizures (01 May 2019 15:04)      Subjective:Interval History - very restless and agitated overnight. she received dilaudid for pain, as well as haldol for agitation.     Objective:   Vital Signs Last 24 Hrs  T(C): 37.7 (02 May 2019 11:00), Max: 37.7 (02 May 2019 11:00)  T(F): 99.9 (02 May 2019 11:00), Max: 99.9 (02 May 2019 11:00)  HR: 68 (02 May 2019 12:00) (60 - 81)  BP: 151/67 (02 May 2019 09:00) (151/67 - 206/93)  BP(mean): 96 (02 May 2019 09:00) (84 - 134)  RR: 14 (02 May 2019 12:00) (0 - 25)  SpO2: 99% (02 May 2019 12:00) (89% - 100%)    General Exam:   General appearance: lying in bed comfortably, NAD    Neurological Exam:  Mental Status: Follows simple commands    Cranial Nerves: did not participate in EOMI, blink to threat on left, absent blink to threat on right, no spontaneous nystagmus. did not smile when asked.                     Strength: DELGADO, hand  full b/l    Other:    05-02    138  |  103  |  5<L>  ----------------------------<  135<H>  3.4<L>   |  23  |  0.54    Ca    9.0      02 May 2019 04:52  Phos  3.5     05-01  Mg     1.8     05-01    TPro  6.9  /  Alb  3.5  /  TBili  0.2  /  DBili  x   /  AST  49<H>  /  ALT  104<H>  /  AlkPhos  345<H>  05-02 05-02    138  |  103  |  5<L>  ----------------------------<  135<H>  3.4<L>   |  23  |  0.54    Ca    9.0      02 May 2019 04:52  Phos  3.5     05-01  Mg     1.8     05-01    TPro  6.9  /  Alb  3.5  /  TBili  0.2  /  DBili  x   /  AST  49<H>  /  ALT  104<H>  /  AlkPhos  345<H>  05-02    LIVER FUNCTIONS - ( 02 May 2019 04:52 )  Alb: 3.5 g/dL / Pro: 6.9 g/dL / ALK PHOS: 345 U/L / ALT: 104 U/L / AST: 49 U/L / GGT: x                                 9.9    10.8  )-----------( 268      ( 01 May 2019 23:02 )             30.0     Radiology    EKG:  tele:  TTE:  EEG:      MEDICATIONS  (STANDING):  ALBUTerol/ipratropium for Nebulization 3 milliLiter(s) Nebulizer every 6 hours  amLODIPine   Tablet 10 milliGRAM(s) Oral daily  chlorhexidine 4% Liquid 1 Application(s) Topical <User Schedule>  dextrose 5%. 1000 milliLiter(s) (50 mL/Hr) IV Continuous <Continuous>  dextrose 50% Injectable 12.5 Gram(s) IV Push once  dextrose 50% Injectable 25 Gram(s) IV Push once  dextrose 50% Injectable 25 Gram(s) IV Push once  docusate sodium 100 milliGRAM(s) Oral daily  enoxaparin Injectable 40 milliGRAM(s) SubCutaneous <User Schedule>  fosphenytoin IVPB 200 milliGRAM(s) PE IV Intermittent every 12 hours  hydrALAZINE 100 milliGRAM(s) Oral every 8 hours  insulin glargine Injectable (LANTUS) 16 Unit(s) SubCutaneous at bedtime  insulin lispro (HumaLOG) corrective regimen sliding scale   SubCutaneous three times a day before meals  insulin lispro (HumaLOG) corrective regimen sliding scale   SubCutaneous at bedtime  insulin lispro Injectable (HumaLOG) 4 Unit(s) SubCutaneous three times a day with meals  labetalol 100 milliGRAM(s) Oral every 12 hours  levETIRAcetam  IVPB 1000 milliGRAM(s) IV Intermittent <User Schedule>  levothyroxine 50 MICROGram(s) Oral daily  polyethylene glycol 3350 17 Gram(s) Oral two times a day  senna 1 Tablet(s) Oral daily    MEDICATIONS  (PRN):  bisacodyl Suppository 10 milliGRAM(s) Rectal daily PRN Constipation  dextrose 40% Gel 15 Gram(s) Oral once PRN Blood Glucose LESS THAN 70 milliGRAM(s)/deciliter  glucagon  Injectable 1 milliGRAM(s) IntraMuscular once PRN Glucose LESS THAN 70 milligrams/deciliter  hydrALAZINE Injectable 5 milliGRAM(s) IV Push every 4 hours PRN sbp over 160  HYDROmorphone  Injectable 0.5 milliGRAM(s) IV Push every 4 hours PRN Severe Pain (7 - 10)  LORazepam   Injectable 2 milliGRAM(s) IV Push once PRN Seizure  morphine  - Injectable 2 milliGRAM(s) IV Push every 4 hours PRN Moderate Pain (4 - 6)

## 2019-05-02 NOTE — PROGRESS NOTE ADULT - ASSESSMENT
69F w/ CAD, DM, HTN, HLD, hypothyroid, on eliquis, ASA, s/p umbilical hernia repair presented as a level 1 trauma activation. Patient brought in by ambulance after being found down by family. Course complicated by acute respiratory failure s/p intubation , seizures noted on imaging is left posterior lateral temporal and parietal occipital hyperintense T2 and FLAIR signal . There  is a 4 x 6 mm  enhancement  in the left posterior lateral lateral  temporal lobe with questionable leptomeningeal enhancement and slight  susceptibility, found to have brain mass on MRI. S/P empiric tx for herpes encephalitis;   POD #1 S/P brain Bx     Neuro  Neuro checks q 4hr  CT today  Off all ABX  Resume seizure meds    Resp  Probable NACHO  Maintain O2 sats > 92 % - suppl O2 at night or CPAP if tolerate    CV- afib, HTN- off NOAC/ASA ; CAD - hx of stents  Maintain -150  amlodipine, labetolol , hydralazine  PRN IV Labetolol and hydralazine   Discuss with NSG when to resume ASA  NOAC    ID- AFEBRILE ; s/p ACYCLOVIR FOR POSSIBLE HERPES ENCEPHALITIS  CURRENTLY OFF ALL abx    End- Hypothyroidism and DM  Resume Levothyroxine  Lantus 16 U qhs and 3 U ac    GI- start CCD diet once taking po; transaminitis - improving   Stool softeners  No PPI needed    Renal-  Monitor Cr  IVL once taking adequate PO     Heme/ONC  Preliminary w/u for possible lymphoma neg  Anemia- check Fe, Ferritin and B12 and folate  Ca19-9, Ca 125; Ca 15-3 - not suggestive of tumor at specific visceral site   Fe studies - nl  B12/folate- nl    DVT prophylaxis fresh post op -hold anticoag with risk of bleeding in operative bed therefore hold prophylaxis till CT in am.     Case reviewed extensively, History, meds, vitals , labs and imaging reviewed. Assessment and plan discussed with housestaff.    Time spent 45 min 69F w/ CAD, DM, HTN, HLD, hypothyroid, on eliquis, ASA, s/p umbilical hernia repair presented as a level 1 trauma activation. Patient brought in by ambulance after being found down by family. Course complicated by acute respiratory failure s/p intubation , seizures noted on imaging is left posterior lateral temporal and parietal occipital hyperintense T2 and FLAIR signal . There  is a 4 x 6 mm  enhancement  in the left posterior lateral lateral  temporal lobe with questionable leptomeningeal enhancement and slight  susceptibility, found to have brain mass on MRI. S/P empiric tx for herpes encephalitis;   POD #1 S/P brain Bx     Neuro  Neuro checks q 4hr  CT today  Off all ABX  Resume seizure meds  F/U autoimmune W/U   Add TSI and anti thyroglobulin Ab.     Resp  Probable NACHO  Maintain O2 sats > 92 % - suppl O2 at night or CPAP if tolerate    CV- afib, HTN- off NOAC/ASA ; CAD - hx of stents  Maintain -150  amlodipine, labetolol , hydralazine  PRN IV Labetolol and hydralazine   Discuss with NSG when to resume ASA  NOAC    ID- AFEBRILE ; s/p ACYCLOVIR FOR POSSIBLE HERPES ENCEPHALITIS  CURRENTLY OFF ALL abx    End- Hypothyroidism and DM  Resume Levothyroxine  Lantus 16 U qhs and 3 U ac    GI- start CCD diet once taking po; transaminitis - improving   Stool softeners  No PPI needed    Renal-  Monitor Cr  IVL once taking adequate PO     Heme/ONC  Preliminary w/u for possible lymphoma neg  Anemia- check Fe, Ferritin and B12 and folate  Ca19-9, Ca 125; Ca 15-3 - not suggestive of tumor at specific visceral site   Fe studies - nl  B12/folate- nl    DVT prophylaxis fresh post op -hold anticoag with risk of bleeding in operative bed therefore hold prophylaxis till CT in am.     Case reviewed extensively, History, meds, vitals , labs and imaging reviewed. Assessment and plan discussed with housestaff.    Time spent 45 min 69F w/ CAD, DM, HTN, HLD, hypothyroid, on eliquis, ASA, s/p umbilical hernia repair presented as a level 1 trauma activation. Patient brought in by ambulance after being found down by family. Course complicated by acute respiratory failure s/p intubation , seizures noted on imaging is left posterior lateral temporal and parietal occipital hyperintense T2 and FLAIR signal . There  is a 4 x 6 mm  enhancement  in the left posterior lateral lateral  temporal lobe with questionable leptomeningeal enhancement and slight  susceptibility, found to have brain mass on MRI. S/P empiric tx for herpes encephalitis;   POD #1 S/P brain Bx     Neuro  Neuro checks q 4hr  Pain control- Morphine alt with dilauduid - switch to po dilaudid   Off all ABX  On seizure meds- dilantin level 7.5 - give 200mg x1 now - adequate  and on Keppra   F/U autoimmune W/U   Add TSI and anti thyroglobulin Ab. ( see above labs )    Resp  Probable NACHO  Maintain O2 sats > 92 % - suppl O2 at night or CPAP if tolerate    CV- afib, HTN- off NOAC/ASA ; CAD - hx of stents  Maintain -150  amlodipine, labetolol , hydralazine  PRN IV Labetolol and hydralazine   Discuss with NSG when to resume ASA  NOAC    ID- AFEBRILE ; s/p ACYCLOVIR FOR POSSIBLE HERPES ENCEPHALITIS  CURRENTLY OFF ALL abx    End- Hypothyroidism and DM  Resume Levothyroxine  Lantus 16 U qhs and 3 U ac    GI- start CCD diet once taking po; transaminitis - improving   Stool softeners  No PPI needed    Renal-  Monitor Cr  IVL once taking adequate PO     Heme/ONC  Preliminary w/u for possible lymphoma neg  Anemia- check Fe, Ferritin and B12 and folate  Ca19-9, Ca 125; Ca 15-3 - not suggestive of tumor at specific visceral site   Fe studies - nl  B12/folate- nl    DVT prophylaxis- lovenox 40mg qhs    Case reviewed extensively, History, meds, vitals , labs and imaging reviewed. Assessment and plan discussed with housestaff.    Time spent 450min

## 2019-05-02 NOTE — PROGRESS NOTE ADULT - ASSESSMENT
68 y/o F w/h/o uncontrolled T2DM on oral DM meds. Also HTN/HLD/CAD/Hypothyroidism/recent umbilical hernia repair 2 weeks ago. Here s/p fall with LOC> seizures> hypernatremia. S/p brain biopsy yesterday. Pt not eating today as she is increasingly lethargic w/intermittent confusion. BG values at goal on present insulin regimen. BG goal (100-180mg/dl). Discussed w/RN to continue to hold premeal insulin until PO intake improves.

## 2019-05-02 NOTE — PROGRESS NOTE ADULT - PROBLEM SELECTOR PLAN 1
-test BG AC/HS  -c/w Lantus 16 units. Can change to 14 units if NPO  -c/w Humalog 4 units AC meals (hold if not eating)  -c/w Humalog moderate correction scale AC and Mod HS scale  Discharge recs will depend on PO intake/insulin needs and glycemic control in hospital  -discussed w/family and SICU RN  pager: [phone]

## 2019-05-02 NOTE — PROGRESS NOTE ADULT - SUBJECTIVE AND OBJECTIVE BOX
Patient is a 69y old  Female who presents with a chief complaint of seizures (02 May 2019 13:11)      SUBJECTIVE / OVERNIGHT EVENTS: confused. Daughter at bedside.  Review of Systems  unobtainable     MEDICATIONS  (STANDING):  ALBUTerol/ipratropium for Nebulization 3 milliLiter(s) Nebulizer every 6 hours  amLODIPine   Tablet 10 milliGRAM(s) Oral daily  chlorhexidine 4% Liquid 1 Application(s) Topical <User Schedule>  dextrose 5%. 1000 milliLiter(s) (50 mL/Hr) IV Continuous <Continuous>  dextrose 50% Injectable 12.5 Gram(s) IV Push once  dextrose 50% Injectable 25 Gram(s) IV Push once  dextrose 50% Injectable 25 Gram(s) IV Push once  docusate sodium 100 milliGRAM(s) Oral daily  enalapril 10 milliGRAM(s) Oral two times a day  enoxaparin Injectable 40 milliGRAM(s) SubCutaneous <User Schedule>  fosphenytoin IVPB 200 milliGRAM(s) PE IV Intermittent every 12 hours  hydrALAZINE 100 milliGRAM(s) Oral every 8 hours  insulin glargine Injectable (LANTUS) 16 Unit(s) SubCutaneous at bedtime  insulin lispro (HumaLOG) corrective regimen sliding scale   SubCutaneous three times a day before meals  insulin lispro (HumaLOG) corrective regimen sliding scale   SubCutaneous at bedtime  insulin lispro Injectable (HumaLOG) 4 Unit(s) SubCutaneous three times a day with meals  labetalol 100 milliGRAM(s) Oral every 12 hours  levETIRAcetam  IVPB 1000 milliGRAM(s) IV Intermittent <User Schedule>  levothyroxine 50 MICROGram(s) Oral daily  niCARdipine Infusion 5 mG/Hr (25 mL/Hr) IV Continuous <Continuous>  polyethylene glycol 3350 17 Gram(s) Oral two times a day  senna 1 Tablet(s) Oral daily    MEDICATIONS  (PRN):  bisacodyl Suppository 10 milliGRAM(s) Rectal daily PRN Constipation  dextrose 40% Gel 15 Gram(s) Oral once PRN Blood Glucose LESS THAN 70 milliGRAM(s)/deciliter  glucagon  Injectable 1 milliGRAM(s) IntraMuscular once PRN Glucose LESS THAN 70 milligrams/deciliter  haloperidol    Injectable 1 milliGRAM(s) IV Push every 4 hours PRN Agitation  hydrALAZINE Injectable 10 milliGRAM(s) IV Push every 4 hours PRN sbp over 160  HYDROmorphone  Injectable 0.5 milliGRAM(s) IV Push every 4 hours PRN Severe Pain (7 - 10)  LORazepam   Injectable 2 milliGRAM(s) IV Push once PRN Seizure  morphine  - Injectable 2 milliGRAM(s) IV Push every 4 hours PRN Moderate Pain (4 - 6)      Vital Signs Last 24 Hrs  T(C): 37.2 (02 May 2019 15:00), Max: 37.7 (02 May 2019 11:00)  T(F): 98.9 (02 May 2019 15:00), Max: 99.9 (02 May 2019 11:00)  HR: 82 (02 May 2019 14:45) (60 - 88)  BP: 179/76 (02 May 2019 14:45) (151/67 - 206/93)  BP(mean): 109 (02 May 2019 14:45) (84 - 134)  RR: 24 (02 May 2019 14:45) (0 - 47)  SpO2: 90% (02 May 2019 14:45) (89% - 100%)    PHYSICAL EXAM:  GENERAL: NAD, well-developed  HEAD:  Atraumatic, Normocephalic  EYES: EOMI, PERRLA, conjunctiva and sclera clear  NECK: Supple, No JVD  CHEST/LUNG: Clear to auscultation bilaterally; No wheeze  HEART: Regular rate and rhythm; No murmurs, rubs, or gallops  ABDOMEN: Soft, Nontender, Nondistended; Bowel sounds present  EXTREMITIES:  2+ Peripheral Pulses, No clubbing, cyanosis, or edema  PSYCH: AAOx3  NEUROLOGY: non-focal  SKIN: No rashes or lesions    LABS:                        9.9    10.8  )-----------( 268      ( 01 May 2019 23:02 )             30.0     05-02    138  |  103  |  5<L>  ----------------------------<  135<H>  3.4<L>   |  23  |  0.54    Ca    9.0      02 May 2019 04:52  Phos  3.5     05-01  Mg     1.8     05-01    TPro  6.9  /  Alb  3.5  /  TBili  0.2  /  DBili  x   /  AST  49<H>  /  ALT  104<H>  /  AlkPhos  345<H>  05-02    PT/INR - ( 01 May 2019 23:02 )   PT: 11.4 sec;   INR: 0.99 ratio         PTT - ( 01 May 2019 09:29 )  PTT:26.3 sec            RADIOLOGY & ADDITIONAL TESTS:    Imaging Personally Reviewed:    < from: CT Head No Cont (05.02.19 @ 01:48) >  IMPRESSION:  No acute intracranial hemorrhage.  Expected postoperative change.    < end of copied text >  Consultant(s) Notes Reviewed:      Care Discussed with Consultants/Other Providers:

## 2019-05-02 NOTE — PROGRESS NOTE ADULT - SUBJECTIVE AND OBJECTIVE BOX
Diabetes Follow up note:  Interval Hx:  68 y/o F w/h/o uncontrolled T2DM on oral DM meds. Also HTN/HLD/CAD/Hypothyroidism/recent umbilical hernia repair 2 weeks ago. Here s/p fall with LOC> seizures> hypernatremia s/p brain biopsy yesterday. Pt now in ICU, increasingly confused/lethargic. BG values at goal. RN/family endorse pt not eating today. Trying to get out of bed when present at bedside.     Review of Systems:  Confused at time of visit. unable to answer my questions.     MEDS:    insulin glargine Injectable (LANTUS) 16 Unit(s) SubCutaneous at bedtime  insulin lispro (HumaLOG) corrective regimen sliding scale   SubCutaneous three times a day before meals  insulin lispro (HumaLOG) corrective regimen sliding scale   SubCutaneous at bedtime  insulin lispro Injectable (HumaLOG) 4 Unit(s) SubCutaneous three times a day with meals  levothyroxine 50 MICROGram(s) Oral daily      Allergies    No Known Allergies          PE:  General: Female lying in bed. NAD.   Vital Signs Last 24 Hrs  T(C): 37.7 (02 May 2019 11:00), Max: 37.7 (02 May 2019 11:00)  T(F): 99.9 (02 May 2019 11:00), Max: 99.9 (02 May 2019 11:00)  HR: 79 (02 May 2019 13:00) (60 - 81)  BP: 197/86 (02 May 2019 12:15) (151/67 - 206/93)  BP(mean): 124 (02 May 2019 12:15) (84 - 134)  RR: 18 (02 May 2019 13:00) (0 - 25)  SpO2: 97% (02 May 2019 13:00) (89% - 100%)  CV: S1, S2. NSR on monitor  Abd: Soft, NT,ND, Obese  Extremities: Warm  Neuro: Awake. Restless.     LABS:    POCT Blood Glucose.: 113 mg/dL (05-02-19 @ 12:21)  POCT Blood Glucose.: 165 mg/dL (05-02-19 @ 08:47)  POCT Blood Glucose.: 139 mg/dL (05-02-19 @ 06:15)  POCT Blood Glucose.: 151 mg/dL (05-01-19 @ 22:49)  POCT Blood Glucose.: 157 mg/dL (05-01-19 @ 18:23)  POCT Blood Glucose.: 178 mg/dL (05-01-19 @ 15:31)  POCT Blood Glucose.: 176 mg/dL (05-01-19 @ 06:37)  POCT Blood Glucose.: 148 mg/dL (04-30-19 @ 22:22)  POCT Blood Glucose.: 140 mg/dL (04-30-19 @ 21:18)  POCT Blood Glucose.: 204 mg/dL (04-30-19 @ 17:22)  POCT Blood Glucose.: 248 mg/dL (04-30-19 @ 12:48)  POCT Blood Glucose.: 124 mg/dL (04-30-19 @ 08:44)  POCT Blood Glucose.: 294 mg/dL (04-29-19 @ 21:33)  POCT Blood Glucose.: 126 mg/dL (04-29-19 @ 17:30)                            9.9    10.8  )-----------( 268      ( 01 May 2019 23:02 )             30.0       05-02    138  |  103  |  5<L>  ----------------------------<  135<H>  3.4<L>   |  23  |  0.54    Ca    9.0      02 May 2019 04:52  Phos  3.5     05-01  Mg     1.8     05-01    TPro  6.9  /  Alb  3.5  /  TBili  0.2  /  DBili  x   /  AST  49<H>  /  ALT  104<H>  /  AlkPhos  345<H>  05-02      Thyroid Function Tests:  04-22 @ 08:55 TSH -- FreeT4 1.1 T3 -- Anti TPO -- Anti Thyroglobulin Ab -- TSI --  04-22 @ 08:51 TSH 1.45 FreeT4 -- T3 -- Anti TPO -- Anti Thyroglobulin Ab -- TSI --      Hemoglobin A1C, Whole Blood: 9.9 % <H> [4.0 - 5.6] (04-21-19 @ 09:26)            Contact number: eugenio [phone] or [phone]

## 2019-05-02 NOTE — PROVIDER CONTACT NOTE (EICU) - SITUATION
Pt Hypertensive  on cuff pressure. SBP 190s on left radial a-line.
Pt confused to place and situation. Acute change from previous neuro check. Pt appears more restless as well.

## 2019-05-02 NOTE — PROGRESS NOTE ADULT - SUBJECTIVE AND OBJECTIVE BOX
Follow-up Pulm Progress Note    Admitted to ICU for observation post procedure 2nd HTN  Currently lethargic after receiving pain medication earlier  96% on 3L NC  Reports from RN of desaturating to 88-90% on RA while sleeping      Medications:  MEDICATIONS  (STANDING):  ALBUTerol/ipratropium for Nebulization 3 milliLiter(s) Nebulizer every 6 hours  amLODIPine   Tablet 10 milliGRAM(s) Oral daily  chlorhexidine 4% Liquid 1 Application(s) Topical <User Schedule>  dextrose 5%. 1000 milliLiter(s) (50 mL/Hr) IV Continuous <Continuous>  dextrose 50% Injectable 12.5 Gram(s) IV Push once  dextrose 50% Injectable 25 Gram(s) IV Push once  dextrose 50% Injectable 25 Gram(s) IV Push once  docusate sodium 100 milliGRAM(s) Oral daily  enalapril 10 milliGRAM(s) Oral two times a day  enoxaparin Injectable 40 milliGRAM(s) SubCutaneous <User Schedule>  fosphenytoin IVPB 200 milliGRAM(s) PE IV Intermittent every 12 hours  hydrALAZINE 100 milliGRAM(s) Oral every 8 hours  insulin glargine Injectable (LANTUS) 16 Unit(s) SubCutaneous at bedtime  insulin lispro (HumaLOG) corrective regimen sliding scale   SubCutaneous three times a day before meals  insulin lispro (HumaLOG) corrective regimen sliding scale   SubCutaneous at bedtime  insulin lispro Injectable (HumaLOG) 4 Unit(s) SubCutaneous three times a day with meals  labetalol 100 milliGRAM(s) Oral every 12 hours  levETIRAcetam  IVPB 1000 milliGRAM(s) IV Intermittent <User Schedule>  levothyroxine 50 MICROGram(s) Oral daily  polyethylene glycol 3350 17 Gram(s) Oral two times a day  senna 1 Tablet(s) Oral daily    MEDICATIONS  (PRN):  bisacodyl Suppository 10 milliGRAM(s) Rectal daily PRN Constipation  dextrose 40% Gel 15 Gram(s) Oral once PRN Blood Glucose LESS THAN 70 milliGRAM(s)/deciliter  glucagon  Injectable 1 milliGRAM(s) IntraMuscular once PRN Glucose LESS THAN 70 milligrams/deciliter  hydrALAZINE Injectable 5 milliGRAM(s) IV Push every 4 hours PRN sbp over 160  HYDROmorphone  Injectable 0.5 milliGRAM(s) IV Push every 4 hours PRN Severe Pain (7 - 10)  LORazepam   Injectable 2 milliGRAM(s) IV Push once PRN Seizure  morphine  - Injectable 2 milliGRAM(s) IV Push every 4 hours PRN Moderate Pain (4 - 6)          Vital Signs Last 24 Hrs  T(C): 37.7 (02 May 2019 11:00), Max: 37.7 (02 May 2019 11:00)  T(F): 99.9 (02 May 2019 11:00), Max: 99.9 (02 May 2019 11:00)  HR: 79 (02 May 2019 13:00) (60 - 81)  BP: 197/86 (02 May 2019 12:15) (151/67 - 206/93)  BP(mean): 124 (02 May 2019 12:15) (84 - 134)  RR: 18 (02 May 2019 13:00) (0 - 25)  SpO2: 97% (02 May 2019 13:00) (89% - 100%) on 3L NC    ABG - ( 01 May 2019 10:03 )  pH, Arterial: 7.43  pH, Blood: x     /  pCO2: 39    /  pO2: 271   / HCO3: 25    / Base Excess: 1.4   /  SaO2: 100                   05-01 @ 07:01  -  05-02 @ 07:00  --------------------------------------------------------  IN: 1620 mL / OUT: 2250 mL / NET: -630 mL          LABS:                        9.9    10.8  )-----------( 268      ( 01 May 2019 23:02 )             30.0     05-02    138  |  103  |  5<L>  ----------------------------<  135<H>  3.4<L>   |  23  |  0.54    Ca    9.0      02 May 2019 04:52  Phos  3.5     05-01  Mg     1.8     05-01    TPro  6.9  /  Alb  3.5  /  TBili  0.2  /  DBili  x   /  AST  49<H>  /  ALT  104<H>  /  AlkPhos  345<H>  05-02          CAPILLARY BLOOD GLUCOSE      POCT Blood Glucose.: 113 mg/dL (02 May 2019 12:21)    PT/INR - ( 01 May 2019 23:02 )   PT: 11.4 sec;   INR: 0.99 ratio         PTT - ( 01 May 2019 09:29 )  PTT:26.3 sec        MIGUEL Negative 04-28 @ 17:30  Anti SS-1 --  Anti SS-2 --  Anti RNP --  RF -- 04-28 @ 17:30    Atypical ANCA -- 04-28 @ 17:30  c-ANCA titer -- 04-28 @ 17:30  c-ANCA -- 04-28 @ 17:30  p-ANCA -- 04-28 @ 17:30              CULTURES: (if applicable)  Culture Results:   >=3 organisms. Probable collection contamination. (04-27 @ 13:56)    Most recent blood culture -- 04-27 @ 13:56   -- -- .Urine 04-27 @ 13:56        Physical Examination:  PULM: Clear to auscultation bilaterally, no significant sputum production  CVS: S1, S2 heard    RADIOLOGY REVIEWED

## 2019-05-02 NOTE — PROVIDER CONTACT NOTE (OTHER) - ASSESSMENT
PCA and daughter say that witnessed the patients head shake for close to one minute. I went in the room to assess the patient. Pupils are normal, no focal defecits.

## 2019-05-02 NOTE — PROGRESS NOTE ADULT - PROBLEM SELECTOR PLAN 1
Maintain sp02>90% w supplemental oxygen if needed  -Outpt PSG  -Can trial on CPAP at night while inpt but will not be able to qualify her for home CPAP without PSG.

## 2019-05-02 NOTE — PROVIDER CONTACT NOTE (OTHER) - SITUATION
Pt is consistently hypertensive 190-200 systolic after multiple pushed of hydralazine. Rory Montgomery notified multiple times by WADE Miller.

## 2019-05-02 NOTE — PROGRESS NOTE ADULT - SUBJECTIVE AND OBJECTIVE BOX
HPI:  69F w/ CAD, DM, HTN, HLD, hypothyroid, on eliquis, ASA, s/p umbilical hernia repair presented as a level 1 trauma activation. Patient brought in by ambulance 4/18/19 after being found down by family. Hematology consulted for workup of possible lymphoma. Admitted to SICU and then began having subclinical siezures . Patient had LP and MRI with Left posterior lateral temporal and parietal occipital hyperintense T2 FLAIR suspicious for herpes encephalitis - started on Acyclovir. For the seizures she was also started onkeppra, fycompa, and vimpat,  4/19 PM Transferred to [facility]   4/19-20 multiple subclinical seizures, now on keppra, fycompa, and vimpat  4/22: 10 seizures overnight, 2/hr in the am  5/1: brain biopsy       PAST MEDICAL & SURGICAL HISTORY:  Hypothyroid  Obesity  NACHO (obstructive sleep apnea): sleep study done 5 years ago  DM (Diabetes Mellitus) (ICD9 250.00)  Dyslipidemia (ICD9 272.4)  HTN (Hypertension) (ICD9 401.9)  CAD (Coronary Artery Disease) (ICD9 414.00): c STENTS  History of hysterectomy  History of ovarian cystectomy: x 2 in 2013  S/P primary angioplasty with coronary stent: has 5 stents-first placed in 2007, last stent -2011,  C Section: x 1    FAMILY HISTORY:  Family history of heart disease (Aunt)    SOCIAL HISTORY: No EtOH, no tobacco      Allergies    No Known Allergies    Intolerances    REVIEW OF SYSTEMS: [X ] Neuro: [X ] Headache [ ] Back pain [ ] Numbness [ ] Weakness [ ] Ataxia [ ] Dizziness [ ] Aphasia [ ] Dysarthria [ ] Visual disturbance  Resp: [ ] Shortness of breath/dyspnea, [ ] Orthopnea [ ] Cough  CV: [ ] Chest pain [ ] Palpitation [ ] Lightheadedness [ ] Syncope  Renal: [ ] Thirst [ ] Edema  GI: [ ] Nausea [ ] Emesis [ ] Abdominal pain [ ] Constipation [ ] Diarrhea  Hem: [ ] Hematemesis [ ] bright red blood per rectum  ID: [ ] Fever [ ] Chills [ ] Dysuria  ENT: [ ] Rhinorrhea    MEDICATIONS  (STANDING):  ALBUTerol/ipratropium for Nebulization 3 milliLiter(s) Nebulizer every 6 hours  amLODIPine   Tablet 10 milliGRAM(s) Oral daily  dextrose 5%. 1000 milliLiter(s) (50 mL/Hr) IV Continuous <Continuous>  dextrose 50% Injectable 12.5 Gram(s) IV Push once  dextrose 50% Injectable 25 Gram(s) IV Push once  dextrose 50% Injectable 25 Gram(s) IV Push once  docusate sodium 100 milliGRAM(s) Oral daily  fosphenytoin IVPB 100 milliGRAM(s) PE IV Intermittent every 8 hours  hydrALAZINE 100 milliGRAM(s) Oral every 8 hours  insulin glargine Injectable (LANTUS) 16 Unit(s) SubCutaneous at bedtime  insulin lispro (HumaLOG) corrective regimen sliding scale   SubCutaneous three times a day before meals  insulin lispro (HumaLOG) corrective regimen sliding scale   SubCutaneous at bedtime  insulin lispro Injectable (HumaLOG) 4 Unit(s) SubCutaneous three times a day with meals  labetalol 100 milliGRAM(s) Oral every 12 hours  levETIRAcetam  IVPB 1000 milliGRAM(s) IV Intermittent <User Schedule>  levothyroxine 50 MICROGram(s) Oral daily  polyethylene glycol 3350 17 Gram(s) Oral two times a day  potassium chloride  10 mEq/100 mL IVPB 10 milliEquivalent(s) IV Intermittent every 1 hour  senna 1 Tablet(s) Oral daily  sodium chloride 0.9%. 1000 milliLiter(s) (70 mL/Hr) IV Continuous <Continuous>    MEDICATIONS  (PRN):  bisacodyl Suppository 10 milliGRAM(s) Rectal daily PRN Constipation  dextrose 40% Gel 15 Gram(s) Oral once PRN Blood Glucose LESS THAN 70 milliGRAM(s)/deciliter  glucagon  Injectable 1 milliGRAM(s) IntraMuscular once PRN Glucose LESS THAN 70 milligrams/deciliter  hydrALAZINE Injectable 5 milliGRAM(s) IV Push every 4 hours PRN sbp over 160  LORazepam   Injectable 2 milliGRAM(s) IV Push once PRN Seizure  morphine  - Injectable 2 milliGRAM(s) IV Push every 4 hours PRN Severe Pain (7 - 10)  oxyCODONE    IR 5 milliGRAM(s) Oral every 6 hours PRN Moderate Pain (4 - 6)  oxyCODONE    IR 10 milliGRAM(s) Oral every 6 hours PRN Severe Pain (7 - 10)    ICU Vital Signs Last 24 Hrs  T(C): 37.6 (02 May 2019 07:00), Max: 37.6 (02 May 2019 07:00)  T(F): 99.6 (02 May 2019 07:00), Max: 99.6 (02 May 2019 07:00)  HR: 72 (02 May 2019 08:00) (60 - 83)  BP: 198/74 (02 May 2019 07:00) (170/79 - 206/93)  BP(mean): 84 (02 May 2019 07:00) (84 - 134)  ABP: 192/63 (02 May 2019 08:00) (157/62 - 197/84)  ABP(mean): 103 (02 May 2019 08:00) (94 - 128)  RR: 25 (02 May 2019 08:00) (0 - 25)  SpO2: 89% (02 May 2019 08:00) (89% - 100%)                            9.9    10.8  )-----------( 268      ( 01 May 2019 23:02 )             30.0   05-02    138  |  103  |  5<L>  ----------------------------<  135<H>  3.4<L>   |  23  |  0.54    Ca    9.0      02 May 2019 04:52  Phos  3.5     05-01  Mg     1.8     05-01    TPro  6.9  /  Alb  3.5  /  TBili  0.2  /  DBili  x   /  AST  49<H>  /  ALT  104<H>  /  AlkPhos  345<H>  05-02    CAPILLARY BLOOD GLUCOSE    CAPILLARY BLOOD GLUCOSE    POCT Blood Glucose.: 139 mg/dL (02 May 2019 06:15)  POCT Blood Glucose.: 151 mg/dL (01 May 2019 22:49)  POCT Blood Glucose.: 157 mg/dL (01 May 2019 18:23)  POCT Blood Glucose.: 178 mg/dL (01 May 2019 15:31)        Thyroid Function Tests:  04-22 @ 08:55 TSH -- FreeT4 1.1 T3 -- Anti TPO -- Anti Thyroglobulin Ab -- TSI --  04-22 @ 08:51 TSH 1.45 FreeT4 -- T3 -- Anti TPO -- Anti Thyroglobulin Ab -- TSI --      Hemoglobin A1C, Whole Blood: 9.9 % <H> [4.0 - 5.6] (04-21-19 @ 09:26)      MIGUEL/AMA/anti-smooth musc/Anti-LKM/Igs negative.   viral hepatitis panel: Hep A, Hep B,  CMV, and HSV PCR.   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    acetaminophen/EToh/ASA levels negative.     Herpes CSF PCR and repeat CSF 4/24 both negative-   CSF Cx negative, VDRL negative, PCR negative  Afebrile, normal WBC/diff    MICROBIOLOGY:  RECENT CULTURES:  04-27 @ 13:56 .Urine     >=3 organisms. Probable collection contamination.    .CSF  04-24 @ 17:17   Testing in progress  --    No polymorphonuclear cells seen  No organisms seen  by cytocentrifuge    .CSF  04-19 @ 16:57   No growth  --    No polymorphonuclear cells seen  No organisms seen  by cytocentrifuge         US Abdomen Complete (04.29.19 @ 15:33) >  FINDINGS:    Liver: Within normal limits.    Bile ducts: Normal caliber. Common bile duct measures 3 mm.     Gallbladder: Within normal limits.        Pancreas: Visualized portions are within normal limits.    Spleen: 8 cm. Within normal limits.    Right kidney: 12.5 cm. No hydronephrosis.    Left kidney: 12.0 cm.  No hydronephrosis.    Ascites: None.    Aorta and IVC: Visualized portions are within normal limits.    IMPRESSION:     Normal abdominal ultrasound.     MR Abdomen w/wo IV Cont (04.29.19  LOWER CHEST: Bibasilar subsegmental atelectasis.    LIVER: Within normal limits.   BILE DUCTS: Normal caliber.  GALLBLADDER: Within normal limits.  SPLEEN: Within normal limits.  PANCREAS: Within normal limits.  ADRENALS: Within normal limits.  KIDNEYS/URETERS: Homogeneous enhancement without focal lesion or   hydronephrosis. No abnormality at diffusion-weighted imaging.    VISUALIZED PORTIONS:    BOWEL: Small hiatal hernia.   PERITONEUM: No ascites.  VESSELS: Renal veins and IVC are patent.  RETROPERITONEUM: No lymphadenopathy.    ABDOMINAL WALL: Unchanged nonspecific infiltration of the periumbilical   soft tissues.  BONES: Right iliac bone island.    IMPRESSION:       < end of copied text >      MR Head w/wo IV Cont (04.25.19 @ 10:20) >    IMPRESSION:    No significant interval change from previous brain MRIs.    Similar-appearing nonenhancing hyperintense signal in the left posterior   temporal and occipital lobes which appears both subcortical and cortical   in nature.    Study done for presurgical planning.      CT Abdomen and Pelvis w/ IV Cont (04.25.19 @ 16:16) >  FINDINGS:    CHEST:     LUNGS AND LARGE AIRWAYS: Patent central airways. Bilateral patchy   nonspecific groundglass opacities. Improved aeration in the left lower   lobe. Linear atelectasis in the right lower lobe.  PLEURA: Trace bilateral pleural effusions.  VESSELS: Left PICC line, with its tip at the cavoatrial junction.   Atherosclerotic calcification of the aorta and coronary arteries.  HEART: Heart size is mildly enlarged. No pericardial effusion.  MEDIASTINUM AND CANDICE: No lymphadenopathy.  CHEST WALL AND LOWER NECK: Within normal limits.    ABDOMEN AND PELVIS:    LIVER: Within normal limits.  BILE DUCTS: Normal caliber.  GALLBLADDER: Within normal limits.  SPLEEN: Within normal limits.  PANCREAS: Within normal limits.  ADRENALS: Within normal limits.  KIDNEYS/URETERS: No hydronephrosis. Heterogeneous enhancement of the   kidneys bilaterally, with patchy areas of decreased attenuation.    BLADDER: Within normal limits.  REPRODUCTIVE ORGANS:Status post hysterectomy.    BOWEL: No bowel obstruction. Appendix is within normal limits. Small   hiatal hernia.  PERITONEUM: No ascites.  VESSELS:  Atherosclerotic calcification of the aorta and bilateral renal   arteries. Again noted, is infiltration of the subcutaneous tissues in the   left groin, likely related to intervention, slightly decreased since   4/18/2019.  RETROPERITONEUM: No lymphadenopathy.    ABDOMINAL WALL: Again noted, is infiltration of the periumbilical soft   tissues.  BONES: Degenerative changes in the spine. Sclerotic focus in the right   iliac bone, unchanged compared to prior studies dating back to 9/1/2018.    IMPRESSION: Heterogeneous enhancement of the kidneys bilaterally, with   striated nephrograms bilaterally. Differential diagnosis includes   infection such as pyelonephritis, as well as ischemia and infiltrative   processes such is lymphoma and leukemia.  Correlation with urinalysis is   recommended.    Infiltration of the periumbilical subcutaneous tissues again noted.      GENERAL: NAD, well-developed  HEAD:  Atraumatic, Normocephalic  EYES: EOMI, PERRLA, conjunctiva and sclera clear  NECK: Supple, No JVD  CHEST/LUNG: Clear to auscultation bilaterally; No wheeze  HEART: Regular rate and rhythm; No murmurs, rubs, or gallops  ABDOMEN: Soft, Nontender, Nondistended; Bowel sounds present  EXTREMITIES:  2+ Peripheral Pulses, No clubbing, cyanosis, or edema  NEUROLOGY: non-focal  SKIN: No rashes or lesions HPI:  69F w/ CAD, DM, HTN, HLD, hypothyroid, on eliquis, ASA, s/p umbilical hernia repair presented as a level 1 trauma activation. Patient brought in by ambulance 4/18/19 after being found down by family. Hematology consulted for workup of possible lymphoma. Admitted to SICU and then began having subclinical siezures . Patient had LP and MRI with Left posterior lateral temporal and parietal occipital hyperintense T2 FLAIR suspicious for herpes encephalitis - started on Acyclovir. For the seizures she was also started onkeppra, fycompa, and vimpat,  4/19 PM Transfer[facility]   4/19-20 multiple subclinical seizures, now on keppra, fycompa, and vimpat  4/22: 10 seizures overnight, 2/hr in the am  Post 4/22- seizure free  5/1: brain biopsy       PAST MEDICAL & SURGICAL HISTORY:  Hypothyroid  Obesity  NACHO (obstructive sleep apnea): sleep study done 5 years ago  DM (Diabetes Mellitus) (ICD9 250.00)  Dyslipidemia (ICD9 272.4)  HTN (Hypertension) (ICD9 401.9)  CAD (Coronary Artery Disease) (ICD9 414.00): c STENTS  History of hysterectomy  History of ovarian cystectomy: x 2 in 2013  S/P primary angioplasty with coronary stent: has 5 stents-first placed in 2007, last stent -2011,  C Section: x 1    FAMILY HISTORY:  Family history of heart disease (Aunt)    SOCIAL HISTORY: No EtOH, no tobacco      Allergies    No Known Allergies    Intolerances    REVIEW OF SYSTEMS: [X ] Neuro: [X ] Headache [ ] Back pain [ ] Numbness [ ] Weakness [ ] Ataxia [ ] Dizziness [ ] Aphasia [ ] Dysarthria [ ] Visual disturbance  Resp: [ ] Shortness of breath/dyspnea, [ ] Orthopnea [ ] Cough  CV: [ ] Chest pain [ ] Palpitation [ ] Lightheadedness [ ] Syncope  Renal: [ ] Thirst [ ] Edema  GI: [ ] Nausea [ ] Emesis [ ] Abdominal pain [ ] Constipation [ ] Diarrhea  Hem: [ ] Hematemesis [ ] bright red blood per rectum  ID: [ ] Fever [ ] Chills [ ] Dysuria  ENT: [ ] Rhinorrhea    MEDICATIONS  (STANDING):  ALBUTerol/ipratropium for Nebulization 3 milliLiter(s) Nebulizer every 6 hours  amLODIPine   Tablet 10 milliGRAM(s) Oral daily  dextrose 5%. 1000 milliLiter(s) (50 mL/Hr) IV Continuous <Continuous>  dextrose 50% Injectable 12.5 Gram(s) IV Push once  dextrose 50% Injectable 25 Gram(s) IV Push once  dextrose 50% Injectable 25 Gram(s) IV Push once  docusate sodium 100 milliGRAM(s) Oral daily  fosphenytoin IVPB 100 milliGRAM(s) PE IV Intermittent every 8 hours  hydrALAZINE 100 milliGRAM(s) Oral every 8 hours  insulin glargine Injectable (LANTUS) 16 Unit(s) SubCutaneous at bedtime  insulin lispro (HumaLOG) corrective regimen sliding scale   SubCutaneous three times a day before meals  insulin lispro (HumaLOG) corrective regimen sliding scale   SubCutaneous at bedtime  insulin lispro Injectable (HumaLOG) 4 Unit(s) SubCutaneous three times a day with meals  labetalol 100 milliGRAM(s) Oral every 12 hours  levETIRAcetam  IVPB 1000 milliGRAM(s) IV Intermittent <User Schedule>  levothyroxine 50 MICROGram(s) Oral daily  polyethylene glycol 3350 17 Gram(s) Oral two times a day  potassium chloride  10 mEq/100 mL IVPB 10 milliEquivalent(s) IV Intermittent every 1 hour  senna 1 Tablet(s) Oral daily  sodium chloride 0.9%. 1000 milliLiter(s) (70 mL/Hr) IV Continuous <Continuous>    MEDICATIONS  (PRN):  bisacodyl Suppository 10 milliGRAM(s) Rectal daily PRN Constipation  dextrose 40% Gel 15 Gram(s) Oral once PRN Blood Glucose LESS THAN 70 milliGRAM(s)/deciliter  glucagon  Injectable 1 milliGRAM(s) IntraMuscular once PRN Glucose LESS THAN 70 milligrams/deciliter  hydrALAZINE Injectable 5 milliGRAM(s) IV Push every 4 hours PRN sbp over 160  LORazepam   Injectable 2 milliGRAM(s) IV Push once PRN Seizure  morphine  - Injectable 2 milliGRAM(s) IV Push every 4 hours PRN Severe Pain (7 - 10)  oxyCODONE    IR 5 milliGRAM(s) Oral every 6 hours PRN Moderate Pain (4 - 6)  oxyCODONE    IR 10 milliGRAM(s) Oral every 6 hours PRN Severe Pain (7 - 10)    ICU Vital Signs Last 24 Hrs  T(C): 37.6 (02 May 2019 07:00), Max: 37.6 (02 May 2019 07:00)  T(F): 99.6 (02 May 2019 07:00), Max: 99.6 (02 May 2019 07:00)  HR: 72 (02 May 2019 08:00) (60 - 83)  BP: 198/74 (02 May 2019 07:00) (170/79 - 206/93)  BP(mean): 84 (02 May 2019 07:00) (84 - 134)  ABP: 192/63 (02 May 2019 08:00) (157/62 - 197/84)  ABP(mean): 103 (02 May 2019 08:00) (94 - 128)  RR: 25 (02 May 2019 08:00) (0 - 25)  SpO2: 89% (02 May 2019 08:00) (89% - 100%)                            9.9    10.8  )-----------( 268      ( 01 May 2019 23:02 )             30.0   05-02    138  |  103  |  5<L>  ----------------------------<  135<H>  3.4<L>   |  23  |  0.54    Ca    9.0      02 May 2019 04:52  Phos  3.5     05-01  Mg     1.8     05-01    TPro  6.9  /  Alb  3.5  /  TBili  0.2  /  DBili  x   /  AST  49<H>  /  ALT  104<H>  /  AlkPhos  345<H>  05-02    CAPILLARY BLOOD GLUCOSE    CAPILLARY BLOOD GLUCOSE    POCT Blood Glucose.: 139 mg/dL (02 May 2019 06:15)  POCT Blood Glucose.: 151 mg/dL (01 May 2019 22:49)  POCT Blood Glucose.: 157 mg/dL (01 May 2019 18:23)  POCT Blood Glucose.: 178 mg/dL (01 May 2019 15:31)        Thyroid Function Tests:  04-22 @ 08:55 TSH -- FreeT4 1.1 T3 -- Anti TPO -- Anti Thyroglobulin Ab -- TSI --  04-22 @ 08:51 TSH 1.45 FreeT4 -- T3 -- Anti TPO -- Anti Thyroglobulin Ab -- TSI --      Hemoglobin A1C, Whole Blood: 9.9 % <H> [4.0 - 5.6] (04-21-19 @ 09:26)      MIGUEL/AMA/anti-smooth musc/Anti-LKM/Igs negative.   viral hepatitis panel: Hep A, Hep B,  CMV, and HSV PCR.   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    acetaminophen/EToh/ASA levels negative.     Herpes CSF PCR and repeat CSF 4/24 both negative-   CSF Cx negative, VDRL negative, PCR negative  Afebrile, normal WBC/diff    MICROBIOLOGY:  RECENT CULTURES:  04-27 @ 13:56 .Urine     >=3 organisms. Probable collection contamination.    .CSF  04-24 @ 17:17   Testing in progress  --    No polymorphonuclear cells seen  No organisms seen  by cytocentrifuge    .CSF  04-19 @ 16:57   No growth  --    No polymorphonuclear cells seen  No organisms seen  by cytocentrifuge         US Abdomen Complete (04.29.19 @ 15:33) >  FINDINGS:    Liver: Within normal limits.    Bile ducts: Normal caliber. Common bile duct measures 3 mm.     Gallbladder: Within normal limits.        Pancreas: Visualized portions are within normal limits.    Spleen: 8 cm. Within normal limits.    Right kidney: 12.5 cm. No hydronephrosis.    Left kidney: 12.0 cm.  No hydronephrosis.    Ascites: None.    Aorta and IVC: Visualized portions are within normal limits.    IMPRESSION:     Normal abdominal ultrasound.     MR Abdomen w/wo IV Cont (04.29.19  LOWER CHEST: Bibasilar subsegmental atelectasis.    LIVER: Within normal limits.   BILE DUCTS: Normal caliber.  GALLBLADDER: Within normal limits.  SPLEEN: Within normal limits.  PANCREAS: Within normal limits.  ADRENALS: Within normal limits.  KIDNEYS/URETERS: Homogeneous enhancement without focal lesion or   hydronephrosis. No abnormality at diffusion-weighted imaging.    VISUALIZED PORTIONS:    BOWEL: Small hiatal hernia.   PERITONEUM: No ascites.  VESSELS: Renal veins and IVC are patent.  RETROPERITONEUM: No lymphadenopathy.    ABDOMINAL WALL: Unchanged nonspecific infiltration of the periumbilical   soft tissues.  BONES: Right iliac bone island.      MR Head w/wo IV Cont (04.25.19 @ 10:20) >    IMPRESSION:    No significant interval change from previous brain MRIs.    Similar-appearing nonenhancing hyperintense signal in the left posterior   temporal and occipital lobes which appears both subcortical and cortical   in nature.    Study done for presurgical planning.      CT Abdomen and Pelvis w/ IV Cont (04.25.19 @ 16:16) >  FINDINGS:    CHEST:     LUNGS AND LARGE AIRWAYS: Patent central airways. Bilateral patchy   nonspecific groundglass opacities. Improved aeration in the left lower   lobe. Linear atelectasis in the right lower lobe.  PLEURA: Trace bilateral pleural effusions.  VESSELS: Left PICC line, with its tip at the cavoatrial junction.   Atherosclerotic calcification of the aorta and coronary arteries.  HEART: Heart size is mildly enlarged. No pericardial effusion.  MEDIASTINUM AND CANDICE: No lymphadenopathy.  CHEST WALL AND LOWER NECK: Within normal limits.    ABDOMEN AND PELVIS:    LIVER: Within normal limits.  BILE DUCTS: Normal caliber.  GALLBLADDER: Within normal limits.  SPLEEN: Within normal limits.  PANCREAS: Within normal limits.  ADRENALS: Within normal limits.  KIDNEYS/URETERS: No hydronephrosis. Heterogeneous enhancement of the   kidneys bilaterally, with patchy areas of decreased attenuation.    BLADDER: Within normal limits.  REPRODUCTIVE ORGANS:Status post hysterectomy.    BOWEL: No bowel obstruction. Appendix is within normal limits. Small   hiatal hernia.  PERITONEUM: No ascites.  VESSELS:  Atherosclerotic calcification of the aorta and bilateral renal   arteries. Again noted, is infiltration of the subcutaneous tissues in the   left groin, likely related to intervention, slightly decreased since   4/18/2019.  RETROPERITONEUM: No lymphadenopathy.    ABDOMINAL WALL: Again noted, is infiltration of the periumbilical soft   tissues.  BONES: Degenerative changes in the spine. Sclerotic focus in the right   iliac bone, unchanged compared to prior studies dating back to 9/1/2018.    IMPRESSION: Heterogeneous enhancement of the kidneys bilaterally, with   striated nephrograms bilaterally. Differential diagnosis includes   infection such as pyelonephritis, as well as ischemia and infiltrative   processes such is lymphoma and leukemia.  Correlation with urinalysis is   recommended.    Infiltration of the periumbilical subcutaneous tissues again noted.      GENERAL: NAD, well-developed  HEAD:  Atraumatic, Normocephalic  EYES: EOMI, PERRLA, conjunctiva and sclera clear  NECK: Supple, No JVD  CHEST/LUNG: Clear to auscultation bilaterally; No wheeze  HEART: Regular rate and rhythm; No murmurs, rubs, or gallops  ABDOMEN: Soft, Nontender, Nondistended; Bowel sounds present  EXTREMITIES:  2+ Peripheral Pulses, No clubbing, cyanosis, or edema  NEUROLOGY: non-focal  SKIN: No rashes or lesions HPI:  69F w/ CAD, DM, HTN, HLD, hypothyroid, on eliquis, ASA, s/p umbilical hernia repair presented as a level 1 trauma activation. Patient brought in by ambulance 4/18/19 after being found down by family. Hematology consulted for workup of possible lymphoma. Admitted to SICU and then began having subclinical siezures . Patient had LP and MRI with Left posterior lateral temporal and parietal occipital hyperintense T2 FLAIR suspicious for herpes encephalitis - started on Acyclovir. For the seizures she was also started onkeppra, fycompa, and vimpat,  4/19 PM Transferred to [facility]   4/19-20 multiple subclinical seizures, now on keppra, fycompa, and vimpat  4/22- 4/26- L Temp/occip seizure   4/27- seizure free- dilantin + Keppra  5/1: brain biopsy       PAST MEDICAL & SURGICAL HISTORY:  Hypothyroid  Obesity  NACHO (obstructive sleep apnea): sleep study done 5 years ago  DM (Diabetes Mellitus) (ICD9 250.00)  Dyslipidemia (ICD9 272.4)  HTN (Hypertension) (ICD9 401.9)  CAD (Coronary Artery Disease) (ICD9 414.00): c STENTS  History of hysterectomy  History of ovarian cystectomy: x 2 in 2013  S/P primary angioplasty with coronary stent: has 5 stents-first placed in 2007, last stent -2011,  C Section: x 1    FAMILY HISTORY:  Family history of heart disease (Aunt)    SOCIAL HISTORY: No EtOH, no tobacco      Allergies    No Known Allergies    Intolerances    REVIEW OF SYSTEMS: [X ] Neuro: [X ] Headache [ ] Back pain [ ] Numbness [ ] Weakness [ ] Ataxia [ ] Dizziness [ ] Aphasia [ ] Dysarthria [ ] Visual disturbance  Resp: [ ] Shortness of breath/dyspnea, [ ] Orthopnea [ ] Cough  CV: [ ] Chest pain [ ] Palpitation [ ] Lightheadedness [ ] Syncope  Renal: [ ] Thirst [ ] Edema  GI: [ ] Nausea [ ] Emesis [ ] Abdominal pain [ ] Constipation [ ] Diarrhea  Hem: [ ] Hematemesis [ ] bright red blood per rectum  ID: [ ] Fever [ ] Chills [ ] Dysuria  ENT: [ ] Rhinorrhea    MEDICATIONS  (STANDING):  ALBUTerol/ipratropium for Nebulization 3 milliLiter(s) Nebulizer every 6 hours  amLODIPine   Tablet 10 milliGRAM(s) Oral daily  dextrose 5%. 1000 milliLiter(s) (50 mL/Hr) IV Continuous <Continuous>  dextrose 50% Injectable 12.5 Gram(s) IV Push once  dextrose 50% Injectable 25 Gram(s) IV Push once  dextrose 50% Injectable 25 Gram(s) IV Push once  docusate sodium 100 milliGRAM(s) Oral daily  fosphenytoin IVPB 100 milliGRAM(s) PE IV Intermittent every 8 hours  hydrALAZINE 100 milliGRAM(s) Oral every 8 hours  insulin glargine Injectable (LANTUS) 16 Unit(s) SubCutaneous at bedtime  insulin lispro (HumaLOG) corrective regimen sliding scale   SubCutaneous three times a day before meals  insulin lispro (HumaLOG) corrective regimen sliding scale   SubCutaneous at bedtime  insulin lispro Injectable (HumaLOG) 4 Unit(s) SubCutaneous three times a day with meals  labetalol 100 milliGRAM(s) Oral every 12 hours  levETIRAcetam  IVPB 1000 milliGRAM(s) IV Intermittent <User Schedule>  levothyroxine 50 MICROGram(s) Oral daily  polyethylene glycol 3350 17 Gram(s) Oral two times a day  potassium chloride  10 mEq/100 mL IVPB 10 milliEquivalent(s) IV Intermittent every 1 hour  senna 1 Tablet(s) Oral daily  sodium chloride 0.9%. 1000 milliLiter(s) (70 mL/Hr) IV Continuous <Continuous>    MEDICATIONS  (PRN):  bisacodyl Suppository 10 milliGRAM(s) Rectal daily PRN Constipation  dextrose 40% Gel 15 Gram(s) Oral once PRN Blood Glucose LESS THAN 70 milliGRAM(s)/deciliter  glucagon  Injectable 1 milliGRAM(s) IntraMuscular once PRN Glucose LESS THAN 70 milligrams/deciliter  hydrALAZINE Injectable 5 milliGRAM(s) IV Push every 4 hours PRN sbp over 160  LORazepam   Injectable 2 milliGRAM(s) IV Push once PRN Seizure  morphine  - Injectable 2 milliGRAM(s) IV Push every 4 hours PRN Severe Pain (7 - 10)  oxyCODONE    IR 5 milliGRAM(s) Oral every 6 hours PRN Moderate Pain (4 - 6)  oxyCODONE    IR 10 milliGRAM(s) Oral every 6 hours PRN Severe Pain (7 - 10)    ICU Vital Signs Last 24 Hrs  T(C): 37.6 (02 May 2019 07:00), Max: 37.6 (02 May 2019 07:00)  T(F): 99.6 (02 May 2019 07:00), Max: 99.6 (02 May 2019 07:00)  HR: 72 (02 May 2019 08:00) (60 - 83)  BP: 198/74 (02 May 2019 07:00) (170/79 - 206/93)  BP(mean): 84 (02 May 2019 07:00) (84 - 134)  ABP: 192/63 (02 May 2019 08:00) (157/62 - 197/84)  ABP(mean): 103 (02 May 2019 08:00) (94 - 128)  RR: 25 (02 May 2019 08:00) (0 - 25)  SpO2: 89% (02 May 2019 08:00) (89% - 100%)                            9.9    10.8  )-----------( 268      ( 01 May 2019 23:02 )             30.0   05-02    138  |  103  |  5<L>  ----------------------------<  135<H>  3.4<L>   |  23  |  0.54    Ca    9.0      02 May 2019 04:52  Phos  3.5     05-01  Mg     1.8     05-01    TPro  6.9  /  Alb  3.5  /  TBili  0.2  /  DBili  x   /  AST  49<H>  /  ALT  104<H>  /  AlkPhos  345<H>  05-02    CAPILLARY BLOOD GLUCOSE    CAPILLARY BLOOD GLUCOSE    POCT Blood Glucose.: 139 mg/dL (02 May 2019 06:15)  POCT Blood Glucose.: 151 mg/dL (01 May 2019 22:49)  POCT Blood Glucose.: 157 mg/dL (01 May 2019 18:23)  POCT Blood Glucose.: 178 mg/dL (01 May 2019 15:31)        Thyroid Function Tests:  04-22 @ 08:55 TSH -- FreeT4 1.1 T3 -- Anti TPO -- Anti Thyroglobulin Ab -- TSI --  04-22 @ 08:51 TSH 1.45 FreeT4 -- T3 -- Anti TPO -- Anti Thyroglobulin Ab -- TSI --      Hemoglobin A1C, Whole Blood: 9.9 % <H> [4.0 - 5.6] (04-21-19 @ 09:26)      MIGUEL/AMA/anti-smooth musc/Anti-LKM/Igs negative.   viral hepatitis panel: Hep A, Hep B,  CMV, and HSV PCR.   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    acetaminophen/EToh/ASA levels negative.     Herpes CSF PCR and repeat CSF 4/24 both negative-   CSF Cx negative, VDRL negative, PCR negative  Afebrile, normal WBC/diff    MICROBIOLOGY:  RECENT CULTURES:  04-27 @ 13:56 .Urine     >=3 organisms. Probable collection contamination.    .CSF  04-24 @ 17:17   Testing in progress  --    No polymorphonuclear cells seen  No organisms seen  by cytocentrifuge    .CSF  04-19 @ 16:57   No growth  --    No polymorphonuclear cells seen  No organisms seen  by cytocentrifuge     CT Head No Cont (05.02.19   A left parietal craniotomy is seen with scalp and dural flap changes.   Parenchymal vasogenic edema adjacent to the craniotomy is consistent with   expected postoperative change.    Ventricles and sulci are otherwise within normal limits. There are no new   areas of attenuation abnormality in the brain. There is no   intraparenchymal hematoma, mass effect or midline shift. No abnormal   extra-axial fluid collections are present. There is no evidence of acute   transcortical territorial infarction.       US Abdomen Complete (04.29.19 @ 15:33) >  FINDINGS:    Liver: Within normal limits.    Bile ducts: Normal caliber. Common bile duct measures 3 mm.     Gallbladder: Within normal limits.        Pancreas: Visualized portions are within normal limits.    Spleen: 8 cm. Within normal limits.    Right kidney: 12.5 cm. No hydronephrosis.    Left kidney: 12.0 cm.  No hydronephrosis.    Ascites: None.    Aorta and IVC: Visualized portions are within normal limits.    IMPRESSION:     Normal abdominal ultrasound.     MR Abdomen w/wo IV Cont (04.29.19  LOWER CHEST: Bibasilar subsegmental atelectasis.    LIVER: Within normal limits.   BILE DUCTS: Normal caliber.  GALLBLADDER: Within normal limits.  SPLEEN: Within normal limits.  PANCREAS: Within normal limits.  ADRENALS: Within normal limits.  KIDNEYS/URETERS: Homogeneous enhancement without focal lesion or   hydronephrosis. No abnormality at diffusion-weighted imaging.    VISUALIZED PORTIONS:    BOWEL: Small hiatal hernia.   PERITONEUM: No ascites.  VESSELS: Renal veins and IVC are patent.  RETROPERITONEUM: No lymphadenopathy.    ABDOMINAL WALL: Unchanged nonspecific infiltration of the periumbilical   soft tissues.  BONES: Right iliac bone island.      MR Head w/wo IV Cont (04.25.19 @ 10:20) >    IMPRESSION:    No significant interval change from previous brain MRIs.    Similar-appearing nonenhancing hyperintense signal in the left posterior   temporal and occipital lobes which appears both subcortical and cortical   in nature.    Study done for presurgical planning.      CT Abdomen and Pelvis w/ IV Cont (04.25.19 @ 16:16) >  FINDINGS:    CHEST:     LUNGS AND LARGE AIRWAYS: Patent central airways. Bilateral patchy   nonspecific groundglass opacities. Improved aeration in the left lower   lobe. Linear atelectasis in the right lower lobe.  PLEURA: Trace bilateral pleural effusions.  VESSELS: Left PICC line, with its tip at the cavoatrial junction.   Atherosclerotic calcification of the aorta and coronary arteries.  HEART: Heart size is mildly enlarged. No pericardial effusion.  MEDIASTINUM AND CANDICE: No lymphadenopathy.  CHEST WALL AND LOWER NECK: Within normal limits.    ABDOMEN AND PELVIS:    LIVER: Within normal limits.  BILE DUCTS: Normal caliber.  GALLBLADDER: Within normal limits.  SPLEEN: Within normal limits.  PANCREAS: Within normal limits.  ADRENALS: Within normal limits.  KIDNEYS/URETERS: No hydronephrosis. Heterogeneous enhancement of the   kidneys bilaterally, with patchy areas of decreased attenuation.    BLADDER: Within normal limits.  REPRODUCTIVE ORGANS:Status post hysterectomy.    BOWEL: No bowel obstruction. Appendix is within normal limits. Small   hiatal hernia.  PERITONEUM: No ascites.  VESSELS:  Atherosclerotic calcification of the aorta and bilateral renal   arteries. Again noted, is infiltration of the subcutaneous tissues in the   left groin, likely related to intervention, slightly decreased since   4/18/2019.  RETROPERITONEUM: No lymphadenopathy.    ABDOMINAL WALL: Again noted, is infiltration of the periumbilical soft   tissues.  BONES: Degenerative changes in the spine. Sclerotic focus in the right   iliac bone, unchanged compared to prior studies dating back to 9/1/2018.    IMPRESSION: Heterogeneous enhancement of the kidneys bilaterally, with   striated nephrograms bilaterally. Differential diagnosis includes   infection such as pyelonephritis, as well as ischemia and infiltrative   processes such is lymphoma and leukemia.  Correlation with urinalysis is   recommended.    Infiltration of the periumbilical subcutaneous tissues again noted.      GENERAL: NAD, well-developed  HEAD:  Atraumatic, Normocephalic  EYES: EOMI, PERRLA, conjunctiva and sclera clear  NECK: Supple, No JVD  CHEST/LUNG: Clear to auscultation bilaterally; No wheeze  HEART: Regular rate and rhythm; No murmurs, rubs, or gallops  ABDOMEN: Soft, Nontender, Nondistended; Bowel sounds present  EXTREMITIES:  2+ Peripheral Pulses, No clubbing, cyanosis, or edema  NEUROLOGY: non-focal  SKIN: No rashes or lesions

## 2019-05-02 NOTE — PROGRESS NOTE ADULT - ASSESSMENT
Seizure, brain mass  - restlessness likely due to pain  - however, will obtain CT Head to ensure no hemorrhage  - no reports of seizure-like activity prior to this episode  - continue current antiepileptics     30 minutes critical care time

## 2019-05-02 NOTE — PROGRESS NOTE ADULT - ASSESSMENT
68 y/o F with PMH of CAD, DM, HTN, HLD, hypothyroid, on eliquis, ASA, s/p umbilical hernia repair ~2 weeks ago, presents as level 1 trauma activation after being found down at home. Found to have status epilepticus-now controlled. L temporal lesion identified on MRI brain of yet unclear etiology. Called to eval for ?NACHO. Sp02 dropping to 92% on RA at night. CT chest with mosaic attenuation pattern-expiratory phase of film vs. small airway disease d/t asthma. Hospital course c/b transaminitis, ?drug induced. s/p brain biopsy 5/1. Post procedure course c/b HTN requiring ICU admission

## 2019-05-02 NOTE — PROGRESS NOTE ADULT - ASSESSMENT
69 f s/p brain mass biopsy  Off antibiotics.  Hold ASA, Eliquis - restart when cleared by NSx.  A Fib  Hypothyroid  - follow TSH.  HTN control  COPD  - continue nebs  Diabetes control.  Seizure control  Abnormal LFT  - Hepatology follow  - follow LFT  Path pending.  SICU care  Tyrone Antony MD pager [phone]

## 2019-05-02 NOTE — PROGRESS NOTE ADULT - SUBJECTIVE AND OBJECTIVE BOX
Vital Signs Last 24 Hrs  T(C): 36.3 (01 May 2019 19:00), Max: 36.7 (01 May 2019 04:57)  T(F): 97.4 (01 May 2019 19:00), Max: 98 (01 May 2019 04:57)  HR: 68 (02 May 2019 00:00) (60 - 83)  BP: 186/81 (02 May 2019 00:00) (110/68 - 206/93)  BP(mean): 117 (02 May 2019 00:00) (109 - 134)  RR: 17 (02 May 2019 00:00) (11 - 20)  SpO2: 98% (02 May 2019 00:00) (91% - 100%)    EXAM  Awake, Ox3,  FC, DELGADO 5/5

## 2019-05-02 NOTE — PROGRESS NOTE ADULT - SUBJECTIVE AND OBJECTIVE BOX
Called by RN that patient is more restless.     Patient grabbing head and appears in pain. Nods head "yes" when asked if has headache and would like pain medication.    Patient alert and awake, oriented to self but refusing to answer questions (shakes head "no" when I ask if she can answer questions for me), does follow commands (two fingers on both hands, lifts both legs), moves all limbs symmetrically and strongly

## 2019-05-03 LAB
GLUCOSE BLDC GLUCOMTR-MCNC: 114 MG/DL — HIGH (ref 70–99)
GLUCOSE BLDC GLUCOMTR-MCNC: 129 MG/DL — HIGH (ref 70–99)
GLUCOSE BLDC GLUCOMTR-MCNC: 133 MG/DL — HIGH (ref 70–99)
GLUCOSE BLDC GLUCOMTR-MCNC: 134 MG/DL — HIGH (ref 70–99)
GLUCOSE BLDC GLUCOMTR-MCNC: 149 MG/DL — HIGH (ref 70–99)
SOLUBLE LIVER IGG SER IA-ACNC: < 20.1 UNITS — SIGNIFICANT CHANGE UP

## 2019-05-03 PROCEDURE — 95819 EEG AWAKE AND ASLEEP: CPT | Mod: 26

## 2019-05-03 PROCEDURE — 99233 SBSQ HOSP IP/OBS HIGH 50: CPT

## 2019-05-03 PROCEDURE — 99291 CRITICAL CARE FIRST HOUR: CPT

## 2019-05-03 PROCEDURE — 99232 SBSQ HOSP IP/OBS MODERATE 35: CPT

## 2019-05-03 PROCEDURE — 95951: CPT | Mod: 26

## 2019-05-03 RX ORDER — QUETIAPINE FUMARATE 200 MG/1
12.5 TABLET, FILM COATED ORAL
Refills: 0 | Status: DISCONTINUED | OUTPATIENT
Start: 2019-05-03 | End: 2019-05-07

## 2019-05-03 RX ORDER — HYDRALAZINE HCL 50 MG
10 TABLET ORAL ONCE
Refills: 0 | Status: COMPLETED | OUTPATIENT
Start: 2019-05-03 | End: 2019-05-03

## 2019-05-03 RX ORDER — LACOSAMIDE 50 MG/1
150 TABLET ORAL
Refills: 0 | Status: DISCONTINUED | OUTPATIENT
Start: 2019-05-03 | End: 2019-05-03

## 2019-05-03 RX ORDER — LACOSAMIDE 50 MG/1
150 TABLET ORAL ONCE
Refills: 0 | Status: DISCONTINUED | OUTPATIENT
Start: 2019-05-03 | End: 2019-05-03

## 2019-05-03 RX ORDER — LACOSAMIDE 50 MG/1
150 TABLET ORAL
Refills: 0 | Status: DISCONTINUED | OUTPATIENT
Start: 2019-05-03 | End: 2019-05-07

## 2019-05-03 RX ADMIN — Medication 50 MICROGRAM(S): at 05:06

## 2019-05-03 RX ADMIN — MORPHINE SULFATE 2 MILLIGRAM(S): 50 CAPSULE, EXTENDED RELEASE ORAL at 06:55

## 2019-05-03 RX ADMIN — Medication 200 MILLIGRAM(S): at 05:06

## 2019-05-03 RX ADMIN — CHLORHEXIDINE GLUCONATE 1 APPLICATION(S): 213 SOLUTION TOPICAL at 21:34

## 2019-05-03 RX ADMIN — Medication 3 MILLILITER(S): at 11:21

## 2019-05-03 RX ADMIN — Medication 100 MILLIGRAM(S): at 12:04

## 2019-05-03 RX ADMIN — Medication 100 MILLIGRAM(S): at 14:10

## 2019-05-03 RX ADMIN — Medication 0.1 MILLIGRAM(S): at 18:11

## 2019-05-03 RX ADMIN — HYDROMORPHONE HYDROCHLORIDE 0.5 MILLIGRAM(S): 2 INJECTION INTRAMUSCULAR; INTRAVENOUS; SUBCUTANEOUS at 20:45

## 2019-05-03 RX ADMIN — LEVETIRACETAM 400 MILLIGRAM(S): 250 TABLET, FILM COATED ORAL at 21:34

## 2019-05-03 RX ADMIN — LEVETIRACETAM 400 MILLIGRAM(S): 250 TABLET, FILM COATED ORAL at 05:57

## 2019-05-03 RX ADMIN — POLYETHYLENE GLYCOL 3350 17 GRAM(S): 17 POWDER, FOR SOLUTION ORAL at 18:11

## 2019-05-03 RX ADMIN — Medication 4 UNIT(S): at 08:36

## 2019-05-03 RX ADMIN — Medication 10 MILLIGRAM(S): at 18:11

## 2019-05-03 RX ADMIN — Medication 100 MILLIGRAM(S): at 21:34

## 2019-05-03 RX ADMIN — Medication 10 MILLIGRAM(S): at 08:31

## 2019-05-03 RX ADMIN — Medication 200 MILLIGRAM(S): at 14:10

## 2019-05-03 RX ADMIN — Medication 10 MILLIGRAM(S): at 22:59

## 2019-05-03 RX ADMIN — FOSPHENYTOIN 108 MILLIGRAM(S) PE: 50 INJECTION INTRAMUSCULAR; INTRAVENOUS at 05:06

## 2019-05-03 RX ADMIN — NICARDIPINE HYDROCHLORIDE 25 MG/HR: 30 CAPSULE, EXTENDED RELEASE ORAL at 03:00

## 2019-05-03 RX ADMIN — Medication 50 GRAM(S): at 00:02

## 2019-05-03 RX ADMIN — ENOXAPARIN SODIUM 40 MILLIGRAM(S): 100 INJECTION SUBCUTANEOUS at 18:11

## 2019-05-03 RX ADMIN — HYDROMORPHONE HYDROCHLORIDE 0.5 MILLIGRAM(S): 2 INJECTION INTRAMUSCULAR; INTRAVENOUS; SUBCUTANEOUS at 20:30

## 2019-05-03 RX ADMIN — MORPHINE SULFATE 2 MILLIGRAM(S): 50 CAPSULE, EXTENDED RELEASE ORAL at 15:35

## 2019-05-03 RX ADMIN — Medication 40 MILLIEQUIVALENT(S): at 05:05

## 2019-05-03 RX ADMIN — LEVETIRACETAM 400 MILLIGRAM(S): 250 TABLET, FILM COATED ORAL at 14:10

## 2019-05-03 RX ADMIN — QUETIAPINE FUMARATE 12.5 MILLIGRAM(S): 200 TABLET, FILM COATED ORAL at 21:34

## 2019-05-03 RX ADMIN — Medication 10 MILLIGRAM(S): at 20:02

## 2019-05-03 RX ADMIN — AMLODIPINE BESYLATE 10 MILLIGRAM(S): 2.5 TABLET ORAL at 05:06

## 2019-05-03 RX ADMIN — POLYETHYLENE GLYCOL 3350 17 GRAM(S): 17 POWDER, FOR SOLUTION ORAL at 05:07

## 2019-05-03 RX ADMIN — Medication 100 MILLIGRAM(S): at 06:01

## 2019-05-03 RX ADMIN — MORPHINE SULFATE 2 MILLIGRAM(S): 50 CAPSULE, EXTENDED RELEASE ORAL at 15:20

## 2019-05-03 RX ADMIN — Medication 4 UNIT(S): at 14:35

## 2019-05-03 RX ADMIN — MORPHINE SULFATE 2 MILLIGRAM(S): 50 CAPSULE, EXTENDED RELEASE ORAL at 06:39

## 2019-05-03 RX ADMIN — Medication 200 MILLIGRAM(S): at 21:33

## 2019-05-03 RX ADMIN — MORPHINE SULFATE 2 MILLIGRAM(S): 50 CAPSULE, EXTENDED RELEASE ORAL at 10:55

## 2019-05-03 RX ADMIN — Medication 0.1 MILLIGRAM(S): at 06:01

## 2019-05-03 RX ADMIN — SENNA PLUS 1 TABLET(S): 8.6 TABLET ORAL at 12:05

## 2019-05-03 RX ADMIN — LACOSAMIDE 150 MILLIGRAM(S): 50 TABLET ORAL at 12:05

## 2019-05-03 RX ADMIN — Medication 10 MILLIGRAM(S): at 05:06

## 2019-05-03 RX ADMIN — MORPHINE SULFATE 2 MILLIGRAM(S): 50 CAPSULE, EXTENDED RELEASE ORAL at 10:40

## 2019-05-03 RX ADMIN — INSULIN GLARGINE 16 UNIT(S): 100 INJECTION, SOLUTION SUBCUTANEOUS at 21:33

## 2019-05-03 NOTE — PROGRESS NOTE ADULT - ASSESSMENT
69 year old female with altered mental status due to seizures likely 2/2 to left temporal lesion, differential diagnosis includes neoplastic vs autoimmune encephalitis. Was in nonconvulsive status epilepticus, improved. Now on fycompa, phenytoin, keppra. MR spect does not suggest neoplastic lesion. MRI brain w/ contrast does not show enhancement of the lesion, which makes infiltrative glioma less likely, as well as lymphoma, though lymphoma is not entirely excluded. CSF so far also suggests against herpes encephalitis or infectious etiology. CSF flow cytometry and cytopathology have both been negative. CT c/a/p shows enhancement of bilateral kidneys, concerning for infection vs neoplastic process. Though the renal enhancement could be tumor, it is not clear if the same process is ongoing intracranially. For that reason, may not be useful to biopsy the kidney, as it may not yield full diagnostic information regarding the intracranial process.She has elevated liver enzymes, which could be due to pheyntoin use.     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   - VEEG  -currently on Keppra 1g TID; vimpat 150 BID   -f/u LP results from 4/24 per ID recs for: csf fungal culture, AFB culture, spinal fluid autoimmune encephalitis panel   -f/u serum autoimmune encephalitis panel, SPEP and UPEP, hepatology labs (anti-mitochondrial, anti-smooth muscle, anti-LKM, anti-liver cytosol antibody)  -will f/u endo for discharge recommendations  -case was discussed with patient's cardiology, and patient is on Eliquis for atrial fibrillation  -US abdomen as part of evaluation for elevated LFTs - normal  -MRI abdomen to assess renal lesions - renal enhancements still present   -daily LFT's  - Neurosurgery recommended holding anticoagulations for 10 dose, Patient at risk of stroke, but risk of bleeding from recent biopsy in setting of anticoagulation is more compared to risk of stroke.

## 2019-05-03 NOTE — PROGRESS NOTE ADULT - SUBJECTIVE AND OBJECTIVE BOX
HPI:  69F w/ CAD, DM, HTN, HLD, hypothyroid, on eliquis, ASA, s/p umbilical hernia repair presented as a level 1 trauma activation. Patient brought in by ambulance 4/18/19 after being found down by family. Hematology consulted for workup of possible lymphoma. Admitted to SICU and then began having subclinical siezures . Patient had LP and MRI with Left posterior lateral temporal and parietal occipital hyperintense T2 FLAIR suspicious for herpes encephalitis - started on Acyclovir. For the seizures she was also started onkeppra, fycompa, and vimpat,  4/19 PM Transferred to [facility]   4/19-20 multiple subclinical seizures, now on keppra, fycompa, and vimpat  4/22- 4/26- L Temp/occip seizure   4/27- seizure free- dilantin + Keppra  5/1: brain biopsy  5/2- HTN - 's started on cardene drip       PAST MEDICAL & SURGICAL HISTORY:  Hypothyroid  Obesity  NACHO (obstructive sleep apnea): sleep study done 5 years ago  DM (Diabetes Mellitus) (ICD9 250.00)  Dyslipidemia (ICD9 272.4)  HTN (Hypertension) (ICD9 401.9)  CAD (Coronary Artery Disease) (ICD9 414.00): c STENTS  History of hysterectomy  History of ovarian cystectomy: x 2 in 2013  S/P primary angioplasty with coronary stent: has 5 stents-first placed in 2007, last stent -2011,  C Section: x 1    FAMILY HISTORY:  Family history of heart disease (Aunt)    SOCIAL HISTORY: No EtOH, no tobacco      Allergies    No Known Allergies    Intolerances    REVIEW OF SYSTEMS: [X ] Neuro: [X ] Headache [ ] Back pain [ ] Numbness [ ] Weakness [ ] Ataxia [ ] Dizziness [ ] Aphasia [ ] Dysarthria [ ] Visual disturbance  Resp: [ ] Shortness of breath/dyspnea, [ ] Orthopnea [ ] Cough  CV: [ ] Chest pain [ ] Palpitation [ ] Lightheadedness [ ] Syncope  Renal: [ ] Thirst [ ] Edema  GI: [ ] Nausea [ ] Emesis [ ] Abdominal pain [ ] Constipation [ ] Diarrhea  Hem: [ ] Hematemesis [ ] bright red blood per rectum  ID: [ ] Fever [ ] Chills [ ] Dysuria  ENT: [ ] Rhinorrhea    MEDICATIONS  (STANDING):  ALBUTerol/ipratropium for Nebulization 3 milliLiter(s) Nebulizer every 6 hours  amLODIPine   Tablet 10 milliGRAM(s) Oral daily  dextrose 5%. 1000 milliLiter(s) (50 mL/Hr) IV Continuous <Continuous>  dextrose 50% Injectable 12.5 Gram(s) IV Push once  dextrose 50% Injectable 25 Gram(s) IV Push once  dextrose 50% Injectable 25 Gram(s) IV Push once  docusate sodium 100 milliGRAM(s) Oral daily  fosphenytoin IVPB 100 milliGRAM(s) PE IV Intermittent every 8 hours  hydrALAZINE 100 milliGRAM(s) Oral every 8 hours  insulin glargine Injectable (LANTUS) 16 Unit(s) SubCutaneous at bedtime  insulin lispro (HumaLOG) corrective regimen sliding scale   SubCutaneous three times a day before meals  insulin lispro (HumaLOG) corrective regimen sliding scale   SubCutaneous at bedtime  insulin lispro Injectable (HumaLOG) 4 Unit(s) SubCutaneous three times a day with meals  labetalol 100 milliGRAM(s) Oral every 12 hours  levETIRAcetam  IVPB 1000 milliGRAM(s) IV Intermittent <User Schedule>  levothyroxine 50 MICROGram(s) Oral daily  polyethylene glycol 3350 17 Gram(s) Oral two times a day  potassium chloride  10 mEq/100 mL IVPB 10 milliEquivalent(s) IV Intermittent every 1 hour  senna 1 Tablet(s) Oral daily  sodium chloride 0.9%. 1000 milliLiter(s) (70 mL/Hr) IV Continuous <Continuous>    MEDICATIONS  (PRN):  bisacodyl Suppository 10 milliGRAM(s) Rectal daily PRN Constipation  dextrose 40% Gel 15 Gram(s) Oral once PRN Blood Glucose LESS THAN 70 milliGRAM(s)/deciliter  glucagon  Injectable 1 milliGRAM(s) IntraMuscular once PRN Glucose LESS THAN 70 milligrams/deciliter  hydrALAZINE Injectable 5 milliGRAM(s) IV Push every 4 hours PRN sbp over 160  LORazepam   Injectable 2 milliGRAM(s) IV Push once PRN Seizure  morphine  - Injectable 2 milliGRAM(s) IV Push every 4 hours PRN Severe Pain (7 - 10)  oxyCODONE    IR 5 milliGRAM(s) Oral every 6 hours PRN Moderate Pain (4 - 6)  oxyCODONE    IR 10 milliGRAM(s) Oral every 6 hours PRN Severe Pain (7 - 10)    ICU Vital Signs Last 24 Hrs  T(C): 37.3 (03 May 2019 03:00), Max: 37.7 (02 May 2019 11:00)  T(F): 99.2 (03 May 2019 03:00), Max: 99.9 (02 May 2019 11:00)  HR: 75 (03 May 2019 07:00) (67 - 116)  BP: 140/65 (03 May 2019 07:00) (110/54 - 206/91)  BP(mean): 93 (03 May 2019 07:00) (78 - 131)  ABP: 139/53 (03 May 2019 07:00) (115/52 - 211/82)  ABP(mean): 77 (03 May 2019 07:00) (70 - 135)  RR: 21 (03 May 2019 07:00) (0 - 47)  SpO2: 93% (03 May 2019 07:00) (89% - 99%)                          9.9    10.8  )-----------( 268      ( 01 May 2019 23:02 )             30.0   05-02    137  |  102  |  <4<L>  ----------------------------<  161<H>  3.7   |  22  |  0.49<L>    Ca    9.0      02 May 2019 22:13  Phos  2.7     05-02  Mg     1.8     05-02    TPro  6.9  /  Alb  3.3  /  TBili  0.5  /  DBili  0.2  /  AST  100<H>  /  ALT  100<H>  /  AlkPhos  396<H>  05-02    CAPILLARY BLOOD GLUCOSE    POCT Blood Glucose.: 134 mg/dL (03 May 2019 06:10)  POCT Blood Glucose.: 149 mg/dL (02 May 2019 21:56)  POCT Blood Glucose.: 141 mg/dL (02 May 2019 17:12)  POCT Blood Glucose.: 113 mg/dL (02 May 2019 12:21)  POCT Blood Glucose.: 165 mg/dL (02 May 2019 08:47)        Thyroid Function Tests:   04-22 @ 08:51 TSH 1.45 FreeT4 -      Hemoglobin A1C, Whole Blood: 9.9 % <H> [4.0 - 5.6] (04-21-19 @ 09:26)      MIGUEL/AMA/anti-smooth musc/Anti-LKM/Igs negative.   viral hepatitis panel: Hep A, Hep B,  CMV, and HSV PCR.   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    acetaminophen/EToh/ASA levels negative.     Herpes CSF PCR and repeat CSF 4/24 both negative-   CSF Cx negative, VDRL negative, PCR negative  Afebrile, normal WBC/diff    MICROBIOLOGY:  RECENT CULTURES:  04-27 @ 13:56 .Urine     >=3 organisms. Probable collection contamination.    .CSF  04-24 @ 17:17   Testing in progress  --    No polymorphonuclear cells seen  No organisms seen  by cytocentrifuge    .CSF  04-19 @ 16:57   No growth  --    No polymorphonuclear cells seen  No organisms seen  by cytocentrifuge     CT Head No Cont (05.02.19   A left parietal craniotomy is seen with scalp and dural flap changes.   Parenchymal vasogenic edema adjacent to the craniotomy is consistent with   expected postoperative change.    Ventricles and sulci are otherwise within normal limits. There are no new   areas of attenuation abnormality in the brain. There is no   intraparenchymal hematoma, mass effect or midline shift. No abnormal   extra-axial fluid collections are present. There is no evidence of acute   transcortical territorial infarction.       US Abdomen Complete (04.29.19 @ 15:33) >  FINDINGS:    Liver: Within normal limits.    Bile ducts: Normal caliber. Common bile duct measures 3 mm.     Gallbladder: Within normal limits.        Pancreas: Visualized portions are within normal limits.    Spleen: 8 cm. Within normal limits.    Right kidney: 12.5 cm. No hydronephrosis.    Left kidney: 12.0 cm.  No hydronephrosis.    Ascites: None.    Aorta and IVC: Visualized portions are within normal limits.    IMPRESSION:     Normal abdominal ultrasound.     MR Abdomen w/wo IV Cont (04.29.19  LOWER CHEST: Bibasilar subsegmental atelectasis.    LIVER: Within normal limits.   BILE DUCTS: Normal caliber.  GALLBLADDER: Within normal limits.  SPLEEN: Within normal limits.  PANCREAS: Within normal limits.  ADRENALS: Within normal limits.  KIDNEYS/URETERS: Homogeneous enhancement without focal lesion or   hydronephrosis. No abnormality at diffusion-weighted imaging.    VISUALIZED PORTIONS:    BOWEL: Small hiatal hernia.   PERITONEUM: No ascites.  VESSELS: Renal veins and IVC are patent.  RETROPERITONEUM: No lymphadenopathy.    ABDOMINAL WALL: Unchanged nonspecific infiltration of the periumbilical   soft tissues.  BONES: Right iliac bone island.      MR Head w/wo IV Cont (04.25.19 @ 10:20) >    IMPRESSION:    No significant interval change from previous brain MRIs.    Similar-appearing nonenhancing hyperintense signal in the left posterior   temporal and occipital lobes which appears both subcortical and cortical   in nature.    Study done for presurgical planning.      CT Abdomen and Pelvis w/ IV Cont (04.25.19 @ 16:16) >  FINDINGS:    CHEST:     LUNGS AND LARGE AIRWAYS: Patent central airways. Bilateral patchy   nonspecific groundglass opacities. Improved aeration in the left lower   lobe. Linear atelectasis in the right lower lobe.  PLEURA: Trace bilateral pleural effusions.  VESSELS: Left PICC line, with its tip at the cavoatrial junction.   Atherosclerotic calcification of the aorta and coronary arteries.  HEART: Heart size is mildly enlarged. No pericardial effusion.  MEDIASTINUM AND CANDICE: No lymphadenopathy.  CHEST WALL AND LOWER NECK: Within normal limits.    ABDOMEN AND PELVIS:    LIVER: Within normal limits.  BILE DUCTS: Normal caliber.  GALLBLADDER: Within normal limits.  SPLEEN: Within normal limits.  PANCREAS: Within normal limits.  ADRENALS: Within normal limits.  KIDNEYS/URETERS: No hydronephrosis. Heterogeneous enhancement of the   kidneys bilaterally, with patchy areas of decreased attenuation.    BLADDER: Within normal limits.  REPRODUCTIVE ORGANS:Status post hysterectomy.    BOWEL: No bowel obstruction. Appendix is within normal limits. Small   hiatal hernia.  PERITONEUM: No ascites.  VESSELS:  Atherosclerotic calcification of the aorta and bilateral renal   arteries. Again noted, is infiltration of the subcutaneous tissues in the   left groin, likely related to intervention, slightly decreased since   4/18/2019.  RETROPERITONEUM: No lymphadenopathy.    ABDOMINAL WALL: Again noted, is infiltration of the periumbilical soft   tissues.  BONES: Degenerative changes in the spine. Sclerotic focus in the right   iliac bone, unchanged compared to prior studies dating back to 9/1/2018.    IMPRESSION: Heterogeneous enhancement of the kidneys bilaterally, with   striated nephrograms bilaterally. Differential diagnosis includes   infection such as pyelonephritis, as well as ischemia and infiltrative   processes such is lymphoma and leukemia.  Correlation with urinalysis is   recommended.    Infiltration of the periumbilical subcutaneous tissues again noted.      GENERAL: NAD, well-developed  HEAD:  Atraumatic, Normocephalic  EYES: EOMI, PERRLA, conjunctiva and sclera clear  NECK: Supple, No JVD  CHEST/LUNG: Clear to auscultation bilaterally; No wheeze  HEART: Regular rate and rhythm; No murmurs, rubs, or gallops  ABDOMEN: Soft, Nontender, Nondistended; Bowel sounds present  EXTREMITIES:  2+ Peripheral Pulses, No clubbing, cyanosis, or edema  NEUROLOGY: non-focal  SKIN: No rashes or lesions HPI:  69F w/ CAD, DM, HTN, HLD, hypothyroid, on eliquis, ASA, s/p umbilical hernia repair presented as a level 1 trauma activation. Patient brought in by ambulance 4/18/19 after being found down by family. Hematology consulted for workup of possible lymphoma. Admitted to SICU and then began having subclinical siezures . Patient had LP and MRI with Left posterior lateral temporal and parietal occipital hyperintense T2 FLAIR suspicious for herpes encephalitis - started on Acyclovir. For the seizures she was also started onkeppra, fycompa, and vimpat,  4/19 PM Transfer[facility]   4/19-20 multiple subclinical seizures, now on keppra, fycompa, and vimpat  4/22- 4/26- L Temp/occip seizure   4/27- seizure free- dilantin + Keppra  5/1: brain biopsy  5/2- HTN - 's started on cardene drip; clonidine added 0.1 mg q 12, increased labetolol to 200mg q 8  5/3- cardene weaned off at 0600       PAST MEDICAL & SURGICAL HISTORY:  Hypothyroid  Obesity  NACHO (obstructive sleep apnea): sleep study done 5 years ago  DM (Diabetes Mellitus) (ICD9 250.00)  Dyslipidemia (ICD9 272.4)  HTN (Hypertension) (ICD9 401.9)  CAD (Coronary Artery Disease) (ICD9 414.00): c STENTS  History of hysterectomy  History of ovarian cystectomy: x 2 in 2013  S/P primary angioplasty with coronary stent: has 5 stents-first placed in 2007, last stent -2011,  C Section: x 1    FAMILY HISTORY:  Family history of heart disease (Aunt)    SOCIAL HISTORY: No EtOH, no tobacco      Allergies    No Known Allergies    Intolerances    REVIEW OF SYSTEMS: [X ] Neuro: [X ] Headache [ ] Back pain [ ] Numbness [ ] Weakness [ ] Ataxia [ ] Dizziness [ ] Aphasia [ ] Dysarthria [ ] Visual disturbance  Resp: [ ] Shortness of breath/dyspnea, [ ] Orthopnea [ ] Cough  CV: [ ] Chest pain [ ] Palpitation [ ] Lightheadedness [ ] Syncope  Renal: [ ] Thirst [ ] Edema  GI: [ ] Nausea [ ] Emesis [ ] Abdominal pain [ ] Constipation [ ] Diarrhea  Hem: [ ] Hematemesis [ ] bright red blood per rectum  ID: [ ] Fever [ ] Chills [ ] Dysuria  ENT: [ ] Rhinorrhea    MEDICATIONS  (STANDING):  ALBUTerol/ipratropium for Nebulization 3 milliLiter(s) Nebulizer every 6 hours  amLODIPine   Tablet 10 milliGRAM(s) Oral daily  dextrose 5%. 1000 milliLiter(s) (50 mL/Hr) IV Continuous <Continuous>  dextrose 50% Injectable 12.5 Gram(s) IV Push once  dextrose 50% Injectable 25 Gram(s) IV Push once  dextrose 50% Injectable 25 Gram(s) IV Push once  docusate sodium 100 milliGRAM(s) Oral daily  fosphenytoin IVPB 100 milliGRAM(s) PE IV Intermittent every 8 hours  hydrALAZINE 100 milliGRAM(s) Oral every 8 hours  insulin glargine Injectable (LANTUS) 16 Unit(s) SubCutaneous at bedtime  insulin lispro (HumaLOG) corrective regimen sliding scale   SubCutaneous three times a day before meals  insulin lispro (HumaLOG) corrective regimen sliding scale   SubCutaneous at bedtime  insulin lispro Injectable (HumaLOG) 4 Unit(s) SubCutaneous three times a day with meals  labetalol 100 milliGRAM(s) Oral every 12 hours  levETIRAcetam  IVPB 1000 milliGRAM(s) IV Intermittent <User Schedule>  levothyroxine 50 MICROGram(s) Oral daily  polyethylene glycol 3350 17 Gram(s) Oral two times a day  potassium chloride  10 mEq/100 mL IVPB 10 milliEquivalent(s) IV Intermittent every 1 hour  senna 1 Tablet(s) Oral daily  sodium chloride 0.9%. 1000 milliLiter(s) (70 mL/Hr) IV Continuous <Continuous>    MEDICATIONS  (PRN):  bisacodyl Suppository 10 milliGRAM(s) Rectal daily PRN Constipation  dextrose 40% Gel 15 Gram(s) Oral once PRN Blood Glucose LESS THAN 70 milliGRAM(s)/deciliter  glucagon  Injectable 1 milliGRAM(s) IntraMuscular once PRN Glucose LESS THAN 70 milligrams/deciliter  hydrALAZINE Injectable 5 milliGRAM(s) IV Push every 4 hours PRN sbp over 160  LORazepam   Injectable 2 milliGRAM(s) IV Push once PRN Seizure  morphine  - Injectable 2 milliGRAM(s) IV Push every 4 hours PRN Severe Pain (7 - 10)  oxyCODONE    IR 5 milliGRAM(s) Oral every 6 hours PRN Moderate Pain (4 - 6)  oxyCODONE    IR 10 milliGRAM(s) Oral every 6 hours PRN Severe Pain (7 - 10)    ICU Vital Signs Last 24 Hrs  T(C): 37.3 (03 May 2019 03:00), Max: 37.7 (02 May 2019 11:00)  T(F): 99.2 (03 May 2019 03:00), Max: 99.9 (02 May 2019 11:00)  HR: 75 (03 May 2019 07:00) (67 - 116)  BP: 140/65 (03 May 2019 07:00) (110/54 - 206/91)  BP(mean): 93 (03 May 2019 07:00) (78 - 131)  ABP: 139/53 (03 May 2019 07:00) (115/52 - 211/82)  ABP(mean): 77 (03 May 2019 07:00) (70 - 135)  RR: 21 (03 May 2019 07:00) (0 - 47)  SpO2: 93% (03 May 2019 07:00) (89% - 99%)                          9.9    10.8  )-----------( 268      ( 01 May 2019 23:02 )             30.0   05-02    137  |  102  |  <4<L>  ----------------------------<  161<H>  3.7   |  22  |  0.49<L>    Ca    9.0      02 May 2019 22:13  Phos  2.7     05-02  Mg     1.8     05-02    TPro  6.9  /  Alb  3.3  /  TBili  0.5  /  DBili  0.2  /  AST  100<H>  /  ALT  100<H>  /  AlkPhos  396<H>  05-02    CAPILLARY BLOOD GLUCOSE    POCT Blood Glucose.: 134 mg/dL (03 May 2019 06:10)  POCT Blood Glucose.: 149 mg/dL (02 May 2019 21:56)  POCT Blood Glucose.: 141 mg/dL (02 May 2019 17:12)  POCT Blood Glucose.: 113 mg/dL (02 May 2019 12:21)  POCT Blood Glucose.: 165 mg/dL (02 May 2019 08:47)        Thyroid Function Tests:   04-22 @ 08:51 TSH 1.45 FreeT4 -      Hemoglobin A1C, Whole Blood: 9.9 % <H> [4.0 - 5.6] (04-21-19 @ 09:26)      MIGUEL/AMA/anti-smooth musc/Anti-LKM/Igs negative.   viral hepatitis panel: Hep A, Hep B,  CMV, and HSV PCR.   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    acetaminophen/EToh/ASA levels negative.     Herpes CSF PCR and repeat CSF 4/24 both negative-   CSF Cx negative, VDRL negative, PCR negative  Afebrile, normal WBC/diff    MICROBIOLOGY:  RECENT CULTURES:  04-27 @ 13:56 .Urine     >=3 organisms. Probable collection contamination.    .CSF  04-24 @ 17:17   Testing in progress  --    No polymorphonuclear cells seen  No organisms seen  by cytocentrifuge    .CSF  04-19 @ 16:57   No growth  --    No polymorphonuclear cells seen  No organisms seen  by cytocentrifuge     CT Head No Cont (05.02.19   A left parietal craniotomy is seen with scalp and dural flap changes.   Parenchymal vasogenic edema adjacent to the craniotomy is consistent with   expected postoperative change.    Ventricles and sulci are otherwise within normal limits. There are no new   areas of attenuation abnormality in the brain. There is no   intraparenchymal hematoma, mass effect or midline shift. No abnormal   extra-axial fluid collections are present. There is no evidence of acute   transcortical territorial infarction.       US Abdomen Complete (04.29.19 @ 15:33) >  FINDINGS:    Liver: Within normal limits.    Bile ducts: Normal caliber. Common bile duct measures 3 mm.     Gallbladder: Within normal limits.        Pancreas: Visualized portions are within normal limits.    Spleen: 8 cm. Within normal limits.    Right kidney: 12.5 cm. No hydronephrosis.    Left kidney: 12.0 cm.  No hydronephrosis.    Ascites: None.    Aorta and IVC: Visualized portions are within normal limits.    IMPRESSION:     Normal abdominal ultrasound.     MR Abdomen w/wo IV Cont (04.29.19  LOWER CHEST: Bibasilar subsegmental atelectasis.    LIVER: Within normal limits.   BILE DUCTS: Normal caliber.  GALLBLADDER: Within normal limits.  SPLEEN: Within normal limits.  PANCREAS: Within normal limits.  ADRENALS: Within normal limits.  KIDNEYS/URETERS: Homogeneous enhancement without focal lesion or   hydronephrosis. No abnormality at diffusion-weighted imaging.    VISUALIZED PORTIONS:    BOWEL: Small hiatal hernia.   PERITONEUM: No ascites.  VESSELS: Renal veins and IVC are patent.  RETROPERITONEUM: No lymphadenopathy.    ABDOMINAL WALL: Unchanged nonspecific infiltration of the periumbilical   soft tissues.  BONES: Right iliac bone island.      MR Head w/wo IV Cont (04.25.19 @ 10:20) >    IMPRESSION:    No significant interval change from previous brain MRIs.    Similar-appearing nonenhancing hyperintense signal in the left posterior   temporal and occipital lobes which appears both subcortical and cortical   in nature.    Study done for presurgical planning.      CT Abdomen and Pelvis w/ IV Cont (04.25.19 @ 16:16) >  FINDINGS:    CHEST:     LUNGS AND LARGE AIRWAYS: Patent central airways. Bilateral patchy   nonspecific groundglass opacities. Improved aeration in the left lower   lobe. Linear atelectasis in the right lower lobe.  PLEURA: Trace bilateral pleural effusions.  VESSELS: Left PICC line, with its tip at the cavoatrial junction.   Atherosclerotic calcification of the aorta and coronary arteries.  HEART: Heart size is mildly enlarged. No pericardial effusion.  MEDIASTINUM AND CANDICE: No lymphadenopathy.  CHEST WALL AND LOWER NECK: Within normal limits.    ABDOMEN AND PELVIS:    LIVER: Within normal limits.  BILE DUCTS: Normal caliber.  GALLBLADDER: Within normal limits.  SPLEEN: Within normal limits.  PANCREAS: Within normal limits.  ADRENALS: Within normal limits.  KIDNEYS/URETERS: No hydronephrosis. Heterogeneous enhancement of the   kidneys bilaterally, with patchy areas of decreased attenuation.    BLADDER: Within normal limits.  REPRODUCTIVE ORGANS:Status post hysterectomy.    BOWEL: No bowel obstruction. Appendix is within normal limits. Small   hiatal hernia.  PERITONEUM: No ascites.  VESSELS:  Atherosclerotic calcification of the aorta and bilateral renal   arteries. Again noted, is infiltration of the subcutaneous tissues in the   left groin, likely related to intervention, slightly decreased since   4/18/2019.  RETROPERITONEUM: No lymphadenopathy.    ABDOMINAL WALL: Again noted, is infiltration of the periumbilical soft   tissues.  BONES: Degenerative changes in the spine. Sclerotic focus in the right   iliac bone, unchanged compared to prior studies dating back to 9/1/2018.    IMPRESSION: Heterogeneous enhancement of the kidneys bilaterally, with   striated nephrograms bilaterally. Differential diagnosis includes   infection such as pyelonephritis, as well as ischemia and infiltrative   processes such is lymphoma and leukemia.  Correlation with urinalysis is   recommended.    Infiltration of the periumbilical subcutaneous tissues again noted.      GENERAL: NAD, well-developed  HEAD:  Atraumatic, Normocephalic  EYES: EOMI, PERRLA, conjunctiva and sclera clear  NECK: Supple, No JVD  CHEST/LUNG: Clear to auscultation bilaterally; No wheeze  HEART: Regular rate and rhythm; No murmurs, rubs, or gallops  ABDOMEN: Soft, Nontender, Nondistended; Bowel sounds present  EXTREMITIES:  2+ Peripheral Pulses, No clubbing, cyanosis, or edema  NEUROLOGY: non-focal  SKIN: No rashes or lesions

## 2019-05-03 NOTE — PROGRESS NOTE ADULT - ASSESSMENT
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 aspiration risk  Will check Hep B status, not done with prior labs

## 2019-05-03 NOTE — PROGRESS NOTE ADULT - SUBJECTIVE AND OBJECTIVE BOX
Patient is a 69y old  Female who presents with a chief complaint of seizures,confusion (03 May 2019 09:45)      SUBJECTIVE / OVERNIGHT EVENTS: tired  Review of Systems  chest pain no  palpitations no  sob no  nausea no  headache no    MEDICATIONS  (STANDING):  ALBUTerol/ipratropium for Nebulization 3 milliLiter(s) Nebulizer every 6 hours  amLODIPine   Tablet 10 milliGRAM(s) Oral daily  chlorhexidine 4% Liquid 1 Application(s) Topical <User Schedule>  cloNIDine 0.1 milliGRAM(s) Oral every 12 hours  dextrose 5%. 1000 milliLiter(s) (50 mL/Hr) IV Continuous <Continuous>  dextrose 50% Injectable 12.5 Gram(s) IV Push once  dextrose 50% Injectable 25 Gram(s) IV Push once  dextrose 50% Injectable 25 Gram(s) IV Push once  docusate sodium 100 milliGRAM(s) Oral daily  enalapril 10 milliGRAM(s) Oral two times a day  enoxaparin Injectable 40 milliGRAM(s) SubCutaneous <User Schedule>  hydrALAZINE 100 milliGRAM(s) Oral every 8 hours  insulin glargine Injectable (LANTUS) 16 Unit(s) SubCutaneous at bedtime  insulin lispro (HumaLOG) corrective regimen sliding scale   SubCutaneous three times a day before meals  insulin lispro (HumaLOG) corrective regimen sliding scale   SubCutaneous at bedtime  insulin lispro Injectable (HumaLOG) 4 Unit(s) SubCutaneous three times a day with meals  labetalol 200 milliGRAM(s) Oral every 8 hours  lacosamide 150 milliGRAM(s) Oral two times a day  levETIRAcetam  IVPB 1000 milliGRAM(s) IV Intermittent <User Schedule>  levothyroxine 50 MICROGram(s) Oral daily  polyethylene glycol 3350 17 Gram(s) Oral two times a day  QUEtiapine 12.5 milliGRAM(s) Oral two times a day  senna 1 Tablet(s) Oral daily    MEDICATIONS  (PRN):  bisacodyl Suppository 10 milliGRAM(s) Rectal daily PRN Constipation  dextrose 40% Gel 15 Gram(s) Oral once PRN Blood Glucose LESS THAN 70 milliGRAM(s)/deciliter  glucagon  Injectable 1 milliGRAM(s) IntraMuscular once PRN Glucose LESS THAN 70 milligrams/deciliter  haloperidol    Injectable 1 milliGRAM(s) IV Push every 4 hours PRN Agitation  hydrALAZINE Injectable 10 milliGRAM(s) IV Push every 4 hours PRN sbp over 160  HYDROmorphone  Injectable 0.5 milliGRAM(s) IV Push every 4 hours PRN Severe Pain (7 - 10)  LORazepam   Injectable 2 milliGRAM(s) IV Push once PRN Seizure  morphine  - Injectable 2 milliGRAM(s) IV Push every 4 hours PRN Moderate Pain (4 - 6)      Vital Signs Last 24 Hrs  T(C): 37.1 (03 May 2019 19:30), Max: 37.8 (03 May 2019 11:00)  T(F): 98.8 (03 May 2019 19:30), Max: 100.1 (03 May 2019 11:00)  HR: 74 (03 May 2019 19:30) (74 - 102)  BP: 182/84 (03 May 2019 19:30) (110/54 - 194/71)  BP(mean): 120 (03 May 2019 19:30) (78 - 123)  RR: 25 (03 May 2019 19:30) (0 - 39)  SpO2: 94% (03 May 2019 19:30) (89% - 99%)    PHYSICAL EXAM:  GENERAL: NAD  HEAD:  Atraumatic, Normocephalic  EYES: EOMI, PERRLA, conjunctiva and sclera clear  NECK: Supple, No JVD  CHEST/LUNG: Clear to auscultation bilaterally; No wheeze  HEART: Regular rate and rhythm; No murmurs, rubs, or gallops  ABDOMEN: Soft, Nontender, Nondistended; Bowel sounds present  EXTREMITIES:  2+ Peripheral Pulses, No clubbing, cyanosis, or edema  NEUROLOGY: non-focal  SKIN: No rashes or lesions    LABS:                        9.9    10.8  )-----------( 268      ( 01 May 2019 23:02 )             30.0     05-02    137  |  102  |  <4<L>  ----------------------------<  161<H>  3.7   |  22  |  0.49<L>    Ca    9.0      02 May 2019 22:13  Phos  2.7     05-02  Mg     1.8     05-02    TPro  6.9  /  Alb  3.3  /  TBili  0.5  /  DBili  0.2  /  AST  100<H>  /  ALT  100<H>  /  AlkPhos  396<H>  05-02    PT/INR - ( 01 May 2019 23:02 )   PT: 11.4 sec;   INR: 0.99 ratio                     RADIOLOGY & ADDITIONAL TESTS:    Imaging Personally Reviewed:    Consultant(s) Notes Reviewed:      Care Discussed with Consultants/Other Providers:

## 2019-05-03 NOTE — PROGRESS NOTE ADULT - SUBJECTIVE AND OBJECTIVE BOX
Vital Signs Last 24 Hrs  T(C): 37.1 (02 May 2019 23:00), Max: 37.7 (02 May 2019 11:00)  T(F): 98.8 (02 May 2019 23:00), Max: 99.9 (02 May 2019 11:00)  HR: 95 (03 May 2019 00:00) (66 - 116)  BP: 124/61 (03 May 2019 00:00) (110/54 - 206/91)  BP(mean): 87 (03 May 2019 00:00) (78 - 131)  RR: 23 (03 May 2019 00:00) (0 - 47)  SpO2: 97% (03 May 2019 00:00) (89% - 99%)    EXAM  Awake, Ox3,  FC, DELGADO 5/5

## 2019-05-03 NOTE — EEG REPORT - NS EEG TEXT BOX
[facility]   COMPREHENSIVE EPILEPSY CENTER   REPORT OF ROUTINE VIDEO EEG     [facility]: [address] [place] [address] , [address], [place] [address], Ph#: [phone]  [facility]: [id number], [place] [address], Ph#: [phone]  Office: [address] [place] [address], [facility] [address], [place] [address] Ph#: [phone]    Patient Name: RON FOSTER  Age and : 69y (54)  MRN #: [id number]  Location: [place] [address]  Referring Physician: Micha Thornton    Study Date: 19    _____________________________________________________________  TECHNICAL INFORMATION    Placement and Labeling of Electrodes:  The EEG was performed utilizing 20 channels referential EEG connections (coronal over temporal over parasagittal montage) using all standard 10-20 electrode placements with EKG.  Recording was at a sampling rate of 256 samples per second per channel.  Time synchronized digital video recording was done simultaneously with EEG recording.  A low light infrared camera was used for low light recording.  Saulo and seizure detection algorithms were utilized.    _____________________________________________________________  HISTORY    Patient is a 69y old  Female who presents with a chief complaint of seizures,confusion (03 May 2019 09:45)      PERTINENT MEDICATION:  lacosamide 150 milliGRAM(s) Oral two times a day  levETIRAcetam  IVPB 1000 milliGRAM(s) IV Intermittent <User Schedule>    _____________________________________________________________  STUDY INTERPRETATION    Findings:  The background was continuous, spontaneously variable and reactive. Background predominantly consisted of diffuse theta and delta activities. There was fragments of asymmetric 7-8 Hz posterior dominant rhythm seen at 30uV, better formed over the right hemisphere.      Focal Slowing:  Continuous polymorphic delta and theta activity over the left hemisphere.    Sleep Background:  Stage II sleep transients were characterized by asymmetric sleep spindles and K complexes, better formed over the right hemisphere.    Interictal Epileptiform Activity:   Occasional very brief runs of poorly organized fluctuating 1-2 Hz left posterior-temporal max (T7/P7) lateralized periodic discharges with spike-wave morphology.    Events:  None    Activation Procedures:   Hyperventilation was not performed.    Photic stimulation was not performed.     Artifacts:  Intermittent myogenic and movement artifacts were noted.    ECG:  The heart rate on single channel ECG was predominantly between 70-90 BPM.    _____________________________________________________________  EEG SUMMARY/CLASSIFICATION  Abnormal EEG in awake, drowsy and asleep states.  - Occasional very brief runs of poorly organized fluctuating 1-2 Hz left posterior-temporal max (T7/P7) lateralized periodic discharges with spike-wave morphology.  - Continuous polymorphic delta and theta activity over the left hemisphere.  - Mild to moderate generalized slowing.   _____________________________________________________________  EEG IMPRESSION/CLINICAL CORRELATE    Abnormal EEG study.  1. Epileptogenic focus in the left posterior-temporal region.   2. Structural abnormality over the left hemisphere.   3. Mild to moderate diffuse or multifocal cerebral dysfunction.  4. No seizures recorded.     _____________________________________________________________    Sergio Phillips MD  Attending Physician, [facility] Epilepsy [place] [facility]   COMPREHENSIVE EPILEPSY CENTER   REPORT OF ROUTINE VIDEO EEG     [facility]: [address] [place] [address] , [address], [place] [address], Ph#: [phone]  [facility]: [id number], [place] [address], Ph#: [phone]  Office: [address] [place] [address], [facility] [address], [place] [address] Ph#: [phone]    Patient Name: ORN FOSTER  Age and : 69y (54)  MRN #: [id number]  Location: [place] [address]  Referring Physician: Micha Thornton    Study Date: 19    _____________________________________________________________  TECHNICAL INFORMATION    Placement and Labeling of Electrodes:  The EEG was performed utilizing 20 channels referential EEG connections (coronal over temporal over parasagittal montage) using all standard 10-20 electrode placements with EKG.  Recording was at a sampling rate of 256 samples per second per channel.  Time synchronized digital video recording was done simultaneously with EEG recording.  A low light infrared camera was used for low light recording.  Saulo and seizure detection algorithms were utilized.    _____________________________________________________________  HISTORY    Patient is a 69y old  Female who presents with a chief complaint of seizures,confusion (03 May 2019 09:45)      PERTINENT MEDICATION:  lacosamide 150 milliGRAM(s) Oral two times a day  levETIRAcetam  IVPB 1000 milliGRAM(s) IV Intermittent <User Schedule>    _____________________________________________________________  STUDY INTERPRETATION    Findings:  The background was continuous, spontaneously variable and reactive. Background predominantly consisted of diffuse theta and delta activities. There was fragments of asymmetric 7-8 Hz posterior dominant rhythm seen at 30uV, better formed over the right hemisphere.      Focal Slowing:  Continuous polymorphic delta and theta activity over the left hemisphere.    Sleep Background:  Stage II sleep transients were characterized by asymmetric sleep spindles and K complexes, better formed over the right hemisphere.    Interictal Epileptiform Activity:   Occasional very brief runs of poorly organized fluctuating 1-2 Hz left posterior-temporo-occipital max (T7/P7/O1) lateralized periodic discharges with spike-wave morphology.    Events:  None    Activation Procedures:   Hyperventilation was not performed.    Photic stimulation was not performed.     Artifacts:  Intermittent myogenic and movement artifacts were noted.    ECG:  The heart rate on single channel ECG was predominantly between 70-90 BPM.    _____________________________________________________________  EEG SUMMARY/CLASSIFICATION  Abnormal EEG in awake, drowsy and asleep states.  - Occasional very brief runs of poorly organized fluctuating 1-2 Hz left posterior-temporo-occipital max (T7/P7/O1) lateralized periodic discharges with spike-wave morphology.  - Continuous polymorphic delta and theta activity over the left hemisphere.  - Mild to moderate generalized slowing.   _____________________________________________________________  EEG IMPRESSION/CLINICAL CORRELATE    Abnormal EEG study.  1. Epileptogenic focus in the left posterior-temporo-occipital region.   2. Structural abnormality over the left hemisphere.   3. Mild to moderate diffuse or multifocal cerebral dysfunction.  4. No seizures recorded.     _____________________________________________________________    Sergio Phillips MD  Attending Physician, [facility] Epilepsy [place]

## 2019-05-03 NOTE — PROGRESS NOTE ADULT - SUBJECTIVE AND OBJECTIVE BOX
Follow-up Pulm Progress Note    No new respiratory events overnight.  Denies SOB/CP.   c/o headache  97% on RA    Medications:  MEDICATIONS  (STANDING):  ALBUTerol/ipratropium for Nebulization 3 milliLiter(s) Nebulizer every 6 hours  amLODIPine   Tablet 10 milliGRAM(s) Oral daily  chlorhexidine 4% Liquid 1 Application(s) Topical <User Schedule>  cloNIDine 0.1 milliGRAM(s) Oral every 12 hours  dextrose 5%. 1000 milliLiter(s) (50 mL/Hr) IV Continuous <Continuous>  dextrose 50% Injectable 12.5 Gram(s) IV Push once  dextrose 50% Injectable 25 Gram(s) IV Push once  dextrose 50% Injectable 25 Gram(s) IV Push once  docusate sodium 100 milliGRAM(s) Oral daily  enalapril 10 milliGRAM(s) Oral two times a day  enoxaparin Injectable 40 milliGRAM(s) SubCutaneous <User Schedule>  hydrALAZINE 100 milliGRAM(s) Oral every 8 hours  insulin glargine Injectable (LANTUS) 16 Unit(s) SubCutaneous at bedtime  insulin lispro (HumaLOG) corrective regimen sliding scale   SubCutaneous three times a day before meals  insulin lispro (HumaLOG) corrective regimen sliding scale   SubCutaneous at bedtime  insulin lispro Injectable (HumaLOG) 4 Unit(s) SubCutaneous three times a day with meals  labetalol 200 milliGRAM(s) Oral every 8 hours  lacosamide 150 milliGRAM(s) Oral two times a day  lacosamide 150 milliGRAM(s) Oral two times a day  levETIRAcetam  IVPB 1000 milliGRAM(s) IV Intermittent <User Schedule>  levothyroxine 50 MICROGram(s) Oral daily  polyethylene glycol 3350 17 Gram(s) Oral two times a day  QUEtiapine 12.5 milliGRAM(s) Oral two times a day  senna 1 Tablet(s) Oral daily    MEDICATIONS  (PRN):  bisacodyl Suppository 10 milliGRAM(s) Rectal daily PRN Constipation  dextrose 40% Gel 15 Gram(s) Oral once PRN Blood Glucose LESS THAN 70 milliGRAM(s)/deciliter  glucagon  Injectable 1 milliGRAM(s) IntraMuscular once PRN Glucose LESS THAN 70 milligrams/deciliter  haloperidol    Injectable 1 milliGRAM(s) IV Push every 4 hours PRN Agitation  hydrALAZINE Injectable 10 milliGRAM(s) IV Push every 4 hours PRN sbp over 160  HYDROmorphone  Injectable 0.5 milliGRAM(s) IV Push every 4 hours PRN Severe Pain (7 - 10)  LORazepam   Injectable 2 milliGRAM(s) IV Push once PRN Seizure  morphine  - Injectable 2 milliGRAM(s) IV Push every 4 hours PRN Moderate Pain (4 - 6)          Vital Signs Last 24 Hrs  T(C): 37.8 (03 May 2019 11:00), Max: 37.8 (03 May 2019 11:00)  T(F): 100.1 (03 May 2019 11:00), Max: 100.1 (03 May 2019 11:00)  HR: 83 (03 May 2019 11:23) (74 - 116)  BP: 140/65 (03 May 2019 07:00) (110/54 - 206/91)  BP(mean): 93 (03 May 2019 07:00) (78 - 131)  RR: 33 (03 May 2019 11:00) (0 - 47)  SpO2: 99% (03 May 2019 11:23) (89% - 99%) on RA          05-02 @ 07:01  -  05-03 @ 07:00  --------------------------------------------------------  IN: 1790 mL / OUT: 1400 mL / NET: 390 mL          LABS:                        9.9    10.8  )-----------( 268      ( 01 May 2019 23:02 )             30.0     05-02    137  |  102  |  <4<L>  ----------------------------<  161<H>  3.7   |  22  |  0.49<L>    Ca    9.0      02 May 2019 22:13  Phos  2.7     05-02  Mg     1.8     05-02    TPro  6.9  /  Alb  3.3  /  TBili  0.5  /  DBili  0.2  /  AST  100<H>  /  ALT  100<H>  /  AlkPhos  396<H>  05-02          CAPILLARY BLOOD GLUCOSE      POCT Blood Glucose.: 129 mg/dL (03 May 2019 12:27)    PT/INR - ( 01 May 2019 23:02 )   PT: 11.4 sec;   INR: 0.99 ratio                 MIGUEL Negative 04-28 @ 17:30  Anti SS-1 --  Anti SS-2 --  Anti RNP --  RF -- 04-28 @ 17:30    Atypical ANCA -- 04-28 @ 17:30  c-ANCA titer -- 04-28 @ 17:30  c-ANCA -- 04-28 @ 17:30  p-ANCA -- 04-28 @ 17:30              CULTURES: (if applicable)  Culture Results:   >=3 organisms. Probable collection contamination. (04-27 @ 13:56)          Physical Examination:  PULM: Clear to auscultation bilaterally, no significant sputum production  CVS: S1, S2 heard    RADIOLOGY REVIEWED

## 2019-05-03 NOTE — PROGRESS NOTE ADULT - ASSESSMENT
68 y/o F w/h/o uncontrolled T2DM on oral DM meds. Also HTN/HLD/CAD/Hypothyroidism/recent umbilical hernia repair 2 weeks ago. Here s/p fall with LOC> seizures> hypernatremia. S/p craniotomy for brain biopsy. Tolerating POs today.  BG values at goal while on present insulin doses. . Pt remains in 1 to 1 observation due to on/off confusion and trying to climb out of bed. Family at bedside. No hypoglycemia.

## 2019-05-03 NOTE — PROGRESS NOTE ADULT - ASSESSMENT
69 f s/p brain mass biopsy  Off antibiotics.  Hold ASA, Eliquis - restart when cleared by NSx.  A Fib  Hypothyroid  - follow TSH.  HTN control  COPD  - continue nebs  Diabetes control.  Seizure control  Abnormal LFT improving   - Hepatology follow  - follow LFT  Path pending.  SICU care  Tyrone Atnony MD pager [phone]

## 2019-05-03 NOTE — PROGRESS NOTE ADULT - PROBLEM SELECTOR PLAN 1
-test BG AC/HS  -c/w Lantus 16 units. Can change to 14 units if NPO  -c/w Humalog 4 units AC meals (hold if not eating)  -c/w Humalog moderate correction scale AC and Mod HS scale  Discharge recs will depend on PO intake/insulin needs and glycemic control in hospital. Gurvinder oral meds  -Plan discussed with pt/team.  Contact info: [phone] (24/7). pager [phone]

## 2019-05-03 NOTE — PROGRESS NOTE ADULT - ASSESSMENT
Seizures, brain biopsy  - Delirium precautions  - Continue seizure meds  - If has fluctuations in exam, re-hook EEG  - Pain control  - -160mmHg; adjust labetalol to wean off nicardipine    Updated family at bedside

## 2019-05-03 NOTE — PROGRESS NOTE ADULT - ASSESSMENT
68 y/o F with PMH of CAD, DM, HTN, HLD, hypothyroid, on eliquis, ASA, s/p umbilical hernia repair ~2 weeks ago, presents as level 1 trauma activation after being found down at home. Found to have status epilepticus-now controlled. L temporal lesion identified on MRI brain of yet unclear etiology. s/p empiric treatment for herpes encephalitis. Called to eval for ?NACHO. Sp02 dropping to 92% on RA at night. CT chest with mosaic attenuation pattern-expiratory phase of film vs. small airway disease d/t asthma. Hospital course c/b transaminitis, ?drug induced. s/p brain biopsy 5/1. Post procedure course c/b HTN requiring ICU admission

## 2019-05-03 NOTE — PROGRESS NOTE ADULT - ASSESSMENT
69F w/ CAD, DM, HTN, HLD, hypothyroid, on eliquis, ASA, s/p umbilical hernia repair presented as a level 1 trauma activation. Patient brought in by ambulance after being found down by family. Course complicated by acute respiratory failure s/p intubation , seizures noted on imaging is left posterior lateral temporal and parietal occipital hyperintense T2 and FLAIR signal . There  is a 4 x 6 mm  enhancement  in the left posterior lateral lateral  temporal lobe with questionable leptomeningeal enhancement and slight  susceptibility, found to have brain mass on MRI. S/P empiric tx for herpes encephalitis;   POD #2 S/P brain Bx     Neuro  Neuro checks q 4hr  Pain control- Morphine alt with dilauduid -  Haldol for agitation delirium  Off all ABX  On seizure meds- dilantin level 7.5 - 5/2/19 - give 200mg x1  - adequate  and on Keppra   F/U autoimmune W/U   Add TSI and anti thyroglobulin Ab. ( see above labs )    Resp  Probable NACHO  Maintain O2 sats > 92 % - suppl O2 at night or CPAP if tolerate    CV- afib, HTN- off NOAC/ASA ; CAD - hx of stents  Maintain -150  amlodipine, labetolol , hydralazine 300mg /day; vasotec  Wean cardene to off  PRN IV Labetolol and hydralazine   Discuss with NSG when to resume ASA  NOAC    ID- AFEBRILE ; s/p ACYCLOVIR FOR POSSIBLE HERPES ENCEPHALITIS  CURRENTLY OFF ALL abx    End- Hypothyroidism and DM  Resume Levothyroxine  Lantus 16 U qhs and 3 U ac    GI- start CCD diet once taking po; transaminitis - improving   Stool softeners  No PPI needed    Renal-  Monitor Cr  IVL once taking adequate PO     Heme/ONC  Preliminary w/u for possible lymphoma neg  Anemia- check Fe, Ferritin and B12 and folate  Ca19-9, Ca 125; Ca 15-3 - not suggestive of tumor at specific visceral site   Fe studies - nl  B12/folate- nl    DVT prophylaxis- lovenox 40mg qhs    Case reviewed extensively, History, meds, vitals , labs and imaging reviewed. Assessment and plan discussed with housestaff.    Time spent 40min 69F w/ CAD, DM, HTN, HLD, hypothyroid, on eliquis, ASA, s/p umbilical hernia repair presented as a level 1 trauma activation. Patient brought in by ambulance after being found down by family. Course complicated by acute respiratory failure s/p intubation , seizures noted on imaging is left posterior lateral temporal and parietal occipital hyperintense T2 and FLAIR signal . There  is a 4 x 6 mm  enhancement  in the left posterior lateral lateral  temporal lobe with questionable leptomeningeal enhancement and slight  susceptibility, found to have brain mass on MRI. S/P empiric tx for herpes encephalitis;   POD #2 S/P brain Bx     Neuro  Neuro checks q 4hr  Pain control- Morphine alt with dilauduid -  Haldol for agitation delirium prn and Seroquel 12.5mg q12- hold if sedated  Off all ABX  On seizure meds- dilantin level 7.5 - 5/2/19 - give 200mg x1  - adequate  and on Keppra   F/U autoimmune W/U   Add TSI and anti thyroglobulin Ab. ( see above labs )    Resp  Probable NACHO  Maintain O2 sats > 92 % - suppl O2 at night or CPAP if tolerate    CV- afib, HTN- off NOAC/ASA ; CAD - hx of stents  Maintain -150  amlodipine, labetolol , hydralazine 300mg /day; vasotec  Wean cardene to off  PRN IV Labetolol and hydralazine   Discuss with NSG when to resume ASA  NOAC    ID- AFEBRILE ; s/p ACYCLOVIR FOR POSSIBLE HERPES ENCEPHALITIS  CURRENTLY OFF ALL abx    End- Hypothyroidism and DM  Resume Levothyroxine  Lantus 16 U qhs and 3 U ac    GI- start CCD diet once taking po; transaminitis - improving   Stool softeners  No PPI needed    Renal-  Monitor Cr  IVL once taking adequate PO  Suppl K     Heme/ONC  Preliminary w/u for possible lymphoma neg  Anemia- check Fe, Ferritin and B12 and folate  Ca19-9, Ca 125; Ca 15-3 - not suggestive of tumor at specific visceral site   Fe studies - nl  B12/folate- nl    DVT prophylaxis- lovenox 40mg qhs    Case reviewed extensively, History, meds, vitals , labs and imaging reviewed. Assessment and plan discussed with housestaff.    Time spent 40min

## 2019-05-03 NOTE — PROGRESS NOTE ADULT - PROBLEM SELECTOR PLAN 4
L temporal lesion of yet unclear etiology  -f/u pathology from biopsy
L temporal lesion of yet unclear etiology  -f/u pathology from biopsy yesterday

## 2019-05-03 NOTE — PROGRESS NOTE ADULT - SUBJECTIVE AND OBJECTIVE BOX
Diabetes Follow up note: Saw pt earlier today  Interval Hx: 68 y/o F w/h/o uncontrolled T2DM on oral DM meds. Also HTN/HLD/CAD/Hypothyroidism/recent umbilical hernia repair 2 weeks ago. Here s/p fall with LOC> seizures> hypernatremia s/p left craniotomy, intraoperative corticography and biopsy of seizure focus.  Saw pt in ICU. Pt alert and able to answer questions. However, pt believes she had brain surgery instead of biopsy because she "got staples" in her head. Pt and staff report tolerating POs today.  BG values at goal. Pt remains in 1 to 1 observation due to on/off confusion and trying to climb out of bed. Family at bedside.      Review of Systems:  General: as above  GI: Tolerating POs without any N/V/D/ABD PAIN.  CV: No CP/SOB  ENDO: No S&Sx of hypoglycemia  NEURO: Denies HA.      MEDS:  insulin glargine Injectable (LANTUS) 16 Unit(s) SubCutaneous at bedtime  insulin lispro (HumaLOG) corrective regimen sliding scale   SubCutaneous three times a day before meals  insulin lispro (HumaLOG) corrective regimen sliding scale   SubCutaneous at bedtime  insulin lispro Injectable (HumaLOG) 4 Unit(s) SubCutaneous three times a day with meals  levothyroxine 50 MICROGram(s) Oral daily      Allergies    No Known Allergies        PE:  General: Female lying in bed in NAD.   Vital Signs Last 24 Hrs  T(C): 37.4 (05-03-19 @ 15:00), Max: 37.8 (05-03-19 @ 11:00)  T(F): 99.3 (05-03-19 @ 15:00), Max: 100.1 (05-03-19 @ 11:00)  HR: 74 (05-03-19 @ 16:00) (74 - 107)  BP: 145/67 (05-03-19 @ 16:00) (110/54 - 194/71)  BP(mean): 96 (05-03-19 @ 16:00) (78 - 123)  RR: 25 (05-03-19 @ 16:00) (0 - 39)  SpO2: 91% (05-03-19 @ 16:00) (89% - 99%)  HEENT: L Skull surgical site with staples dry and intact  Abd: Soft, NT, ND, Obese  Extremities: Warm, no edema noted in all 4 exts.   Neuro: Alert and able to answer most questions appropriately.      LABS:  POCT Blood Glucose.: 129 mg/dL (05-03-19 @ 12:27)  POCT Blood Glucose.: 133 mg/dL (05-03-19 @ 08:34)  POCT Blood Glucose.: 134 mg/dL (05-03-19 @ 06:10)  POCT Blood Glucose.: 149 mg/dL (05-02-19 @ 21:56)  POCT Blood Glucose.: 141 mg/dL (05-02-19 @ 17:12)  POCT Blood Glucose.: 113 mg/dL (05-02-19 @ 12:21)  POCT Blood Glucose.: 165 mg/dL (05-02-19 @ 08:47)  POCT Blood Glucose.: 139 mg/dL (05-02-19 @ 06:15)  POCT Blood Glucose.: 151 mg/dL (05-01-19 @ 22:49)  POCT Blood Glucose.: 157 mg/dL (05-01-19 @ 18:23)                                    9.9    10.8  )-----------( 268      ( 01 May 2019 23:02 )             30.0       05-02    138  |  103  |  5<L>  ----------------------------<  135<H>  3.4<L>   |  23  |  0.54    Ca    9.0      02 May 2019 04:52  Phos  3.5     05-01  Mg     1.8     05-01    TPro  6.9  /  Alb  3.5  /  TBili  0.2  /  DBili  x   /  AST  49<H>  /  ALT  104<H>  /  AlkPhos  345<H>  05-02      Thyroid Function Tests:  04-22 @ 08:55 TSH -- FreeT4 1.1 T3 -- Anti TPO -- Anti Thyroglobulin Ab -- TSI --  04-22 @ 08:51 TSH 1.45 FreeT4 -- T3 -- Anti TPO -- Anti Thyroglobulin Ab -- TSI --      Hemoglobin A1C, Whole Blood: 9.9 % <H> [4.0 - 5.6] (04-21-19 @ 09:26)

## 2019-05-03 NOTE — PROGRESS NOTE ADULT - SUBJECTIVE AND OBJECTIVE BOX
CC: f/u for brain lesion    Patient reports:no focal complaints, mental status waxes and wanes    REVIEW OF SYSTEMS:  All other review of systems negative (Comprehensive ROS)    Antimicrobials Day #  :off    Other Medications Reviewed    T(F): 99.8 (05-03-19 @ 07:00), Max: 99.9 (05-02-19 @ 11:00)  HR: 90 (05-03-19 @ 09:00)  BP: 140/65 (05-03-19 @ 07:00)  RR: 19 (05-03-19 @ 09:00)  SpO2: 95% (05-03-19 @ 09:00)  Wt(kg): --    PHYSICAL EXAM:  General: alert, no acute distress, brain bisopsy site dry  Eyes:  anicteric, no conjunctival injection, no discharge  Oropharynx: no lesions or injection 	  Neck: supple, without adenopathy  Lungs: clear to auscultation  Heart: regular rate and rhythm; no murmur, rubs or gallops  Abdomen: soft, nondistended, nontender, without mass or organomegaly  Skin: no lesions  Extremities: no clubbing, cyanosis, or edema  Neurologic: alert, oriented, moves all extremities    LAB RESULTS:                        9.9    10.8  )-----------( 268      ( 01 May 2019 23:02 )             30.0     05-02    137  |  102  |  <4<L>  ----------------------------<  161<H>  3.7   |  22  |  0.49<L>    Ca    9.0      02 May 2019 22:13  Phos  2.7     05-02  Mg     1.8     05-02    TPro  6.9  /  Alb  3.3  /  TBili  0.5  /  DBili  0.2  /  AST  100<H>  /  ALT  100<H>  /  AlkPhos  396<H>  05-02    LIVER FUNCTIONS - ( 02 May 2019 22:13 )  Alb: 3.3 g/dL / Pro: 6.9 g/dL / ALK PHOS: 396 U/L / ALT: 100 U/L / AST: 100 U/L / GGT: x           Hep C antibody negative  Hep E IgM negative  MICROBIOLOGY:  RECENT CULTURES:      RADIOLOGY REVIEWED:    < from: CT Head No Cont (05.02.19 @ 01:48) >  IMPRESSION:  No acute intracranial hemorrhage.  Expected postoperative change.  < end of copied text >

## 2019-05-03 NOTE — PROGRESS NOTE ADULT - SUBJECTIVE AND OBJECTIVE BOX
Placed on 1:1 during the day on 5/2/19. HTNive requiring nicardipine gtt.    ROS: +HA, no chest pain, no SOB    Awake, alert, minimal verbalization but appears to understand, follows commands with prompting, oriented to self only, moves all limbs fully and strongly  Decreased breath sounds at bases  Heart regular  Abd soft, +BS  Incision c/d/i

## 2019-05-03 NOTE — PROGRESS NOTE ADULT - SUBJECTIVE AND OBJECTIVE BOX
Neurology Follow up note    Patient is a 69y old  Female who presents with a chief complaint of seizures      Subjective: Interval History -  reported moderate headache at biopsy site     Objective:   Vital Signs Last 24 Hrs  T(C): 37.8 (03 May 2019 11:00), Max: 37.8 (03 May 2019 11:00)  T(F): 100.1 (03 May 2019 11:00), Max: 100.1 (03 May 2019 11:00)  HR: 83 (03 May 2019 15:00) (75 - 107)  BP: 129/67 (03 May 2019 15:00) (110/54 - 194/71)  BP(mean): 88 (03 May 2019 15:00) (78 - 123)  RR: 20 (03 May 2019 15:00) (0 - 39)  SpO2: 95% (03 May 2019 15:00) (89% - 99%)    General Exam:   General appearance: lying in bed comfortably, NAD    Neurological Exam:  Mental Status: Follows simple commands    Cranial Nerves: did not participate in EOMI, blink to threat on left, absent blink to threat on right, no spontaneous nystagmus. did not smile when asked.                     Strength: DELGADO, hand  full b/l    Other:    05-02    138  |  103  |  5<L>  ----------------------------<  135<H>  3.4<L>   |  23  |  0.54    Ca    9.0      02 May 2019 04:52  Phos  3.5     05-01  Mg     1.8     05-01    TPro  6.9  /  Alb  3.5  /  TBili  0.2  /  DBili  x   /  AST  49<H>  /  ALT  104<H>  /  AlkPhos  345<H>  05-02 05-02    138  |  103  |  5<L>  ----------------------------<  135<H>  3.4<L>   |  23  |  0.54    Ca    9.0      02 May 2019 04:52  Phos  3.5     05-01  Mg     1.8     05-01    MEDICATIONS  (STANDING):  ALBUTerol/ipratropium for Nebulization 3 milliLiter(s) Nebulizer every 6 hours  amLODIPine   Tablet 10 milliGRAM(s) Oral daily  chlorhexidine 4% Liquid 1 Application(s) Topical <User Schedule>  cloNIDine 0.1 milliGRAM(s) Oral every 12 hours  dextrose 5%. 1000 milliLiter(s) (50 mL/Hr) IV Continuous <Continuous>  dextrose 50% Injectable 12.5 Gram(s) IV Push once  dextrose 50% Injectable 25 Gram(s) IV Push once  dextrose 50% Injectable 25 Gram(s) IV Push once  docusate sodium 100 milliGRAM(s) Oral daily  enalapril 10 milliGRAM(s) Oral two times a day  enoxaparin Injectable 40 milliGRAM(s) SubCutaneous <User Schedule>  hydrALAZINE 100 milliGRAM(s) Oral every 8 hours  insulin glargine Injectable (LANTUS) 16 Unit(s) SubCutaneous at bedtime  insulin lispro (HumaLOG) corrective regimen sliding scale   SubCutaneous three times a day before meals  insulin lispro (HumaLOG) corrective regimen sliding scale   SubCutaneous at bedtime  insulin lispro Injectable (HumaLOG) 4 Unit(s) SubCutaneous three times a day with meals  labetalol 200 milliGRAM(s) Oral every 8 hours  lacosamide 150 milliGRAM(s) Oral two times a day  lacosamide 150 milliGRAM(s) Oral two times a day  levETIRAcetam  IVPB 1000 milliGRAM(s) IV Intermittent <User Schedule>  levothyroxine 50 MICROGram(s) Oral daily  polyethylene glycol 3350 17 Gram(s) Oral two times a day  QUEtiapine 12.5 milliGRAM(s) Oral two times a day  senna 1 Tablet(s) Oral daily    MEDICATIONS  (PRN):  bisacodyl Suppository 10 milliGRAM(s) Rectal daily PRN Constipation  dextrose 40% Gel 15 Gram(s) Oral once PRN Blood Glucose LESS THAN 70 milliGRAM(s)/deciliter  glucagon  Injectable 1 milliGRAM(s) IntraMuscular once PRN Glucose LESS THAN 70 milligrams/deciliter  haloperidol    Injectable 1 milliGRAM(s) IV Push every 4 hours PRN Agitation  hydrALAZINE Injectable 10 milliGRAM(s) IV Push every 4 hours PRN sbp over 160  HYDROmorphone  Injectable 0.5 milliGRAM(s) IV Push every 4 hours PRN Severe Pain (7 - 10)  LORazepam   Injectable 2 milliGRAM(s) IV Push once PRN Seizure  morphine  - Injectable 2 milliGRAM(s) IV Push every 4 hours PRN Moderate Pain (4 - 6)  TPro  6.9  /  Alb  3.5  /  TBili  0.2  /  DBili  x   /  AST  49<H>  /  ALT  104<H>  /  AlkPhos  345<H>  05-02    LIVER FUNCTIONS - ( 02 May 2019 04:52 )  Alb: 3.5 g/dL / Pro: 6.9 g/dL / ALK PHOS: 345 U/L / ALT: 104 U/L / AST: 49 U/L / GGT: x                                 9.9    10.8  )-----------( 268      ( 01 May 2019 23:02 )             30.0     Radiology    EKG:  tele:  TTE:  EEG:

## 2019-05-03 NOTE — PROGRESS NOTE ADULT - ASSESSMENT
PLAN  Tx Floor: Obvs room or 1:1  Tx to Neurology (Dr. Noel or Dr. Burnham Service)   F/u Hashimoto encephalitis labs

## 2019-05-04 LAB
ALBUMIN SERPL ELPH-MCNC: 3.4 G/DL — SIGNIFICANT CHANGE UP (ref 3.3–5)
ALP SERPL-CCNC: 375 U/L — HIGH (ref 40–120)
ALT FLD-CCNC: 61 U/L — HIGH (ref 10–45)
ANION GAP SERPL CALC-SCNC: 14 MMOL/L — SIGNIFICANT CHANGE UP (ref 5–17)
ANION GAP SERPL CALC-SCNC: 15 MMOL/L — SIGNIFICANT CHANGE UP (ref 5–17)
AST SERPL-CCNC: 32 U/L — SIGNIFICANT CHANGE UP (ref 10–40)
BASOPHILS # BLD AUTO: 0 K/UL — SIGNIFICANT CHANGE UP (ref 0–0.2)
BASOPHILS NFR BLD AUTO: 0.1 % — SIGNIFICANT CHANGE UP (ref 0–2)
BILIRUB SERPL-MCNC: 0.2 MG/DL — SIGNIFICANT CHANGE UP (ref 0.2–1.2)
BUN SERPL-MCNC: 4 MG/DL — LOW (ref 7–23)
BUN SERPL-MCNC: 5 MG/DL — LOW (ref 7–23)
CALCIUM SERPL-MCNC: 9.2 MG/DL — SIGNIFICANT CHANGE UP (ref 8.4–10.5)
CALCIUM SERPL-MCNC: 9.2 MG/DL — SIGNIFICANT CHANGE UP (ref 8.4–10.5)
CHLORIDE SERPL-SCNC: 100 MMOL/L — SIGNIFICANT CHANGE UP (ref 96–108)
CHLORIDE SERPL-SCNC: 103 MMOL/L — SIGNIFICANT CHANGE UP (ref 96–108)
CO2 SERPL-SCNC: 22 MMOL/L — SIGNIFICANT CHANGE UP (ref 22–31)
CO2 SERPL-SCNC: 23 MMOL/L — SIGNIFICANT CHANGE UP (ref 22–31)
CREAT SERPL-MCNC: 0.58 MG/DL — SIGNIFICANT CHANGE UP (ref 0.5–1.3)
CREAT SERPL-MCNC: 0.6 MG/DL — SIGNIFICANT CHANGE UP (ref 0.5–1.3)
EOSINOPHIL # BLD AUTO: 0.1 K/UL — SIGNIFICANT CHANGE UP (ref 0–0.5)
EOSINOPHIL NFR BLD AUTO: 0.9 % — SIGNIFICANT CHANGE UP (ref 0–6)
GLUCOSE BLDC GLUCOMTR-MCNC: 112 MG/DL — HIGH (ref 70–99)
GLUCOSE BLDC GLUCOMTR-MCNC: 132 MG/DL — HIGH (ref 70–99)
GLUCOSE BLDC GLUCOMTR-MCNC: 147 MG/DL — HIGH (ref 70–99)
GLUCOSE BLDC GLUCOMTR-MCNC: 87 MG/DL — SIGNIFICANT CHANGE UP (ref 70–99)
GLUCOSE SERPL-MCNC: 129 MG/DL — HIGH (ref 70–99)
GLUCOSE SERPL-MCNC: 143 MG/DL — HIGH (ref 70–99)
HBV CORE AB SER-ACNC: SIGNIFICANT CHANGE UP
HBV SURFACE AB SER-ACNC: <3 MIU/ML — LOW
HBV SURFACE AG SER-ACNC: SIGNIFICANT CHANGE UP
HCT VFR BLD CALC: 30.1 % — LOW (ref 34.5–45)
HCT VFR BLD CALC: 30.5 % — LOW (ref 34.5–45)
HGB BLD-MCNC: 9.2 G/DL — LOW (ref 11.5–15.5)
HGB BLD-MCNC: 9.7 G/DL — LOW (ref 11.5–15.5)
LYMPHOCYTES # BLD AUTO: 2.1 K/UL — SIGNIFICANT CHANGE UP (ref 1–3.3)
LYMPHOCYTES # BLD AUTO: 24.2 % — SIGNIFICANT CHANGE UP (ref 13–44)
MAGNESIUM SERPL-MCNC: 2 MG/DL — SIGNIFICANT CHANGE UP (ref 1.6–2.6)
MCHC RBC-ENTMCNC: 24 PG — LOW (ref 27–34)
MCHC RBC-ENTMCNC: 25 PG — LOW (ref 27–34)
MCHC RBC-ENTMCNC: 30.6 GM/DL — LOW (ref 32–36)
MCHC RBC-ENTMCNC: 31.7 GM/DL — LOW (ref 32–36)
MCV RBC AUTO: 78.4 FL — LOW (ref 80–100)
MCV RBC AUTO: 79.1 FL — LOW (ref 80–100)
MONOCYTES # BLD AUTO: 0.6 K/UL — SIGNIFICANT CHANGE UP (ref 0–0.9)
MONOCYTES NFR BLD AUTO: 7 % — SIGNIFICANT CHANGE UP (ref 2–14)
NEUTROPHILS # BLD AUTO: 6 K/UL — SIGNIFICANT CHANGE UP (ref 1.8–7.4)
NEUTROPHILS NFR BLD AUTO: 67.8 % — SIGNIFICANT CHANGE UP (ref 43–77)
PHOSPHATE SERPL-MCNC: 2.9 MG/DL — SIGNIFICANT CHANGE UP (ref 2.5–4.5)
PLATELET # BLD AUTO: 331 K/UL — SIGNIFICANT CHANGE UP (ref 150–400)
PLATELET # BLD AUTO: 344 K/UL — SIGNIFICANT CHANGE UP (ref 150–400)
POTASSIUM SERPL-MCNC: 3.6 MMOL/L — SIGNIFICANT CHANGE UP (ref 3.5–5.3)
POTASSIUM SERPL-MCNC: 3.7 MMOL/L — SIGNIFICANT CHANGE UP (ref 3.5–5.3)
POTASSIUM SERPL-SCNC: 3.6 MMOL/L — SIGNIFICANT CHANGE UP (ref 3.5–5.3)
POTASSIUM SERPL-SCNC: 3.7 MMOL/L — SIGNIFICANT CHANGE UP (ref 3.5–5.3)
PROT SERPL-MCNC: 6.9 G/DL — SIGNIFICANT CHANGE UP (ref 6–8.3)
RBC # BLD: 3.84 M/UL — SIGNIFICANT CHANGE UP (ref 3.8–5.2)
RBC # BLD: 3.86 M/UL — SIGNIFICANT CHANGE UP (ref 3.8–5.2)
RBC # FLD: 16.3 % — HIGH (ref 10.3–14.5)
RBC # FLD: 17.4 % — HIGH (ref 10.3–14.5)
SODIUM SERPL-SCNC: 138 MMOL/L — SIGNIFICANT CHANGE UP (ref 135–145)
SODIUM SERPL-SCNC: 139 MMOL/L — SIGNIFICANT CHANGE UP (ref 135–145)
TSI ACT/NOR SER: <0.1 IU/L — SIGNIFICANT CHANGE UP (ref 0–0.55)
WBC # BLD: 8.89 K/UL — SIGNIFICANT CHANGE UP (ref 3.8–10.5)
WBC # BLD: 8.9 K/UL — SIGNIFICANT CHANGE UP (ref 3.8–10.5)
WBC # FLD AUTO: 8.89 K/UL — SIGNIFICANT CHANGE UP (ref 3.8–10.5)
WBC # FLD AUTO: 8.9 K/UL — SIGNIFICANT CHANGE UP (ref 3.8–10.5)

## 2019-05-04 PROCEDURE — 95951: CPT | Mod: 26,52

## 2019-05-04 PROCEDURE — 99232 SBSQ HOSP IP/OBS MODERATE 35: CPT

## 2019-05-04 PROCEDURE — 99233 SBSQ HOSP IP/OBS HIGH 50: CPT | Mod: GC

## 2019-05-04 PROCEDURE — 71045 X-RAY EXAM CHEST 1 VIEW: CPT | Mod: 26

## 2019-05-04 RX ORDER — INSULIN LISPRO 100/ML
3 VIAL (ML) SUBCUTANEOUS
Refills: 0 | Status: DISCONTINUED | OUTPATIENT
Start: 2019-05-04 | End: 2019-05-05

## 2019-05-04 RX ORDER — DEXAMETHASONE 0.5 MG/5ML
8 ELIXIR ORAL EVERY 8 HOURS
Refills: 0 | Status: DISCONTINUED | OUTPATIENT
Start: 2019-05-04 | End: 2019-05-07

## 2019-05-04 RX ORDER — POTASSIUM CHLORIDE 20 MEQ
20 PACKET (EA) ORAL ONCE
Refills: 0 | Status: COMPLETED | OUTPATIENT
Start: 2019-05-04 | End: 2019-05-04

## 2019-05-04 RX ORDER — ACETAMINOPHEN 500 MG
1000 TABLET ORAL ONCE
Refills: 0 | Status: COMPLETED | OUTPATIENT
Start: 2019-05-04 | End: 2019-05-04

## 2019-05-04 RX ORDER — DEXAMETHASONE 0.5 MG/5ML
24 ELIXIR ORAL ONCE
Refills: 0 | Status: COMPLETED | OUTPATIENT
Start: 2019-05-04 | End: 2019-05-04

## 2019-05-04 RX ADMIN — LACOSAMIDE 150 MILLIGRAM(S): 50 TABLET ORAL at 23:32

## 2019-05-04 RX ADMIN — Medication 3 MILLILITER(S): at 12:05

## 2019-05-04 RX ADMIN — LEVETIRACETAM 400 MILLIGRAM(S): 250 TABLET, FILM COATED ORAL at 05:23

## 2019-05-04 RX ADMIN — Medication 4 UNIT(S): at 09:01

## 2019-05-04 RX ADMIN — QUETIAPINE FUMARATE 12.5 MILLIGRAM(S): 200 TABLET, FILM COATED ORAL at 21:32

## 2019-05-04 RX ADMIN — Medication 0.1 MILLIGRAM(S): at 05:23

## 2019-05-04 RX ADMIN — LEVETIRACETAM 400 MILLIGRAM(S): 250 TABLET, FILM COATED ORAL at 21:34

## 2019-05-04 RX ADMIN — Medication 200 MILLIGRAM(S): at 21:34

## 2019-05-04 RX ADMIN — Medication 0.1 MILLIGRAM(S): at 17:40

## 2019-05-04 RX ADMIN — Medication 101.6 MILLIGRAM(S): at 23:00

## 2019-05-04 RX ADMIN — Medication 100 MILLIGRAM(S): at 21:34

## 2019-05-04 RX ADMIN — Medication 200 MILLIGRAM(S): at 05:23

## 2019-05-04 RX ADMIN — LACOSAMIDE 150 MILLIGRAM(S): 50 TABLET ORAL at 01:22

## 2019-05-04 RX ADMIN — AMLODIPINE BESYLATE 10 MILLIGRAM(S): 2.5 TABLET ORAL at 05:23

## 2019-05-04 RX ADMIN — Medication 3 MILLILITER(S): at 23:32

## 2019-05-04 RX ADMIN — HYDROMORPHONE HYDROCHLORIDE 0.5 MILLIGRAM(S): 2 INJECTION INTRAMUSCULAR; INTRAVENOUS; SUBCUTANEOUS at 05:45

## 2019-05-04 RX ADMIN — Medication 50 MICROGRAM(S): at 05:23

## 2019-05-04 RX ADMIN — HYDROMORPHONE HYDROCHLORIDE 0.5 MILLIGRAM(S): 2 INJECTION INTRAMUSCULAR; INTRAVENOUS; SUBCUTANEOUS at 05:30

## 2019-05-04 RX ADMIN — POLYETHYLENE GLYCOL 3350 17 GRAM(S): 17 POWDER, FOR SOLUTION ORAL at 05:23

## 2019-05-04 RX ADMIN — Medication 100 MILLIGRAM(S): at 12:05

## 2019-05-04 RX ADMIN — CHLORHEXIDINE GLUCONATE 1 APPLICATION(S): 213 SOLUTION TOPICAL at 21:34

## 2019-05-04 RX ADMIN — Medication 400 MILLIGRAM(S): at 16:25

## 2019-05-04 RX ADMIN — Medication 10 MILLIGRAM(S): at 01:22

## 2019-05-04 RX ADMIN — Medication 3 MILLILITER(S): at 17:40

## 2019-05-04 RX ADMIN — Medication 10 MILLIGRAM(S): at 17:40

## 2019-05-04 RX ADMIN — ENOXAPARIN SODIUM 40 MILLIGRAM(S): 100 INJECTION SUBCUTANEOUS at 17:40

## 2019-05-04 RX ADMIN — Medication 112 MILLIGRAM(S): at 19:55

## 2019-05-04 RX ADMIN — Medication 100 MILLIGRAM(S): at 12:48

## 2019-05-04 RX ADMIN — Medication 200 MILLIGRAM(S): at 12:47

## 2019-05-04 RX ADMIN — INSULIN GLARGINE 16 UNIT(S): 100 INJECTION, SOLUTION SUBCUTANEOUS at 21:38

## 2019-05-04 RX ADMIN — LACOSAMIDE 150 MILLIGRAM(S): 50 TABLET ORAL at 12:03

## 2019-05-04 RX ADMIN — Medication 100 MILLIGRAM(S): at 05:23

## 2019-05-04 RX ADMIN — LEVETIRACETAM 400 MILLIGRAM(S): 250 TABLET, FILM COATED ORAL at 13:24

## 2019-05-04 RX ADMIN — Medication 20 MILLIEQUIVALENT(S): at 05:24

## 2019-05-04 RX ADMIN — Medication 10 MILLIGRAM(S): at 05:23

## 2019-05-04 RX ADMIN — SENNA PLUS 1 TABLET(S): 8.6 TABLET ORAL at 12:05

## 2019-05-04 RX ADMIN — QUETIAPINE FUMARATE 12.5 MILLIGRAM(S): 200 TABLET, FILM COATED ORAL at 12:03

## 2019-05-04 RX ADMIN — POLYETHYLENE GLYCOL 3350 17 GRAM(S): 17 POWDER, FOR SOLUTION ORAL at 17:40

## 2019-05-04 NOTE — PHYSICAL THERAPY INITIAL EVALUATION ADULT - BED MOBILITY TRAINING, PT EVAL
Patient will perform bed mobility independently in 2 weeks
GOAL: patient will be able to performed bed mob (I) in 2 weeks

## 2019-05-04 NOTE — PHYSICAL THERAPY INITIAL EVALUATION ADULT - PLANNED THERAPY INTERVENTIONS, PT EVAL
bed mobility training/transfer training/gait training/Stair negotiation. GOAL: Patient will negotiate up and down 1 flight of stairs with supervision in 2 weeks
gait training/bed mobility training/transfer training/stairs: GOAL: patient will be able to negotiated 12 stairs (I) with one HR in 2 weeks

## 2019-05-04 NOTE — PHYSICAL THERAPY INITIAL EVALUATION ADULT - GENERAL OBSERVATIONS, REHAB EVAL
semi-supine in bed with NAD, +vEEG, +BP cuff, +pulse ox, +cardiac monitor, +IV,
Patient received sitting in recliner with LEs elevated, +EEG electrodes (not connected), +tele, +PIV, RN cleared patient for PT.

## 2019-05-04 NOTE — PHYSICAL THERAPY INITIAL EVALUATION ADULT - PERTINENT HX OF CURRENT PROBLEM, REHAB EVAL
69yoF with DM, CAD, HTN, ASA, s/p umbilical hernia repair 2 weeks ago, then fall in the hospital, transferred here to neuro management, CA duplex b/l LEs 4/21, occlusive R calf vein, MRI 4/19 head, possible atypical cavernoma vs. encephalopathy vs neoplasm on L posterior lateral temporal lobe
69 year old female with altered mental status due to seizures likely 2/2 to left temporal lesion now s/p Left craniotomy, intraoperative corticography and biopsy of seizure focus 5/1/2019. 5/2/2019: Head CT impression: No acute intracranial hemorrhage.

## 2019-05-04 NOTE — PROGRESS NOTE ADULT - ASSESSMENT
70 y/o F w/h/o uncontrolled T2DM on oral DM meds. Also HTN/HLD/CAD/Hypothyroidism/recent umbilical hernia repair 2 weeks ago. Here s/p fall with LOC> seizures> hypernatremia. S/p craniotomy for brain biopsy. BG values at goal but w/limited PO intake today. Will need assistance w/meals. Discussed w/RN ok to hold premeal insulin if not eating>BG goal (100-180mg/dl).

## 2019-05-04 NOTE — PHYSICAL THERAPY INITIAL EVALUATION ADULT - LEVEL OF CONSCIOUSNESS, REHAB EVAL
alert/confused
Patient confused throughout session, alternating between speaking in English and Creole. RN present and aware./alert/confused

## 2019-05-04 NOTE — PROGRESS NOTE ADULT - PROBLEM SELECTOR PLAN 1
-test BG AC/HS  -c/w Lantus 16 units QHS. Can give 14 units if NPO  -Decrease Humalog to 3 units w/meals for now. Hold if not eating  -c/w Humalog moderate correction scale AC and Mod HS scale  Discharge recs will depend on PO intake/insulin needs and glycemic control in hospital. Gurvinder oral meds  -Plan discussed with pt/RN  pager: [phone]

## 2019-05-04 NOTE — PROVIDER CONTACT NOTE (OTHER) - ASSESSMENT
Pt temp 102.5 F oral; /85; pt asymptomatic. denies pain, denies chest pain, is not in distress at this time

## 2019-05-04 NOTE — PHYSICAL THERAPY INITIAL EVALUATION ADULT - LEVEL OF INDEPENDENCE: GAIT, REHAB EVAL
Attempted ambulation two trials with nursing staff, patient refused to advance LEs to ambulate.
minimum assist (75% patients effort)

## 2019-05-04 NOTE — PROGRESS NOTE ADULT - ASSESSMENT
69 f s/p brain mass biopsy  Off antibiotics.  Hold ASA, Eliquis - restart when cleared by NSx.  A Fib  Hypothyroid  - follow TSH.  HTN control  COPD  - continue nebs  Diabetes control.  Seizure control  Abnormal LFT improving   - Hepatology follow  - follow LFT  Path pending.  Tyrone Antony MD pager [phone]

## 2019-05-04 NOTE — PROGRESS NOTE ADULT - SUBJECTIVE AND OBJECTIVE BOX
Diabetes Follow up note:  Interval Hx:  68 y/o F w/h/o uncontrolled T2DM on oral DM meds. Also HTN/HLD/CAD/Hypothyroidism/recent umbilical hernia repair 2 weeks ago. Here s/p fall with LOC> seizures> hypernatremia s/p left craniotomy, intraoperative corticography and biopsy of seizure focus. Pt now moved to floor on enhanced supervision. Glucose remains controlled-pre-lunch glucose 87. Per RN, was told in report pt had poor PO intake today. Pt reports not hungry at this time. Per RN, waxing and waning mental status and trying to get up at times. EEG ongoing      Review of Systems:  General: denies pain  GI: Tolerating POs without any N/V/D/ABD PAIN.  CV: No CP/SOB  ENDO: No S&Sx of hypoglycemia  MEDS:    insulin glargine Injectable (LANTUS) 16 Unit(s) SubCutaneous at bedtime  insulin lispro (HumaLOG) corrective regimen sliding scale   SubCutaneous three times a day before meals  insulin lispro (HumaLOG) corrective regimen sliding scale   SubCutaneous at bedtime  insulin lispro Injectable (HumaLOG) 3 Unit(s) SubCutaneous three times a day with meals  levothyroxine 50 MICROGram(s) Oral daily      Allergies    No Known Allergies        PE:  General: Female reclining in chair. NAD  Vital Signs Last 24 Hrs  T(C): 37.4 (04 May 2019 09:03), Max: 37.8 (04 May 2019 03:00)  T(F): 99.4 (04 May 2019 09:03), Max: 100 (04 May 2019 03:00)  HR: 80 (04 May 2019 09:03) (67 - 93)  BP: 156/68 (04 May 2019 09:03) (123/57 - 182/84)  BP(mean): 82 (04 May 2019 08:00) (82 - 120)  RR: 20 (04 May 2019 09:03) (17 - 26)  SpO2: 96% (04 May 2019 09:03) (91% - 96%)  HEENT: EEG monitoring electrodes w/bandage in place.   Abd: Soft, NT,ND, Obese.   Extremities: Warm. no edema.   Neuro: A&O X1-2. not oriented to place and situation.     LABS:    POCT Blood Glucose.: 87 mg/dL (05-04-19 @ 12:00)  POCT Blood Glucose.: 112 mg/dL (05-04-19 @ 08:59)  POCT Blood Glucose.: 149 mg/dL (05-03-19 @ 21:42)  POCT Blood Glucose.: 114 mg/dL (05-03-19 @ 18:16)  POCT Blood Glucose.: 129 mg/dL (05-03-19 @ 12:27)  POCT Blood Glucose.: 133 mg/dL (05-03-19 @ 08:34)  POCT Blood Glucose.: 134 mg/dL (05-03-19 @ 06:10)  POCT Blood Glucose.: 149 mg/dL (05-02-19 @ 21:56)  POCT Blood Glucose.: 141 mg/dL (05-02-19 @ 17:12)  POCT Blood Glucose.: 113 mg/dL (05-02-19 @ 12:21)  POCT Blood Glucose.: 165 mg/dL (05-02-19 @ 08:47)  POCT Blood Glucose.: 139 mg/dL (05-02-19 @ 06:15)  POCT Blood Glucose.: 151 mg/dL (05-01-19 @ 22:49)  POCT Blood Glucose.: 157 mg/dL (05-01-19 @ 18:23)  POCT Blood Glucose.: 178 mg/dL (05-01-19 @ 15:31)                            9.7    8.9   )-----------( 331      ( 04 May 2019 03:58 )             30.5       05-04    139  |  103  |  5<L>  ----------------------------<  129<H>  3.7   |  22  |  0.58    Ca    9.2      04 May 2019 03:58  Phos  2.9     05-04  Mg     2.0     05-04    TPro  6.9  /  Alb  3.4  /  TBili  0.2  /  DBili  x   /  AST  32  /  ALT  61<H>  /  AlkPhos  375<H>  05-04      Thyroid Function Tests:  04-22 @ 08:55 TSH -- FreeT4 1.1 T3 -- Anti TPO -- Anti Thyroglobulin Ab -- TSI --  04-22 @ 08:51 TSH 1.45 FreeT4 -- T3 -- Anti TPO -- Anti Thyroglobulin Ab -- TSI --      Hemoglobin A1C, Whole Blood: 9.9 % <H> [4.0 - 5.6] (04-21-19 @ 09:26)            Contact number: eugenio [phone] or [phone]

## 2019-05-04 NOTE — PHYSICAL THERAPY INITIAL EVALUATION ADULT - IMPAIRMENTS FOUND, PT EVAL
aerobic capacity/endurance/gait, locomotion, and balance/neuromotor development and sensory integration/poor safety awareness
gait, locomotion, and balance/aerobic capacity/endurance/muscle strength

## 2019-05-04 NOTE — PROGRESS NOTE ADULT - SUBJECTIVE AND OBJECTIVE BOX
CC: f/u for low grade fever    Patient reports: she is with waxing and waning mental status    REVIEW OF SYSTEMS:  All other review of systems negative (Comprehensive ROS)    Antimicrobials Day #  :off    Other Medications Reviewed    T(F): 99.3 (05-04-19 @ 07:00), Max: 100.1 (05-03-19 @ 11:00)  HR: 67 (05-04-19 @ 07:00)  BP: 135/60 (05-04-19 @ 07:00)  RR: 18 (05-04-19 @ 07:00)  SpO2: 94% (05-04-19 @ 07:00)  Wt(kg): --    PHYSICAL EXAM:  General: alert, no acute distress, cranial site dressed  Eyes:  anicteric, no conjunctival injection, no discharge  Oropharynx: no lesions or injection 	  Neck: supple, without adenopathy  Lungs: clear to auscultation  Heart: regular rate and rhythm; no murmur, rubs or gallops  Abdomen: soft, nondistended, nontender, without mass or organomegaly  Skin: no lesions  Extremities: no clubbing, cyanosis, or edema  Neurologic: alert, oriented, moves all extremities    LAB RESULTS:                        9.7    8.9   )-----------( 331      ( 04 May 2019 03:58 )             30.5     05-04    139  |  103  |  5<L>  ----------------------------<  129<H>  3.7   |  22  |  0.58    Ca    9.2      04 May 2019 03:58  Phos  2.9     05-04  Mg     2.0     05-04    TPro  6.9  /  Alb  3.4  /  TBili  0.2  /  DBili  x   /  AST  32  /  ALT  61<H>  /  AlkPhos  375<H>  05-04    LIVER FUNCTIONS - ( 04 May 2019 03:58 )  Alb: 3.4 g/dL / Pro: 6.9 g/dL / ALK PHOS: 375 U/L / ALT: 61 U/L / AST: 32 U/L / GGT: x             MICROBIOLOGY:  RECENT CULTURES:      RADIOLOGY REVIEWED:  < from: CT Head No Cont (05.02.19 @ 01:48) >  IMPRESSION:  No acute intracranial hemorrhage.  Expected postoperative change.    < end of copied text >

## 2019-05-04 NOTE — PROGRESS NOTE ADULT - SUBJECTIVE AND OBJECTIVE BOX
Patient is a 69y old  Female who presents with a chief complaint of seizures, encephalopathy (04 May 2019 08:12)      SUBJECTIVE / OVERNIGHT EVENTS: Comfortable without new complaints.   Review of Systems  chest pain no  palpitations no  sob no  nausea no  headache no    MEDICATIONS  (STANDING):  ALBUTerol/ipratropium for Nebulization 3 milliLiter(s) Nebulizer every 6 hours  amLODIPine   Tablet 10 milliGRAM(s) Oral daily  chlorhexidine 4% Liquid 1 Application(s) Topical <User Schedule>  cloNIDine 0.1 milliGRAM(s) Oral every 12 hours  dextrose 5%. 1000 milliLiter(s) (50 mL/Hr) IV Continuous <Continuous>  dextrose 50% Injectable 12.5 Gram(s) IV Push once  dextrose 50% Injectable 25 Gram(s) IV Push once  dextrose 50% Injectable 25 Gram(s) IV Push once  docusate sodium 100 milliGRAM(s) Oral daily  enalapril 10 milliGRAM(s) Oral two times a day  enoxaparin Injectable 40 milliGRAM(s) SubCutaneous <User Schedule>  hydrALAZINE 100 milliGRAM(s) Oral every 8 hours  insulin glargine Injectable (LANTUS) 16 Unit(s) SubCutaneous at bedtime  insulin lispro (HumaLOG) corrective regimen sliding scale   SubCutaneous three times a day before meals  insulin lispro (HumaLOG) corrective regimen sliding scale   SubCutaneous at bedtime  insulin lispro Injectable (HumaLOG) 3 Unit(s) SubCutaneous three times a day with meals  labetalol 200 milliGRAM(s) Oral every 8 hours  lacosamide 150 milliGRAM(s) Oral two times a day  levETIRAcetam  IVPB 1000 milliGRAM(s) IV Intermittent <User Schedule>  levothyroxine 50 MICROGram(s) Oral daily  polyethylene glycol 3350 17 Gram(s) Oral two times a day  QUEtiapine 12.5 milliGRAM(s) Oral two times a day  senna 1 Tablet(s) Oral daily    MEDICATIONS  (PRN):  bisacodyl Suppository 10 milliGRAM(s) Rectal daily PRN Constipation  dextrose 40% Gel 15 Gram(s) Oral once PRN Blood Glucose LESS THAN 70 milliGRAM(s)/deciliter  glucagon  Injectable 1 milliGRAM(s) IntraMuscular once PRN Glucose LESS THAN 70 milligrams/deciliter  haloperidol    Injectable 1 milliGRAM(s) IV Push every 4 hours PRN Agitation  hydrALAZINE Injectable 10 milliGRAM(s) IV Push every 4 hours PRN sbp over 160  HYDROmorphone  Injectable 0.5 milliGRAM(s) IV Push every 4 hours PRN Severe Pain (7 - 10)  LORazepam   Injectable 2 milliGRAM(s) IV Push once PRN Seizure  morphine  - Injectable 2 milliGRAM(s) IV Push every 4 hours PRN Moderate Pain (4 - 6)      Vital Signs Last 24 Hrs  T(C): 37 (04 May 2019 12:43), Max: 37.8 (04 May 2019 03:00)  T(F): 98.6 (04 May 2019 12:43), Max: 100 (04 May 2019 03:00)  HR: 92 (04 May 2019 13:45) (67 - 92)  BP: 144/73 (04 May 2019 13:45) (123/57 - 182/84)  BP(mean): 82 (04 May 2019 08:00) (82 - 120)  RR: 19 (04 May 2019 12:43) (17 - 26)  SpO2: 96% (04 May 2019 13:45) (91% - 99%)    PHYSICAL EXAM:  GENERAL: NAD  HEAD: s/p craniotomy, Normocephalic  EYES: EOMI, PERRLA, conjunctiva and sclera clear  CHEST/LUNG: Clear to auscultation bilaterally; No wheeze  HEART: Regular rate and rhythm; No murmurs, rubs, or gallops  ABDOMEN: Soft, Nontender, Nondistended; Bowel sounds present  EXTREMITIES:  2+ Peripheral Pulses, No clubbing, cyanosis, or edema  NEUROLOGY: non-focal  SKIN: No rashes or lesions    LABS:                        9.7    8.9   )-----------( 331      ( 04 May 2019 03:58 )             30.5     05-04    139  |  103  |  5<L>  ----------------------------<  129<H>  3.7   |  22  |  0.58    Ca    9.2      04 May 2019 03:58  Phos  2.9     05-04  Mg     2.0     05-04    TPro  6.9  /  Alb  3.4  /  TBili  0.2  /  DBili  x   /  AST  32  /  ALT  61<H>  /  AlkPhos  375<H>  05-04                RADIOLOGY & ADDITIONAL TESTS:    Imaging Personally Reviewed:    Consultant(s) Notes Reviewed:      Care Discussed with Consultants/Other Providers:

## 2019-05-04 NOTE — PROGRESS NOTE ADULT - ASSESSMENT
69 year old female with altered mental status due to seizures likely 2/2 to left temporal lesion, differential diagnosis includes neoplastic vs autoimmune encephalitis. Was in nonconvulsive status epilepticus, improved. Now on fycompa, phenytoin, keppra. MR spect does not suggest neoplastic lesion. MRI brain w/ contrast does not show enhancement of the lesion, which makes infiltrative glioma less likely, as well as lymphoma, though lymphoma is not entirely excluded. CSF so far also suggests against herpes encephalitis or infectious etiology. CSF flow cytometry and cytopathology have both been negative. CT c/a/p shows enhancement of bilateral kidneys, concerning for infection vs neoplastic process. Though the renal enhancement could be tumor, it is not clear if the same process is ongoing intracranially. For that reason, may not be useful to biopsy the kidney, as it may not yield full diagnostic information regarding the intracranial process.She has elevated liver enzymes, which could be due to pheyntoin use.     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   -currently on Keppra 1g TID; vimpat 150 BID   -f/u LP results from 4/24 per ID recs for: csf fungal culture- negative,  AFB culture - negative,  spinal fluid autoimmune encephalitis panel - pending   -f/u serum autoimmune encephalitis panel,   - hepatology labs (anti-mitochondrial, anti-smooth muscle, anti-LKM, anti-liver cytosol antibody) - unremarkable   -case was discussed with patient's cardiology, and patient is on Eliquis for atrial fibrillation  -US abdomen as part of evaluation for elevated LFTs - normal  -MRI abdomen to assess renal lesions - renal enhancements still present   -daily LFT's - noted ALT/AST significant improvement after discontinuing phenytoin   - Neurosurgery recommended holding anticoagulations for 10 days, Patient at risk of stroke, but risk of bleeding from recent biopsy in setting of anticoagulation is more compared to risk of stroke.

## 2019-05-04 NOTE — EEG REPORT - NS EEG TEXT BOX
[facility]   COMPREHENSIVE EPILEPSY CENTER   REPORT OF CONTINUOUS VIDEO EEG     [facility]: [address] [place] [address] , [address], [place] [address], Ph#: [phone]  [facility]: [id number], [place] [address], Ph#: [phone]  Office: [address] [place] [address], [place] [address], [place] [address] Ph#: [phone]    Patient Name: RON FOSTER  Age and : 69y (54)  MRN #: [id number]  Location: [place] [address]  Referring Physician: Micha Thornton    Study Date: 19 - 19    _____________________________________________________________  HISTORY    Patient is a 69y old  Female who presents with a chief complaint of seizures, confusion (03 May 2019 09:45)      PERTINENT MEDICATION:  lacosamide 150 milliGRAM(s) Oral two times a day  levETIRAcetam  IVPB 1000 milliGRAM(s) IV Intermittent <User Schedule>    _____________________________________________________________  STUDY INTERPRETATION    Findings:  The background was continuous, spontaneously variable and reactive. Background predominantly consisted of diffuse theta and delta activities. There was fragments of asymmetric 7-8 Hz posterior dominant rhythm seen at 30uV, better formed over the right hemisphere.      Focal Slowing:  Continuous polymorphic delta and theta activity over the left hemisphere.    Sleep Background:  Stage II sleep transients were characterized by asymmetric sleep spindles and K complexes, better formed over the right hemisphere.    Interictal Epileptiform Activity:   Occasional very brief runs of poorly organized fluctuating 1-2 Hz left posterior-temporo-occipital max (T7/P7/O1) lateralized periodic discharges with spike-wave morphology.    Events:  None    Activation Procedures:   Hyperventilation was not performed.    Photic stimulation was not performed.     Artifacts:  Intermittent myogenic and movement artifacts were noted.    ECG:  The heart rate on single channel ECG was predominantly between 70-90 BPM.    _____________________________________________________________  EEG SUMMARY/CLASSIFICATION  Abnormal EEG in awake, drowsy and asleep states.  - Occasional very brief runs of poorly organized fluctuating 1-2 Hz left posterior-temporo-occipital max (T7/P7/O1) lateralized periodic discharges with spike-wave morphology.  - Continuous polymorphic delta and theta activity over the left hemisphere.  - Mild to moderate generalized slowing.   _____________________________________________________________  EEG IMPRESSION/CLINICAL CORRELATE    Abnormal EEG study.  1. Epileptogenic focus in the left posterior-temporo-occipital region.   2. Structural abnormality over the left hemisphere.   3. Mild to moderate diffuse or multifocal cerebral dysfunction.  4. No seizures recorded.     _____________________________________________________________    Sergio Phillips MD  Attending Physician, [facility]ile[facility] [place]

## 2019-05-04 NOTE — PHYSICAL THERAPY INITIAL EVALUATION ADULT - TRANSFER TRAINING, PT EVAL
Patient will transfer independently without assistive device in 2 weeks
GOAL: patient will be able to performed sit<>stand transfer (I)  in 2 weeks

## 2019-05-04 NOTE — PROGRESS NOTE ADULT - ASSESSMENT
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

## 2019-05-04 NOTE — PROGRESS NOTE ADULT - SUBJECTIVE AND OBJECTIVE BOX
Neurology Follow up note    Patient is a 69y old  Female who presents with a chief complaint of seizures      Subjective: Interval History -  reported improvement in headache at biopsy site     Objective:   Vital Signs Last 24 Hrs  T(C): 37.4 (04 May 2019 07:00), Max: 37.8 (03 May 2019 11:00)  T(F): 99.3 (04 May 2019 07:00), Max: 100.1 (03 May 2019 11:00)  HR: 67 (04 May 2019 07:00) (67 - 96)  BP: 135/60 (04 May 2019 07:00) (118/86 - 182/84)  BP(mean): 87 (04 May 2019 07:00) (87 - 120)  RR: 18 (04 May 2019 07:00) (17 - 33)  SpO2: 94% (04 May 2019 07:00) (91% - 99%)    General Exam:   General appearance: lying in bed comfortably, NAD    Neurological Exam:  Mental Status: Follows simple commands    Cranial Nerves: did not participate in EOMI, blink to threat on left, absent blink to threat on right, no spontaneous nystagmus. did not smile when asked.                     Strength: DELGADO, hand  full b/l    Other:    05-02    138  |  103  |  5<L>  ----------------------------<  135<H>  3.4<L>   |  23  |  0.54    Ca    9.0      02 May 2019 04:52  Phos  3.5     05-01  Mg     1.8     05-01    TPro  6.9  /  Alb  3.5  /  TBili  0.2  /  DBili  x   /  AST  49<H>  /  ALT  104<H>  /  AlkPhos  345<H>  05-02 05-02    138  |  103  |  5<L>  ----------------------------<  135<H>  3.4<L>   |  23  |  0.54    Ca    9.0      02 May 2019 04:52  Phos  3.5     05-01  Mg     1.8     05-01    MEDICATIONS  (STANDING):  ALBUTerol/ipratropium for Nebulization 3 milliLiter(s) Nebulizer every 6 hours  amLODIPine   Tablet 10 milliGRAM(s) Oral daily  chlorhexidine 4% Liquid 1 Application(s) Topical <User Schedule>  cloNIDine 0.1 milliGRAM(s) Oral every 12 hours  dextrose 5%. 1000 milliLiter(s) (50 mL/Hr) IV Continuous <Continuous>  dextrose 50% Injectable 12.5 Gram(s) IV Push once  dextrose 50% Injectable 25 Gram(s) IV Push once  dextrose 50% Injectable 25 Gram(s) IV Push once  docusate sodium 100 milliGRAM(s) Oral daily  enalapril 10 milliGRAM(s) Oral two times a day  enoxaparin Injectable 40 milliGRAM(s) SubCutaneous <User Schedule>  hydrALAZINE 100 milliGRAM(s) Oral every 8 hours  insulin glargine Injectable (LANTUS) 16 Unit(s) SubCutaneous at bedtime  insulin lispro (HumaLOG) corrective regimen sliding scale   SubCutaneous three times a day before meals  insulin lispro (HumaLOG) corrective regimen sliding scale   SubCutaneous at bedtime  insulin lispro Injectable (HumaLOG) 4 Unit(s) SubCutaneous three times a day with meals  labetalol 200 milliGRAM(s) Oral every 8 hours  lacosamide 150 milliGRAM(s) Oral two times a day  levETIRAcetam  IVPB 1000 milliGRAM(s) IV Intermittent <User Schedule>  levothyroxine 50 MICROGram(s) Oral daily  polyethylene glycol 3350 17 Gram(s) Oral two times a day  QUEtiapine 12.5 milliGRAM(s) Oral two times a day  senna 1 Tablet(s) Oral daily    MEDICATIONS  (PRN):  bisacodyl Suppository 10 milliGRAM(s) Rectal daily PRN Constipation  dextrose 40% Gel 15 Gram(s) Oral once PRN Blood Glucose LESS THAN 70 milliGRAM(s)/deciliter  glucagon  Injectable 1 milliGRAM(s) IntraMuscular once PRN Glucose LESS THAN 70 milligrams/deciliter  haloperidol    Injectable 1 milliGRAM(s) IV Push every 4 hours PRN Agitation  hydrALAZINE Injectable 10 milliGRAM(s) IV Push every 4 hours PRN sbp over 160  HYDROmorphone  Injectable 0.5 milliGRAM(s) IV Push every 4 hours PRN Severe Pain (7 - 10)  LORazepam   Injectable 2 milliGRAM(s) IV Push once PRN Seizure  morphine  - Injectable 2 milliGRAM(s) IV Push every 4 hours PRN Moderate Pain (4 - 6)            TPro  6.9  /  Alb  3.5  /  TBili  0.2  /  DBili  x   /  AST  49<H>  /  ALT  104<H>  /  AlkPhos  345<H>  05-02    LIVER FUNCTIONS - ( 02 May 2019 04:52 )  Alb: 3.5 g/dL / Pro: 6.9 g/dL / ALK PHOS: 345 U/L / ALT: 104 U/L / AST: 49 U/L / GGT: x                                 9.9    10.8  )-----------( 268      ( 01 May 2019 23:02 )             30.0     Radiology    EKG:  tele:  TTE:  EEG:

## 2019-05-04 NOTE — PHYSICAL THERAPY INITIAL EVALUATION ADULT - MANUAL MUSCLE TESTING RESULTS, REHAB EVAL
4-/5 t/o
Grossly WFL, unable to formally assess secondary to mental status, able to move all limbs against gravity.

## 2019-05-05 LAB
ALBUMIN SERPL ELPH-MCNC: 3.5 G/DL — SIGNIFICANT CHANGE UP (ref 3.3–5)
ALP SERPL-CCNC: 444 U/L — HIGH (ref 40–120)
ALT FLD-CCNC: 73 U/L — HIGH (ref 10–45)
APPEARANCE UR: ABNORMAL
AST SERPL-CCNC: 77 U/L — HIGH (ref 10–40)
BILIRUB DIRECT SERPL-MCNC: 0.1 MG/DL — SIGNIFICANT CHANGE UP (ref 0–0.2)
BILIRUB INDIRECT FLD-MCNC: 0.2 MG/DL — SIGNIFICANT CHANGE UP (ref 0.2–1)
BILIRUB SERPL-MCNC: 0.3 MG/DL — SIGNIFICANT CHANGE UP (ref 0.2–1.2)
BILIRUB UR-MCNC: NEGATIVE — SIGNIFICANT CHANGE UP
COLOR SPEC: YELLOW — SIGNIFICANT CHANGE UP
DIFF PNL FLD: ABNORMAL
GLUCOSE BLDC GLUCOMTR-MCNC: 163 MG/DL — HIGH (ref 70–99)
GLUCOSE BLDC GLUCOMTR-MCNC: 205 MG/DL — HIGH (ref 70–99)
GLUCOSE BLDC GLUCOMTR-MCNC: 231 MG/DL — HIGH (ref 70–99)
GLUCOSE BLDC GLUCOMTR-MCNC: 241 MG/DL — HIGH (ref 70–99)
GLUCOSE UR QL: NEGATIVE — SIGNIFICANT CHANGE UP
KETONES UR-MCNC: NEGATIVE — SIGNIFICANT CHANGE UP
LEUKOCYTE ESTERASE UR-ACNC: ABNORMAL
NITRITE UR-MCNC: NEGATIVE — SIGNIFICANT CHANGE UP
PH UR: 8.5 — HIGH (ref 5–8)
PROT SERPL-MCNC: 6.9 G/DL — SIGNIFICANT CHANGE UP (ref 6–8.3)
PROT UR-MCNC: ABNORMAL
SP GR SPEC: 1.01 — SIGNIFICANT CHANGE UP (ref 1.01–1.02)
UROBILINOGEN FLD QL: SIGNIFICANT CHANGE UP

## 2019-05-05 PROCEDURE — 70450 CT HEAD/BRAIN W/O DYE: CPT | Mod: 26

## 2019-05-05 PROCEDURE — 99232 SBSQ HOSP IP/OBS MODERATE 35: CPT | Mod: GC

## 2019-05-05 RX ORDER — INSULIN LISPRO 100/ML
6 VIAL (ML) SUBCUTANEOUS
Refills: 0 | Status: DISCONTINUED | OUTPATIENT
Start: 2019-05-05 | End: 2019-05-05

## 2019-05-05 RX ORDER — CEFTRIAXONE 500 MG/1
1 INJECTION, POWDER, FOR SOLUTION INTRAMUSCULAR; INTRAVENOUS EVERY 24 HOURS
Refills: 0 | Status: DISCONTINUED | OUTPATIENT
Start: 2019-05-06 | End: 2019-05-07

## 2019-05-05 RX ORDER — ACETAMINOPHEN 500 MG
1000 TABLET ORAL EVERY 8 HOURS
Refills: 0 | Status: COMPLETED | OUTPATIENT
Start: 2019-05-05 | End: 2019-05-05

## 2019-05-05 RX ORDER — INSULIN GLARGINE 100 [IU]/ML
20 INJECTION, SOLUTION SUBCUTANEOUS AT BEDTIME
Refills: 0 | Status: DISCONTINUED | OUTPATIENT
Start: 2019-05-05 | End: 2019-05-07

## 2019-05-05 RX ORDER — CEFTRIAXONE 500 MG/1
INJECTION, POWDER, FOR SOLUTION INTRAMUSCULAR; INTRAVENOUS
Refills: 0 | Status: DISCONTINUED | OUTPATIENT
Start: 2019-05-05 | End: 2019-05-07

## 2019-05-05 RX ORDER — CEFTRIAXONE 500 MG/1
1 INJECTION, POWDER, FOR SOLUTION INTRAMUSCULAR; INTRAVENOUS ONCE
Refills: 0 | Status: COMPLETED | OUTPATIENT
Start: 2019-05-05 | End: 2019-05-05

## 2019-05-05 RX ORDER — INSULIN LISPRO 100/ML
6 VIAL (ML) SUBCUTANEOUS
Refills: 0 | Status: DISCONTINUED | OUTPATIENT
Start: 2019-05-05 | End: 2019-05-07

## 2019-05-05 RX ADMIN — CHLORHEXIDINE GLUCONATE 1 APPLICATION(S): 213 SOLUTION TOPICAL at 21:20

## 2019-05-05 RX ADMIN — Medication 100 MILLIGRAM(S): at 13:02

## 2019-05-05 RX ADMIN — Medication 0.1 MILLIGRAM(S): at 05:01

## 2019-05-05 RX ADMIN — HYDROMORPHONE HYDROCHLORIDE 0.5 MILLIGRAM(S): 2 INJECTION INTRAMUSCULAR; INTRAVENOUS; SUBCUTANEOUS at 17:45

## 2019-05-05 RX ADMIN — Medication 3 UNIT(S): at 09:07

## 2019-05-05 RX ADMIN — Medication 0.1 MILLIGRAM(S): at 17:11

## 2019-05-05 RX ADMIN — Medication 50 MICROGRAM(S): at 05:01

## 2019-05-05 RX ADMIN — Medication 4: at 18:19

## 2019-05-05 RX ADMIN — Medication 200 MILLIGRAM(S): at 21:20

## 2019-05-05 RX ADMIN — Medication 3 MILLILITER(S): at 17:11

## 2019-05-05 RX ADMIN — Medication 3 MILLILITER(S): at 05:01

## 2019-05-05 RX ADMIN — POLYETHYLENE GLYCOL 3350 17 GRAM(S): 17 POWDER, FOR SOLUTION ORAL at 05:02

## 2019-05-05 RX ADMIN — Medication 10 MILLIGRAM(S): at 00:43

## 2019-05-05 RX ADMIN — Medication 100 MILLIGRAM(S): at 05:00

## 2019-05-05 RX ADMIN — MORPHINE SULFATE 2 MILLIGRAM(S): 50 CAPSULE, EXTENDED RELEASE ORAL at 02:39

## 2019-05-05 RX ADMIN — Medication 1000 MILLIGRAM(S): at 08:46

## 2019-05-05 RX ADMIN — SENNA PLUS 1 TABLET(S): 8.6 TABLET ORAL at 13:02

## 2019-05-05 RX ADMIN — Medication 10 MILLIGRAM(S): at 17:11

## 2019-05-05 RX ADMIN — Medication 4: at 13:09

## 2019-05-05 RX ADMIN — LEVETIRACETAM 400 MILLIGRAM(S): 250 TABLET, FILM COATED ORAL at 21:30

## 2019-05-05 RX ADMIN — Medication 3 MILLILITER(S): at 13:01

## 2019-05-05 RX ADMIN — LACOSAMIDE 150 MILLIGRAM(S): 50 TABLET ORAL at 13:01

## 2019-05-05 RX ADMIN — POLYETHYLENE GLYCOL 3350 17 GRAM(S): 17 POWDER, FOR SOLUTION ORAL at 17:11

## 2019-05-05 RX ADMIN — LEVETIRACETAM 400 MILLIGRAM(S): 250 TABLET, FILM COATED ORAL at 13:02

## 2019-05-05 RX ADMIN — Medication 101.6 MILLIGRAM(S): at 05:01

## 2019-05-05 RX ADMIN — INSULIN GLARGINE 20 UNIT(S): 100 INJECTION, SOLUTION SUBCUTANEOUS at 21:21

## 2019-05-05 RX ADMIN — Medication 10 MILLIGRAM(S): at 05:01

## 2019-05-05 RX ADMIN — LEVETIRACETAM 400 MILLIGRAM(S): 250 TABLET, FILM COATED ORAL at 05:01

## 2019-05-05 RX ADMIN — AMLODIPINE BESYLATE 10 MILLIGRAM(S): 2.5 TABLET ORAL at 05:01

## 2019-05-05 RX ADMIN — HYDROMORPHONE HYDROCHLORIDE 0.5 MILLIGRAM(S): 2 INJECTION INTRAMUSCULAR; INTRAVENOUS; SUBCUTANEOUS at 17:11

## 2019-05-05 RX ADMIN — Medication 200 MILLIGRAM(S): at 13:02

## 2019-05-05 RX ADMIN — QUETIAPINE FUMARATE 12.5 MILLIGRAM(S): 200 TABLET, FILM COATED ORAL at 09:34

## 2019-05-05 RX ADMIN — QUETIAPINE FUMARATE 12.5 MILLIGRAM(S): 200 TABLET, FILM COATED ORAL at 21:21

## 2019-05-05 RX ADMIN — ENOXAPARIN SODIUM 40 MILLIGRAM(S): 100 INJECTION SUBCUTANEOUS at 17:11

## 2019-05-05 RX ADMIN — Medication 101.6 MILLIGRAM(S): at 21:20

## 2019-05-05 RX ADMIN — Medication 101.6 MILLIGRAM(S): at 13:02

## 2019-05-05 RX ADMIN — MORPHINE SULFATE 2 MILLIGRAM(S): 50 CAPSULE, EXTENDED RELEASE ORAL at 02:09

## 2019-05-05 RX ADMIN — Medication 200 MILLIGRAM(S): at 05:02

## 2019-05-05 RX ADMIN — CEFTRIAXONE 100 GRAM(S): 500 INJECTION, POWDER, FOR SOLUTION INTRAMUSCULAR; INTRAVENOUS at 10:46

## 2019-05-05 RX ADMIN — Medication 2: at 09:06

## 2019-05-05 RX ADMIN — Medication 100 MILLIGRAM(S): at 21:20

## 2019-05-05 RX ADMIN — Medication 400 MILLIGRAM(S): at 08:16

## 2019-05-05 NOTE — PROGRESS NOTE ADULT - ASSESSMENT
70 y/o F w/h/o uncontrolled T2DM on oral DM meds. Also HTN/HLD/CAD/Hypothyroidism/recent umbilical hernia repair 2 weeks ago. Here s/p fall with LOC> seizures> hypernatremia. S/p craniotomy for brain biopsy. Pt now started on high dose decadron. PO intake improved today now w/hyperglycemia. Will adjust regimen while on steroids. Discussed w/RN-will need to evaluate if pt eating prior to giving higher insulin premeal dose. BG goal (100-180mg/dl).

## 2019-05-05 NOTE — OCCUPATIONAL THERAPY INITIAL EVALUATION ADULT - ADDITIONAL COMMENTS
History obtained from chart review per case management assessment and initial PT evaluation from 4/23/2019. Patient lives in private home with spouse and family, no stairs to enter, 1 flight to bedroom. Patient ambulated independent and independent with ADLs prior to admission.

## 2019-05-05 NOTE — PROGRESS NOTE ADULT - ASSESSMENT
69 f s/p brain mass biopsy  Off antibiotics.  Hold ASA, Eliquis - restart when cleared by NSx.  A Fib  Hypothyroid  - follow TSH.  HTN control  COPD  - continue nebs  Diabetes control.  Seizure control  Abnormal LFT improving   - Hepatology follow  - follow LFT  Path pending.  Fever  - panculture  - observe off antibiotics for now  - ID follow  Tyrone Antony MD pager [phone]

## 2019-05-05 NOTE — EEG REPORT - NS EEG TEXT BOX
[facility]   COMPREHENSIVE EPILEPSY CENTER   REPORT OF CONTINUOUS VIDEO EEG     [facility]: [address] [place] [address] , [address], [place] [address], Ph#: [phone]  [facility]: [id number], [place] [address], Ph#: [phone]  Office: [address] [place] [address], [place] [address], [place] [address] Ph#: [phone]    Patient Name: RON FOSTER  Age and : 69y (54)  MRN #: [id number]  Location: [place] [address]  Referring Physician: Micha Thornton    Study Date: 19 - 19    _____________________________________________________________  HISTORY    Patient is a 69y old  Female who presents with a chief complaint of seizures, confusion (03 May 2019 09:45)      PERTINENT MEDICATION:  lacosamide 150 milliGRAM(s) Oral two times a day  levETIRAcetam  IVPB 1000 milliGRAM(s) IV Intermittent <User Schedule>    _____________________________________________________________  STUDY INTERPRETATION    Findings:  The background was continuous, spontaneously variable and reactive. Background predominantly consisted of diffuse theta and delta activities. There was fragments of asymmetric 7-8 Hz posterior dominant rhythm seen at 30uV, better formed over the right hemisphere.      Focal Slowing:  Continuous polymorphic delta and theta activity over the left hemisphere.    Sleep Background:  Stage II sleep transients were characterized by asymmetric sleep spindles and K complexes, better formed over the right hemisphere.    Interictal Epileptiform Activity:   During wakefulness, frequent brief to intermediate runs of poorly organized fluctuating 1-2 Hz left posterior-temporo-occipital max (T7/P7/O1) lateralized periodic discharges with spike-wave morphology.    Events:  None    Activation Procedures:   Hyperventilation was not performed.    Photic stimulation was not performed.     Artifacts:  Intermittent myogenic and movement artifacts were noted.    ECG:  The heart rate on single channel ECG was predominantly between 70-90 BPM.    _____________________________________________________________  EEG SUMMARY/CLASSIFICATION  Abnormal EEG in awake, drowsy and asleep states.  - During wakefulness, frequent brief to intermediate runs of poorly organized fluctuating 1-2 Hz left posterior-temporo-occipital max (T7/P7/O1) lateralized periodic discharges with spike-wave morphology.  - Continuous polymorphic delta and theta activity over the left hemisphere.  - Mild to moderate generalized slowing.   _____________________________________________________________  EEG IMPRESSION/CLINICAL CORRELATE    Abnormal EEG study.  1. Epileptogenic focus in the left posterior-temporo-occipital region.   2. Structural abnormality over the left hemisphere.   3. Mild to moderate diffuse or multifocal cerebral dysfunction.  4. No seizures recorded.     _____________________________________________________________    Sergio Phillips MD  Attending Physician, [facility]ile[facility]   COMPREHENSIVE EPILEPSY CENTER   REPORT OF CONTINUOUS VIDEO EEG     [facility]: [address] [place] [address] , [address], [place] [address], Ph#: [phone]  [facility]: [id number], [place] [address], Ph#: [phone]  Office: [address] [place] [address], [place] [address], [place] [address] Ph#: [phone]    Patient Name: RON FOSTER  Age and : 69y (54)  MRN #: [id number]  Location: [place] [address]  Referring Physician: Micha Thornton    Study Date: 19     _____________________________________________________________  HISTORY    Patient is a 69y old  Female who presents with a chief complaint of seizures, confusion (03 May 2019 09:45)      PERTINENT MEDICATION:  lacosamide 150 milliGRAM(s) Oral two times a day  levETIRAcetam  IVPB 1000 milliGRAM(s) IV Intermittent <User Schedule>    _____________________________________________________________  STUDY INTERPRETATION    Findings:  The background was continuous, spontaneously variable and reactive. Background predominantly consisted of diffuse theta and delta activities. There was fragments of asymmetric 7-8 Hz posterior dominant rhythm seen at 30uV, better formed over the right hemisphere.      Focal Slowing:  Continuous polymorphic delta and theta activity over the left hemisphere.    Sleep Background:  Stage II sleep transients were characterized by asymmetric sleep spindles and K complexes, better formed over the right hemisphere.    Interictal Epileptiform Activity:   During wakefulness, frequent brief to intermediate runs of poorly organized fluctuating 1-2 Hz left posterior-temporo-occipital max (T7/P7/O1) lateralized periodic discharges with spike-wave morphology.    Events:  None    Activation Procedures:   Hyperventilation was not performed.    Photic stimulation was not performed.     Artifacts:  Intermittent myogenic and movement artifacts were noted.    ECG:  The heart rate on single channel ECG was predominantly between 70-90 BPM.    _____________________________________________________________  EEG SUMMARY/CLASSIFICATION  Abnormal EEG in awake, drowsy and asleep states.  - During wakefulness, frequent brief to intermediate runs of poorly organized fluctuating 1-2 Hz left posterior-temporo-occipital max (T7/P7/O1) lateralized periodic discharges with spike-wave morphology.  - Continuous polymorphic delta and theta activity over the left hemisphere.  - Mild to moderate generalized slowing.   _____________________________________________________________  EEG IMPRESSION/CLINICAL CORRELATE    Abnormal EEG study.  1. Epileptogenic focus in the left posterior-temporo-occipital region.   2. Structural abnormality over the left hemisphere.   3. Mild to moderate diffuse or multifocal cerebral dysfunction.  4. No seizures recorded.     _____________________________________________________________    Sergio Phillips MD  Attending Physician, [facility]ile[facility] [place]

## 2019-05-05 NOTE — OCCUPATIONAL THERAPY INITIAL EVALUATION ADULT - PLANNED THERAPY INTERVENTIONS, OT EVAL
parent/caregiver training.../cognitive, visual perceptual/neuromuscular re-education/ADL retraining/balance training/bed mobility training/transfer training

## 2019-05-05 NOTE — PROGRESS NOTE ADULT - SUBJECTIVE AND OBJECTIVE BOX
CC: f/u for post biopsy fever    Patient reports: vague discomfort , no clear localizing symptom.She has a spike to 102.5 yesterday, CTX started empirically for 1 dose    REVIEW OF SYSTEMS:  All other review of systems negative (Comprehensive ROS)    Antimicrobials Day #  :  cefTRIAXone   IVPB 1 Gram(s) IV Intermittent once  cefTRIAXone   IVPB        Other Medications Reviewed    T(F): 99.7 (19 @ 04:43), Max: 102.5 (19 @ 16:25)  HR: 100 (19 @ 04:43)  BP: 149/71 (19 @ 04:43)  RR: 17 (19 @ 04:43)  SpO2: 93% (19 @ 04:43)  Wt(kg): --    PHYSICAL EXAM:  General: alert, no acute distress, craniotomy incision is C/D/I  Eyes:  anicteric, no conjunctival injection, no discharge  Oropharynx: no lesions or injection 	  Neck: supple, without adenopathy  Lungs: clear to auscultation  Heart: regular rate and rhythm; no murmur, rubs or gallops  Abdomen: soft, nondistended, nontender, without mass or organomegaly  Skin: no lesions  Extremities: no clubbing, cyanosis, or edema  Neurologic: alert, oriented, moves all extremities    LAB RESULTS:                        9.2    8.89  )-----------( 344      ( 04 May 2019 22:55 )             30.1     05-    138  |  100  |  4<L>  ----------------------------<  143<H>  3.6   |  23  |  0.60    Ca    9.2      04 May 2019 17:52  Phos  2.9     05-  Mg     2.0     -    TPro  6.9  /  Alb  3.4  /  TBili  0.2  /  DBili  x   /  AST  32  /  ALT  61<H>  /  AlkPhos  375<H>  -    LIVER FUNCTIONS - ( 04 May 2019 03:58 )  Alb: 3.4 g/dL / Pro: 6.9 g/dL / ALK PHOS: 375 U/L / ALT: 61 U/L / AST: 32 U/L / GGT: x           Urinalysis Basic - ( 04 May 2019 23:33 )    Color: Yellow / Appearance: Slightly Turbid / S.011 / pH: x  Gluc: x / Ketone: Negative  / Bili: Negative / Urobili: <2 mg/dL   Blood: x / Protein: 30 mg/dL / Nitrite: Negative   Leuk Esterase: Large / RBC: 58 /HPF / WBC 22 /HPF   Sq Epi: x / Non Sq Epi: 1 /HPF / Bacteria: Many      MICROBIOLOGY:  RECENT CULTURES:    Blood and urine cultures are pending  RADIOLOGY REVIEWED:  < from: CT Head No Cont (19 @ 01:48) >  FINDINGS:  A left parietal craniotomy is seen with scalp and dural flap changes.   Parenchymal vasogenic edema adjacent to the craniotomy is consistent with   expected postoperative change.    Ventricles and sulci are otherwise within normal limits. There are no new   areas of attenuation abnormality in the brain. There is no   intraparenchymal hematoma, mass effect or midline shift. No abnormal   extra-axial fluid collections are present. There is no evidence of acute   transcortical territorial infarction.    The visualized intraorbital compartments, paranasal sinuses and   tympanomastoid cavities appear free of acute disease.      IMPRESSION:  No acute intracranial hemorrhage.  Expected postoperative change.

## 2019-05-05 NOTE — PROGRESS NOTE ADULT - REASON FOR ADMISSION
AMS/seizure
AMS/seizure
brain lesion
seizure, encephalopathy
seizures
seizures, encephalopathy
seizures,confusion
NCSE
NCSE, brain lesion
LOC/Seizures
S/p fall> seizures
LOC

## 2019-05-05 NOTE — OCCUPATIONAL THERAPY INITIAL EVALUATION ADULT - PERTINENT HX OF CURRENT PROBLEM, REHAB EVAL
69 year old female with altered mental status due to seizures likely 2/2 to left temporal lesion now s/p Left craniotomy, intraoperative corticography and biopsy of seizure focus 5/1/2019. 5/2/2019: Head CT impression: No acute intracranial hemorrhage

## 2019-05-05 NOTE — CHART NOTE - NSCHARTNOTEFT_GEN_A_CORE
NEURO-ONCOLOGY: LAURENT    patient seen and examined with housestaff on 5/5/19    Briefly, 70 yo RH woman with recent discovery of left posterior temporal enhancing and nonenhancing mass lesion of uncertain etiology, s/p CSF evaluation, s/p brain biopsy last week, now febrile and with depressed mental status.    ON EXAM  more arousable this AM than yesterday, but still not alert   opens eyes to noxious stimuli, not voice.  resists commands.  pupils are equal and reactive  moves both arms, but not to command    LABS  T=102 this AM  urinalysis reveals moderate leuk esterase c/w urinary tract infection    IMPRESSION/PLAN  MENTAL STATUS DETERIORATION -- suspect infection playing role. Minimal improvement with steroid administration. will repeat head CT (last one was 5/2/19)  UTI -- please start antibiotics now.  CEREBRAL EDEMA -- continue with dex 8mg q8.    total time spent was 36 minutes

## 2019-05-05 NOTE — OCCUPATIONAL THERAPY INITIAL EVALUATION ADULT - COGNITIVE, VISUAL PERCEPTUAL, OT EVAL
Goal: Pt will follow 100% simple commands within 2 weeks to increase independence with Activities of Daily Living.

## 2019-05-05 NOTE — PROGRESS NOTE ADULT - PROBLEM SELECTOR PLAN 1
-test BG AC/HS  -Increase Lantus 20 units QHS  -Increase Humalog 6 units AC meals for now  -c/w Humalog moderate correction scale AC and Mod HS scale  -Please notify endocrine team in any changes in steroid doses.   Discharge recs will depend on PO intake/insulin needs and glycemic control in hospital and long-term steroid plan  -plan discussed w/pt and RN  pager: [phone]

## 2019-05-05 NOTE — PROGRESS NOTE ADULT - ASSESSMENT
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

## 2019-05-05 NOTE — PROGRESS NOTE ADULT - SUBJECTIVE AND OBJECTIVE BOX
Patient is a 69y old  Female who presents with a chief complaint of seizure, encephalopathy (05 May 2019 07:05)      SUBJECTIVE / OVERNIGHT EVENTS: more awake, tired  Review of Systems  chest pain no  palpitations no  sob no  nausea no  headache no    MEDICATIONS  (STANDING):  ALBUTerol/ipratropium for Nebulization 3 milliLiter(s) Nebulizer every 6 hours  amLODIPine   Tablet 10 milliGRAM(s) Oral daily  cefTRIAXone   IVPB      chlorhexidine 4% Liquid 1 Application(s) Topical <User Schedule>  cloNIDine 0.1 milliGRAM(s) Oral every 12 hours  dexamethasone  IVPB 8 milliGRAM(s) IV Intermittent every 8 hours  dextrose 5%. 1000 milliLiter(s) (50 mL/Hr) IV Continuous <Continuous>  dextrose 50% Injectable 12.5 Gram(s) IV Push once  dextrose 50% Injectable 25 Gram(s) IV Push once  dextrose 50% Injectable 25 Gram(s) IV Push once  docusate sodium 100 milliGRAM(s) Oral daily  enalapril 10 milliGRAM(s) Oral two times a day  enoxaparin Injectable 40 milliGRAM(s) SubCutaneous <User Schedule>  hydrALAZINE 100 milliGRAM(s) Oral every 8 hours  insulin glargine Injectable (LANTUS) 20 Unit(s) SubCutaneous at bedtime  insulin lispro (HumaLOG) corrective regimen sliding scale   SubCutaneous three times a day before meals  insulin lispro (HumaLOG) corrective regimen sliding scale   SubCutaneous at bedtime  insulin lispro Injectable (HumaLOG) 6 Unit(s) SubCutaneous three times a day with meals  labetalol 200 milliGRAM(s) Oral every 8 hours  lacosamide 150 milliGRAM(s) Oral two times a day  levETIRAcetam  IVPB 1000 milliGRAM(s) IV Intermittent <User Schedule>  levothyroxine 50 MICROGram(s) Oral daily  polyethylene glycol 3350 17 Gram(s) Oral two times a day  QUEtiapine 12.5 milliGRAM(s) Oral two times a day  senna 1 Tablet(s) Oral daily    MEDICATIONS  (PRN):  bisacodyl Suppository 10 milliGRAM(s) Rectal daily PRN Constipation  dextrose 40% Gel 15 Gram(s) Oral once PRN Blood Glucose LESS THAN 70 milliGRAM(s)/deciliter  glucagon  Injectable 1 milliGRAM(s) IntraMuscular once PRN Glucose LESS THAN 70 milligrams/deciliter  haloperidol    Injectable 1 milliGRAM(s) IV Push every 4 hours PRN Agitation  hydrALAZINE Injectable 10 milliGRAM(s) IV Push every 4 hours PRN sbp over 160  HYDROmorphone  Injectable 0.5 milliGRAM(s) IV Push every 4 hours PRN Severe Pain (7 - 10)  LORazepam   Injectable 2 milliGRAM(s) IV Push once PRN Seizure  morphine  - Injectable 2 milliGRAM(s) IV Push every 4 hours PRN Moderate Pain (4 - 6)      Vital Signs Last 24 Hrs  T(C): 37.6 (05 May 2019 13:00), Max: 38.9 (05 May 2019 08:05)  T(F): 99.7 (05 May 2019 13:00), Max: 102.1 (05 May 2019 08:05)  HR: 85 (05 May 2019 13:00) (76 - 100)  BP: 144/80 (05 May 2019 13:00) (111/70 - 174/91)  BP(mean): --  RR: 18 (05 May 2019 13:00) (17 - 19)  SpO2: 96% (05 May 2019 13:00) (93% - 99%)    PHYSICAL EXAM:  GENERAL: NAD, well-developed  HEAD:  s/p craniotomy and bx, Normocephalic  EYES: EOMI, PERRLA, conjunctiva and sclera clear  NECK: Supple, No JVD  CHEST/LUNG: Clear to auscultation bilaterally; No wheeze  HEART: Regular rate and rhythm; No murmurs, rubs, or gallops  ABDOMEN: Soft, Nontender, Nondistended; Bowel sounds present  EXTREMITIES:  2+ Peripheral Pulses, No clubbing, cyanosis, or edema  NEUROLOGY: non-focal  SKIN: No rashes or lesions    LABS:                        9.2    8.89  )-----------( 344      ( 04 May 2019 22:55 )             30.1     05-04    138  |  100  |  4<L>  ----------------------------<  143<H>  3.6   |  23  |  0.60    Ca    9.2      04 May 2019 17:52  Phos  2.9     05-04  Mg     2.0     05-04    TPro  6.9  /  Alb  3.5  /  TBili  0.3  /  DBili  0.1  /  AST  77<H>  /  ALT  73<H>  /  AlkPhos  444<H>  05-05          Urinalysis Basic - ( 04 May 2019 23:33 )    Color: Yellow / Appearance: Slightly Turbid / S.011 / pH: x  Gluc: x / Ketone: Negative  / Bili: Negative / Urobili: <2 mg/dL   Blood: x / Protein: 30 mg/dL / Nitrite: Negative   Leuk Esterase: Large / RBC: 58 /HPF / WBC 22 /HPF   Sq Epi: x / Non Sq Epi: 1 /HPF / Bacteria: Many          RADIOLOGY & ADDITIONAL TESTS:    Imaging Personally Reviewed:    Consultant(s) Notes Reviewed:      Care Discussed with Consultants/Other Providers:

## 2019-05-05 NOTE — PROVIDER CONTACT NOTE (OTHER) - ACTION/TREATMENT ORDERED:
Report to Dilcia ANDUJAR given Hold giving patient narcotics as per BRIJESH Pereira . Dilcia ANDUJAR called and made aware of update. BRIJESH Pereira from Neuro aware that patient has a bed in SICU room 18 report given
BRIJESH Gaytan will come assess patient on rounds. No treatments ordered at this time.
Cultures and IV tylenol will be ordered. Will continue to monitor
Dawson ordered IVP dilaudid.
Give morning BP meds and recheck BP. Provider notified and aware.
Hydralazine 5 mg ordered. Will continue to monitor.
IV Tylenol order, continue to monitor patient
OK to give duoneb treatment, to consult respiratory therapy for possible CPAP, continue to monitor pt.
Rachael aware of neurological status and patient lethargic. Changed neurological checks to [id number].
Rory Montgomery will notify her attending. Pending orders. No intervention at this time.
half NPH and give sliding scale Humalog coverage
provider notified and aware. pending orders

## 2019-05-05 NOTE — PROGRESS NOTE ADULT - SUBJECTIVE AND OBJECTIVE BOX
Diabetes Follow up note:  Interval Hx:  68 y/o F w/h/o uncontrolled T2DM on oral DM meds. Also HTN/HLD/CAD/Hypothyroidism/recent umbilical hernia repair 2 weeks ago. Here s/p fall with LOC> seizures> hypernatremia s/p left craniotomy, intraoperative corticography and biopsy of seizure focus. Pt now moved to floor on enhanced supervision. Pt started on high dose decadron course now. Pt now eating after poor appetite yesterday- glucose 231 mg/dl prior to lunch. Pt seen at bedside. Tired but nodding yes/no to my questions.        Review of Systems:  General: Denies pain  GI: Tolerating POs without any N/V/D/ABD PAIN.  CV: No CP/SOB  ENDO: No S&Sx of hypoglycemia  MEDS:  dexamethasone  IVPB 8 milliGRAM(s) IV Intermittent every 8 hours    insulin glargine Injectable (LANTUS) 16 Unit(s) SubCutaneous at bedtime  insulin lispro (HumaLOG) corrective regimen sliding scale   SubCutaneous three times a day before meals  insulin lispro (HumaLOG) corrective regimen sliding scale   SubCutaneous at bedtime  insulin lispro Injectable (HumaLOG) 3 Unit(s) SubCutaneous three times a day with meals  levothyroxine 50 MICROGram(s) Oral daily    cefTRIAXone   IVPB        Allergies    No Known Allergies      PE:  General: Female reclining in chair. NAD.   Vital Signs Last 24 Hrs  T(C): 37.4 (05 May 2019 10:24), Max: 39.2 (04 May 2019 16:25)  T(F): 99.3 (05 May 2019 10:24), Max: 102.5 (04 May 2019 16:25)  HR: 92 (05 May 2019 08:05) (77 - 100)  BP: 125/71 (05 May 2019 08:05) (125/71 - 174/91)  BP(mean): --  RR: 19 (05 May 2019 08:05) (17 - 19)  SpO2: 95% (05 May 2019 08:05) (93% - 99%)  Abd: Soft, NT,ND, Obese.   Extremities: Warm. no edema noted.   Neuro: Lethargic. Nods yes/no to answers     LABS:    POCT Blood Glucose.: 231 mg/dL (05-05-19 @ 12:09)  POCT Blood Glucose.: 163 mg/dL (05-05-19 @ 09:02)  POCT Blood Glucose.: 147 mg/dL (05-04-19 @ 21:34)  POCT Blood Glucose.: 132 mg/dL (05-04-19 @ 18:37)  POCT Blood Glucose.: 87 mg/dL (05-04-19 @ 12:00)  POCT Blood Glucose.: 112 mg/dL (05-04-19 @ 08:59)  POCT Blood Glucose.: 149 mg/dL (05-03-19 @ 21:42)  POCT Blood Glucose.: 114 mg/dL (05-03-19 @ 18:16)  POCT Blood Glucose.: 129 mg/dL (05-03-19 @ 12:27)  POCT Blood Glucose.: 133 mg/dL (05-03-19 @ 08:34)  POCT Blood Glucose.: 134 mg/dL (05-03-19 @ 06:10)  POCT Blood Glucose.: 149 mg/dL (05-02-19 @ 21:56)  POCT Blood Glucose.: 141 mg/dL (05-02-19 @ 17:12)                            9.2    8.89  )-----------( 344      ( 04 May 2019 22:55 )             30.1       05-04    138  |  100  |  4<L>  ----------------------------<  143<H>  3.6   |  23  |  0.60    Ca    9.2      04 May 2019 17:52  Phos  2.9     05-04  Mg     2.0     05-04    TPro  6.9  /  Alb  3.5  /  TBili  0.3  /  DBili  0.1  /  AST  77<H>  /  ALT  73<H>  /  AlkPhos  444<H>  05-05      Thyroid Function Tests:  05-02 @ 17:37 TSH -- FreeT4 -- T3 -- Anti TPO -- Anti Thyroglobulin Ab -- TSI <0.10  04-22 @ 08:55 TSH -- FreeT4 1.1 T3 -- Anti TPO -- Anti Thyroglobulin Ab -- TSI --      Hemoglobin A1C, Whole Blood: 9.9 % <H> [4.0 - 5.6] (04-21-19 @ 09:26)            Contact number: beeper [phone] or [phone]

## 2019-05-06 LAB
GLUCOSE BLDC GLUCOMTR-MCNC: 135 MG/DL — HIGH (ref 70–99)
GLUCOSE BLDC GLUCOMTR-MCNC: 193 MG/DL — HIGH (ref 70–99)
GLUCOSE BLDC GLUCOMTR-MCNC: 194 MG/DL — HIGH (ref 70–99)
GLUCOSE BLDC GLUCOMTR-MCNC: 197 MG/DL — HIGH (ref 70–99)
GLUCOSE BLDC GLUCOMTR-MCNC: 251 MG/DL — HIGH (ref 70–99)

## 2019-05-06 PROCEDURE — 99231 SBSQ HOSP IP/OBS SF/LOW 25: CPT | Mod: GC

## 2019-05-06 RX ADMIN — Medication 200 MILLIGRAM(S): at 21:21

## 2019-05-06 RX ADMIN — Medication 2: at 21:23

## 2019-05-06 RX ADMIN — Medication 3 MILLILITER(S): at 23:38

## 2019-05-06 RX ADMIN — POLYETHYLENE GLYCOL 3350 17 GRAM(S): 17 POWDER, FOR SOLUTION ORAL at 05:56

## 2019-05-06 RX ADMIN — SENNA PLUS 1 TABLET(S): 8.6 TABLET ORAL at 13:28

## 2019-05-06 RX ADMIN — CEFTRIAXONE 100 GRAM(S): 500 INJECTION, POWDER, FOR SOLUTION INTRAMUSCULAR; INTRAVENOUS at 01:32

## 2019-05-06 RX ADMIN — INSULIN GLARGINE 20 UNIT(S): 100 INJECTION, SOLUTION SUBCUTANEOUS at 21:23

## 2019-05-06 RX ADMIN — LACOSAMIDE 150 MILLIGRAM(S): 50 TABLET ORAL at 13:28

## 2019-05-06 RX ADMIN — Medication 6 UNIT(S): at 18:00

## 2019-05-06 RX ADMIN — Medication 101.6 MILLIGRAM(S): at 13:38

## 2019-05-06 RX ADMIN — Medication 0.1 MILLIGRAM(S): at 17:59

## 2019-05-06 RX ADMIN — QUETIAPINE FUMARATE 12.5 MILLIGRAM(S): 200 TABLET, FILM COATED ORAL at 21:21

## 2019-05-06 RX ADMIN — POLYETHYLENE GLYCOL 3350 17 GRAM(S): 17 POWDER, FOR SOLUTION ORAL at 17:59

## 2019-05-06 RX ADMIN — Medication 10 MILLIGRAM(S): at 05:55

## 2019-05-06 RX ADMIN — ENOXAPARIN SODIUM 40 MILLIGRAM(S): 100 INJECTION SUBCUTANEOUS at 17:59

## 2019-05-06 RX ADMIN — LEVETIRACETAM 400 MILLIGRAM(S): 250 TABLET, FILM COATED ORAL at 13:28

## 2019-05-06 RX ADMIN — Medication 3 MILLILITER(S): at 17:59

## 2019-05-06 RX ADMIN — Medication 100 MILLIGRAM(S): at 13:32

## 2019-05-06 RX ADMIN — Medication 101.6 MILLIGRAM(S): at 05:55

## 2019-05-06 RX ADMIN — Medication 100 MILLIGRAM(S): at 21:21

## 2019-05-06 RX ADMIN — Medication 200 MILLIGRAM(S): at 13:28

## 2019-05-06 RX ADMIN — Medication 101.6 MILLIGRAM(S): at 21:21

## 2019-05-06 RX ADMIN — CHLORHEXIDINE GLUCONATE 1 APPLICATION(S): 213 SOLUTION TOPICAL at 21:21

## 2019-05-06 RX ADMIN — LACOSAMIDE 150 MILLIGRAM(S): 50 TABLET ORAL at 23:38

## 2019-05-06 RX ADMIN — Medication 6 UNIT(S): at 13:27

## 2019-05-06 RX ADMIN — LEVETIRACETAM 400 MILLIGRAM(S): 250 TABLET, FILM COATED ORAL at 05:55

## 2019-05-06 RX ADMIN — Medication 100 MILLIGRAM(S): at 13:33

## 2019-05-06 RX ADMIN — Medication 0.1 MILLIGRAM(S): at 05:55

## 2019-05-06 RX ADMIN — LEVETIRACETAM 400 MILLIGRAM(S): 250 TABLET, FILM COATED ORAL at 21:22

## 2019-05-06 RX ADMIN — Medication 6 UNIT(S): at 08:50

## 2019-05-06 RX ADMIN — Medication 200 MILLIGRAM(S): at 05:55

## 2019-05-06 RX ADMIN — Medication 2: at 13:27

## 2019-05-06 RX ADMIN — Medication 100 MILLIGRAM(S): at 05:55

## 2019-05-06 RX ADMIN — Medication 50 MICROGRAM(S): at 05:55

## 2019-05-06 RX ADMIN — Medication 2: at 17:59

## 2019-05-06 RX ADMIN — Medication 3 MILLILITER(S): at 01:32

## 2019-05-06 RX ADMIN — Medication 3 MILLILITER(S): at 05:55

## 2019-05-06 RX ADMIN — Medication 10 MILLIGRAM(S): at 17:59

## 2019-05-06 RX ADMIN — Medication 3 MILLILITER(S): at 13:32

## 2019-05-06 RX ADMIN — LACOSAMIDE 150 MILLIGRAM(S): 50 TABLET ORAL at 01:32

## 2019-05-06 RX ADMIN — AMLODIPINE BESYLATE 10 MILLIGRAM(S): 2.5 TABLET ORAL at 05:55

## 2019-05-06 NOTE — PROGRESS NOTE ADULT - SUBJECTIVE AND OBJECTIVE BOX
CC: f/u for post biopsy fever    Patient reports feeling good. No HA, no fevers.     REVIEW OF SYSTEMS:  All other review of systems negative (Comprehensive ROS)    Antimicrobials Day #  :  cefTRIAXone   IVPB 1 Gram(s) IV Intermittent every 24 hours  cefTRIAXone   IVPB        Other Medications Reviewed    T(F): 98.1 (19 @ 15:04), Max: 99.5 (19 @ 01:43)  HR: 83 (19 @ 15:04)  BP: 146/72 (19 @ 15:04)  RR: 18 (19 @ 15:04)  SpO2: 96% (19 @ 15:04)  Wt(kg): --    PHYSICAL EXAM:  General: alert, no acute distress, craniotomy incision is C/D/I  Eyes:  anicteric, no conjunctival injection, no discharge  Neck: supple, without adenopathy  Lungs: clear to auscultation  Heart: regular rate and rhythm; no murmurs  Abdomen: soft, nondistended, nontender  Skin: no lesions  Extremities: no clubbing, cyanosis, or edema  Neurologic: alert, oriented, moves all extremities      LAB RESULTS:                        9.2    8.89  )-----------( 344      ( 04 May 2019 22:55 )             30.1         TPro  6.9  /  Alb  3.5  /  TBili  0.3  /  DBili  0.1  /  AST  77<H>  /  ALT  73<H>  /  AlkPhos  444<H>      LIVER FUNCTIONS - ( 05 May 2019 06:34 )  Alb: 3.5 g/dL / Pro: 6.9 g/dL / ALK PHOS: 444 U/L / ALT: 73 U/L / AST: 77 U/L / GGT: x           Urinalysis Basic - ( 04 May 2019 23:33 )    Color: Yellow / Appearance: Slightly Turbid / S.011 / pH: x  Gluc: x / Ketone: Negative  / Bili: Negative / Urobili: <2 mg/dL   Blood: x / Protein: 30 mg/dL / Nitrite: Negative   Leuk Esterase: Large / RBC: 58 /HPF / WBC 22 /HPF   Sq Epi: x / Non Sq Epi: 1 /HPF / Bacteria: Many      MICROBIOLOGY:  RECENT CULTURES:   @ 01:19 .Blood     No growth to date.      RADIOLOGY REVIEWED:

## 2019-05-06 NOTE — PROGRESS NOTE ADULT - ASSESSMENT
69 f s/p brain mass biopsy  Off antibiotics.  Hold ASA, Eliquis - restart when cleared by NSx.  A Fib  Hypothyroid  - follow TSH.  HTN control  COPD  - continue nebs  Diabetes control.  Seizure control  Abnormal LFT improving   - Hepatology follow  - follow LFT in am  Path pending.  Fever  - panculture  - observe off antibiotics for now  - ID follow  d/w daughter and son.  Tyrone Antony MD pager [phone]

## 2019-05-06 NOTE — PROGRESS NOTE ADULT - SUBJECTIVE AND OBJECTIVE BOX
Subjective: Interval History - No events overnight    Objective:   Vital Signs Last 24 Hrs  T(C): 36.7 (06 May 2019 08:00), Max: 38.1 (05 May 2019 12:36)  T(F): 98 (06 May 2019 08:00), Max: 100.5 (05 May 2019 12:36)  HR: 72 (06 May 2019 08:00) (72 - 87)  BP: 150/83 (06 May 2019 08:00) (111/70 - 171/81)  RR: 18 (06 May 2019 08:00) (17 - 20)  SpO2: 96% (06 May 2019 08:00) (96% - 99%)    General Exam:   General appearance: No acute distress                   Neurological Exam:  Mental Status: Orientated to self. Unable to answer where she is, the date, or . No dysarthria, possible expressive aphasia. Following commands.  Staples along left scalp    Cranial Nerves: PERRL, blinks to threat on left, not on right.  No facial asymmetry.     Motor:   Tone: normal.           Motor: Moving all extremities spontaneously         Drift: none                 Dysmetria: None to finger-nose-finger    Other:        138  |  100  |  4<L>  ----------------------------<  143<H>  3.6   |  23  |  0.60    Ca    9.2      04 May 2019 17:52    TPro  6.9  /  Alb  3.5  /  TBili  0.3  /  DBili  0.1  /  AST  77<H>  /  ALT  73<H>  /  AlkPhos  444<H>      LIVER FUNCTIONS - ( 05 May 2019 06:34 )  Alb: 3.5 g/dL / Pro: 6.9 g/dL / ALK PHOS: 444 U/L / ALT: 73 U/L / AST: 77 U/L / GGT: x                                 9.2    8.89  )-----------( 344      ( 04 May 2019 22:55 )             30.1       MEDICATIONS  (STANDING):  ALBUTerol/ipratropium for Nebulization 3 milliLiter(s) Nebulizer every 6 hours  amLODIPine   Tablet 10 milliGRAM(s) Oral daily  cefTRIAXone   IVPB 1 Gram(s) IV Intermittent every 24 hours  cefTRIAXone   IVPB      chlorhexidine 4% Liquid 1 Application(s) Topical <User Schedule>  cloNIDine 0.1 milliGRAM(s) Oral every 12 hours  dexamethasone  IVPB 8 milliGRAM(s) IV Intermittent every 8 hours  dextrose 5%. 1000 milliLiter(s) (50 mL/Hr) IV Continuous <Continuous>  dextrose 50% Injectable 12.5 Gram(s) IV Push once  dextrose 50% Injectable 25 Gram(s) IV Push once  dextrose 50% Injectable 25 Gram(s) IV Push once  docusate sodium 100 milliGRAM(s) Oral daily  enalapril 10 milliGRAM(s) Oral two times a day  enoxaparin Injectable 40 milliGRAM(s) SubCutaneous <User Schedule>  hydrALAZINE 100 milliGRAM(s) Oral every 8 hours  insulin glargine Injectable (LANTUS) 20 Unit(s) SubCutaneous at bedtime  insulin lispro (HumaLOG) corrective regimen sliding scale   SubCutaneous three times a day before meals  insulin lispro (HumaLOG) corrective regimen sliding scale   SubCutaneous at bedtime  insulin lispro Injectable (HumaLOG) 6 Unit(s) SubCutaneous three times a day with meals  labetalol 200 milliGRAM(s) Oral every 8 hours  lacosamide 150 milliGRAM(s) Oral two times a day  levETIRAcetam  IVPB 1000 milliGRAM(s) IV Intermittent <User Schedule>  levothyroxine 50 MICROGram(s) Oral daily  polyethylene glycol 3350 17 Gram(s) Oral two times a day  QUEtiapine 12.5 milliGRAM(s) Oral two times a day  senna 1 Tablet(s) Oral daily    MEDICATIONS  (PRN):  bisacodyl Suppository 10 milliGRAM(s) Rectal daily PRN Constipation  dextrose 40% Gel 15 Gram(s) Oral once PRN Blood Glucose LESS THAN 70 milliGRAM(s)/deciliter  glucagon  Injectable 1 milliGRAM(s) IntraMuscular once PRN Glucose LESS THAN 70 milligrams/deciliter  haloperidol    Injectable 1 milliGRAM(s) IV Push every 4 hours PRN Agitation  hydrALAZINE Injectable 10 milliGRAM(s) IV Push every 4 hours PRN sbp over 160  HYDROmorphone  Injectable 0.5 milliGRAM(s) IV Push every 4 hours PRN Severe Pain (7 - 10)  LORazepam   Injectable 2 milliGRAM(s) IV Push once PRN Seizure  morphine  - Injectable 2 milliGRAM(s) IV Push every 4 hours PRN Moderate Pain (4 - 6) Subjective: Interval History - No events overnight    Objective:   Vital Signs Last 24 Hrs  T(C): 36.7 (06 May 2019 08:00), Max: 38.1 (05 May 2019 12:36)  T(F): 98 (06 May 2019 08:00), Max: 100.5 (05 May 2019 12:36)  HR: 72 (06 May 2019 08:00) (72 - 87)  BP: 150/83 (06 May 2019 08:00) (111/70 - 171/81)  RR: 18 (06 May 2019 08:00) (17 - 20)  SpO2: 96% (06 May 2019 08:00) (96% - 99%)    General Exam:   General appearance: No acute distress                   Neurological Exam:  Mental Status: Orientated to self. Unable to answer where she is, the date, or . No dysarthria, possible expressive aphasia. Following commands.  Staples along left scalp    Cranial Nerves: PERRL, blinks to threat on left, not on right.  No facial asymmetry.     Motor:   Tone: normal.           Motor: Moving all extremities spontaneously         Drift: none                 Dysmetria: None to finger-nose-finger    Other:  SENSORY: EXTINGUISHES RIGHT SIDED STIMULI ON DOUBLE SIMULTANEOUS STIMULATION        138  |  100  |  4<L>  ----------------------------<  143<H>  3.6   |  23  |  0.60    Ca    9.2      04 May 2019 17:52    TPro  6.9  /  Alb  3.5  /  TBili  0.3  /  DBili  0.1  /  AST  77<H>  /  ALT  73<H>  /  AlkPhos  444<H>  05-05    LIVER FUNCTIONS - ( 05 May 2019 06:34 )  Alb: 3.5 g/dL / Pro: 6.9 g/dL / ALK PHOS: 444 U/L / ALT: 73 U/L / AST: 77 U/L / GGT: x                                 9.2    8.89  )-----------( 344      ( 04 May 2019 22:55 )             30.1       MEDICATIONS  (STANDING):  ALBUTerol/ipratropium for Nebulization 3 milliLiter(s) Nebulizer every 6 hours  amLODIPine   Tablet 10 milliGRAM(s) Oral daily  cefTRIAXone   IVPB 1 Gram(s) IV Intermittent every 24 hours  cefTRIAXone   IVPB      chlorhexidine 4% Liquid 1 Application(s) Topical <User Schedule>  cloNIDine 0.1 milliGRAM(s) Oral every 12 hours  dexamethasone  IVPB 8 milliGRAM(s) IV Intermittent every 8 hours  dextrose 5%. 1000 milliLiter(s) (50 mL/Hr) IV Continuous <Continuous>  dextrose 50% Injectable 12.5 Gram(s) IV Push once  dextrose 50% Injectable 25 Gram(s) IV Push once  dextrose 50% Injectable 25 Gram(s) IV Push once  docusate sodium 100 milliGRAM(s) Oral daily  enalapril 10 milliGRAM(s) Oral two times a day  enoxaparin Injectable 40 milliGRAM(s) SubCutaneous <User Schedule>  hydrALAZINE 100 milliGRAM(s) Oral every 8 hours  insulin glargine Injectable (LANTUS) 20 Unit(s) SubCutaneous at bedtime  insulin lispro (HumaLOG) corrective regimen sliding scale   SubCutaneous three times a day before meals  insulin lispro (HumaLOG) corrective regimen sliding scale   SubCutaneous at bedtime  insulin lispro Injectable (HumaLOG) 6 Unit(s) SubCutaneous three times a day with meals  labetalol 200 milliGRAM(s) Oral every 8 hours  lacosamide 150 milliGRAM(s) Oral two times a day  levETIRAcetam  IVPB 1000 milliGRAM(s) IV Intermittent <User Schedule>  levothyroxine 50 MICROGram(s) Oral daily  polyethylene glycol 3350 17 Gram(s) Oral two times a day  QUEtiapine 12.5 milliGRAM(s) Oral two times a day  senna 1 Tablet(s) Oral daily    MEDICATIONS  (PRN):  bisacodyl Suppository 10 milliGRAM(s) Rectal daily PRN Constipation  dextrose 40% Gel 15 Gram(s) Oral once PRN Blood Glucose LESS THAN 70 milliGRAM(s)/deciliter  glucagon  Injectable 1 milliGRAM(s) IntraMuscular once PRN Glucose LESS THAN 70 milligrams/deciliter  haloperidol    Injectable 1 milliGRAM(s) IV Push every 4 hours PRN Agitation  hydrALAZINE Injectable 10 milliGRAM(s) IV Push every 4 hours PRN sbp over 160  HYDROmorphone  Injectable 0.5 milliGRAM(s) IV Push every 4 hours PRN Severe Pain (7 - 10)  LORazepam   Injectable 2 milliGRAM(s) IV Push once PRN Seizure  morphine  - Injectable 2 milliGRAM(s) IV Push every 4 hours PRN Moderate Pain (4 - 6)

## 2019-05-06 NOTE — PROGRESS NOTE ADULT - ASSESSMENT
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

## 2019-05-06 NOTE — PROGRESS NOTE ADULT - SUBJECTIVE AND OBJECTIVE BOX
Patient is a 69y old  Female who presents with a chief complaint of seizure, encephalopathy (05 May 2019 07:05)      SUBJECTIVE / OVERNIGHT EVENTS: Comfortable without new complaints. Family at bedside.   Review of Systems  chest pain no  palpitations no  sob no  nausea no  headache no    MEDICATIONS  (STANDING):  ALBUTerol/ipratropium for Nebulization 3 milliLiter(s) Nebulizer every 6 hours  amLODIPine   Tablet 10 milliGRAM(s) Oral daily  cefTRIAXone   IVPB 1 Gram(s) IV Intermittent every 24 hours  cefTRIAXone   IVPB      chlorhexidine 4% Liquid 1 Application(s) Topical <User Schedule>  cloNIDine 0.1 milliGRAM(s) Oral every 12 hours  dexamethasone  IVPB 8 milliGRAM(s) IV Intermittent every 8 hours  dextrose 5%. 1000 milliLiter(s) (50 mL/Hr) IV Continuous <Continuous>  dextrose 50% Injectable 12.5 Gram(s) IV Push once  dextrose 50% Injectable 25 Gram(s) IV Push once  dextrose 50% Injectable 25 Gram(s) IV Push once  docusate sodium 100 milliGRAM(s) Oral daily  enalapril 10 milliGRAM(s) Oral two times a day  enoxaparin Injectable 40 milliGRAM(s) SubCutaneous <User Schedule>  hydrALAZINE 100 milliGRAM(s) Oral every 8 hours  insulin glargine Injectable (LANTUS) 20 Unit(s) SubCutaneous at bedtime  insulin lispro (HumaLOG) corrective regimen sliding scale   SubCutaneous three times a day before meals  insulin lispro (HumaLOG) corrective regimen sliding scale   SubCutaneous at bedtime  insulin lispro Injectable (HumaLOG) 6 Unit(s) SubCutaneous three times a day with meals  labetalol 200 milliGRAM(s) Oral every 8 hours  lacosamide 150 milliGRAM(s) Oral two times a day  levETIRAcetam  IVPB 1000 milliGRAM(s) IV Intermittent <User Schedule>  levothyroxine 50 MICROGram(s) Oral daily  polyethylene glycol 3350 17 Gram(s) Oral two times a day  QUEtiapine 12.5 milliGRAM(s) Oral two times a day  senna 1 Tablet(s) Oral daily    MEDICATIONS  (PRN):  bisacodyl Suppository 10 milliGRAM(s) Rectal daily PRN Constipation  dextrose 40% Gel 15 Gram(s) Oral once PRN Blood Glucose LESS THAN 70 milliGRAM(s)/deciliter  glucagon  Injectable 1 milliGRAM(s) IntraMuscular once PRN Glucose LESS THAN 70 milligrams/deciliter  haloperidol    Injectable 1 milliGRAM(s) IV Push every 4 hours PRN Agitation  hydrALAZINE Injectable 10 milliGRAM(s) IV Push every 4 hours PRN sbp over 160  HYDROmorphone  Injectable 0.5 milliGRAM(s) IV Push every 4 hours PRN Severe Pain (7 - 10)  LORazepam   Injectable 2 milliGRAM(s) IV Push once PRN Seizure  morphine  - Injectable 2 milliGRAM(s) IV Push every 4 hours PRN Moderate Pain (4 - 6)      Vital Signs Last 24 Hrs  T(C): 36.8 (06 May 2019 20:26), Max: 37.5 (06 May 2019 01:43)  T(F): 98.2 (06 May 2019 20:26), Max: 99.5 (06 May 2019 01:43)  HR: 74 (06 May 2019 20:26) (68 - 87)  BP: 149/68 (06 May 2019 20:26) (138/82 - 171/81)  BP(mean): --  RR: 18 (06 May 2019 20:26) (18 - 20)  SpO2: 98% (06 May 2019 20:26) (96% - 99%)    PHYSICAL EXAM:  GENERAL: NAD  HEAD:  Atraumatic, Normocephalic  EYES: EOMI, PERRLA, conjunctiva and sclera clear  NECK: Supple, No JVD  CHEST/LUNG: Clear to auscultation bilaterally; No wheeze  HEART: Regular rate and rhythm; No murmurs, rubs, or gallops  ABDOMEN: Soft, Nontender, Nondistended; Bowel sounds present  EXTREMITIES:  2+ Peripheral Pulses, No clubbing, cyanosis, or edema  NEUROLOGY: non-focal  SKIN: No rashes or lesions    LABS:                        9.2    8.89  )-----------( 344      ( 04 May 2019 22:55 )             30.1         TPro  6.9  /  Alb  3.5  /  TBili  0.3  /  DBili  0.1  /  AST  77<H>  /  ALT  73<H>  /  AlkPhos  444<H>  05-05          Urinalysis Basic - ( 04 May 2019 23:33 )    Color: Yellow / Appearance: Slightly Turbid / S.011 / pH: x  Gluc: x / Ketone: Negative  / Bili: Negative / Urobili: <2 mg/dL   Blood: x / Protein: 30 mg/dL / Nitrite: Negative   Leuk Esterase: Large / RBC: 58 /HPF / WBC 22 /HPF   Sq Epi: x / Non Sq Epi: 1 /HPF / Bacteria: Many        Culture - Blood (collected 05 May 2019 01:19)  Source: .Blood  Preliminary Report (06 May 2019 02:01):    No growth to date.    Culture - Blood (collected 05 May 2019 01:19)  Source: .Blood  Preliminary Report (06 May 2019 02:01):    No growth to date.        RADIOLOGY & ADDITIONAL TESTS:    Imaging Personally Reviewed:    Consultant(s) Notes Reviewed:      Care Discussed with Consultants/Other Providers:

## 2019-05-06 NOTE — PROGRESS NOTE ADULT - ASSESSMENT
69 year old female with altered mental status due to seizures likely 2/2 to left temporal lesion, differential diagnosis includes neoplastic vs autoimmune encephalitis. Was in nonconvulsive status epilepticus, resolved. MR spect does not suggest neoplastic lesion. MRI brain w/ contrast does not show enhancement of the lesion, which makes infiltrative glioma less likely, as well as lymphoma, though lymphoma is not entirely excluded. CSF so far also suggests against herpes encephalitis or infectious etiology. CSF flow cytometry and cytopathology have both been negative. CT c/a/p shows enhancement of bilateral kidneys, concerning for infection vs neoplastic process. Though the renal enhancement could be tumor, it is not clear if the same process is ongoing intracranially. For that reason, may not be useful to biopsy the kidney, as it may not yield full diagnostic information regarding the intracranial process. She has elevated liver enzymes, which could be due to pheyntoin use.     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)   Enhancing renal lesion Differential UTI vs infiltrative process   Left temporal-occipital seizure   Frozen section shows gliosis    Plan:  - currently on Keppra 1g TID; vimpat 150 BID   - Continue decadron 8 q8h  - Ceftriaxone for UTI started 5/5, to end 5/9  - f/u LP results from 4/24 per ID recs for: csf fungal culture- negative,  AFB culture - negative,  spinal fluid autoimmune encephalitis panel - pending   - f/u serum autoimmune encephalitis panel  - hepatology labs (anti-mitochondrial, anti-smooth muscle, anti-LKM, anti-liver cytosol antibody) - unremarkable   - US abdomen as part of evaluation for elevated LFTs - normal  - MRI abdomen to assess renal lesions - renal enhancements still present  - Neurosurgery recommended holding anticoagulations for 10 days, (restart on 5/10), Patient at risk of stroke, but risk of bleeding from recent biopsy in setting of anticoagulation is more compared to risk of stroke 69 year old female with altered mental status due to seizures likely 2/2 to left temporal lesion, differential diagnosis includes neoplastic vs autoimmune encephalitis. Was in nonconvulsive status epilepticus, resolved. MR spect does not suggest neoplastic lesion. MRI brain w/ contrast does not show enhancement of the lesion, which makes infiltrative glioma less likely, as well as lymphoma, though lymphoma is not entirely excluded. CSF so far also suggests against herpes encephalitis or infectious etiology. CSF flow cytometry and cytopathology have both been negative. CT c/a/p shows enhancement of bilateral kidneys, concerning for infection vs neoplastic process. Though the renal enhancement could be tumor, it is not clear if the same process is ongoing intracranially. For that reason, may not be useful to biopsy the kidney, as it may not yield full diagnostic information regarding the intracranial process. She has elevated liver enzymes, which could be due to pheyntoin use.   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)   Enhancing renal lesion Differential UTI vs infiltrative process   Left temporal-occipital seizure   Frozen section shows gliosis    Plan:  - currently on Keppra 1g TID; vimpat 150 BID   - Continue decadron 8 q8h  - Ceftriaxone for UTI started 5/5, to end 5/9  - f/u LP results from 4/24 per ID recs for: csf fungal culture- negative,  AFB culture - negative,  spinal fluid autoimmune encephalitis panel - pending   - f/u serum autoimmune encephalitis panel  - hepatology labs (anti-mitochondrial, anti-smooth muscle, anti-LKM, anti-liver cytosol antibody) - unremarkable   - US abdomen as part of evaluation for elevated LFTs - normal  - MRI abdomen to assess renal lesions - renal enhancements still present  - Neurosurgery recommended holding anticoagulations for 10 days, (restart on 5/10), Patient at risk of stroke, but risk of bleeding from recent biopsy in setting of anticoagulation is more compared to risk of stroke

## 2019-05-07 ENCOUNTER — TRANSCRIPTION ENCOUNTER (OUTPATIENT)
Age: 69
End: 2019-05-07

## 2019-05-07 VITALS
RESPIRATION RATE: 19 BRPM | HEART RATE: 69 BPM | TEMPERATURE: 98 F | OXYGEN SATURATION: 96 % | SYSTOLIC BLOOD PRESSURE: 157 MMHG | DIASTOLIC BLOOD PRESSURE: 77 MMHG

## 2019-05-07 LAB
ALBUMIN SERPL ELPH-MCNC: 3.6 G/DL — SIGNIFICANT CHANGE UP (ref 3.3–5)
ALP SERPL-CCNC: 385 U/L — HIGH (ref 40–120)
ALT FLD-CCNC: 91 U/L — HIGH (ref 10–45)
AMPHIPHYSIN AB TITR CSF: NEGATIVE TITER — SIGNIFICANT CHANGE UP
ANION GAP SERPL CALC-SCNC: 14 MMOL/L — SIGNIFICANT CHANGE UP (ref 5–17)
AST SERPL-CCNC: 77 U/L — HIGH (ref 10–40)
BILIRUB DIRECT SERPL-MCNC: <0.1 MG/DL — SIGNIFICANT CHANGE UP (ref 0–0.2)
BILIRUB INDIRECT FLD-MCNC: >0.1 MG/DL — LOW (ref 0.2–1)
BILIRUB SERPL-MCNC: 0.2 MG/DL — SIGNIFICANT CHANGE UP (ref 0.2–1.2)
BUN SERPL-MCNC: 11 MG/DL — SIGNIFICANT CHANGE UP (ref 7–23)
CALCIUM SERPL-MCNC: 9.3 MG/DL — SIGNIFICANT CHANGE UP (ref 8.4–10.5)
CHLORIDE SERPL-SCNC: 102 MMOL/L — SIGNIFICANT CHANGE UP (ref 96–108)
CO2 SERPL-SCNC: 21 MMOL/L — LOW (ref 22–31)
CREAT SERPL-MCNC: 0.58 MG/DL — SIGNIFICANT CHANGE UP (ref 0.5–1.3)
CULTURE RESULTS: SIGNIFICANT CHANGE UP
CV2 IGG TITR CSF: NEGATIVE TITER — SIGNIFICANT CHANGE UP
GLIAL NUC TYPE 1 AB TITR CSF: NEGATIVE TITER — SIGNIFICANT CHANGE UP
GLUCOSE BLDC GLUCOMTR-MCNC: 176 MG/DL — HIGH (ref 70–99)
GLUCOSE BLDC GLUCOMTR-MCNC: 208 MG/DL — HIGH (ref 70–99)
GLUCOSE SERPL-MCNC: 206 MG/DL — HIGH (ref 70–99)
HCT VFR BLD CALC: 28.9 % — LOW (ref 34.5–45)
HGB BLD-MCNC: 8.7 G/DL — LOW (ref 11.5–15.5)
HU1 AB TITR CSF IF: NEGATIVE TITER — SIGNIFICANT CHANGE UP
HU2 AB TITR CSF IF: NEGATIVE TITER — SIGNIFICANT CHANGE UP
HU3 AB TITR CSF: NEGATIVE TITER — SIGNIFICANT CHANGE UP
MCHC RBC-ENTMCNC: 23.9 PG — LOW (ref 27–34)
MCHC RBC-ENTMCNC: 30.1 GM/DL — LOW (ref 32–36)
MCV RBC AUTO: 79.4 FL — LOW (ref 80–100)
PARANEOPLASTIC INTERPRETATION, CSF: SIGNIFICANT CHANGE UP
PCA-TR AB TITR CSF: NEGATIVE TITER — SIGNIFICANT CHANGE UP
PLATELET # BLD AUTO: 300 K/UL — SIGNIFICANT CHANGE UP (ref 150–400)
POTASSIUM SERPL-MCNC: 4.1 MMOL/L — SIGNIFICANT CHANGE UP (ref 3.5–5.3)
POTASSIUM SERPL-SCNC: 4.1 MMOL/L — SIGNIFICANT CHANGE UP (ref 3.5–5.3)
PROT SERPL-MCNC: 7.2 G/DL — SIGNIFICANT CHANGE UP (ref 6–8.3)
PURKINJE CELL CYTOPLASMIC AB TYPE 2: NEGATIVE TITER — SIGNIFICANT CHANGE UP
PURKINJE CELLS AB TITR CSF IF: NEGATIVE TITER — SIGNIFICANT CHANGE UP
RBC # BLD: 3.64 M/UL — LOW (ref 3.8–5.2)
RBC # FLD: 17.2 % — HIGH (ref 10.3–14.5)
REFLEX ADDED: SIGNIFICANT CHANGE UP
SODIUM SERPL-SCNC: 137 MMOL/L — SIGNIFICANT CHANGE UP (ref 135–145)
SPECIMEN SOURCE: SIGNIFICANT CHANGE UP
WBC # BLD: 5.88 K/UL — SIGNIFICANT CHANGE UP (ref 3.8–10.5)
WBC # FLD AUTO: 5.88 K/UL — SIGNIFICANT CHANGE UP (ref 3.8–10.5)

## 2019-05-07 PROCEDURE — 99232 SBSQ HOSP IP/OBS MODERATE 35: CPT

## 2019-05-07 PROCEDURE — 99239 HOSP IP/OBS DSCHRG MGMT >30: CPT | Mod: GC

## 2019-05-07 RX ORDER — LEVETIRACETAM 250 MG/1
1 TABLET, FILM COATED ORAL
Qty: 90 | Refills: 0
Start: 2019-05-07 | End: 2019-06-05

## 2019-05-07 RX ORDER — ENOXAPARIN SODIUM 100 MG/ML
22 INJECTION SUBCUTANEOUS
Qty: 3 | Refills: 0
Start: 2019-05-07 | End: 2019-06-05

## 2019-05-07 RX ORDER — INSULIN LISPRO 100/ML
8 VIAL (ML) SUBCUTANEOUS
Refills: 0 | Status: DISCONTINUED | OUTPATIENT
Start: 2019-05-07 | End: 2019-05-07

## 2019-05-07 RX ORDER — HYDRALAZINE HCL 50 MG
1 TABLET ORAL
Qty: 90 | Refills: 0
Start: 2019-05-07 | End: 2019-06-05

## 2019-05-07 RX ORDER — LEVOTHYROXINE SODIUM 125 MCG
1 TABLET ORAL
Qty: 30 | Refills: 0
Start: 2019-05-07 | End: 2019-06-05

## 2019-05-07 RX ORDER — LABETALOL HCL 100 MG
1 TABLET ORAL
Qty: 90 | Refills: 0
Start: 2019-05-07 | End: 2019-06-05

## 2019-05-07 RX ORDER — CIPROFLOXACIN LACTATE 400MG/40ML
1 VIAL (ML) INTRAVENOUS
Qty: 14 | Refills: 0
Start: 2019-05-07 | End: 2019-05-13

## 2019-05-07 RX ORDER — INSULIN LISPRO 100/ML
8 VIAL (ML) SUBCUTANEOUS
Qty: 2 | Refills: 0
Start: 2019-05-07 | End: 2019-06-05

## 2019-05-07 RX ORDER — QUETIAPINE FUMARATE 200 MG/1
12.5 TABLET, FILM COATED ORAL
Qty: 15 | Refills: 0
Start: 2019-05-07 | End: 2019-06-05

## 2019-05-07 RX ORDER — LACOSAMIDE 50 MG/1
1 TABLET ORAL
Qty: 60 | Refills: 0
Start: 2019-05-07 | End: 2019-06-05

## 2019-05-07 RX ORDER — AMLODIPINE BESYLATE 2.5 MG/1
1 TABLET ORAL
Qty: 30 | Refills: 0
Start: 2019-05-07 | End: 2019-06-05

## 2019-05-07 RX ORDER — INSULIN GLARGINE 100 [IU]/ML
22 INJECTION, SOLUTION SUBCUTANEOUS AT BEDTIME
Refills: 0 | Status: DISCONTINUED | OUTPATIENT
Start: 2019-05-07 | End: 2019-05-07

## 2019-05-07 RX ORDER — LACOSAMIDE 50 MG/1
1 TABLET ORAL
Qty: 60 | Refills: 0
Start: 2019-05-07

## 2019-05-07 RX ADMIN — Medication 3 MILLILITER(S): at 05:23

## 2019-05-07 RX ADMIN — Medication 10 MILLIGRAM(S): at 17:17

## 2019-05-07 RX ADMIN — Medication 200 MILLIGRAM(S): at 14:15

## 2019-05-07 RX ADMIN — Medication 10 MILLIGRAM(S): at 05:23

## 2019-05-07 RX ADMIN — Medication 6 UNIT(S): at 09:21

## 2019-05-07 RX ADMIN — POLYETHYLENE GLYCOL 3350 17 GRAM(S): 17 POWDER, FOR SOLUTION ORAL at 05:24

## 2019-05-07 RX ADMIN — Medication 100 MILLIGRAM(S): at 14:14

## 2019-05-07 RX ADMIN — LEVETIRACETAM 400 MILLIGRAM(S): 250 TABLET, FILM COATED ORAL at 05:23

## 2019-05-07 RX ADMIN — QUETIAPINE FUMARATE 12.5 MILLIGRAM(S): 200 TABLET, FILM COATED ORAL at 09:22

## 2019-05-07 RX ADMIN — LEVETIRACETAM 400 MILLIGRAM(S): 250 TABLET, FILM COATED ORAL at 14:15

## 2019-05-07 RX ADMIN — Medication 4: at 09:20

## 2019-05-07 RX ADMIN — Medication 200 MILLIGRAM(S): at 05:23

## 2019-05-07 RX ADMIN — CEFTRIAXONE 100 GRAM(S): 500 INJECTION, POWDER, FOR SOLUTION INTRAMUSCULAR; INTRAVENOUS at 02:58

## 2019-05-07 RX ADMIN — Medication 3 MILLILITER(S): at 14:13

## 2019-05-07 RX ADMIN — Medication 50 MICROGRAM(S): at 05:24

## 2019-05-07 RX ADMIN — Medication 101.6 MILLIGRAM(S): at 05:23

## 2019-05-07 RX ADMIN — SENNA PLUS 1 TABLET(S): 8.6 TABLET ORAL at 14:14

## 2019-05-07 RX ADMIN — Medication 100 MILLIGRAM(S): at 05:23

## 2019-05-07 RX ADMIN — Medication 0.1 MILLIGRAM(S): at 05:23

## 2019-05-07 RX ADMIN — Medication 0.1 MILLIGRAM(S): at 17:17

## 2019-05-07 RX ADMIN — LACOSAMIDE 150 MILLIGRAM(S): 50 TABLET ORAL at 14:13

## 2019-05-07 RX ADMIN — AMLODIPINE BESYLATE 10 MILLIGRAM(S): 2.5 TABLET ORAL at 05:23

## 2019-05-07 RX ADMIN — Medication 100 MILLIGRAM(S): at 14:15

## 2019-05-07 RX ADMIN — Medication 101.6 MILLIGRAM(S): at 14:16

## 2019-05-07 NOTE — DISCHARGE NOTE PROVIDER - CARE PROVIDER_API CALL
Nadeen Ruiz; PhD)  Neurological Surgery  [address], [address]  [place] [address]  Phone: [phone]  Fax: [phone]  Follow Up Time:     Mari Guillermo)  EndocrinologyMetabDiabetes; Internal Medicine  [address], [address]  [place] [address]  Phone: [phone]  Fax: [phone]  Follow Up Time:     Rahat Orozco (DO)  Nephrology  [address]  [place] [address]  Phone: [phone]  Fax: [phone]  Follow Up Time:     Haley Armas (MD)  Neurology  [address], [facility] [address]  [place] [address]  Phone: [phone]  Fax: [phone]  Follow Up Time:     Royce Martinez (MD)  Clinical Neurophysiology; EEGEpilepsy; Neurology  [address], [facility] [address]  [place] [address]  Phone: [phone]  Fax: [phone]  Follow Up Time:     Tyrone Antony (MD)  Internal Medicine  [address] [place] [address]  Phone: [phone]  Fax: [phone]  Follow Up Time:

## 2019-05-07 NOTE — PROGRESS NOTE ADULT - PROBLEM SELECTOR PROBLEM 3
Brain lesion
Encephalitis
Hypothyroid
Encephalitis

## 2019-05-07 NOTE — DISCHARGE NOTE PROVIDER - CARE PROVIDERS DIRECT ADDRESSES
,[email or web address],[email or web address],[email or web address],DirectAddress_Unknown,[email or web address]

## 2019-05-07 NOTE — PROGRESS NOTE ADULT - ATTENDING COMMENTS
68 yo F with HTN, DM, HLD, hypothyroidism, CAD, and recent umbilical hernia repair, admitted with status epilepticus that required multiple AEDs for seizure control, currently awaiting brain biopsy on 5/1/19 due to abnormalities seen on MRI. Her hospital course has been complicated by moderate elevations in alkaline phosphatase, AST, and ALT noted on labs since 2/27/19; prior to that on admission, only her alkaline phosphatase was mildly elevated (possibly reflecting underlying nonalcoholic fatty liver disease). Her bilirubin level and liver synthetic function remain preserved. 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 On review of her medications, the most likely culprit is fosphenytoin, which she continues to receive. Recommend to switch to an alternative AED if at all possible to avoid risk of progressive liver injury, as phenytoin-related liver injury can be severe and even progress to liver failure. Given unclear etiology of her neurologic abnormalities, also recommend liver-specific infectious work-up as above to rule out other possible etiologies of her abnormal liver tests aside from DILI.
Patient seen and examined and agree with resident note.    Patient overnight continued to have seizures grossly and go an MRI.     Current management includes:  1) Seizures- continue keppra  -continue propofol  -EEG- 7 seizure   -follow up MRI- ? glioma  'will discuss with NS and neurology    2) Hypokalemia- repleted and will recheck    3) Hyperglycemia with type 2 DM- continue insulin sliding scale  -keep BG< 180    4) Hypomagnesemia- replete and will recheck.    5) Acute respiratory failure s/p trauma- continue mechanical ventilation at this time.  Oxygenation and ventilation appropriate  CXR - clear  -patient can keep   Continue oxygen supplementation with goal SpO2> 92%    T10 vertebral body- will call spine consult    CC time: 45 min
Patient seen and examined, EEG reviewed.  Patient currently remains awake and responsive.  EEG rhythmic sharp waves from left temporal region are not significantly changed from 24 hours ago.  Would DC vimpat and cont keppra with fycompa.  Transfer to [address] [place] [address]
Pt without any history of anemia. Denies any recent wt loss or night sweats or weakness. On exam, no cervical or axillary adenopathy palpable. Plan for biopsy of CNS mass noted. Will follow up with anemia work up.
patient seen and examined with [facility]  Pathology report reviewed  Imaging reviewed in neuroradiology with Dr Blevins.    Briefly, 71 yo RH woman with PMHx multiple ED visits for hypertensive crises (3/27/13 and 3/15/10 with CT imaging for that), found down on ground by family on 4/19/19, had witnessed seizures in ED, admitted and seizures controlled, UP=034/114 at admission, MR imaging demonstrated UNILATERAL T2 FLAIR changes involving posterior left temporo-parietal region, without significant enhancement nor DWI positivity, s/p CSF analysis, s/p seizure control, s/p brain biopsy 5/1/19, persistently with memory loss and intermittent confusion, now markedly improved following antibiotics for UTI.    EXAM  alerT. Not able to remember name of the hospital, but she knows she is in the hospital, unable to recall the month nor the year.  follows all commands. nonaphasic, fluent speech in English and Creole/Slovenian.  no drift, full strength    LABS  PATH: no tumor, no infection, (+) beta amyloid securing a diagnosis of Alzheimer's disease, odd changes in vessels that look similar to what is seen following a stroke, but there is not enough to make an ischemic event diagnosis. No inflammation despite a few reactive histiocytes.    IMAGING  CT - 5/6/19 -- no hydrocephalus. no bleeding. stable post-operative changes. persistent edema c/w prior imaging  MR - 4/25/19 AND 4/19/19 RE-reviewed  CT - 3/15/2010 and 3/27/13 -- performed for hypertensive crises -- show no posterior fossa cerebral edema    IMRPESSION/PLAN   71 yo RH woman with multiple hypertensive crises in past, now found down and having seizures, with MR imaging demonstrating unilateral cerebral edema in posterior circulation distribution, with brain biopsy on 5/1/19 diagnostic for Alzheimer's (early), and probably showing changes consistent with RPLE/PRES, much improved with treatment of UTI and BP control.    MEMORY LOSS -- she has cognitive decline due to Alzheimer's. Perhaps we are capturing this early enough to intervene, as some recent investigations were able to accomplish.  ABNORMAL MRI -- typically, the changes of hypertensive encephalopathy (PRES and RPLE) are bilateral. They occur in the posterior circulation because sympathetic innervation in this area is less. She had a very atypical imaging findings of PRES affecting the left, not so much the right hemisphere in the posterior circulation region.  HYPERTENSION -- she may be forgetting to take her BP meds due to her cognitive decline, with deleterious consequences. Perhaps knowing her memory is very poor will lead to improved compliance now.  DISPO -- d/c planning today. I spoke with the patient and her daughter (Radha at [phone]) about her diagnosis today. All questions answered. total time spent was 42 minutes.
patient seen and examined with housestaff    ON EXAM  (+) febrile to 102  somnolent. difficult to arouse, but will with voice alone.  in Yoruba or English, patient is slow to follow commands.  closes eyes through much of procedure.  no pupillary asymmetry    IMPRESSION/PLAN:  BRAIN LESION -- s/p biopsy, awaiting tissue pathology to guide further treatment.  SOMNOLENCE -- likely related to febrile status. If no improvement after fever resolved, then would start decadron 24mg once, then 8mg q8.  FEVER -- infectious workup ordered.    total time spent was 36 minutes
patient was in OR today and not examined.  I discussed case in detail with housestaff.  await pathology results of biopsy, which may become available after patient has left hospital.
total time spent was 36 minutes
I performed a history and physical examination of the patient and discussed the management of the patient with the resident. I reviewed the resident's note and agree with the documented findings and plan of care with the following additions/exceptions.    4/22/19    This morning on exam she opened eyes only momentarily to voice, required continuous noxious stimuli to respond with her name, location was "14". 90 min prior to had received oxycodone 5mg for a headache. after 20 min she was better with keeping her eyes open for a few sec and able to respond with a few words to follow commands and then could say she knew she was here because she fell at home.  attends to both sides, face symmetric, limbs move symmetrically.     EEG continues to show nonconvulsive status up to 10 seizures per hr.     I hope that the decline in mental status is only temporary from the opiate but im concerned it is related to the status and recommend further ICU monitoring for airway protection until we see what direction she is going in. Im told by the other providers who knew her the last few days that she has previously been alert so this is a decline.     she was on supplemental oxygen for 92% RA. consider evaluating for PE given hx of LE DVT in the past for which she had been on eliquis. Wonder if she has NACHO given body habitus and desat, AMS could also be related to hypercarbia if that's the case.    She is pending repeat MRI to be done with contrast and SPECT to further evaluate the left temporal abnormality- I thought it looked more like neoplasm than encephalitis but agree with empiric coverage of HSV. pending repeat CSF as well to check cytology, flow cytometry, and further infectious markers and repeating the HSV pcr, and also csf nmda and autoimmune encephalitis panel and Igg and ocb.     also recommend checking serum autoimmune encephalitis panel and NMDA    will d/w epilepsy team
Patient awake and following commands.  EEG improved this morning with alpha seen in the left temporal derivations and lateralized epileptiform discharges but no electrographic seizures since 8am.  CSF profile benign, PCR negative.     NSICU team to wean today.  Would continue keppra and fycompa, no sedation and observe.      Would repeat MRI brain and LP on Monday to document objective indication of HSV encephalitis.  The MRI brain and EEG with 3 day prodrome is very suggestive of HSV encephalitis.
overall stable today- yesterday would say the month today she wouldn't, kept saying she was hungry and wasn't easily cooperating but was redirectable. can say the year is 2 thousand something. can name the president and the number of kids she has. counted to 20 but skipped 18. couldn't name glove again but did choose from list.    continued w/u of left temporal lesion- post contrast mri today since couldn't get the other day  will stop acyclovir since hsv pcr neg x 4 in total now  biopsy is planned for monday  autoimmune encephalitis panels pending
[facility]   no [facility] complaints.  for brain biopsy tomorrow, 5/1/19.    EXAM  AOx2. fluent, nonaphasic speech in Mosotho and English  follows all commands, right hemineglect with extinction of right sided touch to double simultaneous stimuli.  no cranial nerve deficits, allowing for right hemineglect.  no drift. full strength.    NO NEW IMAGING    SEIZURES -- controlled. dilantin level is low.  TRANSAMINITIS -- resolving.
she is no longer in nonconvulsive status. on 4 AEDs.  the left post temp lesion causing status is being biopsied monday to determine etiology. less likely HSV since 4 pcrs neg from csf so off acyclovir. nonenhancing and spect was not c/w neoplasm. autoimmune encephalitis panels pending.  body ct with abnormal kidneys, appreciate consultants, will do MRI kidneys however the lesion in brain doesn't look like a met or lymphoma so I don't think we can avoid doing the brain biopsy  lfts are now high, will consult GI    on exam she was stable, awake but wouldn't open eyes for me, wouldn't state the month or year. counted 1 to 19. earlier this morning she was noted to be more cooperative. limbs symmetric.
shes stable- this morning alert, more cooperative, oriented to april 2019. denies headache  eeg without any seizures.  liver enzymes rising. appreciate GI will follow recs for u/s and labs  would do mri kidneys for better eval of Ct abnormality, can remove eeg for now since not having any further seizures. on 4 AEDs.  brain bx planned for wed  serum and csf autoimmune encephalitis labs pending
PLAN TO TAPER DEXAMETHASONE TO 4MG Q8 5/7/19, THEN 4MG QDAILY ON 5/10/19.  AWAITING PATHOLOGY  D/C PLANNING.    total time spent was 24 minutes
mental status improved today  on exam she is alert. pulled off eeg leads. walked to bathroom earlier. ngt is out, on soft diet.  oriented to april and "" for year, oriented to hospital. recalls she went downstairs for something today but couldn't specify (was LP). able to count from 1 to 20. unable to name button or jacket (called it a vest). perseverated on vest when I asked her to name glove. could name pen. she had no apraxia to 2/2 intransitive gestures (wave, come here) but made body part as tool error with combing hair b/l. could do toothbrush better. she tells me she is left handed.   moves limbs symmetrically.     Dx: NCS from left posterior temporal lesion, improving on AED titration. on empiric acyclovir in case this is HSV encephalitis but the imaging isnt classic for that. other etiologies could be neoplasm vs autoimmune encephalitis    rpt CSF  2w 91%L 480r  LDH 20  hsv pcr is pending, continuing acylovir for now    eeg much improved only 1-2 sz per hour and had few hrs without any seizures  appreciate epilepsy, will cont to follow their recs  will get MRI again today- the post contrast will be protocoled for biopsy planning (only got spect yesterday because she hit her head in stretcher and rest of testing was aborted). the spect was not c/w neoplasm  brain biopsy is planned for monday
still in nonconvulsive status though the frequency is now 3-10 seizures per hr.  mental status continues to flucuate.  on my evaluation she required voice to awaken. chose hospital from list. couldn't say the month. couldn't choose the season. able to follow simple commands. extremities symmetric. she became more alert after a few min, asking to eat.    for further w/u of the left temporal lesion I think she needs a repeat mri with spect and with con, im concerned about a glioma, low grade if nonenhancing which radiology says they can see it doesn't enhance on the angiography imaging. since we want to keep her hooked up to eeg could wait to repeat the mri and instead focus on repeating LP to send for cytology and autoimmune encephalitis markers (IgG index, OCB, encephalitis panel).  would also check serum encephalitis panel and nmda.  defer to ICU re: her hypoxia and if she has a PE- she desats on RA and she has a dvt.  defer to epilepsy re: if they are comfortable with her being transferred out of ICU with regard to the seizure frequency and her continued AMS. she will need an EMU bed.
AST and ALT downtrending, but alkaline phosphatase remains elevated above her prior baseline. Suspicion remains for fosphenytoin-related DILI. To mitigate risk of progressive liver injury, would prefer that her long-term AED regimen avoid phenytoin if at all possible.
sz frequency much improved  alert, fluent, couldn't say month or yr. could say sons birth month. could count 1 to 20.  limbs symmetric. reports mild ha. no back pain.  afebrile  ct suggested pyelonephritis vs neoplasm in kidneys- still being investigated  appreciate renal consult  appreciate epilepsy consult  will d/w heme/onc  planning for biopsy  ddx low grade primary brain tumor vs autoimmune encephalitis.

## 2019-05-07 NOTE — PROGRESS NOTE ADULT - ASSESSMENT
68 y/o F w/h/o uncontrolled T2DM on oral DM meds. Also HTN/HLD/CAD/Hypothyroidism/recent umbilical hernia repair 2 weeks ago. Here s/p fall with LOC> seizures> hypernatremia. S/p craniotomy for brain biopsy. Pt on high dose decadron doses with rebound hyperglycemia. Tolerating POs. Will continue to adjust insulin dose to BG goal (100-180mg/dl). No hypoglycemia

## 2019-05-07 NOTE — PROGRESS NOTE ADULT - PROBLEM SELECTOR PROBLEM 2
Encephalitis
Hypernatremia
Hypertension
Hypertension

## 2019-05-07 NOTE — DISCHARGE NOTE PROVIDER - PROVIDER TOKENS
PROVIDER:[TOKEN:[[id number]]],PROVIDER:[TOKEN:[[id number]]],PROVIDER:[TOKEN:[[id number]]],PROVIDER:[TOKEN:[[id number]]],PROVIDER:[TOKEN:[[id number]]],PROVIDER:[TOKEN:[[id number]]],PROVIDER:[TOKEN:[[id number]]]

## 2019-05-07 NOTE — PROGRESS NOTE ADULT - PROBLEM SELECTOR PROBLEM 1
R/O NACHO (obstructive sleep apnea)
Type 2 diabetes mellitus with hyperglycemia, unspecified whether long term insulin use
R/O NACHO (obstructive sleep apnea)

## 2019-05-07 NOTE — PROGRESS NOTE ADULT - SUBJECTIVE AND OBJECTIVE BOX
Diabetes Follow up note: Saw pt earlier today  Interval Hx: 70 y/o F w/h/o uncontrolled T2DM on oral DM meds. Also HTN/HLD/CAD/Hypothyroidism/recent umbilical hernia repair 2 weeks ago. Here s/p fall with LOC> seizures> hypernatremia s/p left craniotomy, intraoperative corticography and biopsy of seizure focus. Pt now moved to floor on enhanced supervision. Pt remains on Dexa 8mg q8h with glycemic control between high 100s to 200s while on present insulin doses. Per staff pt eating well. Pt alert and able to answer questions. States she is feeling "wonderful" denies any HA.  Elevated LFT.    Review of Systems:  General: Denies pain. As above  GI: Tolerating POs without any N/V/D/ABD PAIN.  CV: No CP/SOB  ENDO: No S&Sx of hypoglycemia      MEDS:  dexamethasone  IVPB 8 milliGRAM(s) IV Intermittent every 8 hours  insulin glargine Injectable (LANTUS) 20 Unit(s) SubCutaneous at bedtime  insulin lispro (HumaLOG) corrective regimen sliding scale   SubCutaneous three times a day before meals  insulin lispro (HumaLOG) corrective regimen sliding scale   SubCutaneous at bedtime  insulin lispro Injectable (HumaLOG) 6 Unit(s) SubCutaneous three times a day with meals  levothyroxine 50 MICROGram(s) Oral daily       Allergies    No Known Allergies      PE:  General: Female sitting in bed in NAD.   Vital Signs Last 24 Hrs  T(C): 36.4 (05-07-19 @ 15:16), Max: 37.2 (05-07-19 @ 04:48)  T(F): 97.6 (05-07-19 @ 15:16), Max: 98.9 (05-07-19 @ 04:48)  HR: 69 (05-07-19 @ 15:16) (69 - 83)  BP: 157/77 (05-07-19 @ 15:16) (121/66 - 167/71)  BP(mean): --  RR: 19 (05-07-19 @ 15:16) (18 - 19)  SpO2: 96% (05-07-19 @ 15:16) (96% - 98%)  HEENT: L skull incision D&I  Abd: Soft, NT, ND, Obese.   Extremities: Warm. no edema noted in all 4 exts.   Neuro: Alert and oriented x2. Able to answer all questions. Appears exited about feeling better> Smiles giving high 5s to every one.    LABS:  POCT Blood Glucose.: 176 mg/dL (05-07-19 @ 13:03)  POCT Blood Glucose.: 208 mg/dL (05-07-19 @ 08:24)  POCT Blood Glucose.: 251 mg/dL (05-06-19 @ 21:21)  POCT Blood Glucose.: 193 mg/dL (05-06-19 @ 17:38)  POCT Blood Glucose.: 194 mg/dL (05-06-19 @ 12:59)  POCT Blood Glucose.: 135 mg/dL (05-06-19 @ 08:31)  POCT Blood Glucose.: 197 mg/dL (05-06-19 @ 01:34)  POCT Blood Glucose.: 205 mg/dL (05-05-19 @ 21:17)  POCT Blood Glucose.: 241 mg/dL (05-05-19 @ 17:37)                          8.7    5.88  )-----------( 300      ( 07 May 2019 09:49 )             28.9     05-07    137  |  102  |  11  ----------------------------<  206<H>  4.1   |  21<L>  |  0.58    Ca    9.3      07 May 2019 08:24    TPro  7.2  /  Alb  3.6  /  TBili  0.2  /  DBili  <0.1  /  AST  77<H>  /  ALT  91<H>  /  AlkPhos  385<H>  05-07  TPro  6.9  /  Alb  3.5  /  TBili  0.3  /  DBili  0.1  /  AST  77<H>  /  ALT  73<H>  /  AlkPhos  444<H>  05-05      Thyroid Function Tests:  05-02 @ 17:37 TSH -- FreeT4 -- T3 -- Anti TPO -- Anti Thyroglobulin Ab -- TSI <0.10  04-22 @ 08:55 TSH -- FreeT4 1.1 T3 -- Anti TPO -- Anti Thyroglobulin Ab -- TSI --      Hemoglobin A1C, Whole Blood: 9.9 % <H> [4.0 - 5.6] (04-21-19 @ 09:26)

## 2019-05-07 NOTE — PROGRESS NOTE ADULT - SUBJECTIVE AND OBJECTIVE BOX
Patient is a 69y old  Female who presents with a chief complaint of Seizures (07 May 2019 11:39)      SUBJECTIVE / OVERNIGHT EVENTS: Comfortable without new complaints. Family at bedside.   Review of Systems  chest pain no  palpitations no  sob no  nausea no  headache no    MEDICATIONS  (STANDING):  ALBUTerol/ipratropium for Nebulization 3 milliLiter(s) Nebulizer every 6 hours  amLODIPine   Tablet 10 milliGRAM(s) Oral daily  cefTRIAXone   IVPB 1 Gram(s) IV Intermittent every 24 hours  cefTRIAXone   IVPB      chlorhexidine 4% Liquid 1 Application(s) Topical <User Schedule>  cloNIDine 0.1 milliGRAM(s) Oral every 12 hours  dexamethasone  IVPB 8 milliGRAM(s) IV Intermittent every 8 hours  dextrose 5%. 1000 milliLiter(s) (50 mL/Hr) IV Continuous <Continuous>  dextrose 50% Injectable 12.5 Gram(s) IV Push once  dextrose 50% Injectable 25 Gram(s) IV Push once  dextrose 50% Injectable 25 Gram(s) IV Push once  docusate sodium 100 milliGRAM(s) Oral daily  enalapril 10 milliGRAM(s) Oral two times a day  enoxaparin Injectable 40 milliGRAM(s) SubCutaneous <User Schedule>  hydrALAZINE 100 milliGRAM(s) Oral every 8 hours  insulin glargine Injectable (LANTUS) 22 Unit(s) SubCutaneous at bedtime  insulin lispro (HumaLOG) corrective regimen sliding scale   SubCutaneous three times a day before meals  insulin lispro (HumaLOG) corrective regimen sliding scale   SubCutaneous at bedtime  insulin lispro Injectable (HumaLOG) 8 Unit(s) SubCutaneous three times a day with meals  labetalol 200 milliGRAM(s) Oral every 8 hours  lacosamide 150 milliGRAM(s) Oral two times a day  levETIRAcetam  IVPB 1000 milliGRAM(s) IV Intermittent <User Schedule>  levothyroxine 50 MICROGram(s) Oral daily  polyethylene glycol 3350 17 Gram(s) Oral two times a day  QUEtiapine 12.5 milliGRAM(s) Oral two times a day  senna 1 Tablet(s) Oral daily    MEDICATIONS  (PRN):  bisacodyl Suppository 10 milliGRAM(s) Rectal daily PRN Constipation  dextrose 40% Gel 15 Gram(s) Oral once PRN Blood Glucose LESS THAN 70 milliGRAM(s)/deciliter  glucagon  Injectable 1 milliGRAM(s) IntraMuscular once PRN Glucose LESS THAN 70 milligrams/deciliter  haloperidol    Injectable 1 milliGRAM(s) IV Push every 4 hours PRN Agitation  hydrALAZINE Injectable 10 milliGRAM(s) IV Push every 4 hours PRN sbp over 160  HYDROmorphone  Injectable 0.5 milliGRAM(s) IV Push every 4 hours PRN Severe Pain (7 - 10)  LORazepam   Injectable 2 milliGRAM(s) IV Push once PRN Seizure  morphine  - Injectable 2 milliGRAM(s) IV Push every 4 hours PRN Moderate Pain (4 - 6)      Vital Signs Last 24 Hrs  T(C): 36.8 (07 May 2019 09:23), Max: 37.2 (07 May 2019 04:48)  T(F): 98.2 (07 May 2019 09:23), Max: 98.9 (07 May 2019 04:48)  HR: 73 (07 May 2019 09:23) (68 - 83)  BP: 121/66 (07 May 2019 09:23) (121/66 - 167/71)  BP(mean): --  RR: 18 (07 May 2019 09:23) (18 - 18)  SpO2: 98% (07 May 2019 09:23) (96% - 98%)    PHYSICAL EXAM:  GENERAL: NAD, well-developed  HEAD: s/p bx, Normocephalic  EYES: EOMI, PERRLA, conjunctiva and sclera clear  NECK: Supple, No JVD  CHEST/LUNG: Clear to auscultation bilaterally; No wheeze  HEART: Regular rate and rhythm; No murmurs, rubs, or gallops  ABDOMEN: Soft, Nontender, Nondistended; Bowel sounds present  EXTREMITIES:  2+ Peripheral Pulses, No clubbing, cyanosis, or edema  PSYCH: AAOx3  NEUROLOGY: non-focal  SKIN: No rashes or lesions    LABS:                        8.7    5.88  )-----------( 300      ( 07 May 2019 09:49 )             28.9     05-07    137  |  102  |  11  ----------------------------<  206<H>  4.1   |  21<L>  |  0.58    Ca    9.3      07 May 2019 08:24    TPro  7.2  /  Alb  3.6  /  TBili  0.2  /  DBili  <0.1  /  AST  77<H>  /  ALT  91<H>  /  AlkPhos  385<H>  05-07              Culture - Urine (collected 06 May 2019 08:46)  Source: .Urine  Final Report (07 May 2019 07:57):    <10,000 CFU/mL Normal Urogenital Gloria    Culture - Blood (collected 05 May 2019 01:19)  Source: .Blood  Preliminary Report (06 May 2019 02:01):    No growth to date.    Culture - Blood (collected 05 May 2019 01:19)  Source: .Blood  Preliminary Report (06 May 2019 02:01):    No growth to date.        RADIOLOGY & ADDITIONAL TESTS:    Imaging Personally Reviewed:    Consultant(s) Notes Reviewed:      Care Discussed with Consultants/Other Providers:

## 2019-05-07 NOTE — DISCHARGE NOTE PROVIDER - HOSPITAL COURSE
69 year old female presented to [facility] with altered mental status due to seizures. MRI brain was done demonstrating hyperintesity in the Left temporal region with concern for possible left temporal lobe lesion vs possible Herpes encephalitis. Patient was started on Acyclovir. Initial CSF studies demonstrated negative viral PCR. vEEG demonstrated patient to be in nonconvulsive status epilepticus which resolved with initiation of Vimpat and Keppra. MR Spectroscopy done and was not suggestive of a neoplastic lesion. Repeat MRI brain w/ contrast did not show enhancement of the lesion. Repeat CSF studies again were negative for viral PCR and alternate viral/infectious etiologies. CSF flow cytometry and cytopathology have both been negative. CT c/a/p shows enhancement of bilateral kidneys. Though the renal enhancement could be tumor, it is not clear if the same process is ongoing intracranially. For that reason, biopsy of the kidney was deferred, as it may not yield full diagnostic information regarding the intracranial process. Patient also noted to have elevated liver enzymes, which were attributed be due to Pheyntoin use. Patient underwent brain biopsy and surgical pathology demonstrated evidence of gliosis, beta-amyloid protein but did not demonstrate evidence of malignancy. Patient was placed on Decadron for post-op edema noted on CT head. Repeat CT Head showed Interval increased parenchymal vasogenic edema involving the left temporal, parietal and occipital lobes and patient's Decadron was adjusted. Patient spiked fever to 102F and UA was found to be positive for which patient was started on Ceftriaxone. Patient clinically improved.     Patient remained vitally and clinically stable. Patient is cleared for discharge with outpatient follow-up with Neurology, Nephrology 69 year old female presented to [facility] with altered mental status due to seizures. MRI brain was done demonstrating hyperintesity in the Left temporal region with concern for possible left temporal lobe lesion vs possible Herpes encephalitis. Patient was started on Acyclovir. Initial CSF studies demonstrated negative viral PCR. vEEG demonstrated patient to be in nonconvulsive status epilepticus which resolved with initiation of Vimpat, Dilantin, Fycompa, and Keppra. MR Spectroscopy done and was not suggestive of a neoplastic lesion. Repeat MRI brain w/ contrast did not show enhancement of the lesion. Repeat CSF studies again were negative for viral PCR and alternate viral/infectious etiologies. CSF flow cytometry and cytopathology have both been negative. CT c/a/p shows enhancement of bilateral kidneys. Though the renal enhancement could be tumor, it is not clear if the same process is ongoing intracranially. For that reason, biopsy of the kidney was deferred, as it may not yield full diagnostic information regarding the intracranial process. Patient also noted to have elevated liver enzymes, which were attributed be due to Pheyntoin use. Patient underwent brain biopsy and surgical pathology demonstrated evidence of gliosis, beta-amyloid protein but did not demonstrate evidence of malignancy. Patient was placed on Decadron for post-op edema noted on CT head. Repeat CT Head showed Interval increased parenchymal vasogenic edema involving the left temporal, parietal and occipital lobes and patient's Decadron was adjusted. Patient spiked fever to 102F and UA was found to be positive for which patient was started on Ceftriaxone. Patient clinically improved.     Patient remained vitally and clinically stable. Patient is cleared for discharge with outpatient follow-up with Neurology, Nephrology, Endocrine, Primary Care, and Pulmonary.         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         Recommendations:    [] Continue Dexamethasone 4mg twice daily starting on 5/8 for 3 days.    [] Start taking lower dose of Dexamethasone 4mg every morning starting on 5/11 for 3 days.    [] Start taking lower dose of Dexamethasone 2mg every morning starting on 5/14 for 3 days.    [] Continue Vimpat 150mg twice daily    [] Continue Keppra 1g three times a day    [] Follow-up with outpatient Neurology, Nephrology, Endocrinology, Pulmonology, Primary Care 69 year old female presented to [facility] with altered mental status due to seizures. MRI brain was done demonstrating hyperintesity in the Left temporal region with concern for possible left temporal lobe lesion vs possible Herpes encephalitis. Patient was started on Acyclovir. Initial CSF studies demonstrated negative viral PCR. vEEG demonstrated patient to be in nonconvulsive status epilepticus which resolved with initiation of Vimpat, Dilantin, Fycompa, and Keppra. MR Spectroscopy done and was not suggestive of a neoplastic lesion. Repeat MRI brain w/ contrast did not show enhancement of the lesion. Repeat CSF studies again were negative for viral PCR and alternate viral/infectious etiologies. CSF flow cytometry and cytopathology have both been negative. CT c/a/p shows enhancement of bilateral kidneys. Though the renal enhancement could be tumor, it is not clear if the same process is ongoing intracranially. For that reason, biopsy of the kidney was deferred, as it may not yield full diagnostic information regarding the intracranial process. Patient also noted to have elevated liver enzymes, which were attributed be due to Pheyntoin use. Patient underwent brain biopsy and surgical pathology demonstrated evidence of gliosis, beta-amyloid protein but did not demonstrate evidence of malignancy. Patient was placed on Decadron for post-op edema noted on CT head. Repeat CT Head showed Interval increased parenchymal vasogenic edema involving the left temporal, parietal and occipital lobes and patient's Decadron was adjusted. Patient spiked fever to 102F and UA was found to be positive for which patient was started on Ceftriaxone. Patient clinically improved.     Patient remained vitally and clinically stable. Patient is cleared for discharge with outpatient follow-up with Neurology, Nephrology, Endocrine, Primary Care, and Pulmonary.         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         Recommendations:    [] Continue Dexamethasone 4mg twice daily starting on 5/8 for 3 days.    [] Start taking lower dose of Dexamethasone 4mg every morning starting on 5/11 for 3 days.    [] Start taking lower dose of Dexamethasone 2mg every morning starting on 5/14 for 3 days.    [] Continue Vimpat 150mg twice daily    [] Continue Keppra 1g three times a day    [] Continue Eliquis twice daily as prescribed by patient's outpatient Cardiologist on 5/10/2019    [] Follow-up with outpatient Neurology, Nephrology, Endocrinology, Pulmonology, Primary Care

## 2019-05-07 NOTE — PROGRESS NOTE ADULT - ASSESSMENT
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

## 2019-05-07 NOTE — DISCHARGE NOTE NURSING/CASE MANAGEMENT/SOCIAL WORK - NSDCDPATPORTLINK_GEN_ALL_CORE
You can access the [email or web address] Patient Portal, offered by [facility], by registering with the following website: [email or web address]

## 2019-05-07 NOTE — PROGRESS NOTE ADULT - ASSESSMENT
69 year old female with altered mental status due to seizures likely 2/2 to left temporal lesion, differential diagnosis includes neoplastic vs autoimmune encephalitis. Was in nonconvulsive status epilepticus, resolved. MR spect does not suggest neoplastic lesion. MRI brain w/ contrast does not show enhancement of the lesion, which makes infiltrative glioma less likely, as well as lymphoma, though lymphoma is not entirely excluded. CSF so far also suggests against herpes encephalitis or infectious etiology. CSF flow cytometry and cytopathology have both been negative. CT c/a/p shows enhancement of bilateral kidneys, concerning for infection vs neoplastic process. Though the renal enhancement could be tumor, it is not clear if the same process is ongoing intracranially. For that reason, may not be useful to biopsy the kidney, as it may not yield full diagnostic information regarding the intracranial process. She has elevated liver enzymes, which could be due to pheyntoin use.   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)   Enhancing renal lesion Differential UTI vs infiltrative process   Left temporal-occipital seizure   Frozen section shows gliosis    Plan:  - currently on Keppra 1g TID; vimpat 150 BID   - Continue decadron 8 q8h  - Ceftriaxone for UTI started 5/5, to end 5/9  - f/u LP results from 4/24 per ID recs for: csf fungal culture- negative,  AFB culture - negative,  spinal fluid autoimmune encephalitis panel - pending   - f/u serum autoimmune encephalitis panel  - hepatology labs (anti-mitochondrial, anti-smooth muscle, anti-LKM, anti-liver cytosol antibody) - unremarkable   - US abdomen as part of evaluation for elevated LFTs - normal  - MRI abdomen to assess renal lesions - renal enhancements still present  - Neurosurgery recommended holding anticoagulations for 10 days, (restart on 5/10), Patient at risk of stroke, but risk of bleeding from recent biopsy in setting of anticoagulation is more compared to risk of stroke

## 2019-05-07 NOTE — PROGRESS NOTE ADULT - PROBLEM SELECTOR PLAN 1
-test BG AC/HS  -Increase Lantus dose to 22 units QHS  -Increase Humalog 8 units AC meals for now  -c/w Humalog moderate correction scale AC and Mod HS scale  -Please notify endocrine team in any changes in steroid doses.   Discharge recs will depend on steroid doses/ PO intake/insulin needs and glycemic control in hospital.  -Plan discussed with pt/team.  Contact info: [phone] (24/7). pager [phone]

## 2019-05-07 NOTE — PROGRESS NOTE ADULT - SUBJECTIVE AND OBJECTIVE BOX
CC: f/u for fever - resolved     Patient reports feeling good & wants to go home now     REVIEW OF SYSTEMS:  All other review of systems negative (Comprehensive ROS)    Antimicrobials Day #  : 2/2  cefTRIAXone   IVPB 1 Gram(s) IV Intermittent every 24 hours  cefTRIAXone   IVPB        Other Medications Reviewed    T(F): 98.2 (05-07-19 @ 09:23), Max: 98.9 (05-07-19 @ 04:48)  HR: 73 (05-07-19 @ 09:23)  BP: 121/66 (05-07-19 @ 09:23)  RR: 18 (05-07-19 @ 09:23)  SpO2: 98% (05-07-19 @ 09:23)  Wt(kg): --    PHYSICAL EXAM:  General: alert, no acute distress  Eyes:  anicteric, no conjunctival injection, no discharge  Oropharynx: no lesions or injection 	  Neck: supple, without adenopathy  Lungs: clear to auscultation  Heart: regular rate and rhythm; no murmur, rubs or gallops  Abdomen: soft, nondistended, nontender, without mass or organomegaly  Skin: no lesions  Extremities: no clubbing, cyanosis, or edema  Neurologic: alert, oriented, moves all extremities     LAB RESULTS:                        8.7    5.88  )-----------( 300      ( 07 May 2019 09:49 )             28.9     05-07    137  |  102  |  11  ----------------------------<  206<H>  4.1   |  21<L>  |  0.58    Ca    9.3      07 May 2019 08:24    TPro  7.2  /  Alb  3.6  /  TBili  0.2  /  DBili  <0.1  /  AST  77<H>  /  ALT  91<H>  /  AlkPhos  385<H>  05-07    LIVER FUNCTIONS - ( 07 May 2019 08:24 )  Alb: 3.6 g/dL / Pro: 7.2 g/dL / ALK PHOS: 385 U/L / ALT: 91 U/L / AST: 77 U/L / GGT: x             MICROBIOLOGY:  RECENT CULTURES:  05-06 @ 08:46 .Urine     <10,000 CFU/mL Normal Urogenital Gloria      05-05 @ 01:19 .Blood     No growth to date.      RADIOLOGY REVIEWED:

## 2019-05-07 NOTE — PROGRESS NOTE ADULT - ASSESSMENT
69 f s/p brain mass biopsy  Off antibiotics.  Hold ASA, Eliquis - restart when cleared by NSx.  A Fib  Hypothyroid  - follow TSH.  HTN control  COPD  - continue nebs  Diabetes control.  Seizure control  Abnormal LFT improving   - Hepatology follow  - follow LFT in am  MS change probably related to Alzheimer ds.  Fever  - panculture  - observe off antibiotics for now  - ID follow  Medically stable.  d/w daughter and son.    Tyrone Antony MD pager [phone]

## 2019-05-07 NOTE — DISCHARGE NOTE PROVIDER - NSDCCPCAREPLAN_GEN_ALL_CORE_FT
PRINCIPAL DISCHARGE DIAGNOSIS  Diagnosis: Posterior reversible encephalopathy syndrome  Assessment and Plan of Treatment: [] Continue Dexamethasone 4mg twice daily starting on 5/8 for 3 days.  [] Start taking lower dose of Dexamethasone 4mg every morning starting on 5/11 for 3 days.  [] Start taking lower dose of Dexamethasone 2mg every morning starting on 5/14 for 3 days.  [] Continue Vimpat 150mg twice daily  [] Continue Keppra 1g three times a day  [] Follow-up with outpatient Neurology, Nephrology, Endocrinology, Pulmonology, Primary Care      SECONDARY DISCHARGE DIAGNOSES  Diagnosis: Acute UTI  Assessment and Plan of Treatment:     Diagnosis: Non-convulsive status epilepticus  Assessment and Plan of Treatment:     Diagnosis: Seizure  Assessment and Plan of Treatment: PRINCIPAL DISCHARGE DIAGNOSIS  Diagnosis: Posterior reversible encephalopathy syndrome  Assessment and Plan of Treatment: [] Continue Dexamethasone 4mg twice daily starting on 5/8 for 3 days.  [] Start taking lower dose of Dexamethasone 4mg every morning starting on 5/11 for 3 days.  [] Start taking lower dose of Dexamethasone 2mg every morning starting on 5/14 for 3 days.  [] Continue Vimpat 150mg twice daily  [] Continue Keppra 1g three times a day  [] Continue Eliquis twice daily as prescribed by patient's outpatient Cardiologist on 5/10/2019  [] Follow-up with outpatient Neurology, Nephrology, Endocrinology, Pulmonology, Primary Care      SECONDARY DISCHARGE DIAGNOSES  Diagnosis: Acute UTI  Assessment and Plan of Treatment:     Diagnosis: Non-convulsive status epilepticus  Assessment and Plan of Treatment:     Diagnosis: Seizure  Assessment and Plan of Treatment:

## 2019-05-07 NOTE — PROGRESS NOTE ADULT - PROBLEM SELECTOR PLAN 3
L temporal lesion of yet unclear etiology  -pathology with beta amyloid plaques c/w Alzheimer's disease and possible CVA  -No mediastinal lymphadenopathy on CT chest to suggest sarcoidosis

## 2019-05-07 NOTE — PROGRESS NOTE ADULT - SUBJECTIVE AND OBJECTIVE BOX
Neurology Progress Note -- Incomplete    Subjective: Interval History - No events overnight    Objective:   Vital Signs Last 24 Hrs  T(C): 37.2 (07 May 2019 04:48), Max: 37.2 (07 May 2019 04:48)  T(F): 98.9 (07 May 2019 04:48), Max: 98.9 (07 May 2019 04:48)  HR: 83 (07 May 2019 04:48) (68 - 83)  BP: 167/71 (07 May 2019 04:48) (138/82 - 167/71)  BP(mean): --  RR: 18 (07 May 2019 04:48) (18 - 18)  SpO2: 98% (07 May 2019 04:48) (96% - 98%)    General Exam:   General appearance: No acute distress                   Neurological Exam:  Mental Status: Orientated to self. Unable to answer where she is, the date, or . No dysarthria, possible expressive aphasia. Following commands.  Staples along left scalp    Cranial Nerves: PERRL, blinks to threat on left, not on right.  No facial asymmetry.     Motor:   Tone: normal.           Motor: Moving all extremities spontaneously         Drift: none                 Dysmetria: None to finger-nose-finger  SENSORY: EXTINGUISHES RIGHT SIDED STIMULI ON DOUBLE SIMULTANEOUS STIMULATION    Other:    < from: CT Head No Cont (19 @ 16:53) >    IMPRESSION:  Interval increased parenchymal vasogenic edema involving the left   temporal, parietal and occipital lobes. Patent basilar cisterns. No acute   intracranial hemorrhage.    < end of copied text >    < from: MR Head w/wo IV Cont (19 @ 10:20) >  IMPRESSION:    No significant interval change from previous brain MRIs.    Similar-appearing nonenhancing hyperintense signal in the left posterior   temporal and occipital lobes which appears both subcortical and cortical   in nature.    Study done for presurgical planning.    < end of copied text >            MEDICATIONS  (STANDING):  ALBUTerol/ipratropium for Nebulization 3 milliLiter(s) Nebulizer every 6 hours  amLODIPine   Tablet 10 milliGRAM(s) Oral daily  cefTRIAXone   IVPB 1 Gram(s) IV Intermittent every 24 hours  cefTRIAXone   IVPB      chlorhexidine 4% Liquid 1 Application(s) Topical <User Schedule>  cloNIDine 0.1 milliGRAM(s) Oral every 12 hours  dexamethasone  IVPB 8 milliGRAM(s) IV Intermittent every 8 hours  dextrose 5%. 1000 milliLiter(s) (50 mL/Hr) IV Continuous <Continuous>  dextrose 50% Injectable 12.5 Gram(s) IV Push once  dextrose 50% Injectable 25 Gram(s) IV Push once  dextrose 50% Injectable 25 Gram(s) IV Push once  docusate sodium 100 milliGRAM(s) Oral daily  enalapril 10 milliGRAM(s) Oral two times a day  enoxaparin Injectable 40 milliGRAM(s) SubCutaneous <User Schedule>  hydrALAZINE 100 milliGRAM(s) Oral every 8 hours  insulin glargine Injectable (LANTUS) 20 Unit(s) SubCutaneous at bedtime  insulin lispro (HumaLOG) corrective regimen sliding scale   SubCutaneous three times a day before meals  insulin lispro (HumaLOG) corrective regimen sliding scale   SubCutaneous at bedtime  insulin lispro Injectable (HumaLOG) 6 Unit(s) SubCutaneous three times a day with meals  labetalol 200 milliGRAM(s) Oral every 8 hours  lacosamide 150 milliGRAM(s) Oral two times a day  levETIRAcetam  IVPB 1000 milliGRAM(s) IV Intermittent <User Schedule>  levothyroxine 50 MICROGram(s) Oral daily  polyethylene glycol 3350 17 Gram(s) Oral two times a day  QUEtiapine 12.5 milliGRAM(s) Oral two times a day  senna 1 Tablet(s) Oral daily    MEDICATIONS  (PRN):  bisacodyl Suppository 10 milliGRAM(s) Rectal daily PRN Constipation  dextrose 40% Gel 15 Gram(s) Oral once PRN Blood Glucose LESS THAN 70 milliGRAM(s)/deciliter  glucagon  Injectable 1 milliGRAM(s) IntraMuscular once PRN Glucose LESS THAN 70 milligrams/deciliter  haloperidol    Injectable 1 milliGRAM(s) IV Push every 4 hours PRN Agitation  hydrALAZINE Injectable 10 milliGRAM(s) IV Push every 4 hours PRN sbp over 160  HYDROmorphone  Injectable 0.5 milliGRAM(s) IV Push every 4 hours PRN Severe Pain (7 - 10)  LORazepam   Injectable 2 milliGRAM(s) IV Push once PRN Seizure  morphine  - Injectable 2 milliGRAM(s) IV Push every 4 hours PRN Moderate Pain (4 - 6)

## 2019-05-07 NOTE — PROGRESS NOTE ADULT - SUBJECTIVE AND OBJECTIVE BOX
Follow-up Pulm Progress Note    No new respiratory events overnight.  Denies SOB/CP.   97% on RA    Medications:  MEDICATIONS  (STANDING):  ALBUTerol/ipratropium for Nebulization 3 milliLiter(s) Nebulizer every 6 hours  amLODIPine   Tablet 10 milliGRAM(s) Oral daily  cefTRIAXone   IVPB 1 Gram(s) IV Intermittent every 24 hours  cefTRIAXone   IVPB      chlorhexidine 4% Liquid 1 Application(s) Topical <User Schedule>  cloNIDine 0.1 milliGRAM(s) Oral every 12 hours  dexamethasone  IVPB 8 milliGRAM(s) IV Intermittent every 8 hours  dextrose 5%. 1000 milliLiter(s) (50 mL/Hr) IV Continuous <Continuous>  dextrose 50% Injectable 12.5 Gram(s) IV Push once  dextrose 50% Injectable 25 Gram(s) IV Push once  dextrose 50% Injectable 25 Gram(s) IV Push once  docusate sodium 100 milliGRAM(s) Oral daily  enalapril 10 milliGRAM(s) Oral two times a day  enoxaparin Injectable 40 milliGRAM(s) SubCutaneous <User Schedule>  hydrALAZINE 100 milliGRAM(s) Oral every 8 hours  insulin glargine Injectable (LANTUS) 22 Unit(s) SubCutaneous at bedtime  insulin lispro (HumaLOG) corrective regimen sliding scale   SubCutaneous three times a day before meals  insulin lispro (HumaLOG) corrective regimen sliding scale   SubCutaneous at bedtime  insulin lispro Injectable (HumaLOG) 8 Unit(s) SubCutaneous three times a day with meals  labetalol 200 milliGRAM(s) Oral every 8 hours  lacosamide 150 milliGRAM(s) Oral two times a day  levETIRAcetam  IVPB 1000 milliGRAM(s) IV Intermittent <User Schedule>  levothyroxine 50 MICROGram(s) Oral daily  polyethylene glycol 3350 17 Gram(s) Oral two times a day  QUEtiapine 12.5 milliGRAM(s) Oral two times a day  senna 1 Tablet(s) Oral daily    MEDICATIONS  (PRN):  bisacodyl Suppository 10 milliGRAM(s) Rectal daily PRN Constipation  dextrose 40% Gel 15 Gram(s) Oral once PRN Blood Glucose LESS THAN 70 milliGRAM(s)/deciliter  glucagon  Injectable 1 milliGRAM(s) IntraMuscular once PRN Glucose LESS THAN 70 milligrams/deciliter  haloperidol    Injectable 1 milliGRAM(s) IV Push every 4 hours PRN Agitation  hydrALAZINE Injectable 10 milliGRAM(s) IV Push every 4 hours PRN sbp over 160  HYDROmorphone  Injectable 0.5 milliGRAM(s) IV Push every 4 hours PRN Severe Pain (7 - 10)  LORazepam   Injectable 2 milliGRAM(s) IV Push once PRN Seizure  morphine  - Injectable 2 milliGRAM(s) IV Push every 4 hours PRN Moderate Pain (4 - 6)          Vital Signs Last 24 Hrs  T(C): 36.8 (07 May 2019 09:23), Max: 37.2 (07 May 2019 04:48)  T(F): 98.2 (07 May 2019 09:23), Max: 98.9 (07 May 2019 04:48)  HR: 73 (07 May 2019 09:23) (68 - 83)  BP: 121/66 (07 May 2019 09:23) (121/66 - 167/71)  BP(mean): --  RR: 18 (07 May 2019 09:23) (18 - 18)  SpO2: 98% (07 May 2019 09:23) (96% - 98%) on RA          05-06 @ 07:01  -  05-07 @ 07:00  --------------------------------------------------------  IN: 360 mL / OUT: 200 mL / NET: 160 mL          LABS:                        8.7    5.88  )-----------( 300      ( 07 May 2019 09:49 )             28.9     05-07    137  |  102  |  11  ----------------------------<  206<H>  4.1   |  21<L>  |  0.58    Ca    9.3      07 May 2019 08:24    TPro  7.2  /  Alb  3.6  /  TBili  0.2  /  DBili  <0.1  /  AST  77<H>  /  ALT  91<H>  /  AlkPhos  385<H>  05-07          CAPILLARY BLOOD GLUCOSE      POCT Blood Glucose.: 208 mg/dL (07 May 2019 08:24)            MIGUEL Negative 04-28 @ 17:30  Anti SS-1 --  Anti SS-2 --  Anti RNP --  RF -- 04-28 @ 17:30    Atypical ANCA -- 04-28 @ 17:30  c-ANCA titer -- 04-28 @ 17:30  c-ANCA -- 04-28 @ 17:30  p-ANCA -- 04-28 @ 17:30              CULTURES: (if applicable)  Culture Results:   <10,000 CFU/mL Normal Urogenital Gloria (05-06 @ 08:46)  Culture Results:   No growth to date. (05-05 @ 01:19)  Culture Results:   No growth to date. (05-05 @ 01:19)    Most recent blood culture -- 05-06 @ 08:46   -- -- .Urine 05-06 @ 08:46  Most recent blood culture -- 05-05 @ 01:19   -- -- .Blood 05-05 @ 01:19        Physical Examination:  PULM: Clear to auscultation bilaterally, no significant sputum production  CVS: S1, S2 heard    RADIOLOGY REVIEWED  CXR:

## 2019-05-07 NOTE — PROGRESS NOTE ADULT - PROVIDER SPECIALTY LIST ADULT
Endocrinology
Hepatology
Hepatology
Infectious Disease
Internal Medicine
NSICU
Neurology
Neurosurgery
Pulmonology
Radiology
SICU
Surgery
Neurology
Hepatology
Internal Medicine
NSICU
Pulmonology
Neurology
Pulmonology

## 2019-05-08 RX ORDER — DEXAMETHASONE 0.5 MG/5ML
1 ELIXIR ORAL
Qty: 9 | Refills: 0
Start: 2019-05-08 | End: 2019-05-10

## 2019-05-09 ENCOUNTER — INBOUND DOCUMENT (OUTPATIENT)
Age: 69
End: 2019-05-09

## 2019-05-10 ENCOUNTER — RX RENEWAL (OUTPATIENT)
Age: 69
End: 2019-05-10

## 2019-05-10 DIAGNOSIS — R56.9 UNSPECIFIED CONVULSIONS: ICD-10-CM

## 2019-05-10 LAB
CULTURE RESULTS: SIGNIFICANT CHANGE UP
CULTURE RESULTS: SIGNIFICANT CHANGE UP
SPECIMEN SOURCE: SIGNIFICANT CHANGE UP
SPECIMEN SOURCE: SIGNIFICANT CHANGE UP

## 2019-05-10 RX ORDER — APIXABAN 2.5 MG/1
1 TABLET, FILM COATED ORAL
Qty: 0 | Refills: 0 | DISCHARGE
Start: 2019-05-10

## 2019-05-10 RX ORDER — LACOSAMIDE 150 MG/1
150 TABLET, FILM COATED ORAL TWICE DAILY
Qty: 60 | Refills: 0 | Status: ACTIVE | COMMUNITY
Start: 2019-05-10 | End: 1900-01-01

## 2019-05-14 RX ORDER — DEXAMETHASONE 0.5 MG/5ML
1 ELIXIR ORAL
Qty: 3 | Refills: 0
Start: 2019-05-14 | End: 2019-05-16

## 2019-05-25 LAB
CULTURE RESULTS: SIGNIFICANT CHANGE UP
SPECIMEN SOURCE: SIGNIFICANT CHANGE UP

## 2019-05-31 ENCOUNTER — APPOINTMENT (OUTPATIENT)
Dept: ENDOCRINOLOGY | Facility: CLINIC | Age: 69
End: 2019-05-31

## 2019-07-13 ENCOUNTER — EMERGENCY (EMERGENCY)
Facility: HOSPITAL | Age: 69
LOS: 1 days | Discharge: HOME | End: 2019-07-13
Attending: EMERGENCY MEDICINE
Payer: COMMERCIAL

## 2019-07-13 VITALS
SYSTOLIC BLOOD PRESSURE: 181 MMHG | OXYGEN SATURATION: 98 % | DIASTOLIC BLOOD PRESSURE: 94 MMHG | RESPIRATION RATE: 18 BRPM | WEIGHT: 240.08 LBS | HEART RATE: 100 BPM | TEMPERATURE: 98 F

## 2019-07-13 VITALS
HEART RATE: 74 BPM | OXYGEN SATURATION: 96 % | RESPIRATION RATE: 18 BRPM | TEMPERATURE: 99 F | DIASTOLIC BLOOD PRESSURE: 66 MMHG | SYSTOLIC BLOOD PRESSURE: 162 MMHG

## 2019-07-13 DIAGNOSIS — Z90.710 ACQUIRED ABSENCE OF BOTH CERVIX AND UTERUS: Chronic | ICD-10-CM

## 2019-07-13 DIAGNOSIS — Z98.89 OTHER SPECIFIED POSTPROCEDURAL STATES: Chronic | ICD-10-CM

## 2019-07-13 LAB
ALBUMIN SERPL ELPH-MCNC: 4.6 G/DL — SIGNIFICANT CHANGE UP (ref 3.3–5)
ALP SERPL-CCNC: 206 U/L — HIGH (ref 40–120)
ALT FLD-CCNC: 17 U/L — SIGNIFICANT CHANGE UP (ref 10–45)
ANION GAP SERPL CALC-SCNC: 18 MMOL/L — HIGH (ref 5–17)
APPEARANCE UR: CLEAR — SIGNIFICANT CHANGE UP
AST SERPL-CCNC: 15 U/L — SIGNIFICANT CHANGE UP (ref 10–40)
BACTERIA # UR AUTO: NEGATIVE — SIGNIFICANT CHANGE UP
BASE EXCESS BLDV CALC-SCNC: 2.2 MMOL/L — HIGH (ref -2–2)
BASE EXCESS BLDV CALC-SCNC: 2.5 MMOL/L — HIGH (ref -2–2)
BASE EXCESS BLDV CALC-SCNC: 4.2 MMOL/L — HIGH (ref -2–2)
BASOPHILS # BLD AUTO: 0 K/UL — SIGNIFICANT CHANGE UP (ref 0–0.2)
BASOPHILS NFR BLD AUTO: 0.3 % — SIGNIFICANT CHANGE UP (ref 0–2)
BILIRUB SERPL-MCNC: 0.2 MG/DL — SIGNIFICANT CHANGE UP (ref 0.2–1.2)
BILIRUB UR-MCNC: NEGATIVE — SIGNIFICANT CHANGE UP
BUN SERPL-MCNC: 10 MG/DL — SIGNIFICANT CHANGE UP (ref 7–23)
CA-I SERPL-SCNC: 1.18 MMOL/L — SIGNIFICANT CHANGE UP (ref 1.12–1.3)
CA-I SERPL-SCNC: 1.19 MMOL/L — SIGNIFICANT CHANGE UP (ref 1.12–1.3)
CA-I SERPL-SCNC: 1.19 MMOL/L — SIGNIFICANT CHANGE UP (ref 1.12–1.3)
CALCIUM SERPL-MCNC: 9.6 MG/DL — SIGNIFICANT CHANGE UP (ref 8.4–10.5)
CHLORIDE BLDV-SCNC: 103 MMOL/L — SIGNIFICANT CHANGE UP (ref 96–108)
CHLORIDE BLDV-SCNC: 103 MMOL/L — SIGNIFICANT CHANGE UP (ref 96–108)
CHLORIDE BLDV-SCNC: 107 MMOL/L — SIGNIFICANT CHANGE UP (ref 96–108)
CHLORIDE SERPL-SCNC: 98 MMOL/L — SIGNIFICANT CHANGE UP (ref 96–108)
CO2 BLDV-SCNC: 28 MMOL/L — SIGNIFICANT CHANGE UP (ref 22–30)
CO2 BLDV-SCNC: 28 MMOL/L — SIGNIFICANT CHANGE UP (ref 22–30)
CO2 BLDV-SCNC: 29 MMOL/L — SIGNIFICANT CHANGE UP (ref 22–30)
CO2 SERPL-SCNC: 24 MMOL/L — SIGNIFICANT CHANGE UP (ref 22–31)
COLOR SPEC: COLORLESS — SIGNIFICANT CHANGE UP
CREAT SERPL-MCNC: 0.73 MG/DL — SIGNIFICANT CHANGE UP (ref 0.5–1.3)
DIFF PNL FLD: NEGATIVE — SIGNIFICANT CHANGE UP
EOSINOPHIL # BLD AUTO: 0.1 K/UL — SIGNIFICANT CHANGE UP (ref 0–0.5)
EOSINOPHIL NFR BLD AUTO: 1.1 % — SIGNIFICANT CHANGE UP (ref 0–6)
EPI CELLS # UR: 2 /HPF — SIGNIFICANT CHANGE UP
GAS PNL BLDV: 140 MMOL/L — SIGNIFICANT CHANGE UP (ref 135–145)
GAS PNL BLDV: 141 MMOL/L — SIGNIFICANT CHANGE UP (ref 135–145)
GAS PNL BLDV: 141 MMOL/L — SIGNIFICANT CHANGE UP (ref 135–145)
GAS PNL BLDV: SIGNIFICANT CHANGE UP
GLUCOSE BLDV-MCNC: 197 MG/DL — HIGH (ref 70–99)
GLUCOSE BLDV-MCNC: 269 MG/DL — HIGH (ref 70–99)
GLUCOSE BLDV-MCNC: 271 MG/DL — HIGH (ref 70–99)
GLUCOSE SERPL-MCNC: 276 MG/DL — HIGH (ref 70–99)
GLUCOSE UR QL: ABNORMAL
HCO3 BLDV-SCNC: 27 MMOL/L — SIGNIFICANT CHANGE UP (ref 21–29)
HCO3 BLDV-SCNC: 27 MMOL/L — SIGNIFICANT CHANGE UP (ref 21–29)
HCO3 BLDV-SCNC: 28 MMOL/L — SIGNIFICANT CHANGE UP (ref 21–29)
HCT VFR BLD CALC: 35.2 % — SIGNIFICANT CHANGE UP (ref 34.5–45)
HCT VFR BLDA CALC: 31 % — LOW (ref 39–50)
HCT VFR BLDA CALC: 32 % — LOW (ref 39–50)
HCT VFR BLDA CALC: 34 % — LOW (ref 39–50)
HGB BLD CALC-MCNC: 10 G/DL — LOW (ref 11.5–15.5)
HGB BLD CALC-MCNC: 10.2 G/DL — LOW (ref 11.5–15.5)
HGB BLD CALC-MCNC: 11.1 G/DL — LOW (ref 11.5–15.5)
HGB BLD-MCNC: 11.1 G/DL — LOW (ref 11.5–15.5)
HYALINE CASTS # UR AUTO: 0 /LPF — SIGNIFICANT CHANGE UP (ref 0–2)
KETONES UR-MCNC: NEGATIVE — SIGNIFICANT CHANGE UP
LACTATE BLDV-MCNC: 2.2 MMOL/L — HIGH (ref 0.7–2)
LACTATE BLDV-MCNC: 3 MMOL/L — HIGH (ref 0.7–2)
LACTATE BLDV-MCNC: 3.1 MMOL/L — HIGH (ref 0.7–2)
LEUKOCYTE ESTERASE UR-ACNC: NEGATIVE — SIGNIFICANT CHANGE UP
LYMPHOCYTES # BLD AUTO: 2.4 K/UL — SIGNIFICANT CHANGE UP (ref 1–3.3)
LYMPHOCYTES # BLD AUTO: 28.6 % — SIGNIFICANT CHANGE UP (ref 13–44)
MAGNESIUM SERPL-MCNC: 1.6 MG/DL — SIGNIFICANT CHANGE UP (ref 1.6–2.6)
MCHC RBC-ENTMCNC: 22.6 PG — LOW (ref 27–34)
MCHC RBC-ENTMCNC: 31.5 GM/DL — LOW (ref 32–36)
MCV RBC AUTO: 71.8 FL — LOW (ref 80–100)
MONOCYTES # BLD AUTO: 0.4 K/UL — SIGNIFICANT CHANGE UP (ref 0–0.9)
MONOCYTES NFR BLD AUTO: 4.2 % — SIGNIFICANT CHANGE UP (ref 2–14)
NEUTROPHILS # BLD AUTO: 5.5 K/UL — SIGNIFICANT CHANGE UP (ref 1.8–7.4)
NEUTROPHILS NFR BLD AUTO: 65.8 % — SIGNIFICANT CHANGE UP (ref 43–77)
NITRITE UR-MCNC: NEGATIVE — SIGNIFICANT CHANGE UP
OTHER CELLS CSF MANUAL: 10 ML/DL — LOW (ref 18–22)
PCO2 BLDV: 38 MMHG — SIGNIFICANT CHANGE UP (ref 35–50)
PCO2 BLDV: 43 MMHG — SIGNIFICANT CHANGE UP (ref 35–50)
PCO2 BLDV: 44 MMHG — SIGNIFICANT CHANGE UP (ref 35–50)
PH BLDV: 7.41 — SIGNIFICANT CHANGE UP (ref 7.35–7.45)
PH BLDV: 7.41 — SIGNIFICANT CHANGE UP (ref 7.35–7.45)
PH BLDV: 7.48 — HIGH (ref 7.35–7.45)
PH UR: 8 — SIGNIFICANT CHANGE UP (ref 5–8)
PHOSPHATE SERPL-MCNC: 3.3 MG/DL — SIGNIFICANT CHANGE UP (ref 2.5–4.5)
PLATELET # BLD AUTO: 265 K/UL — SIGNIFICANT CHANGE UP (ref 150–400)
PO2 BLDV: 35 MMHG — SIGNIFICANT CHANGE UP (ref 25–45)
PO2 BLDV: 42 MMHG — SIGNIFICANT CHANGE UP (ref 25–45)
PO2 BLDV: 44 MMHG — SIGNIFICANT CHANGE UP (ref 25–45)
POTASSIUM BLDV-SCNC: 3.3 MMOL/L — LOW (ref 3.5–5.3)
POTASSIUM BLDV-SCNC: 3.5 MMOL/L — SIGNIFICANT CHANGE UP (ref 3.5–5.3)
POTASSIUM BLDV-SCNC: 3.7 MMOL/L — SIGNIFICANT CHANGE UP (ref 3.5–5.3)
POTASSIUM SERPL-MCNC: 3.5 MMOL/L — SIGNIFICANT CHANGE UP (ref 3.5–5.3)
POTASSIUM SERPL-SCNC: 3.5 MMOL/L — SIGNIFICANT CHANGE UP (ref 3.5–5.3)
PROT SERPL-MCNC: 7.9 G/DL — SIGNIFICANT CHANGE UP (ref 6–8.3)
PROT UR-MCNC: ABNORMAL
RBC # BLD: 4.9 M/UL — SIGNIFICANT CHANGE UP (ref 3.8–5.2)
RBC # FLD: 15.7 % — HIGH (ref 10.3–14.5)
RBC CASTS # UR COMP ASSIST: 1 /HPF — SIGNIFICANT CHANGE UP (ref 0–4)
SAO2 % BLDV: 63 % — LOW (ref 67–88)
SAO2 % BLDV: 74 % — SIGNIFICANT CHANGE UP (ref 67–88)
SAO2 % BLDV: 77 % — SIGNIFICANT CHANGE UP (ref 67–88)
SODIUM SERPL-SCNC: 140 MMOL/L — SIGNIFICANT CHANGE UP (ref 135–145)
SP GR SPEC: 1.01 — SIGNIFICANT CHANGE UP (ref 1.01–1.02)
UROBILINOGEN FLD QL: NEGATIVE — SIGNIFICANT CHANGE UP
VALPROATE SERPL-MCNC: <3 UG/ML — LOW (ref 50–100)
WBC # BLD: 8.4 K/UL — SIGNIFICANT CHANGE UP (ref 3.8–10.5)
WBC # FLD AUTO: 8.4 K/UL — SIGNIFICANT CHANGE UP (ref 3.8–10.5)
WBC UR QL: 1 /HPF — SIGNIFICANT CHANGE UP (ref 0–5)

## 2019-07-13 PROCEDURE — 99284 EMERGENCY DEPT VISIT MOD MDM: CPT

## 2019-07-13 RX ORDER — ACETAMINOPHEN 500 MG
975 TABLET ORAL ONCE
Refills: 0 | Status: COMPLETED | OUTPATIENT
Start: 2019-07-13 | End: 2019-07-13

## 2019-07-13 RX ORDER — LEVETIRACETAM 250 MG/1
1000 TABLET, FILM COATED ORAL ONCE
Refills: 0 | Status: COMPLETED | OUTPATIENT
Start: 2019-07-13 | End: 2019-07-13

## 2019-07-13 RX ORDER — SODIUM CHLORIDE 9 MG/ML
1000 INJECTION INTRAMUSCULAR; INTRAVENOUS; SUBCUTANEOUS ONCE
Refills: 0 | Status: COMPLETED | OUTPATIENT
Start: 2019-07-13 | End: 2019-07-13

## 2019-07-13 RX ORDER — KETOROLAC TROMETHAMINE 30 MG/ML
15 SYRINGE (ML) INJECTION ONCE
Refills: 0 | Status: DISCONTINUED | OUTPATIENT
Start: 2019-07-13 | End: 2019-07-13

## 2019-07-13 RX ORDER — IBUPROFEN 200 MG
600 TABLET ORAL ONCE
Refills: 0 | Status: COMPLETED | OUTPATIENT
Start: 2019-07-13 | End: 2019-07-13

## 2019-07-13 RX ORDER — LEVETIRACETAM 250 MG/1
500 TABLET, FILM COATED ORAL ONCE
Refills: 0 | Status: COMPLETED | OUTPATIENT
Start: 2019-07-13 | End: 2019-07-13

## 2019-07-13 RX ADMIN — Medication 975 MILLIGRAM(S): at 01:45

## 2019-07-13 RX ADMIN — SODIUM CHLORIDE 1000 MILLILITER(S): 9 INJECTION INTRAMUSCULAR; INTRAVENOUS; SUBCUTANEOUS at 05:02

## 2019-07-13 RX ADMIN — LEVETIRACETAM 400 MILLIGRAM(S): 250 TABLET, FILM COATED ORAL at 00:47

## 2019-07-13 RX ADMIN — Medication 600 MILLIGRAM(S): at 06:52

## 2019-07-13 RX ADMIN — Medication 975 MILLIGRAM(S): at 01:46

## 2019-07-13 RX ADMIN — SODIUM CHLORIDE 1000 MILLILITER(S): 9 INJECTION INTRAMUSCULAR; INTRAVENOUS; SUBCUTANEOUS at 00:49

## 2019-07-13 RX ADMIN — LEVETIRACETAM 500 MILLIGRAM(S): 250 TABLET, FILM COATED ORAL at 03:46

## 2019-07-13 NOTE — ED PROVIDER NOTE - CLINICAL SUMMARY MEDICAL DECISION MAKING FREE TEXT BOX
69F presenting after a seizure. was not able to  medications so seizure likely 2/2 medication non-compliance. plan for labs, keppra, will reassess.

## 2019-07-13 NOTE — ED PROVIDER NOTE - PROGRESS NOTE DETAILS
patient no longer post-ictal. lactate likely elevated 2/2 seizure but will repeat vbg. will discharge with neuro follow-up. - resident Bishop Elizabeth lactate downtrending. will discharge with neuro follow-up. - resident Bishop hwang

## 2019-07-13 NOTE — ED PROVIDER NOTE - PHYSICAL EXAMINATION
general: well appearing female, no acute distress   heent: normocephalic, atraumatic   respiratory: normal work of breathing, lungs clear to auscultation bilaterally   cardiac: regular rate and rhythm   abdomen: soft, non-tender   msk: no swelling or tenderness of lower extremities   skin: no rashes   neuro: A&Ox3

## 2019-07-13 NOTE — ED PROVIDER NOTE - NSFOLLOWUPINSTRUCTIONS_ED_ALL_ED_FT
Please follow-up with your primary care doctor in the next 24-48 hours     Please follow-up with your neurologist in the next 24-48 hours for seizure control     If you have any worsening symptoms, severe headache, chest pain, difficulty breathing, fever or you have another seizure please return to the emergency department

## 2019-07-13 NOTE — ED PROVIDER NOTE - OBJECTIVE STATEMENT
69F, pmh of DM, CAD, HTN, seizures presenting with seizure. family reports patient had typical partial seizure that lasted for one minute, resolved, and then had another seizure that lasted for 10 minutes and resolved. patient complaining of headache but is following commands. reports running out of keppra yesterday and missed her doses today. no fever, chest pain, difficulty breathing, nausea, vomiting, pain or burning with urination.

## 2019-07-13 NOTE — ED ADULT NURSE NOTE - OBJECTIVE STATEMENT
68 y/o female a+ox2, pmhx seizures, DM, HTN, coming from home via EMS for seizure. Pt 70 y/o female a+ox2, pmhx seizures, DM, HTN, on Eliquis, coming from home via EMS for seizure. Pt was seen by Neurologist today; sent home of EEG to monitor seizure activity; pt did not take prescribed Keppra today as she was told "she may not need it anymore if the EEG is ok"; pt had a focal seizure witnessed by family lasting less than 1 minute; pt was returned to normal baseline afterwards and family did not call 911. Later in the evening pt had a second seizure lasting "10 minutes"; pt has focal seizures with face posturing to the right, no full body tonic-clonic presentation. EMS arrived and pt was postictal, c/o nausea. EMS gave 4mg zofran enroute. Upon arrival to ED pt c/o headache to left temporal region, non-radiating; a+ox2 not able to follow commands. Family at bedside states pt denies fever or chills, recent falls or head trauma, chest pain or discomfort, dizziness, lightheadedness, abdominal pain, n/v/d. IV established by EMS, labs obtained, medications given as per MD order. Pt left in position of comfort, guard rails up, bed low and locked. Will reassess

## 2019-07-13 NOTE — ED ADULT NURSE NOTE - NSIMPLEMENTINTERV_GEN_ALL_ED
Implemented All Fall with Harm Risk Interventions:  [place] to call system. Call bell, personal items and telephone within reach. Instruct patient to call for assistance. Room bathroom lighting operational. Non-slip footwear when patient is off stretcher. Physically safe environment: no spills, clutter or unnecessary equipment. Stretcher in lowest position, wheels locked, appropriate side rails in place. Provide visual cue, wrist band, yellow gown, etc. Monitor gait and stability. Monitor for mental status changes and reorient to person, place, and time. Review medications for side effects contributing to fall risk. Reinforce activity limits and safety measures with patient and family. Provide visual clues: red socks.

## 2019-07-14 ENCOUNTER — INPATIENT (INPATIENT)
Facility: HOSPITAL | Age: 69
LOS: 5 days | Discharge: ANOTHER HOSPITAL | End: 2019-07-20
Attending: INTERNAL MEDICINE | Admitting: INTERNAL MEDICINE
Payer: MEDICARE

## 2019-07-14 VITALS
TEMPERATURE: 98 F | RESPIRATION RATE: 18 BRPM | OXYGEN SATURATION: 100 % | HEART RATE: 73 BPM | SYSTOLIC BLOOD PRESSURE: 156 MMHG | DIASTOLIC BLOOD PRESSURE: 83 MMHG

## 2019-07-14 DIAGNOSIS — Z90.710 ACQUIRED ABSENCE OF BOTH CERVIX AND UTERUS: Chronic | ICD-10-CM

## 2019-07-14 DIAGNOSIS — Z98.89 OTHER SPECIFIED POSTPROCEDURAL STATES: Chronic | ICD-10-CM

## 2019-07-14 LAB
ALBUMIN SERPL ELPH-MCNC: 4.3 G/DL — SIGNIFICANT CHANGE UP (ref 3.3–5)
ALP SERPL-CCNC: 188 U/L — HIGH (ref 40–120)
ALT FLD-CCNC: 18 U/L — SIGNIFICANT CHANGE UP (ref 4–33)
ANION GAP SERPL CALC-SCNC: 14 MMO/L — SIGNIFICANT CHANGE UP (ref 7–14)
ANISOCYTOSIS BLD QL: SLIGHT — SIGNIFICANT CHANGE UP
APTT BLD: 25.6 SEC — LOW (ref 27.5–36.3)
AST SERPL-CCNC: 19 U/L — SIGNIFICANT CHANGE UP (ref 4–32)
BASE EXCESS BLDV CALC-SCNC: 1.6 MMOL/L — SIGNIFICANT CHANGE UP
BASE EXCESS BLDV CALC-SCNC: 2.5 MMOL/L — SIGNIFICANT CHANGE UP
BASOPHILS # BLD AUTO: 0.04 K/UL — SIGNIFICANT CHANGE UP (ref 0–0.2)
BASOPHILS NFR BLD AUTO: 0.7 % — SIGNIFICANT CHANGE UP (ref 0–2)
BASOPHILS NFR SPEC: 0.9 % — SIGNIFICANT CHANGE UP (ref 0–2)
BILIRUB SERPL-MCNC: < 0.2 MG/DL — LOW (ref 0.2–1.2)
BLASTS # FLD: 0 % — SIGNIFICANT CHANGE UP (ref 0–0)
BLOOD GAS VENOUS - CREATININE: 0.66 MG/DL — SIGNIFICANT CHANGE UP (ref 0.5–1.3)
BLOOD GAS VENOUS - CREATININE: SIGNIFICANT CHANGE UP MG/DL (ref 0.5–1.3)
BLOOD GAS VENOUS - FIO2: 21 — SIGNIFICANT CHANGE UP
BUN SERPL-MCNC: 10 MG/DL — SIGNIFICANT CHANGE UP (ref 7–23)
CALCIUM SERPL-MCNC: 9.5 MG/DL — SIGNIFICANT CHANGE UP (ref 8.4–10.5)
CHLORIDE BLDV-SCNC: 110 MMOL/L — HIGH (ref 96–108)
CHLORIDE BLDV-SCNC: 110 MMOL/L — HIGH (ref 96–108)
CHLORIDE SERPL-SCNC: 103 MMOL/L — SIGNIFICANT CHANGE UP (ref 98–107)
CO2 SERPL-SCNC: 24 MMOL/L — SIGNIFICANT CHANGE UP (ref 22–31)
CREAT SERPL-MCNC: 0.64 MG/DL — SIGNIFICANT CHANGE UP (ref 0.5–1.3)
EOSINOPHIL # BLD AUTO: 0.07 K/UL — SIGNIFICANT CHANGE UP (ref 0–0.5)
EOSINOPHIL NFR BLD AUTO: 1.2 % — SIGNIFICANT CHANGE UP (ref 0–6)
EOSINOPHIL NFR FLD: 1.8 % — SIGNIFICANT CHANGE UP (ref 0–6)
GAS PNL BLDV: 137 MMOL/L — SIGNIFICANT CHANGE UP (ref 136–146)
GAS PNL BLDV: 137 MMOL/L — SIGNIFICANT CHANGE UP (ref 136–146)
GIANT PLATELETS BLD QL SMEAR: PRESENT — SIGNIFICANT CHANGE UP
GLUCOSE BLDV-MCNC: 237 MG/DL — HIGH (ref 70–99)
GLUCOSE BLDV-MCNC: 250 MG/DL — HIGH (ref 70–99)
GLUCOSE SERPL-MCNC: 253 MG/DL — HIGH (ref 70–99)
HCO3 BLDV-SCNC: 26 MMOL/L — SIGNIFICANT CHANGE UP (ref 20–27)
HCO3 BLDV-SCNC: 26 MMOL/L — SIGNIFICANT CHANGE UP (ref 20–27)
HCT VFR BLD CALC: 32.9 % — LOW (ref 34.5–45)
HCT VFR BLDV CALC: 30 % — LOW (ref 34.5–45)
HCT VFR BLDV CALC: 31 % — LOW (ref 34.5–45)
HGB BLD-MCNC: 9.9 G/DL — LOW (ref 11.5–15.5)
HGB BLDV-MCNC: 10 G/DL — LOW (ref 11.5–15.5)
HGB BLDV-MCNC: 9.7 G/DL — LOW (ref 11.5–15.5)
IMM GRANULOCYTES NFR BLD AUTO: 0.2 % — SIGNIFICANT CHANGE UP (ref 0–1.5)
INR BLD: 1.05 — SIGNIFICANT CHANGE UP (ref 0.88–1.17)
LACTATE BLDV-MCNC: 2.5 MMOL/L — HIGH (ref 0.5–2)
LACTATE BLDV-MCNC: 2.5 MMOL/L — HIGH (ref 0.5–2)
LYMPHOCYTES # BLD AUTO: 2.1 K/UL — SIGNIFICANT CHANGE UP (ref 1–3.3)
LYMPHOCYTES # BLD AUTO: 35.4 % — SIGNIFICANT CHANGE UP (ref 13–44)
LYMPHOCYTES NFR SPEC AUTO: 23.9 % — SIGNIFICANT CHANGE UP (ref 13–44)
MCHC RBC-ENTMCNC: 22.6 PG — LOW (ref 27–34)
MCHC RBC-ENTMCNC: 30.1 % — LOW (ref 32–36)
MCV RBC AUTO: 74.9 FL — LOW (ref 80–100)
METAMYELOCYTES # FLD: 0 % — SIGNIFICANT CHANGE UP (ref 0–1)
MICROCYTES BLD QL: SLIGHT — SIGNIFICANT CHANGE UP
MONOCYTES # BLD AUTO: 0.38 K/UL — SIGNIFICANT CHANGE UP (ref 0–0.9)
MONOCYTES NFR BLD AUTO: 6.4 % — SIGNIFICANT CHANGE UP (ref 2–14)
MONOCYTES NFR BLD: 5.3 % — SIGNIFICANT CHANGE UP (ref 2–9)
MYELOCYTES NFR BLD: 0 % — SIGNIFICANT CHANGE UP (ref 0–0)
NEUTROPHIL AB SER-ACNC: 64.6 % — SIGNIFICANT CHANGE UP (ref 43–77)
NEUTROPHILS # BLD AUTO: 3.33 K/UL — SIGNIFICANT CHANGE UP (ref 1.8–7.4)
NEUTROPHILS NFR BLD AUTO: 56.1 % — SIGNIFICANT CHANGE UP (ref 43–77)
NEUTS BAND # BLD: 0 % — SIGNIFICANT CHANGE UP (ref 0–6)
NRBC # FLD: 0 K/UL — SIGNIFICANT CHANGE UP (ref 0–0)
OTHER - HEMATOLOGY %: 0 — SIGNIFICANT CHANGE UP
PCO2 BLDV: 40 MMHG — LOW (ref 41–51)
PCO2 BLDV: 41 MMHG — SIGNIFICANT CHANGE UP (ref 41–51)
PH BLDV: 7.42 PH — SIGNIFICANT CHANGE UP (ref 7.32–7.43)
PH BLDV: 7.42 PH — SIGNIFICANT CHANGE UP (ref 7.32–7.43)
PLATELET # BLD AUTO: 264 K/UL — SIGNIFICANT CHANGE UP (ref 150–400)
PLATELET COUNT - ESTIMATE: NORMAL — SIGNIFICANT CHANGE UP
PMV BLD: 11.3 FL — SIGNIFICANT CHANGE UP (ref 7–13)
PO2 BLDV: 30 MMHG — LOW (ref 35–40)
PO2 BLDV: 71 MMHG — HIGH (ref 35–40)
POTASSIUM BLDV-SCNC: 3.6 MMOL/L — SIGNIFICANT CHANGE UP (ref 3.4–4.5)
POTASSIUM BLDV-SCNC: 3.7 MMOL/L — SIGNIFICANT CHANGE UP (ref 3.4–4.5)
POTASSIUM SERPL-MCNC: 3.8 MMOL/L — SIGNIFICANT CHANGE UP (ref 3.5–5.3)
POTASSIUM SERPL-SCNC: 3.8 MMOL/L — SIGNIFICANT CHANGE UP (ref 3.5–5.3)
PROMYELOCYTES # FLD: 0 % — SIGNIFICANT CHANGE UP (ref 0–0)
PROT SERPL-MCNC: 7.7 G/DL — SIGNIFICANT CHANGE UP (ref 6–8.3)
PROTHROM AB SERPL-ACNC: 11.7 SEC — SIGNIFICANT CHANGE UP (ref 9.8–13.1)
RBC # BLD: 4.39 M/UL — SIGNIFICANT CHANGE UP (ref 3.8–5.2)
RBC # FLD: 15.9 % — HIGH (ref 10.3–14.5)
SAO2 % BLDV: 49.1 % — LOW (ref 60–85)
SAO2 % BLDV: 94.8 % — HIGH (ref 60–85)
SODIUM SERPL-SCNC: 141 MMOL/L — SIGNIFICANT CHANGE UP (ref 135–145)
VARIANT LYMPHS # BLD: 3.5 % — SIGNIFICANT CHANGE UP
WBC # BLD: 5.93 K/UL — SIGNIFICANT CHANGE UP (ref 3.8–10.5)
WBC # FLD AUTO: 5.93 K/UL — SIGNIFICANT CHANGE UP (ref 3.8–10.5)

## 2019-07-14 PROCEDURE — 70450 CT HEAD/BRAIN W/O DYE: CPT | Mod: 26

## 2019-07-14 PROCEDURE — 70450 CT HEAD/BRAIN W/O DYE: CPT | Mod: 26,77

## 2019-07-14 PROCEDURE — 70553 MRI BRAIN STEM W/O & W/DYE: CPT | Mod: 26

## 2019-07-14 RX ORDER — LEVETIRACETAM 250 MG/1
1000 TABLET, FILM COATED ORAL EVERY 12 HOURS
Refills: 0 | Status: DISCONTINUED | OUTPATIENT
Start: 2019-07-14 | End: 2019-07-14

## 2019-07-14 RX ORDER — AMLODIPINE BESYLATE 2.5 MG/1
10 TABLET ORAL ONCE
Refills: 0 | Status: COMPLETED | OUTPATIENT
Start: 2019-07-14 | End: 2019-07-14

## 2019-07-14 RX ORDER — LEVETIRACETAM 250 MG/1
1000 TABLET, FILM COATED ORAL
Refills: 0 | Status: DISCONTINUED | OUTPATIENT
Start: 2019-07-14 | End: 2019-07-20

## 2019-07-14 RX ORDER — MORPHINE SULFATE 50 MG/1
4 CAPSULE, EXTENDED RELEASE ORAL ONCE
Refills: 0 | Status: DISCONTINUED | OUTPATIENT
Start: 2019-07-14 | End: 2019-07-14

## 2019-07-14 RX ORDER — LEVETIRACETAM 250 MG/1
1000 TABLET, FILM COATED ORAL ONCE
Refills: 0 | Status: DISCONTINUED | OUTPATIENT
Start: 2019-07-14 | End: 2019-07-14

## 2019-07-14 RX ORDER — ACETAMINOPHEN 500 MG
650 TABLET ORAL ONCE
Refills: 0 | Status: COMPLETED | OUTPATIENT
Start: 2019-07-14 | End: 2019-07-14

## 2019-07-14 RX ORDER — HYDRALAZINE HCL 50 MG
100 TABLET ORAL ONCE
Refills: 0 | Status: COMPLETED | OUTPATIENT
Start: 2019-07-14 | End: 2019-07-14

## 2019-07-14 RX ORDER — METOCLOPRAMIDE HCL 10 MG
10 TABLET ORAL ONCE
Refills: 0 | Status: COMPLETED | OUTPATIENT
Start: 2019-07-14 | End: 2019-07-14

## 2019-07-14 RX ORDER — SODIUM CHLORIDE 9 MG/ML
1000 INJECTION INTRAMUSCULAR; INTRAVENOUS; SUBCUTANEOUS ONCE
Refills: 0 | Status: COMPLETED | OUTPATIENT
Start: 2019-07-14 | End: 2019-07-14

## 2019-07-14 RX ORDER — LABETALOL HCL 100 MG
200 TABLET ORAL ONCE
Refills: 0 | Status: COMPLETED | OUTPATIENT
Start: 2019-07-14 | End: 2019-07-14

## 2019-07-14 RX ORDER — DIPHENHYDRAMINE HCL 50 MG
25 CAPSULE ORAL ONCE
Refills: 0 | Status: COMPLETED | OUTPATIENT
Start: 2019-07-14 | End: 2019-07-14

## 2019-07-14 RX ORDER — ACETAMINOPHEN 500 MG
975 TABLET ORAL ONCE
Refills: 0 | Status: COMPLETED | OUTPATIENT
Start: 2019-07-14 | End: 2019-07-14

## 2019-07-14 RX ORDER — LACOSAMIDE 50 MG/1
150 TABLET ORAL EVERY 12 HOURS
Refills: 0 | Status: DISCONTINUED | OUTPATIENT
Start: 2019-07-14 | End: 2019-07-18

## 2019-07-14 RX ADMIN — LACOSAMIDE 130 MILLIGRAM(S): 50 TABLET ORAL at 19:02

## 2019-07-14 RX ADMIN — Medication 975 MILLIGRAM(S): at 07:03

## 2019-07-14 RX ADMIN — Medication 10 MILLIGRAM(S): at 14:30

## 2019-07-14 RX ADMIN — MORPHINE SULFATE 4 MILLIGRAM(S): 50 CAPSULE, EXTENDED RELEASE ORAL at 18:39

## 2019-07-14 RX ADMIN — Medication 200 MILLIGRAM(S): at 18:40

## 2019-07-14 RX ADMIN — Medication 100 MILLIGRAM(S): at 19:50

## 2019-07-14 RX ADMIN — LEVETIRACETAM 1000 MILLIGRAM(S): 250 TABLET, FILM COATED ORAL at 22:37

## 2019-07-14 RX ADMIN — SODIUM CHLORIDE 1000 MILLILITER(S): 9 INJECTION INTRAMUSCULAR; INTRAVENOUS; SUBCUTANEOUS at 10:46

## 2019-07-14 RX ADMIN — Medication 25 MILLIGRAM(S): at 14:29

## 2019-07-14 RX ADMIN — LEVETIRACETAM 400 MILLIGRAM(S): 250 TABLET, FILM COATED ORAL at 12:43

## 2019-07-14 RX ADMIN — Medication 10 MILLIGRAM(S): at 18:39

## 2019-07-14 RX ADMIN — Medication 650 MILLIGRAM(S): at 13:14

## 2019-07-14 RX ADMIN — AMLODIPINE BESYLATE 10 MILLIGRAM(S): 2.5 TABLET ORAL at 19:49

## 2019-07-14 RX ADMIN — SODIUM CHLORIDE 1000 MILLILITER(S): 9 INJECTION INTRAMUSCULAR; INTRAVENOUS; SUBCUTANEOUS at 18:41

## 2019-07-14 RX ADMIN — Medication 0.1 MILLIGRAM(S): at 18:41

## 2019-07-14 RX ADMIN — LEVETIRACETAM 400 MILLIGRAM(S): 250 TABLET, FILM COATED ORAL at 22:14

## 2019-07-14 RX ADMIN — Medication 975 MILLIGRAM(S): at 10:46

## 2019-07-14 NOTE — ED CDU PROVIDER INITIAL DAY NOTE - DETAILS
Patient is a 38 y.o female with PMH of nonconvulsive status epilepticus, s/p brain biopsy w/ beta-amyloid hx of Afib, HTN, Hypothyroidism, COPD, diabetes who presents to ED with family c/o headache.Pt seen and worked up in ED, CT head shows still some vasogenic edema, no acute findings. Neuro evaluated patient recommend MRI brain wwo contrast. Pt transferred to CDU for; MRI, neuro following, pain control, vitals q4 and frequent re-evals.

## 2019-07-14 NOTE — ED PROVIDER NOTE - NS ED ROS FT
REVIEW OF SYSTEMS:    CONSTITUTIONAL: +fevers  EYES/ENT: No vertigo or throat pain   NECK: +neck pain  RESPIRATORY: No cough, wheezing, hemoptysis; No shortness of breath  CARDIOVASCULAR: No chest pain or palpitations  GASTROINTESTINAL:+vomiting   GENITOURINARY: No dysuria, frequency or hematuria  NEUROLOGICAL: No numbness or weakness  SKIN: No itching, burning, rashes, or lesions   All other review of systems is negative unless indicated above.

## 2019-07-14 NOTE — ED ADULT NURSE NOTE - OBJECTIVE STATEMENT
68 yo F AAOx4 received to room 11 c/o HA, primary language Creole ( [id number]): hx  shunt removal and seizures, pt reports sudden onset yesterday associated with n/v x 2 episodes, +numbness/tingling to b/l UE, pt reports, + blurry vision, reports 1 witnessed seizure yesterday that "lasted 1 hour, I had shaking all over my body," MD at pt bedside for eval, 20G placed to L wrist, labs sent, will monitor

## 2019-07-14 NOTE — ED CDU PROVIDER DISPOSITION NOTE - ATTENDING CONTRIBUTION TO CARE
70 y/o F w/ PMH of nonconvulsive status epilepticus, s/p brain biopsy w/ beta-amyloid Afib, HTN, Hypothyroidism, COPD, diabetes arrived with headache noted to be gradual in onset with now 10/10 in intensity today.  Pt noted to have seizures on Friday and Saturday but no seizure today. The pt is currently on levetiracetam 1g TID and vimpat 150 BID, with which she reports compliance. Last admission at [facility] April 18-May 7 for nonconvulsive status epilpeticus with AMS, s/p biopsy with beta amyloid detected with a course complicated by vasogenic edema s/p decadron course.  Patient was seen and evaluated in the ED with CT head shows still some vasogenic edema, no acute findings. Neuro evaluated patient recommend MRI brain wwo contrast. Pt transferred to CDU for; MRI, neuro following, pain control, vitals q4 and frequent re-evals.  While in CDU, patient was re-evaluated during neuro checks and noted to be altered.  Re-interviewed by BRIJESH Salas and Dr. Gallo at bedside with  noting not oriented to place or date and unable to follow complex commands which was different from previous exam after review of notes. Patient was noted to follow simple commands, but unable to follow two step commands.   Repeat CT head was wnl.  Neurology re-evaluated.  Patient was admitted to medicine for further evaluation given AMS.  No evidence of seizure was noted at the time of exam and patient was able to answer simple questions correctly during evaluation.     PE:   GEN - NAD; well appearing; A+O x1; non-toxic appearing   CARD -s1s2, RRR, no M,G,R;   PULM - CTA b/l, symmetric breath sounds;   ABD:  +BS, ND, NT, soft, no guarding, no rebound, no masses;   BACK: no CVA tenderness, Normal  spine;   EXT: symmetric pulses, 2+ dp, capillary refill < 2 seconds, no clubbing, no cyanosis, no edema  NEURO: alert, CN 2-12 intact, reflexes nl, sensation nl, coordination nl, finger to nose nl, romberg negative, motor 5/5 RUE/LUE/RLE/LLE/EHL/Plantar flexion, no pronator drift, gait nl; no focal neuro deficits on exam; unable to follow complex commands    Admit to medicine for further evaluation with neurology following.  Will require further testing to evaluate altered mental status.

## 2019-07-14 NOTE — ED ADULT TRIAGE NOTE - CHIEF COMPLAINT QUOTE
pt seen at [facility] yesterday for c/o headache. as per family pt had  shunt removed and has since had severe frequent headaches, hx of seizures. denies fevers, chills. c/o n, v. pt appears very uncomfortable. primarily creole speaking but opting for son to translate.

## 2019-07-14 NOTE — ED PROVIDER NOTE - PROGRESS NOTE DETAILS
Head CT ordered, Neurology consulted. Will follow up labs. Spoke to neurology, will follow up with recommendations after imaging is completed. CT Head resulted: No acute intracranial hemorrhage. Evolution of postoperative change along the left parietal and occipital lobe with resolution of parenchymal vasogenic edema. There is volume loss   and gliosis now seen in the left parietal and occipital lobes.  -Will touch base with neurology and follow up recommendations. Tom, pgy3: neuro recommending MRI head w/wout contrast to eval for disease progression, CDU accepted pt for holding

## 2019-07-14 NOTE — CHART NOTE - NSCHARTNOTEFT_GEN_A_CORE
Patient rescanned on evening of 7/14, noted by CDU staff to be lethargic, less responsive  OF note was given her BP meds within a short duration in addition to benadryl and morphine  Patient was evaluated at bedside, easily arousable to sternal rub, was speaking fluently at that point and able to follow commands  CDU to follow official read and notify neuro of any acute changes (preliminarily reviewed - no acute changes)    Yulissa Martinez  PG3 Neurology Resident

## 2019-07-14 NOTE — ED CDU PROVIDER INITIAL DAY NOTE - MEDICAL DECISION MAKING DETAILS
see attending note Pt seen and worked up in ED, CT head shows still some vasogenic edema, no acute findings. Neuro evaluated patient recommend MRI brain wwo contrast. Pt transferred to CDU for; MRI, neuro following, pain control, vitals q4 and frequent re-evals. see attending note

## 2019-07-14 NOTE — ED CDU PROVIDER INITIAL DAY NOTE - PROGRESS NOTE DETAILS
Patient admits to headache, morphine ordered. Also noted to have elevated BP so home medications ordered. Pt signed out to Overnight PA. BRIJESH Salas: Pt signed out to me to follow up with Neuro recs, MRI showing improvement in vasogenic edema. Also pt was noted to be hypertensive so day team ordered patients home meds as she did not get any yet. Called and spoke with neuro regarding pts BP, MRI read. States will review and call me back. Pt sitting up comfortably eating dinner. will continue to monitor. Pt revitaled and appeared to be sleepier . ID used [id number] no family present at bedside for translation. Pt is able to follow commands however pt does not know what month or year it is. When asked where she is states [place]. Also slow to open eyes and appears sleepier. Performed eval bedside with Dr. Gallo, CT head ordered. Neuro paged. Called CT currently had patient on table for code stroke, will come next Neuro bedside, agrees with CT, pt to be brought to CT now CT head no acute changes. Pt no acute distress.   Spoke with daughter Radha Kang (emergency contact) to update with changes in patients care. discussed pt to be admitted for further care. Discussed with hospitalist who accepted patient for admission. called and spoke with MAR.

## 2019-07-14 NOTE — CONSULT NOTE ADULT - ASSESSMENT
Impression: CT head showed increased vasogenic edema in the left temporal lobe.    Plan:  - Impression: CT head showed increased vasogenic edema in the left temporal lobe. Biopsy probably did not capture underlying lesion. Most likely, she has Alzheimer's disease in addition to what appears to be infiltrating neoplasm. A purely beta amyloid lesion is unlikely to cause progressive vasogenic edema.     Plan:  -MRI brain w/ and w/o contrast to evaluate for progression  -c/w keppra 1 g TID  -c/w vimpat 150 mg BID

## 2019-07-14 NOTE — CONSULT NOTE ADULT - SUBJECTIVE AND OBJECTIVE BOX
RON [id number] is a 69y old  Female who presents with a chief complaint of     HPI: 68 yo F with hx of epilepsy, left temporal FLAIR lesion s/p biopsy showing beta amyloid, HTN, CAD presents to the ER with complaining with headaches. She reports that the headaches started this past Friday. Abruptly started on Friday. Headaches are left temporal, constant since Friday, radiates down to the left face. She was taking tylenol which did not improve symptoms. No photophobia, phonophobia, diplopia, weakness, numbness, tingling. She endorses having nausea and vomiting, as well as neck pain. Of note, on Friday, family reports that patient had a seizure which lasted 1 minute,     She had come to the ER on Friday, and was discharged.       MEDICATIONS  (STANDING):    MEDICATIONS  (PRN):    PAST MEDICAL & SURGICAL HISTORY:  Hypothyroid  Obesity  NACHO (obstructive sleep apnea): sleep study done 5 years ago  DM (Diabetes Mellitus) (ICD9 250.00)  Dyslipidemia (ICD9 272.4)  HTN (Hypertension) (ICD9 401.9)  CAD (Coronary Artery Disease) (ICD9 414.00): c STENTS  History of hysterectomy  History of ovarian cystectomy: x 2 in 2013  S/P primary angioplasty with coronary stent: has 5 stents-first placed in 2007, last stent -2011,  C Section: x 1    FAMILY HISTORY:  Family history of heart disease (Aunt)    Allergies    No Known Allergies    Intolerances        Review of Systems:    see hpi        Vital Signs Last 24 Hrs  T(C): 36.7 (14 Jul 2019 06:21), Max: 36.7 (14 Jul 2019 05:45)  T(F): 98.1 (14 Jul 2019 06:21), Max: 98.1 (14 Jul 2019 05:45)  HR: 77 (14 Jul 2019 06:21) (73 - 77)  BP: 166/77 (14 Jul 2019 06:21) (156/83 - 166/77)  BP(mean): --  RR: 18 (14 Jul 2019 06:21) (18 - 18)  SpO2: 100% (14 Jul 2019 06:21) (100% - 100%)    Neurological Exam:    Mental Status: Orientated to self, date and place.  Attention intact.  No dysarthria. Speech fluent. naming intact. some difficulty following commands to cross midline  Cranial Nerves:   PERRL, EOMI, VFF with extinction on the right, no nystagmus.   No facial asymmetry.  Tongue midline.     Strength:       [] Upper extremity                      Delt       Bicep    Tricep                                                  R         5/5 5/5        5/5       5/5                                               L          5/5        5/5        5/5       5/5    [] Lower extremity                       HF          KE          KF        DF         PF                                               R        5/5 5/5 5/5 5/5       5/5                                               L         5/5 5/5       4+/5       5/5 5/5    Pronator drift: none                 Dysmetria: None to finger-nose-finger    Sensation: intact to light touch, pinprick, vibration and proprioception    Deep Tendon Reflexes:     Biceps          BR        Patellar        Ankle         Babinski                                         R       3+           3+            3+               2+           downgoing                                         L        3+           3+            2+               3+           downgoing    Gait: ambulates without assistance.      Other:    07-14    141  |  103  |  10  ----------------------------<  253<H>  3.8   |  24  |  0.64    Ca    9.5      14 Jul 2019 06:48  Phos  3.3     07-13  Mg     1.6     07-13    TPro  7.7  /  Alb  4.3  /  TBili  < 0.2<L>  /  DBili  x   /  AST  19  /  ALT  18  /  AlkPhos  188<H>  07-14                            9.9    5.93  )-----------( 264      ( 14 Jul 2019 06:48 )             32.9       Radiology    CT:   MRI  EKG:  tele:  TTE:  EEG: RON [id number] is a 69y old  Female who presents with a chief complaint of     HPI: 68 yo F with hx of epilepsy, left temporal FLAIR lesion s/p biopsy showing beta amyloid, HTN, CAD presents to the ER with complaining with headaches. She reports that the headaches started this past Friday. Abruptly started on Friday. Headaches are left temporal, constant since Friday, radiates down to the left face. She was taking tylenol which did not improve symptoms. No photophobia, phonophobia, diplopia, weakness, numbness, tingling. She endorses having nausea and vomiting, as well as neck pain. Of note, on Friday, family reports that patient had a seizure which lasted 1 minute.    MEDICATIONS  (STANDING):    MEDICATIONS  (PRN):    PAST MEDICAL & SURGICAL HISTORY:  Hypothyroid  Obesity  NACHO (obstructive sleep apnea): sleep study done 5 years ago  DM (Diabetes Mellitus) (ICD9 250.00)  Dyslipidemia (ICD9 272.4)  HTN (Hypertension) (ICD9 401.9)  CAD (Coronary Artery Disease) (ICD9 414.00): c STENTS  History of hysterectomy  History of ovarian cystectomy: x 2 in 2013  S/P primary angioplasty with coronary stent: has 5 stents-first placed in 2007, last stent -2011,  C Section: x 1    FAMILY HISTORY:  Family history of heart disease (Aunt)    Allergies    No Known Allergies    Intolerances        Review of Systems:    see hpi        Vital Signs Last 24 Hrs  T(C): 36.7 (14 Jul 2019 06:21), Max: 36.7 (14 Jul 2019 05:45)  T(F): 98.1 (14 Jul 2019 06:21), Max: 98.1 (14 Jul 2019 05:45)  HR: 77 (14 Jul 2019 06:21) (73 - 77)  BP: 166/77 (14 Jul 2019 06:21) (156/83 - 166/77)  BP(mean): --  RR: 18 (14 Jul 2019 06:21) (18 - 18)  SpO2: 100% (14 Jul 2019 06:21) (100% - 100%)    Neurological Exam:    Mental Status: Orientated to self, date and place.  Attention intact.  No dysarthria. Speech fluent. naming intact. some difficulty following commands to cross midline  Cranial Nerves:   PERRL, EOMI, VFF with extinction on the right, no nystagmus.   No facial asymmetry.  Tongue midline.     Strength:       [] Upper extremity                      Delt       Bicep    Tricep                                                  R         5/5 5/5        5/5       5/5                                               L          5/5        5/5        5/5       5/5    [] Lower extremity                       HF          KE          KF        DF         PF                                               R        5/5 5/5        5/5       5/5       5/5                                               L         5/5 5/5       4+/5       5/5 5/5    Pronator drift: none                 Dysmetria: None to finger-nose-finger    Sensation: intact to light touch, pinprick, vibration and proprioception    Deep Tendon Reflexes:     Biceps          BR        Patellar        Ankle         Babinski                                         R       3+           3+            3+               2+           downgoing                                         L        3+           3+            2+               3+           downgoing    Gait: ambulates without assistance.      Other:    07-14    141  |  103  |  10  ----------------------------<  253<H>  3.8   |  24  |  0.64    Ca    9.5      14 Jul 2019 06:48  Phos  3.3     07-13  Mg     1.6     07-13    TPro  7.7  /  Alb  4.3  /  TBili  < 0.2<L>  /  DBili  x   /  AST  19  /  ALT  18  /  AlkPhos  188<H>  07-14                            9.9    5.93  )-----------( 264      ( 14 Jul 2019 06:48 )             32.9       Radiology    CT:   MRI  EKG:  tele:  TTE:  EEG:

## 2019-07-14 NOTE — ED ADULT NURSE NOTE - CHIEF COMPLAINT QUOTE
pt seen at [facility] yesterday for c/o headache. as per family pt had  shunt removed and has since had severe frequent headaches, hx of seizures. denies fevers, chills. c/o n, v. pt appears very uncomfortable. primarily creole speaking but opting for son to translate.

## 2019-07-14 NOTE — ED CDU PROVIDER DISPOSITION NOTE - CLINICAL COURSE
68 y/o F w/ PMH of nonconvulsive status epilepticus, s/p brain biopsy w/ beta-amyloid Afib, HTN, Hypothyroidism, COPD, diabetes arrived with headache noted to be gradual in onset with now 10/10 in intensity today.  Pt noted to have seizures on Friday and Saturday but no seizure today. The pt is currently on levetiracetam 1g TID and vimpat 150 BID, with which she reports compliance. Last admission at [facility] April 18-May 7 for nonconvulsive status epilpeticus with AMS, s/p biopsy with beta amyloid detected with a course complicated by vasogenic edema s/p decadron course.  Patient was seen and evaluated in the ED with CT head shows still some vasogenic edema, no acute findings. Neuro evaluated patient recommend MRI brain wwo contrast. Pt transferred to CDU for; MRI, neuro following, pain control, vitals q4 and frequent re-evals.  While in CDU, patient was re-evaluated during neuro checks and noted to be altered.  Re-interviewed by BRIJESH Salas and Dr. Gallo at bedside with  noting not oriented to place or date and unable to follow complex commands which was different from previous exam after review of notes. Patient was noted to follow simple commands, but unable to follow two step commands.   Repeat CT head was wnl.  Neurology re-evaluated.  Patient was admitted to medicine for further evaluation given AMS. 68 y/o F w/ PMH of nonconvulsive status epilepticus, s/p brain biopsy w/ beta-amyloid Afib, HTN, Hypothyroidism, COPD, diabetes arrived with headache noted to be gradual in onset with now 10/10 in intensity today.  Pt noted to have seizures on Friday and Saturday but no seizure today. The pt is currently on levetiracetam 1g TID and vimpat 150 BID, with which she reports compliance. Last admission at [facility] April 18-May 7 for nonconvulsive status epilpeticus with AMS, s/p biopsy with beta amyloid detected with a course complicated by vasogenic edema s/p decadron course.  Patient was seen and evaluated in the ED with CT head shows still some vasogenic edema, no acute findings. Neuro evaluated patient recommend MRI brain wwo contrast. Pt transferred to CDU for; MRI, neuro following, pain control, vitals q4 and frequent re-evals.  While in CDU, patient was re-evaluated during neuro checks and noted to be altered.  Re-interviewed by BRIJESH Salas and Dr. Gallo at bedside with  noting not oriented to place (Replied [place]) or date and unable to follow complex commands which was different from previous exam after review of notes. Patient was noted to follow simple commands, but unable to follow two step commands.  Repeat CT head was wnl.  Neurology re-evaluated.  Patient was admitted to medicine for further evaluation given AMS.

## 2019-07-14 NOTE — ED CDU PROVIDER INITIAL DAY NOTE - ATTENDING CONTRIBUTION TO CARE
70 y/o F w/ PMH of nonconvulsive status epilepticus,s/p brain biopsy w/ beta-amyloid Afib, HTN, Hypothyroidism, COPD, diabetes who presented to ed with complaints of headache. L sided headache 10/10 in intensity and associated with multiple episodes of vomiting. yesterday patient was seen in the ED at [facility] for seizure episode. The pt is currently on levetiracetam 1g TID and vimpat 150 BID, with which she reports compliance. last admission at [facility] April 18-May 7 for nonconvulsive status epilpeticus with AMS, s/p biopsy with beta amyloid detected with a course complicated by vasogenic edema s/p decadron course.  in the ed, anemia stable. hyperglycemia stable without dka. CTH with no acute findings, resolution of previously seen vasogenic edema. neuro was consulted, recommended continuing keppra TID and vimpat BID and MRI w/w/o contrast. pt was given keppra/vimpat in the cdu and pain control for her headache. mri w/w/o contrast ordered. spoke to neuro, no decadron at this time.   will continue to monitor pt and frequent neuro exams 70 y/o F w/ PMH of nonconvulsive status epilepticus,s/p brain biopsy w/ beta-amyloid Afib, HTN, Hypothyroidism, COPD, diabetes who presented to ed with complaints of headache. L sided headache 10/10 in intensity, gradual in onset, and constant for the past 3 days. on the first day it was associated with multiple episodes of vomiting, but no vomiting since then. had seizure episode on friday and saturday, none today. yesterday patient was seen in the ED at [facility] for seizure episode. The pt is currently on levetiracetam 1g TID and vimpat 150 BID, with which she reports compliance. last admission at [facility] April 18-May 7 for nonconvulsive status epilpeticus with AMS, s/p biopsy with beta amyloid detected with a course complicated by vasogenic edema s/p decadron course.  denies fevers, photophobia, neck pain or stiffness, blurry vision, weakness/numnbess, gait abnormalities, abd pain.   PE nontoxic, nad, lungs cta. abd soft and NT. full rom of neck without pain or stiffness. negative kernig/brudzinski. NEURO: pupils 3 mm, PERRL bl, EOMI bl, CN2-12 intact, finger to nose test nl bilat, normal heel-shin test bl, negative pronator drift bilat, speech is clear without dysarthria; 5/5 motor strength BUE and BLE; sensation intact to light touch BUE and BLE; normal gait with no ataxia  in the ed, anemia stable. hyperglycemia stable without dka. CTH with no acute findings, resolution of previously seen vasogenic edema. neuro was consulted, recommended continuing keppra TID and vimpat BID and MRI w/w/o contrast. pt was given keppra/vimpat in the cdu and pain control for her headache. mri w/w/o contrast ordered. spoke to neuro, no decadron at this time. patient hasn't taken any of her BP meds today, she was prescribed these for her htn.  will continue to monitor pt and frequent neuro exams and f/u MRI

## 2019-07-14 NOTE — ED PROVIDER NOTE - CLINICAL SUMMARY MEDICAL DECISION MAKING FREE TEXT BOX
68 y/o F w/ PMH of nonconvulsive status epilepticus, s/p brain biopsy w/ beta-amyloid, Afib, HTN, Hypothyroidism, COPD, diabetes who presents with a complaint of headache. CT head non-con, keppra level, neuro consult, CBC, CMP, tylenol for pain

## 2019-07-14 NOTE — ED PROVIDER NOTE - OBJECTIVE STATEMENT
68 y/o F w/ PMH of nonconvulsive status epilepticus,s/p brain biopsy w/ beta-amyloid Afib, HTN, Hypothyroidism, COPD, diabetes who presents with a complaint of headache. The headache is located on the L side is 10/10 in intensity and associated with multiple episodes of vomiting. The pt reports yesterday she had an hour long episode of shaking of her extremities during which she was awake with no fecal or urinary incontinence. The pt is currently on levetiracetam 1g TID and vimpat 150 BID, with which she reports compliance. Pt recently admitted April 18-May 7 for nonconvulsive status epilpeticus with AMS, s/p biopsy with beta amyloid detected with a course complicated by vasogenic edema s/p decadron course.  # [id number]    70 y/o F w/ PMH of nonconvulsive status epilepticus,s/p brain biopsy w/ beta-amyloid Afib, HTN, Hypothyroidism, COPD, diabetes who presents with a complaint of headache. The headache is located on the L side is 10/10 in intensity and associated with multiple episodes of vomiting. The pt reports yesterday she had an hour long episode of shaking of her extremities during which she was awake with no fecal or urinary incontinence. The pt is currently on levetiracetam 1g TID and vimpat 150 BID, with which she reports compliance. Pt recently admitted to [facility] April 18-May 7 for nonconvulsive status epilpeticus with AMS, s/p biopsy with beta amyloid detected with a course complicated by vasogenic edema s/p decadron course.

## 2019-07-14 NOTE — ED PROVIDER NOTE - PHYSICAL EXAMINATION
PHYSICAL EXAM:    GENERAL: Comfortable, no acute distress   HEAD:  Normocephalic, atraumatic  HEENT: Moist mucous membranes  NECK: Supple, No JVD  NERVOUS SYSTEM:  Alert & Oriented X3, Motor Strength 5/5 B/L upper and lower extremities, PERRLA, EOMI intact, neuro exam non-focal  CHEST/LUNG: Clear to auscultation bilaterally  HEART: Regular rate and rhythm, no murmur   ABDOMEN: Soft, Nontender, Nondistended, Bowel sounds present  EXTREMITIES:   No clubbing, cyanosis, or edema  MUSCULOSKELTAL- No muscle tenderness, no joint tenderness  SKIN- warm and dry, no rash

## 2019-07-14 NOTE — ED CDU PROVIDER INITIAL DAY NOTE - PHYSICAL EXAMINATION
Vital signs reviewed.   CONSTITUTIONAL: Well-appearing; obese ; in no apparent distress. Non-toxic appearing.   HEAD: Normocephalic, atraumatic. No crepitus or step offs.   EYES: PERRL, EOM intact, conjunctiva and sclera WNL.  ENT: normal nose; no rhinorrhea; normal pharynx with no tonsillar hypertrophy, no erythema, no exudate, no lymphadenopathy.  NECK/LYMPH: Supple; non-tender; no cervical lymphadenopathy. No meningeal signs   CARD: Normal S1, S2; no murmurs, rubs, or gallops noted.  RESP: Normal chest excursion with respiration; breath sounds clear and equal bilaterally; no wheezes, rhonchi, or rales.  EXT/MS: moves all extremities; distal pulses are normal, no pedal edema.  SKIN: Normal for age and race; warm; dry; good turgor; no apparent lesions or exudate noted.  NEURO: Awake, alert, oriented x 3, no gross deficits, , no motor or sensory deficit noted. 5/5 strength UE/LE b/l. No pronator drift. No facial droop or slurred speech.   PSYCH: Normal mood; appropriate affect.

## 2019-07-14 NOTE — ED CDU PROVIDER INITIAL DAY NOTE - OBJECTIVE STATEMENT
MH of nonconvulsive status epilepticus, s/p brain biopsy w/ beta-amyloid Afib, HTN, Hypothyroidism, COPD, diabetes who presents to ED c/o headache. Patient c/o Lt side frontal headache, stating severe pounding pain. Admits to hx of headaches for many years but starting in April headaches became worse, at  that Time patient had new onset of seizures and was admitted to [facility] where biopsy of lesion noted on imaging and dx patient with vasogenic edema. Pt dc home following with Neurologist Dr. Sterling Cosby, daughter states patient saw neurologist this past friday, was suppose to "keep electrodes on for 3 days" but friday afternoon had headache so took off "electrodes" and went to [facility] yesterday for seizure was dc home but continued to have persistent headache so came to ED. Denies neck pain, fevers, chills, cp, sob, dizziness, weakness, changes in vision, trauma/falls, or any other complaints. Pt seen and worked up in ED, CT head shows still some vasogenic edema, no acute findings. Neuro evaluated patient recommend MRI brain wwo contrast. Pt transferred to CDU for; MRI, neuro following, pain control, vitals q4 and frequent re-evals.

## 2019-07-15 DIAGNOSIS — R51 HEADACHE: ICD-10-CM

## 2019-07-15 DIAGNOSIS — I16.0 HYPERTENSIVE URGENCY: ICD-10-CM

## 2019-07-15 DIAGNOSIS — Z29.9 ENCOUNTER FOR PROPHYLACTIC MEASURES, UNSPECIFIED: ICD-10-CM

## 2019-07-15 DIAGNOSIS — R56.9 UNSPECIFIED CONVULSIONS: ICD-10-CM

## 2019-07-15 DIAGNOSIS — I48.91 UNSPECIFIED ATRIAL FIBRILLATION: ICD-10-CM

## 2019-07-15 DIAGNOSIS — I10 ESSENTIAL (PRIMARY) HYPERTENSION: ICD-10-CM

## 2019-07-15 DIAGNOSIS — E11.8 TYPE 2 DIABETES MELLITUS WITH UNSPECIFIED COMPLICATIONS: ICD-10-CM

## 2019-07-15 LAB
ALBUMIN SERPL ELPH-MCNC: 4 G/DL — SIGNIFICANT CHANGE UP (ref 3.3–5)
ALP SERPL-CCNC: 168 U/L — HIGH (ref 40–120)
ALT FLD-CCNC: 12 U/L — SIGNIFICANT CHANGE UP (ref 4–33)
ANION GAP SERPL CALC-SCNC: 13 MMO/L — SIGNIFICANT CHANGE UP (ref 7–14)
AST SERPL-CCNC: 12 U/L — SIGNIFICANT CHANGE UP (ref 4–32)
BILIRUB SERPL-MCNC: 0.2 MG/DL — SIGNIFICANT CHANGE UP (ref 0.2–1.2)
BUN SERPL-MCNC: 9 MG/DL — SIGNIFICANT CHANGE UP (ref 7–23)
CALCIUM SERPL-MCNC: 9.6 MG/DL — SIGNIFICANT CHANGE UP (ref 8.4–10.5)
CHLORIDE SERPL-SCNC: 104 MMOL/L — SIGNIFICANT CHANGE UP (ref 98–107)
CO2 SERPL-SCNC: 27 MMOL/L — SIGNIFICANT CHANGE UP (ref 22–31)
CREAT SERPL-MCNC: 0.8 MG/DL — SIGNIFICANT CHANGE UP (ref 0.5–1.3)
GLUCOSE BLDC GLUCOMTR-MCNC: 140 MG/DL — HIGH (ref 70–99)
GLUCOSE BLDC GLUCOMTR-MCNC: 159 MG/DL — HIGH (ref 70–99)
GLUCOSE BLDC GLUCOMTR-MCNC: 176 MG/DL — HIGH (ref 70–99)
GLUCOSE BLDC GLUCOMTR-MCNC: 188 MG/DL — HIGH (ref 70–99)
GLUCOSE BLDC GLUCOMTR-MCNC: 191 MG/DL — HIGH (ref 70–99)
GLUCOSE BLDC GLUCOMTR-MCNC: 205 MG/DL — HIGH (ref 70–99)
GLUCOSE BLDC GLUCOMTR-MCNC: 229 MG/DL — HIGH (ref 70–99)
GLUCOSE SERPL-MCNC: 169 MG/DL — HIGH (ref 70–99)
HCT VFR BLD CALC: 32.6 % — LOW (ref 34.5–45)
HGB BLD-MCNC: 9.9 G/DL — LOW (ref 11.5–15.5)
LEVETIRACETAM SERPL-MCNC: <2 MCG/ML — LOW (ref 12–46)
MAGNESIUM SERPL-MCNC: 1.8 MG/DL — SIGNIFICANT CHANGE UP (ref 1.6–2.6)
MCHC RBC-ENTMCNC: 22.7 PG — LOW (ref 27–34)
MCHC RBC-ENTMCNC: 30.4 % — LOW (ref 32–36)
MCV RBC AUTO: 74.6 FL — LOW (ref 80–100)
NRBC # FLD: 0 K/UL — SIGNIFICANT CHANGE UP (ref 0–0)
PHOSPHATE SERPL-MCNC: 3.6 MG/DL — SIGNIFICANT CHANGE UP (ref 2.5–4.5)
PLATELET # BLD AUTO: 205 K/UL — SIGNIFICANT CHANGE UP (ref 150–400)
PMV BLD: SIGNIFICANT CHANGE UP FL (ref 7–13)
POTASSIUM SERPL-MCNC: 3.6 MMOL/L — SIGNIFICANT CHANGE UP (ref 3.5–5.3)
POTASSIUM SERPL-SCNC: 3.6 MMOL/L — SIGNIFICANT CHANGE UP (ref 3.5–5.3)
PROT SERPL-MCNC: 7.1 G/DL — SIGNIFICANT CHANGE UP (ref 6–8.3)
RBC # BLD: 4.37 M/UL — SIGNIFICANT CHANGE UP (ref 3.8–5.2)
RBC # FLD: 16.3 % — HIGH (ref 10.3–14.5)
SODIUM SERPL-SCNC: 144 MMOL/L — SIGNIFICANT CHANGE UP (ref 135–145)
WBC # BLD: 5.95 K/UL — SIGNIFICANT CHANGE UP (ref 3.8–10.5)
WBC # FLD AUTO: 5.95 K/UL — SIGNIFICANT CHANGE UP (ref 3.8–10.5)

## 2019-07-15 PROCEDURE — 99223 1ST HOSP IP/OBS HIGH 75: CPT | Mod: GC

## 2019-07-15 PROCEDURE — 99232 SBSQ HOSP IP/OBS MODERATE 35: CPT | Mod: GC

## 2019-07-15 RX ORDER — DEXTROSE 50 % IN WATER 50 %
12.5 SYRINGE (ML) INTRAVENOUS ONCE
Refills: 0 | Status: DISCONTINUED | OUTPATIENT
Start: 2019-07-15 | End: 2019-07-20

## 2019-07-15 RX ORDER — LABETALOL HCL 100 MG
100 TABLET ORAL THREE TIMES A DAY
Refills: 0 | Status: DISCONTINUED | OUTPATIENT
Start: 2019-07-15 | End: 2019-07-20

## 2019-07-15 RX ORDER — DEXTROSE 50 % IN WATER 50 %
15 SYRINGE (ML) INTRAVENOUS ONCE
Refills: 0 | Status: DISCONTINUED | OUTPATIENT
Start: 2019-07-15 | End: 2019-07-20

## 2019-07-15 RX ORDER — INSULIN LISPRO 100/ML
VIAL (ML) SUBCUTANEOUS
Refills: 0 | Status: DISCONTINUED | OUTPATIENT
Start: 2019-07-15 | End: 2019-07-20

## 2019-07-15 RX ORDER — INSULIN GLARGINE 100 [IU]/ML
10 INJECTION, SOLUTION SUBCUTANEOUS ONCE
Refills: 0 | Status: COMPLETED | OUTPATIENT
Start: 2019-07-15 | End: 2019-07-15

## 2019-07-15 RX ORDER — INSULIN GLARGINE 100 [IU]/ML
18 INJECTION, SOLUTION SUBCUTANEOUS ONCE
Refills: 0 | Status: DISCONTINUED | OUTPATIENT
Start: 2019-07-15 | End: 2019-07-15

## 2019-07-15 RX ORDER — DEXTROSE 50 % IN WATER 50 %
25 SYRINGE (ML) INTRAVENOUS ONCE
Refills: 0 | Status: DISCONTINUED | OUTPATIENT
Start: 2019-07-15 | End: 2019-07-20

## 2019-07-15 RX ORDER — ACETAMINOPHEN 500 MG
650 TABLET ORAL EVERY 6 HOURS
Refills: 0 | Status: DISCONTINUED | OUTPATIENT
Start: 2019-07-15 | End: 2019-07-20

## 2019-07-15 RX ORDER — INSULIN LISPRO 100/ML
VIAL (ML) SUBCUTANEOUS AT BEDTIME
Refills: 0 | Status: DISCONTINUED | OUTPATIENT
Start: 2019-07-15 | End: 2019-07-20

## 2019-07-15 RX ORDER — LEVOTHYROXINE SODIUM 125 MCG
50 TABLET ORAL DAILY
Refills: 0 | Status: DISCONTINUED | OUTPATIENT
Start: 2019-07-15 | End: 2019-07-20

## 2019-07-15 RX ORDER — APIXABAN 2.5 MG/1
5 TABLET, FILM COATED ORAL EVERY 12 HOURS
Refills: 0 | Status: DISCONTINUED | OUTPATIENT
Start: 2019-07-15 | End: 2019-07-20

## 2019-07-15 RX ORDER — INSULIN LISPRO 100/ML
5 VIAL (ML) SUBCUTANEOUS
Refills: 0 | Status: DISCONTINUED | OUTPATIENT
Start: 2019-07-15 | End: 2019-07-20

## 2019-07-15 RX ORDER — QUETIAPINE FUMARATE 200 MG/1
12.5 TABLET, FILM COATED ORAL AT BEDTIME
Refills: 0 | Status: DISCONTINUED | OUTPATIENT
Start: 2019-07-15 | End: 2019-07-20

## 2019-07-15 RX ORDER — SODIUM CHLORIDE 9 MG/ML
1000 INJECTION, SOLUTION INTRAVENOUS
Refills: 0 | Status: DISCONTINUED | OUTPATIENT
Start: 2019-07-15 | End: 2019-07-20

## 2019-07-15 RX ORDER — GLUCAGON INJECTION, SOLUTION 0.5 MG/.1ML
1 INJECTION, SOLUTION SUBCUTANEOUS ONCE
Refills: 0 | Status: DISCONTINUED | OUTPATIENT
Start: 2019-07-15 | End: 2019-07-20

## 2019-07-15 RX ORDER — INSULIN GLARGINE 100 [IU]/ML
18 INJECTION, SOLUTION SUBCUTANEOUS AT BEDTIME
Refills: 0 | Status: DISCONTINUED | OUTPATIENT
Start: 2019-07-15 | End: 2019-07-20

## 2019-07-15 RX ADMIN — Medication 650 MILLIGRAM(S): at 13:34

## 2019-07-15 RX ADMIN — APIXABAN 5 MILLIGRAM(S): 2.5 TABLET, FILM COATED ORAL at 17:46

## 2019-07-15 RX ADMIN — Medication 650 MILLIGRAM(S): at 23:45

## 2019-07-15 RX ADMIN — Medication 2: at 17:46

## 2019-07-15 RX ADMIN — Medication 650 MILLIGRAM(S): at 14:30

## 2019-07-15 RX ADMIN — Medication 5 UNIT(S): at 08:32

## 2019-07-15 RX ADMIN — APIXABAN 5 MILLIGRAM(S): 2.5 TABLET, FILM COATED ORAL at 06:13

## 2019-07-15 RX ADMIN — LACOSAMIDE 130 MILLIGRAM(S): 50 TABLET ORAL at 17:46

## 2019-07-15 RX ADMIN — Medication 100 MILLIGRAM(S): at 06:13

## 2019-07-15 RX ADMIN — Medication 5 UNIT(S): at 17:45

## 2019-07-15 RX ADMIN — Medication 0.1 MILLIGRAM(S): at 17:46

## 2019-07-15 RX ADMIN — LEVETIRACETAM 400 MILLIGRAM(S): 250 TABLET, FILM COATED ORAL at 06:13

## 2019-07-15 RX ADMIN — LEVETIRACETAM 400 MILLIGRAM(S): 250 TABLET, FILM COATED ORAL at 13:34

## 2019-07-15 RX ADMIN — Medication 650 MILLIGRAM(S): at 06:14

## 2019-07-15 RX ADMIN — INSULIN GLARGINE 18 UNIT(S): 100 INJECTION, SOLUTION SUBCUTANEOUS at 22:39

## 2019-07-15 RX ADMIN — Medication 650 MILLIGRAM(S): at 07:15

## 2019-07-15 RX ADMIN — Medication 4: at 08:32

## 2019-07-15 RX ADMIN — Medication 0.1 MILLIGRAM(S): at 06:13

## 2019-07-15 RX ADMIN — INSULIN GLARGINE 10 UNIT(S): 100 INJECTION, SOLUTION SUBCUTANEOUS at 06:12

## 2019-07-15 RX ADMIN — LACOSAMIDE 130 MILLIGRAM(S): 50 TABLET ORAL at 08:16

## 2019-07-15 RX ADMIN — Medication 100 MILLIGRAM(S): at 22:39

## 2019-07-15 RX ADMIN — LEVETIRACETAM 400 MILLIGRAM(S): 250 TABLET, FILM COATED ORAL at 22:39

## 2019-07-15 RX ADMIN — Medication 50 MICROGRAM(S): at 06:13

## 2019-07-15 RX ADMIN — Medication 5 UNIT(S): at 12:39

## 2019-07-15 RX ADMIN — QUETIAPINE FUMARATE 12.5 MILLIGRAM(S): 200 TABLET, FILM COATED ORAL at 22:38

## 2019-07-15 RX ADMIN — Medication 650 MILLIGRAM(S): at 22:40

## 2019-07-15 RX ADMIN — Medication 100 MILLIGRAM(S): at 13:34

## 2019-07-15 NOTE — H&P ADULT - NSHPLABSRESULTS_GEN_ALL_CORE
CBC Full  -  ( 14 Jul 2019 06:48 )  WBC Count : 5.93 K/uL  RBC Count : 4.39 M/uL  Hemoglobin : 9.9 g/dL  Hematocrit : 32.9 %  Platelet Count - Automated : 264 K/uL  Mean Cell Volume : 74.9 fL  Mean Cell Hemoglobin : 22.6 pg  Mean Cell Hemoglobin Concentration : 30.1 %  Auto Neutrophil # : 3.33 K/uL  Auto Lymphocyte # : 2.10 K/uL  Auto Monocyte # : 0.38 K/uL  Auto Eosinophil # : 0.07 K/uL  Auto Basophil # : 0.04 K/uL  Auto Neutrophil % : 56.1 %  Auto Lymphocyte % : 35.4 %  Auto Monocyte % : 6.4 %  Auto Eosinophil % : 1.2 %  Auto Basophil % : 0.7 %    07-14    141  |  103  |  10  ----------------------------<  253<H>  3.8   |  24  |  0.64    Ca    9.5      14 Jul 2019 06:48    TPro  7.7  /  Alb  4.3  /  TBili  < 0.2<L>  /  DBili  x   /  AST  19  /  ALT  18  /  AlkPhos  188<H>  07-14 CBC Full  -  ( 14 Jul 2019 06:48 )  WBC Count : 5.93 K/uL  RBC Count : 4.39 M/uL  Hemoglobin : 9.9 g/dL  Hematocrit : 32.9 %  Platelet Count - Automated : 264 K/uL  Mean Cell Volume : 74.9 fL  Mean Cell Hemoglobin : 22.6 pg  Mean Cell Hemoglobin Concentration : 30.1 %  Auto Neutrophil # : 3.33 K/uL  Auto Lymphocyte # : 2.10 K/uL  Auto Monocyte # : 0.38 K/uL  Auto Eosinophil # : 0.07 K/uL  Auto Basophil # : 0.04 K/uL  Auto Neutrophil % : 56.1 %  Auto Lymphocyte % : 35.4 %  Auto Monocyte % : 6.4 %  Auto Eosinophil % : 1.2 %  Auto Basophil % : 0.7 %    07-14    141  |  103  |  10  ----------------------------<  253<H>  3.8   |  24  |  0.64    Ca    9.5      14 Jul 2019 06:48    TPro  7.7  /  Alb  4.3  /  TBili  < 0.2<L>  /  DBili  x   /  AST  19  /  ALT  18  /  AlkPhos  188<H>  07-14    MRI on 7/14:   The nonspecific edema pattern in the left temporal occipital and inferior   parietal regions has substantially decreased compared to 04/19/2019 MRI   study.    Evolving postsurgical-postbiopsy changes as described. CBC Full  -  ( 14 Jul 2019 06:48 )  WBC Count : 5.93 K/uL  RBC Count : 4.39 M/uL  Hemoglobin : 9.9 g/dL  Hematocrit : 32.9 %  Platelet Count - Automated : 264 K/uL  Mean Cell Volume : 74.9 fL  Mean Cell Hemoglobin : 22.6 pg  Mean Cell Hemoglobin Concentration : 30.1 %  Auto Neutrophil # : 3.33 K/uL  Auto Lymphocyte # : 2.10 K/uL  Auto Monocyte # : 0.38 K/uL  Auto Eosinophil # : 0.07 K/uL  Auto Basophil # : 0.04 K/uL  Auto Neutrophil % : 56.1 %  Auto Lymphocyte % : 35.4 %  Auto Monocyte % : 6.4 %  Auto Eosinophil % : 1.2 %  Auto Basophil % : 0.7 %    07-14    141  |  103  |  10  ----------------------------<  253<H>  3.8   |  24  |  0.64    Ca    9.5      14 Jul 2019 06:48    TPro  7.7  /  Alb  4.3  /  TBili  < 0.2<L>  /  DBili  x   /  AST  19  /  ALT  18  /  AlkPhos  188<H>  07-14    MRI on 7/14:   The nonspecific edema pattern in the left temporal occipital and inferior   parietal regions has substantially decreased compared to 04/19/2019 MRI   study.    Evolving postsurgical-postbiopsy changes as described.    CTH:  No acute intracranial hemorrhage.  Evolution of postoperative change along the left parietal and occipital   lobe with resolution of parenchymal vasogenic edema. There is volume loss   and gliosis now seen in the left parietal and occipital lobes.

## 2019-07-15 NOTE — H&P ADULT - PROBLEM SELECTOR PLAN 1
Patient with headaches, in the setting of vasogenic edema on MR brain, and known seizure disorder. Also reports vomiting.  Neurology was consulted. Recommended continuing anti-seizure medications. They are c/f infiltrating neoplasm. Patient with headaches, in the setting of vasogenic edema on MR brain, and known seizure disorder. Also reports vomiting.  Neurology was consulted. Recommended continuing anti-seizure medications, there is c/f an infiltrating neoplasm, since Alzheimer's does not cause vasogenic edema.

## 2019-07-15 NOTE — H&P ADULT - PROBLEM SELECTOR PLAN 4
Patient's SBP was up to 202 overnight. Will keep goal SBP>150.  Patient is on enalapril, labetalol and hydralazine. Will start enalapril with strict hold parameters. Patient's SBP was up to 202 overnight.  Patient appears to have difficult to control BP. Home medicaitons include: enalapril, labetalol and hydralazine, amlodipine and Klodine.  - Goal BP for next 24 hours is SBP >150s.   - Will start labetaolol 100 tid (home dose is 200 tid) and clonidine.  - Holding remaining anti-HTN medications. Please reintroduce when appropriate.

## 2019-07-15 NOTE — H&P ADULT - HISTORY OF PRESENT ILLNESS
69 F w/ PMH of nonconvulsive status epilepticus,s/p brain biopsy w/ beta-amyloid Afib, HTN, Hypothyroidism, COPD, diabetes who presents with a complaint of headache. The headache is located on the L side is 10/10 in intensity and associated with multiple episodes of vomiting. The pt reports yesterday she had an hour long episode of shaking of her extremities during which she was awake with no fecal or urinary incontinence. The pt is currently on levetiracetam 1g TID and vimpat 150 BID, with which she reports compliance. Pt recently admitted to [facility] April 18-May 7 for nonconvulsive status epilpeticus with AMS, s/p biopsy with beta amyloid detected with a course complicated by vasogenic edema s/p decadron course. 69 F w/ PMH of nonconvulsive status epilepticus s/p brain biopsy w/ beta-amyloid, Afib (was on eliquis), HTN, Hypothyroidism, COPD, diabetes who presents with a complaint of headache for last 3 days. Headache is on L temporal region, 10/10 intensity, associated with nausea, photophobia and few episodes of vomiting. Patient states she was shaking her extremities for an hour at home yesterday; denies urinary, fecal incontinence or tongue biting.   She's currently on Keppra 1g TID and vimpat 150 BID and states she's taking medications as prescribed. Patient was admitted to [facility] until 5/2019, found to have nonconvulsive status epilepticus with AMS, s/p biopsy showing beta amyloid, course c/b vasogenic edema requiring decadron course. Patient was observed in CDU today. CTH showed improvement in vasogenic edema compared to CTH in 5/2019. Neurology saw patient and recommended MRI.  During her ED stay, patient became significantly lethargic, which was attribute to rapid drop in SBP from 200s to 139. When I examined patient, she was more alert and answering questions somewhat appropriately, but still providing inconsistent history. 69 F w/ PMH of nonconvulsive status epilepticus s/p brain biopsy w/ beta-amyloid, Afib, HTN, Hypothyroidism, COPD, diabetes who presents with a complaint of headache for last 3 days. Headache is on L temporal region, 10/10 intensity, associated with nausea, photophobia and few episodes of vomiting. Patient states she was shaking her extremities for an hour at home yesterday; denies urinary, fecal incontinence or tongue biting.   She's currently on Keppra 1g TID and vimpat 150 BID and states she's taking medications as prescribed. Patient was admitted to [facility] until 5/2019, found to have nonconvulsive status epilepticus with AMS, s/p biopsy showing beta amyloid, course c/b vasogenic edema requiring decadron course. Patient was observed in CDU today. CTH showed improvement in vasogenic edema compared to imaging from 4/2019. Neurology saw patient and recommended MRI (which also showed improvement in vasogenic edema).  During her ED stay, patient became significantly lethargic, which was attributed to rapid drop in SBP from 200s to 139. When I examined patient, she was more alert and answering questions somewhat appropriately, but still providing inconsistent history. 69 F w/ PMH of nonconvulsive status epilepticus s/p brain biopsy w/ beta-amyloid, Afib, HTN, Hypothyroidism, COPD, diabetes, ?Alzheimer's, who presents with a complaint of headache for last 3 days. Headache is on L temporal region, 10/10 intensity, associated with nausea, photophobia and few episodes of vomiting. Patient states she was shaking her extremities for an hour at home yesterday; denies urinary, fecal incontinence or tongue biting.   She's currently on Keppra 1g TID and vimpat 150 BID and states she's taking medications as prescribed. Patient was admitted to [facility] until 5/2019, found to have nonconvulsive status epilepticus with AMS, s/p biopsy showing beta amyloid, course c/b vasogenic edema requiring decadron course. Patient was observed in CDU today. CTH showed improvement in vasogenic edema compared to imaging from 4/2019. Neurology saw patient and recommended MRI (which also showed improvement in vasogenic edema).  During her ED stay, patient became significantly lethargic, which was attributed to rapid drop in SBP from 200s to 139. When I examined patient, she was more alert and answering questions somewhat appropriately, but still providing inconsistent history.

## 2019-07-15 NOTE — PROGRESS NOTE ADULT - ASSESSMENT
69 F w/ PMH of nonconvulsive status epilepticus s/p brain biopsy w/ beta-amyloid, Afib, HTN, Hypothyroidism, COPD, diabetes who presents with a complaint of headache for last 3 days, admitted to monitor for further seizure activity.

## 2019-07-15 NOTE — H&P ADULT - PROBLEM SELECTOR PLAN 2
Patient with generalized headaches, but improvement in vasogenic edema.  - Will continue current Vimpat and Keppra doses. Patient with known history of generalized seizure disorder, new headaches, but imagine shows improvement in vasogenic edema.  - Will continue current Vimpat and Keppra doses.  - F/u Keppra level

## 2019-07-15 NOTE — H&P ADULT - ASSESSMENT
69 F w/ PMH of nonconvulsive status epilepticus s/p brain biopsy w/ beta-amyloid, Afib (was on eliquis), HTN, Hypothyroidism, COPD, diabetes who presents with a complaint of headache for last 3 days, admitted to monitor for further seizure activity. 69 F w/ PMH of nonconvulsive status epilepticus s/p brain biopsy w/ beta-amyloid, Afib, HTN, Hypothyroidism, COPD, diabetes who presents with a complaint of headache for last 3 days, admitted to monitor for further seizure activity.

## 2019-07-15 NOTE — H&P ADULT - RS GEN PE MLT RESP DETAILS PC
good air movement/respirations non-labored/no wheezes/clear to auscultation bilaterally/no rales/no rhonchi/breath sounds equal

## 2019-07-15 NOTE — H&P ADULT - ATTENDING COMMENTS
Pt seen and examined. 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 Pt had a CT head and MRI that showed interval improvement in vasogenic edema and was being monitored in the CDU. She became hypertensive to the 200s and was treated with multiple anti-hypertensive agents at once as well as benadryl and morphine with resulting AMS. As per CDU staff, pt became lethargic but arousable to verbal stimuli. Repeat CT head was unchanged, no seizure activity noted. On my exam, pt awake and alert. AOx2 (knows name and that she is in a hospital but did not know it was [facility]). Neuro exam grossly intact. Pt reporting improvement in headache, denies photophobia or nuchal rigidity. Suspect acute mental status change was in the setting of rapid fluctuations in pressure. Pt on an extensive HTN regimen. For now will continue clonidine and labetalol, can slowly introduce the rest of her BP meds as needed. Pt also reports seizure activity, (poor historian) reports compliance with meds. Check keppra level, may benefit from repeat EEG. Follow up neuro recs. Would clarify with neurosurgery/neurology if pt should still be on decadron.

## 2019-07-15 NOTE — PROGRESS NOTE ADULT - ASSESSMENT
Impression: MRI brain w/ and w/o contrast showed improvement in left temporal region. Temporal arteritis is on differential, though less likely.    Plan:  -ESR and CRP  -c/w keppra 1 g TID  -c/w vimpat 150 mg BID Impression: MRI brain w/ and w/o contrast showed improvement in left temporal region. Temporal arteritis is on differential, though less likely.    Plan:    -ESR and CRP  -c/w keppra 1 g TID  -c/w vimpat 150 mg BID  -Continue medical mgt and supportive care and for headache mgt may use magnesium Sulfate 1 gram as needed and conservative measures  -All questions answered and patient and daughter understood plan and are willing to comply

## 2019-07-15 NOTE — H&P ADULT - PROBLEM SELECTOR PLAN 3
Patient is on lantus 22 units and humalog 8 units tid at home (per d/c in 5/2019)  - Will start on 18 units lantus and 5 units tid humalog, with SSI.

## 2019-07-16 LAB
ANION GAP SERPL CALC-SCNC: 15 MMO/L — HIGH (ref 7–14)
BUN SERPL-MCNC: 14 MG/DL — SIGNIFICANT CHANGE UP (ref 7–23)
CALCIUM SERPL-MCNC: 9.2 MG/DL — SIGNIFICANT CHANGE UP (ref 8.4–10.5)
CHLORIDE SERPL-SCNC: 101 MMOL/L — SIGNIFICANT CHANGE UP (ref 98–107)
CO2 SERPL-SCNC: 23 MMOL/L — SIGNIFICANT CHANGE UP (ref 22–31)
CREAT SERPL-MCNC: 0.69 MG/DL — SIGNIFICANT CHANGE UP (ref 0.5–1.3)
CRP SERPL-MCNC: 4.9 MG/L — SIGNIFICANT CHANGE UP
ERYTHROCYTE [SEDIMENTATION RATE] IN BLOOD: 31 MM/HR — HIGH (ref 4–25)
GLUCOSE BLDC GLUCOMTR-MCNC: 171 MG/DL — HIGH (ref 70–99)
GLUCOSE BLDC GLUCOMTR-MCNC: 208 MG/DL — HIGH (ref 70–99)
GLUCOSE BLDC GLUCOMTR-MCNC: 256 MG/DL — HIGH (ref 70–99)
GLUCOSE BLDC GLUCOMTR-MCNC: 279 MG/DL — HIGH (ref 70–99)
GLUCOSE SERPL-MCNC: 345 MG/DL — HIGH (ref 70–99)
HBA1C BLD-MCNC: 10.6 % — HIGH (ref 4–5.6)
HCT VFR BLD CALC: 29.1 % — LOW (ref 34.5–45)
HGB BLD-MCNC: 8.8 G/DL — LOW (ref 11.5–15.5)
MAGNESIUM SERPL-MCNC: 2.1 MG/DL — SIGNIFICANT CHANGE UP (ref 1.6–2.6)
MAGNESIUM SERPL-MCNC: 7 MG/DL — CRITICAL HIGH (ref 1.6–2.6)
MCHC RBC-ENTMCNC: 22.2 PG — LOW (ref 27–34)
MCHC RBC-ENTMCNC: 30.2 % — LOW (ref 32–36)
MCV RBC AUTO: 73.5 FL — LOW (ref 80–100)
NRBC # FLD: 0 K/UL — SIGNIFICANT CHANGE UP (ref 0–0)
PLATELET # BLD AUTO: 215 K/UL — SIGNIFICANT CHANGE UP (ref 150–400)
PMV BLD: 11.1 FL — SIGNIFICANT CHANGE UP (ref 7–13)
POTASSIUM SERPL-MCNC: 3.4 MMOL/L — LOW (ref 3.5–5.3)
POTASSIUM SERPL-SCNC: 3.4 MMOL/L — LOW (ref 3.5–5.3)
RBC # BLD: 3.96 M/UL — SIGNIFICANT CHANGE UP (ref 3.8–5.2)
RBC # FLD: 15.9 % — HIGH (ref 10.3–14.5)
SODIUM SERPL-SCNC: 139 MMOL/L — SIGNIFICANT CHANGE UP (ref 135–145)
WBC # BLD: 4.77 K/UL — SIGNIFICANT CHANGE UP (ref 3.8–10.5)
WBC # FLD AUTO: 4.77 K/UL — SIGNIFICANT CHANGE UP (ref 3.8–10.5)

## 2019-07-16 RX ORDER — MAGNESIUM SULFATE 500 MG/ML
1 VIAL (ML) INJECTION ONCE
Refills: 0 | Status: COMPLETED | OUTPATIENT
Start: 2019-07-16 | End: 2019-07-16

## 2019-07-16 RX ORDER — IBUPROFEN 200 MG
400 TABLET ORAL ONCE
Refills: 0 | Status: COMPLETED | OUTPATIENT
Start: 2019-07-16 | End: 2019-07-16

## 2019-07-16 RX ORDER — POTASSIUM CHLORIDE 20 MEQ
40 PACKET (EA) ORAL EVERY 4 HOURS
Refills: 0 | Status: COMPLETED | OUTPATIENT
Start: 2019-07-16 | End: 2019-07-16

## 2019-07-16 RX ADMIN — Medication 400 MILLIGRAM(S): at 11:35

## 2019-07-16 RX ADMIN — Medication 400 MILLIGRAM(S): at 18:15

## 2019-07-16 RX ADMIN — Medication 2: at 08:29

## 2019-07-16 RX ADMIN — Medication 5 UNIT(S): at 17:20

## 2019-07-16 RX ADMIN — APIXABAN 5 MILLIGRAM(S): 2.5 TABLET, FILM COATED ORAL at 06:09

## 2019-07-16 RX ADMIN — QUETIAPINE FUMARATE 12.5 MILLIGRAM(S): 200 TABLET, FILM COATED ORAL at 22:06

## 2019-07-16 RX ADMIN — Medication 6: at 12:08

## 2019-07-16 RX ADMIN — LACOSAMIDE 130 MILLIGRAM(S): 50 TABLET ORAL at 17:20

## 2019-07-16 RX ADMIN — Medication 400 MILLIGRAM(S): at 17:20

## 2019-07-16 RX ADMIN — Medication 100 MILLIGRAM(S): at 06:10

## 2019-07-16 RX ADMIN — Medication 5 UNIT(S): at 12:08

## 2019-07-16 RX ADMIN — Medication 40 MILLIEQUIVALENT(S): at 10:17

## 2019-07-16 RX ADMIN — Medication 0.1 MILLIGRAM(S): at 17:20

## 2019-07-16 RX ADMIN — Medication 650 MILLIGRAM(S): at 10:01

## 2019-07-16 RX ADMIN — Medication 100 MILLIGRAM(S): at 22:07

## 2019-07-16 RX ADMIN — Medication 2: at 22:05

## 2019-07-16 RX ADMIN — Medication 4: at 17:20

## 2019-07-16 RX ADMIN — Medication 100 MILLIGRAM(S): at 13:03

## 2019-07-16 RX ADMIN — APIXABAN 5 MILLIGRAM(S): 2.5 TABLET, FILM COATED ORAL at 17:20

## 2019-07-16 RX ADMIN — INSULIN GLARGINE 18 UNIT(S): 100 INJECTION, SOLUTION SUBCUTANEOUS at 22:06

## 2019-07-16 RX ADMIN — Medication 40 MILLIEQUIVALENT(S): at 13:06

## 2019-07-16 RX ADMIN — Medication 400 MILLIGRAM(S): at 10:48

## 2019-07-16 RX ADMIN — Medication 5 UNIT(S): at 08:28

## 2019-07-16 RX ADMIN — LEVETIRACETAM 400 MILLIGRAM(S): 250 TABLET, FILM COATED ORAL at 22:07

## 2019-07-16 RX ADMIN — Medication 650 MILLIGRAM(S): at 09:07

## 2019-07-16 RX ADMIN — Medication 50 MICROGRAM(S): at 06:10

## 2019-07-16 RX ADMIN — LEVETIRACETAM 400 MILLIGRAM(S): 250 TABLET, FILM COATED ORAL at 13:04

## 2019-07-16 RX ADMIN — Medication 100 GRAM(S): at 06:18

## 2019-07-16 RX ADMIN — Medication 0.1 MILLIGRAM(S): at 06:09

## 2019-07-16 RX ADMIN — LEVETIRACETAM 400 MILLIGRAM(S): 250 TABLET, FILM COATED ORAL at 07:28

## 2019-07-16 RX ADMIN — LACOSAMIDE 130 MILLIGRAM(S): 50 TABLET ORAL at 06:10

## 2019-07-16 NOTE — CHART NOTE - NSCHARTNOTEFT_GEN_A_CORE
Patient complaining of 10/10 headache without visual or neurological changes at this time.  As per Neuro for headaches may use magnesium sulfate 1 gram as needed.  Magnesium ordered, continue to monitor.

## 2019-07-16 NOTE — ED POST DISCHARGE NOTE - DETAILS
spoke with patient and advised of low keppra level, states she is currently hospitalized - Nancy Selby PA-C

## 2019-07-17 DIAGNOSIS — F05 DELIRIUM DUE TO KNOWN PHYSIOLOGICAL CONDITION: ICD-10-CM

## 2019-07-17 LAB
ALBUMIN SERPL ELPH-MCNC: 3.9 G/DL — SIGNIFICANT CHANGE UP (ref 3.3–5)
ALP SERPL-CCNC: 187 U/L — HIGH (ref 40–120)
ALT FLD-CCNC: 24 U/L — SIGNIFICANT CHANGE UP (ref 4–33)
ANION GAP SERPL CALC-SCNC: 12 MMO/L — SIGNIFICANT CHANGE UP (ref 7–14)
AST SERPL-CCNC: 29 U/L — SIGNIFICANT CHANGE UP (ref 4–32)
BILIRUB SERPL-MCNC: < 0.2 MG/DL — LOW (ref 0.2–1.2)
BUN SERPL-MCNC: 12 MG/DL — SIGNIFICANT CHANGE UP (ref 7–23)
CALCIUM SERPL-MCNC: 9.6 MG/DL — SIGNIFICANT CHANGE UP (ref 8.4–10.5)
CHLORIDE SERPL-SCNC: 108 MMOL/L — HIGH (ref 98–107)
CO2 SERPL-SCNC: 25 MMOL/L — SIGNIFICANT CHANGE UP (ref 22–31)
CREAT SERPL-MCNC: 0.64 MG/DL — SIGNIFICANT CHANGE UP (ref 0.5–1.3)
GLUCOSE BLDC GLUCOMTR-MCNC: 165 MG/DL — HIGH (ref 70–99)
GLUCOSE BLDC GLUCOMTR-MCNC: 179 MG/DL — HIGH (ref 70–99)
GLUCOSE BLDC GLUCOMTR-MCNC: 191 MG/DL — HIGH (ref 70–99)
GLUCOSE BLDC GLUCOMTR-MCNC: 226 MG/DL — HIGH (ref 70–99)
GLUCOSE SERPL-MCNC: 129 MG/DL — HIGH (ref 70–99)
HCT VFR BLD CALC: 32.3 % — LOW (ref 34.5–45)
HGB BLD-MCNC: 9.7 G/DL — LOW (ref 11.5–15.5)
LEVETIRACETAM SERPL-MCNC: 32.6 MCG/ML — SIGNIFICANT CHANGE UP (ref 12–46)
MAGNESIUM SERPL-MCNC: 2.1 MG/DL — SIGNIFICANT CHANGE UP (ref 1.6–2.6)
MCHC RBC-ENTMCNC: 22.2 PG — LOW (ref 27–34)
MCHC RBC-ENTMCNC: 30 % — LOW (ref 32–36)
MCV RBC AUTO: 73.9 FL — LOW (ref 80–100)
NRBC # FLD: 0 K/UL — SIGNIFICANT CHANGE UP (ref 0–0)
PHOSPHATE SERPL-MCNC: 4.1 MG/DL — SIGNIFICANT CHANGE UP (ref 2.5–4.5)
PLATELET # BLD AUTO: 245 K/UL — SIGNIFICANT CHANGE UP (ref 150–400)
PMV BLD: 11.2 FL — SIGNIFICANT CHANGE UP (ref 7–13)
POTASSIUM SERPL-MCNC: 3.8 MMOL/L — SIGNIFICANT CHANGE UP (ref 3.5–5.3)
POTASSIUM SERPL-SCNC: 3.8 MMOL/L — SIGNIFICANT CHANGE UP (ref 3.5–5.3)
PROT SERPL-MCNC: 7.3 G/DL — SIGNIFICANT CHANGE UP (ref 6–8.3)
RBC # BLD: 4.37 M/UL — SIGNIFICANT CHANGE UP (ref 3.8–5.2)
RBC # FLD: 16.1 % — HIGH (ref 10.3–14.5)
SODIUM SERPL-SCNC: 145 MMOL/L — SIGNIFICANT CHANGE UP (ref 135–145)
WBC # BLD: 4.61 K/UL — SIGNIFICANT CHANGE UP (ref 3.8–10.5)
WBC # FLD AUTO: 4.61 K/UL — SIGNIFICANT CHANGE UP (ref 3.8–10.5)

## 2019-07-17 PROCEDURE — 70496 CT ANGIOGRAPHY HEAD: CPT | Mod: 26

## 2019-07-17 PROCEDURE — 71045 X-RAY EXAM CHEST 1 VIEW: CPT | Mod: 26

## 2019-07-17 PROCEDURE — 70498 CT ANGIOGRAPHY NECK: CPT | Mod: 26

## 2019-07-17 PROCEDURE — 99233 SBSQ HOSP IP/OBS HIGH 50: CPT | Mod: GC

## 2019-07-17 PROCEDURE — 99223 1ST HOSP IP/OBS HIGH 75: CPT

## 2019-07-17 RX ORDER — IBUPROFEN 200 MG
600 TABLET ORAL EVERY 6 HOURS
Refills: 0 | Status: DISCONTINUED | OUTPATIENT
Start: 2019-07-17 | End: 2019-07-20

## 2019-07-17 RX ADMIN — Medication 600 MILLIGRAM(S): at 16:02

## 2019-07-17 RX ADMIN — Medication 100 MILLIGRAM(S): at 21:22

## 2019-07-17 RX ADMIN — Medication 5 UNIT(S): at 17:48

## 2019-07-17 RX ADMIN — APIXABAN 5 MILLIGRAM(S): 2.5 TABLET, FILM COATED ORAL at 17:52

## 2019-07-17 RX ADMIN — Medication 100 MILLIGRAM(S): at 05:28

## 2019-07-17 RX ADMIN — Medication 600 MILLIGRAM(S): at 07:17

## 2019-07-17 RX ADMIN — Medication 600 MILLIGRAM(S): at 21:27

## 2019-07-17 RX ADMIN — Medication 600 MILLIGRAM(S): at 15:32

## 2019-07-17 RX ADMIN — Medication 2 MILLIGRAM(S): at 13:13

## 2019-07-17 RX ADMIN — Medication 0.5 MILLIGRAM(S): at 10:28

## 2019-07-17 RX ADMIN — Medication 0.1 MILLIGRAM(S): at 05:29

## 2019-07-17 RX ADMIN — Medication 100 MILLIGRAM(S): at 15:31

## 2019-07-17 RX ADMIN — QUETIAPINE FUMARATE 12.5 MILLIGRAM(S): 200 TABLET, FILM COATED ORAL at 21:22

## 2019-07-17 RX ADMIN — Medication 50 MICROGRAM(S): at 05:28

## 2019-07-17 RX ADMIN — LEVETIRACETAM 400 MILLIGRAM(S): 250 TABLET, FILM COATED ORAL at 21:21

## 2019-07-17 RX ADMIN — LACOSAMIDE 130 MILLIGRAM(S): 50 TABLET ORAL at 05:48

## 2019-07-17 RX ADMIN — APIXABAN 5 MILLIGRAM(S): 2.5 TABLET, FILM COATED ORAL at 05:29

## 2019-07-17 RX ADMIN — Medication 0.1 MILLIGRAM(S): at 17:53

## 2019-07-17 RX ADMIN — LEVETIRACETAM 400 MILLIGRAM(S): 250 TABLET, FILM COATED ORAL at 15:19

## 2019-07-17 RX ADMIN — Medication 4: at 17:49

## 2019-07-17 RX ADMIN — Medication 600 MILLIGRAM(S): at 22:34

## 2019-07-17 RX ADMIN — LEVETIRACETAM 400 MILLIGRAM(S): 250 TABLET, FILM COATED ORAL at 06:27

## 2019-07-17 RX ADMIN — INSULIN GLARGINE 18 UNIT(S): 100 INJECTION, SOLUTION SUBCUTANEOUS at 21:21

## 2019-07-17 RX ADMIN — Medication 600 MILLIGRAM(S): at 06:19

## 2019-07-17 RX ADMIN — LACOSAMIDE 130 MILLIGRAM(S): 50 TABLET ORAL at 18:24

## 2019-07-17 NOTE — BEHAVIORAL HEALTH ASSESSMENT NOTE - NSBHCONSULTMEDAGITATION_PSY_A_CORE FT
Ativan 0.5mg PO/IV/IM q6h PRN agitation Ativan prn per neuro for possible NCSE, then if NCSE is ruled out, can use haldol 2mg

## 2019-07-17 NOTE — BEHAVIORAL HEALTH ASSESSMENT NOTE - NSBHCHARTREVIEWINVESTIGATE_PSY_A_CORE FT
< from: 12 Lead ECG (05.02.19 @ 13:55) >    Ventricular Rate 79 BPM    Atrial Rate 79 BPM    P-R Interval 180 ms    QRS Duration 84 ms    Q-T Interval 372 ms    QTC Calculation(Bezet) 426 ms    P Axis 33 degrees    R Axis -15 degrees    T Axis 106 degrees    Diagnosis Line NORMAL SINUS RHYTHM  T WAVE ABNORMALITY, CONSIDER LATERAL ISCHEMIA  ABNORMAL ECG    < end of copied text >

## 2019-07-17 NOTE — BEHAVIORAL HEALTH ASSESSMENT NOTE - NSBHCHARTREVIEWVS_PSY_A_CORE FT
Vital Signs Last 24 Hrs  T(C): 36.6 (17 Jul 2019 11:51), Max: 37 (17 Jul 2019 08:15)  T(F): 97.8 (17 Jul 2019 11:51), Max: 98.6 (17 Jul 2019 08:15)  HR: 69 (17 Jul 2019 11:51) (64 - 71)  BP: 144/72 (17 Jul 2019 11:51) (144/72 - 169/72)  BP(mean): --  RR: 17 (17 Jul 2019 11:51) (17 - 18)  SpO2: 100% (17 Jul 2019 11:51) (97% - 100%)

## 2019-07-17 NOTE — BEHAVIORAL HEALTH ASSESSMENT NOTE - HPI (INCLUDE ILLNESS QUALITY, SEVERITY, DURATION, TIMING, CONTEXT, MODIFYING FACTORS, ASSOCIATED SIGNS AND SYMPTOMS)
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  Pt recently admitted to [facility] April 18-May 7 for nonconvulsive status epilpeticus with AMS, s/p biopsy with beta amyloid detected with a course complicated by vasogenic edema s/p decadron course. Pt seen by neuro, who recommended magnesium for headaches. Low suspicion for temporal arteritis. Psychiatry consulted for agitation, medication management.    This AM, RN staff noticed changes in mental status. At 5 am she was forgetful and at 6 am was not answering questions or following commands. Pt was awake, alert but just repeating no, no. Pt moving all extremities, with intact strength of b/l upper extremities. Code Stroke was called and pt received CTH, CT angio which revealed no acute hemorrhage, stable areas of nonspecific hypodensity in the left temporal and occipital lobes noted on MRI 7/14, no vessel occlusion. Per neuro, pt with global aphasia along with decreased blink to threat on the right, most likely 2/2 seizure with seizure focus most likely the left temporal-occipital and inferior parietal lesion, which may have spreading to anterior frontal lobe, giving both receptive and expressive aphasia. Aphasia could be part of continued seizure activity (nonconvulsive status epilepticus) or post-ictal state. Need to r/o nonconvulsive status epilepticus. She will need continuous EEG monitoring and AED titrations.    Upon return to unit, patient became agitated; she was screaming, refusing to sit, refusing telemetry. Per neuro, concern for post-ictal agitation psychosis so pt received ativan 0.5mg IV x 1 with good effect. Though pt was speaking English yesterday, she is now only speaking Creole and mostly making nonsensical statements.    On assessment, pt is calm, laying in bed NAD, but not able to answer any questions. She is speaking in Creole, and son at bedside states she is talking about bills she needs to pay and repeatedly asking "do you understand me," but otherwise mumbling and not appearing to make sense. Pt not able to answer orientation questions or follow commands. She appears anxious, tugging at writer's hand. Moving all extremities spontaneously, no focal neuro deficits noted.    Collateral obtained from son Francis at bedside. He notes that prior to admission, she was completely functional, walking and talking (in both english and creole). Family assists with ADLs due to concern for sedation/falls from medication. However, pt attends [employer] and is otherwise active/social. She has no previous psych history. No substance use. Family has no safety concerns, as pt has never expressed SI.

## 2019-07-17 NOTE — BEHAVIORAL HEALTH ASSESSMENT NOTE - RISK ASSESSMENT
Unable to fully assess at this point 2/2 AMS. However, pt has no history of SI or psychiatric illness. Acute risk factors include acute medical and impulsivity.

## 2019-07-17 NOTE — PROGRESS NOTE ADULT - ASSESSMENT
70 yo F with hx of epilepsy, left temporal FLAIR lesion s/p biopsy showing beta amyloid, a-fib on eliquis, HTN, CAD presented to the ER with complaining with headaches. Her exam showed VFF with extinction on the right, hyperreflexia in the b/l UE. She underwent MRI of her brain which showed marked improvement of the left temporal-occpital and inferior parietal lesion. On 7/17 AM, she had an acute episode of altered mental status.    Impression: She most likely had a seizure on 7/17 AM, which explains her global aphasia along with decreased blink to threat on the right. Seizure focus is most likely the left temporal-occpital and inferior parietal lesion. MRI brain w/ and w/o contrast showed improvement in left temporal region. She will need continuous EEG monitoring.    Plan:  -c/w keppra 1 g TID  -c/w vimpat 150 mg BID  -Continue medical mgt and supportive care and for headache mgt may use magnesium Sulfate 1 gram as needed and conservative measures  -24 hour video EEG 70 yo F with hx of epilepsy c/b nonconvulsive status epilepticus, left temporal FLAIR lesion s/p biopsy showing beta amyloid, a-fib on eliquis, HTN, CAD presented to the ER with complaining with headaches. Her exam showed VFF with extinction on the right, hyperreflexia in the b/l UE. She underwent MRI of her brain which showed marked improvement of the left temporal-occpital and inferior parietal lesion. On 7/17 AM, she had an acute episode of altered mental status.    Impression: She most likely had a seizure on 7/17 AM, which explains her global aphasia along with decreased blink to threat on the right. Seizure focus is most likely the left temporal-occpital and inferior parietal lesion. MRI brain w/ and w/o contrast showed improvement in left temporal region. She will need continuous EEG monitoring and AED titrations.    Plan:  -UA  -f/u official report of CT head and CT angio head and neck  -c/w keppra 1 g TID  -c/w vimpat 150 mg BID  -24 hour video EEG  -Continue medical mgt and supportive care and for headache mgt may use magnesium Sulfate 1 gram as needed and conservative measures 68 yo F with hx of epilepsy c/b nonconvulsive status epilepticus, left temporal FLAIR lesion s/p biopsy showing beta amyloid, a-fib on eliquis, HTN, CAD presented to the ER with complaining with headaches. Her exam showed VFF with extinction on the right, hyperreflexia in the b/l UE. She underwent MRI of her brain which showed marked improvement of the left temporal-occpital and inferior parietal lesion. On 7/17 AM, she had an acute episode of altered mental status.    Impression: She most likely had a seizure on 7/17 AM, which explains her global aphasia along with decreased blink to threat on the right. Seizure focus is most likely the left temporal-occpital and inferior parietal lesion, which may have spreading to anterior frontal lobe, giving both receptive and expressive aphasia. MRI brain w/ and w/o contrast showed improvement in left temporal region. She will need continuous EEG monitoring and AED titrations.    Plan:  -UA  -f/u official report of CT head and CT angio head and neck  -c/w keppra 1 g TID  -c/w vimpat 150 mg BID  -24 hour video EEG  -Continue medical mgt and supportive care and for headache mgt may use magnesium Sulfate 1 gram as needed and conservative measures 70 yo F with hx of epilepsy c/b nonconvulsive status epilepticus, left temporal FLAIR lesion s/p biopsy showing beta amyloid, a-fib on eliquis, HTN, CAD presented to the ER with complaining with headaches. Her exam showed VFF with extinction on the right, hyperreflexia in the b/l UE. She underwent MRI of her brain which showed marked improvement of the left temporal-occpital and inferior parietal lesion. On 7/17 AM, she had an acute episode of altered mental status.    Impression: She most likely had a seizure on 7/17 AM, which explains her global aphasia along with decreased blink to threat on the right. Seizure focus is most likely the left temporal-occpital and inferior parietal lesion, which may have spreading to anterior frontal lobe, giving both receptive and expressive aphasia. Aphasia could be part of continued seizure activity (nonconvulsive status epilepticus) or post-ictal state. Need to r/o nonconvulsive status epilepticus. She will need continuous EEG monitoring and AED titrations.    Plan:  -r/o underlying infections (UA, CXR)  -f/u official report of CT head and CT angio head and neck  -c/w keppra 1 g TID  -c/w vimpat 150 mg BID  -24 hour video EEG  -Continue medical mgt and supportive care and for headache mgt may use magnesium Sulfate 1 gram as needed and conservative measures 70 yo F with hx of epilepsy c/b nonconvulsive status epilepticus, left temporal FLAIR lesion s/p biopsy showing beta amyloid, a-fib on eliquis, HTN, CAD presented to the ER with complaining with headaches. Her exam showed VFF with extinction on the right, hyperreflexia in the b/l UE. She underwent MRI of her brain which showed marked improvement of the left temporal-occpital and inferior parietal lesion. On 7/17 AM, she had an acute episode of altered mental status.    Impression: She most likely had a seizure on 7/17 AM, which explains her global aphasia along with decreased blink to threat on the right. Seizure focus is most likely the left temporal-occpital and inferior parietal lesion, which may have spreading to anterior frontal lobe, giving both receptive and expressive aphasia. Aphasia could be part of continued seizure activity (nonconvulsive status epilepticus) or post-ictal state. Need to r/o nonconvulsive status epilepticus. She will need continuous EEG monitoring and AED titrations.    Plan:  -r/o underlying infections (UA, CXR)  -f/u official report of CT head and CT angio head and neck  -c/w keppra 1 g TID  -c/w vimpat 150 mg BID  -24 hour video EEG  -behavioral health evaluation for post-ictal psychosis/agitation  -ativan 0.5 mg IV x 1  -Continue medical mgt and supportive care and for headache mgt may use magnesium Sulfate 1 gram as needed and conservative measures    case discussed with Dr. Miller and primary team, BRIJESH Aleman. 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 Her exam showed VFF with extinction on the right, hyperreflexia in the b/l UE. She underwent MRI of her brain which showed marked improvement of the left temporal-occpital and inferior parietal lesion. On 7/17 AM, she had an acute episode of altered mental status.    Impression: She most likely had a seizure on 7/17 AM, which explains her global aphasia along with decreased blink to threat on the right. Seizure focus is most likely the left temporal-occpital and inferior parietal lesion, which may have spreading to anterior frontal lobe, giving both receptive and expressive aphasia. Aphasia could be part of continued seizure activity (nonconvulsive status epilepticus) or post-ictal state. Need to r/o nonconvulsive status epilepticus. She will need continuous EEG monitoring and AED titrations.    Plan:  -r/o underlying infections (UA, CXR) and then LP if no source ascertained and if mental status not improving   -f/u official report of CT head and CT angio head and neck reviewed and unremarkable  -c/w keppra 1 g TID  -c/w vimpat 150 mg BID  -24 hour video EEG  -behavioral health evaluation for post-ictal psychosis/agitation  -ativan 0.5 mg IV x 1  -Continue medical mgt and supportive care and for headache mgt may use magnesium Sulfate 1 gram as needed and conservative measures    case discussed with Dr. Miller and primary team, BRIJESH Aleman.

## 2019-07-17 NOTE — BEHAVIORAL HEALTH ASSESSMENT NOTE - NS ED [facility] MED ROS PSYCHIATRIC
Render Post-Care Instructions In Note?: yes Consent: The patient's consent was obtained including but not limited to risks of crusting, scabbing, blistering, scarring, darker or lighter pigmentary change, recurrence, incomplete removal and infection. Number Of Freeze-Thaw Cycles: 1 freeze-thaw cycle Duration Of Freeze Thaw-Cycle (Seconds): 5 Detail Level: Simple Post-Care Instructions: I reviewed with the patient in detail post-care instructions. Patient is to wear sunprotection, and avoid picking at any of the treated lesions. Pt may apply Vaseline to crusted or scabbing areas. Aperture Size (Optional): D See HPI

## 2019-07-17 NOTE — CHART NOTE - NSCHARTNOTEFT_GEN_A_CORE
Called by RN for change in mental status - as per staff patient last seen normal around 4am , noted to have changes in mental status at 5 am forgetful and at 6 am not answering questions , or following commands, Awake alert just saying no no .  Patient now sitting up in chair in NAD  , will not follow commands, Strenght intact b/l upper extremities when prompted to close hands, unable to answer questions,  moving all extremities  Neurology resident notified and at bedside , code stroke called - as per neuro CT head, CT angio of the head ordered. Attempted to contact family for consent but unsuccessful - neuro to have attendings sign consent , 24 hour video eeg ordered     vitals stable     O2 sat 100 %  /85    Placed on Zoll for transport to CT   d/w attending - in agreement with above plan   will continue to monitor closely / follow up results. Called by RN for change in mental status - as per staff patient last seen normal around 4am , noted to have changes in mental status at 5 am forgetful and at 6 am not answering questions , or following commands, Awake alert just saying no no .  Patient now sitting up in chair in NAD  , will not follow commands, Strenght intact b/l upper extremities when prompted to close hands, unable to answer questions,  moving all extremities  Neurology resident notified and at bedside , code stroke called - as per neuro CT head, CT angio of the head ordered. Attempted to contact family for consent but unsuccessful - neuro to have attendings sign consent , 24 hour video eeg ordered     vitals stable     O2 sat 100 %  /85    Placed on Zoll for transport to CT   d/w attending - in agreement with above plan   will continue to monitor closely / follow up results.  Placed on neuro checks  will resume tele Called by RN for change in mental status - as per staff patient last seen normal around 4am , noted to have changes in mental status at 5 am forgetful and at 6 am not answering questions , or following commands, Awake alert just saying no no .  Patient now sitting up in chair in NAD  , will not follow commands, Strenght intact b/l upper extremities when prompted to close hands, unable to answer questions,  moving all extremities  Neurology resident notified and at bedside , code stroke called - as per neuro CT head, CT angio of the head ordered. Attempted to contact family for consent but unsuccessful - neuro to have attendings sign consent , 24 hour video eeg ordered     vitals stable     O2 sat 100 %  /85    Placed on Zoll for transport to CT   d/w attending - in agreement with above plan   will continue to monitor closely / follow up results.  Placed on neuro checks  will resume tele    addendum 10 : 25 am -   patient agitated screaming. Refusing to sit / refusing telemetry . dw neuro r/o post ictal agitation psychosis - instructed to order ativan 0.5mg IV x 1 and call psych consult (called )-  placed on 1:1 observation for safety . will add fall precautions Called by RN for change in mental status - as per staff patient last seen normal around 4am , noted to have changes in mental status at 5 am forgetful and at 6 am not answering questions , or following commands, Awake alert just saying no no .  Patient now sitting up in chair in NAD  , will not follow commands, Strenght intact b/l upper extremities when prompted to close hands, unable to answer questions,  moving all extremities  Neurology resident notified and at bedside , code stroke called - as per neuro CT head, CT angio of the head ordered. Attempted to contact family for consent but unsuccessful - neuro to have attendings sign consent , 24 hour video eeg ordered     vitals stable     O2 sat 100 %  /85    Placed on Zoll for transport to CT   d/w attending - in agreement with above plan   will continue to monitor closely / follow up results.  Placed on neuro checks  will resume tele    addendum 10 : 25 am -   patient agitated screaming. Refusing to sit / refusing telemetry . dw neuro r/o post ictal agitation psychosis - instructed to order ativan 0.5mg IV x 1 and call psych consult (called )-  placed on 1:1 observation for safety . will add fall precautions.  d/w family - will come to hospital

## 2019-07-17 NOTE — BEHAVIORAL HEALTH ASSESSMENT NOTE - NSBHCHARTREVIEWLAB_PSY_A_CORE FT
9.7    4.61  )-----------( 245      ( 2019 05:25 )             32.3     -    145  |  108<H>  |  12  ----------------------------<  129<H>  3.8   |  25  |  0.64    Ca    9.6      2019 05:25  Phos  4.1       Mg     2.1         TPro  7.3  /  Alb  3.9  /  TBili  < 0.2<L>  /  DBili  x   /  AST  29  /  ALT  24  /  AlkPhos  187<H>      Glucose, CSF: 103 mg/dL (19 @ 15:13)  Protein, CSF: 41 mg/dL (19 @ 15:13)  Cerebrospinal Fluid Cell Count-1 (19 @ 15:13)    CSF Segmented Neutrophils: 1: Differential is based on 77 cells counted after cytocentrifugation. %    CSF Monocytes/Macrophages: 12 %    CSF Lymphocytes: 87 %    CSF Appearance: Clear    CSF Color: No Color    Total Nucleated Cell Count, CSF: 2 /uL  Culture - CSF with Gram Stain . (19 @ 16:57)    Gram Stain:   No polymorphonuclear cells seen  No organisms seen  by cytocentrifuge    Specimen Source: .CSF    Culture Results:   No growth  Oligoclonal Bands, CSF: Absent (19 @ 22:05)  CSF PCR Result: NotDetec  HSV 1/2 PCR: NotDetec  CSF IgG Index (19 @ 22:05)    Quantitative Ig mg/dL    Albumin, Serum: 2637 mg/dL    IgG CSF: 4.4 mg/dL    CSF ALBU: 16.4 mg/dL    IgG/Albumin Ratio, Serum: 0.34 Ratio    IgG/Albumin Ratio, CSF: 0.27 Ratio    IgG Index: 0.8    IgG Synthesis: 6.2 mg/day  VDRL Titer, CSF: Negative  Amphiphysin Ab, CSF: Negative  CRMP-5-IgG, CSF: Negative

## 2019-07-17 NOTE — BEHAVIORAL HEALTH ASSESSMENT NOTE - CASE SUMMARY
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   I agree with the resident's assessment and plan as outlined above with the following additions.  Patient seen this afternoon. She is trying to get out of bed and leave the room, requiring frequent redirection. She seems to understand my English but answers in Creole.  Concern for possible NCSE vs post ictal vs other neuro workup. In the setting of concern for NCSE would treat with ativan as there is concern that perhaps she continues to have seizures with intermittent postictal psychosis.  Defer to neuro regarding ativan dosing and treatment for possible NCSE.  If NCSE is ruled out and patient remains agitated, can use haldol 2mg q6h prn for severe agitation. Defer to neuro whether LP, biopsy is indicated. 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   I agree with the resident's assessment and plan as outlined above with the following additions.  Patient seen this afternoon. She is trying to get out of bed and leave the room, requiring frequent redirection. She seems to understand my English but answers in Creole.  Concern for possible NCSE vs post ictal vs other neuro workup. In the setting of concern for NCSE would treat with ativan as there is concern that perhaps she continues to have seizures with intermittent postictal psychosis.  Defer to neuro regarding ativan dosing and treatment for possible NCSE.  If post ictal psychosis and NCSE is r/o at that point, if patient remains agitated can use haldol 2mg q6h prn- though would use this cautiously as can lower seizure threshold.

## 2019-07-17 NOTE — BEHAVIORAL HEALTH ASSESSMENT NOTE - SUMMARY
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 Of note, pt recently admitted to [facility] April 18-May 7 for nonconvulsive status epilepticus with AMS, s/p biopsy with beta amyloid detected with a course complicated by vasogenic edema s/p decadron course. Pt seen by neuro, who recommended magnesium for headaches. Low suspicion for temporal arteritis. Psychiatry consulted for agitation, medication management.    This AM, pt with AMS. She became nonverbal, unable to answer questions or follow commands. No evidence of stroke on CT/CTA; symptoms likely 2/2 seizure. Pt subsequently with episode of agitation likely 2/2 ongoing seizure vs post-ictal agitation. Now improved s/p ativan 0.5mg IV. Patient continues to appear aphasic, speaking nonsensically in Creole, though she previously spoke English. She is alert, but unable to follow commands or answer questions appropriately. Presentation appear c/w nonconvulsive status epilepticus vs delirium 2/2 acute medical condition. Further neurological workup would be indicated.    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 Of note, pt recently admitted to [facility] April 18-May 7 for nonconvulsive status epilepticus with AMS, s/p biopsy with beta amyloid detected with a course complicated by vasogenic edema s/p decadron course. Pt seen by neuro, who recommended magnesium for headaches. Low suspicion for temporal arteritis. Psychiatry consulted for agitation, medication management.    This AM, pt with AMS. She became nonverbal, unable to answer questions or follow commands. No evidence of stroke on CT/CTA; symptoms likely 2/2 seizure. Pt subsequently with episode of agitation likely 2/2 ongoing seizure vs post-ictal agitation. Now improved s/p ativan 0.5mg IV. Patient continues to appear aphasic, speaking nonsensically in Creole, though she previously spoke English. She is alert, but unable to follow commands or answer questions appropriately. Presentation appear c/w nonconvulsive status epilepticus vs delirium 2/2 acute medical condition. Further neurological workup would be indicated.    --Given concern for nonconvulsive status epilepticus, would continue with Ativan and monitor for improvement  --Complete neuro workup including EEG, possibly LP if EEG normal given h/o headache and recent concern for temporal arteritis- defer to neuro if LP is emergent.    -- For now, would treat with ativan prn for agitation, until non-convulsive status is r/o. At that point, if patient remains agitated can use haldol 2mg q6h prn. 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 Of note, pt recently admitted to [facility] April 18-May 7 for nonconvulsive status epilepticus with AMS, s/p biopsy with beta amyloid detected with a course complicated by vasogenic edema s/p decadron course. Pt seen by neuro, who recommended magnesium for headaches. Low suspicion for temporal arteritis. Psychiatry consulted for agitation, medication management.    This AM, pt with AMS. She became nonverbal, unable to answer questions or follow commands. No evidence of stroke on CT/CTA; symptoms likely 2/2 seizure. Pt subsequently with episode of agitation likely 2/2 ongoing seizure vs post-ictal agitation. Now improved s/p ativan 0.5mg IV. Patient continues to appear aphasic, speaking nonsensically in Creole, though she previously spoke English. She is alert, but unable to follow commands or answer questions appropriately. Presentation appear c/w nonconvulsive status epilepticus vs delirium 2/2 acute medical condition. Further neurological workup would be indicated.    --Given concern for nonconvulsive status epilepticus, would continue with Ativan and monitor for improvement  --Complete neuro workup including EEG, possibly LP if EEG normal given h/o headache and recent concern for temporal arteritis- defer to neuro if LP is emergent.    -- For now, would treat with ativan prn for agitation, until non-convulsive status r/o. If the thought process is that patient is postictally psychotic (no other explanation) then would likely continue to use ativan. If post ictal psychosis and NCSE is r/o at that point, if patient remains agitated can use haldol 2mg q6h prn- though would use this cautiously as can lower seizure threshold.

## 2019-07-17 NOTE — BEHAVIORAL HEALTH ASSESSMENT NOTE - NSBHCHARTREVIEWIMAGING_PSY_A_CORE FT
< from: CT Head No Cont (07.17.19 @ 09:43) >    IMPRESSION:     CT BRAIN: Stable areas of nonspecific hypodensity in the left temporal   and occipital lobes compared to recent MRI of 7/14/2019. No acute   intracranial hemorrhage.    CTA BRAIN: No large stenosis or major vessel occlusion of the [place] of   [place].    CTA NECK: Atherosclerotic calcifications of the bilateral internal   carotid arteries with less than 30% stenosis according to NASCET   criteria. No occlusion or stenosis of the bilateral vertebral arteries.    < end of copied text >

## 2019-07-17 NOTE — CHART NOTE - NSCHARTNOTEFT_GEN_A_CORE
psychiatry attending at bedside - >discussed with neuro resident   due to severe agitation will give ativan 2mg IV x 1

## 2019-07-18 LAB
ANION GAP SERPL CALC-SCNC: 16 MMO/L — HIGH (ref 7–14)
BUN SERPL-MCNC: 17 MG/DL — SIGNIFICANT CHANGE UP (ref 7–23)
CALCIUM SERPL-MCNC: 9.5 MG/DL — SIGNIFICANT CHANGE UP (ref 8.4–10.5)
CHLORIDE SERPL-SCNC: 109 MMOL/L — HIGH (ref 98–107)
CO2 SERPL-SCNC: 16 MMOL/L — LOW (ref 22–31)
CREAT SERPL-MCNC: 0.65 MG/DL — SIGNIFICANT CHANGE UP (ref 0.5–1.3)
GLUCOSE BLDC GLUCOMTR-MCNC: 109 MG/DL — HIGH (ref 70–99)
GLUCOSE BLDC GLUCOMTR-MCNC: 162 MG/DL — HIGH (ref 70–99)
GLUCOSE BLDC GLUCOMTR-MCNC: 170 MG/DL — HIGH (ref 70–99)
GLUCOSE BLDC GLUCOMTR-MCNC: 201 MG/DL — HIGH (ref 70–99)
GLUCOSE SERPL-MCNC: 123 MG/DL — HIGH (ref 70–99)
HCT VFR BLD CALC: 31.7 % — LOW (ref 34.5–45)
HGB BLD-MCNC: 9.6 G/DL — LOW (ref 11.5–15.5)
LEVETIRACETAM SERPL-MCNC: 26 MCG/ML — SIGNIFICANT CHANGE UP (ref 12–46)
MCHC RBC-ENTMCNC: 22 PG — LOW (ref 27–34)
MCHC RBC-ENTMCNC: 30.3 % — LOW (ref 32–36)
MCV RBC AUTO: 72.5 FL — LOW (ref 80–100)
NRBC # FLD: 0 K/UL — SIGNIFICANT CHANGE UP (ref 0–0)
PLATELET # BLD AUTO: 201 K/UL — SIGNIFICANT CHANGE UP (ref 150–400)
PMV BLD: 12.2 FL — SIGNIFICANT CHANGE UP (ref 7–13)
POTASSIUM SERPL-MCNC: 3.9 MMOL/L — SIGNIFICANT CHANGE UP (ref 3.5–5.3)
POTASSIUM SERPL-SCNC: 3.9 MMOL/L — SIGNIFICANT CHANGE UP (ref 3.5–5.3)
RBC # BLD: 4.37 M/UL — SIGNIFICANT CHANGE UP (ref 3.8–5.2)
RBC # FLD: 16.5 % — HIGH (ref 10.3–14.5)
SODIUM SERPL-SCNC: 141 MMOL/L — SIGNIFICANT CHANGE UP (ref 135–145)
WBC # BLD: 4.55 K/UL — SIGNIFICANT CHANGE UP (ref 3.8–10.5)
WBC # FLD AUTO: 4.55 K/UL — SIGNIFICANT CHANGE UP (ref 3.8–10.5)

## 2019-07-18 PROCEDURE — 95819 EEG AWAKE AND ASLEEP: CPT | Mod: 26

## 2019-07-18 PROCEDURE — 99232 SBSQ HOSP IP/OBS MODERATE 35: CPT | Mod: GC

## 2019-07-18 PROCEDURE — 99232 SBSQ HOSP IP/OBS MODERATE 35: CPT

## 2019-07-18 RX ORDER — LACOSAMIDE 50 MG/1
200 TABLET ORAL EVERY 12 HOURS
Refills: 0 | Status: DISCONTINUED | OUTPATIENT
Start: 2019-07-18 | End: 2019-07-20

## 2019-07-18 RX ORDER — INSULIN GLARGINE 100 [IU]/ML
9 INJECTION, SOLUTION SUBCUTANEOUS ONCE
Refills: 0 | Status: COMPLETED | OUTPATIENT
Start: 2019-07-18 | End: 2019-07-18

## 2019-07-18 RX ORDER — INSULIN GLARGINE 100 [IU]/ML
9 INJECTION, SOLUTION SUBCUTANEOUS AT BEDTIME
Refills: 0 | Status: DISCONTINUED | OUTPATIENT
Start: 2019-07-18 | End: 2019-07-18

## 2019-07-18 RX ADMIN — Medication 0.1 MILLIGRAM(S): at 17:58

## 2019-07-18 RX ADMIN — Medication 5 UNIT(S): at 12:19

## 2019-07-18 RX ADMIN — LEVETIRACETAM 400 MILLIGRAM(S): 250 TABLET, FILM COATED ORAL at 21:33

## 2019-07-18 RX ADMIN — Medication 600 MILLIGRAM(S): at 23:30

## 2019-07-18 RX ADMIN — APIXABAN 5 MILLIGRAM(S): 2.5 TABLET, FILM COATED ORAL at 17:58

## 2019-07-18 RX ADMIN — Medication 2: at 12:20

## 2019-07-18 RX ADMIN — QUETIAPINE FUMARATE 12.5 MILLIGRAM(S): 200 TABLET, FILM COATED ORAL at 21:33

## 2019-07-18 RX ADMIN — Medication 4: at 17:58

## 2019-07-18 RX ADMIN — LEVETIRACETAM 400 MILLIGRAM(S): 250 TABLET, FILM COATED ORAL at 13:06

## 2019-07-18 RX ADMIN — LACOSAMIDE 140 MILLIGRAM(S): 50 TABLET ORAL at 17:58

## 2019-07-18 RX ADMIN — Medication 600 MILLIGRAM(S): at 06:44

## 2019-07-18 RX ADMIN — Medication 5 UNIT(S): at 08:16

## 2019-07-18 RX ADMIN — Medication 50 MICROGRAM(S): at 05:16

## 2019-07-18 RX ADMIN — Medication 0.1 MILLIGRAM(S): at 05:10

## 2019-07-18 RX ADMIN — APIXABAN 5 MILLIGRAM(S): 2.5 TABLET, FILM COATED ORAL at 05:10

## 2019-07-18 RX ADMIN — LACOSAMIDE 130 MILLIGRAM(S): 50 TABLET ORAL at 06:54

## 2019-07-18 RX ADMIN — Medication 600 MILLIGRAM(S): at 05:44

## 2019-07-18 RX ADMIN — Medication 2: at 08:16

## 2019-07-18 RX ADMIN — INSULIN GLARGINE 9 UNIT(S): 100 INJECTION, SOLUTION SUBCUTANEOUS at 22:06

## 2019-07-18 RX ADMIN — Medication 100 MILLIGRAM(S): at 05:10

## 2019-07-18 RX ADMIN — LEVETIRACETAM 400 MILLIGRAM(S): 250 TABLET, FILM COATED ORAL at 05:09

## 2019-07-18 RX ADMIN — Medication 5 UNIT(S): at 17:58

## 2019-07-18 RX ADMIN — Medication 100 MILLIGRAM(S): at 21:33

## 2019-07-18 RX ADMIN — Medication 100 MILLIGRAM(S): at 13:06

## 2019-07-18 RX ADMIN — Medication 600 MILLIGRAM(S): at 22:54

## 2019-07-18 NOTE — PROGRESS NOTE ADULT - PROBLEM SELECTOR PLAN 6
DVT ppx: eliquis  DASH diet

## 2019-07-18 NOTE — PROGRESS NOTE BEHAVIORAL HEALTH - NSBHCHARTREVIEWVS_PSY_A_CORE FT
Vital Signs Last 24 Hrs  T(C): 36.8 (18 Jul 2019 09:10), Max: 36.8 (18 Jul 2019 05:08)  T(F): 98.2 (18 Jul 2019 09:10), Max: 98.2 (18 Jul 2019 05:08)  HR: 66 (18 Jul 2019 09:10) (63 - 73)  BP: 141/77 (18 Jul 2019 09:10) (141/77 - 174/95)  BP(mean): --  RR: 18 (18 Jul 2019 09:10) (17 - 18)  SpO2: 100% (18 Jul 2019 09:10) (100% - 100%)

## 2019-07-18 NOTE — PROGRESS NOTE BEHAVIORAL HEALTH - SUMMARY
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 Of note, pt recently admitted to [facility] April 18-May 7 for nonconvulsive status epilepticus with AMS, s/p biopsy with beta amyloid detected with a course complicated by vasogenic edema s/p decadron course. Pt seen by neuro, who recommended magnesium for headaches. Low suspicion for temporal arteritis. Psychiatry consulted for agitation, medication management.    Yesterday, pt with episode of AMS. She became nonverbal, unable to answer questions or follow commands. No evidence of stroke on CT/CTA; symptoms likely 2/2 seizure. Pt subsequently with episode of agitation likely 2/2 ongoing seizure vs post-ictal agitation. Now improved s/p ativan. Pt alert, now speaking English and able to follow commands and answer questions, though still appearing confused with nonsensical responses. Some evidence of paranoia today. Presentation appear c/w nonconvulsive status epilepticus vs delirium 2/2 acute medical condition. Further neurological workup would be indicated.    --Complete neuro workup including EEG, possibly LP if EEG normal given h/o headache and recent concern for temporal arteritis- defer to neuro if LP is emergent.  -- For now, would treat with ativan prn for agitation, until non-convulsive status r/o. If the thought process is that patient is post-ictally psychotic (no other explanation) then would likely continue to use ativan. If post ictal psychosis and NCSE is r/o at that point, and if patient remains agitated, can use haldol 2mg q6h prn- though would use this cautiously as can lower seizure threshold. 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 Of note, pt recently admitted to [facility] April 18-May 7 for nonconvulsive status epilepticus with AMS, s/p biopsy with beta amyloid detected with a course complicated by vasogenic edema s/p decadron course. Pt seen by neuro, who recommended magnesium for headaches. Low suspicion for temporal arteritis. Psychiatry consulted for agitation, medication management.    Yesterday, pt with episode of AMS. She became nonverbal, unable to answer questions or follow commands. No evidence of stroke on CT/CTA; symptoms likely 2/2 seizure. Pt subsequently with episode of agitation likely 2/2 ongoing seizure vs post-ictal agitation. Now improved s/p ativan. Pt alert, now speaking English and able to follow commands and answer questions, though still appearing confused with nonsensical responses. Some evidence of paranoia today. Presentation appear c/w nonconvulsive status epilepticus vs post ictal from seizure vs delirium 2/2 acute medical condition. Further neurological workup would be indicated.    --Complete neuro workup including EEG, possibly LP if EEG normal given h/o headache and recent concern for temporal arteritis- defer to neuro if LP is emergent.  -- For now, would treat with ativan prn for agitation, until non-convulsive status r/o. If the thought process is that patient is post-ictally psychotic (no other explanation) then would likely continue to use ativan. If post ictal psychosis and NCSE is r/o at that point, and if patient remains agitated, can use haldol 2mg q6h prn- though would use this cautiously as can lower seizure threshold.

## 2019-07-18 NOTE — PROGRESS NOTE BEHAVIORAL HEALTH - RISK ASSESSMENT
Pt denies SI/HI, she has no history of SI or psychiatric illness. Acute risk factors include acute medical and impulsivity.

## 2019-07-18 NOTE — EEG REPORT - NS EEG TEXT BOX
[facility]   COMPREHENSIVE EPILEPSY CENTER   REPORT OF ROUTINE VIDEO EEG     [facility]: [address] [place] [address] [address], [place] [address], Ph#: [phone]  [facility]: [address], [place] [address], Ph#: [phone]  Office: [address] [place] [address], [place] [address], [place] [address] Ph#: [phone]    Patient Name: RON FOSTER  Age and : 69y (54)  MRN #: [id number]  Location: [place] [address] [address]  Referring Physician: Modesto Garcia    Study Date: 19    _____________________________________________________________  TECHNICAL INFORMATION    Placement and Labeling of Electrodes:  The EEG was performed utilizing 20 channels referential EEG connections (coronal over temporal over parasagittal montage) using all standard 10-20 electrode placements with EKG.  Recording was at a sampling rate of 256 samples per second per channel.  Time synchronized digital video recording was done simultaneously with EEG recording.  A low light infrared camera was used for low light recording.  Saulo and seizure detection algorithms were utilized.    _____________________________________________________________  HISTORY    Patient is a 69y old  Female who presents with a chief complaint of Headache, seizure work-up (2019 10:50)      PERTINENT MEDICATION:  MEDICATIONS  (STANDING):  apixaban 5 milliGRAM(s) Oral every 12 hours  cloNIDine 0.1 milliGRAM(s) Oral two times a day  dextrose 5%. 1000 milliLiter(s) (50 mL/Hr) IV Continuous <Continuous>  dextrose 50% Injectable 12.5 Gram(s) IV Push once  dextrose 50% Injectable 25 Gram(s) IV Push once  dextrose 50% Injectable 25 Gram(s) IV Push once  insulin glargine Injectable (LANTUS) 18 Unit(s) SubCutaneous at bedtime  insulin lispro (HumaLOG) corrective regimen sliding scale   SubCutaneous three times a day before meals  insulin lispro (HumaLOG) corrective regimen sliding scale   SubCutaneous at bedtime  insulin lispro Injectable (HumaLOG) 5 Unit(s) SubCutaneous three times a day before meals  labetalol 100 milliGRAM(s) Oral three times a day  lacosamide IVPB 150 milliGRAM(s) IV Intermittent every 12 hours  levETIRAcetam  IVPB 1000 milliGRAM(s) IV Intermittent <User Schedule>  levothyroxine 50 MICROGram(s) Oral daily  QUEtiapine 12.5 milliGRAM(s) Oral at bedtime    _____________________________________________________________  STUDY INTERPRETATION    Findings: The background was continuous, spontaneously variable and reactive. During wakefulness, the posterior dominant rhythm consisted of symmetric, well-modulated 7 Hz activity, with amplitude to 30 uV, that attenuated to eye opening.  Low amplitude frontal beta was noted in wakefulness.    Background Slowing:  -Slowing of the posterior dominant rhythm    Focal Slowing:   -Continuous delta and theta over the left hemisphere, maximum left posterior quadrant    Sleep Background:  Stage II sleep transients were not recorded.  Drowsiness and stage II sleep transients were not recorded.    Other Non-Epileptiform Findings:  None    Interictal Epileptiform Activity:   -Lateralized Periodic Discharges, Regional, Left Occipital, maximum O1 (polyspikes, abundant, 1 Hz)    Events:  Clinical events: Subclinical seizure (1 recorded)  Seizures: 1 electrographic seizure was recorded in the left occipital region.  LPDs evolve into rhythmic sharply contoured alpha and beta activity, duration approximately 3 minutes.  No associated clinical signs.    Activation Procedures:   Hyperventilation was not performed.    Photic stimulation was not performed.     Artifacts:  Intermittent myogenic and movement artifacts were noted.    ECG:  The heart rate on single channel ECG was predominantly between XX BPM.    _____________________________________________________________  EEG SUMMARY/CLASSIFICATION    Normal / Abnormal EEG in the awake, drowsy and asleep states.  Abnormal EEG in an unresponsive patient / a sedated patient.    -Clinical events: Subclinical seizure (1 recorded)  -Seizures: 1 electrographic seizure was recorded in the left occipital region.  LPDs evolve into rhythmic sharply contoured alpha and beta activity, duration approximately 3 minutes.  No associated clinical signs.    -Lateralized Periodic Discharges, Regional, Left Occipital, maximum O1 (polyspikes, abundant, 1 Hz)  -Continuous delta and theta over the left hemisphere, maximum left posterior quadrant  -Slowing of the posterior dominant rhythm  _____________________________________________________________  EEG IMPRESSION/CLINICAL CORRELATE  Abnormal EEG study.  1. One subclinical electrographic seizures recorded from the left occipital region. (duration appoximately 3 minutes)  2. Potential epileptogenic focus in the left occipital region.  3. Structural or functional abnormality in the left hemisphere.   4. Moderate nonspecific diffuse or multifocal cerebral dysfunction.     Jhony Berkowitz MD  EEG / Epilepsy Attending Physician

## 2019-07-18 NOTE — PROGRESS NOTE BEHAVIORAL HEALTH - NSBHCHARTREVIEWIMAGING_PSY_A_CORE FT
< from: Xray Chest 1 View- PORTABLE-Routine (07.17.19 @ 18:41) >    IMPRESSION:  Enlarged cardiac silhouette.     Faint 1.1 cm nodular opacity projecting over the lower left chest. Repeat   PAand lateral chest x-ray could be obtained, if clinically feasible.   Should the finding persist or if this is not possible, then a noncontrast   CT scan of the chest could be performed for further evaluation, if felt   to be clinically indicated.    No evidence for pneumonia.    < end of copied text >

## 2019-07-18 NOTE — PROGRESS NOTE BEHAVIORAL HEALTH - CASE SUMMARY
Patient seen and examined with Dr. White. I agree with her interval history, exam, and A/P as per above.

## 2019-07-18 NOTE — PROGRESS NOTE BEHAVIORAL HEALTH - NSBHFUPINTERVALHXFT_PSY_A_CORE
Pt is alert, calm, cooperative. Somewhat difficult to understand 2/2 accent and ?dysarthria. She is grossly oriented to place (hospital) but answers "I don't know" to time, stating she is still feeling confused after yesterday's events. Pt states she currently feels safe in the hospital, but perseverates on "bad people who will go to care home." Pt unable to explain or elaborate, as she just repeats "bad people" many times. Does not appear to be a hallucination, as pt not currently seeing other people in the room, but possibly paranoid ideation. Pt denying AH. Also denying SI/HI.

## 2019-07-18 NOTE — PROGRESS NOTE ADULT - ASSESSMENT
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 Her exam showed VFF with extinction on the right, hyperreflexia in the b/l UE. She underwent MRI of her brain which showed marked improvement of the left temporal-occpital and inferior parietal lesion. On 7/17 AM, she had an acute episode of altered mental status. CT head and CT angio head and neck were unremarkable.     Impression: She most likely had a seizure on 7/17 AM, which explains her global aphasia along with decreased blink to threat on the right. Seizure focus is most likely the left temporal-occpital and inferior parietal lesion, which may have spreading to anterior frontal lobe, giving both receptive and expressive aphasia. She is much improved 7/18. She will need continuous EEG monitoring and AED titrations.    Plan:  -r/o underlying infections (UA)   -c/w keppra 1 g TID  -c/w vimpat 150 mg BID  -24 hour video EEG  -may use magnesium Sulfate 1 gram as needed for HA 70 yo F with hx of epilepsy c/b nonconvulsive status epilepticus, left temporal FLAIR lesion s/p biopsy showing beta amyloid, a-fib on eliquis, HTN, CAD presented to the ER with complaining with headaches. Her exam showed VFF with extinction on the right, hyperreflexia in the b/l UE. She underwent MRI of her brain which showed marked improvement of the left temporal-occpital and inferior parietal lesion. On 7/17 AM, she had an acute episode of altered mental status. CT head and CT angio head and neck were unremarkable.     Impression: She most likely had a seizure on 7/17 AM, which explains her global aphasia along with decreased blink to threat on the right. Seizure focus is most likely the left temporal-occpital and inferior parietal lesion, which may have spreading to anterior frontal lobe, giving both receptive and expressive aphasia. She is much improved 7/18. She will need continuous EEG monitoring and AED titrations.    EEG 7/18 showed 1 subclinical seizure.    Plan:  -r/o underlying infections (UA)   -c/w keppra 1 g TID  -increase vimpat to 200 mg BID  -24 hour video EEG  -may use magnesium Sulfate 1 gram as needed for HA

## 2019-07-18 NOTE — PROGRESS NOTE BEHAVIORAL HEALTH - NSBHCONSULTFOLLOWAFTERCARE_PSY_A_CORE FT
Neuro outpatient f/u   [facility] [phone]

## 2019-07-19 ENCOUNTER — TRANSCRIPTION ENCOUNTER (OUTPATIENT)
Age: 69
End: 2019-07-19

## 2019-07-19 LAB
ANION GAP SERPL CALC-SCNC: 10 MMO/L — SIGNIFICANT CHANGE UP (ref 7–14)
BUN SERPL-MCNC: 15 MG/DL — SIGNIFICANT CHANGE UP (ref 7–23)
CALCIUM SERPL-MCNC: 9.4 MG/DL — SIGNIFICANT CHANGE UP (ref 8.4–10.5)
CHLORIDE SERPL-SCNC: 107 MMOL/L — SIGNIFICANT CHANGE UP (ref 98–107)
CO2 SERPL-SCNC: 27 MMOL/L — SIGNIFICANT CHANGE UP (ref 22–31)
CREAT SERPL-MCNC: 0.71 MG/DL — SIGNIFICANT CHANGE UP (ref 0.5–1.3)
GLUCOSE BLDC GLUCOMTR-MCNC: 148 MG/DL — HIGH (ref 70–99)
GLUCOSE BLDC GLUCOMTR-MCNC: 167 MG/DL — HIGH (ref 70–99)
GLUCOSE BLDC GLUCOMTR-MCNC: 174 MG/DL — HIGH (ref 70–99)
GLUCOSE BLDC GLUCOMTR-MCNC: 216 MG/DL — HIGH (ref 70–99)
GLUCOSE SERPL-MCNC: 158 MG/DL — HIGH (ref 70–99)
HCT VFR BLD CALC: 31.4 % — LOW (ref 34.5–45)
HGB BLD-MCNC: 9.4 G/DL — LOW (ref 11.5–15.5)
MCHC RBC-ENTMCNC: 22.1 PG — LOW (ref 27–34)
MCHC RBC-ENTMCNC: 29.9 % — LOW (ref 32–36)
MCV RBC AUTO: 73.9 FL — LOW (ref 80–100)
NRBC # FLD: 0 K/UL — SIGNIFICANT CHANGE UP (ref 0–0)
PLATELET # BLD AUTO: 105 K/UL — LOW (ref 150–400)
PMV BLD: SIGNIFICANT CHANGE UP FL (ref 7–13)
POTASSIUM SERPL-MCNC: 3.9 MMOL/L — SIGNIFICANT CHANGE UP (ref 3.5–5.3)
POTASSIUM SERPL-SCNC: 3.9 MMOL/L — SIGNIFICANT CHANGE UP (ref 3.5–5.3)
RBC # BLD: 4.25 M/UL — SIGNIFICANT CHANGE UP (ref 3.8–5.2)
RBC # FLD: 16.4 % — HIGH (ref 10.3–14.5)
SODIUM SERPL-SCNC: 144 MMOL/L — SIGNIFICANT CHANGE UP (ref 135–145)
WBC # BLD: 4.25 K/UL — SIGNIFICANT CHANGE UP (ref 3.8–10.5)
WBC # FLD AUTO: 4.25 K/UL — SIGNIFICANT CHANGE UP (ref 3.8–10.5)

## 2019-07-19 PROCEDURE — 95951: CPT | Mod: 26

## 2019-07-19 PROCEDURE — 99233 SBSQ HOSP IP/OBS HIGH 50: CPT | Mod: GC

## 2019-07-19 PROCEDURE — 93010 ELECTROCARDIOGRAM REPORT: CPT

## 2019-07-19 RX ORDER — APIXABAN 2.5 MG/1
1 TABLET, FILM COATED ORAL
Qty: 60 | Refills: 0
Start: 2019-07-19 | End: 2019-08-17

## 2019-07-19 RX ORDER — LABETALOL HCL 100 MG
1 TABLET ORAL
Qty: 90 | Refills: 0
Start: 2019-07-19 | End: 2019-08-17

## 2019-07-19 RX ORDER — POLYETHYLENE GLYCOL 3350 17 G/17G
17 POWDER, FOR SOLUTION ORAL DAILY
Refills: 0 | Status: DISCONTINUED | OUTPATIENT
Start: 2019-07-19 | End: 2019-07-20

## 2019-07-19 RX ORDER — DOCUSATE SODIUM 100 MG
1 CAPSULE ORAL
Qty: 30 | Refills: 0
Start: 2019-07-19 | End: 2019-08-17

## 2019-07-19 RX ORDER — DOCUSATE SODIUM 100 MG
100 CAPSULE ORAL DAILY
Refills: 0 | Status: DISCONTINUED | OUTPATIENT
Start: 2019-07-19 | End: 2019-07-20

## 2019-07-19 RX ORDER — LACOSAMIDE 50 MG/1
20 TABLET ORAL
Qty: 0 | Refills: 0 | DISCHARGE
Start: 2019-07-19

## 2019-07-19 RX ADMIN — INSULIN GLARGINE 18 UNIT(S): 100 INJECTION, SOLUTION SUBCUTANEOUS at 21:55

## 2019-07-19 RX ADMIN — Medication 0.1 MILLIGRAM(S): at 18:22

## 2019-07-19 RX ADMIN — Medication 600 MILLIGRAM(S): at 15:00

## 2019-07-19 RX ADMIN — Medication 100 MILLIGRAM(S): at 21:54

## 2019-07-19 RX ADMIN — Medication 100 MILLIGRAM(S): at 12:33

## 2019-07-19 RX ADMIN — LACOSAMIDE 140 MILLIGRAM(S): 50 TABLET ORAL at 18:21

## 2019-07-19 RX ADMIN — Medication 100 MILLIGRAM(S): at 05:44

## 2019-07-19 RX ADMIN — Medication 5 UNIT(S): at 12:29

## 2019-07-19 RX ADMIN — Medication 0.1 MILLIGRAM(S): at 05:44

## 2019-07-19 RX ADMIN — Medication 100 MILLIGRAM(S): at 14:02

## 2019-07-19 RX ADMIN — LEVETIRACETAM 400 MILLIGRAM(S): 250 TABLET, FILM COATED ORAL at 21:55

## 2019-07-19 RX ADMIN — Medication 50 MICROGRAM(S): at 05:44

## 2019-07-19 RX ADMIN — Medication 4: at 12:29

## 2019-07-19 RX ADMIN — LEVETIRACETAM 400 MILLIGRAM(S): 250 TABLET, FILM COATED ORAL at 05:44

## 2019-07-19 RX ADMIN — QUETIAPINE FUMARATE 12.5 MILLIGRAM(S): 200 TABLET, FILM COATED ORAL at 21:54

## 2019-07-19 RX ADMIN — APIXABAN 5 MILLIGRAM(S): 2.5 TABLET, FILM COATED ORAL at 05:44

## 2019-07-19 RX ADMIN — LACOSAMIDE 140 MILLIGRAM(S): 50 TABLET ORAL at 05:43

## 2019-07-19 RX ADMIN — Medication 600 MILLIGRAM(S): at 14:02

## 2019-07-19 RX ADMIN — APIXABAN 5 MILLIGRAM(S): 2.5 TABLET, FILM COATED ORAL at 17:37

## 2019-07-19 RX ADMIN — Medication 5 UNIT(S): at 17:36

## 2019-07-19 RX ADMIN — LEVETIRACETAM 400 MILLIGRAM(S): 250 TABLET, FILM COATED ORAL at 14:09

## 2019-07-19 RX ADMIN — POLYETHYLENE GLYCOL 3350 17 GRAM(S): 17 POWDER, FOR SOLUTION ORAL at 12:33

## 2019-07-19 RX ADMIN — Medication 5 UNIT(S): at 09:14

## 2019-07-19 RX ADMIN — Medication 2: at 17:36

## 2019-07-19 NOTE — DISCHARGE NOTE PROVIDER - CARE PROVIDERS DIRECT ADDRESSES
,DirectAddress_Unknown ,[email or web address]

## 2019-07-19 NOTE — PROGRESS NOTE ADULT - ATTENDING COMMENTS
68 y/o [place] woman with hx of HTN, CAD, p/w seizures causing aphasia. Pt had MRI showing left temporal lesion that was biopsied and showed only amyloid deposition. It was reported that yesterday patient may have had another seizure due to episode of aphasia and difficulty with speech. Pt was due to get EEG overnight but initially family was refusing.  Pt today on exam she is awake and alert, her speech is fluent but she makes phonemic errors, anomia with perseveration, repetition is impaired, she has difficulty following more complex commands. Part of this may be because of a language barrier as she speaks Creole as her native tongue. I was unable to test reading/writing as the patient states that she is unable to read. Otherwise on exam she has some difficulty with standing from a squat position, and could not stand on her toes or heels, again part of which may have been a communication issue but also from deconditioning as there was no laterality to the weakness.     Possible that the MRI lesion is contributing to her expressive aphasia.     Otherwise, will plan to obtain routine EEG, can increase Vimpat to 200mg BID. If there continues to be suspected seizures, will consider transfer to [facility] for closer monitoring in the EMU.
Patient seen and examined at bedside and above note reviewed and edited and I agree with assessment and plan as outlined. Patient overall feeling better and left sided headache improved although not completely resolved. She has some tenderness to palpation on exam of the left temple region but no other focal neurologic deficits. Will proceed with ESR and CRP and for headaches may use magnesium sulfate 1 gram as needed and NSAIDS as needed.  MRI brain reviewed in detail with neuroradiology and was much improved from prior.   Will continue to follow and continue anti seizure meds as outlined and medical mgt and supportive care.
Patient seen and examined with neurology team and above note reviewed and I agree with assessment and plan as outlined. Patient doing and feeling much better and has continuous EEG in place. Over night study captured about 1-2 seizures per hour. She has been increased on her vimpat to 200mg BID and continue keppra as outlined and will follow up EEG in am.  Awaiting transfer to [place] [facility] once bed is available.  Continue seizure precautions and medical mgt and patient and daughter understood plan and are willing to comply.
Patient seen and examined and above note reviewed and I agree with assessment and plan as outlined. Patient had a seizure this morning and was very agitated and restless following the seizure. She also continues to have confusion and does not know where she is or what has happened to her recently.   She follows some requests with repeated prompting. No focal cranial nerve, motor weakness and she is able to ambulate without assistance.  She was given IV ativan with some improvement in her agitation and restlessness and will continue as needed.  Awaiting overnight EEG to assess for further underlying seizure activity or possibly related to postictal psychosis.   Continue keppra and vimpat and if no improvement in her symptoms by tomorrow and EEG negative will proceed with spinal tap.   Appreciate behavorial health evaluation and continue medical mgt and supportive care and all questions answered with daughter at bedside.

## 2019-07-19 NOTE — DISCHARGE NOTE PROVIDER - HOSPITAL COURSE
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        Problem/Plan - 1:    ·  Problem: Seizure.  Plan: seizure picked up by EEG     Patient to be transferred to [facility] under neurology as per neuro's note.         Problem/Plan - 2:    ·  Problem: Diabetes mellitus type 2 with complications, uncontrolled.  Plan: continue with lantus and humolog.         Problem/Plan - 3:    ·  Problem: Hypertensive urgency.  Plan: BP is better controlled     Continue with current meds.         Problem/Plan - 4:    ·  Problem: Afib.  Plan: Patient with known history of Afib.    Will continue eliquis.    Cardiology for HB picked up on tele. 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 She underwent MRI of her brain which showed marked improvement of the left temporal-occpital and inferior parietal lesion. On 7/17 AM, she had an acute episode of altered mental status. CT head and CT angio head and neck were unremarkable.         # Seizure    -Seizure activity on EEG     -as per Neuro: She most likely had a seizure on 7/17 AM, which explains her global aphasia along with decreased blink to threat on the right. She is much improved 7/18, but continues to have subclinical seizures. She will need continuous EEG monitoring and AED titrations.    EEG 7/18 showed 1 subclinical seizure.    EEG 7/19 showed 1-2 subclinical seizures per hour    -Plan for transfer to [facility] epilepsy monitoring unit under the care of Dr. Alberto Centeno, attending epilepsy monitoring unit neurologist. Case was discussed with Dr. Alberto Centeno.     -Continue Keppra1 g TID    -Continue vimpat 200 mg BID        # Diabetes mellitus type 2 with complications, uncontrolled.    -Continue with lantus and humolog.         # Hypertensive urgency    -Continue current regimen         # Afib.      Patient with known history of Afib.    Will continue eliquis.    Dr. Melara was consulted for possible heart block picked up on tele        7/19: Patient stable for discharge as per Dr. 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 She underwent MRI of her brain which showed marked improvement of the left temporal-occpital and inferior parietal lesion. On 7/17 AM, she had an acute episode of altered mental status. CT head and CT angio head and neck were unremarkable.         # Seizure    -Seizure activity on EEG     -as per Neuro: She most likely had a seizure on 7/17 AM, which explains her global aphasia along with decreased blink to threat on the right. She is much improved 7/18, but continues to have subclinical seizures. She will need continuous EEG monitoring and AED titrations.    EEG 7/18 showed 1 subclinical seizure.    EEG 7/19 showed 1-2 subclinical seizures per hour    -Plan for transfer to [facility] epilepsy monitoring unit under the care of Dr. Alberto Centeno, attending epilepsy monitoring unit neurologist. Case was discussed with Dr. Alberto Centeno.     -Continue Keppra1 g TID    -Continue vimpat 200 mg BID        # Diabetes mellitus type 2 with complications, uncontrolled.    -Continue with lantus and humolog.         # Hypertensive urgency    -Continue current regimen         # Afib.      Patient with known history of Afib.    Will continue eliquis.    Dr. Melara was consulted for possible heart block picked up on tele        7/19: Patient stable for transfer to [facility] to be monitor in Epilepsy monitor Unit.

## 2019-07-19 NOTE — PROGRESS NOTE ADULT - PROBLEM SELECTOR PROBLEM 3
Diabetes mellitus type 2 with complications, uncontrolled
Hypertensive urgency

## 2019-07-19 NOTE — PROGRESS NOTE ADULT - PROBLEM SELECTOR PLAN 4
BP is better controlled   Continue with current meds
Patient with known history of Afib.  Will continue eliquis.  Cardiology for HB picked up on tele

## 2019-07-19 NOTE — DISCHARGE NOTE PROVIDER - PROVIDER TOKENS
FREE:[LAST:[Appointment:],PHONE:[(   )    -],FAX:[(   )    -],ADDRESS:[Follow up with [facility] outpatient]] PROVIDER:[TOKEN:[[id number]]]

## 2019-07-19 NOTE — PROGRESS NOTE ADULT - PROBLEM SELECTOR PLAN 2
Patient had another seizure today- Picked up on EEG  Vimpat increased as per neuro
Patient had another seizure today  Neuro attending followed up and pt will get an EEG   Patient with baseline cognitive problems at baseline
Will continue current Vimpat and Keppra as per neuro
Will continue current Vimpat and Keppra doses.
continue with lantus and humolog

## 2019-07-19 NOTE — PROGRESS NOTE ADULT - ASSESSMENT
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 Her exam showed VFF with extinction on the right, hyperreflexia in the b/l UE. She underwent MRI of her brain which showed marked improvement of the left temporal-occpital and inferior parietal lesion. On 7/17 AM, she had an acute episode of altered mental status. CT head and CT angio head and neck were unremarkable.     Impression: She most likely had a seizure on 7/17 AM, which explains her global aphasia along with decreased blink to threat on the right. Seizure focus is most likely the left temporal-occpital and inferior parietal lesion, which may have spreading to anterior frontal lobe, giving both receptive and expressive aphasia. She is much improved 7/18. She will need continuous EEG monitoring and AED titrations.    EEG 7/18 showed 1 subclinical seizure.    Plan:  -r/o underlying infections (UA)   -c/w keppra 1 g TID  -c/w vimpat 200 mg BID  -24 hour video EEG  -may use magnesium Sulfate 1 gram as needed for HA 70 yo F with hx of epilepsy c/b nonconvulsive status epilepticus, left temporal FLAIR lesion s/p biopsy showing beta amyloid, a-fib on eliquis, HTN, CAD presented to the ER with complaining with headaches. Her exam showed VFF with extinction on the right, hyperreflexia in the b/l UE. She underwent MRI of her brain which showed marked improvement of the left temporal-occpital and inferior parietal lesion. On 7/17 AM, she had an acute episode of altered mental status. CT head and CT angio head and neck were unremarkable.     Impression: She most likely had a seizure on 7/17 AM, which explains her global aphasia along with decreased blink to threat on the right. Seizure focus is most likely the left temporal-occpital and inferior parietal lesion, which may have spreading to anterior frontal lobe, giving both receptive and expressive aphasia. She is much improved 7/18, but continues to have subclinical seizures. She will need continuous EEG monitoring and AED titrations.    EEG 7/18 showed 1 subclinical seizure.  EEG 7/19 showed 1-2 subclinical seizures per hour    Plan:  -plan for transfer to [facility] epilepsy monitoring unit under the care of Dr. Alberto Centeno, attending epilepsy monitoring unit neurologist. Case was discussed with Dr. Alberto Centeno. Transfer is pending bed availability.  -c/w keppra 1 g TID  -c/w vimpat 200 mg BID 70 yo F with hx of epilepsy c/b nonconvulsive status epilepticus, left temporal FLAIR lesion s/p biopsy showing beta amyloid, a-fib on eliquis, HTN, CAD presented to the ER with complaining with headaches. Her exam showed VFF with extinction on the right, hyperreflexia in the b/l UE. She underwent MRI of her brain which showed marked improvement of the left temporal-occpital and inferior parietal lesion. On 7/17 AM, she had an acute episode of altered mental status. CT head and CT angio head and neck were unremarkable.     Impression: She most likely had a seizure on 7/17 AM, which explains her global aphasia along with decreased blink to threat on the right. She is much improved 7/18, but continues to have subclinical seizures. She will need continuous EEG monitoring and AED titrations.    EEG 7/18 showed 1 subclinical seizure.  EEG 7/19 showed 1-2 subclinical seizures per hour    Plan:  -plan for transfer to [facility] epilepsy monitoring unit under the care of Dr. Alberto Centeno, attending epilepsy monitoring unit neurologist. Case was discussed with Dr. Alberto Centeno. Transfer is pending bed availability.  -c/w keppra 1 g TID  -c/w vimpat 200 mg BID 70 yo F with hx of epilepsy c/b nonconvulsive status epilepticus, left temporal FLAIR lesion s/p biopsy showing beta amyloid, a-fib on eliquis, HTN, CAD presented to the ER with complaining with headaches. Her exam showed VFF with extinction on the right, hyperreflexia in the b/l UE. She underwent MRI of her brain which showed marked improvement of the left temporal-occpital and inferior parietal lesion. On 7/17 AM, she had an acute episode of altered mental status. CT head and CT angio head and neck were unremarkable.     Impression: She most likely had a seizure on 7/17 AM, which explains her global aphasia along with decreased blink to threat on the right. She is much improved 7/18, but continues to have subclinical seizures. She will need continuous EEG monitoring and AED titrations.    EEG 7/18 showed 1 subclinical seizure.  EEG 7/19 showed 1-2 subclinical seizures per hour    Plan:  -plan for transfer to [facility] epilepsy monitoring unit under the care of Dr. Alberto Centeno, attending epilepsy monitoring unit neurologist. Case was discussed with Dr. Alberto Centeno. Transfer is pending bed availability.  -c/w keppra 1 g TID  -c/w vimpat 200 mg BID and seizure precautions   -Continue medical mgt and supportive care

## 2019-07-19 NOTE — DISCHARGE NOTE PROVIDER - CARE PROVIDER_API CALL
Appointment:,   Follow up with [facility]  Phone: (   )    -  Fax: (   )    -  Follow Up Time: Alberto Centeno (MD)  Clinical Neurophysiology; EEGEpilepsy; Neurology  [address]  [place] [address]  Phone: [phone]  Fax: [phone]  Follow Up Time:

## 2019-07-19 NOTE — PROGRESS NOTE ADULT - PROVIDER SPECIALTY LIST ADULT
Internal Medicine
Neurology
Internal Medicine
Neurology
Internal Medicine

## 2019-07-19 NOTE — PROGRESS NOTE ADULT - PROBLEM SELECTOR PLAN 1
Neurology fu appreciated  Mgsulfate for HA
Neurology fu appreciated  Mgsulfate for HA
Patient with headaches, in the setting of vasogenic edema on MR brain, and known seizure disorder. Also reports vomiting.  Neurology was consulted. Recommended continuing anti-seizure medications, there is c/f an infiltrating neoplasm, since Alzheimer's does not cause vasogenic edema.  sp MRI- awaiting neurology for fu
seizure picked up by EEG   Patient to be transferred to [facility] under neurology as per neuro's note
Neurology fu appreciated  Mgsulfate for HA

## 2019-07-19 NOTE — PROGRESS NOTE ADULT - PROBLEM SELECTOR PLAN 3
continue with lantus and humolog
BP is better controlled   Continue with current meds

## 2019-07-19 NOTE — PROGRESS NOTE ADULT - REASON FOR ADMISSION
Headache, seizure work-up

## 2019-07-19 NOTE — PROGRESS NOTE ADULT - PROBLEM SELECTOR PLAN 5
DVT ppx: eliquis  DASH diet
Patient with known history of Afib.  Will continue eliquis.

## 2019-07-19 NOTE — PROGRESS NOTE ADULT - NSHPATTENDINGPLANDISCUSS_GEN_ALL_CORE
patient and daughter and neurology teams.
patient, neurology team
patient and neurology and epilepsy team and medical team.
patient and neurology and medical teams and patient family at bedside.

## 2019-07-19 NOTE — DISCHARGE NOTE PROVIDER - NSDCCPCAREPLAN_GEN_ALL_CORE_FT
PRINCIPAL DISCHARGE DIAGNOSIS  Diagnosis: Seizure  Assessment and Plan of Treatment: On EEG there was seizure activity. Transfer to [facility] epilepsy monitoring unit under the care of Dr. Alberto Centeno, attending epilepsy monitoring unit neurologist. Continue Keppra1 g TID and Continue vimpat 200 mg BID      SECONDARY DISCHARGE DIAGNOSES  Diagnosis: Afib  Assessment and Plan of Treatment: Will continue eliquis.  Dr. Melara was consulted for possible heart block picked up on tele

## 2019-07-19 NOTE — EEG REPORT - NS EEG TEXT BOX
[facility]   COMPREHENSIVE EPILEPSY CENTER   REPORT OF CONTINUOUS VIDEO EEG     [facility]: [address] [place] [address] [address], [place] [address], Ph#: [phone]  [facility]: [address], [place] [address], Ph#: [phone]  Office: [address] [place] [address], [place] [address], [place] [address] Ph#: [phone]    Patient Name: RON FOSTER  Age and : 69y (54)  MRN #: [id number]  Location: [place] [address] [address]  Referring Physician: Modesto Garcia    Study Date: 19 - 19    _____________________________________________________________  TECHNICAL INFORMATION    Placement and Labeling of Electrodes:  The EEG was performed utilizing 20 channels referential EEG connections (coronal over temporal over parasagittal montage) using all standard 10-20 electrode placements with EKG.  Recording was at a sampling rate of 256 samples per second per channel.  Time synchronized digital video recording was done simultaneously with EEG recording.  A low light infrared camera was used for low light recording.  Saulo and seizure detection algorithms were utilized.    _____________________________________________________________  HISTORY    Patient is a 69y old  Female who presents with a chief complaint of Headache, seizure work-up (2019 10:50)      PERTINENT MEDICATION:  MEDICATIONS  (STANDING):  apixaban 5 milliGRAM(s) Oral every 12 hours  cloNIDine 0.1 milliGRAM(s) Oral two times a day  dextrose 5%. 1000 milliLiter(s) (50 mL/Hr) IV Continuous <Continuous>  dextrose 50% Injectable 12.5 Gram(s) IV Push once  dextrose 50% Injectable 25 Gram(s) IV Push once  dextrose 50% Injectable 25 Gram(s) IV Push once  insulin glargine Injectable (LANTUS) 18 Unit(s) SubCutaneous at bedtime  insulin lispro (HumaLOG) corrective regimen sliding scale   SubCutaneous three times a day before meals  insulin lispro (HumaLOG) corrective regimen sliding scale   SubCutaneous at bedtime  insulin lispro Injectable (HumaLOG) 5 Unit(s) SubCutaneous three times a day before meals  labetalol 100 milliGRAM(s) Oral three times a day  lacosamide IVPB 150 milliGRAM(s) IV Intermittent every 12 hours  levETIRAcetam  IVPB 1000 milliGRAM(s) IV Intermittent <User Schedule>  levothyroxine 50 MICROGram(s) Oral daily  QUEtiapine 12.5 milliGRAM(s) Oral at bedtime    _____________________________________________________________  STUDY INTERPRETATION    Findings: The background was continuous, spontaneously variable and reactive. During wakefulness, the posterior dominant rhythm consisted of symmetric, well-modulated 7 Hz activity, with amplitude to 30 uV, that attenuated to eye opening.  Low amplitude frontal beta was noted in wakefulness.    Background Slowing:  -Slowing of the posterior dominant rhythm    Focal Slowing:   -Continuous delta and theta over the left hemisphere, maximum left posterior quadrant    Sleep Background:  Stage II sleep transients were not recorded.  Drowsiness and stage II sleep transients were not recorded.    Other Non-Epileptiform Findings:  None    Interictal Epileptiform Activity:   -Lateralized Periodic Discharges, Regional, Left Occipital, maximum O1 (polyspikes, abundant, 1 Hz)    Events:  Clinical events: Subclinical seizures (approximately 1-2 seizures per hour recorded)  Seizures: approximately 1-2 electrographic seizures per hour were recorded in the left occipital region.  LPDs evolve into rhythmic sharply contoured alpha and beta activity, duration approximately 2-4 minutes.  No associated clinical signs.    Activation Procedures:   Hyperventilation was not performed.    Photic stimulation was not performed.     Artifacts:  Intermittent myogenic and movement artifacts were noted.    ECG:  The heart rate on single channel ECG was predominantly between XX BPM.    _____________________________________________________________  EEG SUMMARY/CLASSIFICATION    Normal / Abnormal EEG in the awake, drowsy and asleep states.  Abnormal EEG in an unresponsive patient / a sedated patient.    -Clinical events: Subclinical seizures (approximately 1-2 seizures per hour recorded)  -Seizures: approximately 1-2 electrographic seizures per hour were recorded in the left occipital region.  LPDs evolve into rhythmic sharply contoured alpha and beta activity, duration approximately 2-4 minutes.  No associated clinical signs.    -Lateralized Periodic Discharges, Regional, Left Occipital, maximum O1 (polyspikes, abundant, 1 Hz)  -Continuous delta and theta over the left hemisphere, maximum left posterior quadrant  -Slowing of the posterior dominant rhythm  _____________________________________________________________  EEG IMPRESSION/CLINICAL CORRELATE  Abnormal EEG study.  1. Approximately 1-2 subclinical electrographic seizures per hour were recorded from the left occipital region. (duration appoximately 2-4 minutes each seizure)  2. Potential epileptogenic focus in the left occipital region.  3. Structural or functional abnormality in the left hemisphere.   4. Moderate nonspecific diffuse or multifocal cerebral dysfunction.     Neurology team was notified.    Jhony Berkowitz MD  EEG / Epilepsy Attending Physician

## 2019-07-19 NOTE — PROGRESS NOTE ADULT - SUBJECTIVE AND OBJECTIVE BOX
SUBJECTIVE: unable to obtain ROS as patient is nonverbal.    INTERVAL HISTORY: this morning, she was last normal at 4 am. When evaluated by the nurse at around 8 am, she was found to be nonverbal, not following any commands. code stroke was called.    PAST MEDICAL & SURGICAL HISTORY:  Hypothyroid  Obesity  NACHO (obstructive sleep apnea): sleep study done 5 years ago  DM (Diabetes Mellitus) (ICD9 250.00)  Dyslipidemia (ICD9 272.4)  HTN (Hypertension) (ICD9 401.9)  CAD (Coronary Artery Disease) (ICD9 414.00): c STENTS  History of hysterectomy  History of ovarian cystectomy: x 2 in 2013  S/P primary angioplasty with coronary stent: has 5 stents-first placed in 2007, last stent -2011,  C Section: x 1    FAMILY HISTORY:  Family history of heart disease (Aunt)    SOCIAL HISTORY:   T/E/D:   Occupation:   Lives with:     MEDICATIONS (HOME):  Home Medications:  Eliquis 5 mg oral tablet: 1 tab(s) orally 2 times a day    MEDICATIONS  (STANDING):  apixaban 5 milliGRAM(s) Oral every 12 hours  cloNIDine 0.1 milliGRAM(s) Oral two times a day  dextrose 5%. 1000 milliLiter(s) (50 mL/Hr) IV Continuous <Continuous>  dextrose 50% Injectable 12.5 Gram(s) IV Push once  dextrose 50% Injectable 25 Gram(s) IV Push once  dextrose 50% Injectable 25 Gram(s) IV Push once  insulin glargine Injectable (LANTUS) 18 Unit(s) SubCutaneous at bedtime  insulin lispro (HumaLOG) corrective regimen sliding scale   SubCutaneous three times a day before meals  insulin lispro (HumaLOG) corrective regimen sliding scale   SubCutaneous at bedtime  insulin lispro Injectable (HumaLOG) 5 Unit(s) SubCutaneous three times a day before meals  labetalol 100 milliGRAM(s) Oral three times a day  lacosamide IVPB 150 milliGRAM(s) IV Intermittent every 12 hours  levETIRAcetam  IVPB 1000 milliGRAM(s) IV Intermittent <User Schedule>  levothyroxine 50 MICROGram(s) Oral daily  QUEtiapine 12.5 milliGRAM(s) Oral at bedtime    MEDICATIONS  (PRN):  acetaminophen   Tablet .. 650 milliGRAM(s) Oral every 6 hours PRN Mild Pain (1 - 3), Moderate Pain (4 - 6), Severe Pain (7 - 10)  dextrose 40% Gel 15 Gram(s) Oral once PRN Blood Glucose LESS THAN 70 milliGRAM(s)/deciliter  glucagon  Injectable 1 milliGRAM(s) IntraMuscular once PRN Glucose LESS THAN 70 milligrams/deciliter  ibuprofen  Tablet. 600 milliGRAM(s) Oral every 6 hours PRN Mild Pain (1 - 3), Moderate Pain (4 - 6)    ALLERGIES/INTOLERANCES:  Allergies  No Known Allergies    Intolerances    VITALS & EXAMINATION:  Vital Signs Last 24 Hrs  T(C): 37 (17 Jul 2019 08:15), Max: 37 (17 Jul 2019 08:15)  T(F): 98.6 (17 Jul 2019 08:15), Max: 98.6 (17 Jul 2019 08:15)  HR: 64 (17 Jul 2019 08:15) (64 - 71)  BP: 150/77 (17 Jul 2019 08:15) (111/67 - 169/72)  BP(mean): --  RR: 18 (17 Jul 2019 08:15) (17 - 18)  SpO2: 100% (17 Jul 2019 08:15) (97% - 100%)    General:  Constitutional: Obese Female, appears stated age,  Head: Normocephalic & atraumatic.  Extremities: No cyanosis, clubbing, or edema.  Skin: No rashes, bruising, or discoloration.    Neurological (>12):  MS: Awake, alert, no verbal output. Does not follow any commands.    CNs: PERRLA (R = 4 mm, L = 4 mm). absent blink to threat on the right, present on the left. could not assess EOMI due to participation. No facial asymmetry b/l.     Motor: No drift b/l UE. no effort against gravity in b/l LE.    Sensation: Intact to noxious stimuli in all 4 extremities    Coordination: cannot assess    LABORATORY:  CBC                       9.7    4.61  )-----------( 245      ( 17 Jul 2019 05:25 )             32.3     Chem 07-17    145  |  108<H>  |  12  ----------------------------<  129<H>  3.8   |  25  |  0.64    Ca    9.6      17 Jul 2019 05:25  Phos  4.1     07-17  Mg     2.1     07-17    TPro  7.3  /  Alb  3.9  /  TBili  < 0.2<L>  /  DBili  x   /  AST  29  /  ALT  24  /  AlkPhos  187<H>  07-17    LFTs LIVER FUNCTIONS - ( 17 Jul 2019 05:25 )  Alb: 3.9 g/dL / Pro: 7.3 g/dL / ALK PHOS: 187 u/L / ALT: 24 u/L / AST: 29 u/L / GGT: x           Coagulopathy   Lipid Panel   A1c 07-16 [id number] 10.6    Cardiac enzymes     U/A   CSF  Immunological  Other    STUDIES & IMAGING:  Studies (EKG, EEG, EMG, etc):     Radiology (XR, CT, MR, U/S, TTE/STEPHANY):
69y            Female                                                                                                                                          PAST MEDICAL & SURGICAL HISTORY:  Hypothyroid  Obesity  NACHO (obstructive sleep apnea): sleep study done 5 years ago  DM (Diabetes Mellitus) (ICD9 250.00)  Dyslipidemia (ICD9 272.4)  HTN (Hypertension) (ICD9 401.9)  CAD (Coronary Artery Disease) (ICD9 414.00): c STENTS  History of hysterectomy  History of ovarian cystectomy: x 2 in 2013  S/P primary angioplasty with coronary stent: has 5 stents-first placed in 2007, last stent -2011,  C Section: x 1        acetaminophen   Tablet .. 650 milliGRAM(s) Oral every 6 hours PRN  apixaban 5 milliGRAM(s) Oral every 12 hours  cloNIDine 0.1 milliGRAM(s) Oral two times a day  dextrose 40% Gel 15 Gram(s) Oral once PRN  dextrose 5%. 1000 milliLiter(s) IV Continuous <Continuous>  dextrose 50% Injectable 12.5 Gram(s) IV Push once  dextrose 50% Injectable 25 Gram(s) IV Push once  dextrose 50% Injectable 25 Gram(s) IV Push once  glucagon  Injectable 1 milliGRAM(s) IntraMuscular once PRN  ibuprofen  Tablet. 600 milliGRAM(s) Oral every 6 hours PRN  insulin glargine Injectable (LANTUS) 18 Unit(s) SubCutaneous at bedtime  insulin lispro (HumaLOG) corrective regimen sliding scale   SubCutaneous three times a day before meals  insulin lispro (HumaLOG) corrective regimen sliding scale   SubCutaneous at bedtime  insulin lispro Injectable (HumaLOG) 5 Unit(s) SubCutaneous three times a day before meals  labetalol 100 milliGRAM(s) Oral three times a day  lacosamide IVPB 150 milliGRAM(s) IV Intermittent every 12 hours  levETIRAcetam  IVPB 1000 milliGRAM(s) IV Intermittent <User Schedule>  levothyroxine 50 MICROGram(s) Oral daily  QUEtiapine 12.5 milliGRAM(s) Oral at bedtime      REVIEW OF SYSTEMS  General: InNAD  Respiratory and Thorax: No SOB  Cardiovascular: No CP, No palpitations  Gastrointestinal: NON/V/D  Musculoskeletal: No muscular pain	  Neurological: No HA                          9.7    4.61  )-----------( 245      ( 17 Jul 2019 05:25 )             32.3                                                               07-17    145  |  108<H>  |  12  ----------------------------<  129<H>  3.8   |  25  |  0.64    Ca    9.6      17 Jul 2019 05:25  Phos  4.1     07-17  Mg     2.1     07-17    TPro  7.3  /  Alb  3.9  /  TBili  < 0.2<L>  /  DBili  x   /  AST  29  /  ALT  24  /  AlkPhos  187<H>  07-17       Vital Signs Last 24 Hrs  T(C): 36.6 (17 Jul 2019 11:51), Max: 37 (17 Jul 2019 08:15)  T(F): 97.8 (17 Jul 2019 11:51), Max: 98.6 (17 Jul 2019 08:15)  HR: 69 (17 Jul 2019 11:51) (64 - 71)  BP: 144/72 (17 Jul 2019 11:51) (144/72 - 169/72)  BP(mean): --  RR: 17 (17 Jul 2019 11:51) (17 - 18)  SpO2: 100% (17 Jul 2019 11:51) (97% - 100%)    PHYSICAL EXAM:  Constitutional: Well nourished  Eyes: PERRLA, EOMI  Neck: supple, No JVD  Respiratory: CTABL   Cardiovascular: S1/S2, RRR  Gastrointestinal: Soft, NT, ND, + BS  Extremities: NO edema  Neurological: A+Ox 1
69y            Female                                                                                                                                          PAST MEDICAL & SURGICAL HISTORY:  Hypothyroid  Obesity  NACHO (obstructive sleep apnea): sleep study done 5 years ago  DM (Diabetes Mellitus) (ICD9 250.00)  Dyslipidemia (ICD9 272.4)  HTN (Hypertension) (ICD9 401.9)  CAD (Coronary Artery Disease) (ICD9 414.00): c STENTS  History of hysterectomy  History of ovarian cystectomy: x 2 in 2013  S/P primary angioplasty with coronary stent: has 5 stents-first placed in 2007, last stent -2011,  C Section: x 1        acetaminophen   Tablet .. 650 milliGRAM(s) Oral every 6 hours PRN  apixaban 5 milliGRAM(s) Oral every 12 hours  cloNIDine 0.1 milliGRAM(s) Oral two times a day  dextrose 40% Gel 15 Gram(s) Oral once PRN  dextrose 5%. 1000 milliLiter(s) IV Continuous <Continuous>  dextrose 50% Injectable 12.5 Gram(s) IV Push once  dextrose 50% Injectable 25 Gram(s) IV Push once  dextrose 50% Injectable 25 Gram(s) IV Push once  glucagon  Injectable 1 milliGRAM(s) IntraMuscular once PRN  insulin glargine Injectable (LANTUS) 18 Unit(s) SubCutaneous at bedtime  insulin lispro (HumaLOG) corrective regimen sliding scale   SubCutaneous three times a day before meals  insulin lispro (HumaLOG) corrective regimen sliding scale   SubCutaneous at bedtime  insulin lispro Injectable (HumaLOG) 5 Unit(s) SubCutaneous three times a day before meals  labetalol 100 milliGRAM(s) Oral three times a day  lacosamide IVPB 150 milliGRAM(s) IV Intermittent every 12 hours  levETIRAcetam  IVPB 1000 milliGRAM(s) IV Intermittent <User Schedule>  levothyroxine 50 MICROGram(s) Oral daily  QUEtiapine 12.5 milliGRAM(s) Oral at bedtime      REVIEW OF SYSTEMS  General: InNAD  Respiratory and Thorax: No SOB  Cardiovascular: No CP, No palpitations  Gastrointestinal: NON/V/D  Musculoskeletal: No muscular pain	  Neurological: No HA                          8.8    4.77  )-----------( 215      ( 16 Jul 2019 07:05 )             29.1                                                               07-15    144  |  104  |  9   ----------------------------<  169<H>  3.6   |  27  |  0.80    Ca    9.6      15 Jul 2019 12:53  Phos  3.6     07-15  Mg     1.8     07-15    TPro  7.1  /  Alb  4.0  /  TBili  0.2  /  DBili  x   /  AST  12  /  ALT  12  /  AlkPhos  168<H>  07-15       Vital Signs Last 24 Hrs  T(C): 36.7 (16 Jul 2019 07:18), Max: 37 (16 Jul 2019 06:05)  T(F): 98.1 (16 Jul 2019 07:18), Max: 98.6 (16 Jul 2019 06:05)  HR: 63 (16 Jul 2019 07:18) (63 - 71)  BP: 169/77 (16 Jul 2019 07:18) (119/58 - 175/92)  BP(mean): --  RR: 18 (16 Jul 2019 07:18) (17 - 18)  SpO2: 100% (16 Jul 2019 07:18) (95% - 100%)    PHYSICAL EXAM:  Constitutional: Well nourished  Eyes: PERRLA, EOMI  Neck: supple, No JVD  Respiratory: CTABL   Cardiovascular: S1/S2, RRR  Gastrointestinal: Soft, NT, ND, + BS  Extremities: NO edema  Neurological: A+Ox3
69y            Female                                                                                                                                          PAST MEDICAL & SURGICAL HISTORY:  Hypothyroid  Obesity  NACHO (obstructive sleep apnea): sleep study done 5 years ago  DM (Diabetes Mellitus) (ICD9 250.00)  Dyslipidemia (ICD9 272.4)  HTN (Hypertension) (ICD9 401.9)  CAD (Coronary Artery Disease) (ICD9 414.00): c STENTS  History of hysterectomy  History of ovarian cystectomy: x 2 in 2013  S/P primary angioplasty with coronary stent: has 5 stents-first placed in 2007, last stent -2011,  C Section: x 1        acetaminophen   Tablet .. 650 milliGRAM(s) Oral every 6 hours PRN  apixaban 5 milliGRAM(s) Oral every 12 hours  cloNIDine 0.1 milliGRAM(s) Oral two times a day  dextrose 40% Gel 15 Gram(s) Oral once PRN  dextrose 5%. 1000 milliLiter(s) IV Continuous <Continuous>  dextrose 50% Injectable 12.5 Gram(s) IV Push once  dextrose 50% Injectable 25 Gram(s) IV Push once  dextrose 50% Injectable 25 Gram(s) IV Push once  glucagon  Injectable 1 milliGRAM(s) IntraMuscular once PRN  insulin glargine Injectable (LANTUS) 18 Unit(s) SubCutaneous at bedtime  insulin lispro (HumaLOG) corrective regimen sliding scale   SubCutaneous three times a day before meals  insulin lispro (HumaLOG) corrective regimen sliding scale   SubCutaneous at bedtime  insulin lispro Injectable (HumaLOG) 5 Unit(s) SubCutaneous three times a day before meals  labetalol 100 milliGRAM(s) Oral three times a day  lacosamide IVPB 150 milliGRAM(s) IV Intermittent every 12 hours  levETIRAcetam  IVPB 1000 milliGRAM(s) IV Intermittent <User Schedule>  levothyroxine 50 MICROGram(s) Oral daily  QUEtiapine 12.5 milliGRAM(s) Oral at bedtime      REVIEW OF SYSTEMS  Patient is sitting in bed having lunch. She has no co   General: InNAD  Respiratory and Thorax: No SOB  Cardiovascular: No CP, No palpitations  Gastrointestinal: NON/V/D  Musculoskeletal: No muscular pain	  Neurological: No HA today- but reports HA off and on                           9.9    5.93  )-----------( 264      ( 14 Jul 2019 06:48 )             32.9                                                               07-14    141  |  103  |  10  ----------------------------<  253<H>  3.8   |  24  |  0.64    Ca    9.5      14 Jul 2019 06:48    TPro  7.7  /  Alb  4.3  /  TBili  < 0.2<L>  /  DBili  x   /  AST  19  /  ALT  18  /  AlkPhos  188<H>  07-14       Vital Signs Last 24 Hrs  T(C): 36.9 (15 Jul 2019 11:05), Max: 37.6 (14 Jul 2019 18:15)  T(F): 98.4 (15 Jul 2019 11:05), Max: 99.7 (14 Jul 2019 18:15)  HR: 67 (15 Jul 2019 11:05) (67 - 79)  BP: 138/80 (15 Jul 2019 11:05) (138/80 - 209/92)  BP(mean): --  RR: 17 (15 Jul 2019 11:05) (16 - 18)  SpO2: 100% (15 Jul 2019 11:05) (95% - 100%)    PHYSICAL EXAM:  Constitutional: Well nourished  Eyes: PERRLA, EOMI  Neck: supple, No JVD  Respiratory: CTABL   Cardiovascular: S1/S2, RRR  Gastrointestinal: Soft, NT, ND, + BS  Extremities: NO edema  Neurological: A+Ox2
Call received from neurology resident. EEG done showing a seizure. Will increase Vimpat to 200mg bid as per neurology recommendation.
Neurology Follow up note    Patient is a 69y old  Female who presents with a chief complaint of Headache, seizure work-up (15 Jul 2019 12:54)      Subjective: Interval History - No events overnight    Objective:   Vital Signs Last 24 Hrs  T(C): 36.7 (15 Jul 2019 13:29), Max: 37.6 (14 Jul 2019 18:15)  T(F): 98.1 (15 Jul 2019 13:29), Max: 99.7 (14 Jul 2019 18:15)  HR: 68 (15 Jul 2019 13:29) (67 - 79)  BP: 119/58 (15 Jul 2019 13:29) (119/58 - 209/92)  BP(mean): --  RR: 18 (15 Jul 2019 13:29) (16 - 18)  SpO2: 99% (15 Jul 2019 13:29) (95% - 100%)    Neurological Exam:    Mental Status: Orientated to self, date and place.    TTP of left temporal area    Cranial Nerves: EOMI, no nystagmus or diplopia. CN V1-3 intact to light touch and pinprick.  No facial asymmetry.       Strength: intact throughout    Toes flexor bilaterally    Gait: normal and stable.      Other:    07-15    144  |  104  |  9   ----------------------------<  169<H>  3.6   |  27  |  0.80    Ca    9.6      15 Jul 2019 12:53  Phos  3.6     07-15  Mg     1.8     07-15    TPro  7.1  /  Alb  4.0  /  TBili  0.2  /  DBili  x   /  AST  12  /  ALT  12  /  AlkPhos  168<H>  07-15    07-15    144  |  104  |  9   ----------------------------<  169<H>  3.6   |  27  |  0.80    Ca    9.6      15 Jul 2019 12:53  Phos  3.6     07-15  Mg     1.8     07-15    TPro  7.1  /  Alb  4.0  /  TBili  0.2  /  DBili  x   /  AST  12  /  ALT  12  /  AlkPhos  168<H>  07-15    LIVER FUNCTIONS - ( 15 Jul 2019 12:53 )  Alb: 4.0 g/dL / Pro: 7.1 g/dL / ALK PHOS: 168 u/L / ALT: 12 u/L / AST: 12 u/L / GGT: x                                 9.9    5.95  )-----------( 205      ( 15 Jul 2019 12:53 )             32.6     Radiology    EKG:  tele:  TTE:  EEG:      MEDICATIONS  (STANDING):  apixaban 5 milliGRAM(s) Oral every 12 hours  cloNIDine 0.1 milliGRAM(s) Oral two times a day  dextrose 5%. 1000 milliLiter(s) (50 mL/Hr) IV Continuous <Continuous>  dextrose 50% Injectable 12.5 Gram(s) IV Push once  dextrose 50% Injectable 25 Gram(s) IV Push once  dextrose 50% Injectable 25 Gram(s) IV Push once  insulin glargine Injectable (LANTUS) 18 Unit(s) SubCutaneous at bedtime  insulin lispro (HumaLOG) corrective regimen sliding scale   SubCutaneous three times a day before meals  insulin lispro (HumaLOG) corrective regimen sliding scale   SubCutaneous at bedtime  insulin lispro Injectable (HumaLOG) 5 Unit(s) SubCutaneous three times a day before meals  labetalol 100 milliGRAM(s) Oral three times a day  lacosamide IVPB 150 milliGRAM(s) IV Intermittent every 12 hours  levETIRAcetam  IVPB 1000 milliGRAM(s) IV Intermittent <User Schedule>  levothyroxine 50 MICROGram(s) Oral daily  QUEtiapine 12.5 milliGRAM(s) Oral at bedtime    MEDICATIONS  (PRN):  acetaminophen   Tablet .. 650 milliGRAM(s) Oral every 6 hours PRN Mild Pain (1 - 3), Moderate Pain (4 - 6), Severe Pain (7 - 10)  dextrose 40% Gel 15 Gram(s) Oral once PRN Blood Glucose LESS THAN 70 milliGRAM(s)/deciliter  glucagon  Injectable 1 milliGRAM(s) IntraMuscular once PRN Glucose LESS THAN 70 milligrams/deciliter
SUBJECTIVE: denies any headache    INTERVAL HISTORY: no overnight events    PAST MEDICAL & SURGICAL HISTORY:  Hypothyroid  Obesity  NACHO (obstructive sleep apnea): sleep study done 5 years ago  DM (Diabetes Mellitus) (ICD9 250.00)  Dyslipidemia (ICD9 272.4)  HTN (Hypertension) (ICD9 401.9)  CAD (Coronary Artery Disease) (ICD9 414.00): c STENTS  History of hysterectomy  History of ovarian cystectomy: x 2 in 2013  S/P primary angioplasty with coronary stent: has 5 stents-first placed in 2007, last stent -2011,  C Section: x 1    FAMILY HISTORY:  Family history of heart disease (Aunt)    SOCIAL HISTORY:   Lives with:     MEDICATIONS (HOME):  Home Medications:  Eliquis 5 mg oral tablet: 1 tab(s) orally 2 times a day. RESTART on 5/10/2019 (15 Jul 2019 03:43)    MEDICATIONS  (STANDING):  apixaban 5 milliGRAM(s) Oral every 12 hours  cloNIDine 0.1 milliGRAM(s) Oral two times a day  dextrose 5%. 1000 milliLiter(s) (50 mL/Hr) IV Continuous <Continuous>  dextrose 50% Injectable 12.5 Gram(s) IV Push once  dextrose 50% Injectable 25 Gram(s) IV Push once  dextrose 50% Injectable 25 Gram(s) IV Push once  docusate sodium 100 milliGRAM(s) Oral daily  insulin glargine Injectable (LANTUS) 18 Unit(s) SubCutaneous at bedtime  insulin lispro (HumaLOG) corrective regimen sliding scale   SubCutaneous three times a day before meals  insulin lispro (HumaLOG) corrective regimen sliding scale   SubCutaneous at bedtime  insulin lispro Injectable (HumaLOG) 5 Unit(s) SubCutaneous three times a day before meals  labetalol 100 milliGRAM(s) Oral three times a day  lacosamide IVPB 200 milliGRAM(s) IV Intermittent every 12 hours  levETIRAcetam  IVPB 1000 milliGRAM(s) IV Intermittent <User Schedule>  levothyroxine 50 MICROGram(s) Oral daily  polyethylene glycol 3350 17 Gram(s) Oral daily  QUEtiapine 12.5 milliGRAM(s) Oral at bedtime    MEDICATIONS  (PRN):  acetaminophen   Tablet .. 650 milliGRAM(s) Oral every 6 hours PRN Mild Pain (1 - 3), Moderate Pain (4 - 6), Severe Pain (7 - 10)  dextrose 40% Gel 15 Gram(s) Oral once PRN Blood Glucose LESS THAN 70 milliGRAM(s)/deciliter  glucagon  Injectable 1 milliGRAM(s) IntraMuscular once PRN Glucose LESS THAN 70 milligrams/deciliter  ibuprofen  Tablet. 600 milliGRAM(s) Oral every 6 hours PRN Mild Pain (1 - 3), Moderate Pain (4 - 6)    ALLERGIES/INTOLERANCES:  Allergies  No Known Allergies    Intolerances    VITALS & EXAMINATION:  Vital Signs Last 24 Hrs  T(C): 37.4 (19 Jul 2019 09:09), Max: 37.4 (19 Jul 2019 09:09)  T(F): 99.4 (19 Jul 2019 09:09), Max: 99.4 (19 Jul 2019 09:09)  HR: 64 (19 Jul 2019 09:09) (60 - 71)  BP: 141/63 (19 Jul 2019 09:09) (136/67 - 172/83)  BP(mean): --  RR: 17 (19 Jul 2019 09:09) (16 - 18)  SpO2: 99% (19 Jul 2019 09:09) (97% - 100%)    General:  Constitutional: Obese Female, appears stated age, in no apparent distress including pain  Respiratory: Patent airway. All lung fields are clear to auscultation bilaterally.  Extremities: No cyanosis, clubbing, or edema.  Skin: No rashes, bruising, or discoloration.    Neurological (>12):  MS: Awake, alert, oriented to person, place, situation, time. Normal affect. Follows all commands.    Language: Speech is clear, fluent. Finger agnosia, able to name pen, hand paper. shows 2 fingers, cannot follow crossed commands. repetition impaired.    CNs: No gross facial asymmetry b/l.    Motor: full strength biceps and triceps b/l. 5/5 hand  b/l. 5/5 KF and KE b/l.      LABORATORY:    CBC                       9.6    4.55  )-----------( 201      ( 18 Jul 2019 13:29 )             31.7     Chem 07-18    141  |  109<H>  |  17  ----------------------------<  123<H>  3.9   |  16<L>  |  0.65    Ca    9.5      18 Jul 2019 13:29      LFTs   Coagulopathy   Lipid Panel   A1c 07-16 [id number] 10.6    Cardiac enzymes     U/A   CSF  Immunological  Other    STUDIES & IMAGING:  Studies (EKG, EEG, EMG, etc):     Radiology (XR, CT, MR, U/S, TTE/STEPHANY):
SUBJECTIVE: much improved from yesterday. participating in conversation.    INTERVAL HISTORY:    PAST MEDICAL & SURGICAL HISTORY:  Hypothyroid  Obesity  NACHO (obstructive sleep apnea): sleep study done 5 years ago  DM (Diabetes Mellitus) (ICD9 250.00)  Dyslipidemia (ICD9 272.4)  HTN (Hypertension) (ICD9 401.9)  CAD (Coronary Artery Disease) (ICD9 414.00): c STENTS  History of hysterectomy  History of ovarian cystectomy: x 2 in 2013  S/P primary angioplasty with coronary stent: has 5 stents-first placed in 2007, last stent -2011,  C Section: x 1    FAMILY HISTORY:  Family history of heart disease (Aunt)    SOCIAL HISTORY:     Lives with:     MEDICATIONS (HOME):  Home Medications:  Eliquis 5 mg oral tablet: 1 tab(s) orally 2 times a day. RESTART on 5/10/2019 (15 Jul 2019 03:43)    MEDICATIONS  (STANDING):  apixaban 5 milliGRAM(s) Oral every 12 hours  cloNIDine 0.1 milliGRAM(s) Oral two times a day  dextrose 5%. 1000 milliLiter(s) (50 mL/Hr) IV Continuous <Continuous>  dextrose 50% Injectable 12.5 Gram(s) IV Push once  dextrose 50% Injectable 25 Gram(s) IV Push once  dextrose 50% Injectable 25 Gram(s) IV Push once  insulin glargine Injectable (LANTUS) 18 Unit(s) SubCutaneous at bedtime  insulin lispro (HumaLOG) corrective regimen sliding scale   SubCutaneous three times a day before meals  insulin lispro (HumaLOG) corrective regimen sliding scale   SubCutaneous at bedtime  insulin lispro Injectable (HumaLOG) 5 Unit(s) SubCutaneous three times a day before meals  labetalol 100 milliGRAM(s) Oral three times a day  lacosamide IVPB 150 milliGRAM(s) IV Intermittent every 12 hours  levETIRAcetam  IVPB 1000 milliGRAM(s) IV Intermittent <User Schedule>  levothyroxine 50 MICROGram(s) Oral daily  QUEtiapine 12.5 milliGRAM(s) Oral at bedtime    MEDICATIONS  (PRN):  acetaminophen   Tablet .. 650 milliGRAM(s) Oral every 6 hours PRN Mild Pain (1 - 3), Moderate Pain (4 - 6), Severe Pain (7 - 10)  dextrose 40% Gel 15 Gram(s) Oral once PRN Blood Glucose LESS THAN 70 milliGRAM(s)/deciliter  glucagon  Injectable 1 milliGRAM(s) IntraMuscular once PRN Glucose LESS THAN 70 milligrams/deciliter  ibuprofen  Tablet. 600 milliGRAM(s) Oral every 6 hours PRN Mild Pain (1 - 3), Moderate Pain (4 - 6)    ALLERGIES/INTOLERANCES:  Allergies  No Known Allergies    Intolerances    VITALS & EXAMINATION:  Vital Signs Last 24 Hrs  T(C): 36.8 (18 Jul 2019 09:10), Max: 36.8 (18 Jul 2019 05:08)  T(F): 98.2 (18 Jul 2019 09:10), Max: 98.2 (18 Jul 2019 05:08)  HR: 66 (18 Jul 2019 09:10) (63 - 73)  BP: 141/77 (18 Jul 2019 09:10) (141/77 - 174/95)  BP(mean): --  RR: 18 (18 Jul 2019 09:10) (17 - 18)  SpO2: 100% (18 Jul 2019 09:10) (100% - 100%)    General:  Constitutional: Obese Female, appears stated age, in no apparent distress including pain  Head: Normocephalic & atraumatic.  Extremities: No cyanosis, clubbing, or edema.  Skin: No rashes, bruising, or discoloration.    Neurological (>12):  MS: Awake, alert, oriented to person, place, situation. difficulty with naming, impaired repetition. able to show 2 fingers, could not follow commands to cross midline. +perseveration.    CNs: PERRLA (R = 3mm, L = 3mm).EOMI no nystagmus. V1-3 intact to LT/pinprick, No facial asymmetry b/l, full eye closure strength b/l.     Motor: 5/5 b/l UE. giveway weakness in b/l LE      Reflexes:              Biceps(C5)       BR(C6)     Triceps(C7)               Patellar(L4)    Achilles(S1)      R	2	          2	             2		        2		    2		  L	2	          2	             2		        2		    2		        Gait: No postural instability. Normal stance and tandem gait.     LABORATORY:  CBC                       9.7    4.61  )-----------( 245      ( 17 Jul 2019 05:25 )             32.3     Chem 07-17    145  |  108<H>  |  12  ----------------------------<  129<H>  3.8   |  25  |  0.64    Ca    9.6      17 Jul 2019 05:25  Phos  4.1     07-17  Mg     2.1     07-17    TPro  7.3  /  Alb  3.9  /  TBili  < 0.2<L>  /  DBili  x   /  AST  29  /  ALT  24  /  AlkPhos  187<H>  07-17    LFTs LIVER FUNCTIONS - ( 17 Jul 2019 05:25 )  Alb: 3.9 g/dL / Pro: 7.3 g/dL / ALK PHOS: 187 u/L / ALT: 24 u/L / AST: 29 u/L / GGT: x           Coagulopathy   Lipid Panel   A1c 07-16 [id number] 10.6    Cardiac enzymes     U/A   CSF  Immunological  Other    STUDIES & IMAGING:  Studies (EKG, EEG, EMG, etc):     Radiology (XR, CT, MR, U/S, TTE/STEPHANY):
69y            Female                                                                                                                                          PAST MEDICAL & SURGICAL HISTORY:  Hypothyroid  Obesity  NACHO (obstructive sleep apnea): sleep study done 5 years ago  DM (Diabetes Mellitus) (ICD9 250.00)  Dyslipidemia (ICD9 272.4)  HTN (Hypertension) (ICD9 401.9)  CAD (Coronary Artery Disease) (ICD9 414.00): c STENTS  History of hysterectomy  History of ovarian cystectomy: x 2 in 2013  S/P primary angioplasty with coronary stent: has 5 stents-first placed in 2007, last stent -2011,  C Section: x 1        acetaminophen   Tablet .. 650 milliGRAM(s) Oral every 6 hours PRN  apixaban 5 milliGRAM(s) Oral every 12 hours  cloNIDine 0.1 milliGRAM(s) Oral two times a day  dextrose 40% Gel 15 Gram(s) Oral once PRN  dextrose 5%. 1000 milliLiter(s) IV Continuous <Continuous>  dextrose 50% Injectable 12.5 Gram(s) IV Push once  dextrose 50% Injectable 25 Gram(s) IV Push once  dextrose 50% Injectable 25 Gram(s) IV Push once  docusate sodium 100 milliGRAM(s) Oral daily  glucagon  Injectable 1 milliGRAM(s) IntraMuscular once PRN  ibuprofen  Tablet. 600 milliGRAM(s) Oral every 6 hours PRN  insulin glargine Injectable (LANTUS) 18 Unit(s) SubCutaneous at bedtime  insulin lispro (HumaLOG) corrective regimen sliding scale   SubCutaneous three times a day before meals  insulin lispro (HumaLOG) corrective regimen sliding scale   SubCutaneous at bedtime  insulin lispro Injectable (HumaLOG) 5 Unit(s) SubCutaneous three times a day before meals  labetalol 100 milliGRAM(s) Oral three times a day  lacosamide IVPB 200 milliGRAM(s) IV Intermittent every 12 hours  levETIRAcetam  IVPB 1000 milliGRAM(s) IV Intermittent <User Schedule>  levothyroxine 50 MICROGram(s) Oral daily  polyethylene glycol 3350 17 Gram(s) Oral daily  QUEtiapine 12.5 milliGRAM(s) Oral at bedtime      REVIEW OF SYSTEMS  General: InNAD  Respiratory and Thorax: No SOB  Cardiovascular: No CP, No palpitations  Gastrointestinal: NON/V/D  Musculoskeletal: No muscular pain	  Neurological: No HA                          9.6    4.55  )-----------( 201      ( 18 Jul 2019 13:29 )             31.7                                                               07-18    141  |  109<H>  |  17  ----------------------------<  123<H>  3.9   |  16<L>  |  0.65    Ca    9.5      18 Jul 2019 13:29         Vital Signs Last 24 Hrs  T(C): 37.4 (19 Jul 2019 13:56), Max: 37.4 (19 Jul 2019 09:09)  T(F): 99.4 (19 Jul 2019 13:56), Max: 99.4 (19 Jul 2019 09:09)  HR: 61 (19 Jul 2019 13:56) (60 - 71)  BP: 159/85 (19 Jul 2019 13:56) (136/67 - 172/83)  BP(mean): --  RR: 17 (19 Jul 2019 13:56) (16 - 18)  SpO2: 98% (19 Jul 2019 13:56) (97% - 100%)    PHYSICAL EXAM:  Constitutional: Well nourished  Eyes: PERRLA, EOMI  Neck: supple, No JVD  Respiratory: CTABL   Cardiovascular: S1/S2, RRR  Gastrointestinal: Soft, NT, ND, + BS  Extremities: NO edema  Neurological: A+Ox 2
69y            Female                                                                                                                                          PAST MEDICAL & SURGICAL HISTORY:  Hypothyroid  Obesity  NACHO (obstructive sleep apnea): sleep study done 5 years ago  DM (Diabetes Mellitus) (ICD9 250.00)  Dyslipidemia (ICD9 272.4)  HTN (Hypertension) (ICD9 401.9)  CAD (Coronary Artery Disease) (ICD9 414.00): c STENTS  History of hysterectomy  History of ovarian cystectomy: x 2 in 2013  S/P primary angioplasty with coronary stent: has 5 stents-first placed in 2007, last stent -2011,  C Section: x 1        acetaminophen   Tablet .. 650 milliGRAM(s) Oral every 6 hours PRN  apixaban 5 milliGRAM(s) Oral every 12 hours  cloNIDine 0.1 milliGRAM(s) Oral two times a day  dextrose 40% Gel 15 Gram(s) Oral once PRN  dextrose 5%. 1000 milliLiter(s) IV Continuous <Continuous>  dextrose 50% Injectable 12.5 Gram(s) IV Push once  dextrose 50% Injectable 25 Gram(s) IV Push once  dextrose 50% Injectable 25 Gram(s) IV Push once  glucagon  Injectable 1 milliGRAM(s) IntraMuscular once PRN  ibuprofen  Tablet. 600 milliGRAM(s) Oral every 6 hours PRN  insulin glargine Injectable (LANTUS) 18 Unit(s) SubCutaneous at bedtime  insulin lispro (HumaLOG) corrective regimen sliding scale   SubCutaneous three times a day before meals  insulin lispro (HumaLOG) corrective regimen sliding scale   SubCutaneous at bedtime  insulin lispro Injectable (HumaLOG) 5 Unit(s) SubCutaneous three times a day before meals  labetalol 100 milliGRAM(s) Oral three times a day  lacosamide IVPB 200 milliGRAM(s) IV Intermittent every 12 hours  levETIRAcetam  IVPB 1000 milliGRAM(s) IV Intermittent <User Schedule>  levothyroxine 50 MICROGram(s) Oral daily  QUEtiapine 12.5 milliGRAM(s) Oral at bedtime      REVIEW OF SYSTEMS  General: InNAD  Respiratory and Thorax: No SOB  Cardiovascular: No CP, No palpitations  Gastrointestinal: NON/V/D  Musculoskeletal: No muscular pain	  Neurological: co HA at times                          9.6    4.55  )-----------( 201      ( 18 Jul 2019 13:29 )             31.7                                                               07-18    141  |  109<H>  |  17  ----------------------------<  123<H>  3.9   |  16<L>  |  0.65    Ca    9.5      18 Jul 2019 13:29  Phos  4.1     07-17  Mg     2.1     07-17    TPro  7.3  /  Alb  3.9  /  TBili  < 0.2<L>  /  DBili  x   /  AST  29  /  ALT  24  /  AlkPhos  187<H>  07-17       Vital Signs Last 24 Hrs  T(C): 36.8 (18 Jul 2019 13:03), Max: 36.8 (18 Jul 2019 05:08)  T(F): 98.3 (18 Jul 2019 13:03), Max: 98.3 (18 Jul 2019 13:03)  HR: 65 (18 Jul 2019 13:03) (63 - 66)  BP: 154/82 (18 Jul 2019 13:03) (141/77 - 166/83)  BP(mean): --  RR: 18 (18 Jul 2019 13:03) (17 - 18)  SpO2: 98% (18 Jul 2019 13:03) (98% - 100%)    PHYSICAL EXAM:  Constitutional: Well nourished  Eyes: PERRLA, EOMI  Neck: supple, No JVD  Respiratory: CTABL   Cardiovascular: S1/S2, RRR  Gastrointestinal: Soft, NT, ND, + BS  Extremities: NO edema  Neurological: A+Ox1-2

## 2019-07-20 ENCOUNTER — INPATIENT (INPATIENT)
Facility: HOSPITAL | Age: 69
LOS: 6 days | Discharge: HOME | DRG: 100 | End: 2019-07-27
Attending: PSYCHIATRY & NEUROLOGY | Admitting: PSYCHIATRY & NEUROLOGY
Payer: COMMERCIAL

## 2019-07-20 ENCOUNTER — TRANSCRIPTION ENCOUNTER (OUTPATIENT)
Age: 69
End: 2019-07-20

## 2019-07-20 VITALS
TEMPERATURE: 98 F | DIASTOLIC BLOOD PRESSURE: 90 MMHG | SYSTOLIC BLOOD PRESSURE: 192 MMHG | HEART RATE: 68 BPM | OXYGEN SATURATION: 100 % | RESPIRATION RATE: 18 BRPM

## 2019-07-20 VITALS
HEIGHT: 65 IN | TEMPERATURE: 98 F | SYSTOLIC BLOOD PRESSURE: 149 MMHG | RESPIRATION RATE: 18 BRPM | OXYGEN SATURATION: 94 % | WEIGHT: 220.02 LBS | DIASTOLIC BLOOD PRESSURE: 79 MMHG | HEART RATE: 83 BPM

## 2019-07-20 DIAGNOSIS — Z98.89 OTHER SPECIFIED POSTPROCEDURAL STATES: Chronic | ICD-10-CM

## 2019-07-20 DIAGNOSIS — G40.909 EPILEPSY, UNSPECIFIED, NOT INTRACTABLE, WITHOUT STATUS EPILEPTICUS: ICD-10-CM

## 2019-07-20 DIAGNOSIS — Z90.710 ACQUIRED ABSENCE OF BOTH CERVIX AND UTERUS: Chronic | ICD-10-CM

## 2019-07-20 LAB
ANION GAP SERPL CALC-SCNC: 12 MMO/L — SIGNIFICANT CHANGE UP (ref 7–14)
BUN SERPL-MCNC: 12 MG/DL — SIGNIFICANT CHANGE UP (ref 7–23)
CALCIUM SERPL-MCNC: 9.6 MG/DL — SIGNIFICANT CHANGE UP (ref 8.4–10.5)
CHLORIDE SERPL-SCNC: 106 MMOL/L — SIGNIFICANT CHANGE UP (ref 98–107)
CO2 SERPL-SCNC: 25 MMOL/L — SIGNIFICANT CHANGE UP (ref 22–31)
CREAT SERPL-MCNC: 0.64 MG/DL — SIGNIFICANT CHANGE UP (ref 0.5–1.3)
GLUCOSE BLDC GLUCOMTR-MCNC: 176 MG/DL — HIGH (ref 70–99)
GLUCOSE BLDC GLUCOMTR-MCNC: 200 MG/DL — HIGH (ref 70–99)
GLUCOSE SERPL-MCNC: 164 MG/DL — HIGH (ref 70–99)
HCT VFR BLD CALC: 31.2 % — LOW (ref 34.5–45)
HGB BLD-MCNC: 9.4 G/DL — LOW (ref 11.5–15.5)
MAGNESIUM SERPL-MCNC: 2.1 MG/DL — SIGNIFICANT CHANGE UP (ref 1.6–2.6)
MCHC RBC-ENTMCNC: 22.2 PG — LOW (ref 27–34)
MCHC RBC-ENTMCNC: 30.1 % — LOW (ref 32–36)
MCV RBC AUTO: 73.8 FL — LOW (ref 80–100)
NRBC # FLD: 0 K/UL — SIGNIFICANT CHANGE UP (ref 0–0)
PLATELET # BLD AUTO: 221 K/UL — SIGNIFICANT CHANGE UP (ref 150–400)
PMV BLD: 10.9 FL — SIGNIFICANT CHANGE UP (ref 7–13)
POTASSIUM SERPL-MCNC: 3.7 MMOL/L — SIGNIFICANT CHANGE UP (ref 3.5–5.3)
POTASSIUM SERPL-SCNC: 3.7 MMOL/L — SIGNIFICANT CHANGE UP (ref 3.5–5.3)
RBC # BLD: 4.23 M/UL — SIGNIFICANT CHANGE UP (ref 3.8–5.2)
RBC # FLD: 16.3 % — HIGH (ref 10.3–14.5)
SODIUM SERPL-SCNC: 143 MMOL/L — SIGNIFICANT CHANGE UP (ref 135–145)
WBC # BLD: 4.78 K/UL — SIGNIFICANT CHANGE UP (ref 3.8–10.5)
WBC # FLD AUTO: 4.78 K/UL — SIGNIFICANT CHANGE UP (ref 3.8–10.5)

## 2019-07-20 PROCEDURE — 95816 EEG AWAKE AND DROWSY: CPT | Mod: 26

## 2019-07-20 PROCEDURE — 95951: CPT | Mod: 26

## 2019-07-20 RX ORDER — APIXABAN 2.5 MG/1
1 TABLET, FILM COATED ORAL
Qty: 60 | Refills: 0
Start: 2019-07-20 | End: 2019-08-18

## 2019-07-20 RX ORDER — INSULIN GLARGINE 100 [IU]/ML
18 INJECTION, SOLUTION SUBCUTANEOUS AT BEDTIME
Refills: 0 | Status: DISCONTINUED | OUTPATIENT
Start: 2019-07-20 | End: 2019-07-27

## 2019-07-20 RX ORDER — DEXTROSE 50 % IN WATER 50 %
15 SYRINGE (ML) INTRAVENOUS ONCE
Refills: 0 | Status: DISCONTINUED | OUTPATIENT
Start: 2019-07-20 | End: 2019-07-27

## 2019-07-20 RX ORDER — ACETAMINOPHEN 500 MG
1000 TABLET ORAL ONCE
Refills: 0 | Status: COMPLETED | OUTPATIENT
Start: 2019-07-20 | End: 2019-07-20

## 2019-07-20 RX ORDER — INSULIN LISPRO 100/ML
VIAL (ML) SUBCUTANEOUS
Refills: 0 | Status: DISCONTINUED | OUTPATIENT
Start: 2019-07-20 | End: 2019-07-21

## 2019-07-20 RX ORDER — APIXABAN 2.5 MG/1
5 TABLET, FILM COATED ORAL EVERY 12 HOURS
Refills: 0 | Status: DISCONTINUED | OUTPATIENT
Start: 2019-07-20 | End: 2019-07-27

## 2019-07-20 RX ORDER — AMLODIPINE BESYLATE 2.5 MG/1
5 TABLET ORAL ONCE
Refills: 0 | Status: COMPLETED | OUTPATIENT
Start: 2019-07-20 | End: 2019-07-20

## 2019-07-20 RX ORDER — GLUCAGON INJECTION, SOLUTION 0.5 MG/.1ML
1 INJECTION, SOLUTION SUBCUTANEOUS ONCE
Refills: 0 | Status: DISCONTINUED | OUTPATIENT
Start: 2019-07-20 | End: 2019-07-27

## 2019-07-20 RX ORDER — LEVETIRACETAM 250 MG/1
1 TABLET, FILM COATED ORAL
Qty: 90 | Refills: 0
Start: 2019-07-20 | End: 2019-08-18

## 2019-07-20 RX ORDER — DEXTROSE 50 % IN WATER 50 %
12.5 SYRINGE (ML) INTRAVENOUS ONCE
Refills: 0 | Status: DISCONTINUED | OUTPATIENT
Start: 2019-07-20 | End: 2019-07-27

## 2019-07-20 RX ORDER — LABETALOL HCL 100 MG
100 TABLET ORAL THREE TIMES A DAY
Refills: 0 | Status: DISCONTINUED | OUTPATIENT
Start: 2019-07-20 | End: 2019-07-21

## 2019-07-20 RX ORDER — SODIUM CHLORIDE 9 MG/ML
1000 INJECTION, SOLUTION INTRAVENOUS
Refills: 0 | Status: DISCONTINUED | OUTPATIENT
Start: 2019-07-20 | End: 2019-07-27

## 2019-07-20 RX ORDER — DEXTROSE 50 % IN WATER 50 %
25 SYRINGE (ML) INTRAVENOUS ONCE
Refills: 0 | Status: DISCONTINUED | OUTPATIENT
Start: 2019-07-20 | End: 2019-07-27

## 2019-07-20 RX ORDER — INSULIN GLARGINE 100 [IU]/ML
18 INJECTION, SOLUTION SUBCUTANEOUS
Qty: 0 | Refills: 0 | DISCHARGE
Start: 2019-07-20

## 2019-07-20 RX ORDER — INSULIN GLARGINE 100 [IU]/ML
20 INJECTION, SOLUTION SUBCUTANEOUS
Qty: 0 | Refills: 0 | DISCHARGE
Start: 2019-07-20

## 2019-07-20 RX ORDER — LEVOTHYROXINE SODIUM 125 MCG
50 TABLET ORAL DAILY
Refills: 0 | Status: DISCONTINUED | OUTPATIENT
Start: 2019-07-20 | End: 2019-07-22

## 2019-07-20 RX ORDER — HYDRALAZINE HCL 50 MG
5 TABLET ORAL
Refills: 0 | Status: DISCONTINUED | OUTPATIENT
Start: 2019-07-20 | End: 2019-07-27

## 2019-07-20 RX ORDER — LEVETIRACETAM 250 MG/1
1000 TABLET, FILM COATED ORAL
Refills: 0 | Status: DISCONTINUED | OUTPATIENT
Start: 2019-07-20 | End: 2019-07-25

## 2019-07-20 RX ORDER — LACOSAMIDE 50 MG/1
200 TABLET ORAL
Refills: 0 | Status: DISCONTINUED | OUTPATIENT
Start: 2019-07-20 | End: 2019-07-21

## 2019-07-20 RX ORDER — POLYETHYLENE GLYCOL 3350 17 G/17G
17 POWDER, FOR SOLUTION ORAL DAILY
Refills: 0 | Status: DISCONTINUED | OUTPATIENT
Start: 2019-07-20 | End: 2019-07-27

## 2019-07-20 RX ADMIN — Medication 400 MILLIGRAM(S): at 20:28

## 2019-07-20 RX ADMIN — Medication 50 MICROGRAM(S): at 05:26

## 2019-07-20 RX ADMIN — Medication 5 MILLIGRAM(S): at 23:59

## 2019-07-20 RX ADMIN — Medication 2: at 09:01

## 2019-07-20 RX ADMIN — LACOSAMIDE 140 MILLIGRAM(S): 50 TABLET ORAL at 05:47

## 2019-07-20 RX ADMIN — INSULIN GLARGINE 18 UNIT(S): 100 INJECTION, SOLUTION SUBCUTANEOUS at 22:02

## 2019-07-20 RX ADMIN — Medication 1000 MILLIGRAM(S): at 22:04

## 2019-07-20 RX ADMIN — AMLODIPINE BESYLATE 5 MILLIGRAM(S): 2.5 TABLET ORAL at 01:52

## 2019-07-20 RX ADMIN — Medication 100 MILLIGRAM(S): at 05:26

## 2019-07-20 RX ADMIN — POLYETHYLENE GLYCOL 3350 17 GRAM(S): 17 POWDER, FOR SOLUTION ORAL at 18:56

## 2019-07-20 RX ADMIN — Medication 2: at 12:15

## 2019-07-20 RX ADMIN — LEVETIRACETAM 1000 MILLIGRAM(S): 250 TABLET, FILM COATED ORAL at 22:02

## 2019-07-20 RX ADMIN — Medication 5 UNIT(S): at 12:15

## 2019-07-20 RX ADMIN — Medication 5 UNIT(S): at 09:01

## 2019-07-20 RX ADMIN — Medication 0.1 MILLIGRAM(S): at 05:25

## 2019-07-20 RX ADMIN — APIXABAN 5 MILLIGRAM(S): 2.5 TABLET, FILM COATED ORAL at 17:42

## 2019-07-20 RX ADMIN — Medication 6: at 17:42

## 2019-07-20 RX ADMIN — APIXABAN 5 MILLIGRAM(S): 2.5 TABLET, FILM COATED ORAL at 05:25

## 2019-07-20 RX ADMIN — Medication 600 MILLIGRAM(S): at 05:33

## 2019-07-20 RX ADMIN — Medication 600 MILLIGRAM(S): at 04:33

## 2019-07-20 RX ADMIN — Medication 100 MILLIGRAM(S): at 22:02

## 2019-07-20 RX ADMIN — Medication 0.1 MILLIGRAM(S): at 17:41

## 2019-07-20 RX ADMIN — LEVETIRACETAM 400 MILLIGRAM(S): 250 TABLET, FILM COATED ORAL at 05:26

## 2019-07-20 RX ADMIN — LEVETIRACETAM 1000 MILLIGRAM(S): 250 TABLET, FILM COATED ORAL at 18:56

## 2019-07-20 RX ADMIN — Medication 100 MILLIGRAM(S): at 13:48

## 2019-07-20 RX ADMIN — LACOSAMIDE 200 MILLIGRAM(S): 50 TABLET ORAL at 18:56

## 2019-07-20 NOTE — PATIENT PROFILE ADULT - NSASFUNCLEVELADLTRANSFER_GEN_A_NUR
Novolog has been approved and shipped out to her office and arrived today. Patient was notified via [facility] = independent

## 2019-07-20 NOTE — H&P ADULT - ASSESSMENT
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 Her exam showed VFF with extinction on the right, hyperreflexia in the b/l UE. She underwent MRI of her brain which showed marked improvement of the left temporal-occpital and inferior parietal lesion. On 7/17 AM, she had an acute episode of altered mental status. CT head and CT angio head and neck were unremarkable.     Impression: She most likely had a seizure on 7/17 AM. She is much improved 7/18, but continues to have subclinical seizures. She will need continuous EEG monitoring and AED titrations.    EEG 7/18 showed 1 subclinical seizure.  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 Her exam showed VFF with extinction on the right, hyperreflexia in the b/l UE. She underwent MRI of her brain which showed marked improvement of the left temporal-occpital and inferior parietal lesion. On 7/17 AM, she had an acute episode of altered mental status. CT head and CT angio head and neck were unremarkable.     Impression: She most likely had a seizure on 7/17 AM. She is much improved 7/18, but continues to have subclinical seizures. She will need continuous EEG monitoring and AED titrations.    EEG 7/18 showed 1 subclinical seizure.  EEG 7/19 showed 1-2 subclinical seizures per hour    Plan:   Problem/Plan - 1:  · Seizures  -EMU   -c/w keppra 1 g TID  -c/w vimpat 200 mg BID and seizure precautions   -c/w medical mgt and supportive care  -c/w home medications   Problem/Plan - 2:  ·  Problem: Diabetes mellitus type 2 with complications, uncontrolled.  Plan: continue with lantus and humolog.      Problem/Plan - 3:  ·  Problem: Hypertensive urgency.  Plan: BP is better controlled   Continue with current meds.      Problem/Plan - 4:  ·  Problem: Afib.  Plan: Patient with known history of Afib.  Will continue eliquis.  Cardiology for HB picked up on tele.      Problem/Plan - 5:  ·  Problem: Prophylactic measure.  Plan: DVT ppx: eliquis  DASH diet. 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 Her exam showed VFF with extinction on the right, hyperreflexia in the b/l UE. She underwent MRI of her brain which showed marked improvement of the left temporal-occpital and inferior parietal lesion. On 7/17 AM, she had an acute episode of altered mental status. CT head and CT angio head and neck were unremarkable.     Impression: She most likely had a seizure on 7/17 AM at [facility]. She is much improved 7/18, but continues to have subclinical seizures. She will need continuous EEG monitoring and AED titrations.    EEG 7/18 showed 1 subclinical seizure.  EEG 7/19 showed 1-2 subclinical seizures per hour    Plan:   Problem/Plan - 1:  · Seizures  -EMU   -c/w keppra 1 g TID  -c/w vimpat 200 mg BID and seizure precautions   -c/w medical mgt and supportive care  -c/w home medications   Problem/Plan - 2:  ·  Problem: Diabetes mellitus type 2 with complications, uncontrolled.  Plan: continue with lantus and humolog.      Problem/Plan - 3:  ·  Problem: Hypertensive urgency.  Plan: BP is better controlled   Continue with current meds.      Problem/Plan - 4:  ·  Problem: Afib.  Plan: Patient with known history of Afib.  Will continue eliquis.  Cardiology consulted at [facility] for HB picked up on tele, may re-consult while admitted if needed.       Problem/Plan - 5:  ·  Problem: Prophylactic measure.  Plan: DVT ppx: eliquis  DASH diet.

## 2019-07-20 NOTE — DISCHARGE NOTE NURSING/CASE MANAGEMENT/SOCIAL WORK - NSDCDPATPORTLINK_GEN_ALL_CORE
You can access the [facility], offered by [facility], by registering with the following website: [email or web address]

## 2019-07-20 NOTE — CHART NOTE - NSCHARTNOTEFT_GEN_A_CORE
Called by nurse for /79    Pt is asymptomatic   Vital Signs Last 24 Hrs  T(C): 36.8 (20 Jul 2019 01:00), Max: 37.4 (19 Jul 2019 09:09)  T(F): 98.3 (20 Jul 2019 01:00), Max: 99.4 (19 Jul 2019 09:09)  HR: 65 (20 Jul 2019 01:00) (61 - 68)  BP: 183/79 (20 Jul 2019 01:00) (141/63 - 183/79)  RR: 18 (20 Jul 2019 01:00) (16 - 18)  SpO2: 98% (20 Jul 2019 01:00) (98% - 100%)    Will dose Norvasc 5 mg PO X 1  Will continue to monitor

## 2019-07-20 NOTE — H&P ADULT - HISTORY OF PRESENT ILLNESS
Pt is a 69 Female w/ PMH of nonconvulsive status epilepticus s/p brain biopsy w/ beta-amyloid, Afib, HTN, Hypothyroidism, COPD, diabetes who presents with a complaint of headache for last 3 days, admitted to monitor for further seizure activity. She underwent MRI of her brain which showed marked improvement of the left temporal-occpital and inferior parietal lesion. On 7/17 AM, she had an acute episode of altered mental status. CT head and CT angio head and neck were unremarkable.   Pt likely had a seizure. She was much improved 7/18, but continues to have subclinical seizures on EEG. She will need continuous EEG monitoring and AED titrations which is why she was transferred to [facility] EMU.    EEG 7/18 showed 1 subclinical seizure.  EEG 7/19 showed 1-2 subclinical seizures per hour

## 2019-07-20 NOTE — H&P ADULT - NSHPREVIEWOFSYSTEMS_GEN_ALL_CORE
denies HA, weakness, numbness, tingling, language deficit, dysphagia, cp, sob, abdominal pain, n/v, dizziness

## 2019-07-20 NOTE — EEG REPORT - NS EEG TEXT BOX
[facility]   COMPREHENSIVE EPILEPSY CENTER   REPORT OF CONTINUOUS VIDEO EEG     [facility]: [address] [place] [address] [address], [place] [address], Ph#: [phone]  [facility]: [address], [place] [address], Ph#: [phone]  Office: [address] [place] [address], [place] [address], [place] [address] Ph#: [phone]    Patient Name: RON FOSTER  Age and : 69y (54)  MRN #: [id number]  Location: [place] [address] [address]  Referring Physician: Modesto Garcia    Study Date: 19 - 19    _____________________________________________________________  TECHNICAL INFORMATION    Placement and Labeling of Electrodes:  The EEG was performed utilizing 20 channels referential EEG connections (coronal over temporal over parasagittal montage) using all standard 10-20 electrode placements with EKG.  Recording was at a sampling rate of 256 samples per second per channel.  Time synchronized digital video recording was done simultaneously with EEG recording.  A low light infrared camera was used for low light recording.  Saulo and seizure detection algorithms were utilized.    _____________________________________________________________  HISTORY    Patient is a 69y old  Female who presents with a chief complaint of Headache, seizure work-up (2019 10:50)      PERTINENT MEDICATION:  MEDICATIONS  (STANDING):  apixaban 5 milliGRAM(s) Oral every 12 hours  cloNIDine 0.1 milliGRAM(s) Oral two times a day  dextrose 5%. 1000 milliLiter(s) (50 mL/Hr) IV Continuous <Continuous>  dextrose 50% Injectable 12.5 Gram(s) IV Push once  dextrose 50% Injectable 25 Gram(s) IV Push once  dextrose 50% Injectable 25 Gram(s) IV Push once  insulin glargine Injectable (LANTUS) 18 Unit(s) SubCutaneous at bedtime  insulin lispro (HumaLOG) corrective regimen sliding scale   SubCutaneous three times a day before meals  insulin lispro (HumaLOG) corrective regimen sliding scale   SubCutaneous at bedtime  insulin lispro Injectable (HumaLOG) 5 Unit(s) SubCutaneous three times a day before meals  labetalol 100 milliGRAM(s) Oral three times a day  lacosamide IVPB 150 milliGRAM(s) IV Intermittent every 12 hours  levETIRAcetam  IVPB 1000 milliGRAM(s) IV Intermittent <User Schedule>  levothyroxine 50 MICROGram(s) Oral daily  QUEtiapine 12.5 milliGRAM(s) Oral at bedtime    _____________________________________________________________  STUDY INTERPRETATION    Findings: The background was continuous, spontaneously variable and reactive. During wakefulness, the posterior dominant rhythm consisted of symmetric, well-modulated 7 Hz activity, with amplitude to 30 uV, that attenuated to eye opening.  Low amplitude frontal beta was noted in wakefulness.    Background Slowing:  -Slowing of the posterior dominant rhythm    Focal Slowing:   -Continuous delta and theta over the left hemisphere, maximum left posterior quadrant    Sleep Background:  Stage II sleep transients were not recorded.  Drowsiness and stage II sleep transients were not recorded.    Other Non-Epileptiform Findings:  None    Interictal Epileptiform Activity:   -Lateralized Periodic Discharges, Regional, Left Occipital, maximum O1 (polyspikes, abundant, 1 Hz)    Events:  Clinical events: Subclinical seizures (approximately 0-2 seizures per hour recorded)  Seizures: approximately 1-2 electrographic seizures per hour were recorded in the left occipital region.  LPDs evolve into rhythmic sharply contoured alpha and beta activity, duration approximately 1-2 minutes.  No associated clinical signs.    Activation Procedures:   Hyperventilation was not performed.    Photic stimulation was not performed.     Artifacts:  Intermittent myogenic and movement artifacts were noted.    ECG:  The heart rate on single channel ECG was predominantly between 60-80 BPM.    _____________________________________________________________  EEG SUMMARY/CLASSIFICATION    Normal / Abnormal EEG in the awake, drowsy and asleep states.  Abnormal EEG in an unresponsive patient / a sedated patient.    -Clinical events: Subclinical seizures (approximately 0-2 seizures per hour recorded)  -Seizures: approximately 1-2 electrographic seizures per hour were recorded in the left occipital region.  LPDs evolve into rhythmic sharply contoured alpha and beta activity, duration approximately 1-2 minutes.  No associated clinical signs.    -Lateralized Periodic Discharges, Regional, Left Occipital, maximum O1 (polyspikes, abundant, 1 Hz)  -Continuous delta and theta over the left hemisphere, maximum left posterior quadrant  -Slowing of the posterior dominant rhythm  _____________________________________________________________  EEG IMPRESSION/CLINICAL CORRELATE  Abnormal EEG study.  1. Approximately 0-2 subclinical electrographic seizures per hour were recorded from the left occipital region. (duration appoximately 1-2 minutes each seizure).  This represents an improvement compared to prior study.  2. Potential epileptogenic focus in the left occipital region.  3. Structural or functional abnormality in the left hemisphere.   4. Moderate nonspecific diffuse or multifocal cerebral dysfunction.     Jhony Berkowitz MD  EEG / Epilepsy Attending Physician

## 2019-07-20 NOTE — H&P ADULT - NSHPPHYSICALEXAM_GEN_ALL_CORE
General:  Constitutional: Obese Female, appears stated age, in no apparent distress including pain  Respiratory: Patent airway. All lung fields are clear to auscultation bilaterally.  Extremities: No cyanosis, clubbing, or edema.  Skin: No rashes, bruising, or discoloration.  Neurological (>12):  MS: Awake, alert, oriented to person, place, situation, time. Normal affect. Follows all commands.  Language: Speech is clear, fluent. Finger agnosia, able to name pen, hand paper. shows 2 fingers, cannot follow crossed commands. repetition impaired.  CNs: No gross facial asymmetry b/l.  Motor: full strength biceps and triceps b/l. 5/5 hand  b/l. 5/5 KF and KE b/l.

## 2019-07-20 NOTE — H&P ADULT - NSHPLABSRESULTS_GEN_ALL_CORE
9.4    4.78  )-----------( 221      ( 20 Jul 2019 06:30 )             31.2   07-20    143  |  106  |  12  ----------------------------<  164<H>  3.7   |  25  |  0.64    Ca    9.6      20 Jul 2019 06:30  Mg     2.1     07-20    < from: MR Head w/wo IV Cont (07.14.19 @ 17:42) >      Left-sided craniotomy changes are again noted.    Evolving postoperative-postbiopsy change is again noted in the left   posterior temporal region, with hemosiderin deposition. A small amount of   nonspecific enhancement is noted at the margins of the surgical cavity.    The nonspecific edema pattern in the left temporal occipital and inferior   parietal regions has substantially decreased compared to 04/19/2019 MRI   study, especially when comparing the FLAIR series. The mass effect on the   adjacent left lateral ventricle appears decreased.    There is no evidence of acute vascular territory infarct, new acute   intracranial hemorrhage, or midline shift.    Chronic white matter changes and cerebral volume loss are unchanged.    IMPRESSION:     The nonspecific edema pattern in the left temporal occipital and inferior   parietal regions has substantially decreased compared to 04/19/2019 MRI   study.    Evolving postsurgical-postbiopsy changes as described.    < end of copied text >    < from: CT Head No Cont (07.17.19 @ 09:43) >    IMPRESSION:     CT BRAIN: Stable areas of nonspecific hypodensity in the left temporal   and occipital lobes compared to recent MRI of 7/14/2019. No acute   intracranial hemorrhage.    CTA BRAIN: No large stenosis or major vessel occlusion of the [place] of   [place].    CTA NECK: Atherosclerotic calcifications of the bilateral internal   carotid arteries with less than 30% stenosis according to NASCET   criteria. No occlusion or stenosis of the bilateral vertebral arteries.    < end of copied text >

## 2019-07-20 NOTE — H&P ADULT - ATTENDING COMMENTS
Patient admitted to EMU for video EEG monitoring and medication management.  Continue current AEDs, seizure precautions.    Jhony Berkowitz MD  EEG / Epilepsy Attending Physician

## 2019-07-21 DIAGNOSIS — E11.9 TYPE 2 DIABETES MELLITUS WITHOUT COMPLICATIONS: ICD-10-CM

## 2019-07-21 DIAGNOSIS — I10 ESSENTIAL (PRIMARY) HYPERTENSION: ICD-10-CM

## 2019-07-21 DIAGNOSIS — E03.9 HYPOTHYROIDISM, UNSPECIFIED: ICD-10-CM

## 2019-07-21 DIAGNOSIS — I25.10 ATHEROSCLEROTIC HEART DISEASE OF NATIVE CORONARY ARTERY WITHOUT ANGINA PECTORIS: ICD-10-CM

## 2019-07-21 DIAGNOSIS — R00.1 BRADYCARDIA, UNSPECIFIED: ICD-10-CM

## 2019-07-21 DIAGNOSIS — E66.9 OBESITY, UNSPECIFIED: ICD-10-CM

## 2019-07-21 DIAGNOSIS — R56.9 UNSPECIFIED CONVULSIONS: ICD-10-CM

## 2019-07-21 LAB
MAGNESIUM SERPL-MCNC: 2.1 MG/DL — SIGNIFICANT CHANGE UP (ref 1.6–2.6)
PHOSPHATE SERPL-MCNC: 3.6 MG/DL — SIGNIFICANT CHANGE UP (ref 2.5–4.5)

## 2019-07-21 PROCEDURE — 95951: CPT | Mod: 26

## 2019-07-21 PROCEDURE — 93010 ELECTROCARDIOGRAM REPORT: CPT

## 2019-07-21 PROCEDURE — 99223 1ST HOSP IP/OBS HIGH 75: CPT | Mod: GC

## 2019-07-21 RX ORDER — ACETAMINOPHEN 500 MG
650 TABLET ORAL EVERY 6 HOURS
Refills: 0 | Status: DISCONTINUED | OUTPATIENT
Start: 2019-07-21 | End: 2019-07-27

## 2019-07-21 RX ORDER — DIVALPROEX SODIUM 500 MG/1
750 TABLET, DELAYED RELEASE ORAL
Refills: 0 | Status: DISCONTINUED | OUTPATIENT
Start: 2019-07-21 | End: 2019-07-24

## 2019-07-21 RX ORDER — PANTOPRAZOLE SODIUM 20 MG/1
40 TABLET, DELAYED RELEASE ORAL
Refills: 0 | Status: DISCONTINUED | OUTPATIENT
Start: 2019-07-21 | End: 2019-07-27

## 2019-07-21 RX ORDER — INSULIN LISPRO 100/ML
VIAL (ML) SUBCUTANEOUS AT BEDTIME
Refills: 0 | Status: DISCONTINUED | OUTPATIENT
Start: 2019-07-21 | End: 2019-07-21

## 2019-07-21 RX ORDER — INSULIN LISPRO 100/ML
6 VIAL (ML) SUBCUTANEOUS
Refills: 0 | Status: DISCONTINUED | OUTPATIENT
Start: 2019-07-21 | End: 2019-07-27

## 2019-07-21 RX ORDER — LISINOPRIL 2.5 MG/1
20 TABLET ORAL DAILY
Refills: 0 | Status: DISCONTINUED | OUTPATIENT
Start: 2019-07-21 | End: 2019-07-22

## 2019-07-21 RX ORDER — LABETALOL HCL 100 MG
100 TABLET ORAL
Refills: 0 | Status: DISCONTINUED | OUTPATIENT
Start: 2019-07-21 | End: 2019-07-27

## 2019-07-21 RX ORDER — INSULIN LISPRO 100/ML
VIAL (ML) SUBCUTANEOUS
Refills: 0 | Status: DISCONTINUED | OUTPATIENT
Start: 2019-07-21 | End: 2019-07-21

## 2019-07-21 RX ORDER — LABETALOL HCL 100 MG
10 TABLET ORAL ONCE
Refills: 0 | Status: COMPLETED | OUTPATIENT
Start: 2019-07-21 | End: 2019-07-21

## 2019-07-21 RX ORDER — IBUPROFEN 200 MG
400 TABLET ORAL THREE TIMES A DAY
Refills: 0 | Status: DISCONTINUED | OUTPATIENT
Start: 2019-07-21 | End: 2019-07-27

## 2019-07-21 RX ORDER — INSULIN LISPRO 100/ML
VIAL (ML) SUBCUTANEOUS
Refills: 0 | Status: DISCONTINUED | OUTPATIENT
Start: 2019-07-21 | End: 2019-07-27

## 2019-07-21 RX ADMIN — Medication 0.1 MILLIGRAM(S): at 18:36

## 2019-07-21 RX ADMIN — LEVETIRACETAM 1000 MILLIGRAM(S): 250 TABLET, FILM COATED ORAL at 21:41

## 2019-07-21 RX ADMIN — Medication 10 MILLIGRAM(S): at 16:09

## 2019-07-21 RX ADMIN — Medication 400 MILLIGRAM(S): at 15:21

## 2019-07-21 RX ADMIN — LEVETIRACETAM 1000 MILLIGRAM(S): 250 TABLET, FILM COATED ORAL at 13:23

## 2019-07-21 RX ADMIN — Medication 0.1 MILLIGRAM(S): at 04:38

## 2019-07-21 RX ADMIN — APIXABAN 5 MILLIGRAM(S): 2.5 TABLET, FILM COATED ORAL at 04:38

## 2019-07-21 RX ADMIN — LEVETIRACETAM 1000 MILLIGRAM(S): 250 TABLET, FILM COATED ORAL at 04:37

## 2019-07-21 RX ADMIN — LACOSAMIDE 200 MILLIGRAM(S): 50 TABLET ORAL at 04:38

## 2019-07-21 RX ADMIN — INSULIN GLARGINE 18 UNIT(S): 100 INJECTION, SOLUTION SUBCUTANEOUS at 21:41

## 2019-07-21 RX ADMIN — Medication 2: at 13:25

## 2019-07-21 RX ADMIN — Medication 100 MILLIGRAM(S): at 18:36

## 2019-07-21 RX ADMIN — Medication 50 MICROGRAM(S): at 04:37

## 2019-07-21 RX ADMIN — Medication 400 MILLIGRAM(S): at 16:00

## 2019-07-21 RX ADMIN — POLYETHYLENE GLYCOL 3350 17 GRAM(S): 17 POWDER, FOR SOLUTION ORAL at 13:23

## 2019-07-21 RX ADMIN — DIVALPROEX SODIUM 750 MILLIGRAM(S): 500 TABLET, DELAYED RELEASE ORAL at 09:53

## 2019-07-21 RX ADMIN — DIVALPROEX SODIUM 750 MILLIGRAM(S): 500 TABLET, DELAYED RELEASE ORAL at 18:36

## 2019-07-21 RX ADMIN — Medication 6 UNIT(S): at 13:26

## 2019-07-21 RX ADMIN — Medication 6 UNIT(S): at 18:05

## 2019-07-21 RX ADMIN — Medication 2: at 09:03

## 2019-07-21 RX ADMIN — LISINOPRIL 20 MILLIGRAM(S): 2.5 TABLET ORAL at 13:23

## 2019-07-21 RX ADMIN — Medication 100 MILLIGRAM(S): at 04:37

## 2019-07-21 RX ADMIN — Medication 5 MILLIGRAM(S): at 14:16

## 2019-07-21 RX ADMIN — APIXABAN 5 MILLIGRAM(S): 2.5 TABLET, FILM COATED ORAL at 18:36

## 2019-07-21 NOTE — PROGRESS NOTE ADULT - SUBJECTIVE AND OBJECTIVE BOX
INTERVAL HISTORY: no overnight events, headache is better      FAMILY HISTORY:  Family history of heart disease (Aunt)    SOCIAL HISTORY:   Lives with:     MEDICATIONS  (STANDING):  apixaban 5 milliGRAM(s) Oral every 12 hours  cloNIDine 0.1 milliGRAM(s) Oral every 12 hours  dextrose 5%. 1000 milliLiter(s) (50 mL/Hr) IV Continuous <Continuous>  dextrose 50% Injectable 12.5 Gram(s) IV Push once  dextrose 50% Injectable 25 Gram(s) IV Push once  dextrose 50% Injectable 25 Gram(s) IV Push once  diVALproex  milliGRAM(s) Oral <User Schedule>  insulin glargine Injectable (LANTUS) 18 Unit(s) SubCutaneous at bedtime  insulin lispro (HumaLOG) corrective regimen sliding scale   SubCutaneous three times a day before meals  insulin lispro Injectable (HumaLOG) 6 Unit(s) SubCutaneous three times a day before meals  labetalol 100 milliGRAM(s) Oral two times a day  levETIRAcetam 1000 milliGRAM(s) Oral <User Schedule>  levothyroxine 50 MICROGram(s) Oral daily  lisinopril 20 milliGRAM(s) Oral daily  polyethylene glycol 3350 17 Gram(s) Oral daily    MEDICATIONS  (PRN):  acetaminophen   Tablet .. 650 milliGRAM(s) Oral every 6 hours PRN Mild Pain (1 - 3), Moderate Pain (4 - 6), Severe Pain (7 - 10)  dextrose 40% Gel 15 Gram(s) Oral once PRN Blood Glucose LESS THAN 70 milliGRAM(s)/deciliter  glucagon  Injectable 1 milliGRAM(s) IntraMuscular once PRN Glucose LESS THAN 70 milligrams/deciliter  hydrALAZINE Injectable 5 milliGRAM(s) IV Push every 2 hours PRN SBP > 150/90  LORazepam   Injectable 2 milliGRAM(s) IV Push once PRN Seizure activity    ALLERGIES/INTOLERANCES:  Allergies  No Known Allergies    Intolerances    VITALS & EXAMINATION:  Vital Signs Last 24 Hrs  T(C): 37.4 (19 Jul 2019 09:09), Max: 37.4 (19 Jul 2019 09:09)  T(F): 99.4 (19 Jul 2019 09:09), Max: 99.4 (19 Jul 2019 09:09)  HR: 64 (19 Jul 2019 09:09) (60 - 71)  BP: 141/63 (19 Jul 2019 09:09) (136/67 - 172/83)  BP(mean): --  RR: 17 (19 Jul 2019 09:09) (16 - 18)  SpO2: 99% (19 Jul 2019 09:09) (97% - 100%)    General:  Constitutional: Obese Female, appears stated age, in no apparent distress including pain  Respiratory: Patent airway. All lung fields are clear to auscultation bilaterally.  Extremities: No cyanosis, clubbing, or edema.  Skin: No rashes, bruising, or discoloration.    Neurological (>12):  MS: Awake, alert, oriented to person, place, situation, time. Normal affect. Follows all commands.    Language: Speech is clear, fluent. Finger agnosia, able to name pen, hand paper. shows 2 fingers, cannot follow crossed commands. repetition impaired.    CNs: No gross facial asymmetry b/l.    Motor: full strength biceps and triceps b/l. 5/5 hand  b/l. 5/5 KF and KE b/l.      LABORATORY:    CBC                       9.6    4.55  )-----------( 201      ( 18 Jul 2019 13:29 )             31.7     Chem 07-18    141  |  109<H>  |  17  ----------------------------<  123<H>  3.9   |  16<L>  |  0.65    Ca    9.5      18 Jul 2019 13:29      LFTs   Coagulopathy   Lipid Panel   A1c 07-16 [id number] 10.6    Cardiac enzymes     U/A   CSF  Immunological  Other    STUDIES & IMAGING:  Studies (EKG, EEG, EMG, etc):     Radiology (XR, CT, MR, U/S, TTE/STEPHANY):

## 2019-07-21 NOTE — EEG REPORT - NS EEG TEXT BOX
[facility]   COMPREHENSIVE EPILEPSY CENTER   REPORT OF ROUTINE VIDEO EEG     [facility]: [address] [place] [address] , [address], [place] [address], Ph#: [phone]  [facility]: [id number], [place] [address], Ph#: [phone]  Office: [address] [place] [address], [place] [address], [place] [address] Ph#: [phone]    Patient Name: RON FOSTER  Age and : 69y ([birth year])  MRN #: [id number]  Location: [place] [address]  Referring Physician: Alberto Centeno    Study Date: 19    _____________________________________________________________  TECHNICAL INFORMATION    Placement and Labeling of Electrodes:  The EEG was performed utilizing 20 channels referential EEG connections (coronal over temporal over parasagittal montage) using all standard 10-20 electrode placements with EKG.  Recording was at a sampling rate of 256 samples per second per channel.  Time synchronized digital video recording was done simultaneously with EEG recording.  A low light infrared camera was used for low light recording.  Saulo and seizure detection algorithms were utilized.    _____________________________________________________________  HISTORY    Patient is a 69y old  Female who presents with a chief complaint of     PERTINENT MEDICATION:  MEDICATIONS  (STANDING):  apixaban 5 milliGRAM(s) Oral every 12 hours  cloNIDine 0.1 milliGRAM(s) Oral every 12 hours  dextrose 5%. 1000 milliLiter(s) (50 mL/Hr) IV Continuous <Continuous>  dextrose 50% Injectable 12.5 Gram(s) IV Push once  dextrose 50% Injectable 25 Gram(s) IV Push once  dextrose 50% Injectable 25 Gram(s) IV Push once  diVALproex  milliGRAM(s) Oral <User Schedule>  insulin glargine Injectable (LANTUS) 18 Unit(s) SubCutaneous at bedtime  insulin lispro (HumaLOG) corrective regimen sliding scale   SubCutaneous three times a day before meals  insulin lispro Injectable (HumaLOG) 6 Unit(s) SubCutaneous three times a day before meals  labetalol 100 milliGRAM(s) Oral two times a day  levETIRAcetam 1000 milliGRAM(s) Oral <User Schedule>  levothyroxine 50 MICROGram(s) Oral daily  lisinopril 20 milliGRAM(s) Oral daily  polyethylene glycol 3350 17 Gram(s) Oral daily    _____________________________________________________________  STUDY INTERPRETATION    Findings: The background was continuous, spontaneously variable and reactive. During wakefulness, the posterior dominant rhythm consisted of asymmetric (seen over right posterior quadrant), well-modulated 9 Hz activity, with amplitude to 30 uV, that attenuated to eye opening.  Low amplitude frontal beta was noted in wakefulness.    Background Slowing:  No generalized background slowing was present.    Focal Slowing:   -Continuous delta and theta slowing over left hemisphere, maximum left posterior quadrant    Sleep Background:  Stage II sleep transients were not recorded.  Drowsiness and stage II sleep transients were not recorded.    Other Non-Epileptiform Findings:  None were present.    Interictal Epileptiform Activity:   -LPDs (Lateralized Periodic Discharges), Regional, Left occipital, maximum O1 (0.5 - 1 Hz)    Events:  Clinical events: None recorded.  Seizures: None recorded.    Activation Procedures:   Hyperventilation was not performed.    Photic stimulation was not performed.     Artifacts:  Intermittent myogenic and movement artifacts were noted.    ECG:  The heart rate on single channel ECG was predominantly between 60-80 BPM.    _____________________________________________________________  EEG SUMMARY/CLASSIFICATION  Abnormal EEG in the awake, drowsy and asleep states.  -LPDs (Lateralized Periodic Discharges), Regional, Left occipital, maximum O1 (0.5 - 1 Hz)  -Continuous delta and theta slowing over left hemisphere, maximum left posterior quadrant  _____________________________________________________________  EEG IMPRESSION/CLINICAL CORRELATE  Abnormal EEG study.  1. Potential epileptogenic focus in the left occipital region.  2. Structural and functional abnormality in the left hemisphere, maximum left posterior quadrant.  3. No seizures were recorded.    Jhony Berkowitz MD  EEG / Epilepsy Attending Physician

## 2019-07-21 NOTE — CONSULT NOTE ADULT - SUBJECTIVE AND OBJECTIVE BOX
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 She underwent MRI of her brain which showed marked improvement of the left temporal-occpital and inferior parietal lesion. On 7/17 AM, she had an acute episode of altered mental status. CT head and CT angio head and neck were unremarkable.   Pt likely had a seizure. She was much improved 7/18, but continues to have subclinical seizures on EEG. She will need continuous EEG monitoring and AED titrations which is [facility]    EEG 7/18 showed 1 subclinical seizure.  EEG 7/19 showed 1-2 subclinical seizures per hour  She was started on Vinpat. Overnight, had one episode of second degree Type 2 AVB on Tele.   Denies chest pain, shortness of breath, or dizziness.       PAST MEDICAL & SURGICAL HISTORY:  Hypothyroid  Obesity  NACHO (obstructive sleep apnea): sleep study done 5 years ago  DM (Diabetes Mellitus) (ICD9 250.00)  Dyslipidemia (ICD9 272.4)  HTN (Hypertension) (ICD9 401.9)  CAD (Coronary Artery Disease) (ICD9 414.00): c STENTS  History of hysterectomy  History of ovarian cystectomy: x 2 in 2013  S/P primary angioplasty with coronary stent: has 5 stents-first placed in 2007, last stent -2011,  C Section: x 1          MEDICATIONS:  apixaban 5 milliGRAM(s) Oral every 12 hours  cloNIDine 0.1 milliGRAM(s) Oral every 12 hours  hydrALAZINE Injectable 5 milliGRAM(s) IV Push every 2 hours PRN  labetalol 100 milliGRAM(s) Oral three times a day        acetaminophen   Tablet .. 650 milliGRAM(s) Oral every 6 hours PRN  diVALproex  milliGRAM(s) Oral <User Schedule>  levETIRAcetam 1000 milliGRAM(s) Oral <User Schedule>  LORazepam   Injectable 2 milliGRAM(s) IV Push once PRN    polyethylene glycol 3350 17 Gram(s) Oral daily    dextrose 40% Gel 15 Gram(s) Oral once PRN  dextrose 50% Injectable 12.5 Gram(s) IV Push once  dextrose 50% Injectable 25 Gram(s) IV Push once  dextrose 50% Injectable 25 Gram(s) IV Push once  glucagon  Injectable 1 milliGRAM(s) IntraMuscular once PRN  insulin glargine Injectable (LANTUS) 18 Unit(s) SubCutaneous at bedtime  insulin lispro (HumaLOG) corrective regimen sliding scale   SubCutaneous three times a day before meals  insulin lispro (HumaLOG) corrective regimen sliding scale   SubCutaneous at bedtime  levothyroxine 50 MICROGram(s) Oral daily    dextrose 5%. 1000 milliLiter(s) IV Continuous <Continuous>      FAMILY HISTORY:  Family history of heart disease (Aunt)      SOCIAL HISTORY:    [ ] Non-smoker  [ ] Smoker  [ ] Alcohol    Allergies    No Known Allergies    Intolerances    	    REVIEW OF SYSTEMS:  CONSTITUTIONAL: No fever, weight loss, + fatigue  EYES: No eye pain, visual disturbances, or discharge  ENMT:  No difficulty hearing, tinnitus, vertigo; No sinus or throat pain  NECK: No pain or stiffness  RESPIRATORY: No cough, wheezing, chills or hemoptysis; No Shortness of Breath  CARDIOVASCULAR: No chest pain, palpitations, passing out, dizziness, or leg swelling  GASTROINTESTINAL: No abdominal or epigastric pain. No nausea, vomiting, or hematemesis; No diarrhea or constipation. No melena or hematochezia.  GENITOURINARY: No dysuria, frequency, hematuria, or incontinence  NEUROLOGICAL: No headaches, memory loss, loss of strength, numbness, or tremors  SKIN: No itching, burning, rashes, or lesions   LYMPH Nodes: No enlarged glands  ENDOCRINE: No heat or cold intolerance; No hair loss  MUSCULOSKELETAL: No joint pain or swelling; No muscle, back, or extremity pain  PSYCHIATRIC: No depression, anxiety, mood swings, or difficulty sleeping  HEME/LYMPH: No easy bruising, or bleeding gums  ALLERY AND IMMUNOLOGIC: No hives or eczema	    [ ] All others negative	  [ ] Unable to obtain    PHYSICAL EXAM:  T(C): 36.9 (07-21-19 @ 08:01), Max: 36.9 (07-20-19 @ 23:36)  HR: 66 (07-21-19 @ 08:01) (58 - 83)  BP: 162/82 (07-21-19 @ 08:01) (139/68 - 192/90)  RR: 18 (07-21-19 @ 08:01) (18 - 18)  SpO2: 98% (07-21-19 @ 08:01) (94% - 100%)  Wt(kg): --  I&O's Summary      Appearance: NAD Obese EEg in progress  HEENT:   Dry oral mucosa, PERRL, EOMI	  Lymphatic: No lymphadenopathy  Cardiovascular: Normal S1 S2, No JVD, No murmurs, No edema  Respiratory: Lungs clear to auscultation	  Psychiatry: A & O x 3, Mood & affect appropriate  Gastrointestinal:  Soft, Non-tender, + BS	  Skin: No rashes, No ecchymoses, No cyanosis	  Neurologic: Non-focal  Extremities: Normal range of motion, No clubbing, cyanosis or edema  Vascular: Peripheral pulses palpable 2+ bilaterally    TELEMETRY: 	 SR, Second degree AVB Type 2 (30s), PACs, PVCs, trigeminy   ECG:  	NSR, no acute ischemic stt changes   < from: Transthoracic Echocardiogram (04.21.19 @ 14:07) >    Patient name: RON FOSTER  YOB: 1950   Age: 69 (F)   MR#: [id number]  Study Date: 4/21/2019  [facility]  2nd Sonographer: Adelina Gordon CONNER  Study quality: Technically difficult  ReferringPhysician: Carmelo Gregorio MD  Blood Pressure: 142/58 mmHg  Height: 160 cm  Weight: 100 kg  BSA: 2 m2  ------------------------------------------------------------------------  PROCEDURE: Transthoracic echocardiogram with 2-D, M-Mode  and complete spectral and color flow Doppler.  INDICATION: Cardiogenic shock (R57.0)  ------------------------------------------------------------------------  Dimensions:    Normal Values:  LA:     3.2    2.0 - 4.0 cm  Ao:     2.8    2.0 - 3.8 cm  SEPTUM:        0.6 - 1.2 cm  PWT:           0.6 - 1.1 cm  LVIDd:         3.0 - 5.6 cm  LVIDs:         1.8 - 4.0 cm  EF (Visual Estimate): 85 %  ------------------------------------------------------------------------  Observations:  Mitral Valve: Normal mitral valve.Minimal mitral  regurgitation.  Aortic Valve/Aorta: Aortic valve not well visualized. No  aortic valve regurgitation seen. Left ventricular outflow  tract gradient is present due to hyperdynamic left  ventricular function. No evidence of obstruction is seen.  Peak recorded gradient equals 36 mmHg but may be  underestimated.  Aortic Root: 2.8 cm.  Left Atrium: Normal left atrium.  LA volume index = 27  cc/m2.  Left Ventricle: Endocardium not well visualized;  hyperdynamic left ventricular systolicfunction. Left  ventricular dimensions are poorly visualized; grossly,  there appears to be concentric left ventricular  hypertrophy.  Right Heart: Right atrium not well visualized. The right  ventricle is not well visualized; grossly normal right  ventricular systolic function. Normal tricuspid valve.  Minimal tricuspid regurgitation. Normal pulmonic valve.  Pericardium/Pleura: Normal pericardium with no pericardial  effusion.  Hemodynamic: Estimated right atrial pressure equals 3 mmHg.  Inadequate tricuspid regurgitation Doppler envelope  precludes estimation of RVSP.  ------------------------------------------------------------------------  Conclusions:  Technically difficult study.  1. Normal mitral valve. Minimal mitral regurgitation.  2. Aortic valve not well visualized. No aortic valve  regurgitation seen.  3. Left ventricular dimensions are poorly visualized;  grossly, there appears to be concentric left ventricular  hypertrophy.  4. Endocardium not well visualized; hyperdynamic left  ventricular systolic function. Left ventricular outflow  tract gradient is present due to hyperdynamic left  ventricular function. No evidence of obstruction is seen.  Peak recorded gradient equals 36 mmHg but may be  underestimated.  5. The right ventricle is not well visualized; grossly  normal right ventricular systolic function.  *** No previous Echo exam.  ------------------------------------------------------------------------  Confirmed on  4/21/2019 - 17:53:18 by Wilfredo Huynh M.D.  ------------------------------------------------------------------------    < end of copied text >    RADIOLOGY:       < from: MR Head w/wo IV Cont (07.14.19 @ 17:42) >    EXAM:  MR BRAIN WAW IC        PROCEDURE DATE:  Jul 14 2019         INTERPRETATION:  CLINICAL INDICATION: Headache seizures rule out neoplasm.    TECHNIQUE: MRI of the brain was performed without and with contrast using   the following sequences: Axial DWI/ADC map, axial SWI, axial gradient   echo, axial SPGR, sagittal T1 FLAIR, axial T2 FLAIR, axial T2 FSE axial   T1 SPGR postcontrast and axial/coronal/sagittal T1 spin-echo postcontrast.    10 mls of Gadavist was administered intravenously without complication   and 0 mls were discarded.    COMPARISON: Head CT 07/14/2019. MR brain dated 4/19/2019    FINDINGS:    The study is substantially limited by motion, especially on the   postcontrast series.    Left-sided craniotomy changes are again noted.    Evolving postoperative-postbiopsy change is again noted in the left   posterior temporal region, with hemosiderin deposition. A small amount of   nonspecific enhancement is noted at the margins of the surgical cavity.    The nonspecific edema pattern in the left temporal occipital and inferior   parietal regions has substantially decreased compared to 04/19/2019 MRI   study, especially when comparing the FLAIR series. The mass effect on the   adjacent left lateral ventricle appears decreased.    There is no evidence of acute vascular territory infarct, new acute   intracranial hemorrhage, or midline shift.    Chronic white matter changes and cerebral volume loss are unchanged.    IMPRESSION:     The nonspecific edema pattern in the left temporal occipital and inferior   parietal regions has substantially decreased compared to 04/19/2019 MRI   study.    Evolving postsurgical-postbiopsy changes as described.              VICENTE KULKARNI M.D., RADIOLOGY FELLOW  This document has been electronically signed.  STEFANIA TEAGUE M.D., ATTENDING RADIOLOGIST  This document has been electronically signed. Jul 14 2019  6:30PM                  < end of copied text >      < from: CT Angio Neck w/ IV Cont (07.17.19 @ 09:44) >      EXAM:  CT BRAIN      EXAM:  CT ANGIO BRAIN (W)AW IC      EXAM:  CT ANGIO NECK (W)AW IC        PROCEDURE DATE:  Jul 17 2019         INTERPRETATION:  CLINICAL INFORMATION: Altered mental status, global   aphasia     TECHNIQUE: Head CT with and without contrast and a CT angiogram study of   the neck and head were performed. Two-dimensional sagittal and coronal   reformats were generated. Contrast enhanced axial CT images were acquired   from the aortic arch to the vertex of the calvarium, during the   angiographic phase. 2D and 3D maximum intensity projection reformats were   generated.  90 cc of Omnipaque-350 mg/ml were administered intravenously,   without immediate complication.    COMPARISON: CT brain 7/14/2019, MRI brain 7/14/2019.    FINDINGS:     CT BRAIN:    Left parietal craniotomy is again noted. There are unchanged areas of   hypodensity within the left temporal and occipital lobes, as noted on   recent MRI of 7/14/2019. No abnormal enhancement is noted in this   location. Thereis no acute intra-axial or extra-axial hemorrhage. There   is no mass effect or midline shift. The basal cisterns are not effaced.   Mild prominence of the ventricles and sulci are consistent with age   appropriate cerebral volume loss.     The visualized paranasal sinuses and mastoid air cells are well aerated.     CTA BRAIN    INTERNAL CAROTID ARTERIES: Mild atherosclerotic calcifications of the   cavernous and supraclinoid segments of the intracranial internal carotid   arteries. No significant stenosis or occlusion.     ANTERIOR CEREBRAL ARTERIES: No flow-limiting stenosis or occlusion.   Anterior communicating artery is unremarkable without aneurysm.     MIDDLE CEREBRAL ARTERIES: No flow-limiting stenosis or occlusion. MCA   bifurcationsare unremarkable without aneurysm.     POSTERIOR CEREBRAL ARTERIES: No flow-limiting stenosis or occlusion.   Posterior communicating arteries are unremarkable without aneurysm.     VERTEBROBASILAR SYSTEM: No flow-limiting stenosis or occlusion. Basilar   tip is unremarkable.     CTA NECK      RIGHT CAROTID SYSTEM: Atherosclerotic calcifications of the right   internal carotid artery with less than 30% stenosis according to NASCET   criteria.     LEFT CAROTID SYSTEM: Atherosclerotic calcifications of the left internal   carotid artery with less than 30% stenosis according to NASCET criteria.     VERTEBRAL SYSTEM:  Normal in course and caliber without flow-limiting   stenosis or occlusion. There is mild atherosclerotic calcifications at   the origin of the left vertebral artery. Codominant vertebral system.     AORTIC ARCH: There is a four-vessel aortic arch, a normal anatomic   variant.     IMPRESSION:     CT BRAIN: Stable areas of nonspecific hypodensity in the left temporal   and occipital lobes compared to recent MRI of 7/14/2019. No acute   intracranial hemorrhage.    CTA BRAIN: No large stenosis or major vessel occlusion of the [place]   [place].    CTA NECK: Atherosclerotic calcifications of the bilateral internal   carotid arteries with less than 30% stenosis according to NASCET   criteria. No occlusion or stenosis of the bilateral vertebral arteries.              LEILA ABBOTT M.D., RADIOLOGY RESIDENT  This document has been electronically signed.  STEFANIA TEAGUE M.D., ATTENDINGRADIOLOGIST  This document has been electronically signed. Jul 17 2019 10:55AM                  < end of copied text >    Bradycardia   Abnormal tele  OTHER: 	  	  LABS:	 	    CARDIAC MARKERS:                                  9.4    4.78  )-----------( 221      ( 20 Jul 2019 06:30 )             31.2     07-20    143  |  106  |  12  ----------------------------<  164<H>  3.7   |  25  |  0.64    Ca    9.6      20 Jul 2019 06:30  Mg     2.1     07-20      proBNP:   Lipid Profile:   HgA1c:   TSH:
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 She underwent MRI of her brain which showed marked improvement of the left temporal-occpital and inferior parietal lesion. On 7/17 AM, she had an acute episode of altered mental status. CT head and CT angio head and neck were unremarkable.   Pt likely had a seizure. She was much improved 7/18, but continues to have subclinical seizures on EEG. She will need continuous EEG monitoring and AED titrations which is why she was transferred to [facility].    EEG 7/18 showed 1 subclinical seizure.  EEG 7/19 showed 1-2 subclinical seizures per hour (20 Jul 2019 15:03)  Patient has history of diabetes, on oral meds and on insulin at home, no recent hypoglycemic episodes, no polyuria polydipsia. Patient follows up with PCP for diabetes management.    PAST MEDICAL & SURGICAL HISTORY:  Hypothyroid  Obesity  NACHO (obstructive sleep apnea): sleep study done 5 years ago  DM (Diabetes Mellitus) (ICD9 250.00)  Dyslipidemia (ICD9 272.4)  HTN (Hypertension) (ICD9 401.9)  CAD (Coronary Artery Disease) (ICD9 414.00): c STENTS  History of hysterectomy  History of ovarian cystectomy: x 2 in 2013  S/P primary angioplasty with coronary stent: has 5 stents-first placed in 2007, last stent -2011,  C Section: x 1      FAMILY HISTORY:  Family history of heart disease (Aunt)      Social History:    Outpatient Medications:    MEDICATIONS  (STANDING):  apixaban 5 milliGRAM(s) Oral every 12 hours  cloNIDine 0.1 milliGRAM(s) Oral every 12 hours  dextrose 5%. 1000 milliLiter(s) (50 mL/Hr) IV Continuous <Continuous>  dextrose 50% Injectable 12.5 Gram(s) IV Push once  dextrose 50% Injectable 25 Gram(s) IV Push once  dextrose 50% Injectable 25 Gram(s) IV Push once  diVALproex  milliGRAM(s) Oral <User Schedule>  insulin glargine Injectable (LANTUS) 18 Unit(s) SubCutaneous at bedtime  insulin lispro (HumaLOG) corrective regimen sliding scale   SubCutaneous three times a day before meals  insulin lispro (HumaLOG) corrective regimen sliding scale   SubCutaneous three times a day before meals  insulin lispro Injectable (HumaLOG) 6 Unit(s) SubCutaneous three times a day before meals  labetalol 100 milliGRAM(s) Oral two times a day  levETIRAcetam 1000 milliGRAM(s) Oral <User Schedule>  levothyroxine 50 MICROGram(s) Oral daily  lisinopril 20 milliGRAM(s) Oral daily  polyethylene glycol 3350 17 Gram(s) Oral daily    MEDICATIONS  (PRN):  acetaminophen   Tablet .. 650 milliGRAM(s) Oral every 6 hours PRN Mild Pain (1 - 3), Moderate Pain (4 - 6), Severe Pain (7 - 10)  dextrose 40% Gel 15 Gram(s) Oral once PRN Blood Glucose LESS THAN 70 milliGRAM(s)/deciliter  glucagon  Injectable 1 milliGRAM(s) IntraMuscular once PRN Glucose LESS THAN 70 milligrams/deciliter  hydrALAZINE Injectable 5 milliGRAM(s) IV Push every 2 hours PRN SBP > 150/90  LORazepam   Injectable 2 milliGRAM(s) IV Push once PRN Seizure activity      Allergies    No Known Allergies    Intolerances      Review of Systems:  Constitutional: No fever, no chills  Eyes: No blurry vision  Neuro: No tremors  HEENT: No pain, no neck swelling  Cardiovascular: No chest pain, no palpitations  Respiratory: Has SOB, no cough  GI: No nausea, vomiting, abdominal pain  : No dysuria  Skin: no rash  MSK: Has leg swelling.  Psych: no depression  Endocrine: no polyuria, polydipsia    ALL OTHER SYSTEMS REVIEWED AND NEGATIVE    UNABLE TO OBTAIN    PHYSICAL EXAM:  VITALS: T(C): 36.9 (07-21-19 @ 08:01)  T(F): 98.4 (07-21-19 @ 08:01), Max: 98.4 (07-20-19 @ 23:36)  HR: 66 (07-21-19 @ 08:01) (58 - 83)  BP: 162/82 (07-21-19 @ 08:01) (139/73 - 192/90)  RR:  (18 - 18)  SpO2:  (94% - 100%)  Wt(kg): --  GENERAL: NAD, well-groomed, well-developed  EYES: No proptosis, no lid lag  HEENT:  Atraumatic, Normocephalic  THYROID: Normal size, no palpable nodules  RESPIRATORY: Clear to auscultation bilaterally; No rales, rhonchi, wheezing  CARDIOVASCULAR: Si S2, No murmurs;  GI: Soft, non distended, normal bowel sounds  SKIN: Dry, intact, No rashes or lesions  MUSCULOSKELETAL: Has BL lower extremity edema.  NEURO:  no tremor, sensation decreased in feet BL,    POCT Blood Glucose.: 193 mg/dL (07-21-19 @ 08:57)  POCT Blood Glucose.: 231 mg/dL (07-20-19 @ 21:53)  POCT Blood Glucose.: 258 mg/dL (07-20-19 @ 17:23)  POCT Blood Glucose.: 176 mg/dL (07-20-19 @ 12:01)  POCT Blood Glucose.: 200 mg/dL (07-20-19 @ 08:52)  POCT Blood Glucose.: 174 mg/dL (07-19-19 @ 21:29)  POCT Blood Glucose.: 167 mg/dL (07-19-19 @ 17:08)  POCT Blood Glucose.: 216 mg/dL (07-19-19 @ 11:57)  POCT Blood Glucose.: 148 mg/dL (07-19-19 @ 09:05)  POCT Blood Glucose.: 109 mg/dL (07-18-19 @ 21:27)  POCT Blood Glucose.: 201 mg/dL (07-18-19 @ 17:10)                            9.4    4.78  )-----------( 221      ( 20 Jul 2019 06:30 )             31.2       07-20    143  |  106  |  12  ----------------------------<  164<H>  3.7   |  25  |  0.64    EGFR if : 107  EGFR if non : 92    Ca    9.6      07-20  Mg     2.1     07-21  Phos  3.6     07-21        Thyroid Function Tests:      Hemoglobin A1C, Whole Blood: 10.6 % <H> [4.0 - 5.6] (07-16-19 @ 07:05)          Radiology:

## 2019-07-21 NOTE — CONSULT NOTE ADULT - PROBLEM SELECTOR RECOMMENDATION 2
Will get full thyroid panel, will continue current Synthroid dose and FU
Orders per Epilepsy team   Anaya masterson

## 2019-07-21 NOTE — CONSULT NOTE ADULT - PROBLEM SELECTOR RECOMMENDATION 9
Likely multifactorial i.e. labetolol, vinpat and probable NACHO  Decrease labetolol 100 mg BID and add Lisinopril 20 mg daily   Monitor on Tele Likely multifactorial i.e. labetolol, Clonidine, vinpat and probable NACHO  Decrease labetolol 100 mg BID and add Lisinopril 20 mg daily   Monitor on Tele

## 2019-07-21 NOTE — CONSULT NOTE ADULT - PROBLEM SELECTOR RECOMMENDATION 3
Suggest to continue medications, monitoring, FU primary team recommendations. .
s/p PCI   asymptomatic

## 2019-07-21 NOTE — CONSULT NOTE ADULT - ASSESSMENT
Assessment  DMT2: 69y Female with DM T2 with hyperglycemia, A1C 10.4%,  insulin at home, blood sugars running high, no hypoglycemic episode,  eating meals,  non compliant with low carb diet.  Epilepsy: On treatment, stable.  CAD: on medications, no chest pain, stable, monitored.  Hypothyroidism: On Synthroid 50 mcg po daily, compliant with Synthroid intake, asymptomatic.  HTN: Controlled,  on antihypertensive medications.  Obesity: No strict exercise routines, not on any weight loss program, neither on low calorie diet.          Gen Gardner MD  Cell: [phone]  Office: [phone]
68 yo female admitted with seixures and found to have intermittent episode of bradycardia with second degree Type 2 AVB on tele

## 2019-07-21 NOTE — PROGRESS NOTE ADULT - ASSESSMENT
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 Her exam showed VFF with extinction on the right, hyperreflexia in the b/l UE. She underwent MRI of her brain which showed marked improvement of the left temporal-occpital and inferior parietal lesion. On 7/17 AM, she had an acute episode of altered mental status. CT head and CT angio head and neck were unremarkable. 7/18-19 had subclinical seizures. We placed her on vimpat after which she showed 1st degree block then 2nd degree block. We discontinued vimpat and started her on VPA. Cardiology was consulted and following for rhythm management.  She also has an poorly controled T2DM for which endocrine is on board.      Plan:  -plan for transfer to [facility] epilepsy monitoring unit under the care of Dr. Alberto Centeno, attending epilepsy monitoring unit neurologist. Case was discussed with Dr. Alberto Centeno. Transfer is pending bed availability.  -c/w keppra 1 g TID  - DC vimpat  - start VPA  BID  - cardio and endo on board

## 2019-07-21 NOTE — CHART NOTE - NSCHARTNOTEFT_GEN_A_CORE
Had another episode of headache, to date headaches responded to tylenol or ibuprofen, just received a dose will give a little time.   Her SPB is also 190, its possible that her headache is due to hypertention or visa versa will give 10 of labetolol IV push.   will continue to monitor

## 2019-07-21 NOTE — EEG REPORT - NS EEG TEXT BOX
[facility]   COMPREHENSIVE EPILEPSY CENTER   REPORT OF CONTINUOUS VIDEO EEG     [facility]: [address] [place] [address] , [address], [place] [address], Ph#: [phone]  [facility]: [id number], [place] [address], Ph#: [phone]  Office: [address] [place] [address], [facility] [address], [place] [address] Ph#: [phone]    Patient Name: RON FOSTER  Age and : 69y (54)  MRN #: [id number]  Location: [place] [address]  Referring Physician: Alberto Centeno    Study Date: 19 - 19    _____________________________________________________________  TECHNICAL INFORMATION    Placement and Labeling of Electrodes:  The EEG was performed utilizing 20 channels referential EEG connections (coronal over temporal over parasagittal montage) using all standard 10-20 electrode placements with EKG.  Recording was at a sampling rate of 256 samples per second per channel.  Time synchronized digital video recording was done simultaneously with EEG recording.  A low light infrared camera was used for low light recording.  Saulo and seizure detection algorithms were utilized.    _____________________________________________________________  HISTORY    Patient is a 69y old  Female who presents with a chief complaint of seizures    PERTINENT MEDICATION:  MEDICATIONS  (STANDING):  apixaban 5 milliGRAM(s) Oral every 12 hours  cloNIDine 0.1 milliGRAM(s) Oral every 12 hours  dextrose 5%. 1000 milliLiter(s) (50 mL/Hr) IV Continuous <Continuous>  dextrose 50% Injectable 12.5 Gram(s) IV Push once  dextrose 50% Injectable 25 Gram(s) IV Push once  dextrose 50% Injectable 25 Gram(s) IV Push once  diVALproex  milliGRAM(s) Oral <User Schedule>  insulin glargine Injectable (LANTUS) 18 Unit(s) SubCutaneous at bedtime  insulin lispro (HumaLOG) corrective regimen sliding scale   SubCutaneous three times a day before meals  insulin lispro Injectable (HumaLOG) 6 Unit(s) SubCutaneous three times a day before meals  labetalol 100 milliGRAM(s) Oral two times a day  levETIRAcetam 1000 milliGRAM(s) Oral <User Schedule>  levothyroxine 50 MICROGram(s) Oral daily  lisinopril 20 milliGRAM(s) Oral daily  polyethylene glycol 3350 17 Gram(s) Oral daily    _____________________________________________________________  STUDY INTERPRETATION    Findings: The background was continuous, spontaneously variable and reactive. During wakefulness, the posterior dominant rhythm consisted of asymmetric (seen over right posterior quadrant), well-modulated 9 Hz activity, with amplitude to 30 uV, that attenuated to eye opening.  Low amplitude frontal beta was noted in wakefulness.    Background Slowing:  No generalized background slowing was present.    Focal Slowing:   -Continuous delta and theta slowing over left hemisphere, maximum left posterior quadrant    Sleep Background:  Stage II sleep transients were not recorded.  Drowsiness and stage II sleep transients were characterized by slowing of background frequencies.    Other Non-Epileptiform Findings:  None were present.    Interictal Epileptiform Activity:   -LPDs (Lateralized Periodic Discharges), Regional, Left occipital, maximum O1 (0.5 - 1 Hz)    Events:  Clinical events: Subclinical seizures (3 recorded)  Seizures: 3 subclinical seizures were recorded over the left posterior quadrant with rhythmic sharp alpha activity developing into rhythmic sharp theta activity, duration 2-4 minutes, with no associated clinical signs.    Activation Procedures:   Hyperventilation was not performed.    Photic stimulation was not performed.     Artifacts:  Intermittent myogenic and movement artifacts were noted.    ECG:  The heart rate on single channel ECG was predominantly between 60-80 BPM.    _____________________________________________________________  EEG SUMMARY/CLASSIFICATION  Abnormal EEG in the awake, drowsy and asleep states.    -Clinical events: Subclinical seizures (3 recorded)  -Seizures: 3 subclinical seizures were recorded over the left posterior quadrant with rhythmic sharp alpha activity developing into rhythmic sharp theta activity, duration 2-4 minutes, with no associated clinical signs.    -LPDs (Lateralized Periodic Discharges), Regional, Left occipital, maximum O1 (0.5 - 1 Hz)  -Continuous delta and theta slowing over left hemisphere, maximum left posterior quadrant  _____________________________________________________________  EEG IMPRESSION/CLINICAL CORRELATE  Abnormal EEG study.  1. 3 subclinical seizures recorded over left posterior quadrant (duration 2-4 minutes). Last seizure recorded on 19 at 20:42.  2. Potential epileptogenic focus in the left occipital region.  3. Structural and functional abnormality in the left hemisphere, maximum left posterior quadrant.  4. No seizures were recorded.    Jhony Berkowitz MD  EEG / Epilepsy Attending Physician

## 2019-07-21 NOTE — CONSULT NOTE ADULT - PROBLEM SELECTOR RECOMMENDATION 4
Overweight/Obesity: Patient counseled for weight loss, exercise, low calorie diet.
Decrease labetolol as above   Lisinopril added

## 2019-07-22 DIAGNOSIS — I48.91 UNSPECIFIED ATRIAL FIBRILLATION: ICD-10-CM

## 2019-07-22 LAB
ALBUMIN SERPL ELPH-MCNC: 4.1 G/DL — SIGNIFICANT CHANGE UP (ref 3.3–5)
ALP SERPL-CCNC: 220 U/L — HIGH (ref 40–120)
ALT FLD-CCNC: 27 U/L — SIGNIFICANT CHANGE UP (ref 10–45)
ANION GAP SERPL CALC-SCNC: 14 MMOL/L — SIGNIFICANT CHANGE UP (ref 5–17)
AST SERPL-CCNC: 22 U/L — SIGNIFICANT CHANGE UP (ref 10–40)
BILIRUB SERPL-MCNC: 0.2 MG/DL — SIGNIFICANT CHANGE UP (ref 0.2–1.2)
BUN SERPL-MCNC: 11 MG/DL — SIGNIFICANT CHANGE UP (ref 7–23)
CALCIUM SERPL-MCNC: 9.3 MG/DL — SIGNIFICANT CHANGE UP (ref 8.4–10.5)
CHLORIDE SERPL-SCNC: 104 MMOL/L — SIGNIFICANT CHANGE UP (ref 96–108)
CO2 SERPL-SCNC: 25 MMOL/L — SIGNIFICANT CHANGE UP (ref 22–31)
CREAT SERPL-MCNC: 0.73 MG/DL — SIGNIFICANT CHANGE UP (ref 0.5–1.3)
GLUCOSE SERPL-MCNC: 181 MG/DL — HIGH (ref 70–99)
HCT VFR BLD CALC: 31.7 % — LOW (ref 34.5–45)
HGB BLD-MCNC: 10.2 G/DL — LOW (ref 11.5–15.5)
MCHC RBC-ENTMCNC: 23.5 PG — LOW (ref 27–34)
MCHC RBC-ENTMCNC: 32.2 GM/DL — SIGNIFICANT CHANGE UP (ref 32–36)
MCV RBC AUTO: 72.9 FL — LOW (ref 80–100)
PLATELET # BLD AUTO: 183 K/UL — SIGNIFICANT CHANGE UP (ref 150–400)
POTASSIUM SERPL-MCNC: 4 MMOL/L — SIGNIFICANT CHANGE UP (ref 3.5–5.3)
POTASSIUM SERPL-SCNC: 4 MMOL/L — SIGNIFICANT CHANGE UP (ref 3.5–5.3)
PROT SERPL-MCNC: 7.3 G/DL — SIGNIFICANT CHANGE UP (ref 6–8.3)
RBC # BLD: 4.34 M/UL — SIGNIFICANT CHANGE UP (ref 3.8–5.2)
RBC # FLD: 15.9 % — HIGH (ref 10.3–14.5)
SODIUM SERPL-SCNC: 143 MMOL/L — SIGNIFICANT CHANGE UP (ref 135–145)
T4 FREE SERPL-MCNC: 1.1 NG/DL — SIGNIFICANT CHANGE UP (ref 0.9–1.8)
TSH SERPL-MCNC: 4.75 UIU/ML — HIGH (ref 0.27–4.2)
VALPROATE SERPL-MCNC: 65 UG/ML — SIGNIFICANT CHANGE UP (ref 50–100)
WBC # BLD: 5 K/UL — SIGNIFICANT CHANGE UP (ref 3.8–10.5)
WBC # FLD AUTO: 5 K/UL — SIGNIFICANT CHANGE UP (ref 3.8–10.5)

## 2019-07-22 PROCEDURE — 95951: CPT | Mod: 26

## 2019-07-22 PROCEDURE — 99233 SBSQ HOSP IP/OBS HIGH 50: CPT | Mod: GC

## 2019-07-22 RX ORDER — LEVOTHYROXINE SODIUM 125 MCG
75 TABLET ORAL DAILY
Refills: 0 | Status: DISCONTINUED | OUTPATIENT
Start: 2019-07-22 | End: 2019-07-27

## 2019-07-22 RX ORDER — ASPIRIN/CALCIUM CARB/MAGNESIUM 324 MG
81 TABLET ORAL DAILY
Refills: 0 | Status: DISCONTINUED | OUTPATIENT
Start: 2019-07-22 | End: 2019-07-27

## 2019-07-22 RX ORDER — LISINOPRIL 2.5 MG/1
20 TABLET ORAL
Refills: 0 | Status: DISCONTINUED | OUTPATIENT
Start: 2019-07-22 | End: 2019-07-23

## 2019-07-22 RX ADMIN — Medication 6 UNIT(S): at 08:58

## 2019-07-22 RX ADMIN — LISINOPRIL 20 MILLIGRAM(S): 2.5 TABLET ORAL at 04:52

## 2019-07-22 RX ADMIN — Medication 81 MILLIGRAM(S): at 12:19

## 2019-07-22 RX ADMIN — Medication 50 MICROGRAM(S): at 04:52

## 2019-07-22 RX ADMIN — Medication 6 UNIT(S): at 12:59

## 2019-07-22 RX ADMIN — DIVALPROEX SODIUM 750 MILLIGRAM(S): 500 TABLET, DELAYED RELEASE ORAL at 08:58

## 2019-07-22 RX ADMIN — Medication 100 MILLIGRAM(S): at 17:28

## 2019-07-22 RX ADMIN — Medication 6 UNIT(S): at 17:28

## 2019-07-22 RX ADMIN — DIVALPROEX SODIUM 750 MILLIGRAM(S): 500 TABLET, DELAYED RELEASE ORAL at 17:27

## 2019-07-22 RX ADMIN — INSULIN GLARGINE 18 UNIT(S): 100 INJECTION, SOLUTION SUBCUTANEOUS at 21:36

## 2019-07-22 RX ADMIN — LISINOPRIL 20 MILLIGRAM(S): 2.5 TABLET ORAL at 17:29

## 2019-07-22 RX ADMIN — Medication 0.1 MILLIGRAM(S): at 17:28

## 2019-07-22 RX ADMIN — LEVETIRACETAM 1000 MILLIGRAM(S): 250 TABLET, FILM COATED ORAL at 12:19

## 2019-07-22 RX ADMIN — Medication 400 MILLIGRAM(S): at 17:27

## 2019-07-22 RX ADMIN — LEVETIRACETAM 1000 MILLIGRAM(S): 250 TABLET, FILM COATED ORAL at 21:36

## 2019-07-22 RX ADMIN — APIXABAN 5 MILLIGRAM(S): 2.5 TABLET, FILM COATED ORAL at 17:28

## 2019-07-22 RX ADMIN — Medication 400 MILLIGRAM(S): at 16:29

## 2019-07-22 RX ADMIN — Medication 100 MILLIGRAM(S): at 04:52

## 2019-07-22 RX ADMIN — Medication 0.1 MILLIGRAM(S): at 04:52

## 2019-07-22 RX ADMIN — APIXABAN 5 MILLIGRAM(S): 2.5 TABLET, FILM COATED ORAL at 04:52

## 2019-07-22 RX ADMIN — Medication 650 MILLIGRAM(S): at 17:27

## 2019-07-22 RX ADMIN — LEVETIRACETAM 1000 MILLIGRAM(S): 250 TABLET, FILM COATED ORAL at 04:52

## 2019-07-22 RX ADMIN — PANTOPRAZOLE SODIUM 40 MILLIGRAM(S): 20 TABLET, DELAYED RELEASE ORAL at 04:52

## 2019-07-22 RX ADMIN — Medication 1: at 12:59

## 2019-07-22 NOTE — PROGRESS NOTE ADULT - SUBJECTIVE AND OBJECTIVE BOX
Subjective: Patient seen and examined. No new events except as noted.   resting comfortably   no cp or sob   EEG continues   no events on tele last night       REVIEW OF SYSTEMS:    CONSTITUTIONAL: +weakness, fevers or chills  EYES/ENT: No visual changes;  No vertigo or throat pain   NECK: No pain or stiffness  RESPIRATORY: No cough, wheezing, hemoptysis; No shortness of breath  CARDIOVASCULAR: No chest pain or palpitations  GASTROINTESTINAL: No abdominal or epigastric pain. No nausea, vomiting, or hematemesis; No diarrhea or constipation. No melena or hematochezia.  GENITOURINARY: No dysuria, frequency or hematuria  NEUROLOGICAL: No numbness or weakness  SKIN: No itching, burning, rashes, or lesions   All other review of systems is negative unless indicated above.    MEDICATIONS:  MEDICATIONS  (STANDING):  apixaban 5 milliGRAM(s) Oral every 12 hours  cloNIDine 0.1 milliGRAM(s) Oral every 12 hours  dextrose 5%. 1000 milliLiter(s) (50 mL/Hr) IV Continuous <Continuous>  dextrose 50% Injectable 12.5 Gram(s) IV Push once  dextrose 50% Injectable 25 Gram(s) IV Push once  dextrose 50% Injectable 25 Gram(s) IV Push once  diVALproex  milliGRAM(s) Oral <User Schedule>  insulin glargine Injectable (LANTUS) 18 Unit(s) SubCutaneous at bedtime  insulin lispro (HumaLOG) corrective regimen sliding scale   SubCutaneous three times a day before meals  insulin lispro Injectable (HumaLOG) 6 Unit(s) SubCutaneous three times a day before meals  labetalol 100 milliGRAM(s) Oral two times a day  levETIRAcetam 1000 milliGRAM(s) Oral <User Schedule>  levothyroxine 50 MICROGram(s) Oral daily  lisinopril 20 milliGRAM(s) Oral daily  pantoprazole    Tablet 40 milliGRAM(s) Oral before breakfast  polyethylene glycol 3350 17 Gram(s) Oral daily      PHYSICAL EXAM:  T(C): 36.5 (07-22-19 @ 07:11), Max: 36.9 (07-22-19 @ 04:40)  HR: 58 (07-22-19 @ 07:11) (58 - 76)  BP: 139/79 (07-22-19 @ 07:11) (139/79 - 190/79)  RR: 18 (07-22-19 @ 07:11) (17 - 18)  SpO2: 100% (07-22-19 @ 07:11) (95% - 100%)  Wt(kg): --  I&O's Summary    21 Jul 2019 07:01  -  22 Jul 2019 07:00  --------------------------------------------------------  IN: 240 mL / OUT: 0 mL / NET: 240 mL          Appearance: NAD Obese, EEG in progress 	  HEENT:   Normal oral mucosa, PERRL, EOMI	  Lymphatic: No lymphadenopathy , no edema  Cardiovascular: Normal S1 S2, No JVD, No murmurs , Peripheral pulses palpable 2+ bilaterally  Respiratory: Lungs clear to auscultation, normal effort 	  Gastrointestinal:  Soft, Non-tender, + BS	  Skin: No rashes, No ecchymoses, No cyanosis, warm to touch  Musculoskeletal: Normal range of motion, normal strength  Psychiatry:  Mood & affect appropriate  Ext: No edema      LABS:    CARDIAC MARKERS:              Phos  3.6     07-21  Mg     2.1     07-21      proBNP:   Lipid Profile:   HgA1c:   TSH:             TELEMETRY: 	 SR   ECG:  	  RADIOLOGY:   DIAGNOSTIC TESTING:  [ ] Echocardiogram:  [ ]  Catheterization:  [ ] Stress Test:    OTHER:

## 2019-07-22 NOTE — PROGRESS NOTE ADULT - ASSESSMENT
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 Her exam showed VFF with extinction on the right, hyperreflexia in the b/l UE. She underwent MRI of her brain which showed marked improvement of the left temporal-occpital and inferior parietal lesion. On 7/17 AM, she had an acute episode of altered mental status. CT head and CT angio head and neck were unremarkable. 7/18-19 had subclinical seizures. We placed her on vimpat after which she showed 1st degree block then 2nd degree block. We discontinued vimpat and started her on VPA. Cardiology was consulted and following for rhythm management.  She also has an poorly controled T2DM for which endocrine is on board.      Plan:  -c/w keppra 1 g TID  - vimpat was dc'd due to 2nd degree heart block  - c/w VPA  BID  - cardio and endo on board for blood pressure and glycemic mgmt  - blood pressure control per cardiology - lisinopril was increased to 20mg   - c/w vEEG monitoring  - pending depakote level

## 2019-07-22 NOTE — PROGRESS NOTE ADULT - ASSESSMENT
70 yo female admitted with seixures and found to have intermittent episode of bradycardia with second degree Type 2 AVB on tele

## 2019-07-22 NOTE — PROGRESS NOTE ADULT - ASSESSMENT
Assessment  DMT2: 69y Female with DM T2 with hyperglycemia, A1C 10.4%,  started on basal bolus insulin, blood sugars trending down, no hypoglycemic episode,  eating meals,  non compliant with low carb diet.  Epilepsy: On treatment, stable.  CAD: on medications, no chest pain, stable, monitored.  Hypothyroidism: On Synthroid 50 mcg po daily, compliant with Synthroid intake, asymptomatic.  HTN: Controlled,  on antihypertensive medications.  Obesity: No strict exercise routines, not on any weight loss program, neither on low calorie diet.          Gen Gardner MD  Cell: [phone]  Office: [phone]

## 2019-07-22 NOTE — PROGRESS NOTE ADULT - SUBJECTIVE AND OBJECTIVE BOX
Chief complaint  Patient is a 69y old  Female who presents with a chief complaint of Headache, seizure work-up (22 Jul 2019 15:26)   Review of systems  Patient in bed, looks comfortable, no fever, no hypoglycemia.    Labs and Fingersticks  CAPILLARY BLOOD GLUCOSE      POCT Blood Glucose.: 176 mg/dL (22 Jul 2019 12:38)  POCT Blood Glucose.: 125 mg/dL (22 Jul 2019 08:45)  POCT Blood Glucose.: 254 mg/dL (21 Jul 2019 20:58)  POCT Blood Glucose.: 140 mg/dL (21 Jul 2019 17:05)      Anion Gap, Serum: 14 (07-22 @ 10:58)      Calcium, Total Serum: 9.3 (07-22 @ 10:58)  Albumin, Serum: 4.1 (07-22 @ 10:58)    Alanine Aminotransferase (ALT/SGPT): 27 (07-22 @ 10:58)  Alkaline Phosphatase, Serum: 220 <H> (07-22 @ 10:58)  Aspartate Aminotransferase (AST/SGOT): 22 (07-22 @ 10:58)        07-22    143  |  104  |  11  ----------------------------<  181<H>  4.0   |  25  |  0.73    Ca    9.3      22 Jul 2019 10:58  Phos  3.6     07-21  Mg     2.1     07-21    TPro  7.3  /  Alb  4.1  /  TBili  0.2  /  DBili  x   /  AST  22  /  ALT  27  /  AlkPhos  220<H>  07-22                        10.2   5.0   )-----------( 183      ( 22 Jul 2019 10:58 )             31.7     Medications  MEDICATIONS  (STANDING):  apixaban 5 milliGRAM(s) Oral every 12 hours  aspirin enteric coated 81 milliGRAM(s) Oral daily  cloNIDine 0.1 milliGRAM(s) Oral every 12 hours  dextrose 5%. 1000 milliLiter(s) (50 mL/Hr) IV Continuous <Continuous>  dextrose 50% Injectable 12.5 Gram(s) IV Push once  dextrose 50% Injectable 25 Gram(s) IV Push once  dextrose 50% Injectable 25 Gram(s) IV Push once  diVALproex  milliGRAM(s) Oral <User Schedule>  insulin glargine Injectable (LANTUS) 18 Unit(s) SubCutaneous at bedtime  insulin lispro (HumaLOG) corrective regimen sliding scale   SubCutaneous three times a day before meals  insulin lispro Injectable (HumaLOG) 6 Unit(s) SubCutaneous three times a day before meals  labetalol 100 milliGRAM(s) Oral two times a day  levETIRAcetam 1000 milliGRAM(s) Oral <User Schedule>  levothyroxine 75 MICROGram(s) Oral daily  lisinopril 20 milliGRAM(s) Oral two times a day  pantoprazole    Tablet 40 milliGRAM(s) Oral before breakfast  polyethylene glycol 3350 17 Gram(s) Oral daily      Physical Exam  General: Patient comfortable in bed  Vital Signs Last 12 Hrs  T(F): 97.8 (07-22-19 @ 11:58), Max: 98.4 (07-22-19 @ 04:40)  HR: 62 (07-22-19 @ 11:58) (58 - 73)  BP: 147/76 (07-22-19 @ 11:58) (139/79 - 154/84)  BP(mean): --  RR: 18 (07-22-19 @ 11:58) (18 - 18)  SpO2: 98% (07-22-19 @ 11:58) (95% - 100%)  Neck: No palpable thyroid nodules.  CVS: S1S2, No murmurs  Respiratory: No wheezing, no crepitations  GI: Abdomen soft, bowel sounds positive  Musculoskeletal:  edema lower extremities.   Skin: No skin rashes, no ecchymosis    Diagnostics    Thyroid Stimulating Hormone, Serum: AM Sched. Collection: 22-Jul-2019 06:00 (07-21 @ 12:09)  Free Thyroxine, Serum: AM Sched. Collection: 22-Jul-2019 06:00 (07-21 @ 12:09)

## 2019-07-22 NOTE — PROGRESS NOTE ADULT - SUBJECTIVE AND OBJECTIVE BOX
INTERVAL HISTORY: patient reports that she had headache yesterday afternoon, but improved with medication. EEG overnight showed she had 2 electrographic seizures in left posterior quadrant with no clinical correlate.     MEDICATIONS  (STANDING):  apixaban 5 milliGRAM(s) Oral every 12 hours  aspirin enteric coated 81 milliGRAM(s) Oral daily  cloNIDine 0.1 milliGRAM(s) Oral every 12 hours  dextrose 5%. 1000 milliLiter(s) (50 mL/Hr) IV Continuous <Continuous>  dextrose 50% Injectable 12.5 Gram(s) IV Push once  dextrose 50% Injectable 25 Gram(s) IV Push once  dextrose 50% Injectable 25 Gram(s) IV Push once  diVALproex  milliGRAM(s) Oral <User Schedule>  insulin glargine Injectable (LANTUS) 18 Unit(s) SubCutaneous at bedtime  insulin lispro (HumaLOG) corrective regimen sliding scale   SubCutaneous three times a day before meals  insulin lispro Injectable (HumaLOG) 6 Unit(s) SubCutaneous three times a day before meals  labetalol 100 milliGRAM(s) Oral two times a day  levETIRAcetam 1000 milliGRAM(s) Oral <User Schedule>  levothyroxine 50 MICROGram(s) Oral daily  lisinopril 20 milliGRAM(s) Oral two times a day  pantoprazole    Tablet 40 milliGRAM(s) Oral before breakfast  polyethylene glycol 3350 17 Gram(s) Oral daily    MEDICATIONS  (PRN):  acetaminophen   Tablet .. 650 milliGRAM(s) Oral every 6 hours PRN Mild Pain (1 - 3), Moderate Pain (4 - 6), Severe Pain (7 - 10)  dextrose 40% Gel 15 Gram(s) Oral once PRN Blood Glucose LESS THAN 70 milliGRAM(s)/deciliter  glucagon  Injectable 1 milliGRAM(s) IntraMuscular once PRN Glucose LESS THAN 70 milligrams/deciliter  hydrALAZINE Injectable 5 milliGRAM(s) IV Push every 2 hours PRN SBP > 150/90  ibuprofen  Tablet. 400 milliGRAM(s) Oral three times a day PRN Moderate Pain (4 - 6)  LORazepam   Injectable 2 milliGRAM(s) IV Push once PRN Seizure activity      ALLERGIES/INTOLERANCES:  Allergies  No Known Allergies    Intolerances    VITALS & EXAMINATION:  Vital Signs Last 24 Hrs  T(C): 36.6 (22 Jul 2019 11:58), Max: 36.9 (22 Jul 2019 04:40)  T(F): 97.8 (22 Jul 2019 11:58), Max: 98.4 (22 Jul 2019 04:40)  HR: 62 (22 Jul 2019 11:58) (58 - 76)  BP: 147/76 (22 Jul 2019 11:58) (139/79 - 190/79)  BP(mean): --  RR: 18 (22 Jul 2019 11:58) (18 - 18)  SpO2: 98% (22 Jul 2019 11:58) (95% - 100%)    General:  Constitutional: Obese Female, appears stated age, in no apparent distress including pain    Neurological (>12):  MS: Awake, alert, oriented to person, place, situation, time. Normal affect. Follows all commands.    Language: Speech is clear, fluent. Finger agnosia, able to name pen, hand paper. shows 2 fingers, cannot follow crossed commands. repetition impaired.    CNs: No gross facial asymmetry b/l.    Motor: full strength biceps and triceps b/l. 5/5 hand  b/l. 5/5 KF and KE b/l.      LABORATORY:    CBC                       9.6    4.55  )-----------( 201      ( 18 Jul 2019 13:29 )             31.7     Chem 07-18    141  |  109<H>  |  17  ----------------------------<  123<H>  3.9   |  16<L>  |  0.65    Ca    9.5      18 Jul 2019 13:29    A1c 07-16 [id number] 10.6

## 2019-07-23 PROCEDURE — 95951: CPT | Mod: 26

## 2019-07-23 PROCEDURE — 99233 SBSQ HOSP IP/OBS HIGH 50: CPT | Mod: GC

## 2019-07-23 RX ORDER — LISINOPRIL 2.5 MG/1
40 TABLET ORAL DAILY
Refills: 0 | Status: DISCONTINUED | OUTPATIENT
Start: 2019-07-23 | End: 2019-07-27

## 2019-07-23 RX ORDER — AMLODIPINE BESYLATE 2.5 MG/1
5 TABLET ORAL DAILY
Refills: 0 | Status: DISCONTINUED | OUTPATIENT
Start: 2019-07-23 | End: 2019-07-24

## 2019-07-23 RX ORDER — LISINOPRIL 2.5 MG/1
20 TABLET ORAL ONCE
Refills: 0 | Status: COMPLETED | OUTPATIENT
Start: 2019-07-23 | End: 2019-07-23

## 2019-07-23 RX ORDER — AMLODIPINE BESYLATE 2.5 MG/1
5 TABLET ORAL DAILY
Refills: 0 | Status: DISCONTINUED | OUTPATIENT
Start: 2019-07-23 | End: 2019-07-23

## 2019-07-23 RX ADMIN — POLYETHYLENE GLYCOL 3350 17 GRAM(S): 17 POWDER, FOR SOLUTION ORAL at 11:29

## 2019-07-23 RX ADMIN — Medication 0.1 MILLIGRAM(S): at 17:11

## 2019-07-23 RX ADMIN — Medication 6 UNIT(S): at 08:49

## 2019-07-23 RX ADMIN — Medication 0.1 MILLIGRAM(S): at 05:29

## 2019-07-23 RX ADMIN — DIVALPROEX SODIUM 750 MILLIGRAM(S): 500 TABLET, DELAYED RELEASE ORAL at 17:09

## 2019-07-23 RX ADMIN — LISINOPRIL 20 MILLIGRAM(S): 2.5 TABLET ORAL at 05:29

## 2019-07-23 RX ADMIN — LISINOPRIL 20 MILLIGRAM(S): 2.5 TABLET ORAL at 15:01

## 2019-07-23 RX ADMIN — Medication 6 UNIT(S): at 17:59

## 2019-07-23 RX ADMIN — Medication 1: at 12:55

## 2019-07-23 RX ADMIN — Medication 100 MILLIGRAM(S): at 05:29

## 2019-07-23 RX ADMIN — Medication 5 MILLIGRAM(S): at 18:09

## 2019-07-23 RX ADMIN — Medication 100 MILLIGRAM(S): at 17:11

## 2019-07-23 RX ADMIN — LEVETIRACETAM 1000 MILLIGRAM(S): 250 TABLET, FILM COATED ORAL at 21:18

## 2019-07-23 RX ADMIN — Medication 6 UNIT(S): at 12:55

## 2019-07-23 RX ADMIN — Medication 2: at 17:58

## 2019-07-23 RX ADMIN — Medication 81 MILLIGRAM(S): at 11:28

## 2019-07-23 RX ADMIN — APIXABAN 5 MILLIGRAM(S): 2.5 TABLET, FILM COATED ORAL at 05:29

## 2019-07-23 RX ADMIN — APIXABAN 5 MILLIGRAM(S): 2.5 TABLET, FILM COATED ORAL at 17:11

## 2019-07-23 RX ADMIN — Medication 75 MICROGRAM(S): at 05:29

## 2019-07-23 RX ADMIN — LEVETIRACETAM 1000 MILLIGRAM(S): 250 TABLET, FILM COATED ORAL at 15:02

## 2019-07-23 RX ADMIN — LEVETIRACETAM 1000 MILLIGRAM(S): 250 TABLET, FILM COATED ORAL at 05:29

## 2019-07-23 RX ADMIN — DIVALPROEX SODIUM 750 MILLIGRAM(S): 500 TABLET, DELAYED RELEASE ORAL at 05:29

## 2019-07-23 RX ADMIN — INSULIN GLARGINE 18 UNIT(S): 100 INJECTION, SOLUTION SUBCUTANEOUS at 21:18

## 2019-07-23 RX ADMIN — AMLODIPINE BESYLATE 5 MILLIGRAM(S): 2.5 TABLET ORAL at 15:01

## 2019-07-23 RX ADMIN — PANTOPRAZOLE SODIUM 40 MILLIGRAM(S): 20 TABLET, DELAYED RELEASE ORAL at 05:29

## 2019-07-23 RX ADMIN — Medication 1: at 08:49

## 2019-07-23 NOTE — PROGRESS NOTE ADULT - ASSESSMENT
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 Her exam showed VFF with extinction on the right, hyperreflexia in the b/l UE. She underwent MRI of her brain which showed marked improvement of the left temporal-occpital and inferior parietal lesion. On 7/17 AM, she had an acute episode of altered mental status. CT head and CT angio head and neck were unremarkable. 7/18-19 had subclinical seizures. We placed her on vimpat after which she showed 1st degree block then 2nd degree block. We discontinued vimpat and started her on VPA. Cardiology was consulted and following for rhythm management.  She also has an poorly controled T2DM for which endocrine is on board.      Plan:  -c/w keppra 1 g TID  - vimpat was dc'd due to 2nd degree heart block  - c/w VPA  BID - depakote level was 65  - cardio and endo on board for blood pressure and glycemic mgmt  - blood pressure control per cardiology - lisinopril was increased to 40mg daily, norvasc 5mg was added   - c/w vEEG monitoring

## 2019-07-23 NOTE — PROGRESS NOTE ADULT - SUBJECTIVE AND OBJECTIVE BOX
INTERVAL HISTORY: patient reports that she had headache yesterday afternoon like the prior afternoon, but improved with medication. EEG overnight showed subtle seizure correlate overnight.    MEDICATIONS  (STANDING):  amLODIPine   Tablet 5 milliGRAM(s) Oral daily  amLODIPine   Tablet 5 milliGRAM(s) Oral daily  apixaban 5 milliGRAM(s) Oral every 12 hours  aspirin enteric coated 81 milliGRAM(s) Oral daily  cloNIDine 0.1 milliGRAM(s) Oral every 12 hours  dextrose 5%. 1000 milliLiter(s) (50 mL/Hr) IV Continuous <Continuous>  dextrose 50% Injectable 12.5 Gram(s) IV Push once  dextrose 50% Injectable 25 Gram(s) IV Push once  dextrose 50% Injectable 25 Gram(s) IV Push once  diVALproex  milliGRAM(s) Oral <User Schedule>  insulin glargine Injectable (LANTUS) 18 Unit(s) SubCutaneous at bedtime  insulin lispro (HumaLOG) corrective regimen sliding scale   SubCutaneous three times a day before meals  insulin lispro Injectable (HumaLOG) 6 Unit(s) SubCutaneous three times a day before meals  labetalol 100 milliGRAM(s) Oral two times a day  levETIRAcetam 1000 milliGRAM(s) Oral <User Schedule>  levothyroxine 75 MICROGram(s) Oral daily  lisinopril 20 milliGRAM(s) Oral once  lisinopril 40 milliGRAM(s) Oral daily  pantoprazole    Tablet 40 milliGRAM(s) Oral before breakfast  polyethylene glycol 3350 17 Gram(s) Oral daily    MEDICATIONS  (PRN):  acetaminophen   Tablet .. 650 milliGRAM(s) Oral every 6 hours PRN Mild Pain (1 - 3), Moderate Pain (4 - 6), Severe Pain (7 - 10)  dextrose 40% Gel 15 Gram(s) Oral once PRN Blood Glucose LESS THAN 70 milliGRAM(s)/deciliter  glucagon  Injectable 1 milliGRAM(s) IntraMuscular once PRN Glucose LESS THAN 70 milligrams/deciliter  hydrALAZINE Injectable 5 milliGRAM(s) IV Push every 2 hours PRN SBP > 150/90  ibuprofen  Tablet. 400 milliGRAM(s) Oral three times a day PRN Moderate Pain (4 - 6)  LORazepam   Injectable 2 milliGRAM(s) IV Push once PRN Seizure activity    ALLERGIES/INTOLERANCES:  Allergies  No Known Allergies    Intolerances    VITALS & EXAMINATION:  Vital Signs Last 24 Hrs  T(C): 36.6 (23 Jul 2019 12:32), Max: 36.8 (22 Jul 2019 23:33)  T(F): 97.8 (23 Jul 2019 12:32), Max: 98.3 (22 Jul 2019 23:33)  HR: 62 (23 Jul 2019 12:32) (62 - 70)  BP: 153/84 (23 Jul 2019 12:32) (133/76 - 170/93)  BP(mean): --  RR: 18 (23 Jul 2019 12:32) (18 - 19)  SpO2: 97% (23 Jul 2019 12:32) (97% - 100%)  General:  Constitutional: Obese Female, appears stated age, in no apparent distress including pain    Neurological (>12):  MS: Awake, alert, oriented to person, place, situation, time. Normal affect. Follows all commands.    Language: Speech is clear, fluent. Finger agnosia, able to name pen, hand paper. shows 2 fingers, cannot follow crossed commands. repetition impaired.    CNs: No gross facial asymmetry b/l.    Motor: full strength biceps and triceps b/l. 5/5 hand  b/l. 5/5 KF and KE b/l.      LABORATORY:    CBC                       9.6    4.55  )-----------( 201      ( 18 Jul 2019 13:29 )             31.7     Chem 07-18    141  |  109<H>  |  17  ----------------------------<  123<H>  3.9   |  16<L>  |  0.65    Ca    9.5      18 Jul 2019 13:29    A1c 07-16 [id number] 10.6

## 2019-07-23 NOTE — PROGRESS NOTE ADULT - ASSESSMENT
Assessment  DMT2: 69y Female with DM T2 with hyperglycemia, A1C 10.4%,  started on basal bolus insulin, blood sugars trending down, no hypoglycemic episode,  eating meals,  non compliant with low carb diet, she is c/o severe headaches, no nausea vomiting.  Epilepsy: On treatment, stable.  CAD: on medications, no chest pain, stable, monitored.  Hypothyroidism: On Synthroid 50 mcg po daily, compliant with Synthroid intake, asymptomatic.  HTN: Controlled,  on antihypertensive medications.  Obesity: No strict exercise routines, not on any weight loss program, neither on low calorie diet.          Gen Gardner MD  Cell: [phone]  Office: [phone]

## 2019-07-23 NOTE — PROGRESS NOTE ADULT - SUBJECTIVE AND OBJECTIVE BOX
Subjective: Patient seen and examined. No new events except as noted.   feels good   EEG continues       REVIEW OF SYSTEMS:    CONSTITUTIONAL:+ weakness, fevers or chills  EYES/ENT: No visual changes;  No vertigo or throat pain   NECK: No pain or stiffness  RESPIRATORY: No cough, wheezing, hemoptysis; No shortness of breath  CARDIOVASCULAR: No chest pain or palpitations  GASTROINTESTINAL: No abdominal or epigastric pain. No nausea, vomiting, or hematemesis; No diarrhea or constipation. No melena or hematochezia.  GENITOURINARY: No dysuria, frequency or hematuria  NEUROLOGICAL: No numbness or weakness  SKIN: No itching, burning, rashes, or lesions   All other review of systems is negative unless indicated above.    MEDICATIONS:  MEDICATIONS  (STANDING):  amLODIPine   Tablet 5 milliGRAM(s) Oral daily  apixaban 5 milliGRAM(s) Oral every 12 hours  aspirin enteric coated 81 milliGRAM(s) Oral daily  cloNIDine 0.1 milliGRAM(s) Oral every 12 hours  dextrose 5%. 1000 milliLiter(s) (50 mL/Hr) IV Continuous <Continuous>  dextrose 50% Injectable 12.5 Gram(s) IV Push once  dextrose 50% Injectable 25 Gram(s) IV Push once  dextrose 50% Injectable 25 Gram(s) IV Push once  diVALproex  milliGRAM(s) Oral <User Schedule>  insulin glargine Injectable (LANTUS) 18 Unit(s) SubCutaneous at bedtime  insulin lispro (HumaLOG) corrective regimen sliding scale   SubCutaneous three times a day before meals  insulin lispro Injectable (HumaLOG) 6 Unit(s) SubCutaneous three times a day before meals  labetalol 100 milliGRAM(s) Oral two times a day  levETIRAcetam 1000 milliGRAM(s) Oral <User Schedule>  levothyroxine 75 MICROGram(s) Oral daily  lisinopril 20 milliGRAM(s) Oral once  lisinopril 20 milliGRAM(s) Oral two times a day  pantoprazole    Tablet 40 milliGRAM(s) Oral before breakfast  polyethylene glycol 3350 17 Gram(s) Oral daily      PHYSICAL EXAM:  T(C): 36.5 (19 @ 08:36), Max: 36.8 (19 @ 23:33)  HR: 65 (19 @ 08:36) (62 - 70)  BP: 147/77 (19 @ 08:36) (133/76 - 170/93)  RR: 18 (19 @ 08:36) (18 - 19)  SpO2: 98% (19 @ 08:36) (97% - 100%)  Wt(kg): --  I&O's Summary    2019 07:01  -  2019 07:00  --------------------------------------------------------  IN: 360 mL / OUT: 0 mL / NET: 360 mL          Appearance: NAD obese EEG in progress   HEENT:   Normal oral mucosa, PERRL, EOMI	  Lymphatic: No lymphadenopathy , no edema  Cardiovascular: Normal S1 S2, No JVD, No murmurs , Peripheral pulses palpable 2+ bilaterally  Respiratory: Lungs clear to auscultation, normal effort 	  Gastrointestinal:  Soft, Non-tender, + BS	  Skin: No rashes, No ecchymoses, No cyanosis, warm to touch  Musculoskeletal: Normal range of motion, normal strength  Psychiatry:  Mood & affect appropriate  Ext: No edema      LABS:    CARDIAC MARKERS:                                10.2   5.0   )-----------( 183      ( 2019 10:58 )             31.7         143  |  104  |  11  ----------------------------<  181<H>  4.0   |  25  |  0.73    Ca    9.3      2019 10:58    TPro  7.3  /  Alb  4.1  /  TBili  0.2  /  DBili  x   /  AST  22  /  ALT  27  /  AlkPhos  220<H>      proBNP:   Lipid Profile:   HgA1c:   TSH: Thyroid Stimulating Hormone, Serum: 4.75 uIU/mL ( @ 14:13)              TELEMETRY: 	SR, brief 2:1 avb     ECG:  	  RADIOLOGY:   DIAGNOSTIC TESTING:  [ ] Echocardiogram:  [ ]  Catheterization:  [ ] Stress Test:    OTHER: 	          EEG REPORT:   EEG Report:  · EEG Report		  [facility] EPILEPSY Weidman   REPORT OF CONTINUOUS VIDEO EEG     [facility]: [address] , [address], [place] [address], Ph#: [phone]  [facility]: [id number], [place] [address], Ph#: [phone]  Office: [address] [place] [address] Ph#: [phone]    Patient Name: RON FOSTER  Age and : 69y (54)  MRN #: [id number]  Location: [place] [address]  Referring Physician: Alberto Centeno    Study Date: 19    _____________________________________________________________  TECHNICAL INFORMATION    Placement and Labeling of Electrodes:  The EEG was performed utilizing 20 channels referential EEG connections (coronal over temporal over parasagittal montage) using all standard 10-20 electrode placements with EKG.  Recording was at a sampling rate of 256 samples per second per channel.  Time synchronized digital video recording was done simultaneously with EEG recording.  A low light infrared camera was used for low light recording.  Saulo and seizure detection algorithms were utilized.    _____________________________________________________________  HISTORY    Patient is a 69y old  Female who presents with a chief complaint of seizures    PERTINENT MEDICATION:  MEDICATIONS  (STANDING):  apixaban 5 milliGRAM(s) Oral every 12 hours  cloNIDine 0.1 milliGRAM(s) Oral every 12 hours  dextrose 5%. 1000 milliLiter(s) (50 mL/Hr) IV Continuous <Continuous>  dextrose 50% Injectable 12.5 Gram(s) IV Push once  dextrose 50% Injectable 25 Gram(s) IV Push once  dextrose 50% Injectable 25 Gram(s) IV Push once  diVALproex  milliGRAM(s) Oral <User Schedule>  insulin glargine Injectable (LANTUS) 18 Unit(s) SubCutaneous at bedtime  insulin lispro (HumaLOG) corrective regimen sliding scale   SubCutaneous three times a day before meals  insulin lispro Injectable (HumaLOG) 6 Unit(s) SubCutaneous three times a day before meals  labetalol 100 milliGRAM(s) Oral two times a day  levETIRAcetam 1000 milliGRAM(s) Oral <User Schedule>  levothyroxine 50 MICROGram(s) Oral daily  lisinopril 20 milliGRAM(s) Oral daily  polyethylene glycol 3350 17 Gram(s) Oral daily    _____________________________________________________________  STUDY INTERPRETATION    Findings: The background was continuous, spontaneously variable and reactive. During wakefulness, the posterior dominant rhythm consisted of asymmetric (seen over right posterior quadrant), well-modulated 9 Hz activity, with amplitude to 30 uV, that attenuated to eye opening.  Low amplitude frontal beta was noted in wakefulness.    Background Slowing:  No generalized background slowing was present.    Focal Slowing:   -Continuous delta and theta slowing over left hemisphere, maximum left posterior quadrant    Sleep Background:  Stage II sleep transients were not recorded.  Drowsiness and stage II sleep transients were characterized by slowing of background frequencies.    Other Non-Epileptiform Findings:  None were present.    Interictal Epileptiform Activity:   -LPDs (Lateralized Periodic Discharges), Regional, Left occipital, maximum O1 (0.5 - 1 Hz)    Events:  Clinical events: Subclinical seizures (3 recorded)  Seizures: 3 subclinical seizures were recorded over the left posterior quadrant with rhythmic sharp alpha activity developing into rhythmic sharp theta activity, duration 2-4 minutes, with no associated clinical signs.    Activation Procedures:   Hyperventilation was not performed.    Photic stimulation was not performed.     Artifacts:  Intermittent myogenic and movement artifacts were noted.    ECG:  The heart rate on single channel ECG was predominantly between 60-80 BPM.    _____________________________________________________________  EEG SUMMARY/CLASSIFICATION  Abnormal EEG in the awake, drowsy and asleep states.    -Clinical events: Subclinical seizures (3 recorded)  -Seizures: 3 subclinical seizures were recorded over the left posterior quadrant with rhythmic sharp alpha activity developing into rhythmic sharp theta activity, duration 2-4 minutes, with no associated clinical signs.    -LPDs (Lateralized Periodic Discharges), Regional, Left occipital, maximum O1 (0.5 - 1 Hz)  -Continuous delta and theta slowing over left hemisphere, maximum left posterior quadrant  _____________________________________________________________  EEG IMPRESSION/CLINICAL CORRELATE  Abnormal EEG study.  1. 3 subclinical seizures recorded over left posterior quadrant (duration 2-4 minutes). Last seizure recorded on 19 at 20:42.  2. Potential epileptogenic focus in the left occipital region.  3. Structural and functional abnormality in the left hemisphere, maximum left posterior quadrant.  4. No seizures were recorded.    Jhony Berkowitz MD  EEG / Epilepsy Attending Physician          Electronic Signatures:  Jhony Berkowitz)  (Signed 2019 14:03)  	Authored: EEG REPORT      Last Updated: 2019 14:03 by Jhony Berkowitz)

## 2019-07-23 NOTE — PROGRESS NOTE ADULT - SUBJECTIVE AND OBJECTIVE BOX
Chief complaint  Patient is a 69y old  Female who presents with a chief complaint of Headache, seizure work-up (22 Jul 2019 15:26)   Review of systems  Patient in bed, looks comfortable, no fever, no hypoglycemia.    Labs and Fingersticks  CAPILLARY BLOOD GLUCOSE      POCT Blood Glucose.: 322 mg/dL (22 Jul 2019 21:32)  POCT Blood Glucose.: 108 mg/dL (22 Jul 2019 17:07)  POCT Blood Glucose.: 176 mg/dL (22 Jul 2019 12:38)  POCT Blood Glucose.: 125 mg/dL (22 Jul 2019 08:45)      Anion Gap, Serum: 14 (07-22 @ 10:58)      Calcium, Total Serum: 9.3 (07-22 @ 10:58)  Albumin, Serum: 4.1 (07-22 @ 10:58)    Alanine Aminotransferase (ALT/SGPT): 27 (07-22 @ 10:58)  Alkaline Phosphatase, Serum: 220 <H> (07-22 @ 10:58)  Aspartate Aminotransferase (AST/SGOT): 22 (07-22 @ 10:58)        07-22    143  |  104  |  11  ----------------------------<  181<H>  4.0   |  25  |  0.73    Ca    9.3      22 Jul 2019 10:58  Phos  3.6     07-21  Mg     2.1     07-21    TPro  7.3  /  Alb  4.1  /  TBili  0.2  /  DBili  x   /  AST  22  /  ALT  27  /  AlkPhos  220<H>  07-22                        10.2   5.0   )-----------( 183      ( 22 Jul 2019 10:58 )             31.7     Medications  MEDICATIONS  (STANDING):  apixaban 5 milliGRAM(s) Oral every 12 hours  aspirin enteric coated 81 milliGRAM(s) Oral daily  cloNIDine 0.1 milliGRAM(s) Oral every 12 hours  dextrose 5%. 1000 milliLiter(s) (50 mL/Hr) IV Continuous <Continuous>  dextrose 50% Injectable 12.5 Gram(s) IV Push once  dextrose 50% Injectable 25 Gram(s) IV Push once  dextrose 50% Injectable 25 Gram(s) IV Push once  diVALproex  milliGRAM(s) Oral <User Schedule>  insulin glargine Injectable (LANTUS) 18 Unit(s) SubCutaneous at bedtime  insulin lispro (HumaLOG) corrective regimen sliding scale   SubCutaneous three times a day before meals  insulin lispro Injectable (HumaLOG) 6 Unit(s) SubCutaneous three times a day before meals  labetalol 100 milliGRAM(s) Oral two times a day  levETIRAcetam 1000 milliGRAM(s) Oral <User Schedule>  levothyroxine 75 MICROGram(s) Oral daily  lisinopril 20 milliGRAM(s) Oral two times a day  pantoprazole    Tablet 40 milliGRAM(s) Oral before breakfast  polyethylene glycol 3350 17 Gram(s) Oral daily      Physical Exam  General: Patient comfortable in bed  Vital Signs Last 12 Hrs  T(F): 98.2 (07-23-19 @ 04:55), Max: 98.3 (07-22-19 @ 23:33)  HR: 62 (07-23-19 @ 04:55) (62 - 64)  BP: 170/93 (07-23-19 @ 04:55) (147/71 - 170/93)  BP(mean): --  RR: 19 (07-23-19 @ 04:55) (19 - 19)  SpO2: 98% (07-23-19 @ 04:55) (97% - 98%)  Neck: No palpable thyroid nodules.  CVS: S1S2, No murmurs  Respiratory: No wheezing, no crepitations  GI: Abdomen soft, bowel sounds positive  Musculoskeletal:  edema lower extremities.   Skin: No skin rashes, no ecchymosis    Diagnostics    Thyroid Stimulating Hormone, Serum: AM Sched. Collection: 22-Jul-2019 06:00 (07-21 @ 12:09)  Free Thyroxine, Serum: AM Sched. Collection: 22-Jul-2019 06:00 (07-21 @ 12:09)

## 2019-07-24 LAB
ALBUMIN SERPL ELPH-MCNC: 4.1 G/DL — SIGNIFICANT CHANGE UP (ref 3.3–5)
ALP SERPL-CCNC: 203 U/L — HIGH (ref 40–120)
ALT FLD-CCNC: 18 U/L — SIGNIFICANT CHANGE UP (ref 10–45)
ANION GAP SERPL CALC-SCNC: 12 MMOL/L — SIGNIFICANT CHANGE UP (ref 5–17)
AST SERPL-CCNC: 14 U/L — SIGNIFICANT CHANGE UP (ref 10–40)
BILIRUB SERPL-MCNC: 0.1 MG/DL — LOW (ref 0.2–1.2)
BUN SERPL-MCNC: 11 MG/DL — SIGNIFICANT CHANGE UP (ref 7–23)
CALCIUM SERPL-MCNC: 9.5 MG/DL — SIGNIFICANT CHANGE UP (ref 8.4–10.5)
CHLORIDE SERPL-SCNC: 106 MMOL/L — SIGNIFICANT CHANGE UP (ref 96–108)
CO2 SERPL-SCNC: 26 MMOL/L — SIGNIFICANT CHANGE UP (ref 22–31)
CREAT SERPL-MCNC: 0.64 MG/DL — SIGNIFICANT CHANGE UP (ref 0.5–1.3)
GLUCOSE SERPL-MCNC: 208 MG/DL — HIGH (ref 70–99)
HCT VFR BLD CALC: 34.1 % — LOW (ref 34.5–45)
HGB BLD-MCNC: 10.8 G/DL — LOW (ref 11.5–15.5)
MCHC RBC-ENTMCNC: 23.3 PG — LOW (ref 27–34)
MCHC RBC-ENTMCNC: 31.6 GM/DL — LOW (ref 32–36)
MCV RBC AUTO: 73.7 FL — LOW (ref 80–100)
PLATELET # BLD AUTO: 258 K/UL — SIGNIFICANT CHANGE UP (ref 150–400)
POTASSIUM SERPL-MCNC: 3.8 MMOL/L — SIGNIFICANT CHANGE UP (ref 3.5–5.3)
POTASSIUM SERPL-SCNC: 3.8 MMOL/L — SIGNIFICANT CHANGE UP (ref 3.5–5.3)
PROT SERPL-MCNC: 7.3 G/DL — SIGNIFICANT CHANGE UP (ref 6–8.3)
RBC # BLD: 4.63 M/UL — SIGNIFICANT CHANGE UP (ref 3.8–5.2)
RBC # FLD: 16.2 % — HIGH (ref 10.3–14.5)
SODIUM SERPL-SCNC: 144 MMOL/L — SIGNIFICANT CHANGE UP (ref 135–145)
VALPROATE SERPL-MCNC: 86 UG/ML — SIGNIFICANT CHANGE UP (ref 50–100)
WBC # BLD: 5.2 K/UL — SIGNIFICANT CHANGE UP (ref 3.8–10.5)
WBC # FLD AUTO: 5.2 K/UL — SIGNIFICANT CHANGE UP (ref 3.8–10.5)

## 2019-07-24 PROCEDURE — 99233 SBSQ HOSP IP/OBS HIGH 50: CPT | Mod: GC

## 2019-07-24 PROCEDURE — 95951: CPT | Mod: 26

## 2019-07-24 RX ORDER — HYDRALAZINE HCL 50 MG
25 TABLET ORAL THREE TIMES A DAY
Refills: 0 | Status: DISCONTINUED | OUTPATIENT
Start: 2019-07-24 | End: 2019-07-27

## 2019-07-24 RX ORDER — HYDRALAZINE HCL 50 MG
20 TABLET ORAL ONCE
Refills: 0 | Status: COMPLETED | OUTPATIENT
Start: 2019-07-24 | End: 2019-07-24

## 2019-07-24 RX ORDER — OXCARBAZEPINE 300 MG/1
150 TABLET, FILM COATED ORAL
Refills: 0 | Status: DISCONTINUED | OUTPATIENT
Start: 2019-07-24 | End: 2019-07-25

## 2019-07-24 RX ORDER — AMLODIPINE BESYLATE 2.5 MG/1
10 TABLET ORAL DAILY
Refills: 0 | Status: DISCONTINUED | OUTPATIENT
Start: 2019-07-24 | End: 2019-07-27

## 2019-07-24 RX ADMIN — Medication 400 MILLIGRAM(S): at 22:11

## 2019-07-24 RX ADMIN — Medication 0.1 MILLIGRAM(S): at 16:47

## 2019-07-24 RX ADMIN — LEVETIRACETAM 1000 MILLIGRAM(S): 250 TABLET, FILM COATED ORAL at 05:21

## 2019-07-24 RX ADMIN — POLYETHYLENE GLYCOL 3350 17 GRAM(S): 17 POWDER, FOR SOLUTION ORAL at 14:05

## 2019-07-24 RX ADMIN — AMLODIPINE BESYLATE 10 MILLIGRAM(S): 2.5 TABLET ORAL at 12:58

## 2019-07-24 RX ADMIN — Medication 100 MILLIGRAM(S): at 05:21

## 2019-07-24 RX ADMIN — LEVETIRACETAM 1000 MILLIGRAM(S): 250 TABLET, FILM COATED ORAL at 12:56

## 2019-07-24 RX ADMIN — Medication 20 MILLIGRAM(S): at 09:39

## 2019-07-24 RX ADMIN — APIXABAN 5 MILLIGRAM(S): 2.5 TABLET, FILM COATED ORAL at 16:48

## 2019-07-24 RX ADMIN — Medication 25 MILLIGRAM(S): at 22:10

## 2019-07-24 RX ADMIN — Medication 6 UNIT(S): at 09:00

## 2019-07-24 RX ADMIN — PANTOPRAZOLE SODIUM 40 MILLIGRAM(S): 20 TABLET, DELAYED RELEASE ORAL at 05:21

## 2019-07-24 RX ADMIN — Medication 25 MILLIGRAM(S): at 13:02

## 2019-07-24 RX ADMIN — Medication 100 MILLIGRAM(S): at 16:48

## 2019-07-24 RX ADMIN — Medication 1: at 12:53

## 2019-07-24 RX ADMIN — OXCARBAZEPINE 150 MILLIGRAM(S): 300 TABLET, FILM COATED ORAL at 22:10

## 2019-07-24 RX ADMIN — Medication 6 UNIT(S): at 12:53

## 2019-07-24 RX ADMIN — INSULIN GLARGINE 18 UNIT(S): 100 INJECTION, SOLUTION SUBCUTANEOUS at 22:20

## 2019-07-24 RX ADMIN — Medication 75 MICROGRAM(S): at 05:21

## 2019-07-24 RX ADMIN — LISINOPRIL 40 MILLIGRAM(S): 2.5 TABLET ORAL at 05:20

## 2019-07-24 RX ADMIN — DIVALPROEX SODIUM 750 MILLIGRAM(S): 500 TABLET, DELAYED RELEASE ORAL at 05:21

## 2019-07-24 RX ADMIN — AMLODIPINE BESYLATE 5 MILLIGRAM(S): 2.5 TABLET ORAL at 05:21

## 2019-07-24 RX ADMIN — APIXABAN 5 MILLIGRAM(S): 2.5 TABLET, FILM COATED ORAL at 05:21

## 2019-07-24 RX ADMIN — LEVETIRACETAM 1000 MILLIGRAM(S): 250 TABLET, FILM COATED ORAL at 22:05

## 2019-07-24 RX ADMIN — Medication 0.1 MILLIGRAM(S): at 05:21

## 2019-07-24 RX ADMIN — Medication 81 MILLIGRAM(S): at 11:50

## 2019-07-24 RX ADMIN — Medication 6 UNIT(S): at 18:39

## 2019-07-24 NOTE — PROGRESS NOTE ADULT - ASSESSMENT
Assessment  DMT2: 69y Female with DM T2 with hyperglycemia, A1C 10.4%,  on insulin, FS improved, no hypoglycemic episode,  eating meals,  non compliant with low carb diet.  Epilepsy: On treatment, stable.  CAD: on medications, no chest pain, stable, monitored.  Hypothyroidism: On Synthroid 50 mcg po daily, compliant with Synthroid intake, asymptomatic.  HTN: Controlled,  on antihypertensive medications.  Obesity: No strict exercise routines, not on any weight loss program, neither on low calorie diet.          Gen Gardner MD  Cell: [phone]  Office: [phone]

## 2019-07-24 NOTE — PROGRESS NOTE ADULT - ASSESSMENT
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 Her exam showed VFF with extinction on the right, hyperreflexia in the b/l UE. She underwent MRI of her brain which showed marked improvement of the left temporal-occpital and inferior parietal lesion. On 7/17 AM, she had an acute episode of altered mental status. CT head and CT angio head and neck were unremarkable. 7/18-19 had subclinical seizures. We placed her on vimpat after which she showed 1st degree block then 2nd degree block. We discontinued vimpat and started her on VPA.    Cardiology was consulted and following for rhythm management.  She also has an poorly controled T2DM for which endocrine is on board.    Plan:  -c/w keppra 1 g TID  - vimpat was dc'd due to 2nd degree heart block  - trileptal 150mg bid started today (7/24)  - repeat EKG to ensure patient does not have heart block  - depakote AM dose given (7/24), PM dose and subsequent depakote discontinued.  - cardio and endo on board for blood pressure and glycemic mgmt  - blood pressure control per cardiology - norvasc 10mg now, hydralazine 25mg TID PO for blood pressure  - c/w vEEG monitoring

## 2019-07-24 NOTE — PROGRESS NOTE ADULT - SUBJECTIVE AND OBJECTIVE BOX
Subjective: Patient seen and examined. No new events except as noted.   Has been hypertensive     REVIEW OF SYSTEMS:    CONSTITUTIONAL: + weakness, fevers or chills  EYES/ENT: No visual changes;  No vertigo or throat pain   NECK: No pain or stiffness  RESPIRATORY: No cough, wheezing, hemoptysis; No shortness of breath  CARDIOVASCULAR: No chest pain or palpitations  GASTROINTESTINAL: No abdominal or epigastric pain. No nausea, vomiting, or hematemesis; No diarrhea or constipation. No melena or hematochezia.  GENITOURINARY: No dysuria, frequency or hematuria  NEUROLOGICAL: No numbness or weakness  SKIN: No itching, burning, rashes, or lesions   All other review of systems is negative unless indicated above.    MEDICATIONS:  MEDICATIONS  (STANDING):  amLODIPine   Tablet 10 milliGRAM(s) Oral daily  apixaban 5 milliGRAM(s) Oral every 12 hours  aspirin enteric coated 81 milliGRAM(s) Oral daily  cloNIDine 0.1 milliGRAM(s) Oral every 12 hours  dextrose 5%. 1000 milliLiter(s) (50 mL/Hr) IV Continuous <Continuous>  dextrose 50% Injectable 12.5 Gram(s) IV Push once  dextrose 50% Injectable 25 Gram(s) IV Push once  dextrose 50% Injectable 25 Gram(s) IV Push once  diVALproex  milliGRAM(s) Oral <User Schedule>  hydrALAZINE 25 milliGRAM(s) Oral three times a day  hydrALAZINE Injectable 20 milliGRAM(s) IV Push once  insulin glargine Injectable (LANTUS) 18 Unit(s) SubCutaneous at bedtime  insulin lispro (HumaLOG) corrective regimen sliding scale   SubCutaneous three times a day before meals  insulin lispro Injectable (HumaLOG) 6 Unit(s) SubCutaneous three times a day before meals  labetalol 100 milliGRAM(s) Oral two times a day  levETIRAcetam 1000 milliGRAM(s) Oral <User Schedule>  levothyroxine 75 MICROGram(s) Oral daily  lisinopril 40 milliGRAM(s) Oral daily  pantoprazole    Tablet 40 milliGRAM(s) Oral before breakfast  polyethylene glycol 3350 17 Gram(s) Oral daily      PHYSICAL EXAM:  T(C): 36.6 (07-24-19 @ 08:10), Max: 36.9 (07-24-19 @ 00:14)  HR: 67 (07-24-19 @ 08:10) (61 - 88)  BP: 177/88 (07-24-19 @ 08:10) (145/66 - 177/88)  RR: 18 (07-24-19 @ 08:10) (18 - 18)  SpO2: 97% (07-24-19 @ 08:10) (97% - 99%)  Wt(kg): --  I&O's Summary    23 Jul 2019 07:01  -  24 Jul 2019 07:00  --------------------------------------------------------  IN: 680 mL / OUT: 0 mL / NET: 680 mL          Appearance: Normal	  HEENT:   Normal oral mucosa, PERRL, EOMI	  Lymphatic: No lymphadenopathy , no edema  Cardiovascular: Normal S1 S2, No JVD, No murmurs , Peripheral pulses palpable 2+ bilaterally  Respiratory: Lungs clear to auscultation, normal effort 	  Gastrointestinal:  Soft, Non-tender, + BS	  Skin: No rashes, No ecchymoses, No cyanosis, warm to touch  Musculoskeletal: Normal range of motion, normal strength  Psychiatry:  Mood & affect appropriate  Ext: No edema      LABS:    CARDIAC MARKERS:                                10.2   5.0   )-----------( 183      ( 22 Jul 2019 10:58 )             31.7     07-22    143  |  104  |  11  ----------------------------<  181<H>  4.0   |  25  |  0.73    Ca    9.3      22 Jul 2019 10:58    TPro  7.3  /  Alb  4.1  /  TBili  0.2  /  DBili  x   /  AST  22  /  ALT  27  /  AlkPhos  220<H>  07-22    proBNP:   Lipid Profile:   HgA1c:   TSH:             TELEMETRY: 	SR    ECG:  	  RADIOLOGY:   DIAGNOSTIC TESTING:  [ ] Echocardiogram:  [ ]  Catheterization:  [ ] Stress Test:    OTHER:

## 2019-07-24 NOTE — PROGRESS NOTE ADULT - SUBJECTIVE AND OBJECTIVE BOX
Chief complaint  Patient is a 69y old  Female who presents with a chief complaint of Headache, seizure work-up (23 Jul 2019 14:57)   Review of systems  Patient in bed, looks comfortable, no fever, no hypoglycemia.    Labs and Fingersticks  CAPILLARY BLOOD GLUCOSE      POCT Blood Glucose.: 169 mg/dL (24 Jul 2019 12:51)  POCT Blood Glucose.: 118 mg/dL (24 Jul 2019 08:56)  POCT Blood Glucose.: 158 mg/dL (23 Jul 2019 21:16)  POCT Blood Glucose.: 231 mg/dL (23 Jul 2019 17:42)      Anion Gap, Serum: 12 (07-24 @ 12:05)      Calcium, Total Serum: 9.5 (07-24 @ 12:05)  Albumin, Serum: 4.1 (07-24 @ 12:05)    Alanine Aminotransferase (ALT/SGPT): 18 (07-24 @ 12:05)  Alkaline Phosphatase, Serum: 203 <H> (07-24 @ 12:05)  Aspartate Aminotransferase (AST/SGOT): 14 (07-24 @ 12:05)        07-24    144  |  106  |  11  ----------------------------<  208<H>  3.8   |  26  |  0.64    Ca    9.5      24 Jul 2019 12:05    TPro  7.3  /  Alb  4.1  /  TBili  0.1<L>  /  DBili  x   /  AST  14  /  ALT  18  /  AlkPhos  203<H>  07-24                        10.8   5.2   )-----------( 258      ( 24 Jul 2019 12:05 )             34.1     Medications  MEDICATIONS  (STANDING):  amLODIPine   Tablet 10 milliGRAM(s) Oral daily  apixaban 5 milliGRAM(s) Oral every 12 hours  aspirin enteric coated 81 milliGRAM(s) Oral daily  cloNIDine 0.1 milliGRAM(s) Oral every 12 hours  dextrose 5%. 1000 milliLiter(s) (50 mL/Hr) IV Continuous <Continuous>  dextrose 50% Injectable 12.5 Gram(s) IV Push once  dextrose 50% Injectable 25 Gram(s) IV Push once  dextrose 50% Injectable 25 Gram(s) IV Push once  hydrALAZINE 25 milliGRAM(s) Oral three times a day  insulin glargine Injectable (LANTUS) 18 Unit(s) SubCutaneous at bedtime  insulin lispro (HumaLOG) corrective regimen sliding scale   SubCutaneous three times a day before meals  insulin lispro Injectable (HumaLOG) 6 Unit(s) SubCutaneous three times a day before meals  labetalol 100 milliGRAM(s) Oral two times a day  levETIRAcetam 1000 milliGRAM(s) Oral <User Schedule>  levothyroxine 75 MICROGram(s) Oral daily  lisinopril 40 milliGRAM(s) Oral daily  OXcarbazepine 150 milliGRAM(s) Oral two times a day  pantoprazole    Tablet 40 milliGRAM(s) Oral before breakfast  polyethylene glycol 3350 17 Gram(s) Oral daily      Physical Exam  General: Patient comfortable in bed  Vital Signs Last 12 Hrs  T(F): 97.7 (07-24-19 @ 12:55), Max: 97.9 (07-24-19 @ 08:10)  HR: 69 (07-24-19 @ 12:55) (61 - 69)  BP: 126/70 (07-24-19 @ 12:55) (126/70 - 177/88)  BP(mean): --  RR: 18 (07-24-19 @ 12:55) (18 - 18)  SpO2: 99% (07-24-19 @ 12:55) (97% - 99%)  Neck: No palpable thyroid nodules.  CVS: S1S2, No murmurs  Respiratory: No wheezing, no crepitations  GI: Abdomen soft, bowel sounds positive  Musculoskeletal:  edema lower extremities.   Skin: No skin rashes, no ecchymosis    Diagnostics    Thyroid Stimulating Hormone, Serum: AM Sched. Collection: 22-Jul-2019 06:00 (07-21 @ 12:09)  Free Thyroxine, Serum: AM Sched. Collection: 22-Jul-2019 06:00 (07-21 @ 12:09)

## 2019-07-25 PROCEDURE — 93010 ELECTROCARDIOGRAM REPORT: CPT

## 2019-07-25 PROCEDURE — 99233 SBSQ HOSP IP/OBS HIGH 50: CPT | Mod: GC

## 2019-07-25 PROCEDURE — 95951: CPT | Mod: 26

## 2019-07-25 RX ORDER — LEVETIRACETAM 250 MG/1
1000 TABLET, FILM COATED ORAL
Refills: 0 | Status: DISCONTINUED | OUTPATIENT
Start: 2019-07-25 | End: 2019-07-27

## 2019-07-25 RX ORDER — OXCARBAZEPINE 300 MG/1
300 TABLET, FILM COATED ORAL
Refills: 0 | Status: DISCONTINUED | OUTPATIENT
Start: 2019-07-25 | End: 2019-07-25

## 2019-07-25 RX ORDER — OXCARBAZEPINE 300 MG/1
450 TABLET, FILM COATED ORAL
Refills: 0 | Status: DISCONTINUED | OUTPATIENT
Start: 2019-07-25 | End: 2019-07-25

## 2019-07-25 RX ORDER — OXCARBAZEPINE 300 MG/1
300 TABLET, FILM COATED ORAL
Refills: 0 | Status: DISCONTINUED | OUTPATIENT
Start: 2019-07-25 | End: 2019-07-27

## 2019-07-25 RX ADMIN — Medication 25 MILLIGRAM(S): at 21:30

## 2019-07-25 RX ADMIN — Medication 25 MILLIGRAM(S): at 13:01

## 2019-07-25 RX ADMIN — Medication 81 MILLIGRAM(S): at 13:01

## 2019-07-25 RX ADMIN — Medication 100 MILLIGRAM(S): at 05:36

## 2019-07-25 RX ADMIN — Medication 0.1 MILLIGRAM(S): at 05:36

## 2019-07-25 RX ADMIN — Medication 6 UNIT(S): at 17:55

## 2019-07-25 RX ADMIN — INSULIN GLARGINE 18 UNIT(S): 100 INJECTION, SOLUTION SUBCUTANEOUS at 21:30

## 2019-07-25 RX ADMIN — Medication 100 MILLIGRAM(S): at 17:55

## 2019-07-25 RX ADMIN — PANTOPRAZOLE SODIUM 40 MILLIGRAM(S): 20 TABLET, DELAYED RELEASE ORAL at 09:50

## 2019-07-25 RX ADMIN — Medication 0.1 MILLIGRAM(S): at 17:55

## 2019-07-25 RX ADMIN — Medication 6 UNIT(S): at 09:49

## 2019-07-25 RX ADMIN — Medication 75 MICROGRAM(S): at 05:36

## 2019-07-25 RX ADMIN — OXCARBAZEPINE 300 MILLIGRAM(S): 300 TABLET, FILM COATED ORAL at 17:55

## 2019-07-25 RX ADMIN — AMLODIPINE BESYLATE 10 MILLIGRAM(S): 2.5 TABLET ORAL at 05:36

## 2019-07-25 RX ADMIN — APIXABAN 5 MILLIGRAM(S): 2.5 TABLET, FILM COATED ORAL at 17:55

## 2019-07-25 RX ADMIN — LEVETIRACETAM 1000 MILLIGRAM(S): 250 TABLET, FILM COATED ORAL at 13:01

## 2019-07-25 RX ADMIN — LISINOPRIL 40 MILLIGRAM(S): 2.5 TABLET ORAL at 05:36

## 2019-07-25 RX ADMIN — Medication 25 MILLIGRAM(S): at 05:36

## 2019-07-25 RX ADMIN — Medication 3: at 17:55

## 2019-07-25 RX ADMIN — APIXABAN 5 MILLIGRAM(S): 2.5 TABLET, FILM COATED ORAL at 05:36

## 2019-07-25 RX ADMIN — LEVETIRACETAM 1000 MILLIGRAM(S): 250 TABLET, FILM COATED ORAL at 05:36

## 2019-07-25 RX ADMIN — Medication 6 UNIT(S): at 13:01

## 2019-07-25 RX ADMIN — OXCARBAZEPINE 150 MILLIGRAM(S): 300 TABLET, FILM COATED ORAL at 05:36

## 2019-07-25 RX ADMIN — LEVETIRACETAM 1000 MILLIGRAM(S): 250 TABLET, FILM COATED ORAL at 21:30

## 2019-07-25 NOTE — PROGRESS NOTE ADULT - ASSESSMENT
Assessment  DMT2: 69y Female with DM T2 with hyperglycemia, A1C 10.4%,  on insulin, FS improved, no hypoglycemic episode,  eating meals,  non compliant with low carb diet.  Epilepsy: On treatment, stable.  CAD: on medications, no chest pain, stable, monitored.  Hypothyroidism: On Synthroid 50 mcg po daily, compliant with Synthroid intake, asymptomatic.  HTN: Controlled,  on antihypertensive medications.  Obesity: No strict exercise routines, not on any weight loss program, neither on low calorie diet.          Gen Gardner MD  Cell: [phone]  Office: [phone]

## 2019-07-25 NOTE — PROGRESS NOTE ADULT - ASSESSMENT
68 yo female admitted with seixures and found to have intermittent episode of bradycardia with second degree Type 2 AVB on tele

## 2019-07-25 NOTE — PROGRESS NOTE ADULT - SUBJECTIVE AND OBJECTIVE BOX
INTERVAL HISTORY: patient reports she did not have any headaches last night. patient's blood pressures improve to SBP 130s-140s overnight.    MEDICATIONS  (STANDING):  amLODIPine   Tablet 10 milliGRAM(s) Oral daily  apixaban 5 milliGRAM(s) Oral every 12 hours  aspirin enteric coated 81 milliGRAM(s) Oral daily  cloNIDine 0.1 milliGRAM(s) Oral every 12 hours  dextrose 5%. 1000 milliLiter(s) (50 mL/Hr) IV Continuous <Continuous>  dextrose 50% Injectable 12.5 Gram(s) IV Push once  dextrose 50% Injectable 25 Gram(s) IV Push once  dextrose 50% Injectable 25 Gram(s) IV Push once  hydrALAZINE 25 milliGRAM(s) Oral three times a day  insulin glargine Injectable (LANTUS) 18 Unit(s) SubCutaneous at bedtime  insulin lispro (HumaLOG) corrective regimen sliding scale   SubCutaneous three times a day before meals  insulin lispro Injectable (HumaLOG) 6 Unit(s) SubCutaneous three times a day before meals  labetalol 100 milliGRAM(s) Oral two times a day  levETIRAcetam 1000 milliGRAM(s) Oral <User Schedule>  levothyroxine 75 MICROGram(s) Oral daily  lisinopril 40 milliGRAM(s) Oral daily  OXcarbazepine 300 milliGRAM(s) Oral two times a day  pantoprazole    Tablet 40 milliGRAM(s) Oral before breakfast  polyethylene glycol 3350 17 Gram(s) Oral daily    MEDICATIONS  (PRN):  acetaminophen   Tablet .. 650 milliGRAM(s) Oral every 6 hours PRN Mild Pain (1 - 3), Moderate Pain (4 - 6), Severe Pain (7 - 10)  dextrose 40% Gel 15 Gram(s) Oral once PRN Blood Glucose LESS THAN 70 milliGRAM(s)/deciliter  glucagon  Injectable 1 milliGRAM(s) IntraMuscular once PRN Glucose LESS THAN 70 milligrams/deciliter  hydrALAZINE Injectable 5 milliGRAM(s) IV Push every 2 hours PRN SBP > 150/90  ibuprofen  Tablet. 400 milliGRAM(s) Oral three times a day PRN Moderate Pain (4 - 6)  LORazepam   Injectable 2 milliGRAM(s) IV Push once PRN Seizure activity      ALLERGIES/INTOLERANCES:  Allergies  No Known Allergies    VITALS & EXAMINATION:  Vital Signs Last 24 Hrs  Vital Signs Last 24 Hrs  T(C): 36.9 (25 Jul 2019 15:21), Max: 37.1 (25 Jul 2019 04:45)  T(F): 98.4 (25 Jul 2019 15:21), Max: 98.7 (25 Jul 2019 04:45)  HR: 69 (25 Jul 2019 15:21) (61 - 84)  BP: 123/67 (25 Jul 2019 15:21) (106/64 - 147/77)  BP(mean): --  RR: 18 (25 Jul 2019 15:21) (18 - 19)  SpO2: 100% (25 Jul 2019 15:21) (97% - 100%)  General:  Constitutional: Obese Female, appears stated age, in no apparent distress including pain    Neurological (>12):  MS: Awake, alert, oriented to person, place, situation, time. Normal affect. Follows all commands.    Language: Speech is clear, fluent. Finger agnosia, able to name pen, hand paper. shows 2 fingers, cannot follow crossed commands. repetition impaired.    CNs: No gross facial asymmetry b/l.    Motor: full strength biceps and triceps b/l. 5/5 hand  b/l. 5/5 KF and KE b/l.      LABORATORY:    CBC                       9.6    4.55  )-----------( 201      ( 18 Jul 2019 13:29 )             31.7     Chem 07-18    141  |  109<H>  |  17  ----------------------------<  123<H>  3.9   |  16<L>  |  0.65    Ca    9.5      18 Jul 2019 13:29    A1c 07-16 [id number] 10.6

## 2019-07-25 NOTE — PROGRESS NOTE ADULT - SUBJECTIVE AND OBJECTIVE BOX
Chief complaint  Patient is a 69y old  Female who presents with a chief complaint of Headache, seizure work-up (24 Jul 2019 13:47)   Review of systems  Patient in bed, looks comfortable, no fever, no hypoglycemia.    Labs and Fingersticks  CAPILLARY BLOOD GLUCOSE      POCT Blood Glucose.: 117 mg/dL (25 Jul 2019 08:51)  POCT Blood Glucose.: 135 mg/dL (24 Jul 2019 22:17)  POCT Blood Glucose.: 145 mg/dL (24 Jul 2019 17:49)  POCT Blood Glucose.: 169 mg/dL (24 Jul 2019 12:51)      Anion Gap, Serum: 12 (07-24 @ 12:05)      Calcium, Total Serum: 9.5 (07-24 @ 12:05)  Albumin, Serum: 4.1 (07-24 @ 12:05)    Alanine Aminotransferase (ALT/SGPT): 18 (07-24 @ 12:05)  Alkaline Phosphatase, Serum: 203 <H> (07-24 @ 12:05)  Aspartate Aminotransferase (AST/SGOT): 14 (07-24 @ 12:05)        07-24    144  |  106  |  11  ----------------------------<  208<H>  3.8   |  26  |  0.64    Ca    9.5      24 Jul 2019 12:05    TPro  7.3  /  Alb  4.1  /  TBili  0.1<L>  /  DBili  x   /  AST  14  /  ALT  18  /  AlkPhos  203<H>  07-24                        10.8   5.2   )-----------( 258      ( 24 Jul 2019 12:05 )             34.1     Medications  MEDICATIONS  (STANDING):  amLODIPine   Tablet 10 milliGRAM(s) Oral daily  apixaban 5 milliGRAM(s) Oral every 12 hours  aspirin enteric coated 81 milliGRAM(s) Oral daily  cloNIDine 0.1 milliGRAM(s) Oral every 12 hours  dextrose 5%. 1000 milliLiter(s) (50 mL/Hr) IV Continuous <Continuous>  dextrose 50% Injectable 12.5 Gram(s) IV Push once  dextrose 50% Injectable 25 Gram(s) IV Push once  dextrose 50% Injectable 25 Gram(s) IV Push once  hydrALAZINE 25 milliGRAM(s) Oral three times a day  insulin glargine Injectable (LANTUS) 18 Unit(s) SubCutaneous at bedtime  insulin lispro (HumaLOG) corrective regimen sliding scale   SubCutaneous three times a day before meals  insulin lispro Injectable (HumaLOG) 6 Unit(s) SubCutaneous three times a day before meals  labetalol 100 milliGRAM(s) Oral two times a day  levETIRAcetam 1000 milliGRAM(s) Oral <User Schedule>  levothyroxine 75 MICROGram(s) Oral daily  lisinopril 40 milliGRAM(s) Oral daily  OXcarbazepine 300 milliGRAM(s) Oral two times a day  pantoprazole    Tablet 40 milliGRAM(s) Oral before breakfast  polyethylene glycol 3350 17 Gram(s) Oral daily      Physical Exam  General: Patient comfortable in bed  Vital Signs Last 12 Hrs  T(F): 97.8 (07-25-19 @ 07:19), Max: 98.7 (07-25-19 @ 04:45)  HR: 61 (07-25-19 @ 07:19) (61 - 82)  BP: 106/64 (07-25-19 @ 07:19) (106/64 - 147/77)  BP(mean): --  RR: 18 (07-25-19 @ 07:19) (18 - 19)  SpO2: 97% (07-25-19 @ 07:19) (97% - 98%)  Neck: No palpable thyroid nodules.  CVS: S1S2, No murmurs  Respiratory: No wheezing, no crepitations  GI: Abdomen soft, bowel sounds positive  Musculoskeletal:  edema lower extremities.   Skin: No skin rashes, no ecchymosis    Diagnostics    Thyroid Stimulating Hormone, Serum: AM Sched. Collection: 22-Jul-2019 06:00 (07-21 @ 12:09)  Free Thyroxine, Serum: AM Sched. Collection: 22-Jul-2019 06:00 (07-21 @ 12:09)

## 2019-07-25 NOTE — PROGRESS NOTE ADULT - SUBJECTIVE AND OBJECTIVE BOX
Subjective: Patient seen and examined. No new events except as noted.   feels ok   Bp controlled   EEG continues   no cp or sob       REVIEW OF SYSTEMS:    CONSTITUTIONAL: No weakness, fevers or chills  EYES/ENT: No visual changes;  No vertigo or throat pain   NECK: No pain or stiffness  RESPIRATORY: No cough, wheezing, hemoptysis; No shortness of breath  CARDIOVASCULAR: No chest pain or palpitations  GASTROINTESTINAL: No abdominal or epigastric pain. No nausea, vomiting, or hematemesis; No diarrhea or constipation. No melena or hematochezia.  GENITOURINARY: No dysuria, frequency or hematuria  NEUROLOGICAL: No numbness or weakness  SKIN: No itching, burning, rashes, or lesions   All other review of systems is negative unless indicated above.    MEDICATIONS:  MEDICATIONS  (STANDING):  amLODIPine   Tablet 10 milliGRAM(s) Oral daily  apixaban 5 milliGRAM(s) Oral every 12 hours  aspirin enteric coated 81 milliGRAM(s) Oral daily  cloNIDine 0.1 milliGRAM(s) Oral every 12 hours  dextrose 5%. 1000 milliLiter(s) (50 mL/Hr) IV Continuous <Continuous>  dextrose 50% Injectable 12.5 Gram(s) IV Push once  dextrose 50% Injectable 25 Gram(s) IV Push once  dextrose 50% Injectable 25 Gram(s) IV Push once  hydrALAZINE 25 milliGRAM(s) Oral three times a day  insulin glargine Injectable (LANTUS) 18 Unit(s) SubCutaneous at bedtime  insulin lispro (HumaLOG) corrective regimen sliding scale   SubCutaneous three times a day before meals  insulin lispro Injectable (HumaLOG) 6 Unit(s) SubCutaneous three times a day before meals  labetalol 100 milliGRAM(s) Oral two times a day  levETIRAcetam 1000 milliGRAM(s) Oral <User Schedule>  levothyroxine 75 MICROGram(s) Oral daily  lisinopril 40 milliGRAM(s) Oral daily  OXcarbazepine 300 milliGRAM(s) Oral two times a day  pantoprazole    Tablet 40 milliGRAM(s) Oral before breakfast  polyethylene glycol 3350 17 Gram(s) Oral daily      PHYSICAL EXAM:  T(C): 36.6 (07-25-19 @ 07:19), Max: 37.1 (07-25-19 @ 04:45)  HR: 61 (07-25-19 @ 07:19) (61 - 84)  BP: 106/64 (07-25-19 @ 07:19) (106/64 - 147/77)  RR: 18 (07-25-19 @ 07:19) (18 - 19)  SpO2: 97% (07-25-19 @ 07:19) (97% - 99%)  Wt(kg): --  I&O's Summary    24 Jul 2019 07:01  -  25 Jul 2019 07:00  --------------------------------------------------------  IN: 720 mL / OUT: 0 mL / NET: 720 mL          Appearance: NAD EEG in progress	  HEENT:   Normal oral mucosa, PERRL, EOMI	  Lymphatic: No lymphadenopathy , no edema  Cardiovascular: Normal S1 S2, No JVD, No murmurs , Peripheral pulses palpable 2+ bilaterally  Respiratory: Lungs clear to auscultation, normal effort 	  Gastrointestinal:  Soft, Non-tender, + BS	  Skin: No rashes, No ecchymoses, No cyanosis, warm to touch  Musculoskeletal: Normal range of motion, normal strength  Psychiatry:  Mood & affect appropriate  Ext: No edema      LABS:    CARDIAC MARKERS:                                10.8   5.2   )-----------( 258      ( 24 Jul 2019 12:05 )             34.1     07-24    144  |  106  |  11  ----------------------------<  208<H>  3.8   |  26  |  0.64    Ca    9.5      24 Jul 2019 12:05    TPro  7.3  /  Alb  4.1  /  TBili  0.1<L>  /  DBili  x   /  AST  14  /  ALT  18  /  AlkPhos  203<H>  07-24    proBNP:   Lipid Profile:   HgA1c:   TSH:             TELEMETRY: 	  SR  ECG:  	  RADIOLOGY:   DIAGNOSTIC TESTING:  [ ] Echocardiogram:  [ ]  Catheterization:  [ ] Stress Test:    OTHER:

## 2019-07-25 NOTE — PROGRESS NOTE ADULT - ASSESSMENT
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 Her exam showed VFF with extinction on the right, hyperreflexia in the b/l UE. She underwent MRI of her brain which showed marked improvement of the left temporal-occpital and inferior parietal lesion. On 7/17 AM, she had an acute episode of altered mental status. CT head and CT angio head and neck were unremarkable. 7/18-19 had subclinical seizures. We placed her on vimpat after which she showed 1st degree block then 2nd degree block. We discontinued vimpat and started her on VPA.    Cardiology was consulted and following for rhythm management.  She also has an poorly controled T2DM for which endocrine is on board.    Plan:  -c/w keppra 1 g TID  - vimpat was dc'd due to 2nd degree heart block  - trileptal inc from 150 --> 300 bid 7/25  - repeat EKG to ensure patient does not have heart block  - depakote discontinued  - cardio and endo on board for blood pressure and glycemic mgmt  - blood pressure control per cardiology - norvasc 10mg now, hydralazine 25mg TID PO for blood pressure  - c/w vEEG monitoring

## 2019-07-26 ENCOUNTER — TRANSCRIPTION ENCOUNTER (OUTPATIENT)
Age: 69
End: 2019-07-26

## 2019-07-26 PROCEDURE — 99233 SBSQ HOSP IP/OBS HIGH 50: CPT

## 2019-07-26 PROCEDURE — 95951: CPT | Mod: 26

## 2019-07-26 RX ORDER — LABETALOL HCL 100 MG
1 TABLET ORAL
Qty: 60 | Refills: 0
Start: 2019-07-26 | End: 2019-08-24

## 2019-07-26 RX ORDER — PANTOPRAZOLE SODIUM 20 MG/1
1 TABLET, DELAYED RELEASE ORAL
Qty: 0 | Refills: 0 | DISCHARGE
Start: 2019-07-26

## 2019-07-26 RX ORDER — LISINOPRIL 2.5 MG/1
1 TABLET ORAL
Qty: 0 | Refills: 0 | DISCHARGE
Start: 2019-07-26

## 2019-07-26 RX ORDER — POLYETHYLENE GLYCOL 3350 17 G/17G
17 POWDER, FOR SOLUTION ORAL
Qty: 0 | Refills: 0 | DISCHARGE
Start: 2019-07-26

## 2019-07-26 RX ORDER — INSULIN LISPRO 100/ML
6 VIAL (ML) SUBCUTANEOUS
Qty: 15 | Refills: 0
Start: 2019-07-26

## 2019-07-26 RX ORDER — AMLODIPINE BESYLATE 2.5 MG/1
1 TABLET ORAL
Qty: 0 | Refills: 0 | DISCHARGE
Start: 2019-07-26

## 2019-07-26 RX ORDER — HYDRALAZINE HCL 50 MG
1 TABLET ORAL
Qty: 0 | Refills: 0 | DISCHARGE
Start: 2019-07-26

## 2019-07-26 RX ORDER — OXCARBAZEPINE 300 MG/1
1 TABLET, FILM COATED ORAL
Qty: 0 | Refills: 0 | DISCHARGE
Start: 2019-07-26

## 2019-07-26 RX ORDER — LEVOTHYROXINE SODIUM 125 MCG
1.5 TABLET ORAL
Qty: 45 | Refills: 0
Start: 2019-07-26 | End: 2019-08-24

## 2019-07-26 RX ORDER — ASPIRIN/CALCIUM CARB/MAGNESIUM 324 MG
1 TABLET ORAL
Qty: 0 | Refills: 0 | DISCHARGE
Start: 2019-07-26

## 2019-07-26 RX ADMIN — APIXABAN 5 MILLIGRAM(S): 2.5 TABLET, FILM COATED ORAL at 17:59

## 2019-07-26 RX ADMIN — Medication 100 MILLIGRAM(S): at 05:23

## 2019-07-26 RX ADMIN — LEVETIRACETAM 1000 MILLIGRAM(S): 250 TABLET, FILM COATED ORAL at 13:12

## 2019-07-26 RX ADMIN — Medication 75 MICROGRAM(S): at 05:23

## 2019-07-26 RX ADMIN — OXCARBAZEPINE 300 MILLIGRAM(S): 300 TABLET, FILM COATED ORAL at 23:05

## 2019-07-26 RX ADMIN — LISINOPRIL 40 MILLIGRAM(S): 2.5 TABLET ORAL at 05:23

## 2019-07-26 RX ADMIN — Medication 1: at 17:59

## 2019-07-26 RX ADMIN — Medication 0.1 MILLIGRAM(S): at 17:59

## 2019-07-26 RX ADMIN — INSULIN GLARGINE 18 UNIT(S): 100 INJECTION, SOLUTION SUBCUTANEOUS at 23:05

## 2019-07-26 RX ADMIN — APIXABAN 5 MILLIGRAM(S): 2.5 TABLET, FILM COATED ORAL at 05:24

## 2019-07-26 RX ADMIN — LEVETIRACETAM 1000 MILLIGRAM(S): 250 TABLET, FILM COATED ORAL at 23:05

## 2019-07-26 RX ADMIN — Medication 25 MILLIGRAM(S): at 23:05

## 2019-07-26 RX ADMIN — Medication 400 MILLIGRAM(S): at 20:30

## 2019-07-26 RX ADMIN — OXCARBAZEPINE 300 MILLIGRAM(S): 300 TABLET, FILM COATED ORAL at 13:12

## 2019-07-26 RX ADMIN — LEVETIRACETAM 1000 MILLIGRAM(S): 250 TABLET, FILM COATED ORAL at 05:22

## 2019-07-26 RX ADMIN — Medication 6 UNIT(S): at 09:12

## 2019-07-26 RX ADMIN — Medication 25 MILLIGRAM(S): at 13:12

## 2019-07-26 RX ADMIN — Medication 25 MILLIGRAM(S): at 05:23

## 2019-07-26 RX ADMIN — OXCARBAZEPINE 300 MILLIGRAM(S): 300 TABLET, FILM COATED ORAL at 05:23

## 2019-07-26 RX ADMIN — Medication 6 UNIT(S): at 18:00

## 2019-07-26 RX ADMIN — Medication 81 MILLIGRAM(S): at 13:12

## 2019-07-26 RX ADMIN — Medication 6 UNIT(S): at 13:12

## 2019-07-26 RX ADMIN — Medication 0.1 MILLIGRAM(S): at 05:24

## 2019-07-26 RX ADMIN — PANTOPRAZOLE SODIUM 40 MILLIGRAM(S): 20 TABLET, DELAYED RELEASE ORAL at 05:23

## 2019-07-26 RX ADMIN — Medication 100 MILLIGRAM(S): at 17:59

## 2019-07-26 RX ADMIN — AMLODIPINE BESYLATE 10 MILLIGRAM(S): 2.5 TABLET ORAL at 05:24

## 2019-07-26 RX ADMIN — Medication 400 MILLIGRAM(S): at 21:30

## 2019-07-26 NOTE — DIETITIAN INITIAL EVALUATION ADULT. - DIET TYPE
PO diet (DASH/TLC) DASH/TLC (sodium and cholesterol restricted diet)/consistent carbohydrate (no snacks)

## 2019-07-26 NOTE — DIETITIAN INITIAL EVALUATION ADULT. - ADD RECOMMEND
1. Recommend to change diet order to consistent carbohydrates, DASH/TLC. 2. Monitor pt's PO intake, weight, skin, edema, GI distress.

## 2019-07-26 NOTE — DIETITIAN INITIAL EVALUATION ADULT. - OTHER INFO
Source: Comprehensive chart review, patient     INFORMATION PTA  Diet PTA: Pt reports she eats everything. Reports she eats similarly to the foods she is receiving in-house. Reports she only consumes coffee and water, however accuracy of recall questionable. Pt refrained from providing detailed answers. States her children and  do the cooking at home. Report they add salt to meals.  Nutrition Status PTA: Pt with history of DM. On insulin and Metformin at home as per patient. Reports to checking her blood glucose levels every day prior to meals. States values range from 80-150mg/dL.   Nutrition Supplements PTA: Pt not aware if she takes any vitamins/minerals at home. States her children provide her with medications.   Food Allergies: Confirmed no known food allergies.   Weight History PTA: As per chart, Pt's stated weight is 220 pounds. No new weights documented. Per last RD note from 4/19/19, Pt's dosing weight is 221.5 pounds. Denies any changes in weight.     INFORMATION THIS ADMISSION  Last BM: BM not documented in flow sheets. Reports last BM was yesterday, no issues. Will take a suppository at home if constipated.   Other Subjective Information: Pt reports good appetite in-house. Reports to consuming 100% of meals. Pt receives diet health shake 3x/day, however does not consume. No reports of nausea/vomiting. Denies any difficulties chewing/swallowing.  Therapeutic Diet Education Provided: Provided Pt with verbal and written type 2 DM and heart healthy therapeutic diet education. Pt unwilling to participate in discussion. Reports she is aware of the foods she needs to consume, however could not recall sources of carbohydrates. Endocrinology following.

## 2019-07-26 NOTE — DIETITIAN INITIAL EVALUATION ADULT. - PHYSICAL APPEARANCE
other (specify)/obese As per visual observations, no overt signs of muscle wasting/fat loss.   As per flow sheets, Pt with 1+ B/L feet/ankle edema.   Skin: Intact, as per flow sheets.

## 2019-07-26 NOTE — PROGRESS NOTE ADULT - ASSESSMENT
Assessment  DMT2: 69y Female with DM T2 with hyperglycemia, A1C 10.4%,  on insulin, FS improving, no hypoglycemic episode,  eating meals,  non compliant with low carb diet.  Epilepsy: On treatment, stable.  CAD: on medications, no chest pain, stable, monitored.  Hypothyroidism: On Synthroid 75 mcg po daily, compliant with Synthroid intake, asymptomatic.  HTN: Controlled,  on antihypertensive medications.  Obesity: No strict exercise routines, not on any weight loss program, neither on low calorie diet.      [facility]  [phone]

## 2019-07-26 NOTE — DISCHARGE NOTE PROVIDER - HOSPITAL COURSE
Pt is a 69 Female w/ PMH of nonconvulsive status epilepticus s/p brain biopsy w/ beta-amyloid, Afib, HTN, Hypothyroidism, COPD, diabetes who presents with a complaint of headache for last 3 days, admitted to monitor for further seizure activity. She underwent MRI of her brain which showed marked improvement of the left temporal-occpital and inferior parietal lesion. On 7/17 AM, she had an acute episode of altered mental status. CT head and CT angio head and neck were unremarkable.     Pt likely had a seizure. She was much improved 7/18, but continues to have subclinical seizures on EEG. She will need continuous EEG monitoring and AED titrations which is why she was transferred to [facility].        EEG 7/18 showed 1 subclinical seizure.    EEG 7/19 showed 1-2 subclinical seizures per hour    MRI & CT from 7/14 showed:    Left-sided craniotomy changes are again noted.        	Evolving postoperative-postbiopsy change is again noted in the left     	posterior temporal region, with hemosiderin deposition. A small amount of     	nonspecific enhancement is noted at the margins of the surgical cavity.        	The nonspecific edema pattern in the left temporal occipital and inferior     	parietal regions has substantially decreased compared to 04/19/2019 MRI     	study, especially when comparing the FLAIR series. The mass effect on the     	adjacent left lateral ventricle appears decreased.        	There is no evidence of acute vascular territory infarct, new acute     	intracranial hemorrhage, or midline shift.        	Chronic white matter changes and cerebral volume loss are unchanged.        	IMPRESSION:         	The nonspecific edema pattern in the left temporal occipital and inferior     	parietal regions has substantially decreased compared to 04/19/2019 MRI     	study.        	Evolving postsurgical-postbiopsy changes as described.        	< end of copied text >        	< from: CT Head No Cont (07.17.19 @ 09:43) >        	IMPRESSION:         	CT BRAIN: Stable areas of nonspecific hypodensity in the left temporal     	and occipital lobes compared to recent MRI of 7/14/2019. No acute     	intracranial hemorrhage.        	CTA BRAIN: No large stenosis or major vessel occlusion of the [place]     	[place].        	CTA NECK: Atherosclerotic calcifications of the bilateral internal     	carotid arteries with less than 30% stenosis according to NASCET     	criteria. No occlusion or stenosis of the bilateral vertebral arteries.    Hospital Course: Once bed available at [facility], patient was transferred to the care of Dr. Centeno. Patient had 2nd degree heartblock per telemetry review per cardiology so her vimpat was discontinued and Depakote & Keppra were started. Had focal seizures captured on vEEG and continued to have recurrent subclinical seizures. As patient continued to have seizures on Depakote and Keppra, patient had Trileptal added to regimen, Depakote was discontinued. Patient’s blood pressures managed by Cardiology and achieved good blood pressure control (goal < 140/90) with lisinopril, Norvasc, clonidine, hydralazine, labetalol. Glycemic control per endocrinology with patient’s basal lantus and premeal insulin. Headaches were better controlled once the blood pressures became better controlled and with Tylenol and ibuprofen. Pt is a 69 Female w/ PMH of nonconvulsive status epilepticus s/p brain biopsy w/ beta-amyloid, Afib, HTN, Hypothyroidism, COPD, diabetes who presents with a complaint of headache for last 3 days, admitted to monitor for further seizure activity. She underwent MRI of her brain which showed marked improvement of the left temporal-occpital and inferior parietal lesion. On 7/17 AM, she had an acute episode of altered mental status. CT head and CT angio head and neck were unremarkable.     Pt likely had a seizure. She was much improved 7/18, but continues to have subclinical seizures on EEG. She will need continuous EEG monitoring and AED titrations which is why she was transferred to [facility].        EEG 7/18 showed 1 subclinical seizure.    EEG 7/19 showed 1-2 subclinical seizures per hour    EEG 7/26 showed 2 seizures 10-15 second in 24 hours    MRI & CT from 7/14 showed:    Left-sided craniotomy changes are again noted.        	Evolving postoperative-postbiopsy change is again noted in the left     	posterior temporal region, with hemosiderin deposition. A small amount of     	nonspecific enhancement is noted at the margins of the surgical cavity.        	The nonspecific edema pattern in the left temporal occipital and inferior     	parietal regions has substantially decreased compared to 04/19/2019 MRI     	study, especially when comparing the FLAIR series. The mass effect on the     	adjacent left lateral ventricle appears decreased.        	There is no evidence of acute vascular territory infarct, new acute     	intracranial hemorrhage, or midline shift.        	Chronic white matter changes and cerebral volume loss are unchanged.        	IMPRESSION:         	The nonspecific edema pattern in the left temporal occipital and inferior     	parietal regions has substantially decreased compared to 04/19/2019 MRI     	study.        	Evolving postsurgical-postbiopsy changes as described.        	< end of copied text >        	< from: CT Head No Cont (07.17.19 @ 09:43) >        	IMPRESSION:         	CT BRAIN: Stable areas of nonspecific hypodensity in the left temporal     	and occipital lobes compared to recent MRI of 7/14/2019. No acute     	intracranial hemorrhage.        	CTA BRAIN: No large stenosis or major vessel occlusion of the [place] of     	[place].        	CTA NECK: Atherosclerotic calcifications of the bilateral internal     	carotid arteries with less than 30% stenosis according to NASCET     	criteria. No occlusion or stenosis of the bilateral vertebral arteries.    Hospital Course: Once bed available at [facility], patient was transferred to the care of Dr. Centeno. Patient had 2nd degree heartblock per telemetry review per cardiology so her vimpat was discontinued and Depakote & Keppra were started. Had focal seizures captured on vEEG and continued to have recurrent subclinical seizures. As patient continued to have seizures on Depakote and Keppra, patient had Trileptal added to regimen, Depakote was discontinued. Patient’s blood pressures managed by Cardiology and achieved good blood pressure control (goal < 140/90) with lisinopril, Norvasc, clonidine, hydralazine, labetalol. Glycemic control per endocrinology with patient’s basal lantus and premeal insulin. Headaches were better controlled once the blood pressures became better controlled and with Tylenol and ibuprofen. Seizure control improved, but still had 2 brief seizures 10-15 min on last 24 before DC. Plan is to titrate up oxcarbazepine as tolerated and monitor with ambulatory EEG.

## 2019-07-26 NOTE — DISCHARGE NOTE PROVIDER - CARE PROVIDER_API CALL
Alberto Centeno (MD)  Clinical Neurophysiology; EEGEpilepsy; Neurology  [address], [address]  [place] [address]  Phone: [phone]  Fax: [phone]  Follow Up Time:     Ryland Vargas (DO)  EndocrinologyMetabDiabetes; Internal Medicine  [address], [address]  [place] [address]  Phone: [phone]  Fax: [phone]  Follow Up Time: Routine    Wu Back (DO)  Cardiology; Internal Medicine  [address], [address]  [place] [address]  Phone: [phone]  Fax: [phone]  Follow Up Time:

## 2019-07-26 NOTE — DIETITIAN INITIAL EVALUATION ADULT. - REASON INDICATOR FOR ASSESSMENT
Pt seen for initial nutrition assessment for length of stay on [facility]. Pt is a 69 year old female, PMH hypothyroidism, obesity, NACHO, type 2 DM, dyslipidemia, HTN. Pt admitted for seizures. Being monitored for seizure activity in-house. Plan for repeat EKG to ensure patient does not have heart block.

## 2019-07-26 NOTE — PROGRESS NOTE ADULT - SUBJECTIVE AND OBJECTIVE BOX
Subjective: Patient seen and examined. No new events except as noted.   feels ok     REVIEW OF SYSTEMS:    CONSTITUTIONAL: No weakness, fevers or chills  EYES/ENT: No visual changes;  No vertigo or throat pain   NECK: No pain or stiffness  RESPIRATORY: No cough, wheezing, hemoptysis; No shortness of breath  CARDIOVASCULAR: No chest pain or palpitations  GASTROINTESTINAL: No abdominal or epigastric pain. No nausea, vomiting, or hematemesis; No diarrhea or constipation. No melena or hematochezia.  GENITOURINARY: No dysuria, frequency or hematuria  NEUROLOGICAL: No numbness or weakness  SKIN: No itching, burning, rashes, or lesions   All other review of systems is negative unless indicated above.    MEDICATIONS:  MEDICATIONS  (STANDING):  amLODIPine   Tablet 10 milliGRAM(s) Oral daily  apixaban 5 milliGRAM(s) Oral every 12 hours  aspirin enteric coated 81 milliGRAM(s) Oral daily  cloNIDine 0.1 milliGRAM(s) Oral every 12 hours  dextrose 5%. 1000 milliLiter(s) (50 mL/Hr) IV Continuous <Continuous>  dextrose 50% Injectable 12.5 Gram(s) IV Push once  dextrose 50% Injectable 25 Gram(s) IV Push once  dextrose 50% Injectable 25 Gram(s) IV Push once  hydrALAZINE 25 milliGRAM(s) Oral three times a day  insulin glargine Injectable (LANTUS) 18 Unit(s) SubCutaneous at bedtime  insulin lispro (HumaLOG) corrective regimen sliding scale   SubCutaneous three times a day before meals  insulin lispro Injectable (HumaLOG) 6 Unit(s) SubCutaneous three times a day before meals  labetalol 100 milliGRAM(s) Oral two times a day  levETIRAcetam 1000 milliGRAM(s) Oral <User Schedule>  levothyroxine 75 MICROGram(s) Oral daily  lisinopril 40 milliGRAM(s) Oral daily  OXcarbazepine 300 milliGRAM(s) Oral <User Schedule>  pantoprazole    Tablet 40 milliGRAM(s) Oral before breakfast  polyethylene glycol 3350 17 Gram(s) Oral daily      PHYSICAL EXAM:  T(C): 36.5 (07-26-19 @ 08:00), Max: 36.9 (07-25-19 @ 15:21)  HR: 68 (07-26-19 @ 08:00) (66 - 70)  BP: 122/80 (07-26-19 @ 08:00) (114/63 - 150/80)  RR: 18 (07-26-19 @ 08:00) (18 - 19)  SpO2: 97% (07-26-19 @ 08:00) (96% - 100%)  Wt(kg): --  I&O's Summary    25 Jul 2019 07:01  -  26 Jul 2019 07:00  --------------------------------------------------------  IN: 360 mL / OUT: 0 mL / NET: 360 mL          Appearance: NAD EEG 	  HEENT:   Normal oral mucosa, PERRL, EOMI	  Lymphatic: No lymphadenopathy , no edema  Cardiovascular: Normal S1 S2, No JVD, No murmurs , Peripheral pulses palpable 2+ bilaterally  Respiratory: Lungs clear to auscultation, normal effort 	  Gastrointestinal:  Soft, Non-tender, + BS	  Skin: No rashes, No ecchymoses, No cyanosis, warm to touch  Musculoskeletal: Normal range of motion, normal strength  Psychiatry:  Mood & affect appropriate  Ext: No edema      LABS:    CARDIAC MARKERS:                  proBNP:   Lipid Profile:   HgA1c:   TSH:             TELEMETRY: 	    ECG:  	  RADIOLOGY:   DIAGNOSTIC TESTING:  [ ] Echocardiogram:  [ ]  Catheterization:  [ ] Stress Test:    OTHER:

## 2019-07-26 NOTE — DISCHARGE NOTE PROVIDER - PROVIDER TOKENS
PROVIDER:[TOKEN:[[id number]]],PROVIDER:[TOKEN:[[id number]],FOLLOWUP:[Routine]],PROVIDER:[TOKEN:[[id number]]]

## 2019-07-26 NOTE — PROGRESS NOTE ADULT - SUBJECTIVE AND OBJECTIVE BOX
INTERVAL HISTORY: patient reports she did not have any headaches last night. patient's blood pressures SBPs 110s-150 (150 this AM). had 2 electrographic seizures last night. trileptal dosing inc to 300 q8h last night.    MEDICATIONS  (STANDING):  amLODIPine   Tablet 10 milliGRAM(s) Oral daily  apixaban 5 milliGRAM(s) Oral every 12 hours  aspirin enteric coated 81 milliGRAM(s) Oral daily  cloNIDine 0.1 milliGRAM(s) Oral every 12 hours  dextrose 5%. 1000 milliLiter(s) (50 mL/Hr) IV Continuous <Continuous>  dextrose 50% Injectable 12.5 Gram(s) IV Push once  dextrose 50% Injectable 25 Gram(s) IV Push once  dextrose 50% Injectable 25 Gram(s) IV Push once  hydrALAZINE 25 milliGRAM(s) Oral three times a day  insulin glargine Injectable (LANTUS) 18 Unit(s) SubCutaneous at bedtime  insulin lispro (HumaLOG) corrective regimen sliding scale   SubCutaneous three times a day before meals  insulin lispro Injectable (HumaLOG) 6 Unit(s) SubCutaneous three times a day before meals  labetalol 100 milliGRAM(s) Oral two times a day  levETIRAcetam 1000 milliGRAM(s) Oral <User Schedule>  levothyroxine 75 MICROGram(s) Oral daily  lisinopril 40 milliGRAM(s) Oral daily  OXcarbazepine 300 milliGRAM(s) Oral <User Schedule>  pantoprazole    Tablet 40 milliGRAM(s) Oral before breakfast  polyethylene glycol 3350 17 Gram(s) Oral daily    MEDICATIONS  (PRN):  acetaminophen   Tablet .. 650 milliGRAM(s) Oral every 6 hours PRN Mild Pain (1 - 3), Moderate Pain (4 - 6), Severe Pain (7 - 10)  dextrose 40% Gel 15 Gram(s) Oral once PRN Blood Glucose LESS THAN 70 milliGRAM(s)/deciliter  glucagon  Injectable 1 milliGRAM(s) IntraMuscular once PRN Glucose LESS THAN 70 milligrams/deciliter  hydrALAZINE Injectable 5 milliGRAM(s) IV Push every 2 hours PRN SBP > 150/90  ibuprofen  Tablet. 400 milliGRAM(s) Oral three times a day PRN Moderate Pain (4 - 6)  LORazepam   Injectable 2 milliGRAM(s) IV Push once PRN Seizure activity        ALLERGIES/INTOLERANCES:  Allergies  No Known Allergies    VITALS & EXAMINATION:  Vital Signs Last 24 Hrs  T(C): 36.5 (26 Jul 2019 08:00), Max: 36.9 (25 Jul 2019 15:21)  T(F): 97.7 (26 Jul 2019 08:00), Max: 98.4 (25 Jul 2019 15:21)  HR: 68 (26 Jul 2019 08:00) (66 - 84)  BP: 122/80 (26 Jul 2019 08:00) (114/63 - 150/80)  BP(mean): --  RR: 18 (26 Jul 2019 08:00) (18 - 19)  SpO2: 97% (26 Jul 2019 08:00) (96% - 100%)  General:  Constitutional: Obese Female, appears stated age, in no apparent distress including pain    Neurological (>12):  MS: Awake, alert, oriented to person, place, situation, time. Normal affect. Follows all commands.    Language: Speech is clear, fluent. Finger agnosia, able to name pen, hand paper. shows 2 fingers, cannot follow crossed commands. repetition impaired.    CNs: No gross facial asymmetry b/l.    Motor: full strength biceps and triceps b/l. 5/5 hand  b/l. 5/5 KF and KE b/l.      LABORATORY:    CBC                       9.6    4.55  )-----------( 201      ( 18 Jul 2019 13:29 )             31.7     Chem 07-18    141  |  109<H>  |  17  ----------------------------<  123<H>  3.9   |  16<L>  |  0.65    Ca    9.5      18 Jul 2019 13:29    A1c 07-16 [id number] 10.6

## 2019-07-26 NOTE — DISCHARGE NOTE PROVIDER - CARE PROVIDERS DIRECT ADDRESSES
[email or web address],DirectAddress_Unknown

## 2019-07-26 NOTE — PROGRESS NOTE ADULT - SUBJECTIVE AND OBJECTIVE BOX
Endocrinology Attending Covering for Dr. Gardner      Chief complaint  Patient is a 69y old  Female who presents with a chief complaint of Headache, seizure work-up (26 Jul 2019 08:57)   Review of systems  Patient in bed, looks comfortable, no fever,  had no hypoglycemia.    Labs and Fingersticks  CAPILLARY BLOOD GLUCOSE      POCT Blood Glucose.: 170 mg/dL (26 Jul 2019 17:33)  POCT Blood Glucose.: 131 mg/dL (26 Jul 2019 13:02)  POCT Blood Glucose.: 122 mg/dL (26 Jul 2019 08:49)  POCT Blood Glucose.: 110 mg/dL (25 Jul 2019 21:22)                        Medications  MEDICATIONS  (STANDING):  amLODIPine   Tablet 10 milliGRAM(s) Oral daily  apixaban 5 milliGRAM(s) Oral every 12 hours  aspirin enteric coated 81 milliGRAM(s) Oral daily  cloNIDine 0.1 milliGRAM(s) Oral every 12 hours  dextrose 5%. 1000 milliLiter(s) (50 mL/Hr) IV Continuous <Continuous>  dextrose 50% Injectable 12.5 Gram(s) IV Push once  dextrose 50% Injectable 25 Gram(s) IV Push once  dextrose 50% Injectable 25 Gram(s) IV Push once  hydrALAZINE 25 milliGRAM(s) Oral three times a day  insulin glargine Injectable (LANTUS) 18 Unit(s) SubCutaneous at bedtime  insulin lispro (HumaLOG) corrective regimen sliding scale   SubCutaneous three times a day before meals  insulin lispro Injectable (HumaLOG) 6 Unit(s) SubCutaneous three times a day before meals  labetalol 100 milliGRAM(s) Oral two times a day  levETIRAcetam 1000 milliGRAM(s) Oral <User Schedule>  levothyroxine 75 MICROGram(s) Oral daily  lisinopril 40 milliGRAM(s) Oral daily  OXcarbazepine 300 milliGRAM(s) Oral <User Schedule>  pantoprazole    Tablet 40 milliGRAM(s) Oral before breakfast  polyethylene glycol 3350 17 Gram(s) Oral daily      Physical Exam  General: Patient comfortable in bed  Vital Signs Last 12 Hrs  T(F): 98.1 (07-26-19 @ 16:29), Max: 98.1 (07-26-19 @ 13:09)  HR: 76 (07-26-19 @ 17:58) (64 - 76)  BP: 149/78 (07-26-19 @ 16:29) (122/80 - 149/78)  BP(mean): --  RR: 18 (07-26-19 @ 16:29) (18 - 18)  SpO2: 98% (07-26-19 @ 16:29) (97% - 98%)  Neck: No palpable thyroid nodules.  CVS: S1S2, No murmurs  Respiratory: No wheezing, no crepitations  GI: Abdomen soft, bowel sounds positive  Musculoskeletal:  edema lower extremities.   Skin: No skin rashes, no ecchymosis    Diagnostics

## 2019-07-26 NOTE — PROGRESS NOTE ADULT - ASSESSMENT
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 Her exam showed VFF with extinction on the right, hyperreflexia in the b/l UE. She underwent MRI of her brain which showed marked improvement of the left temporal-occpital and inferior parietal lesion. On 7/17 AM, she had an acute episode of altered mental status. CT head and CT angio head and neck were unremarkable. 7/18-19 had subclinical seizures. We placed her on vimpat after which she showed 1st degree block then 2nd degree block. We discontinued vimpat and started her on VPA.    Cardiology was consulted and following for rhythm management.  She also has an poorly controled T2DM for which endocrine is on board.    observe for another 24h with vEEG, maybe dc tomorrow if no further seizures noted.    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 Her exam showed VFF with extinction on the right, hyperreflexia in the b/l UE. She underwent MRI of her brain which showed marked improvement of the left temporal-occpital and inferior parietal lesion. On 7/17d AM, she had an acute episode of altered mental status. CT head and CT angio head and neck were unremarkable. 7/18-19 had subclinical seizures. We placed her on vimpat after which she showed 1st degree block then 2nd degree block. We discontinued vimpat and started her on VPA.    Cardiology was consulted and following for rhythm management.  She also has an poorly controled T2DM for which endocrine is on board.    observe for another 24h with vEEG, maybe dc tomorrow if no further seizures noted.    Plan:  -c/w keppra 1 g TID  - vimpat was dc'd due to 2nd degree heart block  - trileptal 300 q8h  - repeat EKG to ensure patient does not have heart block  - depakote discontinued  - cardio and endo on board for blood pressure and glycemic mgmt  - blood pressure control per cardiology - norvasc 10mg now, hydralazine 25mg TID PO for blood pressure  - c/w vEEG monitoring

## 2019-07-27 ENCOUNTER — TRANSCRIPTION ENCOUNTER (OUTPATIENT)
Age: 69
End: 2019-07-27

## 2019-07-27 VITALS — SYSTOLIC BLOOD PRESSURE: 132 MMHG | HEART RATE: 61 BPM | DIASTOLIC BLOOD PRESSURE: 72 MMHG

## 2019-07-27 PROCEDURE — 99238 HOSP IP/OBS DSCHRG MGMT 30/<: CPT

## 2019-07-27 RX ORDER — OXCARBAZEPINE 300 MG/1
1 TABLET, FILM COATED ORAL
Qty: 0 | Refills: 3 | DISCHARGE
Start: 2019-07-27

## 2019-07-27 RX ORDER — OXCARBAZEPINE 300 MG/1
1 TABLET, FILM COATED ORAL
Qty: 90 | Refills: 3
Start: 2019-07-27

## 2019-07-27 RX ADMIN — Medication 6 UNIT(S): at 09:16

## 2019-07-27 RX ADMIN — LEVETIRACETAM 1000 MILLIGRAM(S): 250 TABLET, FILM COATED ORAL at 05:34

## 2019-07-27 RX ADMIN — LEVETIRACETAM 1000 MILLIGRAM(S): 250 TABLET, FILM COATED ORAL at 14:43

## 2019-07-27 RX ADMIN — Medication 6 UNIT(S): at 12:56

## 2019-07-27 RX ADMIN — Medication 25 MILLIGRAM(S): at 05:34

## 2019-07-27 RX ADMIN — PANTOPRAZOLE SODIUM 40 MILLIGRAM(S): 20 TABLET, DELAYED RELEASE ORAL at 05:34

## 2019-07-27 RX ADMIN — OXCARBAZEPINE 300 MILLIGRAM(S): 300 TABLET, FILM COATED ORAL at 05:34

## 2019-07-27 RX ADMIN — AMLODIPINE BESYLATE 10 MILLIGRAM(S): 2.5 TABLET ORAL at 05:34

## 2019-07-27 RX ADMIN — Medication 25 MILLIGRAM(S): at 14:44

## 2019-07-27 RX ADMIN — APIXABAN 5 MILLIGRAM(S): 2.5 TABLET, FILM COATED ORAL at 05:34

## 2019-07-27 RX ADMIN — Medication 75 MICROGRAM(S): at 05:34

## 2019-07-27 RX ADMIN — OXCARBAZEPINE 300 MILLIGRAM(S): 300 TABLET, FILM COATED ORAL at 14:44

## 2019-07-27 RX ADMIN — Medication 0.1 MILLIGRAM(S): at 05:34

## 2019-07-27 RX ADMIN — Medication 100 MILLIGRAM(S): at 05:34

## 2019-07-27 RX ADMIN — Medication 2: at 12:56

## 2019-07-27 RX ADMIN — Medication 81 MILLIGRAM(S): at 12:57

## 2019-07-27 RX ADMIN — LISINOPRIL 40 MILLIGRAM(S): 2.5 TABLET ORAL at 05:34

## 2019-07-27 RX ADMIN — Medication 1: at 09:16

## 2019-07-27 NOTE — PROGRESS NOTE ADULT - PROBLEM SELECTOR PLAN 4
Overweight/Obesity: Patient counseled for weight loss, exercise, low calorie diet.
Overweight/Obesity: Patient counseled for weight loss, exercise, low calorie diet.
20 mg IV hydralazine IV x 1 now   Add hydralazine 25 mg PO TID  lisinopril 40 mg daily   Norvasc 10
Change lisinopril 40 mg daily   Norvasc 5
Increase Lisinopril 20 mg BID
Now controlled   Cont  hydralazine 25 mg PO TID , lisinopril 40 mg daily  and Norvasc 10
Now controlled   Cont  hydralazine 25 mg PO TID , lisinopril 40 mg daily  and Norvasc 10
Overweight/Obesity: Patient counseled for weight loss, exercise, low calorie diet.

## 2019-07-27 NOTE — PROGRESS NOTE ADULT - ASSESSMENT
Assessment  DMT2: 69y Female with DM T2 with hyperglycemia, A1C 10.4%,  on insulin, FS still high , no hypoglycemic episode,  eating meals,  non compliant with low carb diet.  Epilepsy: On treatment, stable.  CAD: on medications, no chest pain, stable, monitored.  Hypothyroidism: On Synthroid 75 mcg po daily, compliant with Synthroid intake, asymptomatic.  HTN: Controlled,  on antihypertensive medications.  Obesity: No strict exercise routines, not on any weight loss program, neither on low calorie diet.      [facility]  [phone]

## 2019-07-27 NOTE — PROGRESS NOTE ADULT - SUBJECTIVE AND OBJECTIVE BOX
INTERVAL HISTORY:   MEDICATIONS  (STANDING):  amLODIPine   Tablet 10 milliGRAM(s) Oral daily  apixaban 5 milliGRAM(s) Oral every 12 hours  aspirin enteric coated 81 milliGRAM(s) Oral daily  cloNIDine 0.1 milliGRAM(s) Oral every 12 hours  dextrose 5%. 1000 milliLiter(s) (50 mL/Hr) IV Continuous <Continuous>  dextrose 50% Injectable 12.5 Gram(s) IV Push once  dextrose 50% Injectable 25 Gram(s) IV Push once  dextrose 50% Injectable 25 Gram(s) IV Push once  hydrALAZINE 25 milliGRAM(s) Oral three times a day  insulin glargine Injectable (LANTUS) 18 Unit(s) SubCutaneous at bedtime  insulin lispro (HumaLOG) corrective regimen sliding scale   SubCutaneous three times a day before meals  insulin lispro Injectable (HumaLOG) 6 Unit(s) SubCutaneous three times a day before meals  labetalol 100 milliGRAM(s) Oral two times a day  levETIRAcetam 1000 milliGRAM(s) Oral <User Schedule>  levothyroxine 75 MICROGram(s) Oral daily  lisinopril 40 milliGRAM(s) Oral daily  OXcarbazepine 300 milliGRAM(s) Oral <User Schedule>  pantoprazole    Tablet 40 milliGRAM(s) Oral before breakfast  polyethylene glycol 3350 17 Gram(s) Oral daily    MEDICATIONS  (PRN):  acetaminophen   Tablet .. 650 milliGRAM(s) Oral every 6 hours PRN Mild Pain (1 - 3), Moderate Pain (4 - 6), Severe Pain (7 - 10)  dextrose 40% Gel 15 Gram(s) Oral once PRN Blood Glucose LESS THAN 70 milliGRAM(s)/deciliter  glucagon  Injectable 1 milliGRAM(s) IntraMuscular once PRN Glucose LESS THAN 70 milligrams/deciliter  hydrALAZINE Injectable 5 milliGRAM(s) IV Push every 2 hours PRN SBP > 150/90  ibuprofen  Tablet. 400 milliGRAM(s) Oral three times a day PRN Moderate Pain (4 - 6)  LORazepam   Injectable 2 milliGRAM(s) IV Push once PRN Seizure activity      Vital Signs (24 Hrs):  T(C): 36.9 (07-27-19 @ 07:06), Max: 36.9 (07-27-19 @ 05:24)  HR: 64 (07-27-19 @ 07:06) (64 - 76)  BP: 146/75 (07-27-19 @ 07:06) (144/82 - 157/89)  RR: 18 (07-27-19 @ 07:06) (18 - 19)  SpO2: 96% (07-27-19 @ 07:06) (96% - 99%)  Wt(kg): --  Daily     Daily     I&O's Summary    26 Jul 2019 07:01  -  27 Jul 2019 07:00  --------------------------------------------------------  IN: 840 mL / OUT: 0 mL / NET: 840 mL      General:  Constitutional: Obese Female, appears stated age, in no apparent distress including pain    Neurological (>12):  MS: Awake, alert, oriented to person, place, situation, time. Normal affect. Follows all commands.    Language: Speech is clear, fluent. Finger agnosia, able to name pen, hand paper. shows 2 fingers, cannot follow crossed commands. repetition impaired.    CNs: No gross facial asymmetry b/l.    Motor: full strength biceps and triceps b/l. 5/5 hand  b/l. 5/5 KF and KE b/l.      LABORATORY:    CBC                       9.6    4.55  )-----------( 201      ( 18 Jul 2019 13:29 )             31.7     Chem 07-18    141  |  109<H>  |  17  ----------------------------<  123<H>  3.9   |  16<L>  |  0.65    Ca    9.5      18 Jul 2019 13:29    A1c 07-16 [id number] 10.6 INTERVAL HISTORY: Feeling well      MEDICATIONS  (STANDING):  amLODIPine   Tablet 10 milliGRAM(s) Oral daily  apixaban 5 milliGRAM(s) Oral every 12 hours  aspirin enteric coated 81 milliGRAM(s) Oral daily  cloNIDine 0.1 milliGRAM(s) Oral every 12 hours  dextrose 5%. 1000 milliLiter(s) (50 mL/Hr) IV Continuous <Continuous>  dextrose 50% Injectable 12.5 Gram(s) IV Push once  dextrose 50% Injectable 25 Gram(s) IV Push once  dextrose 50% Injectable 25 Gram(s) IV Push once  hydrALAZINE 25 milliGRAM(s) Oral three times a day  insulin glargine Injectable (LANTUS) 18 Unit(s) SubCutaneous at bedtime  insulin lispro (HumaLOG) corrective regimen sliding scale   SubCutaneous three times a day before meals  insulin lispro Injectable (HumaLOG) 6 Unit(s) SubCutaneous three times a day before meals  labetalol 100 milliGRAM(s) Oral two times a day  levETIRAcetam 1000 milliGRAM(s) Oral <User Schedule>  levothyroxine 75 MICROGram(s) Oral daily  lisinopril 40 milliGRAM(s) Oral daily  OXcarbazepine 300 milliGRAM(s) Oral <User Schedule>  pantoprazole    Tablet 40 milliGRAM(s) Oral before breakfast  polyethylene glycol 3350 17 Gram(s) Oral daily    MEDICATIONS  (PRN):  acetaminophen   Tablet .. 650 milliGRAM(s) Oral every 6 hours PRN Mild Pain (1 - 3), Moderate Pain (4 - 6), Severe Pain (7 - 10)  dextrose 40% Gel 15 Gram(s) Oral once PRN Blood Glucose LESS THAN 70 milliGRAM(s)/deciliter  glucagon  Injectable 1 milliGRAM(s) IntraMuscular once PRN Glucose LESS THAN 70 milligrams/deciliter  hydrALAZINE Injectable 5 milliGRAM(s) IV Push every 2 hours PRN SBP > 150/90  ibuprofen  Tablet. 400 milliGRAM(s) Oral three times a day PRN Moderate Pain (4 - 6)  LORazepam   Injectable 2 milliGRAM(s) IV Push once PRN Seizure activity      Vital Signs (24 Hrs):  T(C): 36.9 (07-27-19 @ 07:06), Max: 36.9 (07-27-19 @ 05:24)  HR: 64 (07-27-19 @ 07:06) (64 - 76)  BP: 146/75 (07-27-19 @ 07:06) (144/82 - 157/89)  RR: 18 (07-27-19 @ 07:06) (18 - 19)  SpO2: 96% (07-27-19 @ 07:06) (96% - 99%)  Wt(kg): --  Daily     Daily     I&O's Summary    26 Jul 2019 07:01  -  27 Jul 2019 07:00  --------------------------------------------------------  IN: 840 mL / OUT: 0 mL / NET: 840 mL      General:  Constitutional: Obese Female, appears stated age, in no apparent distress including pain    Neurological (>12):  MS: Awake, alert, oriented to person, place, situation, time. Normal affect. Follows all commands.    Language: Speech is clear, fluent. Finger agnosia, able to name pen, hand paper. shows 2 fingers, cannot follow crossed commands. repetition impaired.    CNs: No gross facial asymmetry b/l.    Motor: full strength biceps and triceps b/l. 5/5 hand  b/l. 5/5 KF and KE b/l.      LABORATORY:    CBC                       9.6    4.55  )-----------( 201      ( 18 Jul 2019 13:29 )             31.7     Chem 07-18    141  |  109<H>  |  17  ----------------------------<  123<H>  3.9   |  16<L>  |  0.65    Ca    9.5      18 Jul 2019 13:29    A1c 07-16 [id number] 10.6

## 2019-07-27 NOTE — PROGRESS NOTE ADULT - PROBLEM SELECTOR PROBLEM 2
Hypothyroid
Seizure
Hypothyroid

## 2019-07-27 NOTE — PROGRESS NOTE ADULT - ASSESSMENT
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 Her exam showed VFF with extinction on the right, hyperreflexia in the b/l UE. She underwent MRI of her brain which showed marked improvement of the left temporal-occpital and inferior parietal lesion. On 7/17d AM, she had an acute episode of altered mental status. CT head and CT angio head and neck were unremarkable. 7/18-19 had subclinical seizures. We placed her on vimpat after which she showed 1st degree block then 2nd degree block. We discontinued vimpat and started her on VPA.    Cardiology was consulted and following for rhythm management.  She also has an poorly controled T2DM for which endocrine is on board.    observe for another 24h with vEEG, maybe dc tomorrow if no further seizures noted.    Plan:  -c/w keppra 1 g TID  - vimpat was dc'd due to 2nd degree heart block  - trileptal 300 q8h  - cardio and endo on board for blood pressure and glycemic mgmt  - blood pressure control per cardiology - norvasc 10mg now, hydralazine 25mg TID PO for blood pressure  - c/w vEEG monitoring

## 2019-07-27 NOTE — PROGRESS NOTE ADULT - PROBLEM SELECTOR PLAN 2
Will increase Synthroid to 75 mcg PO daily, FU
EEG in progress   On meds   Epilepsy team following
Will increase Synthroid to 75 mcg PO daily, FU

## 2019-07-27 NOTE — PROGRESS NOTE ADULT - PROVIDER SPECIALTY LIST ADULT
Cardiology
Endocrinology
Neurology
Endocrinology

## 2019-07-27 NOTE — PROGRESS NOTE ADULT - PROBLEM SELECTOR PROBLEM 3
Seizure
Seizure
CAD (coronary artery disease)
Seizure

## 2019-07-27 NOTE — PROGRESS NOTE ADULT - REASON FOR ADMISSION
Headache, seizure work-up
hyperglycemia
hyperglycemia

## 2019-07-27 NOTE — PROGRESS NOTE ADULT - SUBJECTIVE AND OBJECTIVE BOX
Endocrinology Attending Covering for Dr. Gardner      Chief complaint  Patient is a 69y old  Female who presents with a chief complaint of hyperglycemia (26 Jul 2019 18:03)   Review of systems  Patient in bed, looks comfortable, no fever,  had no hypoglycemia.    Labs and Fingersticks  CAPILLARY BLOOD GLUCOSE      POCT Blood Glucose.: 190 mg/dL (27 Jul 2019 08:39)  POCT Blood Glucose.: 146 mg/dL (26 Jul 2019 22:33)  POCT Blood Glucose.: 170 mg/dL (26 Jul 2019 17:33)  POCT Blood Glucose.: 131 mg/dL (26 Jul 2019 13:02)                        Medications  MEDICATIONS  (STANDING):  amLODIPine   Tablet 10 milliGRAM(s) Oral daily  apixaban 5 milliGRAM(s) Oral every 12 hours  aspirin enteric coated 81 milliGRAM(s) Oral daily  cloNIDine 0.1 milliGRAM(s) Oral every 12 hours  dextrose 5%. 1000 milliLiter(s) (50 mL/Hr) IV Continuous <Continuous>  dextrose 50% Injectable 12.5 Gram(s) IV Push once  dextrose 50% Injectable 25 Gram(s) IV Push once  dextrose 50% Injectable 25 Gram(s) IV Push once  hydrALAZINE 25 milliGRAM(s) Oral three times a day  insulin glargine Injectable (LANTUS) 18 Unit(s) SubCutaneous at bedtime  insulin lispro (HumaLOG) corrective regimen sliding scale   SubCutaneous three times a day before meals  insulin lispro Injectable (HumaLOG) 6 Unit(s) SubCutaneous three times a day before meals  labetalol 100 milliGRAM(s) Oral two times a day  levETIRAcetam 1000 milliGRAM(s) Oral <User Schedule>  levothyroxine 75 MICROGram(s) Oral daily  lisinopril 40 milliGRAM(s) Oral daily  OXcarbazepine 300 milliGRAM(s) Oral <User Schedule>  pantoprazole    Tablet 40 milliGRAM(s) Oral before breakfast  polyethylene glycol 3350 17 Gram(s) Oral daily      Physical Exam  General: Patient comfortable in bed  Vital Signs Last 12 Hrs  T(F): 98.4 (07-27-19 @ 07:06), Max: 98.4 (07-27-19 @ 05:24)  HR: 64 (07-27-19 @ 07:06) (64 - 71)  BP: 146/75 (07-27-19 @ 07:06) (146/75 - 157/89)  BP(mean): --  RR: 18 (07-27-19 @ 07:06) (18 - 19)  SpO2: 96% (07-27-19 @ 07:06) (96% - 98%)  Neck: No palpable thyroid nodules.  CVS: S1S2, No murmurs  Respiratory: No wheezing, no crepitations  GI: Abdomen soft, bowel sounds positive  Musculoskeletal:  edema lower extremities.   Skin: No skin rashes, no ecchymosis    Diagnostics

## 2019-07-27 NOTE — PROGRESS NOTE ADULT - PROBLEM SELECTOR PROBLEM 4
Obesity
Obesity
HTN (hypertension)
Obesity

## 2019-07-27 NOTE — PROGRESS NOTE ADULT - PROBLEM SELECTOR PROBLEM 1
Diabetes
Bradycardia
Diabetes

## 2019-07-27 NOTE — PROGRESS NOTE ADULT - NSHPATTENDINGPLANDISCUSS_GEN_ALL_CORE
resident, PA, patient
resident, patient
resident, patient
patient, resident.

## 2019-07-27 NOTE — PROGRESS NOTE ADULT - PROBLEM SELECTOR PROBLEM 5
CAD (coronary artery disease)
CAD (coronary artery disease)
Atrial fibrillation
CAD (coronary artery disease)

## 2019-07-27 NOTE — PROGRESS NOTE ADULT - PROBLEM SELECTOR PLAN 5
On medications,  no chest pain, stable, monitored and followed up by primary team/cardiology team
On medications,  no chest pain, stable, monitored and followed up by primary team/cardiology team
On Eliquis
On medications,  no chest pain, stable, monitored and followed up by primary team/cardiology team

## 2019-07-27 NOTE — PROGRESS NOTE ADULT - PROBLEM SELECTOR PLAN 3
Suggest to continue medications, monitoring, FU primary team recommendations. .
Suggest to continue medications, monitoring, FU primary team recommendations. .
Stable and asymptomatic   ASA given history of PCI
Stable and asymptomatic   Add ASA given history of PCI
Suggest to continue medications, monitoring, FU primary team recommendations. .

## 2019-07-27 NOTE — DISCHARGE NOTE NURSING/CASE MANAGEMENT/SOCIAL WORK - NSDCDPATPORTLINK_GEN_ALL_CORE
You can access the [facility], offered by [facility], by registering with the following website: [email or web address]

## 2019-07-27 NOTE — PROGRESS NOTE ADULT - PROBLEM SELECTOR PLAN 1
Will continue current insulin regimen for now. Will continue monitoring FS, log, will Follow up.  Patient counseled for compliance with consistent low carb diet.
No more episodes on tele since meds were adjusted
Will continue current insulin regimen for now. Will continue monitoring FS, log, will Follow up.  DC home on current insulin regimen, FU 4 weeks  Patient counseled for compliance with consistent low carb diet.
Will continue current insulin regimen for now. Will continue monitoring FS, log, will Follow up.  Patient counseled for compliance with consistent low carb diet.
Will increase Lantus to 20 units stay on Humalog 6-6-6. Will continue monitoring FS, log, will Follow up.  DC home on current insulin regimen, FU 4 weeks  Patient counseled for compliance with consistent low carb diet.

## 2019-07-27 NOTE — PROGRESS NOTE ADULT - ATTENDING COMMENTS
BP control improving, but patient still had 2 electrographic seizures yesterday. Tolerating introduction of oxcarbazepine - today received 300mg q12  Plan  1. observe on current BP regime - do not wish to overcorrect.  2. increased oxcarbazepine to 300 mg q8 starting 7/26.  3. change levetiracetam to po 1000 q8 starting 7/26.
Patient clinically stable this morning, headache-free after Tylenol.  Will review overnight EEG.  2nd degree heart block reported by telemetry.  Will DC Vimpat, and start Depakote ER 750mg BID.  continue Keppra.  Consult Cardiology.  elevated blood glucose, persistent, consult endocrinology.    Jhony Berkowitz MD  EEG / Epilepsy Attending Physician
Patient with possible PRES affecting R posterior temporal lobe subcortical WM (other etiologies ruled out).  EEG over weekend notable for recurrent focal seizure. Since Sunday there were 3 focal seizures. Vimpat dc'd for concern for 2nd degree heart block, now on Keppra and Valproate.  SBP initially high, but now improving.
Patient with possible PRES affecting R posterior temporal lobe subcortical WM (other etiologies ruled out).  EEG over weekend notable for recurrent focal seizure. Vimpat dc'd for concern for 2nd degree heart block, now on Keppra and Valproate.  SBP initially high, but now improving-though still fluctuating.    Overnight there were 2 subtle electrographic seizures from L posterior temporal region.     Plan   1. cont keppra 1g tid, VPA  bid   2. patient counselled on depakote side effects.   3. if still having seizures today, will consider adding oxcarbazepine 150 q12.
plan as above. 4 electrographic seizures yesterday without clear clinical correlate.   start oxcarbazepine 150 q12 and advance as tolerate, probably 300 q12 tomorrow.
Patient feeling well. EEG Showed two brief L occipto-temporal seizures - lasting approx 10-15 seconds. No clinical correlate - improved over prior.   As patient is trending well, will dc on current AED regimen with plan to increase oxcarbazepine at next visit, and record ambulatory EEG to determine response to treatment.
agree with above.

## 2019-08-05 PROBLEM — R56.9 SEIZURES: Status: ACTIVE | Noted: 2019-05-10

## 2019-08-15 ENCOUNTER — APPOINTMENT (OUTPATIENT)
Dept: ENDOCRINOLOGY | Facility: CLINIC | Age: 69
End: 2019-08-15

## 2019-11-26 NOTE — BEHAVIORAL HEALTH ASSESSMENT NOTE - NSBHVIOLPROTECT_PSY_A_CORE
What Type Of Note Output Would You Prefer (Optional)?: Standard Output Hpi Title: Evaluation of Skin Lesions How Severe Are Your Spot(S)?: mild Have Your Spot(S) Been Treated In The Past?: has not been treated Sobriety/Relationship stability/Residential stability

## 2019-12-14 ENCOUNTER — EMERGENCY (EMERGENCY)
Facility: HOSPITAL | Age: 69
LOS: 1 days | Discharge: HOME | End: 2019-12-14
Attending: EMERGENCY MEDICINE
Payer: COMMERCIAL

## 2019-12-14 VITALS
SYSTOLIC BLOOD PRESSURE: 175 MMHG | OXYGEN SATURATION: 100 % | DIASTOLIC BLOOD PRESSURE: 71 MMHG | RESPIRATION RATE: 18 BRPM | HEART RATE: 60 BPM | TEMPERATURE: 98 F

## 2019-12-14 VITALS
TEMPERATURE: 98 F | SYSTOLIC BLOOD PRESSURE: 184 MMHG | DIASTOLIC BLOOD PRESSURE: 94 MMHG | RESPIRATION RATE: 16 BRPM | OXYGEN SATURATION: 97 % | HEART RATE: 65 BPM

## 2019-12-14 DIAGNOSIS — Z90.710 ACQUIRED ABSENCE OF BOTH CERVIX AND UTERUS: Chronic | ICD-10-CM

## 2019-12-14 DIAGNOSIS — Z98.89 OTHER SPECIFIED POSTPROCEDURAL STATES: Chronic | ICD-10-CM

## 2019-12-14 LAB
ALBUMIN SERPL ELPH-MCNC: 4.1 G/DL — SIGNIFICANT CHANGE UP (ref 3.3–5)
ALP SERPL-CCNC: 348 U/L — HIGH (ref 40–120)
ALT FLD-CCNC: 37 U/L — SIGNIFICANT CHANGE UP (ref 10–45)
ANION GAP SERPL CALC-SCNC: 14 MMOL/L — SIGNIFICANT CHANGE UP (ref 5–17)
ANISOCYTOSIS BLD QL: SLIGHT — SIGNIFICANT CHANGE UP
APPEARANCE UR: CLEAR — SIGNIFICANT CHANGE UP
AST SERPL-CCNC: 22 U/L — SIGNIFICANT CHANGE UP (ref 10–40)
BACTERIA # UR AUTO: NEGATIVE — SIGNIFICANT CHANGE UP
BASOPHILS # BLD AUTO: 0.05 K/UL — SIGNIFICANT CHANGE UP (ref 0–0.2)
BASOPHILS NFR BLD AUTO: 0.9 % — SIGNIFICANT CHANGE UP (ref 0–2)
BILIRUB SERPL-MCNC: 0.2 MG/DL — SIGNIFICANT CHANGE UP (ref 0.2–1.2)
BILIRUB UR-MCNC: NEGATIVE — SIGNIFICANT CHANGE UP
BUN SERPL-MCNC: 14 MG/DL — SIGNIFICANT CHANGE UP (ref 7–23)
CALCIUM SERPL-MCNC: 9.7 MG/DL — SIGNIFICANT CHANGE UP (ref 8.4–10.5)
CHLORIDE SERPL-SCNC: 100 MMOL/L — SIGNIFICANT CHANGE UP (ref 96–108)
CO2 SERPL-SCNC: 23 MMOL/L — SIGNIFICANT CHANGE UP (ref 22–31)
COLOR SPEC: COLORLESS — SIGNIFICANT CHANGE UP
CREAT SERPL-MCNC: 0.7 MG/DL — SIGNIFICANT CHANGE UP (ref 0.5–1.3)
DIFF PNL FLD: NEGATIVE — SIGNIFICANT CHANGE UP
EOSINOPHIL # BLD AUTO: 0 K/UL — SIGNIFICANT CHANGE UP (ref 0–0.5)
EOSINOPHIL NFR BLD AUTO: 0 % — SIGNIFICANT CHANGE UP (ref 0–6)
EPI CELLS # UR: 1 /HPF — SIGNIFICANT CHANGE UP
GLUCOSE SERPL-MCNC: 340 MG/DL — HIGH (ref 70–99)
GLUCOSE UR QL: ABNORMAL
HCT VFR BLD CALC: 35.7 % — SIGNIFICANT CHANGE UP (ref 34.5–45)
HGB BLD-MCNC: 11 G/DL — LOW (ref 11.5–15.5)
HYALINE CASTS # UR AUTO: 0 /LPF — SIGNIFICANT CHANGE UP (ref 0–2)
KETONES UR-MCNC: NEGATIVE — SIGNIFICANT CHANGE UP
LEUKOCYTE ESTERASE UR-ACNC: NEGATIVE — SIGNIFICANT CHANGE UP
LYMPHOCYTES # BLD AUTO: 1.74 K/UL — SIGNIFICANT CHANGE UP (ref 1–3.3)
LYMPHOCYTES # BLD AUTO: 30.9 % — SIGNIFICANT CHANGE UP (ref 13–44)
MANUAL SMEAR VERIFICATION: SIGNIFICANT CHANGE UP
MCHC RBC-ENTMCNC: 23 PG — LOW (ref 27–34)
MCHC RBC-ENTMCNC: 30.8 GM/DL — LOW (ref 32–36)
MCV RBC AUTO: 74.5 FL — LOW (ref 80–100)
MICROCYTES BLD QL: SIGNIFICANT CHANGE UP
MONOCYTES # BLD AUTO: 0.15 K/UL — SIGNIFICANT CHANGE UP (ref 0–0.9)
MONOCYTES NFR BLD AUTO: 2.7 % — SIGNIFICANT CHANGE UP (ref 2–14)
NEUTROPHILS # BLD AUTO: 3.32 K/UL — SIGNIFICANT CHANGE UP (ref 1.8–7.4)
NEUTROPHILS NFR BLD AUTO: 59.1 % — SIGNIFICANT CHANGE UP (ref 43–77)
NITRITE UR-MCNC: NEGATIVE — SIGNIFICANT CHANGE UP
PH UR: 7.5 — SIGNIFICANT CHANGE UP (ref 5–8)
PLAT MORPH BLD: NORMAL — SIGNIFICANT CHANGE UP
PLATELET # BLD AUTO: 224 K/UL — SIGNIFICANT CHANGE UP (ref 150–400)
POLYCHROMASIA BLD QL SMEAR: SLIGHT — SIGNIFICANT CHANGE UP
POTASSIUM SERPL-MCNC: 3.8 MMOL/L — SIGNIFICANT CHANGE UP (ref 3.5–5.3)
POTASSIUM SERPL-SCNC: 3.8 MMOL/L — SIGNIFICANT CHANGE UP (ref 3.5–5.3)
PROT SERPL-MCNC: 7.7 G/DL — SIGNIFICANT CHANGE UP (ref 6–8.3)
PROT UR-MCNC: ABNORMAL
RBC # BLD: 4.79 M/UL — SIGNIFICANT CHANGE UP (ref 3.8–5.2)
RBC # FLD: 15.9 % — HIGH (ref 10.3–14.5)
RBC BLD AUTO: ABNORMAL
RBC CASTS # UR COMP ASSIST: 1 /HPF — SIGNIFICANT CHANGE UP (ref 0–4)
SMUDGE CELLS # BLD: PRESENT — SIGNIFICANT CHANGE UP
SODIUM SERPL-SCNC: 137 MMOL/L — SIGNIFICANT CHANGE UP (ref 135–145)
SP GR SPEC: 1.01 — SIGNIFICANT CHANGE UP (ref 1.01–1.02)
UROBILINOGEN FLD QL: NEGATIVE — SIGNIFICANT CHANGE UP
VARIANT LYMPHS # BLD: 6.4 % — HIGH (ref 0–6)
WBC # BLD: 5.62 K/UL — SIGNIFICANT CHANGE UP (ref 3.8–10.5)
WBC # FLD AUTO: 5.62 K/UL — SIGNIFICANT CHANGE UP (ref 3.8–10.5)
WBC UR QL: 1 /HPF — SIGNIFICANT CHANGE UP (ref 0–5)

## 2019-12-14 PROCEDURE — 96374 THER/PROPH/DIAG INJ IV PUSH: CPT

## 2019-12-14 PROCEDURE — 99284 EMERGENCY DEPT VISIT MOD MDM: CPT

## 2019-12-14 PROCEDURE — 81001 URINALYSIS AUTO W/SCOPE: CPT

## 2019-12-14 PROCEDURE — 80053 COMPREHEN METABOLIC PANEL: CPT

## 2019-12-14 PROCEDURE — 85027 COMPLETE CBC AUTOMATED: CPT

## 2019-12-14 PROCEDURE — 96375 TX/PRO/DX INJ NEW DRUG ADDON: CPT

## 2019-12-14 PROCEDURE — 99284 EMERGENCY DEPT VISIT MOD MDM: CPT | Mod: 25

## 2019-12-14 RX ORDER — KETOROLAC TROMETHAMINE 30 MG/ML
15 SYRINGE (ML) INJECTION ONCE
Refills: 0 | Status: DISCONTINUED | OUTPATIENT
Start: 2019-12-14 | End: 2019-12-14

## 2019-12-14 RX ORDER — SODIUM CHLORIDE 9 MG/ML
500 INJECTION INTRAMUSCULAR; INTRAVENOUS; SUBCUTANEOUS ONCE
Refills: 0 | Status: COMPLETED | OUTPATIENT
Start: 2019-12-14 | End: 2019-12-14

## 2019-12-14 RX ORDER — KETOROLAC TROMETHAMINE 30 MG/ML
10 SYRINGE (ML) INJECTION ONCE
Refills: 0 | Status: DISCONTINUED | OUTPATIENT
Start: 2019-12-14 | End: 2019-12-14

## 2019-12-14 RX ORDER — METOCLOPRAMIDE HCL 10 MG
10 TABLET ORAL ONCE
Refills: 0 | Status: COMPLETED | OUTPATIENT
Start: 2019-12-14 | End: 2019-12-14

## 2019-12-14 RX ADMIN — Medication 10 MILLIGRAM(S): at 16:38

## 2019-12-14 RX ADMIN — Medication 15 MILLIGRAM(S): at 16:10

## 2019-12-14 RX ADMIN — SODIUM CHLORIDE 500 MILLILITER(S): 9 INJECTION INTRAMUSCULAR; INTRAVENOUS; SUBCUTANEOUS at 16:38

## 2019-12-14 NOTE — ED ADULT NURSE REASSESSMENT NOTE - NS ED NURSE REASSESS COMMENT FT1
Patient did not take her afternoon dose of labetalol po. Patient took her dose and Dr. East made aware.

## 2019-12-14 NOTE — ED PROVIDER NOTE - NS ED ROS FT
GENERAL: No fever or chills, EYES: no change in vision, HEENT: no trouble swallowing or speaking, CARDIAC: no chest pain, PULMONARY: no cough or SOB, GI: no abdominal pain, no nausea, no vomiting, no diarrhea or constipation, : No changes in urination, SKIN: no rashes, NEURO: +headache,  MSK: No joint pain ~Eloy Harrison M.D. Resident

## 2019-12-14 NOTE — ED PROVIDER NOTE - PHYSICAL EXAMINATION
Gen: AAOx3, non-toxic  Head: NCAT  HEENT: EOMI, oral mucosa moist, normal conjunctiva  Lung: CTAB, no respiratory distress, speaking in full sentences  CV: RRR, no murmurs, rubs or gallops  Abd: soft, NTND, no guarding, no CVA tenderness  MSK: no visible deformities  Neuro: No focal sensory or motor deficits              CN 2-12 intact  Skin: Warm, well perfused, no rash  Psych: normal affect.   ~Eloy Harrison M.D. Resident

## 2019-12-14 NOTE — ED PROVIDER NOTE - CLINICAL SUMMARY MEDICAL DECISION MAKING FREE TEXT BOX
Cruzito: headache today, slow onset. no focal neuro no trauma. +elequis. +hx of headaches usually relived with motrin. this one is like her headaches but it is more painful.  Nl neuro exam

## 2019-12-14 NOTE — ED PROVIDER NOTE - ATTENDING CONTRIBUTION TO CARE
I performed a history and physical exam of the patient and discussed their management with the resident and /or advanced care provider. I reviewed the resident and /or ACP's note and agree with the documented findings and plan of care. My medical decison making and observations are found above.  Lungs clear, abd soft, nl gaiT no pronator drift

## 2019-12-14 NOTE — ED PROVIDER NOTE - CARE PROVIDER_API CALL
Nissenbaum, Michael A (MD)  Neurology  [address] [facility] [address] [place] [address]  [place] [address]  Phone: [phone]  Fax: [phone]  Follow Up Time:

## 2019-12-14 NOTE — ED ADULT NURSE REASSESSMENT NOTE - NS ED NURSE REASSESS COMMENT FT1
sleeping comfortably but easy to awaken. States her headache is now 2/10 compared to 10/10 prior to medications. Close monitoring provided.

## 2019-12-14 NOTE — ED ADULT NURSE NOTE - OBJECTIVE STATEMENT
68 y/o female hx cardiac stent x 5, seizures, dm, htn BIBA from PMD office where she was found to be hypertensive and with a headache. As per family, patient originally went to PMD office for a med refill. Patient states her headache started this morning and gradually worsened. Denies any visual changes, nausea, cp, sob, palpitations, fever, chills or cough. +PERRL, speech clear, no facial asymmetry noted, denies numbness or tingling. Equal strength in all extremities. Skin warm dry and intact. Patient states she has had similar headaches in the past which are relieved by motrin.

## 2019-12-14 NOTE — ED PROVIDER NOTE - NS ED ATTENDING STATEMENT MOD
I have personally seen and examined this patient.  I have fully participated in the care of this patient. I have reviewed all pertinent clinical information, including history, physical exam, plan and the Resident’s note and agree except as noted.
Driving allowed

## 2019-12-14 NOTE — ED PROVIDER NOTE - OBJECTIVE STATEMENT
69yF with h/o HTN, HLD, headaches, and seizures presents with gradual onset headache few hours prior to presentation. Endorse left sided headache without changes in vision, n/v, chest pain abd pain 69yF with h/o HTN, HLD, diabetes, headaches, and seizures presents with gradual onset headache few hours prior to presentation. Endorse left sided headache without changes in vision, n/v, chest pain, abd pain, sob, urinary or bowel irregularities

## 2019-12-14 NOTE — ED PROVIDER NOTE - NSFOLLOWUPINSTRUCTIONS_ED_ALL_ED_FT
1) You were seen in the ER for headache.   2) Please follow up with Dr Cintron next week.   3) Please take Tylenol 650mg every 4-6 hours as needed for pain.  4) Please return to ED for any new or worsening symptoms.

## 2020-04-14 RX ORDER — METFORMIN HYDROCHLORIDE 850 MG/1
1 TABLET ORAL
Qty: 0 | Refills: 0 | DISCHARGE

## 2020-04-14 RX ORDER — APIXABAN 2.5 MG/1
1 TABLET, FILM COATED ORAL
Qty: 0 | Refills: 0 | DISCHARGE

## 2020-04-23 NOTE — DIETITIAN INITIAL EVALUATION ADULT. - OBTAIN WEEKLY WEIGHT
Oly notified that MD will follow patient.   
PATIENT IS BEING DISCHARGED FROM [facility] TODAY - HAS ORDERS FOR HOME HEALTH.    WILL DR TENA FOLLOW AND SIGN?    PLS CALL  
Please advise   
Yes I willl. Also schedule a video or telephone visit with patient in the next week  
yes

## 2020-06-17 NOTE — ED PROVIDER NOTE - CADM POA URETHRAL CATHETER
FOR REFILL REQUESTS INCLUDING CONTROLLED SUBSTANCES   Please contact your pharmacy at least three (3) business days before your medication runs out. The pharmacy will then send us a request for your refill. Please allow 24-48 hours for the refill to be processed. ?  FOR CONTROLLED SUBSTANCE REFILL REQUESTS   Please call the clinic Monday through Friday, from 8:00am - 5:00pm to speak with a Patient . ?  LAB AND X-RAY HOURS FOR [address] [place] [address]  Monday - Friday 7 am - 4:30 pm  ?  TEST RESULTS  If your physician has ordered additional laboratory or radiology testing as part of your ongoing plan of care, notification of the test results will be communicated in a timely manner once reviewed by your provider. - If your results are normal, you will receive a letter in the mail. - If there are any irregularities, you will receive a phone call from our office within 5 - 7 business days. - If your results are critical and require more immediate intervention, you will be contacted promptly. ?  YOUR OPINION MATTERS  You may be receiving a patient satisfaction survey in the mail. We would appreciate it if you could please take the time to complete, as your feedback is very important to us. We strive to make your experience exceptional and your comments help us with that goal. We look forward to hearing from you. Feedback is anonymous unless you choose otherwise. No

## 2020-09-17 NOTE — ED PROVIDER NOTE - NS_EDPROVIDERDISPOUSERTYPE_ED_A_ED
Attending Attestation (For Attendings USE Only)... Cimetidine Counseling:  I discussed with the patient the risks of Cimetidine including but not limited to gynecomastia, headache, diarrhea, nausea, drowsiness, arrhythmias, pancreatitis, skin rashes, psychosis, bone marrow suppression and kidney toxicity.

## 2020-12-04 NOTE — ED PROVIDER NOTE - NS ED MD DISPO ISOLATION TYPES
None Skyrizi Counseling: I discussed with the patient the risks of risankizumab-rzaa including but not limited to immunosuppression, and serious infections.  The patient understands that monitoring is required including a PPD at baseline and must alert us or the primary physician if symptoms of infection or other concerning signs are noted.

## 2021-02-26 NOTE — CONSULT NOTE ADULT - ASSESSMENT
69F w/ CAD, DM, HTN, HLD, hypothyroid, on eliquis, ASA, s/p umbilical hernia repair 2 weeks ago, presents as level 1 trauma activation s/p fall from standing with AMS, seized and subsequently intubated in trauma bay.  CT negative for ICH.     Neuro:  - EEG for eval for seizures  - will start keppra if seizures continue    Resp:  - intubated for airway protection  - will wean from vent as tolerated    Cards: hypertension  - continue lopressor    GI:  - NPO    :  - close monitorin I/Os    Heme:  - H/H stable, will monitor    ID:   no active issues    Endo: DM  - insulin sliding scale    S Siskind PGY2  [id number] Acitretin Counseling:  I discussed with the patient the risks of acitretin including but not limited to hair loss, dry lips/skin/eyes, liver damage, hyperlipidemia, depression/suicidal ideation, photosensitivity.  Serious rare side effects can include but are not limited to pancreatitis, pseudotumor cerebri, bony changes, clot formation/stroke/heart attack.  Patient understands that alcohol is contraindicated since it can result in liver toxicity and significantly prolong the elimination of the drug by many years.

## 2021-03-04 ENCOUNTER — NON-APPOINTMENT (OUTPATIENT)
Age: 71
End: 2021-03-04

## 2021-03-04 ENCOUNTER — APPOINTMENT (OUTPATIENT)
Dept: OPHTHALMOLOGY | Facility: CLINIC | Age: 71
End: 2021-03-04
Payer: MEDICARE

## 2021-03-04 PROBLEM — I10 ESSENTIAL (PRIMARY) HYPERTENSION: Chronic | Status: ACTIVE | Noted: 2019-12-14

## 2021-03-04 PROBLEM — R56.9 UNSPECIFIED CONVULSIONS: Chronic | Status: ACTIVE | Noted: 2019-12-14

## 2021-03-04 PROBLEM — E78.00 PURE HYPERCHOLESTEROLEMIA, UNSPECIFIED: Chronic | Status: ACTIVE | Noted: 2019-12-14

## 2021-03-04 PROCEDURE — 99072 ADDL SUPL MATRL&STAF TM PHE: CPT

## 2021-03-04 PROCEDURE — 92020 GONIOSCOPY: CPT

## 2021-03-04 PROCEDURE — 92250 FUNDUS PHOTOGRAPHY W/I&R: CPT

## 2021-03-04 PROCEDURE — 92004 COMPRE OPH EXAM NEW PT 1/>: CPT

## 2021-03-04 PROCEDURE — 76514 ECHO EXAM OF EYE THICKNESS: CPT

## 2021-03-15 ENCOUNTER — NON-APPOINTMENT (OUTPATIENT)
Age: 71
End: 2021-03-15

## 2021-03-15 ENCOUNTER — APPOINTMENT (OUTPATIENT)
Dept: OPHTHALMOLOGY | Facility: CLINIC | Age: 71
End: 2021-03-15
Payer: MEDICARE

## 2021-03-15 PROCEDURE — 92012 INTRM OPH EXAM EST PATIENT: CPT

## 2021-03-15 PROCEDURE — 99072 ADDL SUPL MATRL&STAF TM PHE: CPT

## 2021-03-15 PROCEDURE — 92133 CPTRZD OPH DX IMG PST SGM ON: CPT

## 2021-03-15 PROCEDURE — 92083 EXTENDED VISUAL FIELD XM: CPT

## 2021-04-12 ENCOUNTER — NON-APPOINTMENT (OUTPATIENT)
Age: 71
End: 2021-04-12

## 2021-04-12 ENCOUNTER — APPOINTMENT (OUTPATIENT)
Dept: OPHTHALMOLOGY | Facility: CLINIC | Age: 71
End: 2021-04-12
Payer: MEDICARE

## 2021-04-12 PROCEDURE — 99072 ADDL SUPL MATRL&STAF TM PHE: CPT

## 2021-04-12 PROCEDURE — 92012 INTRM OPH EXAM EST PATIENT: CPT

## 2021-06-19 NOTE — ED ADULT NURSE NOTE - CHIEF COMPLAINT QUOTE
Ice water provided to pt. Pt tolerating PO fluids well. Call bell within reach. witnessed fall, head injury, AMS

## 2021-08-13 ENCOUNTER — INPATIENT (INPATIENT)
Facility: HOSPITAL | Age: 71
LOS: 0 days | Discharge: HOME | End: 2021-08-14
Attending: INTERNAL MEDICINE | Admitting: INTERNAL MEDICINE
Payer: MEDICARE

## 2021-08-13 VITALS
OXYGEN SATURATION: 98 % | HEART RATE: 73 BPM | DIASTOLIC BLOOD PRESSURE: 66 MMHG | RESPIRATION RATE: 18 BRPM | HEIGHT: 65 IN | TEMPERATURE: 98 F | SYSTOLIC BLOOD PRESSURE: 138 MMHG

## 2021-08-13 DIAGNOSIS — I21.4 NON-ST ELEVATION (NSTEMI) MYOCARDIAL INFARCTION: ICD-10-CM

## 2021-08-13 DIAGNOSIS — Z90.710 ACQUIRED ABSENCE OF BOTH CERVIX AND UTERUS: Chronic | ICD-10-CM

## 2021-08-13 DIAGNOSIS — Z98.89 OTHER SPECIFIED POSTPROCEDURAL STATES: Chronic | ICD-10-CM

## 2021-08-13 LAB
ALBUMIN SERPL ELPH-MCNC: 4.1 G/DL — SIGNIFICANT CHANGE UP (ref 3.3–5)
ALP SERPL-CCNC: 173 U/L — HIGH (ref 40–120)
ALT FLD-CCNC: 23 U/L — SIGNIFICANT CHANGE UP (ref 4–33)
ANION GAP SERPL CALC-SCNC: 14 MMOL/L — SIGNIFICANT CHANGE UP (ref 7–14)
APTT BLD: 28.9 SEC — SIGNIFICANT CHANGE UP (ref 27–36.3)
AST SERPL-CCNC: 34 U/L — HIGH (ref 4–32)
B PERT DNA SPEC QL NAA+PROBE: SIGNIFICANT CHANGE UP
B PERT+PARAPERT DNA PNL SPEC NAA+PROBE: SIGNIFICANT CHANGE UP
BASOPHILS # BLD AUTO: 0.04 K/UL — SIGNIFICANT CHANGE UP (ref 0–0.2)
BASOPHILS NFR BLD AUTO: 0.5 % — SIGNIFICANT CHANGE UP (ref 0–2)
BILIRUB SERPL-MCNC: <0.2 MG/DL — SIGNIFICANT CHANGE UP (ref 0.2–1.2)
BLD GP AB SCN SERPL QL: NEGATIVE — SIGNIFICANT CHANGE UP
BORDETELLA PARAPERTUSSIS (RAPRVP): SIGNIFICANT CHANGE UP
BUN SERPL-MCNC: 15 MG/DL — SIGNIFICANT CHANGE UP (ref 7–23)
C PNEUM DNA SPEC QL NAA+PROBE: SIGNIFICANT CHANGE UP
CALCIUM SERPL-MCNC: 9.3 MG/DL — SIGNIFICANT CHANGE UP (ref 8.4–10.5)
CHLORIDE SERPL-SCNC: 106 MMOL/L — SIGNIFICANT CHANGE UP (ref 98–107)
CO2 SERPL-SCNC: 21 MMOL/L — LOW (ref 22–31)
CREAT SERPL-MCNC: 0.85 MG/DL — SIGNIFICANT CHANGE UP (ref 0.5–1.3)
EOSINOPHIL # BLD AUTO: 0.05 K/UL — SIGNIFICANT CHANGE UP (ref 0–0.5)
EOSINOPHIL NFR BLD AUTO: 0.6 % — SIGNIFICANT CHANGE UP (ref 0–6)
FLUAV SUBTYP SPEC NAA+PROBE: SIGNIFICANT CHANGE UP
FLUBV RNA SPEC QL NAA+PROBE: SIGNIFICANT CHANGE UP
GLUCOSE BLDC GLUCOMTR-MCNC: 79 MG/DL — SIGNIFICANT CHANGE UP (ref 70–99)
GLUCOSE BLDC GLUCOMTR-MCNC: 91 MG/DL — SIGNIFICANT CHANGE UP (ref 70–99)
GLUCOSE SERPL-MCNC: 107 MG/DL — HIGH (ref 70–99)
HADV DNA SPEC QL NAA+PROBE: SIGNIFICANT CHANGE UP
HCOV 229E RNA SPEC QL NAA+PROBE: SIGNIFICANT CHANGE UP
HCOV HKU1 RNA SPEC QL NAA+PROBE: SIGNIFICANT CHANGE UP
HCOV NL63 RNA SPEC QL NAA+PROBE: SIGNIFICANT CHANGE UP
HCOV OC43 RNA SPEC QL NAA+PROBE: SIGNIFICANT CHANGE UP
HCT VFR BLD CALC: 37.8 % — SIGNIFICANT CHANGE UP (ref 34.5–45)
HGB BLD-MCNC: 11.2 G/DL — LOW (ref 11.5–15.5)
HMPV RNA SPEC QL NAA+PROBE: SIGNIFICANT CHANGE UP
HPIV1 RNA SPEC QL NAA+PROBE: SIGNIFICANT CHANGE UP
HPIV2 RNA SPEC QL NAA+PROBE: SIGNIFICANT CHANGE UP
HPIV3 RNA SPEC QL NAA+PROBE: SIGNIFICANT CHANGE UP
HPIV4 RNA SPEC QL NAA+PROBE: SIGNIFICANT CHANGE UP
IANC: 5.54 K/UL — SIGNIFICANT CHANGE UP (ref 1.5–8.5)
IMM GRANULOCYTES NFR BLD AUTO: 0.5 % — SIGNIFICANT CHANGE UP (ref 0–1.5)
INR BLD: 1.43 RATIO — HIGH (ref 0.88–1.16)
LYMPHOCYTES # BLD AUTO: 2.06 K/UL — SIGNIFICANT CHANGE UP (ref 1–3.3)
LYMPHOCYTES # BLD AUTO: 25.3 % — SIGNIFICANT CHANGE UP (ref 13–44)
M PNEUMO DNA SPEC QL NAA+PROBE: SIGNIFICANT CHANGE UP
MCHC RBC-ENTMCNC: 22.6 PG — LOW (ref 27–34)
MCHC RBC-ENTMCNC: 29.6 GM/DL — LOW (ref 32–36)
MCV RBC AUTO: 76.2 FL — LOW (ref 80–100)
MONOCYTES # BLD AUTO: 0.42 K/UL — SIGNIFICANT CHANGE UP (ref 0–0.9)
MONOCYTES NFR BLD AUTO: 5.2 % — SIGNIFICANT CHANGE UP (ref 2–14)
NEUTROPHILS # BLD AUTO: 5.54 K/UL — SIGNIFICANT CHANGE UP (ref 1.8–7.4)
NEUTROPHILS NFR BLD AUTO: 67.9 % — SIGNIFICANT CHANGE UP (ref 43–77)
NRBC # BLD: 0 /100 WBCS — SIGNIFICANT CHANGE UP
NRBC # FLD: 0 K/UL — SIGNIFICANT CHANGE UP
NT-PROBNP SERPL-SCNC: 28 PG/ML — SIGNIFICANT CHANGE UP
PLATELET # BLD AUTO: 269 K/UL — SIGNIFICANT CHANGE UP (ref 150–400)
POTASSIUM SERPL-MCNC: 4.9 MMOL/L — SIGNIFICANT CHANGE UP (ref 3.5–5.3)
POTASSIUM SERPL-SCNC: 4.9 MMOL/L — SIGNIFICANT CHANGE UP (ref 3.5–5.3)
PROT SERPL-MCNC: 7.9 G/DL — SIGNIFICANT CHANGE UP (ref 6–8.3)
PROTHROM AB SERPL-ACNC: 16 SEC — HIGH (ref 10.6–13.6)
RAPID RVP RESULT: SIGNIFICANT CHANGE UP
RBC # BLD: 4.96 M/UL — SIGNIFICANT CHANGE UP (ref 3.8–5.2)
RBC # FLD: 16.6 % — HIGH (ref 10.3–14.5)
RH IG SCN BLD-IMP: POSITIVE — SIGNIFICANT CHANGE UP
RSV RNA SPEC QL NAA+PROBE: SIGNIFICANT CHANGE UP
RV+EV RNA SPEC QL NAA+PROBE: SIGNIFICANT CHANGE UP
SARS-COV-2 RNA SPEC QL NAA+PROBE: SIGNIFICANT CHANGE UP
SODIUM SERPL-SCNC: 141 MMOL/L — SIGNIFICANT CHANGE UP (ref 135–145)
TROPONIN T, HIGH SENSITIVITY RESULT: 8 NG/L — SIGNIFICANT CHANGE UP
WBC # BLD: 8.15 K/UL — SIGNIFICANT CHANGE UP (ref 3.8–10.5)
WBC # FLD AUTO: 8.15 K/UL — SIGNIFICANT CHANGE UP (ref 3.8–10.5)

## 2021-08-13 PROCEDURE — 70450 CT HEAD/BRAIN W/O DYE: CPT | Mod: 26

## 2021-08-13 PROCEDURE — 99222 1ST HOSP IP/OBS MODERATE 55: CPT | Mod: 25

## 2021-08-13 PROCEDURE — 99291 CRITICAL CARE FIRST HOUR: CPT

## 2021-08-13 PROCEDURE — 93458 L HRT ARTERY/VENTRICLE ANGIO: CPT | Mod: 26

## 2021-08-13 RX ORDER — INSULIN LISPRO 100/ML
VIAL (ML) SUBCUTANEOUS AT BEDTIME
Refills: 0 | Status: DISCONTINUED | OUTPATIENT
Start: 2021-08-13 | End: 2021-08-14

## 2021-08-13 RX ORDER — LATANOPROST 0.05 MG/ML
1 SOLUTION/ DROPS OPHTHALMIC; TOPICAL AT BEDTIME
Refills: 0 | Status: DISCONTINUED | OUTPATIENT
Start: 2021-08-13 | End: 2021-08-14

## 2021-08-13 RX ORDER — DEXTROSE 50 % IN WATER 50 %
25 SYRINGE (ML) INTRAVENOUS ONCE
Refills: 0 | Status: DISCONTINUED | OUTPATIENT
Start: 2021-08-13 | End: 2021-08-14

## 2021-08-13 RX ORDER — DEXTROSE 50 % IN WATER 50 %
15 SYRINGE (ML) INTRAVENOUS ONCE
Refills: 0 | Status: DISCONTINUED | OUTPATIENT
Start: 2021-08-13 | End: 2021-08-14

## 2021-08-13 RX ORDER — DEXTROSE 50 % IN WATER 50 %
12.5 SYRINGE (ML) INTRAVENOUS ONCE
Refills: 0 | Status: DISCONTINUED | OUTPATIENT
Start: 2021-08-13 | End: 2021-08-14

## 2021-08-13 RX ORDER — SODIUM CHLORIDE 9 MG/ML
1000 INJECTION, SOLUTION INTRAVENOUS
Refills: 0 | Status: DISCONTINUED | OUTPATIENT
Start: 2021-08-13 | End: 2021-08-14

## 2021-08-13 RX ORDER — OXCARBAZEPINE 300 MG/1
300 TABLET, FILM COATED ORAL
Refills: 0 | Status: DISCONTINUED | OUTPATIENT
Start: 2021-08-13 | End: 2021-08-14

## 2021-08-13 RX ORDER — LABETALOL HCL 100 MG
200 TABLET ORAL THREE TIMES A DAY
Refills: 0 | Status: DISCONTINUED | OUTPATIENT
Start: 2021-08-13 | End: 2021-08-14

## 2021-08-13 RX ORDER — AMLODIPINE BESYLATE 2.5 MG/1
10 TABLET ORAL DAILY
Refills: 0 | Status: DISCONTINUED | OUTPATIENT
Start: 2021-08-13 | End: 2021-08-14

## 2021-08-13 RX ORDER — HYDRALAZINE HCL 50 MG
100 TABLET ORAL THREE TIMES A DAY
Refills: 0 | Status: DISCONTINUED | OUTPATIENT
Start: 2021-08-13 | End: 2021-08-14

## 2021-08-13 RX ORDER — HEPARIN SODIUM 5000 [USP'U]/ML
INJECTION INTRAVENOUS; SUBCUTANEOUS
Qty: 25000 | Refills: 0 | Status: DISCONTINUED | OUTPATIENT
Start: 2021-08-13 | End: 2021-08-13

## 2021-08-13 RX ORDER — ATORVASTATIN CALCIUM 80 MG/1
40 TABLET, FILM COATED ORAL AT BEDTIME
Refills: 0 | Status: DISCONTINUED | OUTPATIENT
Start: 2021-08-13 | End: 2021-08-14

## 2021-08-13 RX ORDER — INSULIN LISPRO 100/ML
VIAL (ML) SUBCUTANEOUS
Refills: 0 | Status: DISCONTINUED | OUTPATIENT
Start: 2021-08-13 | End: 2021-08-14

## 2021-08-13 RX ORDER — NITROGLYCERIN 6.5 MG
0.4 CAPSULE, EXTENDED RELEASE ORAL ONCE
Refills: 0 | Status: COMPLETED | OUTPATIENT
Start: 2021-08-13 | End: 2021-08-13

## 2021-08-13 RX ORDER — TICAGRELOR 90 MG/1
180 TABLET ORAL ONCE
Refills: 0 | Status: COMPLETED | OUTPATIENT
Start: 2021-08-13 | End: 2021-08-13

## 2021-08-13 RX ORDER — LEVOTHYROXINE SODIUM 125 MCG
75 TABLET ORAL DAILY
Refills: 0 | Status: DISCONTINUED | OUTPATIENT
Start: 2021-08-13 | End: 2021-08-14

## 2021-08-13 RX ORDER — HEPARIN SODIUM 5000 [USP'U]/ML
5000 INJECTION INTRAVENOUS; SUBCUTANEOUS ONCE
Refills: 0 | Status: DISCONTINUED | OUTPATIENT
Start: 2021-08-13 | End: 2021-08-13

## 2021-08-13 RX ORDER — HYDRALAZINE HCL 50 MG
1 TABLET ORAL
Qty: 0 | Refills: 0 | DISCHARGE

## 2021-08-13 RX ORDER — ACETAMINOPHEN 500 MG
650 TABLET ORAL ONCE
Refills: 0 | Status: COMPLETED | OUTPATIENT
Start: 2021-08-13 | End: 2021-08-13

## 2021-08-13 RX ORDER — LABETALOL HCL 100 MG
1 TABLET ORAL
Qty: 0 | Refills: 0 | DISCHARGE

## 2021-08-13 RX ORDER — GABAPENTIN 400 MG/1
1 CAPSULE ORAL
Qty: 0 | Refills: 0 | DISCHARGE

## 2021-08-13 RX ORDER — ASPIRIN/CALCIUM CARB/MAGNESIUM 324 MG
81 TABLET ORAL DAILY
Refills: 0 | Status: DISCONTINUED | OUTPATIENT
Start: 2021-08-13 | End: 2021-08-14

## 2021-08-13 RX ORDER — HEPARIN SODIUM 5000 [USP'U]/ML
6000 INJECTION INTRAVENOUS; SUBCUTANEOUS EVERY 6 HOURS
Refills: 0 | Status: DISCONTINUED | OUTPATIENT
Start: 2021-08-13 | End: 2021-08-13

## 2021-08-13 RX ORDER — PANTOPRAZOLE SODIUM 20 MG/1
40 TABLET, DELAYED RELEASE ORAL
Refills: 0 | Status: DISCONTINUED | OUTPATIENT
Start: 2021-08-13 | End: 2021-08-14

## 2021-08-13 RX ORDER — ATORVASTATIN CALCIUM 80 MG/1
1 TABLET, FILM COATED ORAL
Qty: 0 | Refills: 0 | DISCHARGE

## 2021-08-13 RX ORDER — BIMATOPROST 0.3 MG/ML
1 SOLUTION/ DROPS OPHTHALMIC
Qty: 0 | Refills: 0 | DISCHARGE

## 2021-08-13 RX ORDER — EMPAGLIFLOZIN 10 MG/1
1 TABLET, FILM COATED ORAL
Qty: 0 | Refills: 0 | DISCHARGE

## 2021-08-13 RX ORDER — LEVETIRACETAM 250 MG/1
1000 TABLET, FILM COATED ORAL
Refills: 0 | Status: DISCONTINUED | OUTPATIENT
Start: 2021-08-13 | End: 2021-08-14

## 2021-08-13 RX ORDER — GLUCAGON INJECTION, SOLUTION 0.5 MG/.1ML
1 INJECTION, SOLUTION SUBCUTANEOUS ONCE
Refills: 0 | Status: DISCONTINUED | OUTPATIENT
Start: 2021-08-13 | End: 2021-08-14

## 2021-08-13 RX ORDER — ASPIRIN/CALCIUM CARB/MAGNESIUM 324 MG
162 TABLET ORAL ONCE
Refills: 0 | Status: COMPLETED | OUTPATIENT
Start: 2021-08-13 | End: 2021-08-13

## 2021-08-13 RX ORDER — GABAPENTIN 400 MG/1
100 CAPSULE ORAL DAILY
Refills: 0 | Status: DISCONTINUED | OUTPATIENT
Start: 2021-08-13 | End: 2021-08-14

## 2021-08-13 RX ADMIN — LEVETIRACETAM 1000 MILLIGRAM(S): 250 TABLET, FILM COATED ORAL at 23:52

## 2021-08-13 RX ADMIN — TICAGRELOR 180 MILLIGRAM(S): 90 TABLET ORAL at 16:39

## 2021-08-13 RX ADMIN — Medication 200 MILLIGRAM(S): at 23:51

## 2021-08-13 RX ADMIN — Medication 100 MILLIGRAM(S): at 22:27

## 2021-08-13 RX ADMIN — Medication 162 MILLIGRAM(S): at 16:38

## 2021-08-13 RX ADMIN — ATORVASTATIN CALCIUM 40 MILLIGRAM(S): 80 TABLET, FILM COATED ORAL at 22:27

## 2021-08-13 RX ADMIN — GABAPENTIN 100 MILLIGRAM(S): 400 CAPSULE ORAL at 22:27

## 2021-08-13 RX ADMIN — Medication 650 MILLIGRAM(S): at 23:49

## 2021-08-13 RX ADMIN — Medication 0.1 MILLIGRAM(S): at 22:27

## 2021-08-13 RX ADMIN — Medication 0.4 MILLIGRAM(S): at 16:39

## 2021-08-13 RX ADMIN — LATANOPROST 1 DROP(S): 0.05 SOLUTION/ DROPS OPHTHALMIC; TOPICAL at 23:50

## 2021-08-13 NOTE — CHART NOTE - NSCHARTNOTEFT_GEN_A_CORE
Patient is s/p cardiac cath.   w/d w/n morbidly obese elderly black female found sitting in bed eating snacks, watching TV & talking on her cell phone A & O x 3 in NAD.    Patient c/o mild headache without any associated symptoms.   Right femoral artery access site  dressing intact, clean / dry,  no hematoma, no active bleeding noted.  Lower extremity warm to touch, 2++ ankle edema noted, DP was + by doppler.  BP on routine vitals was 190/85 mmHg - pt due for Hydralazine 100 mg PO & Clonidine 0.1 mg PO, Labetalol 200 mg PO to be given as ordered   Tylenol given for headache   Plan d/w Ashley ANDUJAR   pt hemodynamically stable will reassess        Vital Signs Last 24 Hrs  T(C): 36.9 (13 Aug 2021 22:25), Max: 36.9 (13 Aug 2021 15:48)  T(F): 98.4 (13 Aug 2021 22:25), Max: 98.5 (13 Aug 2021 15:48)  HR: 75 (13 Aug 2021 22:25) (67 - 75)  BP: 190/85 (13 Aug 2021 22:25) (138/66 - 190/85)-  RR: 18 (13 Aug 2021 22:25) (18 - 20)  SpO2: 100% (13 Aug 2021 22:25) (98% - 100%)    MEDICATIONS  (STANDING):  acetaminophen   Tablet .. 650 milliGRAM(s) Oral once  amLODIPine   Tablet 10 milliGRAM(s) Oral daily  aspirin enteric coated 81 milliGRAM(s) Oral daily  atorvastatin 40 milliGRAM(s) Oral at bedtime  cloNIDine 0.1 milliGRAM(s) Oral three times a day  dextrose 40% Gel 15 Gram(s) Oral once  dextrose 5%. 1000 milliLiter(s) (100 mL/Hr) IV Continuous <Continuous>  dextrose 5%. 1000 milliLiter(s) (50 mL/Hr) IV Continuous <Continuous>  dextrose 50% Injectable 25 Gram(s) IV Push once  dextrose 50% Injectable 12.5 Gram(s) IV Push once  dextrose 50% Injectable 25 Gram(s) IV Push once  gabapentin 100 milliGRAM(s) Oral daily  glucagon  Injectable 1 milliGRAM(s) IntraMuscular once  hydrALAZINE 100 milliGRAM(s) Oral three times a day  insulin lispro (ADMELOG) corrective regimen sliding scale   SubCutaneous three times a day before meals  insulin lispro (ADMELOG) corrective regimen sliding scale   SubCutaneous at bedtime  labetalol 200 milliGRAM(s) Oral three times a day  latanoprost 0.005% Ophthalmic Solution 1 Drop(s) Both EYES at bedtime  levETIRAcetam 1000 milliGRAM(s) Oral <User Schedule>  levothyroxine 75 MICROGram(s) Oral daily  OXcarbazepine 300 milliGRAM(s) Oral two times a day  pantoprazole    Tablet 40 milliGRAM(s) Oral before breakfast    MEDICATIONS  (PRN):

## 2021-08-13 NOTE — CHART NOTE - NSCHARTNOTEFT_GEN_A_CORE
Spoke with Jeremías Flores(interventionalist cardiologist who did the procedure) and he recommended to restart patient's home dose of Eliquis tomorrow 8/14 in the morning if right groin site is stable and CT head negative.

## 2021-08-13 NOTE — ED PROVIDER NOTE - ATTENDING CONTRIBUTION TO CARE
72 y/o F with PMH  HTN, HLD, Obesity, diabetes,  seizures, CAD s/p stent p/w chest pain. Pt w/ acute onset chest pain pressure like 8/10 located under the left breast w/ radiation to her shoulder accompanied by diaphoresis and vomiting. Pt states she was given 2 aspirin by ems. She reports some sob with the chest pain. She denies fever, chills, nausea, vomiting, abdominal pain.   denies fever, chills, +chest pain, +SOB, abdominal pain, diarrhea, dysuria, syncope, bleeding, new rash,weakness, numbness, blurred vision  +/n/v  ROS  otherwise negative as per HPI  Gen: Awake, Alert, WD, WN, NAD  Head:  NC/AT  Eyes:  PERRL, EOMI, Conjunctiva pink, lids normal, no scleral icterus  ENT: OP clear, no exudates, no erythema,, moist mucus membranes  Neck: supple, nontender, no meningismus, no JVD, trachea midline  Cardiac/CV:  S1 S2, RRR, no M/G/R  Respiratory/Pulm:  CTAB, good air movement, normal resp effort, no wheezes/stridor/retractions/rales/rhonchi  Gastrointestinal/Abdomen:  Soft, obese  nontender, nondistended, +BS, no rebound/guarding  Back:  no CVAT, no MLT  Ext:  warm, well perfused, moving all extremities spontaneously, no peripheral edema, distal pulses intact  Skin: intact, no rash  Neuro:  AAOx3, sensation intact, motor 5/5 x 4 extremities, speech clear  MDM as above

## 2021-08-13 NOTE — ED ADULT NURSE REASSESSMENT NOTE - NS ED NURSE REASSESS COMMENT FT1
Report given to cath lab CON Coreas pt in NAD, pt attached to zoll, to be taken to Ct scan then recovery room of cath lab.

## 2021-08-13 NOTE — ED PROVIDER NOTE - ST/T WAVE
t wave inversions v4-v6 T wave t wave inversions v4-v6, AVL St depression on AVL, T wave flattening V4-V6

## 2021-08-13 NOTE — ED PROVIDER NOTE - PROGRESS NOTE DETAILS
Shane Johnson MD:  History concerning for ACS, EKG showing new Lateral lead t wave inversions, AVL inversion. Repeat EKG being acquired, cardiology consulted.

## 2021-08-13 NOTE — CONSULT NOTE ADULT - SUBJECTIVE AND OBJECTIVE BOX
Patient is a 71y old  Female who presents with a chief complaint of     HPI:  71 year old woman with PMH CAD s/p PCI x5 ten years ago (no longer on antiplatelets); last MI 2 years ago, HTN, HLD, DM2, NACHO, SDH s/p crani s/b seizures presents for chest pain that started this morning. Patient experienced sudden onset chest pain at 9 AM that was substernal and nonradiating and not reproduced on palpation. The pain was associated with diaphoresis, N/V that persisted for 20 minutes so she presented to the hospital.       PAST MEDICAL & SURGICAL HISTORY:  CAD (Coronary Artery Disease) (ICD9 414.00)  5 STENTS    HTN (Hypertension) (ICD9 401.9)    Dyslipidemia (ICD9 272.4)    DM (Diabetes Mellitus) (ICD9 250.00)    NACHO (obstructive sleep apnea)  sleep study done 5 years ago    Obesity    Hypothyroid    HTN (hypertension)    Diabetes    Seizure    Headache    High cholesterol    C Section  x 1    S/P primary angioplasty with coronary stent  has 5 stents-first placed in 2007, last stent -2011,    History of ovarian cystectomy  x 2 in 2013    History of hysterectomy              ECHO  FINDINGS:      MEDICATIONS  (STANDING):    MEDICATIONS  (PRN):      FAMILY HISTORY:  Family history of heart disease (Aunt)            SOCIAL HISTORY:    CIGARETTES:    ALCOHOL:  Vital Signs Last 24 Hrs  T(C): 36.9 (13 Aug 2021 15:48), Max: 36.9 (13 Aug 2021 15:48)  T(F): 98.5 (13 Aug 2021 15:48), Max: 98.5 (13 Aug 2021 15:48)  HR: 75 (13 Aug 2021 15:48) (73 - 75)  BP: 152/71 (13 Aug 2021 15:48) (138/66 - 152/71)  BP(mean): --  RR: 20 (13 Aug 2021 15:48) (18 - 20)  SpO2: 100% (13 Aug 2021 15:48) (98% - 100%)    PHYSICAL EXAM:      Constitutional:    Eyes:    ENMT:    Neck:    Breasts:    Back:    Respiratory:    Cardiovascular:    Gastrointestinal:    Genitourinary:    Rectal:    Extremities:    Vascular:    Neurological:    Skin:    Lymph Nodes:    Musculoskeletal:    Psychiatric:        ECG:    I&O's Detail      LABS:                        11.2   8.15  )-----------( 269      ( 13 Aug 2021 16:36 )             37.8               PT/INR - ( 13 Aug 2021 16:36 )   PT: 16.0 sec;   INR: 1.43 ratio         PTT - ( 13 Aug 2021 16:36 )  PTT:28.9 sec    I&O's Summary    BNP  RADIOLOGY & ADDITIONAL STUDIES: Patient is a 71y old  Female who presents with a chief complaint of     HPI:  71 year old woman with PMH CAD s/p PCI x5 ten years ago (no longer on antiplatelets); last MI 2 years ago, atrial fibrillation (on eliquis), 2nd degree AVB, HTN, HLD, DM2, NACHO, COPD, hypothyroidism, 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 Patient experienced sudden onset chest pain at 9 AM that was substernal and non-radiating and not reproduced on palpation. The pain was associated with diaphoresis, N/V that persisted for 20 minutes so she presented to the hospital.       PAST MEDICAL & SURGICAL HISTORY:  CAD (Coronary Artery Disease) (ICD9 414.00)  5 STENTS    HTN (Hypertension) (ICD9 401.9)    Dyslipidemia (ICD9 272.4)    DM (Diabetes Mellitus) (ICD9 250.00)    NACHO (obstructive sleep apnea)  sleep study done 5 years ago    Obesity    Hypothyroid    HTN (hypertension)    Diabetes    Seizure    Headache    High cholesterol    C Section  x 1    S/P primary angioplasty with coronary stent  has 5 stents-first placed in 2007, last stent -2011,    History of ovarian cystectomy  x 2 in 2013    History of hysterectomy              ECHO  FINDINGS:      MEDICATIONS  (STANDING):    MEDICATIONS  (PRN):      FAMILY HISTORY:  Family history of heart disease (Aunt)            SOCIAL HISTORY:    CIGARETTES:    ALCOHOL:  Vital Signs Last 24 Hrs  T(C): 36.9 (13 Aug 2021 15:48), Max: 36.9 (13 Aug 2021 15:48)  T(F): 98.5 (13 Aug 2021 15:48), Max: 98.5 (13 Aug 2021 15:48)  HR: 75 (13 Aug 2021 15:48) (73 - 75)  BP: 152/71 (13 Aug 2021 15:48) (138/66 - 152/71)  BP(mean): --  RR: 20 (13 Aug 2021 15:48) (18 - 20)  SpO2: 100% (13 Aug 2021 15:48) (98% - 100%)    Vital Signs Last 24 Hrs  T(C): 36.9 (13 Aug 2021 15:48), Max: 36.9 (13 Aug 2021 15:48)  T(F): 98.5 (13 Aug 2021 15:48), Max: 98.5 (13 Aug 2021 15:48)  HR: 75 (13 Aug 2021 15:48) (73 - 75)  BP: 152/71 (13 Aug 2021 15:48) (138/66 - 152/71)  BP(mean): --  RR: 20 (13 Aug 2021 15:48) (18 - 20)  SpO2: 100% (13 Aug 2021 15:48) (98% - 100%)    CONSTITUTIONAL: obese, in mild distress  EYES: PERRLA; conjunctiva and sclera clear  ENMT: Moist oral mucosa, no pharyngeal injection or exudates; normal dentition  RESPIRATORY: scattered wheezing, no crackles  CARDIOVASCULAR: Regular rate and rhythm, normal S1 and S2, no murmur/rub/gallop; 2+ extremity edema; Peripheral pulses are 2+ bilaterally  ABDOMEN: +distended, Nontender to palpation, normoactive bowel sounds, no rebound/guarding; No hepatosplenomegaly  PSYCH: A+O to person, place, and time; affect appropriate  NEUROLOGY: no focal deficits  SKIN: No rashes; no palpable lesions      ECG: ST depression aVL, t wave flattening/inversions in V4-V6 in EKG 1, resolved in EKG 2    I&O's Detail      LABS:                        11.2   8.15  )-----------( 269      ( 13 Aug 2021 16:36 )             37.8       08-13    141  |  106  |  15  ----------------------------<  107<H>  4.9   |  21<L>  |  0.85    Ca    9.3      13 Aug 2021 16:36    TPro  7.9  /  Alb  4.1  /  TBili  <0.2  /  DBili  x   /  AST  34<H>  /  ALT  23  /  AlkPhos  173<H>  08-13    PT/INR - ( 13 Aug 2021 16:36 )   PT: 16.0 sec;   INR: 1.43 ratio         PTT - ( 13 Aug 2021 16:36 )  PTT:28.9 sec              CAPILLARY BLOOD GLUCOSE      POCT Blood Glucose.: 111 mg/dL (13 Aug 2021 15:15)           Troponin T, High Sensitivity Result: 8  Serum Pro-Brain Natriuretic Peptide: 28        I&O's Summary    BNP  RADIOLOGY & ADDITIONAL STUDIES:

## 2021-08-13 NOTE — ED PROVIDER NOTE - CLINICAL SUMMARY MEDICAL DECISION MAKING FREE TEXT BOX
70 y/o F with PMH  HTN, HLD, Obesity, diabetes,  seizures, CAD s/p stent p/w chest pain. Pt w/ acute onset chest pain pressure like 8/10 located under the left breast w/ radiation to her shoulder accompanied by diaphoresis and vomiting. concern for acute acs, aspirin, nitro, cbc, cmp, trop, ekg, cardiology, vbg, pt/inr, cxr, admission

## 2021-08-13 NOTE — H&P CARDIOLOGY - NSICDXPASTMEDICALHX_GEN_ALL_CORE_FT
PAST MEDICAL HISTORY:  CAD (Coronary Artery Disease) (ICD9 414.00) s/p 5 stents    DM (Diabetes Mellitus) (ICD9 250.00)     Dyslipidemia (ICD9 272.4)     High cholesterol     HTN (hypertension)     HTN (Hypertension) (ICD9 401.9)     Hypothyroid     Obesity     NACHO (obstructive sleep apnea) sleep study done 5 years ago    Seizure

## 2021-08-13 NOTE — H&P CARDIOLOGY - HISTORY OF PRESENT ILLNESS
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 Patient experienced sudden onset chest pain at 9 AM today morning that was substernal and non-radiating and not reproduced on palpation. The pain was associated with diaphoresis, N/V that persisted for 20 minutes so she presented to the hospital.      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 Patient experienced sudden onset chest pain at 9 AM today morning that was substernal and non-radiating and not reproduced on palpation. The pain was associated with diaphoresis, N/V that persisted for 20 minutes so she presented to the hospital. Patient otherwise denied any other complaints such as fevers, chills, N/V/D/C, abdominal pain, dysuria, melena, hematochezia, recent travel, sick contact, cough, body aches, pleuritic or positional chest pain.     COVID PCR not detected on 8/13/21

## 2021-08-13 NOTE — ED ADULT NURSE NOTE - OBJECTIVE STATEMENT
pt aox4, ambulatory c/o sharp chest pain started this morning at 9am. pt states pain is still present but less than this morning. pt states she was sitting down when pain started. pt states EMS gave her aspirin. pt states she had an episode of diaphoresis this morning. pt on cardiac monitor NSR. 20g placed in left hand, labs sent.

## 2021-08-13 NOTE — CONSULT NOTE ADULT - ASSESSMENT
71 year old woman with PMH CAD s/p PCIx5, last MI 2019, HTN, DM2, HLD, NCSE s/p brain biopsy, COPD, hypothyroidism presented for acute chest pain with N/V, diaphoresis. Pain has improved from 10/10 to 8/10 and EKG findings have normalized. MAR 4, NADIRA 103. S/p ASA, nitro, Brilinta. Suspect NSTE-ACS (unstable angina)    Recs:   -c/w ACS protocol: asa, brilinta  -trend troponin q3 hrs  -EKG stat for chest pain  -plan for urgent C tonight    NOTE INCOMPLETE UNTIL SIGNED BY ATTENDING

## 2021-08-14 ENCOUNTER — TRANSCRIPTION ENCOUNTER (OUTPATIENT)
Age: 71
End: 2021-08-14

## 2021-08-14 VITALS
OXYGEN SATURATION: 98 % | RESPIRATION RATE: 18 BRPM | HEART RATE: 65 BPM | DIASTOLIC BLOOD PRESSURE: 61 MMHG | SYSTOLIC BLOOD PRESSURE: 121 MMHG | TEMPERATURE: 98 F

## 2021-08-14 LAB
A1C WITH ESTIMATED AVERAGE GLUCOSE RESULT: 7.8 % — HIGH (ref 4–5.6)
ALBUMIN SERPL ELPH-MCNC: 3.8 G/DL — SIGNIFICANT CHANGE UP (ref 3.3–5)
ALP SERPL-CCNC: 163 U/L — HIGH (ref 40–120)
ALT FLD-CCNC: 18 U/L — SIGNIFICANT CHANGE UP (ref 4–33)
ANION GAP SERPL CALC-SCNC: 12 MMOL/L — SIGNIFICANT CHANGE UP (ref 7–14)
AST SERPL-CCNC: 18 U/L — SIGNIFICANT CHANGE UP (ref 4–32)
BILIRUB SERPL-MCNC: 0.2 MG/DL — SIGNIFICANT CHANGE UP (ref 0.2–1.2)
BUN SERPL-MCNC: 12 MG/DL — SIGNIFICANT CHANGE UP (ref 7–23)
CALCIUM SERPL-MCNC: 9.2 MG/DL — SIGNIFICANT CHANGE UP (ref 8.4–10.5)
CHLORIDE SERPL-SCNC: 107 MMOL/L — SIGNIFICANT CHANGE UP (ref 98–107)
CHOLEST SERPL-MCNC: 154 MG/DL — SIGNIFICANT CHANGE UP
CO2 SERPL-SCNC: 23 MMOL/L — SIGNIFICANT CHANGE UP (ref 22–31)
COVID-19 SPIKE DOMAIN AB INTERP: POSITIVE
COVID-19 SPIKE DOMAIN ANTIBODY RESULT: >250 U/ML — HIGH
CREAT SERPL-MCNC: 0.82 MG/DL — SIGNIFICANT CHANGE UP (ref 0.5–1.3)
ESTIMATED AVERAGE GLUCOSE: 177 — SIGNIFICANT CHANGE UP
GLUCOSE BLDC GLUCOMTR-MCNC: 144 MG/DL — HIGH (ref 70–99)
GLUCOSE BLDC GLUCOMTR-MCNC: 207 MG/DL — HIGH (ref 70–99)
GLUCOSE SERPL-MCNC: 141 MG/DL — HIGH (ref 70–99)
HCT VFR BLD CALC: 35.4 % — SIGNIFICANT CHANGE UP (ref 34.5–45)
HDLC SERPL-MCNC: 49 MG/DL — LOW
HGB BLD-MCNC: 10.8 G/DL — LOW (ref 11.5–15.5)
LIPID PNL WITH DIRECT LDL SERPL: 91 MG/DL — SIGNIFICANT CHANGE UP
MCHC RBC-ENTMCNC: 22.5 PG — LOW (ref 27–34)
MCHC RBC-ENTMCNC: 30.5 GM/DL — LOW (ref 32–36)
MCV RBC AUTO: 73.9 FL — LOW (ref 80–100)
NON HDL CHOLESTEROL: 105 MG/DL — SIGNIFICANT CHANGE UP
NRBC # BLD: 0 /100 WBCS — SIGNIFICANT CHANGE UP
NRBC # FLD: 0 K/UL — SIGNIFICANT CHANGE UP
PLATELET # BLD AUTO: 245 K/UL — SIGNIFICANT CHANGE UP (ref 150–400)
POTASSIUM SERPL-MCNC: 3.8 MMOL/L — SIGNIFICANT CHANGE UP (ref 3.5–5.3)
POTASSIUM SERPL-SCNC: 3.8 MMOL/L — SIGNIFICANT CHANGE UP (ref 3.5–5.3)
PROT SERPL-MCNC: 7.2 G/DL — SIGNIFICANT CHANGE UP (ref 6–8.3)
RBC # BLD: 4.79 M/UL — SIGNIFICANT CHANGE UP (ref 3.8–5.2)
RBC # FLD: 16.6 % — HIGH (ref 10.3–14.5)
SARS-COV-2 IGG+IGM SERPL QL IA: >250 U/ML — HIGH
SARS-COV-2 IGG+IGM SERPL QL IA: POSITIVE
SODIUM SERPL-SCNC: 142 MMOL/L — SIGNIFICANT CHANGE UP (ref 135–145)
TRIGL SERPL-MCNC: 72 MG/DL — SIGNIFICANT CHANGE UP
WBC # BLD: 6.37 K/UL — SIGNIFICANT CHANGE UP (ref 3.8–10.5)
WBC # FLD AUTO: 6.37 K/UL — SIGNIFICANT CHANGE UP (ref 3.8–10.5)

## 2021-08-14 PROCEDURE — 99238 HOSP IP/OBS DSCHRG MGMT 30/<: CPT

## 2021-08-14 RX ORDER — APIXABAN 2.5 MG/1
5 TABLET, FILM COATED ORAL EVERY 12 HOURS
Refills: 0 | Status: DISCONTINUED | OUTPATIENT
Start: 2021-08-14 | End: 2021-08-14

## 2021-08-14 RX ORDER — INSULIN GLARGINE AND LIXISENATIDE 100; 33 U/ML; UG/ML
0 INJECTION, SOLUTION SUBCUTANEOUS
Qty: 0 | Refills: 0 | DISCHARGE

## 2021-08-14 RX ADMIN — Medication 100 MILLIGRAM(S): at 07:28

## 2021-08-14 RX ADMIN — Medication 75 MICROGRAM(S): at 07:00

## 2021-08-14 RX ADMIN — Medication 81 MILLIGRAM(S): at 11:49

## 2021-08-14 RX ADMIN — Medication 200 MILLIGRAM(S): at 07:00

## 2021-08-14 RX ADMIN — APIXABAN 5 MILLIGRAM(S): 2.5 TABLET, FILM COATED ORAL at 11:48

## 2021-08-14 RX ADMIN — OXCARBAZEPINE 300 MILLIGRAM(S): 300 TABLET, FILM COATED ORAL at 07:00

## 2021-08-14 RX ADMIN — LEVETIRACETAM 1000 MILLIGRAM(S): 250 TABLET, FILM COATED ORAL at 07:29

## 2021-08-14 RX ADMIN — Medication 0.1 MILLIGRAM(S): at 07:29

## 2021-08-14 RX ADMIN — Medication 650 MILLIGRAM(S): at 00:49

## 2021-08-14 RX ADMIN — Medication 10 MILLIGRAM(S): at 07:00

## 2021-08-14 RX ADMIN — PANTOPRAZOLE SODIUM 40 MILLIGRAM(S): 20 TABLET, DELAYED RELEASE ORAL at 07:01

## 2021-08-14 RX ADMIN — Medication 10 MILLIGRAM(S): at 11:49

## 2021-08-14 RX ADMIN — AMLODIPINE BESYLATE 10 MILLIGRAM(S): 2.5 TABLET ORAL at 07:01

## 2021-08-14 RX ADMIN — GABAPENTIN 100 MILLIGRAM(S): 400 CAPSULE ORAL at 11:49

## 2021-08-14 RX ADMIN — Medication 2: at 12:21

## 2021-08-14 NOTE — PROGRESS NOTE ADULT - ASSESSMENT
71 year old woman with PMH CAD s/p PCIx5, last MI 2019, HTN, DM2, HLD, NCSE s/p brain biopsy, COPD, hypothyroidism p/w CP, found to have non-obstructive CAD on C.    #non-obs CAD  -unlikely etiology for CP  -right femoral access site neurovascularly intact  -c/w ASA 81mg daily  -c/w atorvastatin 40mg qHS  -consider increasing enalapril 20mg PO daily given poorly controlled BP  -c/w labetalol, hydralazine, and amlodipine  -groin precautions (no driving 3 days, no heavy lifting 2 weeks, no swimming or baths 2 weeks)  -no cardiac contraindications for discharge, have patient follow up outpatient cards 2-4 weeks from discharge    Jaron Felder MD  Cardiology Fellow - PGY 6  For all New Consults and Questions:  [email or web address]   Login: Gather

## 2021-08-14 NOTE — PROGRESS NOTE ADULT - ATTENDING COMMENTS
Stable for d/c today.    Plan as outlined above. Likely accelerated HTN as cause of angina. Needs careful outpt f/u.

## 2021-08-14 NOTE — DISCHARGE NOTE PROVIDER - CARE PROVIDER_API CALL
Jose Aguilar)  Cardiovascular Disease; Nuclear Cardiology  [phone] [place] [address]  Phone: [phone]  Fax: [phone]  Follow Up Time:

## 2021-08-14 NOTE — DISCHARGE NOTE PROVIDER - NSDCMRMEDTOKEN_GEN_ALL_CORE_FT
amLODIPine 10 mg oral tablet: 1 tab(s) orally once a day  apixaban 5 mg oral tablet: 1 tab(s) orally every 12 hours  aspirin 81 mg oral delayed release tablet: 1 tab(s) orally once a day  Catapres 0.1 mg oral tablet: 1 tab(s) orally 3 times a day  docusate sodium 100 mg oral capsule: 1 cap(s) orally once a day  gabapentin 100 mg oral capsule: 1 cap(s) orally once a day  hydrALAZINE 100 mg oral tablet: 1 tab(s) orally 3 times a day  Jardiance 10 mg oral tablet: 1 tab(s) orally once a day (in the morning)  labetalol 200 mg oral tablet: 1 tab(s) orally 3 times a day  levETIRAcetam 1000 mg oral tablet: 1 tab(s) orally 3 times a day   levothyroxine 50 mcg (0.05 mg) oral tablet: 1.5 tab(s) orally once a day   Lipitor 40 mg oral tablet: 1 tab(s) orally once a day  Lumigan 0.01% ophthalmic solution: 1 drop(s) to each affected eye once a day (in the evening)  OXcarbazepine 300 mg oral tablet: 1 tab(s) orally 2 times a day  pantoprazole 40 mg oral delayed release tablet: 1 tab(s) orally once a day (before a meal)  Soliqua 100/33 subcutaneous solution: Use Prior Home Dose  Vasotec 20 mg oral tablet: 1 tab(s) orally once a day

## 2021-08-14 NOTE — DISCHARGE NOTE PROVIDER - HOSPITAL COURSE
71 year old woman with PMH CAD s/p PCIx5, last MI 2019, HTN, DM2, HLD, NCSE s/p brain biopsy, COPD, hypothyroidism p/w CP, found to have non-obstructive CAD on C.    #non-obs CAD  -unlikely etiology for CP  -right femoral access site neurovascularly intact  -c/w ASA 81mg daily  -c/w atorvastatin 40mg qHS  -consider increasing enalapril 20mg PO daily given poorly controlled BP  -c/w labetalol, hydralazine, and amlodipine  -groin precautions (no driving 3 days, no heavy lifting 2 weeks, no swimming or baths 2 weeks)  -no cardiac contraindications for discharge, have patient follow up outpatient cards 2-4 weeks from discharge

## 2021-08-14 NOTE — DISCHARGE NOTE NURSING/CASE MANAGEMENT/SOCIAL WORK - PATIENT PORTAL LINK FT
You can access the FollowMyHealth Patient Portal offered by [facility] by registering at the following website: [email or web address]. By joining "[employer]’s FollowMyHealth portal, you will also be able to view your health information using other applications (apps) compatible with our system.

## 2021-08-14 NOTE — DISCHARGE NOTE PROVIDER - CARE PROVIDERS DIRECT ADDRESSES
[email or web address]

## 2021-08-14 NOTE — PROGRESS NOTE ADULT - SUBJECTIVE AND OBJECTIVE BOX
Patient seen and examined at bedside.    Overnight Events:  no events overnight.  feels well. mild groin pain w/ palpation.    Review Of Systems: No chest pain, shortness of breath, or palpitations            Current Meds:  amLODIPine   Tablet 10 milliGRAM(s) Oral daily  aspirin enteric coated 81 milliGRAM(s) Oral daily  atorvastatin 40 milliGRAM(s) Oral at bedtime  cloNIDine 0.1 milliGRAM(s) Oral three times a day  dextrose 40% Gel 15 Gram(s) Oral once  dextrose 5%. 1000 milliLiter(s) IV Continuous <Continuous>  dextrose 5%. 1000 milliLiter(s) IV Continuous <Continuous>  dextrose 50% Injectable 25 Gram(s) IV Push once  dextrose 50% Injectable 12.5 Gram(s) IV Push once  dextrose 50% Injectable 25 Gram(s) IV Push once  enalapril 10 milliGRAM(s) Oral daily  gabapentin 100 milliGRAM(s) Oral daily  glucagon  Injectable 1 milliGRAM(s) IntraMuscular once  hydrALAZINE 100 milliGRAM(s) Oral three times a day  insulin lispro (ADMELOG) corrective regimen sliding scale   SubCutaneous three times a day before meals  insulin lispro (ADMELOG) corrective regimen sliding scale   SubCutaneous at bedtime  labetalol 200 milliGRAM(s) Oral three times a day  latanoprost 0.005% Ophthalmic Solution 1 Drop(s) Both EYES at bedtime  levETIRAcetam 1000 milliGRAM(s) Oral <User Schedule>  levothyroxine 75 MICROGram(s) Oral daily  OXcarbazepine 300 milliGRAM(s) Oral two times a day  pantoprazole    Tablet 40 milliGRAM(s) Oral before breakfast      Vitals:  T(F): 98 (08-14), Max: 98.5 (08-13)  HR: 71 (08-14) (67 - 84)  BP: 148/75 (08-14) (138/66 - 190/85)  RR: 18 (08-14)  SpO2: 99% (08-14)  I&O's Summary      Physical Exam:  Appearance: No acute distress; well appearing  Eyes: PERRL, EOMI, pink conjunctiva  HEENT: Normal oral mucosa  Cardiovascular: RRR, S1, S2, no murmurs, rubs, or gallops; no edema; no JVD  Respiratory: Clear to auscultation bilaterally  Gastrointestinal: soft, non-tender, non-distended with normal bowel sounds  Musculoskeletal: No clubbing; no joint deformity   Neurologic: Non-focal  Lymphatic: No lymphadenopathy  Psychiatry: AAOx3, mood & affect appropriate  Skin: No rashes, ecchymoses, or cyanosis  RFA site:  2+ femoral pulse, no ecchymosis or hematoma, no audible bruit, 2+ DP pulse, motor and sensory intact, WWP distal extremity                          10.8   6.37  )-----------( 245      ( 14 Aug 2021 07:43 )             35.4     08-13    141  |  106  |  15  ----------------------------<  107<H>  4.9   |  21<L>  |  0.85    Ca    9.3      13 Aug 2021 16:36    TPro  7.9  /  Alb  4.1  /  TBili  <0.2  /  DBili  x   /  AST  34<H>  /  ALT  23  /  AlkPhos  173<H>  08-13    PT/INR - ( 13 Aug 2021 16:36 )   PT: 16.0 sec;   INR: 1.43 ratio         PTT - ( 13 Aug 2021 16:36 )  PTT:28.9 sec  CARDIAC MARKERS ( 13 Aug 2021 16:36 )  8 ng/L / x     / x     / x     / x     / x          Serum Pro-Brain Natriuretic Peptide: 28 pg/mL (08-13 @ 16:36)      Interpretation of Telemetry:  vagal mediated AVB, otherwise NSR

## 2021-08-14 NOTE — DISCHARGE NOTE PROVIDER - NSDCCPCAREPLAN_GEN_ALL_CORE_FT
PRINCIPAL DISCHARGE DIAGNOSIS  Diagnosis: CAD (coronary artery disease)  Assessment and Plan of Treatment: You had an angiogram which showed Non Obstructive Coronary artery Disease   You Need to Continue withYour Curent Meds and Follow up with Your Cardiologist in 1-2 weeks

## 2021-08-16 ENCOUNTER — APPOINTMENT (OUTPATIENT)
Dept: OPHTHALMOLOGY | Facility: CLINIC | Age: 71
End: 2021-08-16
Payer: MEDICARE

## 2021-08-16 ENCOUNTER — NON-APPOINTMENT (OUTPATIENT)
Age: 71
End: 2021-08-16

## 2021-08-16 PROCEDURE — 92012 INTRM OPH EXAM EST PATIENT: CPT

## 2021-08-20 LAB — LEVETIRACETAM SERPL-MCNC: 20.4 UG/ML — SIGNIFICANT CHANGE UP (ref 10–40)

## 2022-02-10 ENCOUNTER — NON-APPOINTMENT (OUTPATIENT)
Age: 72
End: 2022-02-10

## 2022-02-10 ENCOUNTER — APPOINTMENT (OUTPATIENT)
Dept: OPHTHALMOLOGY | Facility: CLINIC | Age: 72
End: 2022-02-10
Payer: MEDICARE

## 2022-02-10 PROCEDURE — 92083 EXTENDED VISUAL FIELD XM: CPT

## 2022-02-10 PROCEDURE — 92133 CPTRZD OPH DX IMG PST SGM ON: CPT

## 2022-02-10 PROCEDURE — 92012 INTRM OPH EXAM EST PATIENT: CPT

## 2022-02-17 NOTE — ED PROVIDER NOTE - PMH
CAD (Coronary Artery Disease) (ICD9 414.00)  c STENTS  DM (Diabetes Mellitus) (ICD9 250.00)    Dyslipidemia (ICD9 272.4)    HTN (Hypertension) (ICD9 401.9)    Hypothyroid    Obesity    NACHO (obstructive sleep apnea)  sleep study done 5 years ago
I performed a history and physical exam of the patient and discussed their management with the resident and /or advanced care provider. I reviewed the resident and /or ACP's note and agree with the documented findings and plan of care. My medical decision making and observations are found above.  Lungs clear, abd soft
26-Jun-2017
 used

## 2022-03-16 NOTE — ED PROVIDER NOTE - OBJECTIVE STATEMENT
71 Y f H/O X4 stents (>10 years ago?), Last MI 2 years ago, HTN, HCL, DM, obesity, presenting with sudden onset chest pain starting at 9 am. After standing from breakfast experienced sudden onset chest pain, central chest pressure, associated with diaphoresis, vomiting, persistent chest pain continuing until presentation to ED
Patient requests all Lab, Cardiology, and Radiology Results on their Discharge Instructions

## 2022-04-01 ENCOUNTER — EMERGENCY (EMERGENCY)
Facility: HOSPITAL | Age: 72
LOS: 1 days | Discharge: HOME | End: 2022-04-01
Attending: EMERGENCY MEDICINE | Admitting: EMERGENCY MEDICINE
Payer: MEDICARE

## 2022-04-01 VITALS
SYSTOLIC BLOOD PRESSURE: 141 MMHG | RESPIRATION RATE: 16 BRPM | DIASTOLIC BLOOD PRESSURE: 70 MMHG | OXYGEN SATURATION: 99 % | HEART RATE: 79 BPM

## 2022-04-01 VITALS
OXYGEN SATURATION: 99 % | SYSTOLIC BLOOD PRESSURE: 161 MMHG | HEIGHT: 65 IN | HEART RATE: 81 BPM | DIASTOLIC BLOOD PRESSURE: 88 MMHG | RESPIRATION RATE: 16 BRPM | TEMPERATURE: 98 F

## 2022-04-01 DIAGNOSIS — Z98.89 OTHER SPECIFIED POSTPROCEDURAL STATES: Chronic | ICD-10-CM

## 2022-04-01 DIAGNOSIS — Z90.710 ACQUIRED ABSENCE OF BOTH CERVIX AND UTERUS: Chronic | ICD-10-CM

## 2022-04-01 PROCEDURE — 99284 EMERGENCY DEPT VISIT MOD MDM: CPT

## 2022-04-01 NOTE — ED PROVIDER NOTE - OBJECTIVE STATEMENT
71 yr old F c complex PMHx incl CAD, HTN, HLD, seizure d/o who p/w HTN.  States her SBP was around 190 at home and she called her PMD who told her to come to ED.  She denies symptoms at any time. NO HA, no vision changes. No CP or SOB.  No N/V/D.  Ambulating well.  HAs been in USOH and taking all meds.   Triage BP much lower.

## 2022-04-01 NOTE — ED PROVIDER NOTE - PATIENT PORTAL LINK FT
You can access the FollowMyHealth Patient Portal offered by [facility] by registering at the following website: [email or web address]. By joining [employer]’s FollowMyHealth portal, you will also be able to view your health information using other applications (apps) compatible with our system. [Fatigue] : fatigue [Anxiety] : anxiety [Negative] : Heme/Lymph [Hesitancy] : hesitancy [Nocturia] : nocturia [Frequency] : frequency [Recent Change In Weight] : ~T no recent weight change [Chest Pain] : no chest pain [Palpitations] : no palpitations [Claudication] : no  leg claudication [Lower Ext Edema] : no lower extremity edema [Shortness Of Breath] : no shortness of breath [Wheezing] : no wheezing [Cough] : no cough [Dyspnea on Exertion] : not dyspnea on exertion [Dysuria] : no dysuria [Incontinence] : no incontinence [Hematuria] : no hematuria [Skin Rash] : no skin rash [Suicidal] : not suicidal [Insomnia] : no insomnia [Depression] : no depression [Swollen Glands] : no swollen glands

## 2022-04-01 NOTE — ED PROVIDER NOTE - CLINICAL SUMMARY MEDICAL DECISION MAKING FREE TEXT BOX
Pt c complex PMHx p/w reported elevated BP at home and no complaints. SBP in triage 160s. BP in room now 140/70 and she remains asympt.  Normal physical exam. Will dc c PMD f/u.

## 2022-04-01 NOTE — ED PROVIDER NOTE - NSFOLLOWUPINSTRUCTIONS_ED_ALL_ED_FT
Please continue taking your medications and call your doctor for a follow up appointment.     Return to the ER for chest pain, fevers, headaches, or other concerning signs.

## 2022-04-26 NOTE — PRE-ANESTHESIA EVALUATION ADULT - BP NONINVASIVE DIASTOLIC (MM HG)
Patient has an upcoming appointment with Dr. Daley on April 28th, RONAL ramirez. She thinks she has a sinus infection and is experiencing persistent coughs and sneezing. This has been going on for a few days now. She would like to know what she can do or take for the time being until the day of her appointment. Please call patient back at [phone]. Thank you!   82

## 2022-06-19 ENCOUNTER — EMERGENCY (EMERGENCY)
Facility: HOSPITAL | Age: 72
LOS: 1 days | Discharge: HOME | End: 2022-06-19
Attending: EMERGENCY MEDICINE | Admitting: EMERGENCY MEDICINE
Payer: MEDICARE

## 2022-06-19 VITALS
SYSTOLIC BLOOD PRESSURE: 144 MMHG | DIASTOLIC BLOOD PRESSURE: 70 MMHG | RESPIRATION RATE: 17 BRPM | HEART RATE: 66 BPM | OXYGEN SATURATION: 100 % | TEMPERATURE: 97 F

## 2022-06-19 VITALS
HEART RATE: 74 BPM | SYSTOLIC BLOOD PRESSURE: 216 MMHG | RESPIRATION RATE: 18 BRPM | OXYGEN SATURATION: 99 % | TEMPERATURE: 98 F | HEIGHT: 65 IN | DIASTOLIC BLOOD PRESSURE: 130 MMHG

## 2022-06-19 DIAGNOSIS — Z98.89 OTHER SPECIFIED POSTPROCEDURAL STATES: Chronic | ICD-10-CM

## 2022-06-19 DIAGNOSIS — Z90.710 ACQUIRED ABSENCE OF BOTH CERVIX AND UTERUS: Chronic | ICD-10-CM

## 2022-06-19 LAB
ALBUMIN SERPL ELPH-MCNC: 4.2 G/DL — SIGNIFICANT CHANGE UP (ref 3.3–5)
ALP SERPL-CCNC: 148 U/L — HIGH (ref 40–120)
ALT FLD-CCNC: SIGNIFICANT CHANGE UP U/L (ref 4–33)
ANION GAP SERPL CALC-SCNC: 11 MMOL/L — SIGNIFICANT CHANGE UP (ref 7–14)
AST SERPL-CCNC: 71 U/L — HIGH (ref 4–32)
BASOPHILS # BLD AUTO: 0.05 K/UL — SIGNIFICANT CHANGE UP (ref 0–0.2)
BASOPHILS NFR BLD AUTO: 0.7 % — SIGNIFICANT CHANGE UP (ref 0–2)
BILIRUB SERPL-MCNC: 0.3 MG/DL — SIGNIFICANT CHANGE UP (ref 0.2–1.2)
BUN SERPL-MCNC: 15 MG/DL — SIGNIFICANT CHANGE UP (ref 7–23)
CALCIUM SERPL-MCNC: 9.1 MG/DL — SIGNIFICANT CHANGE UP (ref 8.4–10.5)
CHLORIDE SERPL-SCNC: 104 MMOL/L — SIGNIFICANT CHANGE UP (ref 98–107)
CO2 SERPL-SCNC: 24 MMOL/L — SIGNIFICANT CHANGE UP (ref 22–31)
CREAT SERPL-MCNC: 0.71 MG/DL — SIGNIFICANT CHANGE UP (ref 0.5–1.3)
EGFR: 91 ML/MIN/1.73M2 — SIGNIFICANT CHANGE UP
EOSINOPHIL # BLD AUTO: 0.11 K/UL — SIGNIFICANT CHANGE UP (ref 0–0.5)
EOSINOPHIL NFR BLD AUTO: 1.5 % — SIGNIFICANT CHANGE UP (ref 0–6)
GLUCOSE SERPL-MCNC: 132 MG/DL — HIGH (ref 70–99)
HCT VFR BLD CALC: 37.8 % — SIGNIFICANT CHANGE UP (ref 34.5–45)
HGB BLD-MCNC: 11.5 G/DL — SIGNIFICANT CHANGE UP (ref 11.5–15.5)
IANC: 4.53 K/UL — SIGNIFICANT CHANGE UP (ref 1.8–7.4)
IMM GRANULOCYTES NFR BLD AUTO: 0.3 % — SIGNIFICANT CHANGE UP (ref 0–1.5)
LYMPHOCYTES # BLD AUTO: 2.29 K/UL — SIGNIFICANT CHANGE UP (ref 1–3.3)
LYMPHOCYTES # BLD AUTO: 30.7 % — SIGNIFICANT CHANGE UP (ref 13–44)
MCHC RBC-ENTMCNC: 23.3 PG — LOW (ref 27–34)
MCHC RBC-ENTMCNC: 30.4 GM/DL — LOW (ref 32–36)
MCV RBC AUTO: 76.5 FL — LOW (ref 80–100)
MONOCYTES # BLD AUTO: 0.46 K/UL — SIGNIFICANT CHANGE UP (ref 0–0.9)
MONOCYTES NFR BLD AUTO: 6.2 % — SIGNIFICANT CHANGE UP (ref 2–14)
NEUTROPHILS # BLD AUTO: 4.53 K/UL — SIGNIFICANT CHANGE UP (ref 1.8–7.4)
NEUTROPHILS NFR BLD AUTO: 60.6 % — SIGNIFICANT CHANGE UP (ref 43–77)
NRBC # BLD: 0 /100 WBCS — SIGNIFICANT CHANGE UP
NRBC # FLD: 0 K/UL — SIGNIFICANT CHANGE UP
PLATELET # BLD AUTO: 296 K/UL — SIGNIFICANT CHANGE UP (ref 150–400)
POTASSIUM SERPL-MCNC: SIGNIFICANT CHANGE UP MMOL/L (ref 3.5–5.3)
POTASSIUM SERPL-SCNC: SIGNIFICANT CHANGE UP MMOL/L (ref 3.5–5.3)
PROT SERPL-MCNC: SIGNIFICANT CHANGE UP G/DL (ref 6–8.3)
RBC # BLD: 4.94 M/UL — SIGNIFICANT CHANGE UP (ref 3.8–5.2)
RBC # FLD: 16.5 % — HIGH (ref 10.3–14.5)
SARS-COV-2 RNA SPEC QL NAA+PROBE: SIGNIFICANT CHANGE UP
SODIUM SERPL-SCNC: 139 MMOL/L — SIGNIFICANT CHANGE UP (ref 135–145)
WBC # BLD: 7.46 K/UL — SIGNIFICANT CHANGE UP (ref 3.8–10.5)
WBC # FLD AUTO: 7.46 K/UL — SIGNIFICANT CHANGE UP (ref 3.8–10.5)

## 2022-06-19 PROCEDURE — 99285 EMERGENCY DEPT VISIT HI MDM: CPT

## 2022-06-19 PROCEDURE — 70450 CT HEAD/BRAIN W/O DYE: CPT | Mod: 26,ME

## 2022-06-19 PROCEDURE — G1004: CPT

## 2022-06-19 RX ORDER — ACETAMINOPHEN 500 MG
975 TABLET ORAL ONCE
Refills: 0 | Status: COMPLETED | OUTPATIENT
Start: 2022-06-19 | End: 2022-06-19

## 2022-06-19 RX ORDER — METOCLOPRAMIDE HCL 10 MG
10 TABLET ORAL ONCE
Refills: 0 | Status: COMPLETED | OUTPATIENT
Start: 2022-06-19 | End: 2022-06-19

## 2022-06-19 RX ORDER — DEXAMETHASONE 0.5 MG/5ML
6 ELIXIR ORAL ONCE
Refills: 0 | Status: DISCONTINUED | OUTPATIENT
Start: 2022-06-19 | End: 2022-06-19

## 2022-06-19 RX ORDER — DIPHENHYDRAMINE HCL 50 MG
25 CAPSULE ORAL ONCE
Refills: 0 | Status: COMPLETED | OUTPATIENT
Start: 2022-06-19 | End: 2022-06-19

## 2022-06-19 RX ORDER — MAGNESIUM SULFATE 500 MG/ML
1 VIAL (ML) INJECTION ONCE
Refills: 0 | Status: DISCONTINUED | OUTPATIENT
Start: 2022-06-19 | End: 2022-06-19

## 2022-06-19 RX ORDER — LEVETIRACETAM 250 MG/1
1000 TABLET, FILM COATED ORAL ONCE
Refills: 0 | Status: COMPLETED | OUTPATIENT
Start: 2022-06-19 | End: 2022-06-19

## 2022-06-19 RX ORDER — LEVETIRACETAM 250 MG/1
1 TABLET, FILM COATED ORAL
Qty: 90 | Refills: 0
Start: 2022-06-19 | End: 2022-07-18

## 2022-06-19 RX ORDER — KETOROLAC TROMETHAMINE 30 MG/ML
15 SYRINGE (ML) INJECTION ONCE
Refills: 0 | Status: DISCONTINUED | OUTPATIENT
Start: 2022-06-19 | End: 2022-06-19

## 2022-06-19 RX ADMIN — LEVETIRACETAM 1000 MILLIGRAM(S): 250 TABLET, FILM COATED ORAL at 06:57

## 2022-06-19 RX ADMIN — Medication 975 MILLIGRAM(S): at 06:00

## 2022-06-19 RX ADMIN — Medication 975 MILLIGRAM(S): at 05:00

## 2022-06-19 RX ADMIN — Medication 15 MILLIGRAM(S): at 06:57

## 2022-06-19 RX ADMIN — Medication 10 MILLIGRAM(S): at 05:02

## 2022-06-19 RX ADMIN — Medication 25 MILLIGRAM(S): at 05:03

## 2022-06-19 NOTE — CONSULT NOTE ADULT - ASSESSMENT
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 Pt reports that she has had intermittent headaches for 2 years (unable to say frequency), usually L sided, since her craniotomy 2 years ago. Pt reports that her current HA is resolved, it was gradual on onset, starts over occiput, spreads to L side of face down to L jaw. Neuro exam notable for registration 3/3, recall 0/3, L occiput tenderness to palpation. Pt's headache resolved in the ED after tylenol, toradol, reglan, benadryl. CTH neg.     Impression: L sided HA now resolved likely 2/2 chronic headache 2/2 L craniotomy vs. cervicogenic vs. occipital neuralgia    Recommendations:  [] tylenol PRN for headache  [] continue home AEDs  [] outpatient neurology f/u for headache and seizures at [address] [place] [address].    Case seen and discussed with Dr. Horn

## 2022-06-19 NOTE — ED ADULT NURSE REASSESSMENT NOTE - GENERAL PATIENT STATE
Tele tech notified RN Pt briefly noted to kenneth to 40's Md was notified./comfortable appearance/resting/sleeping
Pt is axox3./comfortable appearance/improvement verbalized/resting/sleeping
Pt is axox3 Poor historian unable to tell RN Name of siezure meds. Pt st" I ran out of my pills 2 days ago I forget the names but I take for siezures."/comfortable appearance/cooperative/resting/sleeping/smiling/interactive

## 2022-06-19 NOTE — ED PROVIDER NOTE - PATIENT PORTAL LINK FT
You can access the FollowMyHealth Patient Portal offered by [facility] by registering at the following website: [email or web address]. By joining [employer]’s FollowMyHealth portal, you will also be able to view your health information using other applications (apps) compatible with our system.

## 2022-06-19 NOTE — ED PROVIDER NOTE - ATTENDING CONTRIBUTION TO CARE
DR. BA, ATTENDING MD-  I performed a face to face bedside interview with the patient regarding history of present illness, review of symptoms and past medical history. I completed an independent physical exam.  I have discussed the patient's plan of care with the resident.   Documentation as above in the note.    71 y/o female h/o seizure, cva, dm2, htn, hld here with l ha rad to l eye x1pm.  Eval for ich, ic mass, no vision changes or pupillary abn to suggest glaucoma.  Obtain cbc cmp ct head give ha med reassess.

## 2022-06-19 NOTE — ED ADULT NURSE REASSESSMENT NOTE - COMFORT CARE
Medicated as per orders./darkened lights/repositioned/warm blanket provided
assisted to bathroom/darkened lights/plan of care explained/repositioned

## 2022-06-19 NOTE — ED PROVIDER NOTE - CLINICAL SUMMARY MEDICAL DECISION MAKING FREE TEXT BOX
pt admitted several times for persistent headaches, will try tylenol, toradol, reglan for symptomatic relief, ct head for complicated neuro hx, labs, if pts symptoms unimproved consider neuro consult, not likely glaucoma, hsv keratitis or leandro hunt/zoster, no concern for cardiac cause of symptoms at this time

## 2022-06-19 NOTE — ED PROVIDER NOTE - OBJECTIVE STATEMENT
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 pt also states she ran out of her seizure meds (believes keppra) x2 days ago. Pt came to ED because of severity of pain in head. Pt denies vision changes, no dizziness, no light sensitivity, no facial tenderness.

## 2022-06-19 NOTE — CONSULT NOTE ADULT - SUBJECTIVE AND OBJECTIVE BOX
Surgical Post Operative Note    Pre-Operative Diagnosis: _ Nodular Graves  Post-Operative Diagnosis: _ same    Procedure: _ Total thyroidectomy,     Recurrent Laryngeal Nerve Monitoring x 1.hr    Surgeon: _Andrea  Assistant: _  Anesthesiologist: _   Anesthesia Type: _Gen  Estimated Blood Loss: _  Replacement: _  Drains: _  Fluids: _  Findings: _  Specimen:  1. _Total thyroid  2. _  3. 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 Pt reports that she has had intermittent headaches for 2 years (unable to say frequency), usually L sided, since her craniotomy 2 years ago. Pt reports that her current HA is resolved, it was gradual on onset, starts over occiput, spreads to L side of face down to L jaw. Pt denies rash, no fevers/chills/n/v no infectious symptoms, pt denies trauma, weakness, numbness or vision changes. Pt came to ED because of severity of pain in head. Pt denies vision changes, no dizziness, no light sensitivity, no facial tenderness. Pt's headache resolved in the ED after tylenol, toradol, reglan, benadryl.    REVIEW OF SYSTEMS  A 10-system ROS was performed and is negative except for those items noted above and/or in the HPI.    PAST MEDICAL & SURGICAL HISTORY:  CAD (Coronary Artery Disease) (ICD9 414.00)  s/p 5 stents      HTN (Hypertension) (ICD9 401.9)      Dyslipidemia (ICD9 272.4)      DM (Diabetes Mellitus) (ICD9 250.00)      NACHO (obstructive sleep apnea)  sleep study done 5 years ago      Obesity      Hypothyroid      HTN (hypertension)      Seizure      High cholesterol      C Section  x 1      S/P primary angioplasty with coronary stent  has 5 stents-first placed in 2007, last stent -2011,      History of ovarian cystectomy  x 2 in 2013      History of hysterectomy        FAMILY HISTORY:  Family history of heart disease (Aunt)      SOCIAL HISTORY:   T/E/D:   Occupation:   Lives with:     MEDICATIONS (HOME):  Home Medications:  amLODIPine 10 mg oral tablet: 1 tab(s) orally once a day (13 Aug 2021 20:27)  aspirin 81 mg oral delayed release tablet: 1 tab(s) orally once a day (13 Aug 2021 20:27)  Catapres 0.1 mg oral tablet: 1 tab(s) orally 3 times a day (13 Aug 2021 20:27)  gabapentin 100 mg oral capsule: 1 cap(s) orally once a day (13 Aug 2021 20:27)  hydrALAZINE 100 mg oral tablet: 1 tab(s) orally 3 times a day (13 Aug 2021 20:27)  Jardiance 10 mg oral tablet: 1 tab(s) orally once a day (in the morning) (13 Aug 2021 20:27)  labetalol 200 mg oral tablet: 1 tab(s) orally 3 times a day (13 Aug 2021 20:27)  Lipitor 40 mg oral tablet: 1 tab(s) orally once a day (13 Aug 2021 20:27)  Lumigan 0.01% ophthalmic solution: 1 drop(s) to each affected eye once a day (in the evening) (13 Aug 2021 20:27)  OXcarbazepine 300 mg oral tablet: 1 tab(s) orally 2 times a day (13 Aug 2021 20:33)  pantoprazole 40 mg oral delayed release tablet: 1 tab(s) orally once a day (before a meal) (13 Aug 2021 20:27)  Soliqua 100/33 subcutaneous solution: Use Prior Home Dose (14 Aug 2021 11:21)    MEDICATIONS  (STANDING):    MEDICATIONS  (PRN):    ALLERGIES/INTOLERANCES:  Allergies  No Known Allergies    Intolerances    VITALS & EXAMINATION:  Vital Signs Last 24 Hrs  T(C): 36.3 (19 Jun 2022 09:33), Max: 36.7 (19 Jun 2022 03:21)  T(F): 97.4 (19 Jun 2022 09:33), Max: 98.1 (19 Jun 2022 03:21)  HR: 66 (19 Jun 2022 09:33) (64 - 74)  BP: 144/70 (19 Jun 2022 09:33) (144/70 - 216/130)  BP(mean): --  RR: 17 (19 Jun 2022 09:33) (16 - 18)  SpO2: 100% (19 Jun 2022 09:33) (99% - 100%)    General:  Constitutional: Obese Female, appears stated age, in no apparent distress including pain  Head: Prior L craniotomy. Pt reports tenderness to palpation over L occiput  Respiratory: No increased work of breathing  Extremities: No cyanosis, clubbing, or edema.  Skin: No rashes, bruising, or discoloration.    Neurological (>12):  MS: Awake, alert, oriented to person, place, situation, time. Normal affect. Follows most simple commands, however needs some repetition. Registration 3/3, recall 0/3.    Language: Speech is clear, fluent with good repetition & comprehension (able to name objects thumb)    CNs: PERRL (R = 3mm, L = 3mm). VFF. EOMI no nystagmus, no diplopia. V1-3 intact to LT. No facial asymmetry b/l, full eye closure strength b/l. Hearing grossly normal (rubbing fingers) b/l.  Gag reflex deferred. Head turning & shoulder shrug intact b/l. Tongue midline, normal movements, no atrophy.    Motor: Normal muscle bulk & tone. No noticeable tremor or seizure. No pronator drift. Moving extremities equally, dorsiflexion/plantar flexion 5/5    Sensation: Intact to LT b/l throughout.     Coordination: No dysmetria to FTN    Gait: deferred    LABORATORY:  CBC                       11.5   7.46  )-----------( 296      ( 19 Jun 2022 04:35 )             37.8     Chem 06-19    139  |  104  |  15  ----------------------------<  132<H>  TNP   |  24  |  0.71    Ca    9.1      19 Jun 2022 04:35    TPro  TNP  /  Alb  4.2  /  TBili  0.3  /  DBili  x   /  AST  71<H>  /  ALT  TNP  /  AlkPhos  148<H>  06-19    LFTs LIVER FUNCTIONS - ( 19 Jun 2022 04:35 )  Alb: 4.2 g/dL / Pro: TNP g/dL / ALK PHOS: 148 U/L / ALT: TNP U/L / AST: 71 U/L / GGT: x           Coagulopathy   Lipid Panel   A1c   Cardiac enzymes     U/A   CSF  Immunological  Other    STUDIES & IMAGING:  Studies (EKG, EEG, EMG, etc):     Radiology (XR, CT, MR, U/S, TTE/STEPHANY):    < from: CT Head No Cont (06.19.22 @ 05:47) >  FINDINGS:  There is no acute intracranial hemorrhage. There are no extra-axial   collections.  The basal cisterns are patent. There is no hydrocephalus.   There is no midline shift.    Left posterior temporal occipital and parietal encephalomalacia and   gliosis subjacent to the left parieto-occipital craniotomy site. There is   ex vacuo dilatation of the atrium and occipital horn of the left lateral   ventricle.    The visualized paranasalsinuses and mastoid air cells are clear.    Status post left parietal occipital craniotomy. No acute calvarial   fracture.    IMPRESSION:  No evidence of acute intracranial hemorrhage, midline shift or CT   evidence of acute territorial infarct.    Stable postoperative changes.    If the patient's symptoms persist, consider short interval follow-up head   CT or brain MRI if there are no MRI    < end of copied text >

## 2022-06-19 NOTE — ED ADULT NURSE NOTE - OBJECTIVE STATEMENT
Beth RN: Patient received in room 26 c/o Headache radiating to left side since yesterday morning. Patient alert and oriented, reports dizziness and epigastric pain. Last BM 8am and was "normal". Patient reports hx. seizures, cardiac stents x5, denies any blood thinners. 20G IV Placed in left ac, labs drawn and sent. MD at bedside for evaluation. VS as noted. Report given to primary RN Alva.

## 2022-06-19 NOTE — ED ADULT NURSE NOTE - NSIMPLEMENTINTERV_GEN_ALL_ED
Implemented All Universal Safety Interventions:  [place] to call system. Call bell, personal items and telephone within reach. Instruct patient to call for assistance. Room bathroom lighting operational. Non-slip footwear when patient is off stretcher. Physically safe environment: no spills, clutter or unnecessary equipment. Stretcher in lowest position, wheels locked, appropriate side rails in place.

## 2022-06-19 NOTE — ED ADULT TRIAGE NOTE - CHIEF COMPLAINT QUOTE
C/o left-sided headache radiating to left eye, dizziness, and epigastric pain since 1PM. Denies nausea, vomiting, chest pain, SOB. PMH stroke, DM2 - insulin, HTN, HLD, seizure

## 2022-06-19 NOTE — ED ADULT NURSE REASSESSMENT NOTE - NS ED NURSE REASSESS COMMENT FT1
Pt c/o headache but improved now 6/10. torodol given will cont to assess.
Patient at baseline mental status. Denies headache, dizziness and chest pain. Breathing even and unlabored. No pallor or diaphoresis noted at this time. Patient to be discharged home. Neuro exam unremarkable at this time. Patient awaiting paper work.

## 2022-06-19 NOTE — ED PROVIDER NOTE - PROGRESS NOTE DETAILS
BRIJESH Phillips: Pt headache resolved. Seen and cleared by neurology.  The patient was given verbal and written discharge instructions. Specifically, instructions when to return to the ED and when to seek follow-up from their pcp was discussed. Any specialty follow-up was discussed, including how to make an appointment.  Any necessary referral lists provided.  Instructions were discussed in simple, plain language and was understood by the patient. The patient understands that should their symptoms worsen or any new symptoms arise, they should return to the ED immediately for further evaluation. All pt's questions were answered. Patient verbalizes understanding.

## 2022-06-19 NOTE — ED PROVIDER NOTE - PHYSICAL EXAMINATION
CONSTITUTIONAL: NAD  SKIN: Warm dry, normal skin turgor, no rash  HEAD: NCAT  EYES: EOMI, PERRLA, no scleral icterus, conjunctiva pink, no lesions on woods lamp exam, no pain w/ eom  ENT: normal pharynx with no erythema or exudates  NECK: Supple; non tender. Full ROM.  CARD: RRR, no murmurs.  RESP: clear to ausculation b/l. No crackles or wheezing.  ABD: soft, non-tender, no rebound or guarding.  MSK: No pedal edema, no calf tenderness  NEURO: normal motor. normal sensory.   PSYCH: Cooperative, appropriate.

## 2022-06-30 LAB — OXCARBAZEPINE SERPL-MCNC: 7 UG/ML — LOW (ref 10–35)

## 2022-07-19 NOTE — ED PROVIDER NOTE - CHPI ED SYMPTOMS POS
1. The severity of signs/symptoms.(See ED/admit documents) rash to forehead and rt face/PAIN/RASH/ITCHING

## 2022-08-16 ENCOUNTER — APPOINTMENT (OUTPATIENT)
Dept: OPHTHALMOLOGY | Facility: CLINIC | Age: 72
End: 2022-08-16

## 2022-08-16 ENCOUNTER — NON-APPOINTMENT (OUTPATIENT)
Age: 72
End: 2022-08-16

## 2022-08-16 PROCEDURE — 99213 OFFICE O/P EST LOW 20 MIN: CPT

## 2022-08-16 PROCEDURE — 92133 CPTRZD OPH DX IMG PST SGM ON: CPT

## 2022-08-16 PROCEDURE — 92083 EXTENDED VISUAL FIELD XM: CPT

## 2022-11-21 NOTE — OCCUPATIONAL THERAPY INITIAL EVALUATION ADULT - PHYSICAL ASSIST/NONPHYSICAL ASSIST:DRESS UPPER BODY, OT EVAL
Group Topic: BH Check-in/Symptom Rating    Date: 11/21/2022  Start Time: 1030  End Time: 1115  Facilitators: Debora Magill, MSW    Focus: Symptoms and Goals  Number in attendance: 5    Pts reported improved symptoms, symptoms of concern, safety issues and goals for today.    Pt was recruited for group but did not attend. Efforts to encourage participation in programming on the unit will continue.  Debbie Magill, MSW, LCSW     verbal cues/nonverbal cues (demo/gestures)/1 person assist

## 2023-02-16 ENCOUNTER — APPOINTMENT (OUTPATIENT)
Dept: OPHTHALMOLOGY | Facility: CLINIC | Age: 73
End: 2023-02-16
Payer: MEDICARE

## 2023-02-16 ENCOUNTER — NON-APPOINTMENT (OUTPATIENT)
Age: 73
End: 2023-02-16

## 2023-02-16 PROCEDURE — 92020 GONIOSCOPY: CPT

## 2023-02-16 PROCEDURE — 92202 OPSCPY EXTND ON/MAC DRAW: CPT

## 2023-02-16 PROCEDURE — 92014 COMPRE OPH EXAM EST PT 1/>: CPT

## 2023-03-22 NOTE — PATIENT PROFILE ADULT - FALL HARM RISK TYPE OF ASSESSMENT
[2+] : left 2+ [de-identified] : NAD. well appearing  [FreeTextEntry1] : strong thrill in left radiocephalic AV fistula  admission

## 2023-04-08 ENCOUNTER — APPOINTMENT (OUTPATIENT)
Dept: OPHTHALMOLOGY | Facility: CLINIC | Age: 73
End: 2023-04-08

## 2023-05-05 ENCOUNTER — EMERGENCY (EMERGENCY)
Facility: HOSPITAL | Age: 73
LOS: 1 days | Discharge: HOME | End: 2023-05-05
Attending: EMERGENCY MEDICINE | Admitting: EMERGENCY MEDICINE
Payer: MEDICARE

## 2023-05-05 VITALS
TEMPERATURE: 98 F | RESPIRATION RATE: 16 BRPM | OXYGEN SATURATION: 100 % | HEART RATE: 72 BPM | DIASTOLIC BLOOD PRESSURE: 91 MMHG | SYSTOLIC BLOOD PRESSURE: 192 MMHG

## 2023-05-05 VITALS
SYSTOLIC BLOOD PRESSURE: 188 MMHG | OXYGEN SATURATION: 100 % | RESPIRATION RATE: 17 BRPM | HEART RATE: 72 BPM | TEMPERATURE: 98 F | DIASTOLIC BLOOD PRESSURE: 88 MMHG

## 2023-05-05 DIAGNOSIS — Z98.89 OTHER SPECIFIED POSTPROCEDURAL STATES: Chronic | ICD-10-CM

## 2023-05-05 DIAGNOSIS — Z90.710 ACQUIRED ABSENCE OF BOTH CERVIX AND UTERUS: Chronic | ICD-10-CM

## 2023-05-05 LAB
ALBUMIN SERPL ELPH-MCNC: 4.1 G/DL — SIGNIFICANT CHANGE UP (ref 3.3–5)
ALP SERPL-CCNC: 153 U/L — HIGH (ref 40–120)
ALT FLD-CCNC: 14 U/L — SIGNIFICANT CHANGE UP (ref 4–33)
ANION GAP SERPL CALC-SCNC: 12 MMOL/L — SIGNIFICANT CHANGE UP (ref 7–14)
AST SERPL-CCNC: 16 U/L — SIGNIFICANT CHANGE UP (ref 4–32)
BASOPHILS # BLD AUTO: 0.06 K/UL — SIGNIFICANT CHANGE UP (ref 0–0.2)
BASOPHILS NFR BLD AUTO: 1 % — SIGNIFICANT CHANGE UP (ref 0–2)
BILIRUB SERPL-MCNC: <0.2 MG/DL — SIGNIFICANT CHANGE UP (ref 0.2–1.2)
BUN SERPL-MCNC: 9 MG/DL — SIGNIFICANT CHANGE UP (ref 7–23)
CALCIUM SERPL-MCNC: 9.2 MG/DL — SIGNIFICANT CHANGE UP (ref 8.4–10.5)
CHLORIDE SERPL-SCNC: 105 MMOL/L — SIGNIFICANT CHANGE UP (ref 98–107)
CO2 SERPL-SCNC: 26 MMOL/L — SIGNIFICANT CHANGE UP (ref 22–31)
CREAT SERPL-MCNC: 0.84 MG/DL — SIGNIFICANT CHANGE UP (ref 0.5–1.3)
EGFR: 74 ML/MIN/1.73M2 — SIGNIFICANT CHANGE UP
EOSINOPHIL # BLD AUTO: 0.08 K/UL — SIGNIFICANT CHANGE UP (ref 0–0.5)
EOSINOPHIL NFR BLD AUTO: 1.3 % — SIGNIFICANT CHANGE UP (ref 0–6)
GLUCOSE SERPL-MCNC: 80 MG/DL — SIGNIFICANT CHANGE UP (ref 70–99)
HCT VFR BLD CALC: 38.8 % — SIGNIFICANT CHANGE UP (ref 34.5–45)
HGB BLD-MCNC: 12 G/DL — SIGNIFICANT CHANGE UP (ref 11.5–15.5)
IANC: 2.8 K/UL — SIGNIFICANT CHANGE UP (ref 1.8–7.4)
IMM GRANULOCYTES NFR BLD AUTO: 0.2 % — SIGNIFICANT CHANGE UP (ref 0–0.9)
LYMPHOCYTES # BLD AUTO: 2.74 K/UL — SIGNIFICANT CHANGE UP (ref 1–3.3)
LYMPHOCYTES # BLD AUTO: 45.7 % — HIGH (ref 13–44)
MCHC RBC-ENTMCNC: 24.1 PG — LOW (ref 27–34)
MCHC RBC-ENTMCNC: 30.9 GM/DL — LOW (ref 32–36)
MCV RBC AUTO: 78.1 FL — LOW (ref 80–100)
MONOCYTES # BLD AUTO: 0.31 K/UL — SIGNIFICANT CHANGE UP (ref 0–0.9)
MONOCYTES NFR BLD AUTO: 5.2 % — SIGNIFICANT CHANGE UP (ref 2–14)
NEUTROPHILS # BLD AUTO: 2.8 K/UL — SIGNIFICANT CHANGE UP (ref 1.8–7.4)
NEUTROPHILS NFR BLD AUTO: 46.6 % — SIGNIFICANT CHANGE UP (ref 43–77)
NRBC # BLD: 0 /100 WBCS — SIGNIFICANT CHANGE UP (ref 0–0)
NRBC # FLD: 0 K/UL — SIGNIFICANT CHANGE UP (ref 0–0)
PLATELET # BLD AUTO: 249 K/UL — SIGNIFICANT CHANGE UP (ref 150–400)
POTASSIUM SERPL-MCNC: 3.1 MMOL/L — LOW (ref 3.5–5.3)
POTASSIUM SERPL-SCNC: 3.1 MMOL/L — LOW (ref 3.5–5.3)
PROT SERPL-MCNC: 7.7 G/DL — SIGNIFICANT CHANGE UP (ref 6–8.3)
RBC # BLD: 4.97 M/UL — SIGNIFICANT CHANGE UP (ref 3.8–5.2)
RBC # FLD: 15.4 % — HIGH (ref 10.3–14.5)
SODIUM SERPL-SCNC: 143 MMOL/L — SIGNIFICANT CHANGE UP (ref 135–145)
WBC # BLD: 6 K/UL — SIGNIFICANT CHANGE UP (ref 3.8–10.5)
WBC # FLD AUTO: 6 K/UL — SIGNIFICANT CHANGE UP (ref 3.8–10.5)

## 2023-05-05 PROCEDURE — 93010 ELECTROCARDIOGRAM REPORT: CPT

## 2023-05-05 PROCEDURE — 99284 EMERGENCY DEPT VISIT MOD MDM: CPT

## 2023-05-05 RX ORDER — POTASSIUM CHLORIDE 20 MEQ
40 PACKET (EA) ORAL ONCE
Refills: 0 | Status: COMPLETED | OUTPATIENT
Start: 2023-05-05 | End: 2023-05-05

## 2023-05-05 RX ADMIN — Medication 40 MILLIEQUIVALENT(S): at 18:18

## 2023-05-05 NOTE — ED PROVIDER NOTE - PHYSICAL EXAMINATION
PHYSICAL EXAM:  CONSTITUTIONAL: Well appearing, awake, alert, oriented to person, place, time/situation and in no apparent distress.  HEAD: Atraumatic  EYES: Clear bilaterally, pupils equal, round and reactive to light.  ENMT: Airway patent, Nasal mucosa clear. Mouth with normal mucosa. Uvula is midline.   CARDIAC: Normal rate, regular rhythm. +S1/S2. No murmurs, rubs or gallops.  RESPIRATORY: Breathing unlabored. Breath sounds clear and equal bilaterally.  ABDOMEN:  Soft, nontender, nondistended. No rebound tenderness or guarding.  NEUROLOGICAL: Alert and oriented, no focal deficits, no motor or sensory deficits. CN2-12 intact. Sensation intact x4 extremities. Strength 5/5 of upper and lower extremities B/L.   MSK: No clubbing, cyanosis, or edema. Full range of motion of all extremities.  SKIN: Skin warm and dry. No evidence of rashes or lesions.

## 2023-05-05 NOTE — ED PROVIDER NOTE - NSFOLLOWUPINSTRUCTIONS_ED_ALL_ED_FT
You were seen in the ER today for your high blood pressure.    Please follow up with your primary care doctor within 1 - 3 days. Call and let them know you were seen in the ER today.   Bring the results of your blood work and imaging with you to your appointment, if applicable.    Please take your blood pressure medications as prescribed.    Return to the ER for any worsening symptoms or concerns, including chest pain, shortness of breath, lightheadedness, weakness, or any other concerns.

## 2023-05-05 NOTE — ED PROVIDER NOTE - OBJECTIVE STATEMENT
73 year old female with PMH CAD, HTN, seizures, traumatic brain bleed, craniotomy, sent by PCP for elevated BP reading with SBP 260s. Patient denies any symptoms before during or after the BP reading including feve chills, headache, vision changes, chest pain, shortness of breath, nausea, vomiting, diarrhea, abdominal pain. Patient hadn't taken blood pressure meds prior to PCP office visit, but took the home meds after the visit prior to arrival in the ED. ED BP was 180s.

## 2023-05-05 NOTE — ED PROVIDER NOTE - ATTENDING CONTRIBUTION TO CARE
Dr. Lindsey:  I have personally performed a face to face bedside history and physical examination of this patient. I have discussed the history, examination, review of systems, assessment and plan of management with the resident. I have reviewed the electronic medical record and amended it to reflect my history, review of systems, physical exam, assessment and plan.    73F h/o cad s/p 5x stents, htn, seizures s/p left temporal lesion s/p biopsy, traumatic brain bleed s/p craniotomy, sent in from PCP office for elevated BP reading.  Reportedly 's.  Pt denies any symptoms, including fever/chills, cp, sob, n/v/d, headaches, visual changes.      Exam:  - nad  - rrr  - ctab  - abd soft ntnd  - no focal neuro deficits    A/P  - asymptomatic HTN; check renal function  - cbc, cmp  - likely f/u PCP if labs wnl Dr. Lindsey:  I have personally performed a face to face bedside history and physical examination of this patient. I have discussed the history, examination, review of systems, assessment and plan of management with the resident. I have reviewed the electronic medical record and amended it to reflect my history, review of systems, physical exam, assessment and plan.    73F h/o cad s/p 5x stents, htn, seizures s/p left temporal lesion s/p biopsy, traumatic brain bleed s/p craniotomy, sent in from PCP office for elevated BP reading.  Reportedly 's.  Pt denies any symptoms, including fever/chills, cp, sob, n/v/d, headaches, visual changes.  Pt is on 5 different BP meds; states she had not taken them prior to her PCP visit today.  However, had her  bring her the meds and actually took her home meds prior to ED arrival.  ED triage BP 180s.    Exam:  - nad  - rrr  - ctab  - abd soft ntnd  - no focal neuro deficits    A/P  - asymptomatic HTN; check renal function  - cbc, cmp  - likely f/u PCP if labs wnl No

## 2023-05-05 NOTE — ED PROVIDER NOTE - CLINICAL SUMMARY MEDICAL DECISION MAKING FREE TEXT BOX
Caitlin Mancini DO PGY-2  73 year old female with complex medical history presents to the ED with asymptomatic HTN with elevated BP reading in 260s at PCP office prior to taking home meds. Patient then took home meds and in ED SBP 180s. Will evaluate for hematologic/electrolyte abnormalities including renal dysfunction, observe, and patient may be candidate for discharge.

## 2023-05-05 NOTE — ED PROVIDER NOTE - PATIENT PORTAL LINK FT
You can access the FollowMyHealth Patient Portal offered by [facility] by registering at the following website: [email or web address]. By joining [employer]’s FollowMyHealth portal, you will also be able to view your health information using other applications (apps) compatible with our system.

## 2023-05-05 NOTE — ED PROVIDER NOTE - PROGRESS NOTE DETAILS
Patient re-assessed, hemodynamically remained stable. Patient remained asymptomatic in ED. No evidence of end organ damage, labs at baseline. Will dc home with PCP f/u. Counseled about taking BP medications

## 2023-05-05 NOTE — ED ADULT TRIAGE NOTE - CHIEF COMPLAINT QUOTE
Pt arrives via EMS from PCP office for HTN, reading of SBP in the 300s as per EMS. Pt denies complaints aside from mild abdominal discomfort. PMH: hemorrhagic stroke, craniotomy, seizure, HTN, NACHO, hypothyroid, DM, HLD, CAD x5 stents.

## 2023-05-18 ENCOUNTER — NON-APPOINTMENT (OUTPATIENT)
Age: 73
End: 2023-05-18

## 2023-05-18 ENCOUNTER — APPOINTMENT (OUTPATIENT)
Dept: OPHTHALMOLOGY | Facility: CLINIC | Age: 73
End: 2023-05-18
Payer: MEDICARE

## 2023-05-18 PROCEDURE — 92012 INTRM OPH EXAM EST PATIENT: CPT

## 2023-05-18 PROCEDURE — 92134 CPTRZ OPH DX IMG PST SGM RTA: CPT

## 2023-09-11 NOTE — PROVIDER CONTACT NOTE (OTHER) - DATE AND TIME:
01-May-2019 17:40 Detail Level: Detailed Depth Of Biopsy: dermis Was A Bandage Applied: Yes Size Of Lesion In Cm: 0.8 X Size Of Lesion In Cm: 0 Biopsy Type: H and E Biopsy Method: Personna blade Anesthesia Type: 1% lidocaine with epinephrine Anesthesia Volume In Cc (Will Not Render If 0): 0.5 Hemostasis: Edouard's Wound Care: Petrolatum Dressing: bandage Destruction After The Procedure: No Type Of Destruction Used: Curettage Curettage Text: The wound bed was treated with curettage after the biopsy was performed. Cryotherapy Text: The wound bed was treated with cryotherapy after the biopsy was performed. Electrodesiccation Text: The wound bed was treated with electrodesiccation after the biopsy was performed. Electrodesiccation And Curettage Text: The wound bed was treated with electrodesiccation and curettage after the biopsy was performed. Silver Nitrate Text: The wound bed was treated with silver nitrate after the biopsy was performed. Lab: -7 Lab Facility: 3 Consent: Written consent was obtained and risks were reviewed including but not limited to scarring, infection, bleeding, scabbing, incomplete removal, nerve damage and allergy to anesthesia. Post-Care Instructions: I reviewed with the patient in detail post-care instructions. Patient to keep bandaid on for 24 hours. Then remove, wash with soap and water and apply vaseline twice daily until healed. Notification Instructions: Patient will be notified of biopsy results. However, patient instructed to call the office if not contacted within 2 weeks. Billing Type: Third-Party Bill Information: Selecting Yes will display possible errors in your note based on the variables you have selected. This validation is only offered as a suggestion for you. PLEASE NOTE THAT THE VALIDATION TEXT WILL BE REMOVED WHEN YOU FINALIZE YOUR NOTE. IF YOU WANT TO FAX A PRELIMINARY NOTE YOU WILL NEED TO TOGGLE THIS TO 'NO' IF YOU DO NOT WANT IT IN YOUR FAXED NOTE.

## 2023-09-19 ENCOUNTER — APPOINTMENT (OUTPATIENT)
Dept: OPHTHALMOLOGY | Facility: CLINIC | Age: 73
End: 2023-09-19

## 2023-11-26 NOTE — PATIENT PROFILE ADULT - NSPROPTRIGHTCAREGIVER_GEN_A_NUR
Follow-up with your primary care doctor.  If you have any new worse or concerning symptoms such as dizziness chest pain shortness of breath or any feels concerning to you, return to the emergency room.  
information could not be obtained

## 2024-01-17 ENCOUNTER — EMERGENCY (EMERGENCY)
Facility: HOSPITAL | Age: 74
LOS: 1 days | Discharge: HOME | End: 2024-01-17
Attending: EMERGENCY MEDICINE | Admitting: EMERGENCY MEDICINE
Payer: MEDICARE

## 2024-01-17 VITALS
RESPIRATION RATE: 16 BRPM | SYSTOLIC BLOOD PRESSURE: 195 MMHG | DIASTOLIC BLOOD PRESSURE: 93 MMHG | HEART RATE: 70 BPM | OXYGEN SATURATION: 97 %

## 2024-01-17 VITALS
HEART RATE: 81 BPM | TEMPERATURE: 99 F | OXYGEN SATURATION: 97 % | DIASTOLIC BLOOD PRESSURE: 99 MMHG | RESPIRATION RATE: 15 BRPM | SYSTOLIC BLOOD PRESSURE: 194 MMHG

## 2024-01-17 DIAGNOSIS — Z98.89 OTHER SPECIFIED POSTPROCEDURAL STATES: Chronic | ICD-10-CM

## 2024-01-17 DIAGNOSIS — Z90.710 ACQUIRED ABSENCE OF BOTH CERVIX AND UTERUS: Chronic | ICD-10-CM

## 2024-01-17 LAB
ALBUMIN SERPL ELPH-MCNC: 4.1 G/DL — SIGNIFICANT CHANGE UP (ref 3.3–5)
ALP SERPL-CCNC: 198 U/L — HIGH (ref 40–120)
ALT FLD-CCNC: 16 U/L — SIGNIFICANT CHANGE UP (ref 4–33)
ANION GAP SERPL CALC-SCNC: 10 MMOL/L — SIGNIFICANT CHANGE UP (ref 7–14)
AST SERPL-CCNC: 19 U/L — SIGNIFICANT CHANGE UP (ref 4–32)
BASOPHILS # BLD AUTO: 0.05 K/UL — SIGNIFICANT CHANGE UP (ref 0–0.2)
BASOPHILS NFR BLD AUTO: 0.8 % — SIGNIFICANT CHANGE UP (ref 0–2)
BILIRUB SERPL-MCNC: 0.2 MG/DL — SIGNIFICANT CHANGE UP (ref 0.2–1.2)
BUN SERPL-MCNC: 9 MG/DL — SIGNIFICANT CHANGE UP (ref 7–23)
CALCIUM SERPL-MCNC: 9.6 MG/DL — SIGNIFICANT CHANGE UP (ref 8.4–10.5)
CHLORIDE SERPL-SCNC: 103 MMOL/L — SIGNIFICANT CHANGE UP (ref 98–107)
CO2 SERPL-SCNC: 30 MMOL/L — SIGNIFICANT CHANGE UP (ref 22–31)
CREAT SERPL-MCNC: 0.78 MG/DL — SIGNIFICANT CHANGE UP (ref 0.5–1.3)
EGFR: 81 ML/MIN/1.73M2 — SIGNIFICANT CHANGE UP
EOSINOPHIL # BLD AUTO: 0.09 K/UL — SIGNIFICANT CHANGE UP (ref 0–0.5)
EOSINOPHIL NFR BLD AUTO: 1.5 % — SIGNIFICANT CHANGE UP (ref 0–6)
GLUCOSE SERPL-MCNC: 125 MG/DL — HIGH (ref 70–99)
HCT VFR BLD CALC: 38.6 % — SIGNIFICANT CHANGE UP (ref 34.5–45)
HGB BLD-MCNC: 12.4 G/DL — SIGNIFICANT CHANGE UP (ref 11.5–15.5)
IANC: 3.06 K/UL — SIGNIFICANT CHANGE UP (ref 1.8–7.4)
IMM GRANULOCYTES NFR BLD AUTO: 0.2 % — SIGNIFICANT CHANGE UP (ref 0–0.9)
LIDOCAIN IGE QN: 23 U/L — SIGNIFICANT CHANGE UP (ref 7–60)
LYMPHOCYTES # BLD AUTO: 2.54 K/UL — SIGNIFICANT CHANGE UP (ref 1–3.3)
LYMPHOCYTES # BLD AUTO: 41.8 % — SIGNIFICANT CHANGE UP (ref 13–44)
MCHC RBC-ENTMCNC: 24.8 PG — LOW (ref 27–34)
MCHC RBC-ENTMCNC: 32.1 GM/DL — SIGNIFICANT CHANGE UP (ref 32–36)
MCV RBC AUTO: 77.2 FL — LOW (ref 80–100)
MONOCYTES # BLD AUTO: 0.33 K/UL — SIGNIFICANT CHANGE UP (ref 0–0.9)
MONOCYTES NFR BLD AUTO: 5.4 % — SIGNIFICANT CHANGE UP (ref 2–14)
NEUTROPHILS # BLD AUTO: 3.06 K/UL — SIGNIFICANT CHANGE UP (ref 1.8–7.4)
NEUTROPHILS NFR BLD AUTO: 50.3 % — SIGNIFICANT CHANGE UP (ref 43–77)
NRBC # BLD: 0 /100 WBCS — SIGNIFICANT CHANGE UP (ref 0–0)
NRBC # FLD: 0 K/UL — SIGNIFICANT CHANGE UP (ref 0–0)
PLATELET # BLD AUTO: 265 K/UL — SIGNIFICANT CHANGE UP (ref 150–400)
POTASSIUM SERPL-MCNC: 4 MMOL/L — SIGNIFICANT CHANGE UP (ref 3.5–5.3)
POTASSIUM SERPL-SCNC: 4 MMOL/L — SIGNIFICANT CHANGE UP (ref 3.5–5.3)
PROT SERPL-MCNC: 8.2 G/DL — SIGNIFICANT CHANGE UP (ref 6–8.3)
RBC # BLD: 5 M/UL — SIGNIFICANT CHANGE UP (ref 3.8–5.2)
RBC # FLD: 15.6 % — HIGH (ref 10.3–14.5)
SODIUM SERPL-SCNC: 143 MMOL/L — SIGNIFICANT CHANGE UP (ref 135–145)
WBC # BLD: 6.08 K/UL — SIGNIFICANT CHANGE UP (ref 3.8–10.5)
WBC # FLD AUTO: 6.08 K/UL — SIGNIFICANT CHANGE UP (ref 3.8–10.5)

## 2024-01-17 PROCEDURE — 93010 ELECTROCARDIOGRAM REPORT: CPT

## 2024-01-17 PROCEDURE — 71046 X-RAY EXAM CHEST 2 VIEWS: CPT | Mod: 26

## 2024-01-17 PROCEDURE — 76705 ECHO EXAM OF ABDOMEN: CPT | Mod: 26

## 2024-01-17 PROCEDURE — 99285 EMERGENCY DEPT VISIT HI MDM: CPT | Mod: 25

## 2024-01-17 PROCEDURE — G1004: CPT

## 2024-01-17 PROCEDURE — 70450 CT HEAD/BRAIN W/O DYE: CPT | Mod: 26,MG

## 2024-01-17 RX ORDER — ACETAMINOPHEN 500 MG
650 TABLET ORAL ONCE
Refills: 0 | Status: COMPLETED | OUTPATIENT
Start: 2024-01-17 | End: 2024-01-17

## 2024-01-17 RX ADMIN — Medication 10 MILLIGRAM(S): at 10:44

## 2024-01-17 RX ADMIN — Medication 650 MILLIGRAM(S): at 09:37

## 2024-01-17 NOTE — ED PROVIDER NOTE - OBJECTIVE STATEMENT
Patient is a 73-year-old female with past medical history of diabetes mellitus type 2, hypothyroidism, CAD with stents, atrial fibrillation on Eliquis, history of brain lesion, history of craniotomy, history of seizures presents with headache since yesterday.  Patient reports developing headache at the top of her head initially mild to moderate intensity worsening today, described as heaviness.  Patient reports onset of headache while cooking.  Patient also reports right upper quadrant pain that worsens with p.o. intake since yesterday.  Patient reports she has had issues with confusion and memory for an unknown duration.  Collateral information obtained from patient's daughter, Radha, who states that patient has had issues with confusion and memory for the past 3-1/2 to 4 years ever since she had craniotomy and was diagnosed with seizures.  Daughter states that today, patient is mentally at baseline.  Daughter reports that patient has complained of headache at the top of her head for several years.  Patient denies any fevers, chills, nausea, vomiting, neck pain/stiffness, changes in vision or hearing, numbness, weakness, lightheadedness, dizziness, chest pain, shortness of breath, diarrhea, constipation, recent head injuries. Patient is a 73-year-old female with past medical history of diabetes mellitus type 2, hypothyroidism, CAD with stents, atrial fibrillation on Eliquis, history of brain lesion, history of craniotomy, history of seizures presents with headache since yesterday.  Patient reports developing headache at the top of her head initially mild to moderate intensity worsening today, described as heaviness.  Patient reports onset of headache while cooking.  Patient also reports right upper quadrant pain that worsens with p.o. intake since yesterday.  Patient reports she has had issues with confusion and memory for an unknown duration.  Collateral information obtained from patient's daughter, Radha, who states that patient has had issues with confusion and memory for the past 3-1/2 to 4 years ever since she had craniotomy and was diagnosed with seizures.  Daughter states that today, patient is mentally at baseline.  Daughter reports that patient has complained of headache at the top of her head for several years.  Patient denies any fevers, chills, nausea, vomiting, neck pain/stiffness, changes in vision or hearing, numbness, weakness, lightheadedness, dizziness, chest pain, shortness of breath, diarrhea, constipation, recent head injuries.  Attending - Agree with above.  I evaluated patient myself. 74 y/o F speaks Creole, history using  phone.  Also hx from daughter over phone.  c/o abd pain since yesterday.  No n/v/d.  No recent trauma or illness.  No fever. Reports headache x few days but headache is typical of chronic headaches that she has been having x few years since hemorrhagic CVA/intracranial surgery.  Denies extremity weakness or numbness.  No blurry vision or slurred speech. Patient is a 73-year-old female with past medical history of diabetes mellitus type 2, hypothyroidism, CAD with stents, atrial fibrillation on Eliquis, history of brain lesion, history of craniotomy, history of seizures presents with headache since yesterday.  Patient reports developing headache at the top of her head initially mild to moderate intensity worsening today, described as heaviness.  Patient reports onset of headache while cooking.  Patient also reports right upper quadrant pain that worsens with p.o. intake since yesterday.  Patient reports she has had issues with confusion and memory for an unknown duration.  Collateral information obtained from patient's daughter, Radha, who states that patient has had issues with confusion and memory for the past 3-1/2 to 4 years ever since she had craniotomy and was diagnosed with seizures.  Daughter states that today, patient is mentally at baseline.  Daughter reports that patient has complained of headache at the top of her head for several years.  Patient denies any fevers, chills, nausea, vomiting, neck pain/stiffness, changes in vision or hearing, numbness, weakness, lightheadedness, dizziness, chest pain, shortness of breath, diarrhea, constipation, recent head injuries.  [employer]  [id number] used for translation; language is English and Afghan Creole.  Attending - Agree with above.  I evaluated patient myself. 72 y/o F speaks Creole, history using  phone.  Also hx from daughter over phone.  c/o abd pain since yesterday.  No n/v/d.  No recent trauma or illness.  No fever. Reports headache x few days but headache is typical of chronic headaches that she has been having x few years since hemorrhagic CVA/intracranial surgery.  Denies extremity weakness or numbness.  No blurry vision or slurred speech.

## 2024-01-17 NOTE — ED ADULT NURSE NOTE - NSFALLHARMRISKINTERV_ED_ALL_ED

## 2024-01-17 NOTE — ED PROVIDER NOTE - CLINICAL SUMMARY MEDICAL DECISION MAKING FREE TEXT BOX
Patient is a 73-year-old female with past medical history of diabetes mellitus type 2, hypothyroidism, CAD with stents, atrial fibrillation on Eliquis, history of brain lesion, history of craniotomy, history of seizures presents with headache since yesterday.  Patient reports developing headache at the top of her head initially mild to moderate intensity worsening today, described as heaviness.  Patient reports onset of headache while cooking.  Patient also reports right upper quadrant pain that worsens with p.o. intake since yesterday.  Patient reports she has had issues with confusion and memory for an unknown duration.  Collateral information obtained from patient's daughter, Radha, who states that patient has had issues with confusion and memory for the past 3-1/2 to 4 years ever since she had craniotomy and was diagnosed with seizures.  Daughter states that today, patient is mentally at baseline.  Daughter reports that patient has complained of headache at the top of her head for several years.  Patient denies any fevers, chills, nausea, vomiting, neck pain/stiffness, changes in vision or hearing, numbness, weakness, lightheadedness, dizziness, chest pain, shortness of breath, diarrhea, constipation, recent head injuries.  This is a patient with headache and abdominal pain.  Given right upper quadrant tenderness, consider possible cholecystitis.  Plan to order labs, lipase, Tylenol, CT head, abdominal ultrasound, chest x-ray.  Disposition pending workup.

## 2024-01-17 NOTE — ED PROVIDER NOTE - AXIS
You can access the FollowMyHealth Patient Portal offered by [facility] by registering at the following website: [email or web address]. By joining [employer]’s FollowMyHealth portal, you will also be able to view your health information using other applications (apps) compatible with our system. Left Deviation

## 2024-01-17 NOTE — ED PROVIDER NOTE - CONSTITUTIONAL NEGATIVE STATEMENT, MLM
Care Due:                  Date            Visit Type   Department     Provider  --------------------------------------------------------------------------------                                [facility]                              PRIMARY      MEDICINE/INTERN  Last Visit: 04-      CARE ([facility])   [facility]         Devon Collier  Next Visit: None Scheduled  None         None Found                                                            Last  Test          Frequency    Reason                     Performed    Due Date  --------------------------------------------------------------------------------    CMP.........  12 months..  ibuprofen, simvastatin,    10-   10-                             triamterene-hydrochloroth                             iazide...................    Lipid Panel.  12 months..  simvastatin..............  Not Found    Overdue    [employer] Embedded Care Due Messages. Reference number: [id number].   1/17/2024 8:02:36 AM CST   no fever and no chills.

## 2024-01-17 NOTE — ED PROVIDER NOTE - PATIENT PORTAL LINK FT
You can access the FollowMyHealth Patient Portal offered by [facility] by registering at the following website: [email or web address]. By joining [employer]’s FollowMyHealth portal, you will also be able to view your health information using other applications (apps) compatible with our system.

## 2024-01-17 NOTE — ED PROVIDER NOTE - PHYSICAL EXAMINATION
-Cranial Nerves:  --CN II: PERRLA  --CN III, IV, VI: EOMI b/l  --CN V1-3: Facial sensation intact to touch  --CN VII: No facial asymmetry or droop  --CN VIII: Hearing intact to rubbing fingers b/l  --CN IX, X: Palate elevates symmetrically. Normal phonation  --CN XI: Heading turning and shoulder shrug intact b/l  --CN XII: Tongue midline with normal movements     Normal bilateral FTN.  Rapid alternating movements intact.  Negative rhomberg. -Cranial Nerves:  --CN II: PERRLA  --CN III, IV, VI: EOMI b/l  --CN V1-3: Facial sensation intact to touch  --CN VII: No facial asymmetry or droop  --CN VIII: Hearing intact to rubbing fingers b/l  --CN IX, X: Palate elevates symmetrically. Normal phonation  --CN XI: Heading turning and shoulder shrug intact b/l  --CN XII: Tongue midline with normal movements     Normal bilateral FTN.  Rapid alternating movements intact.  Negative rhomberg.  ATTENDING PHYSICAL EXAM  GEN - NAD; well appearing; A+O x3  HEAD - NC/AT; EYES/NOSE - PERRL, EOMI, mucous membranes moist, no discharge; THROAT: Oral cavity and pharynx normal. No inflammation, swelling, exudate, or lesions  NECK: Neck supple, non-tender without lymphadenopathy, no masses, no JVD  PULMONARY - CTA b/l, symmetric breath sounds, no w/r/r  CARDIAC -s1s2, RRR, no M,R,G  ABDOMEN - +NABS, ND, NT, soft, no guarding, no rebound, no masses   BACK - no CVA tenderness, No vertebral or paravertebral tenderness  EXTREMITIES - symmetric pulses, 2+ dp, capillary refill < 2 seconds, no clubbing, no cyanosis, no edema  SKIN - no rash or bruising   NEUROLOGIC - alert, CN 2-12 intact, no aphasia or dysarthria, sensation to soft touch nl, coordination nl, finger to nose nl, romberg negative, motor 5/5 RUE/LUE/RLE/LLE/EHL/Plantar flexion, no pronator drift, no lateralizing features, gait nl -Cranial Nerves:  --CN II: PERRLA  --CN III, IV, VI: EOMI b/l  --CN V1-3: Facial sensation intact to touch  --CN VII: No facial asymmetry or droop  --CN VIII: Hearing intact to rubbing fingers b/l  --CN IX, X: Palate elevates symmetrically. Normal phonation  --CN XI: Heading turning and shoulder shrug intact b/l  --CN XII: Tongue midline with normal movements     Normal bilateral FTN.  Rapid alternating movements intact.  Negative rhomberg.  ATTENDING PHYSICAL EXAM  GEN - NAD; well appearing; A+O x3  HEAD - NC/AT; EYES/NOSE - PERRL, EOMI, mucous membranes moist, no discharge; THROAT: Oral cavity and pharynx normal. No inflammation, swelling, exudate, or lesions  NECK: Neck supple, non-tender without lymphadenopathy, no masses, no JVD  PULMONARY - CTA b/l, symmetric breath sounds, no w/r/r  CARDIAC -s1s2, RRR, no M,R,G  ABDOMEN - +NABS, ND, RUQ TTP, soft, no guarding, no rebound, no masses   BACK - no CVA tenderness, No vertebral or paravertebral tenderness  EXTREMITIES - symmetric pulses, 2+ dp, capillary refill < 2 seconds, no clubbing, no cyanosis, no edema  SKIN - no rash or bruising   NEUROLOGIC - alert, CN 2-12 intact, no aphasia or dysarthria, sensation to soft touch nl, coordination nl, finger to nose nl, romberg negative, motor 5/5 RUE/LUE/RLE/LLE/EHL/Plantar flexion, no pronator drift, no lateralizing features, gait nl

## 2024-01-17 NOTE — ED ADULT NURSE NOTE - OBJECTIVE STATEMENT
Pt report headache since yesterday denies nausea, vomiting, fever, dizziness, chest pain, and sob. Pt also report abd pain to RUQ that has been worst with eating. Labs drawn, and medication administered.

## 2024-01-17 NOTE — ED ADULT TRIAGE NOTE - CHIEF COMPLAINT QUOTE
alert oriented creole speaking c/o headache x 3 days and generalized body pain hx seizure stents DM2 HTN  no CP dizziness or SOB  did not take Blood Pressure meds yet today

## 2024-01-17 NOTE — ED PROVIDER NOTE - NSFOLLOWUPINSTRUCTIONS_ED_ALL_ED_FT
Advance activity as tolerated.  Continue all previously prescribed medications as directed unless otherwise instructed.  Take Tylenol 650mg (Two 325 mg pills) every 4-6 hours as needed for pain or fevers.  Follow up with your primary care physician in 48-72 hours- bring copies of your results.  Return to the ER for worsening or persistent symptoms, including but not limited to worsening/persistent headache, numbness, weakness, changes in vision or hearing, dizziness, difficulty walking, falls, slurred speech, neck stiffness, fevers, lightheadedness, chest pain, shortness of breath, abdominal pain, vomiting, black or bloody stools, and/or ANY NEW OR CONCERNING SYMPTOMS. If you have issues obtaining follow up, please call: [phone] to obtain a doctor or specialist who takes your insurance in your area.  You may call [phone] to make an appointment with the internal medicine clinic.    ACUTE HEADACHE - General Information    Acute Headache    WHAT YOU NEED TO KNOW:    What is an acute headache? An acute headache is pain or discomfort that may start suddenly and get worse quickly. You may have an acute headache only when you feel stress or eat certain foods. Other acute headache pain can happen every day, and sometimes several times a day.    What are the most common types of acute headache?    Tension headache is the most common type of headache. These headaches typically occur in the late afternoon and go away by evening. The pain is usually mild or moderate. You may have problems tolerating bright light or loud noise. The pain is usually across the forehead or in the back of the head, often only on one side. These headaches may occur every day.    Migraine headaches cause moderate or severe pain. The headache generally lasts from 1 to 3 days and tends to come back. Pain is usually on only one side, but it may change sides. Migraines often occur in the temple, the back of the head, or behind the eye. The pain may throb or be sharp and steady.    A migraine with aura means you see or feel something before a migraine. You may see a small spot surrounded by bright zigzag lines. Other signs or symptoms may follow the aura.    Cluster headache pain is usually only on one side. It often causes severe pain, and can last for 30 minutes to 2 hours. These headaches may occur 1 or 2 times each day, more often at night. The pain may wake you.  What causes acute headaches? The cause of your headache may not be known. The following can trigger a headache:    Stress or tension, hours or even days after stressful events    Fatigue, a lack of sleep or changes in your usual sleep pattern, or a nap during the day    Menstruation, especially after pregnancy, or use of birth control pills or hormone replacement therapy    Food such as cured meats, artificial sweeteners, alcohol, dark chocolate, and MSG    Suddenly not having caffeine if you usually have larger amounts    A medical problem, such as an infection, tooth pain, neck or sinus pain, thyroid problems, or a tumor    A head injury  How is the type of acute headache diagnosed and treated? Your healthcare provider will ask you to describe your pain and rate it on a scale from 1 to 10. Tell the provider how often you have headaches and how long they last. Also describe any other symptoms you have along with headaches, such as dizziness or blurred vision. You may need tests including a CT scan to make sure there is not a leak in any blood vessels.    Medicines may be given to manage or prevent headaches. The medicine will depend on the type of acute headache you have. Do not wait until the pain is severe before you take your medicine. You may be able to take over-the-counter pain medicines as needed. Examples include NSAIDs and acetaminophen. Ask your healthcare provider which medicine is right for you. Ask how much to take and when to take it. Follow directions. These medicines can cause stomach bleeding or kidney or liver damage if not taken correctly.    Biofeedback may be used to help you manage stress. Electrodes (wires) are placed on your body and attached to a monitor. You will learn how to change stress reactions. For example, you learn to slow your heart rate when you become upset.    Cognitive behavior therapy, or stress management, may be used with other therapies to prevent headaches.  What can I do to manage my symptoms?    Apply heat or ice on the headache area. Use a heat or ice pack. For an ice pack, you can also put crushed ice in a plastic bag. Cover the pack or bag with a towel before you apply it to your skin. Ice and heat both help decrease pain, and heat also helps decrease muscle spasms. Apply heat for 20 to 30 minutes every 2 hours. Apply ice for 15 to 20 minutes every hour. Apply heat or ice for as long and for as many days as directed. You may alternate heat and ice.    Relax your muscles. Lie down in a comfortable position and close your eyes. Relax your muscles slowly. Start at your toes and work your way up your body.    Keep a record of your headaches. Write down when your headaches start and stop. Include your symptoms and what you were doing when the headache began. Record what you ate or drank for 24 hours before the headache started. Describe the pain and where it hurts. Keep track of what you did to treat your headache and if it worked.  What can I do to prevent an acute headache?    Avoid anything that triggers an acute headache. Examples include exposure to chemicals, going to high altitude, or not getting enough sleep. Create a regular sleep routine. Go to sleep at the same time and wake up at the same time each day. Do not use electronic devices before bedtime. These may trigger a headache or prevent you from sleeping well.    Do not smoke. Nicotine and other chemicals in cigarettes and cigars can trigger an acute headache or make it worse. Ask your healthcare provider for information if you currently smoke and need help to quit. E-cigarettes or smokeless tobacco still contain nicotine. Talk to your healthcare provider before you use these products.    Limit alcohol as directed. Alcohol can trigger an acute headache or make it worse. If you have cluster headaches, do not drink alcohol during an episode. For other types of headaches, ask your healthcare provider if it is safe for you to drink alcohol. Ask how much is safe for you to drink, and how often.    Exercise as directed. Exercise can reduce tension and help with headache pain. Aim for 30 minutes of physical activity on most days of the week. Your healthcare provider can help you create an exercise plan.    Eat a variety of healthy foods. Healthy foods include fruits, vegetables, low-fat dairy products, lean meats, fish, whole grains, and cooked beans. Your healthcare provider or dietitian can help you create meals plans if you need to avoid foods that trigger headaches.  When should I seek immediate care?    You have severe pain.    You have numbness or weakness on one side of your face or body.    You have a headache that occurs after a blow to the head, a fall, or other trauma.    You have a headache, are forgetful or confused, or have trouble speaking.    You have a headache, stiff neck, and a fever.  When should I call my doctor?    You have a constant headache and are vomiting.    You have a headache each day that does not get better, even after treatment.    You have changes in your headaches, or new symptoms that occur when you have a headache.    You have questions or concerns about your condition or care.  CARE AGREEMENT:    You have the right to help plan your care. Learn about your health condition and how it may be treated. Discuss treatment options with your healthcare providers to decide what care you want to receive. You always have the right to refuse treatment.    © [employer] 1973, 2023    ABDOMINAL PAIN - General Information    Abdominal Pain    WHAT YOU NEED TO KNOW:    What do I need to know about abdominal pain? Abdominal pain may be felt anywhere between the bottom of your rib cage and your groin. Acute pain usually lasts less than 3 months. Chronic pain lasts longer than 3 months. Your pain may be sharp or dull. The pain may stay in the same place or move around. You may have the pain all the time, or it may come and go. Depending on the cause, you may also have nausea, vomiting, fever, or diarrhea.  Abdominal Organs    What causes abdominal pain? The cause may not be found. The following are common causes:    Overeating, gas pains, or food poisoning    Constipation or diarrhea    An injury    Appendicitis, a hernia, or an ulcer    Infection or a blockage    A liver, gallbladder, or kidney condition  How is the cause of abdominal pain diagnosed? Your healthcare provider will check your abdomen. He or she will ask where your pain is and when it started. Tell him or her if the pain wakes you or stops you from doing your daily activities. Describe anything that makes the pain better or worse. You may also need any of the following:    Blood, urine, or bowel movement samples may be tested for signs of an infection, disease, or injury.    X-ray pictures of your abdomen may show an injury or other cause of the pain.  How is abdominal pain treated?    Prescription pain medicine may be given. Ask your healthcare provider how to take this medicine safely. Some prescription pain medicines contain acetaminophen. Do not take other medicines that contain acetaminophen without talking to your healthcare provider. Too much acetaminophen may cause liver damage. Prescription pain medicine may cause constipation. Ask your healthcare provider how to prevent or treat constipation.    Medicines may be given to calm your stomach or prevent vomiting.    Relaxation therapy may be used along with pain medicine.    Surgery may be needed, depending on the cause.  What can I do to manage or prevent abdominal pain?    Apply heat on your abdomen for 20 to 30 minutes every 2 hours for as many days as directed. Heat helps decrease pain and muscle spasms.    Make changes to the foods you eat, if needed. Do not eat foods that cause abdominal pain or other symptoms. Eat small meals more often. The following changes may also help:  Eat more high-fiber foods if you are constipated. High-fiber foods include fruits, vegetables, whole-grain foods, and legumes such as tovar beans.        Do not eat foods that cause gas if you have bloating. Examples include broccoli, cabbage, beans, and carbonated drinks.    Do not eat foods or drinks that contain sorbitol or fructose if you have diarrhea and bloating. Some examples are fruit juices, candy, jelly, and sugar-free gum.    Do not eat high-fat foods. Examples include fried foods, cheeseburgers, hot dogs, and desserts.    Make changes to the liquids you drink, if needed. Do not drink liquids that cause pain or make it worse, such as orange juice. Drink liquids throughout the day to stay hydrated. The following changes may also help:  Drink more liquids to prevent dehydration from diarrhea or vomiting. Ask your healthcare provider how much liquid to drink each day and which liquids are best for you.    Limit or do not have caffeine. Caffeine may make symptoms such as heartburn or nausea worse.    Limit or do not drink alcohol. Alcohol can make your abdominal pain worse. Ask your healthcare provider if it is okay for you to drink alcohol. Also ask how much is okay for you to drink. A drink of alcohol is 12 ounces of beer, ½ ounce of liquor, or 5 ounces of wine.    Keep a diary of your abdominal pain. A diary may help your healthcare provider learn what is causing your pain. Include when the pain happens, how long it lasts, and what the pain feels like. Write down any other symptoms you have with abdominal pain. Also write down what you eat, and any symptoms you have after you eat.    Manage stress. Stress may cause abdominal pain. Your healthcare provider may recommend relaxation techniques and deep breathing exercises to help decrease your stress. Your healthcare provider may recommend you talk to someone about your stress or anxiety, such as a counselor or a friend. Get plenty of sleep. Exercise regularly.  [employer] Family Walking for Exercise      Do not smoke. Nicotine and other chemicals in cigarettes can damage your esophagus and stomach. Ask your healthcare provider for information if you currently smoke and need help to quit. E-cigarettes or smokeless tobacco still contain nicotine. Talk to your healthcare provider before you use these products.  Call your local emergency number (911 in the [place]) if:    You have chest pain or shortness of breath.    When should I seek immediate care?    You have pulsing pain in your upper abdomen or lower back that suddenly becomes constant.    Your pain is in the right lower abdominal area and worsens with movement.    You have a fever over 100.4°F (38°C) or shaking chills.    You are vomiting and cannot keep food or liquids down.    Your pain does not improve or gets worse over the next 8 to 12 hours.    You see blood in your vomit or bowel movements, or they look black and tarry.    Your skin or the whites of your eyes turn yellow.    You are a woman and have a large amount of vaginal bleeding that is not your monthly period.  When should I call my doctor?    You have pain in your lower back.    You are a man and have pain in your testicles.    You have pain when you urinate.    You have questions or concerns about your condition or care.  CARE AGREEMENT:    You have the right to help plan your care. Learn about your health condition and how it may be treated. Discuss treatment options with your healthcare providers to decide what care you want to receive. You always have the right to refuse treatment.    © [employer] 1973, 2023

## 2024-01-17 NOTE — ED PROVIDER NOTE - ABDOMINAL EXAM
no guarding, no rigidity/soft/nontender.../tender.../nondistended/no organomegaly/no pulsating masses

## 2024-01-17 NOTE — ED ADULT NURSE NOTE - NSFALLRISKASMTTYPE_ED_ALL_ED
[facility]    Home Care Following Endoscopy          Activity:    You have just undergone an endoscopic procedure usually performed with conscious sedation.  Do not work or operate machinery (including a car) for at least 12 hours.      I encourage you to walk and attempt to pass this air as soon as possible.    Diet:    Return to the diet you were on before your procedure but eat lightly for the first 12-24 hours.    Drink plenty of water.    Resume any regular medications unless otherwise advised by your physician.  Please begin any new medication prescribed as a result of your procedure as directed by your physician.     If you had any biopsy or polyp removed please refrain from aspirin or aspirin products for 2 days.  If on Coumadin please restart as instructed by your physician.   Pain:    You may take Tylenol as needed for pain.  Expected Recovery:    You can expect some mild abdominal fullness and/or discomfort due to the air used to inflate your intestinal tract. It is also normal to have a mild sore throat after upper endoscopy.    Call Your Physician if You Have:    After Upper Endoscopy:  o Shoulder, back or chest pain.  o Difficulty breathing or swallowing.  o Vomiting blood.  Any questions or concerns about your recovery, please call [phone] or after hours [phone] Nurse Advice Line.    Follow-up Care:    You should receive a call or letter with your results within 1 week. Please call if you have not received a notification of your results.  If asked to return to clinic please make an appointment 1 week after your procedure.  Call [phone].    Initial (On Arrival)

## 2024-01-17 NOTE — ED PROVIDER NOTE - PROGRESS NOTE DETAILS
BRIJESH MOTLEY:  Labs and radiology results nonactionable.  Pt reports resolution of pain.  Pt tolerating PO.  Strict return precautions given.  Pt medically stable for discharge.  Translation assisted by and discharge instructions reviewed with pt's daughter, Radha, who is at bedside.  Pt to follow up with PMD and neurology.  Reassessment performed and plan for discharge discussed with Dr. Peters who agrees with disposition and discharge plan.

## 2024-04-01 NOTE — PATIENT PROFILE ADULT - NSPROMEDSBROUGHTTOHOSP_GEN_A_NUR
[address] [place] [address] my advocate alfred karen no

## 2024-04-12 NOTE — PHYSICAL THERAPY INITIAL EVALUATION ADULT - ADDITIONAL COMMENTS
no
as per pt, resides in a  with spouse and other families, no stair to enter, one flight up to bedroom with HR, PTA, pt (I) with amb, (I) with ADLs. R hand dominant
per case management assessment and initial PT evaluation from 4/23/2019. Patient lives in private home with spouse and family, no stairs to enter, 1 flight to bedroom. Patient ambulated independent and independent with ADLs prior to admission.

## 2024-05-02 NOTE — PROGRESS NOTE ADULT - ASSESSMENT
69 yr old F h/o CAD, DM, HTN, HLD, hypothyroid, on eliquis and ASA (both off >5 days), presented initially after a fall. Patient brought in by ambulance after being found down by family. Patient was found down by family 4/18/2019 and was found to be altered and had witnessed seizure in ED. Patient then developed status epilepticus and was transferred to [facility] for further management. LP was done and was unremarkable. EEG showed many subclinical seizures. MRI showed left posterior lateral temporal and parietal occipital hyperintense T2   and FLAIR signal. PCR for HSV was negative. Neurosurgery was consulted for possible biopsy.     Plan:  - Continue management per neurology/ID  - Continue holding ASA and eliquis  - Biopsy scheduled for Wednesday 5/1  - Please document medical clearance  - Please obtain preop labs and NPO after midnight For information on Fall & Injury Prevention, visit: [email or web address] OR  [email or web address] OR  https://www.cdc.gov/steadi/patient.html

## 2024-05-16 ENCOUNTER — APPOINTMENT (OUTPATIENT)
Dept: OPHTHALMOLOGY | Facility: CLINIC | Age: 74
End: 2024-05-16

## 2024-05-16 NOTE — ED PROVIDER NOTE - CCCP TRG CHIEF CMPLNT
seizures Go for blood tests as directed. Your doctor will do lab tests at regular visits to monitor the effects of this medicine. Please follow up with your doctor and keep your health care provider appointments.

## 2024-05-29 ENCOUNTER — TRANSCRIPTION ENCOUNTER (OUTPATIENT)
Age: 74
End: 2024-05-29

## 2024-05-29 ENCOUNTER — EMERGENCY (EMERGENCY)
Facility: HOSPITAL | Age: 74
LOS: 1 days | Discharge: HOME | End: 2024-05-29
Attending: STUDENT IN AN ORGANIZED HEALTH CARE EDUCATION/TRAINING PROGRAM | Admitting: STUDENT IN AN ORGANIZED HEALTH CARE EDUCATION/TRAINING PROGRAM
Payer: MEDICARE

## 2024-05-29 VITALS
TEMPERATURE: 98 F | RESPIRATION RATE: 20 BRPM | HEART RATE: 62 BPM | OXYGEN SATURATION: 100 % | SYSTOLIC BLOOD PRESSURE: 188 MMHG | DIASTOLIC BLOOD PRESSURE: 139 MMHG

## 2024-05-29 VITALS
DIASTOLIC BLOOD PRESSURE: 86 MMHG | SYSTOLIC BLOOD PRESSURE: 196 MMHG | OXYGEN SATURATION: 98 % | RESPIRATION RATE: 18 BRPM | TEMPERATURE: 99 F | HEART RATE: 60 BPM

## 2024-05-29 DIAGNOSIS — Z98.89 OTHER SPECIFIED POSTPROCEDURAL STATES: Chronic | ICD-10-CM

## 2024-05-29 DIAGNOSIS — Z90.710 ACQUIRED ABSENCE OF BOTH CERVIX AND UTERUS: Chronic | ICD-10-CM

## 2024-05-29 LAB
ALBUMIN SERPL ELPH-MCNC: 4.2 G/DL — SIGNIFICANT CHANGE UP (ref 3.3–5)
ALP SERPL-CCNC: 164 U/L — HIGH (ref 40–120)
ALT FLD-CCNC: 16 U/L — SIGNIFICANT CHANGE UP (ref 4–33)
ANION GAP SERPL CALC-SCNC: 12 MMOL/L — SIGNIFICANT CHANGE UP (ref 7–14)
APTT BLD: 28.5 SEC — SIGNIFICANT CHANGE UP (ref 24.5–35.6)
AST SERPL-CCNC: 19 U/L — SIGNIFICANT CHANGE UP (ref 4–32)
BASOPHILS # BLD AUTO: 0.04 K/UL — SIGNIFICANT CHANGE UP (ref 0–0.2)
BASOPHILS NFR BLD AUTO: 0.9 % — SIGNIFICANT CHANGE UP (ref 0–2)
BILIRUB SERPL-MCNC: <0.2 MG/DL — SIGNIFICANT CHANGE UP (ref 0.2–1.2)
BUN SERPL-MCNC: 8 MG/DL — SIGNIFICANT CHANGE UP (ref 7–23)
CALCIUM SERPL-MCNC: 8.9 MG/DL — SIGNIFICANT CHANGE UP (ref 8.4–10.5)
CHLORIDE SERPL-SCNC: 107 MMOL/L — SIGNIFICANT CHANGE UP (ref 98–107)
CO2 SERPL-SCNC: 26 MMOL/L — SIGNIFICANT CHANGE UP (ref 22–31)
CREAT SERPL-MCNC: 0.8 MG/DL — SIGNIFICANT CHANGE UP (ref 0.5–1.3)
EGFR: 78 ML/MIN/1.73M2 — SIGNIFICANT CHANGE UP
EOSINOPHIL # BLD AUTO: 0.09 K/UL — SIGNIFICANT CHANGE UP (ref 0–0.5)
EOSINOPHIL NFR BLD AUTO: 2 % — SIGNIFICANT CHANGE UP (ref 0–6)
GLUCOSE SERPL-MCNC: 94 MG/DL — SIGNIFICANT CHANGE UP (ref 70–99)
HCT VFR BLD CALC: 38.8 % — SIGNIFICANT CHANGE UP (ref 34.5–45)
HGB BLD-MCNC: 12 G/DL — SIGNIFICANT CHANGE UP (ref 11.5–15.5)
IANC: 2.33 K/UL — SIGNIFICANT CHANGE UP (ref 1.8–7.4)
IMM GRANULOCYTES NFR BLD AUTO: 0.2 % — SIGNIFICANT CHANGE UP (ref 0–0.9)
INR BLD: 1.09 RATIO — SIGNIFICANT CHANGE UP (ref 0.85–1.18)
LYMPHOCYTES # BLD AUTO: 1.84 K/UL — SIGNIFICANT CHANGE UP (ref 1–3.3)
LYMPHOCYTES # BLD AUTO: 40.3 % — SIGNIFICANT CHANGE UP (ref 13–44)
MCHC RBC-ENTMCNC: 24.8 PG — LOW (ref 27–34)
MCHC RBC-ENTMCNC: 30.9 GM/DL — LOW (ref 32–36)
MCV RBC AUTO: 80.2 FL — SIGNIFICANT CHANGE UP (ref 80–100)
MONOCYTES # BLD AUTO: 0.26 K/UL — SIGNIFICANT CHANGE UP (ref 0–0.9)
MONOCYTES NFR BLD AUTO: 5.7 % — SIGNIFICANT CHANGE UP (ref 2–14)
NEUTROPHILS # BLD AUTO: 2.33 K/UL — SIGNIFICANT CHANGE UP (ref 1.8–7.4)
NEUTROPHILS NFR BLD AUTO: 50.9 % — SIGNIFICANT CHANGE UP (ref 43–77)
NRBC # BLD: 0 /100 WBCS — SIGNIFICANT CHANGE UP (ref 0–0)
NRBC # FLD: 0 K/UL — SIGNIFICANT CHANGE UP (ref 0–0)
NT-PROBNP SERPL-SCNC: 208 PG/ML — SIGNIFICANT CHANGE UP
PLATELET # BLD AUTO: 232 K/UL — SIGNIFICANT CHANGE UP (ref 150–400)
POTASSIUM SERPL-MCNC: 3.7 MMOL/L — SIGNIFICANT CHANGE UP (ref 3.5–5.3)
POTASSIUM SERPL-SCNC: 3.7 MMOL/L — SIGNIFICANT CHANGE UP (ref 3.5–5.3)
PROT SERPL-MCNC: 7.5 G/DL — SIGNIFICANT CHANGE UP (ref 6–8.3)
PROTHROM AB SERPL-ACNC: 12.2 SEC — SIGNIFICANT CHANGE UP (ref 9.5–13)
RBC # BLD: 4.84 M/UL — SIGNIFICANT CHANGE UP (ref 3.8–5.2)
RBC # FLD: 14.6 % — HIGH (ref 10.3–14.5)
SODIUM SERPL-SCNC: 145 MMOL/L — SIGNIFICANT CHANGE UP (ref 135–145)
TROPONIN T, HIGH SENSITIVITY RESULT: 12 NG/L — SIGNIFICANT CHANGE UP
TROPONIN T, HIGH SENSITIVITY RESULT: 12 NG/L — SIGNIFICANT CHANGE UP
WBC # BLD: 4.57 K/UL — SIGNIFICANT CHANGE UP (ref 3.8–10.5)
WBC # FLD AUTO: 4.57 K/UL — SIGNIFICANT CHANGE UP (ref 3.8–10.5)

## 2024-05-29 PROCEDURE — 99285 EMERGENCY DEPT VISIT HI MDM: CPT

## 2024-05-29 PROCEDURE — 71046 X-RAY EXAM CHEST 2 VIEWS: CPT | Mod: 26

## 2024-05-29 PROCEDURE — 71045 X-RAY EXAM CHEST 1 VIEW: CPT | Mod: 26,XE

## 2024-05-29 PROCEDURE — 72100 X-RAY EXAM L-S SPINE 2/3 VWS: CPT | Mod: 26

## 2024-05-29 PROCEDURE — 73502 X-RAY EXAM HIP UNI 2-3 VIEWS: CPT | Mod: 26,RT

## 2024-05-29 PROCEDURE — 93010 ELECTROCARDIOGRAM REPORT: CPT

## 2024-05-29 RX ORDER — LIDOCAINE 4 G/100G
1 CREAM TOPICAL ONCE
Refills: 0 | Status: COMPLETED | OUTPATIENT
Start: 2024-05-29 | End: 2024-05-29

## 2024-05-29 RX ORDER — ACETAMINOPHEN 500 MG
1000 TABLET ORAL ONCE
Refills: 0 | Status: COMPLETED | OUTPATIENT
Start: 2024-05-29 | End: 2024-05-29

## 2024-05-29 RX ADMIN — Medication 400 MILLIGRAM(S): at 11:24

## 2024-05-29 RX ADMIN — LIDOCAINE 1 PATCH: 4 CREAM TOPICAL at 11:24

## 2024-05-29 NOTE — ED PROVIDER NOTE - NS ED ATTENDING STATEMENT MOD
Attending with This was a shared visit with the MIGUEL A. I reviewed and verified the documentation.

## 2024-05-29 NOTE — ED PROVIDER NOTE - CLINICAL SUMMARY MEDICAL DECISION MAKING FREE TEXT BOX
74yF w/pmhx DM2, HTN, CAD w/5 stents, hypothyroid, afib on eliquis, hx of brain lesion with craniotomy BIBEMS with right leg pain x 2 weeks, low back pain and shortness of breath this morning. Pt seen by her PMD 2 days ago and was prescribed Fioricet for pain which she has been taking with Tylenol and does have some relief. Pt states this morning she had shortness of breath, also with bilateral chest pain for which she called EMS. Additionally her reports intermittent pain near her umbilicus since having surgery years ago, no pain today. On exam pt is well appearing, O2 sat normal on RA, BP equal in both arms, no abdominal tenderness, no skin changes near umbilicus, FROM of RLE, pt is ambulatory, no midline spinal tenderness, does have +straight leg raise right leg. Concern for likely sciatica/disc disease, no red flags for cauda equina/cord compression. Pt with hx of CAD/MI requiring 5 stents, given chest pain and SOB this morning will r/o ACS. Plan: EKG, cbc/cmp, trop, bnp, CXR, lumbar spine xray, pain control. Pt will likely require admission for ACS workup.

## 2024-05-29 NOTE — ED PROVIDER NOTE - PROGRESS NOTE DETAILS
BRIJESH Cain: Patient reassessed, reports improvement in leg pain and back pain, is ambulatory in ED without assistance.  Daughter at bedside.  Patient offered admission for chest pain and shortness of breath but states she prefers outpatient follow-up.  Patient states that she feels fine and she regularly experiences shortness of breath and chest pain.  Patient will return to the ER with any worsening or concerning symptoms, she also follow-up with her primary care doctor.

## 2024-05-29 NOTE — ED PROVIDER NOTE - PHYSICAL EXAMINATION
MSK: no midline spinal tenderness or left hip tenderness, +straight leg raise right side, FROM of RLE, pt is ambulatory in ED  no leg swelling, no calf tenderness

## 2024-05-29 NOTE — ED PROVIDER NOTE - CARE PLAN
1 Principal Discharge DX:	Right leg pain  Secondary Diagnosis:	Right low back pain  Secondary Diagnosis:	Shortness of breath

## 2024-05-29 NOTE — ED PROVIDER NOTE - TEMPLATE, MLM
Dayana Brooke from [facility] [employer] called and asked if we could put in a referral for his sports med visit which is on 6/24   Thank you
General

## 2024-05-29 NOTE — ED ADULT TRIAGE NOTE - CHIEF COMPLAINT QUOTE
c/o chest pain X 1 week. endorsing dyspnea on exertion. hypertensive in triage. O2 98% room air. arrives on 4L NC for comfort. received 324 of ASA prior to arrival. arrives with 20G IVL to L AC. hx of CAD s/p 5 stents HTN, DM II. FS 94.

## 2024-05-29 NOTE — ED PROVIDER NOTE - OBJECTIVE STATEMENT
74yF w/pmhx DM2, HTN, 74yF w/pmhx DM2, HTN, CAD w/5 stents, hypothyroid, afib on eliquis, hx of brain lesion with craniotomy BIBEMS with right leg pain, low back pain and shortness of breath this morning. Pt states for the past 2 weeks she has RLE pain, states pain is from her foot to her hip, also with pain in the low back. Pt seen by her PMD 2 days ago and was prescribed Fioricet for pain which she has been taking with Tylenol and does have some relief. Pt states this morning she felt pain 74yF w/pmhx DM2, HTN, CAD w/5 stents, hypothyroid, afib on eliquis, hx of brain lesion with craniotomy BIBEMS with right leg pain, low back pain and shortness of breath this morning. Pt states for the past 2 weeks she has RLE pain, states pain is from her foot to her hip, also with pain in the low back. Pt seen by her PMD 2 days ago and was prescribed Fioricet for pain which she has been taking with Tylenol and does have some relief. Pt states this morning she had shortness of breath, also with bilateral chest pain for which she called EMS. Additionally her reports intermittent pain near her umbilicus since having surgery years ago, no pain today. Pt denies fever/chills, headache, dizziness, palpitations, abd pain, n/v/d, leg swelling, recent travel or any other concerns.

## 2024-05-29 NOTE — ED ADULT TRIAGE NOTE - O2 FLOW (L/MIN)
Subjective:   Rox Valdez is a 79 y.o. female who presents today as a follow-up for  Her chronic diastolic heart failure and cor pulmonale. Since she was last seen she stopped the Lasix. She's also been having issues with low potassium. She was seen in the ER for potassium of 2.8 receive IV therapy. She is also on oral replacement and thinks that her labs were rechecked but doesn't know the results of these. She thinks she had this done about a week to 2 weeks ago.    Past Medical History:   Diagnosis Date   • Atrial fibrillation (CMS-[facility])    • COPD (chronic obstructive pulmonary disease) (CMS-HCC)    • Cor pulmonale (CMS-HCC)    • HTN (hypertension)    • Long term (current) use of anticoagulants    • PVC (premature ventricular contraction)    • Unspecified menopausal and postmenopausal disorder      Past Surgical History:   Procedure Laterality Date   • CATARACT EXTRACTION WITH IOL     • CHOLECYSTECTOMY      LAPAROSCOPIC.   • KNEE ARTHROSCOPY     • RETINAL DETACHMENT REPAIR       Family History   Problem Relation Age of Onset   • Hypertension Mother    • Heart Disease Brother      Atrial Fibrillation.     History   Smoking Status   • Former Smoker   • Packs/day: 1.00   • Years: 28.00   • Types: Cigarettes   • Start date: 1/1/1953   • Quit date: 1/1/1981   Smokeless Tobacco   • Never Used     No Known Allergies  Outpatient Encounter Prescriptions as of 9/11/2017   Medication Sig Dispense Refill   • apixaban (ELIQUIS) 5mg Tab Take 1 Tab by mouth 2 Times a Day. 180 Tab 0   • budesonide-formoterol (SYMBICORT) 160-4.5 MCG/ACT Aerosol Inhale 2 Puffs by mouth 2 Times a Day. 1 Inhaler 11   • Umeclidinium Bromide (INCRUSE ELLIPTA) 62.5 MCG/INH AEROSOL POWDER, BREATH ACTIVATED Inhale 1 Puff by mouth every day. 1 Each 5   • tiotropium (SPIRIVA HANDIHALER) 18 MCG Cap Use 1 capsule inhaled from handihaler every day. 90 Cap 3   • hydrochlorothiazide (HYDRODIURIL) 12.5 MG tablet Take 12.5 mg by mouth every day.     • losartan  "(COZAAR) 25 MG Tab Take 25 mg by mouth every day.     • atenolol (TENORMIN) 50 MG Tab Take 50 mg by mouth every day.     • acetaminophen (TYLENOL) 325 MG TABS Take 650 mg by mouth every four hours as needed.     • Omega-3 Fatty Acids (FISH OIL PO) Take 1 Cap by mouth every day. Pt. Taking 2 times a week     • potassium chloride CR (K-DUR) 10 MEQ tablet Take 10 mEq by mouth every day.       • Ascorbic Acid (VITAMIN C PO) Take 1 Tab by mouth every day.       • [DISCONTINUED] potassium chloride SA (KDUR) 20 MEQ Tab CR Take 1 Tab by mouth 2 times a day. (Patient not taking: Reported on 9/11/2017) 30 Tab 0   • [DISCONTINUED] albuterol (VENTOLIN OR PROVENTIL) 108 (90 BASE) MCG/ACT Aero Soln inhalation aerosol Inhale 2 Puffs by mouth every four hours as needed for Shortness of Breath.     • [DISCONTINUED] Prenatal Vit-Fe Fumarate-FA (PRENATAL ONE DAILY PO) Take  by mouth.       No facility-administered encounter medications on file as of 9/11/2017.      Review of Systems   Constitutional: Negative.  Negative for chills, fever and malaise/fatigue.   HENT: Negative.  Negative for sore throat.    Eyes: Negative.    Respiratory: Negative.  Negative for cough, hemoptysis, sputum production, shortness of breath, wheezing and stridor.    Cardiovascular: Negative.  Negative for chest pain, palpitations, orthopnea, claudication, leg swelling and PND.   Gastrointestinal: Negative.    Genitourinary: Negative.    Musculoskeletal: Negative.    Skin: Negative.    Neurological: Negative.  Negative for dizziness, loss of consciousness and weakness.   Endo/Heme/Allergies: Negative.  Does not bruise/bleed easily.   All other systems reviewed and are negative.       Objective:   /74   Pulse 98   Ht 1.575 m (5' 2\")   Wt 59.4 kg (131 lb)   SpO2 93%   BMI 23.96 kg/m²     Physical Exam   Constitutional: She is oriented to person, place, and time. She appears well-developed and well-nourished. No distress.   HENT:   Head: " Normocephalic.   Mouth/Throat: Oropharynx is clear and moist.   Eyes: EOM are normal. Pupils are equal, round, and reactive to light. Right eye exhibits no discharge. Left eye exhibits no discharge. No scleral icterus.   Neck: Normal range of motion. Neck supple. No JVD present. No tracheal deviation present.   Cardiovascular: Normal rate, regular rhythm, S1 normal, S2 normal, normal heart sounds, intact distal pulses and normal pulses.  Exam reveals no gallop, no S3, no S4 and no friction rub.    No murmur heard.   No systolic murmur is present    No diastolic murmur is present   Pulses:       Carotid pulses are 2+ on the right side, and 2+ on the left side.       Radial pulses are 2+ on the right side, and 2+ on the left side.        Dorsalis pedis pulses are 2+ on the right side, and 2+ on the left side.        Posterior tibial pulses are 2+ on the right side, and 2+ on the left side.   Pulmonary/Chest: Effort normal and breath sounds normal. No respiratory distress. She has no wheezes. She has no rales.   Abdominal: Soft. Bowel sounds are normal. She exhibits no distension and no mass. There is no tenderness. There is no rebound and no guarding.   Musculoskeletal: She exhibits no edema.   Neurological: She is alert and oriented to person, place, and time. No cranial nerve deficit.   Skin: Skin is warm and dry. She is not diaphoretic. No pallor.   Psychiatric: She has a normal mood and affect. Her behavior is normal. Judgment and thought content normal.   Nursing note and vitals reviewed.      Assessment:     1. Paroxysmal atrial fibrillation (CMS-[facility])     2. Chronic obstructive pulmonary disease, unspecified COPD type (CMS-HCC)     3. Cor pulmonale (CMS-[facility])     4. Essential hypertension         Medical Decision Making:  Today's Assessment / Status / Plan:     79-year-old female with cor pulmonale and diastolic dysfunction and permanent atrial fibrillation. No changes to her medical therapy today. I will see her  4 back in one year. If she cannot get her labs from her primary care I did offer to put an order in the computer but she will let me know.    Thank for you allowing me to take part in your patient's care, please call should you have any questions or would like to discuss this patient.

## 2024-05-29 NOTE — ED PROVIDER NOTE - PATIENT PORTAL LINK FT
You can access the FollowMyHealth Patient Portal offered by [facility] by registering at the following website: [email or web address]. By joining [employer]’s FollowMyHealth portal, you will also be able to view your health information using other applications (apps) compatible with our system.

## 2024-05-29 NOTE — ED PROVIDER NOTE - NSFOLLOWUPINSTRUCTIONS_ED_ALL_ED_FT
Follow-up with your primary care doctor within 1 week  Follow-up with your cardiologist, discuss chest pain and shortness of breath  Follow-up with the spine center for your back pain, office information attached please call to make an appointment  Continue taking medications as previously prescribed to you  Return to the ER with any worsening or concerning symptoms, numbness, weakness, difficulty walking, bowel or bladder incontinence, worsening chest pain or shortness of breath or any other concerns. Follow-up with your primary care doctor within 1 week  Follow-up with your cardiologist within 1 week, discuss chest pain and shortness of breath  Follow-up with the spine center for your back pain, office information attached please call to make an appointment  Continue taking medications as previously prescribed to you  Return to the ER with any worsening or concerning symptoms, numbness, weakness, difficulty walking, bowel or bladder incontinence, worsening chest pain or shortness of breath or any other concerns.

## 2024-05-29 NOTE — ED PROVIDER NOTE - ATTENDING APP SHARED VISIT CONTRIBUTION OF CARE
74-year-old female past medical history diabetes, hypertension, CAD, hypothyroidism, A-fib on Eliquis, remote revision requiring craniotomy presents for right leg pain lower back pain shortness of breath, chest pain.  No clear inciting event..  He was prescribed by PCP Akhil with minimal relief of her symptoms.  Patient denies fevers, chills, cough, nausea, vomiting, diarrhea, abdominal pain, urinary symptoms.    Exam as above  Plan: Labs, x-ray, symptom relief, reassess.  Please see progress notes for additional details.

## 2024-05-29 NOTE — ED ADULT NURSE NOTE - OBJECTIVE STATEMENT
Presents to ED with complaints of Right leg, back and ABD pain x 2 days. aox3 voiced she is on 2L/NC at home, no s/s of distress. pt noted with elevated Bp. placed on cardiac monitoring awaiting MD

## 2024-05-29 NOTE — ED ADULT NURSE NOTE - NSFALLRISKINTERV_ED_ALL_ED

## 2024-05-30 ENCOUNTER — NON-APPOINTMENT (OUTPATIENT)
Age: 74
End: 2024-05-30

## 2024-05-30 ENCOUNTER — APPOINTMENT (OUTPATIENT)
Dept: OPHTHALMOLOGY | Facility: CLINIC | Age: 74
End: 2024-05-30
Payer: MEDICARE

## 2024-05-30 PROCEDURE — 92014 COMPRE OPH EXAM EST PT 1/>: CPT | Mod: 25

## 2024-05-30 PROCEDURE — 92083 EXTENDED VISUAL FIELD XM: CPT

## 2024-05-30 PROCEDURE — 92133 CPTRZD OPH DX IMG PST SGM ON: CPT

## 2024-08-09 NOTE — PROGRESS NOTE ADULT - SUBJECTIVE AND OBJECTIVE BOX
Continue activity as tolerated.   INTERVAL HISTORY: patient reports she did not have any headaches last night. patient had subclinical seizures on EEG. blood pressures still 140s-160s/60s-80s    MEDICATIONS  (STANDING):  amLODIPine   Tablet 10 milliGRAM(s) Oral daily  apixaban 5 milliGRAM(s) Oral every 12 hours  aspirin enteric coated 81 milliGRAM(s) Oral daily  cloNIDine 0.1 milliGRAM(s) Oral every 12 hours  dextrose 5%. 1000 milliLiter(s) (50 mL/Hr) IV Continuous <Continuous>  dextrose 50% Injectable 12.5 Gram(s) IV Push once  dextrose 50% Injectable 25 Gram(s) IV Push once  dextrose 50% Injectable 25 Gram(s) IV Push once  hydrALAZINE 25 milliGRAM(s) Oral three times a day  insulin glargine Injectable (LANTUS) 18 Unit(s) SubCutaneous at bedtime  insulin lispro (HumaLOG) corrective regimen sliding scale   SubCutaneous three times a day before meals  insulin lispro Injectable (HumaLOG) 6 Unit(s) SubCutaneous three times a day before meals  labetalol 100 milliGRAM(s) Oral two times a day  levETIRAcetam 1000 milliGRAM(s) Oral <User Schedule>  levothyroxine 75 MICROGram(s) Oral daily  lisinopril 40 milliGRAM(s) Oral daily  OXcarbazepine 150 milliGRAM(s) Oral two times a day  pantoprazole    Tablet 40 milliGRAM(s) Oral before breakfast  polyethylene glycol 3350 17 Gram(s) Oral daily    MEDICATIONS  (PRN):  acetaminophen   Tablet .. 650 milliGRAM(s) Oral every 6 hours PRN Mild Pain (1 - 3), Moderate Pain (4 - 6), Severe Pain (7 - 10)  dextrose 40% Gel 15 Gram(s) Oral once PRN Blood Glucose LESS THAN 70 milliGRAM(s)/deciliter  glucagon  Injectable 1 milliGRAM(s) IntraMuscular once PRN Glucose LESS THAN 70 milligrams/deciliter  hydrALAZINE Injectable 5 milliGRAM(s) IV Push every 2 hours PRN SBP > 150/90  ibuprofen  Tablet. 400 milliGRAM(s) Oral three times a day PRN Moderate Pain (4 - 6)  LORazepam   Injectable 2 milliGRAM(s) IV Push once PRN Seizure activity    ALLERGIES/INTOLERANCES:  Allergies  No Known Allergies    Intolerances    VITALS & EXAMINATION:  Vital Signs Last 24 Hrs  T(C): 36.5 (24 Jul 2019 12:55), Max: 36.9 (24 Jul 2019 00:14)  T(F): 97.7 (24 Jul 2019 12:55), Max: 98.4 (24 Jul 2019 00:14)  HR: 69 (24 Jul 2019 12:55) (61 - 88)  BP: 126/70 (24 Jul 2019 12:55) (126/70 - 177/88)  BP(mean): --  RR: 18 (24 Jul 2019 12:55) (18 - 18)  SpO2: 99% (24 Jul 2019 12:55) (97% - 99%)    General:  Constitutional: Obese Female, appears stated age, in no apparent distress including pain    Neurological (>12):  MS: Awake, alert, oriented to person, place, situation, time. Normal affect. Follows all commands.    Language: Speech is clear, fluent. Finger agnosia, able to name pen, hand paper. shows 2 fingers, cannot follow crossed commands. repetition impaired.    CNs: No gross facial asymmetry b/l.    Motor: full strength biceps and triceps b/l. 5/5 hand  b/l. 5/5 KF and KE b/l.      LABORATORY:    CBC                       9.6    4.55  )-----------( 201      ( 18 Jul 2019 13:29 )             31.7     Chem 07-18    141  |  109<H>  |  17  ----------------------------<  123<H>  3.9   |  16<L>  |  0.65    Ca    9.5      18 Jul 2019 13:29    A1c 07-16 [id number] 10.6

## 2024-12-05 ENCOUNTER — APPOINTMENT (OUTPATIENT)
Dept: OPHTHALMOLOGY | Facility: CLINIC | Age: 74
End: 2024-12-05

## 2025-01-25 NOTE — ASU PREOP CHECKLIST - TEMPERATURE IN CELSIUS (DEGREES C)
"      [facility] Medicine Services  HISTORY AND PHYSICAL    Patient Name: Malcolm oCsta  : 1963  MRN: [id number]  Primary Care Physician: Sita Chase APRN  Date of admission: 2025      Subjective   Subjective     Chief Complaint:  Generalized weakness    HPI:  Malcolm Costa is a 61 y.o. male with history of hemochromatosis, DM2, hyperlipidemia, hypertension, GERD, depression and anxiety, who presents with generalized weakness and poor p.o. intake.  The patient is a poor historian and his father at bedside provides most details.    Mr. Costa underwent elective upper and lower endoscopy on 2025 (Dr. Mccrary).  According to the patient and his father, Mr. Costa has been extremely fatigued with occasional nausea and loose stool for at least the past 3 months.  He states he has unintentionally lost 60 pounds.  The patient says he was taken off of his blood pressure and diabetes medications last month by his PCP.  He has been very weak, and according to his father sometimes \"staggers around like an old man.\"      The patient was seen in the [place] ED yesterday for chest discomfort.  Symptoms improved with IV morphine.  Labs were reassuring and the patient was discharged home.     Today Mr Costa's father says the patient was very weak and confused, and therefore brought him to the ED.      The patient chest pain.  He endorses a cough which he says in chronic.  No nausea, abdominal pain or loose stool.  Prior history of Campylobacter infection that he says was treated last year.  No fevers or chills.  Recent I&D of right scalp abscess, states he has completed antibiotics and the area behind his right ear feels much better.  Denies loss of sensation in his lower extremities.          Personal History     Past Medical History:   Diagnosis Date   • Allergic rhinitis    • Anxiety    • Dermatitis 2016   • Diabetes mellitus    • Diverticulitis    • Gastroesophageal reflux disease " 7/11/2016   • GERD (gastroesophageal reflux disease)    • History of alcohol abuse    • Hypertension    • Insomnia 7/11/2016   • Visual impairment 7/11/2016   • Vitamin D deficiency 7/11/2016           Past Surgical History:   Procedure Laterality Date   • APPENDECTOMY  1995   • CHOLECYSTECTOMY WITH INTRAOPERATIVE CHOLANGIOGRAM N/A 2/1/2024    Procedure: CHOLECYSTECTOMY LAPAROSCOPIC WITH INTRAOPERATIVE CHOLANGIOGRAM, UMBILICAL HERNIA REPAIR;  Surgeon: Adam Ramos MD;  Location:  [facility];  Service: General;  Laterality: N/A;   • ENDOSCOPY N/A 2/2/2024    Procedure: ESOPHAGOGASTRODUODENOSCOPY;  Surgeon: Dominik Chase MD;  Location:  [facility];  Service: Gastroenterology;  Laterality: N/A;   • ERCP N/A 2/2/2024    Procedure: ENDOSCOPIC RETROGRADE CHOLANGIOPANCREATOGRAPHY;  Surgeon: Dominik Chase MD;  Location:  [facility];  Service: Gastroenterology;  Laterality: N/A;  Sphincterotomy made to common bile duct (CBD) ampulla. CBD swept with 9-12 mm balloon. ERCP scope removed with plastic cap intact.   • HERNIA REPAIR  2010   • TONSILLECTOMY  1974       Family History: family history includes Alcohol abuse in his father; Diabetes in his paternal grandmother; Hypertension in his father, maternal grandfather, maternal grandmother, mother, paternal grandfather, and paternal grandmother; Multiple myeloma in his mother; Thyroid disease in an other family member.     Social History:  reports that he has never smoked. He has never been exposed to tobacco smoke. He has never used smokeless tobacco. He reports that he does not currently use alcohol. He reports that he does not use drugs.  Social History     Social History Narrative   • Not on file       Medications:  Available home medication information reviewed.  FreeStyle Dagoberto 2 [place], FreeStyle Dagoberto 2 Sensor, Insulin Syringe-Needle U-100, accu-chek soft touch, buPROPion XL, citalopram, glucose blood, metFORMIN ER, and omeprazole    Allergies   Allergen  Reactions   • Shellfish Allergy Anaphylaxis   • Shellfish-Derived Products Anaphylaxis   • Codeine Nausea Only     Not sure of reaction but thinks it is just nausea       Objective   Objective     Vital Signs:   Temp:  [98.6 °F (37 °C)] 98.6 °F (37 °C)  Heart Rate:  [112-116] 112  Resp:  [18] 18  BP: (114-129)/(68-75) 114/75       Physical Exam   Appears fatigued, in bed  MM dry  Tachycardic, regular  Breath sounds slightly diminished bilaterally, no rhonchi, rales or wheezes  Abdomen soft   equal, no drift or asterixis  Lifts each leg against resistance.  No clonus  Awake, speech soft and brief at times.  Knows year, place [place].  Flat affect  Quarter sized area of induration behind right ear with no fluctuance, warmth or significant erythema    Result Review:  I have personally reviewed the results from the time of this admission to 1/25/2025 17:38 EST and agree with these findings:  [x]  Laboratory list / accordion  []  Microbiology  [x]  Radiology  [x]  EKG/Telemetry   []  Cardiology/Vascular   []  Pathology  [x]  Old records  []  Other:  Most notable findings include:       LAB RESULTS:      Lab 01/25/25  1510 01/24/25  1832   WBC 8.48 2.93*   HEMOGLOBIN 11.1* 11.6*   HEMATOCRIT 33.0* 36.1*   PLATELETS 110* 123*   NEUTROS ABS 6.59 1.83   IMMATURE GRANS (ABS) 0.05 0.01   LYMPHS ABS 1.13 0.82   MONOS ABS 0.69 0.19   EOS ABS 0.01 0.08   MCV 89.9 91.4   PROCALCITONIN 7.37*  --    LACTATE 2.7*  --    D DIMER QUANT  --  0.50         Lab 01/25/25  1510 01/24/25  1832   SODIUM 137 139   POTASSIUM 3.7 3.8   CHLORIDE 102 102   CO2 19.0* 23.2   ANION GAP 16.0* 13.8   BUN 15 10   CREATININE 1.14 0.92   EGFR 74.1 95.8   GLUCOSE 277* 174*   CALCIUM 8.8 8.7   MAGNESIUM 1.6  --          Lab 01/25/25  1510 01/24/25  1832   TOTAL PROTEIN 7.0 7.2   ALBUMIN 3.9 3.8   GLOBULIN 3.1 3.4   ALT (SGPT) 136* 40   AST (SGOT) 229* 100*   BILIRUBIN 3.5* 0.8   ALK PHOS 379* 203*   LIPASE  --  34         Lab 01/25/25  8354  01/25/25  1510 01/24/25  1942 01/24/25  1832   PROBNP  --   --   --  489.0   HSTROP T 29* 31* 19 21                 UA          2/1/2024    00:33 1/10/2025    15:41 1/25/2025    14:56   Urinalysis   Squamous Epithelial Cells, UA None Seen  0-2  0-2    Specific Gravity, UA 1.044  1.020  1.018    Ketones, UA 15 mg/dL (1+)  Negative  Negative    Blood, UA Small (1+)  Negative  Negative    Leukocytes, UA Negative  Negative  Negative    Nitrite, UA Negative  Negative  Negative    RBC, UA 0-2  None Seen  3-5    WBC, UA 0-2  0-2  0-2    Bacteria, UA None Seen  None Seen  None Seen        Microbiology Results (last 10 days)       Procedure Component Value - Date/Time    Respiratory Panel PCR w/COVID-19(SARS-CoV-2) [facility] In-House, NP Swab in UTM/VTM, 2 HR TAT - Swab, Nasopharynx [[id number]]  (Normal) Collected: 01/25/25 1506    Lab Status: Final result Specimen: Swab from Nasopharynx Updated: 01/25/25 1615     ADENOVIRUS, PCR Not Detected     Coronavirus 229E Not Detected     Coronavirus HKU1 Not Detected     Coronavirus NL63 Not Detected     Coronavirus OC43 Not Detected     COVID19 Not Detected     Human Metapneumovirus Not Detected     Human Rhinovirus/Enterovirus Not Detected     Influenza A PCR Not Detected     Influenza B PCR Not Detected     Parainfluenza Virus 1 Not Detected     Parainfluenza Virus 2 Not Detected     Parainfluenza Virus 3 Not Detected     Parainfluenza Virus 4 Not Detected     RSV, PCR Not Detected     Bordetella pertussis pcr Not Detected     Bordetella parapertussis PCR Not Detected     Chlamydophila pneumoniae PCR Not Detected     Mycoplasma pneumo by PCR Not Detected    Narrative:      In the setting of a positive respiratory panel with a viral infection PLUS a negative procalcitonin without other underlying concern for bacterial infection, consider observing off antibiotics or discontinuation of antibiotics and continue supportive care. If the respiratory panel is positive for  atypical bacterial infection (Bordetella pertussis, Chlamydophila pneumoniae, or Mycoplasma pneumoniae), consider antibiotic de-escalation to target atypical bacterial infection.            CT Chest Without Contrast Diagnostic    Result Date: 1/25/2025  CT CHEST WO CONTRAST DIAGNOSTIC Date of Exam: 1/25/2025 3:53 PM EST Indication: ams. Comparison: None available. Technique: Axial CT images were obtained of the chest without contrast administration.  Reconstructed coronal and sagittal images were also obtained. Automated exposure control and iterative construction methods were used. FINDINGS: Scattered atelectasis is noted. No well-defined consolidations or pleural effusions are observed. Granulomas are noted in the left lower lobe. No suspicious pulmonary nodules or abnormal pulmonary masses are seen. No significant hilar, mediastinal, or axillary lymphadenopathy is observed. Calcified left hilar lymph nodes are noted. A normal aortic arch branching pattern is identified. Coronary artery calcifications are identified. The thyroid gland is unremarkable. The esophagus is unremarkable. The limited evaluation of the upper abdomen demonstrates no evidence for acute abnormality. There is evidence for diffuse hepatic fatty infiltration with associated hepatosplenomegaly. No acute osseous abnormalities are observed.     Impression: 1.No evidence for acute intrathoracic abnormality. 2.Coronary artery calcifications are noted. Electronically Signed: Andrew Tillman MD  1/25/2025 4:16 PM EST  Workstation ID: [id number]    CT Abdomen Pelvis Without Contrast    Result Date: 1/25/2025  CT ABDOMEN PELVIS WO CONTRAST Date of Exam: 1/25/2025 3:53 PM EST Indication: ams. Comparison: None available. Technique: Axial CT images were obtained of the abdomen and pelvis without the administration of contrast. Reconstructed coronal and sagittal images were also obtained. Automated exposure control and iterative construction methods were  used. FINDINGS: Lung bases: Calcified left hilar lymph nodes. Left lower lobe calcified granulomata. Atheromatous disease of the coronary vessels. Liver: Geographic areas of decreased attenuation within the liver suggesting hepatic steatosis. Spleen: Splenic granulomata Pancreas:No pancreatic masses. No evidence of pancreatitis. Gallbladder and common bile duct: Previous cholecystectomy. Adrenal glands:No adrenal masses Kidneys and ureters:No kidney stones. No renal masses.No calculi present within the ureters. Normal caliber ureters. Urinary bladder:No urinary bladder wall thickening. No bladder masses. Small bowel:Normal caliber small bowel. Large bowel:No diverticulosis or diverticulitis. No large bowel masses are appreciated Appendix: Not seen however there is no evidence of appendicitis GENITOURINARY: Normal prostate Ascites or pneumoperitoneum:None. Adenopathy:None present Osseous structures: The proximal femurs are intact. The pubic bones are intact. The sacrum and sacroiliac joints are normal. The spinous and transverse processes are intact. No rib fractures. Other findings: Fat-containing umbilical hernias.     Impression: 1.No acute findings in the abdomen or pelvis. 2.Hepatic steatosis. 3.Fat-containing umbilical hernias. Electronically Signed: Stefano Garcia MD  1/25/2025 4:11 PM EST  Workstation ID: [id number]    CT Head Without Contrast    Result Date: 1/25/2025  CT HEAD WO CONTRAST Date of Exam: 1/25/2025 3:53 PM EST Indication: ams. Comparison: 12/15/2014 Technique: Axial CT images were obtained of the head without contrast administration.  Automated exposure control and iterative construction methods were used. Findings: Gray-white matter differentiation is maintained without evidence of an acute infarction. No intracranial mass or mass effect. No extra-axial mass or collection. The ventricles and sulci are normal in size and configuration. The posterior fossa appears normal. Sellar and suprasellar  structures are normal. Orbital and paranasal soft tissues are normal. Opacification of the left maxillary sinus with mucoperiosteal reactive changes consistent with chronic sinusitis. The bony calvarium appears intact. No acute fractures. No lytic or blastic bony diseases.     Impression: Impression: No acute intracranial pathology. Electronically Signed: Stefano Garcia MD  1/25/2025 4:07 PM EST  Workstation ID: [id number]    XR Chest 1 View    Result Date: 1/24/2025  XR CHEST 1 VW Date of Exam: 1/24/2025 6:38 PM EST Indication: Chest Pain Triage Protocol Comparison: 1/10/2025 Findings: 3 cm lordotic projection. Heart size is at the upper limits of normal. Pulmonary vessels are normal. Lungs are clear. No pleural effusion. No pneumothorax.     Impression: Impression: 1. No acute cardiopulmonary disease. Electronically Signed: Elijah Kraft MD  1/24/2025 7:09 PM EST  Workstation ID: [id number]         Assessment & Plan   Assessment & Plan       Elevated LFTs      Generalized weakness  Unintentional weight loss  - progressive weakness over three months  - PT/OT  - nutrition consult  - check b12/folate levels  - IV thiamine  - consider Neurology consultation    Elevated LFTs  Hemachromatosis  Thrombocytopenia  - normal bili on yesterday, bilirubin 3.5 today  - prior cholecystectomy 2/2024  - MRI abdomen 12/18/14 showed mild iron deposition in the liver with suggestion of hepatic steatosis.  Mild splenomegaly also noted.  - elevated lactate and procalcitonin -- will obtain blood cultures, start empiric rocephin  - IV fluids  - check liver ultrasound for retained stone  - iron studies am  - check INR and ammonia levels  - check echo given diagnosis of hemachromatosis  - GI consult am    GERD  Colonic inflammation  - EGD and colonoscopy 1/23/25 showed upper: erythematous stomach mucosa and lower: congested mucosa with ulcerations on the ileocecal valve  - protonix    DMII  HTN  - patient states he was taken off his diabetes  and antihypertensive medications one month ago by his PCP  - check A1c  - SSI    Depression and anxiety  - continue citalopram  - father concerned patient recently took oxycodone from his mother's home supply.  Patient says he had one dose earlier this week.  - check UDS      VTE Prophylaxis:  mechanical for now given thrombocytopenia and erythemetous gastric mucosa on recent EGD  Pharmacologic VTE prophylaxis orders are present.          CODE STATUS:    There are no questions and answers to display.       Expected Discharge   Expected discharge date/ time has not been documented.     Christiano Singh MD  01/25/25      36.3

## 2025-02-04 ENCOUNTER — NON-APPOINTMENT (OUTPATIENT)
Age: 75
End: 2025-02-04

## 2025-02-04 ENCOUNTER — APPOINTMENT (OUTPATIENT)
Dept: OPHTHALMOLOGY | Facility: CLINIC | Age: 75
End: 2025-02-04
Payer: MEDICARE

## 2025-02-04 PROCEDURE — 92012 INTRM OPH EXAM EST PATIENT: CPT | Mod: 25

## 2025-02-04 PROCEDURE — 92134 CPTRZ OPH DX IMG PST SGM RTA: CPT

## 2025-03-24 NOTE — ED ADULT NURSE NOTE - NS ED NURSE IV DC DT
DNR/Comfort measures only/Do not re-hospitalize/No blood draws/No artificial nutrition/No antibiotics/No IV fluids/DNI 29-May-2024 16:14

## 2025-05-06 ENCOUNTER — APPOINTMENT (OUTPATIENT)
Dept: OPHTHALMOLOGY | Facility: CLINIC | Age: 75
End: 2025-05-06
